# Patient Record
Sex: MALE | Race: WHITE | Employment: OTHER | URBAN - NONMETROPOLITAN AREA
[De-identification: names, ages, dates, MRNs, and addresses within clinical notes are randomized per-mention and may not be internally consistent; named-entity substitution may affect disease eponyms.]

---

## 2017-01-09 ENCOUNTER — OFFICE VISIT (OUTPATIENT)
Dept: FAMILY MEDICINE CLINIC | Age: 57
End: 2017-01-09

## 2017-01-09 VITALS
HEART RATE: 88 BPM | BODY MASS INDEX: 42.06 KG/M2 | WEIGHT: 284 LBS | HEIGHT: 69 IN | SYSTOLIC BLOOD PRESSURE: 130 MMHG | DIASTOLIC BLOOD PRESSURE: 80 MMHG | TEMPERATURE: 98.4 F

## 2017-01-09 DIAGNOSIS — J01.00 SUBACUTE MAXILLARY SINUSITIS: Primary | ICD-10-CM

## 2017-01-09 PROCEDURE — 99212 OFFICE O/P EST SF 10 MIN: CPT | Performed by: FAMILY MEDICINE

## 2017-01-09 RX ORDER — DOXYCYCLINE HYCLATE 100 MG/1
100 CAPSULE ORAL 2 TIMES DAILY
Qty: 20 CAPSULE | Refills: 0 | Status: SHIPPED | OUTPATIENT
Start: 2017-01-09 | End: 2017-01-19

## 2017-02-03 ENCOUNTER — TELEPHONE (OUTPATIENT)
Dept: UROLOGY | Age: 57
End: 2017-02-03

## 2017-02-03 ENCOUNTER — OFFICE VISIT (OUTPATIENT)
Dept: UROLOGY | Age: 57
End: 2017-02-03

## 2017-02-03 VITALS — BODY MASS INDEX: 43.4 KG/M2 | WEIGHT: 293 LBS | HEIGHT: 69 IN

## 2017-02-03 DIAGNOSIS — R97.20 ELEVATED PSA: Primary | ICD-10-CM

## 2017-02-03 PROCEDURE — 99214 OFFICE O/P EST MOD 30 MIN: CPT | Performed by: UROLOGY

## 2017-02-03 PROCEDURE — 81003 URINALYSIS AUTO W/O SCOPE: CPT | Performed by: UROLOGY

## 2017-02-03 RX ORDER — GENTAMICIN SULFATE 40 MG/ML
80 INJECTION, SOLUTION INTRAMUSCULAR; INTRAVENOUS ONCE
Qty: 2 ML | Refills: 0 | Status: SHIPPED | OUTPATIENT
Start: 2017-02-03 | End: 2017-02-03

## 2017-02-03 RX ORDER — TAMSULOSIN HCL 0.4 MG
0.4 CAPSULE ORAL DAILY
Qty: 30 CAPSULE | Refills: 2
Start: 2017-02-03 | End: 2017-04-05 | Stop reason: SDUPTHER

## 2017-02-03 RX ORDER — CIPROFLOXACIN 500 MG/1
500 TABLET, FILM COATED ORAL EVERY 12 HOURS
Qty: 6 TABLET | Refills: 0 | Status: SHIPPED | OUTPATIENT
Start: 2017-02-03 | End: 2017-02-06

## 2017-02-03 ASSESSMENT — ENCOUNTER SYMPTOMS
COLOR CHANGE: 0
DIARRHEA: 0
FACIAL SWELLING: 0
VOMITING: 0
EYE ITCHING: 0
EYE PAIN: 0
BACK PAIN: 1
CHEST TIGHTNESS: 0
SHORTNESS OF BREATH: 0

## 2017-02-06 ENCOUNTER — TELEPHONE (OUTPATIENT)
Dept: UROLOGY | Age: 57
End: 2017-02-06

## 2017-02-09 LAB
BILIRUBIN, POC: NORMAL
BLOOD URINE, POC: NORMAL
CLARITY, POC: NORMAL
COLOR, POC: NORMAL
GLUCOSE URINE, POC: NORMAL
KETONES, POC: NORMAL
LEUKOCYTE EST, POC: NORMAL
NITRITE, POC: NORMAL
PH, POC: NORMAL
PROTEIN, POC: NORMAL
SPECIFIC GRAVITY, POC: NORMAL
UROBILINOGEN, POC: NORMAL

## 2017-02-21 ENCOUNTER — TELEPHONE (OUTPATIENT)
Dept: UROLOGY | Age: 57
End: 2017-02-21

## 2017-02-21 ENCOUNTER — OFFICE VISIT (OUTPATIENT)
Dept: FAMILY MEDICINE CLINIC | Age: 57
End: 2017-02-21

## 2017-02-21 VITALS
HEART RATE: 84 BPM | BODY MASS INDEX: 42.32 KG/M2 | SYSTOLIC BLOOD PRESSURE: 132 MMHG | DIASTOLIC BLOOD PRESSURE: 76 MMHG | TEMPERATURE: 98.6 F | WEIGHT: 286.6 LBS

## 2017-02-21 DIAGNOSIS — J01.00 SUBACUTE MAXILLARY SINUSITIS: Primary | ICD-10-CM

## 2017-02-21 PROCEDURE — 99212 OFFICE O/P EST SF 10 MIN: CPT | Performed by: FAMILY MEDICINE

## 2017-02-21 RX ORDER — ALBUTEROL SULFATE 2.5 MG/3ML
2.5 SOLUTION RESPIRATORY (INHALATION) EVERY 4 HOURS PRN
Qty: 2 PACKAGE | Refills: 1 | Status: SHIPPED | OUTPATIENT
Start: 2017-02-21 | End: 2017-10-03 | Stop reason: SDUPTHER

## 2017-02-21 RX ORDER — PRAVASTATIN SODIUM 20 MG
20 TABLET ORAL DAILY
COMMUNITY
End: 2019-10-08

## 2017-02-21 RX ORDER — DOXYCYCLINE HYCLATE 100 MG/1
100 CAPSULE ORAL 2 TIMES DAILY
Qty: 20 CAPSULE | Refills: 0 | Status: SHIPPED | OUTPATIENT
Start: 2017-02-21 | End: 2017-03-03

## 2017-03-03 ENCOUNTER — TELEPHONE (OUTPATIENT)
Dept: UROLOGY | Age: 57
End: 2017-03-03

## 2017-03-24 RX ORDER — INSULIN GLARGINE 100 [IU]/ML
54 INJECTION, SOLUTION SUBCUTANEOUS 2 TIMES DAILY
Qty: 32.4 ML | Refills: 5 | Status: SHIPPED | OUTPATIENT
Start: 2017-03-24 | End: 2017-05-03

## 2017-04-05 ENCOUNTER — OFFICE VISIT (OUTPATIENT)
Dept: FAMILY MEDICINE CLINIC | Age: 57
End: 2017-04-05

## 2017-04-05 VITALS
HEIGHT: 69 IN | SYSTOLIC BLOOD PRESSURE: 124 MMHG | DIASTOLIC BLOOD PRESSURE: 76 MMHG | BODY MASS INDEX: 42.21 KG/M2 | WEIGHT: 285 LBS | HEART RATE: 100 BPM

## 2017-04-05 DIAGNOSIS — E11.9 WELL CONTROLLED TYPE 2 DIABETES MELLITUS (HCC): ICD-10-CM

## 2017-04-05 DIAGNOSIS — I25.10 CORONARY ARTERY DISEASE INVOLVING NATIVE CORONARY ARTERY OF NATIVE HEART WITHOUT ANGINA PECTORIS: Primary | ICD-10-CM

## 2017-04-05 DIAGNOSIS — E78.00 PURE HYPERCHOLESTEROLEMIA: ICD-10-CM

## 2017-04-05 DIAGNOSIS — J40 BRONCHITIS: ICD-10-CM

## 2017-04-05 PROCEDURE — 99213 OFFICE O/P EST LOW 20 MIN: CPT | Performed by: FAMILY MEDICINE

## 2017-04-05 RX ORDER — FLUTICASONE PROPIONATE 50 MCG
SPRAY, SUSPENSION (ML) NASAL
Qty: 3 BOTTLE | Refills: 1 | Status: SHIPPED | OUTPATIENT
Start: 2017-04-05 | End: 2017-10-03 | Stop reason: SDUPTHER

## 2017-04-05 RX ORDER — PANTOPRAZOLE SODIUM 40 MG/1
40 TABLET, DELAYED RELEASE ORAL DAILY
Qty: 90 TABLET | Refills: 1 | Status: SHIPPED | OUTPATIENT
Start: 2017-04-05 | End: 2017-10-03 | Stop reason: SDUPTHER

## 2017-04-05 RX ORDER — GEMFIBROZIL 600 MG/1
600 TABLET, FILM COATED ORAL 2 TIMES DAILY
Qty: 180 TABLET | Refills: 1 | Status: SHIPPED | OUTPATIENT
Start: 2017-04-05 | End: 2017-10-03 | Stop reason: SDUPTHER

## 2017-04-05 RX ORDER — ALPRAZOLAM 1 MG/1
1 TABLET ORAL 2 TIMES DAILY PRN
Qty: 180 TABLET | Refills: 1 | Status: SHIPPED | OUTPATIENT
Start: 2017-04-05 | End: 2017-10-03 | Stop reason: SDUPTHER

## 2017-04-05 RX ORDER — CLOPIDOGREL BISULFATE 75 MG/1
TABLET ORAL
Qty: 90 TABLET | Refills: 1 | Status: SHIPPED | OUTPATIENT
Start: 2017-04-05 | End: 2019-07-09 | Stop reason: SDUPTHER

## 2017-04-05 RX ORDER — ENALAPRIL MALEATE 10 MG/1
10 TABLET ORAL DAILY
Qty: 90 TABLET | Refills: 1 | Status: SHIPPED | OUTPATIENT
Start: 2017-04-05 | End: 2017-10-03 | Stop reason: SDUPTHER

## 2017-04-05 RX ORDER — TAMSULOSIN HCL 0.4 MG
0.4 CAPSULE ORAL DAILY
Qty: 30 CAPSULE | Refills: 2 | Status: SHIPPED | OUTPATIENT
Start: 2017-04-05 | End: 2017-07-11 | Stop reason: SDUPTHER

## 2017-04-05 RX ORDER — DOXYCYCLINE HYCLATE 100 MG/1
100 CAPSULE ORAL 2 TIMES DAILY
Qty: 20 CAPSULE | Refills: 0 | Status: SHIPPED | OUTPATIENT
Start: 2017-04-05 | End: 2017-04-15

## 2017-04-05 RX ORDER — METOPROLOL TARTRATE 50 MG/1
25 TABLET, FILM COATED ORAL 2 TIMES DAILY
Qty: 90 TABLET | Refills: 1 | Status: SHIPPED | OUTPATIENT
Start: 2017-04-05 | End: 2019-07-09 | Stop reason: SDUPTHER

## 2017-04-17 ENCOUNTER — TELEPHONE (OUTPATIENT)
Dept: FAMILY MEDICINE CLINIC | Age: 57
End: 2017-04-17

## 2017-04-17 RX ORDER — DOXYCYCLINE HYCLATE 100 MG/1
100 CAPSULE ORAL 2 TIMES DAILY
Qty: 20 CAPSULE | Refills: 0 | Status: SHIPPED | OUTPATIENT
Start: 2017-04-17 | End: 2017-04-27

## 2017-06-02 ENCOUNTER — TELEPHONE (OUTPATIENT)
Dept: UROLOGY | Age: 57
End: 2017-06-02

## 2017-06-02 DIAGNOSIS — R97.20 ELEVATED PSA: Primary | ICD-10-CM

## 2017-06-12 ENCOUNTER — TELEPHONE (OUTPATIENT)
Dept: UROLOGY | Age: 57
End: 2017-06-12

## 2017-07-11 ENCOUNTER — TELEPHONE (OUTPATIENT)
Dept: FAMILY MEDICINE CLINIC | Age: 57
End: 2017-07-11

## 2017-07-11 ENCOUNTER — OFFICE VISIT (OUTPATIENT)
Dept: FAMILY MEDICINE CLINIC | Age: 57
End: 2017-07-11

## 2017-07-11 VITALS
DIASTOLIC BLOOD PRESSURE: 74 MMHG | HEART RATE: 70 BPM | HEIGHT: 69 IN | WEIGHT: 285.4 LBS | BODY MASS INDEX: 42.27 KG/M2 | SYSTOLIC BLOOD PRESSURE: 138 MMHG

## 2017-07-11 DIAGNOSIS — M54.2 NECK PAIN: ICD-10-CM

## 2017-07-11 DIAGNOSIS — M25.512 ACUTE PAIN OF LEFT SHOULDER: Primary | ICD-10-CM

## 2017-07-11 DIAGNOSIS — S43.52XA ACROMIOCLAVICULAR SPRAIN, LEFT, INITIAL ENCOUNTER: ICD-10-CM

## 2017-07-11 PROCEDURE — 99213 OFFICE O/P EST LOW 20 MIN: CPT | Performed by: FAMILY MEDICINE

## 2017-07-11 RX ORDER — LEG BRACE
EACH MISCELLANEOUS
Qty: 1 EACH | Refills: 0 | Status: SHIPPED | OUTPATIENT
Start: 2017-07-11 | End: 2017-10-03

## 2017-07-11 ASSESSMENT — ENCOUNTER SYMPTOMS
SHORTNESS OF BREATH: 0
WHEEZING: 0

## 2017-07-12 RX ORDER — TAMSULOSIN HYDROCHLORIDE 0.4 MG/1
CAPSULE ORAL
Qty: 30 CAPSULE | Refills: 2 | Status: SHIPPED | OUTPATIENT
Start: 2017-07-12 | End: 2017-10-03 | Stop reason: SDUPTHER

## 2017-07-20 ENCOUNTER — HOSPITAL ENCOUNTER (OUTPATIENT)
Dept: PHYSICAL THERAPY | Age: 57
Setting detail: THERAPIES SERIES
Discharge: HOME | End: 2017-07-20
Payer: MEDICAID

## 2017-07-20 PROCEDURE — 97035 APP MDLTY 1+ULTRASOUND EA 15: CPT

## 2017-07-20 PROCEDURE — 97110 THERAPEUTIC EXERCISES: CPT

## 2017-07-20 PROCEDURE — 97163 PT EVAL HIGH COMPLEX 45 MIN: CPT

## 2017-07-20 ASSESSMENT — PAIN SCALES - GENERAL: PAINLEVEL_OUTOF10: 7

## 2017-07-20 ASSESSMENT — PAIN DESCRIPTION - ORIENTATION: ORIENTATION: LEFT

## 2017-07-20 ASSESSMENT — PAIN DESCRIPTION - LOCATION: LOCATION: SHOULDER;NECK

## 2017-07-21 ENCOUNTER — HOSPITAL ENCOUNTER (OUTPATIENT)
Dept: PHYSICAL THERAPY | Age: 57
Setting detail: THERAPIES SERIES
Discharge: HOME | End: 2017-07-21
Payer: MEDICAID

## 2017-07-21 PROCEDURE — 97110 THERAPEUTIC EXERCISES: CPT

## 2017-07-21 PROCEDURE — 97035 APP MDLTY 1+ULTRASOUND EA 15: CPT

## 2017-07-21 ASSESSMENT — PAIN SCALES - GENERAL: PAINLEVEL_OUTOF10: 7

## 2017-07-21 ASSESSMENT — PAIN DESCRIPTION - LOCATION: LOCATION: SHOULDER;NECK

## 2017-07-21 ASSESSMENT — PAIN DESCRIPTION - ORIENTATION: ORIENTATION: LEFT

## 2017-07-24 ENCOUNTER — HOSPITAL ENCOUNTER (OUTPATIENT)
Dept: PHYSICAL THERAPY | Age: 57
Setting detail: THERAPIES SERIES
Discharge: HOME | End: 2017-07-24
Payer: MEDICAID

## 2017-07-24 PROCEDURE — 97035 APP MDLTY 1+ULTRASOUND EA 15: CPT

## 2017-07-24 PROCEDURE — 97110 THERAPEUTIC EXERCISES: CPT

## 2017-07-24 ASSESSMENT — PAIN DESCRIPTION - ORIENTATION: ORIENTATION: LEFT

## 2017-07-24 ASSESSMENT — PAIN DESCRIPTION - LOCATION: LOCATION: SHOULDER;NECK

## 2017-07-24 ASSESSMENT — PAIN SCALES - GENERAL: PAINLEVEL_OUTOF10: 6

## 2017-07-25 ENCOUNTER — OFFICE VISIT (OUTPATIENT)
Dept: FAMILY MEDICINE CLINIC | Age: 57
End: 2017-07-25
Payer: MEDICAID

## 2017-07-25 VITALS
DIASTOLIC BLOOD PRESSURE: 64 MMHG | SYSTOLIC BLOOD PRESSURE: 132 MMHG | HEART RATE: 74 BPM | BODY MASS INDEX: 41.86 KG/M2 | HEIGHT: 69 IN | WEIGHT: 282.6 LBS

## 2017-07-25 DIAGNOSIS — S43.52XA ACROMIOCLAVICULAR SPRAIN, LEFT, INITIAL ENCOUNTER: ICD-10-CM

## 2017-07-25 DIAGNOSIS — M25.612 STIFFNESS OF SHOULDER JOINT, LEFT: ICD-10-CM

## 2017-07-25 DIAGNOSIS — M25.512 ACUTE PAIN OF LEFT SHOULDER: Primary | ICD-10-CM

## 2017-07-25 PROCEDURE — 99213 OFFICE O/P EST LOW 20 MIN: CPT | Performed by: FAMILY MEDICINE

## 2017-07-26 ENCOUNTER — HOSPITAL ENCOUNTER (OUTPATIENT)
Dept: PHYSICAL THERAPY | Age: 57
Setting detail: THERAPIES SERIES
Discharge: HOME | End: 2017-07-26
Payer: MEDICAID

## 2017-07-26 PROCEDURE — 97035 APP MDLTY 1+ULTRASOUND EA 15: CPT

## 2017-07-26 PROCEDURE — 97110 THERAPEUTIC EXERCISES: CPT

## 2017-07-26 ASSESSMENT — PAIN DESCRIPTION - LOCATION: LOCATION: NECK;SHOULDER

## 2017-07-26 ASSESSMENT — PAIN SCALES - GENERAL: PAINLEVEL_OUTOF10: 4

## 2017-07-31 RX ORDER — PEN NEEDLE, DIABETIC 31 GX5/16"
NEEDLE, DISPOSABLE MISCELLANEOUS
Qty: 100 EACH | Refills: 1 | Status: SHIPPED | OUTPATIENT
Start: 2017-07-31 | End: 2017-10-03 | Stop reason: SDUPTHER

## 2017-08-01 ENCOUNTER — HOSPITAL ENCOUNTER (OUTPATIENT)
Dept: PHYSICAL THERAPY | Age: 57
Setting detail: THERAPIES SERIES
Discharge: HOME | End: 2017-08-01
Payer: MEDICAID

## 2017-08-01 PROCEDURE — 97110 THERAPEUTIC EXERCISES: CPT

## 2017-08-01 PROCEDURE — 97035 APP MDLTY 1+ULTRASOUND EA 15: CPT

## 2017-08-01 ASSESSMENT — PAIN SCALES - GENERAL: PAINLEVEL_OUTOF10: 2

## 2017-08-01 ASSESSMENT — PAIN DESCRIPTION - LOCATION: LOCATION: NECK;SHOULDER

## 2017-08-01 ASSESSMENT — PAIN DESCRIPTION - ORIENTATION: ORIENTATION: LEFT

## 2017-08-03 ENCOUNTER — HOSPITAL ENCOUNTER (OUTPATIENT)
Dept: PHYSICAL THERAPY | Age: 57
Setting detail: THERAPIES SERIES
Discharge: HOME | End: 2017-08-03
Payer: MEDICAID

## 2017-08-03 PROCEDURE — 97110 THERAPEUTIC EXERCISES: CPT

## 2017-08-03 ASSESSMENT — PAIN SCALES - GENERAL: PAINLEVEL_OUTOF10: 0

## 2017-08-22 ENCOUNTER — OFFICE VISIT (OUTPATIENT)
Dept: FAMILY MEDICINE CLINIC | Age: 57
End: 2017-08-22
Payer: MEDICAID

## 2017-08-22 VITALS
DIASTOLIC BLOOD PRESSURE: 70 MMHG | HEART RATE: 72 BPM | HEIGHT: 69 IN | BODY MASS INDEX: 42.03 KG/M2 | SYSTOLIC BLOOD PRESSURE: 130 MMHG | WEIGHT: 283.8 LBS

## 2017-08-22 DIAGNOSIS — S43.52XA ACROMIOCLAVICULAR SPRAIN, LEFT, INITIAL ENCOUNTER: ICD-10-CM

## 2017-08-22 DIAGNOSIS — M25.512 ACUTE PAIN OF LEFT SHOULDER: Primary | ICD-10-CM

## 2017-08-22 DIAGNOSIS — R20.0 BILATERAL HAND NUMBNESS: ICD-10-CM

## 2017-08-22 PROCEDURE — 99213 OFFICE O/P EST LOW 20 MIN: CPT | Performed by: FAMILY MEDICINE

## 2017-10-02 ENCOUNTER — HOSPITAL ENCOUNTER (OUTPATIENT)
Dept: PHYSICAL THERAPY | Age: 57
Setting detail: THERAPIES SERIES
Discharge: HOME | End: 2017-10-02
Payer: COMMERCIAL

## 2017-10-03 ENCOUNTER — OFFICE VISIT (OUTPATIENT)
Dept: FAMILY MEDICINE CLINIC | Age: 57
End: 2017-10-03
Payer: COMMERCIAL

## 2017-10-03 VITALS
WEIGHT: 276.6 LBS | BODY MASS INDEX: 40.97 KG/M2 | HEIGHT: 69 IN | SYSTOLIC BLOOD PRESSURE: 148 MMHG | DIASTOLIC BLOOD PRESSURE: 90 MMHG | HEART RATE: 72 BPM

## 2017-10-03 DIAGNOSIS — F41.9 ANXIETY: ICD-10-CM

## 2017-10-03 DIAGNOSIS — J40 BRONCHITIS: ICD-10-CM

## 2017-10-03 DIAGNOSIS — R97.20 ELEVATED PSA: ICD-10-CM

## 2017-10-03 DIAGNOSIS — K21.9 GASTROESOPHAGEAL REFLUX DISEASE WITHOUT ESOPHAGITIS: ICD-10-CM

## 2017-10-03 DIAGNOSIS — J30.1 SEASONAL ALLERGIC RHINITIS DUE TO POLLEN: ICD-10-CM

## 2017-10-03 DIAGNOSIS — I10 ESSENTIAL HYPERTENSION: Primary | ICD-10-CM

## 2017-10-03 DIAGNOSIS — Z12.11 COLON CANCER SCREENING: ICD-10-CM

## 2017-10-03 DIAGNOSIS — E11.9 WELL CONTROLLED TYPE 2 DIABETES MELLITUS (HCC): Chronic | ICD-10-CM

## 2017-10-03 DIAGNOSIS — N40.0 BENIGN NON-NODULAR PROSTATIC HYPERPLASIA WITHOUT LOWER URINARY TRACT SYMPTOMS: ICD-10-CM

## 2017-10-03 LAB
CREATININE URINE POCT: 300
MICROALBUMIN/CREAT 24H UR: 80 MG/G{CREAT}
MICROALBUMIN/CREAT UR-RTO: NORMAL

## 2017-10-03 PROCEDURE — 82044 UR ALBUMIN SEMIQUANTITATIVE: CPT | Performed by: FAMILY MEDICINE

## 2017-10-03 PROCEDURE — 99214 OFFICE O/P EST MOD 30 MIN: CPT | Performed by: FAMILY MEDICINE

## 2017-10-03 RX ORDER — FLUTICASONE PROPIONATE 50 MCG
SPRAY, SUSPENSION (ML) NASAL
Qty: 3 BOTTLE | Refills: 1 | Status: SHIPPED | OUTPATIENT
Start: 2017-10-03 | End: 2018-04-11 | Stop reason: SDUPTHER

## 2017-10-03 RX ORDER — ALBUTEROL SULFATE 2.5 MG/3ML
2.5 SOLUTION RESPIRATORY (INHALATION) EVERY 4 HOURS PRN
Qty: 2 PACKAGE | Refills: 1 | Status: SHIPPED | OUTPATIENT
Start: 2017-10-03 | End: 2020-01-07 | Stop reason: SDUPTHER

## 2017-10-03 RX ORDER — PANTOPRAZOLE SODIUM 40 MG/1
40 TABLET, DELAYED RELEASE ORAL DAILY
Qty: 90 TABLET | Refills: 1 | Status: SHIPPED | OUTPATIENT
Start: 2017-10-03 | End: 2018-04-02 | Stop reason: SDUPTHER

## 2017-10-03 RX ORDER — GEMFIBROZIL 600 MG/1
600 TABLET, FILM COATED ORAL 2 TIMES DAILY
Qty: 180 TABLET | Refills: 1 | Status: SHIPPED | OUTPATIENT
Start: 2017-10-03 | End: 2018-04-11 | Stop reason: SDUPTHER

## 2017-10-03 RX ORDER — ENALAPRIL MALEATE 10 MG/1
10 TABLET ORAL DAILY
Qty: 90 TABLET | Refills: 1 | Status: SHIPPED | OUTPATIENT
Start: 2017-10-03 | End: 2018-01-11 | Stop reason: SDUPTHER

## 2017-10-03 RX ORDER — ALBUTEROL SULFATE 90 UG/1
2 AEROSOL, METERED RESPIRATORY (INHALATION) EVERY 4 HOURS PRN
Qty: 1 INHALER | Refills: 0 | Status: SHIPPED | OUTPATIENT
Start: 2017-10-03 | End: 2017-12-26 | Stop reason: SDUPTHER

## 2017-10-03 RX ORDER — ALPRAZOLAM 1 MG/1
1 TABLET ORAL 2 TIMES DAILY PRN
Qty: 180 TABLET | Refills: 0 | Status: SHIPPED | OUTPATIENT
Start: 2017-10-03 | End: 2018-01-01 | Stop reason: SDUPTHER

## 2017-10-03 RX ORDER — TAMSULOSIN HYDROCHLORIDE 0.4 MG/1
0.4 CAPSULE ORAL DAILY
Qty: 90 CAPSULE | Refills: 1 | Status: SHIPPED | OUTPATIENT
Start: 2017-10-03 | End: 2018-03-04 | Stop reason: SDUPTHER

## 2017-10-03 ASSESSMENT — ENCOUNTER SYMPTOMS
COUGH: 1
SHORTNESS OF BREATH: 0
WHEEZING: 0

## 2017-10-03 NOTE — MR AVS SNAPSHOT
After Visit Summary             Liv Jeff   10/3/2017 8:45 AM   Office Visit    Description:  Male : 1960   Provider:  Connor Tolliver MD   Department:  [address] [place] [address] and Future Appointments         Below is a list of your follow-up and future appointments. This may not be a complete list as you may have made appointments directly with providers that we are not aware of or your providers may have made some for you. Please call your providers to confirm appointments. It is important to keep your appointments. Please bring your current insurance card, photo ID, co-pay, and all medication bottles to your appointment. If self-pay, payment is expected at the time of service. Your To-Do List     Future Orders Complete By Expires    POCT Fecal Immunochemical Test (FIT) [[id number] Custom]  2017    Follow-Up    Return in about 3 months (around 1/3/2018) for DM, anxiety. Information from Your Visit        Department     Name Address Phone Fax    [facility] [address] [facility] [phone]      You Were Seen for:         Comments    Essential hypertension   [[id number]]         Vital Signs     Blood Pressure Pulse Height Weight Body Mass Index Smoking Status    148/90 72 5' 9\" (1.753 m) 276 lb 9.6 oz (125.5 kg) 40.85 kg/m2 Former Smoker      Additional Information about your Body Mass Index (BMI)           Your BMI as listed above is considered obese (30 or more). BMI is an estimate of body fat, calculated from your height and weight. The higher your BMI, the greater your risk of heart disease, high blood pressure, type 2 diabetes, stroke, gallstones, arthritis, sleep apnea, and certain cancers. BMI is not perfect. It may overestimate body fat in athletes and people who are more muscular.   Even a small weight loss (between 5 and 10 gemfibrozil (LOPID) 600 MG tablet Take 1 tablet by mouth 2 times daily    metFORMIN (GLUCOPHAGE) 1000 MG tablet Take 1 tablet by mouth 2 times daily (with meals)    Insulin Syringes, Disposable, U-100 1 ML MISC 1 each by Does not apply route 3 times daily 31 gauge x 8 mm  ultrafine 2    ALPRAZolam (XANAX) 1 MG tablet Take 1 tablet by mouth 2 times daily as needed for Sleep or Anxiety    enalapril (VASOTEC) 10 MG tablet Take 1 tablet by mouth daily    pantoprazole (PROTONIX) 40 MG tablet Take 1 tablet by mouth daily    insulin aspart (NOVOLOG) 100 UNIT/ML injection vial Inject 10 Units into the skin 2 times daily as needed for High Blood Sugar    fluticasone (FLONASE) 50 MCG/ACT nasal spray One spray in each nostril q hs    tamsulosin (FLOMAX) 0.4 MG capsule Take 1 capsule by mouth daily    insulin glargine (BASAGLAR KWIKPEN) 100 UNIT/ML injection pen Inject 54 Units into the skin 2 times daily    Insulin Pen Needle (B-D ULTRAFINE III SHORT PEN) 31G X 8 MM MISC 1 each by Other route 2 times daily    clopidogrel (PLAVIX) 75 MG tablet take 1 tablet by mouth once daily    metoprolol tartrate (LOPRESSOR) 50 MG tablet Take 0.5 tablets by mouth 2 times daily    pravastatin (PRAVACHOL) 20 MG tablet Take 20 mg by mouth daily    nitroGLYCERIN (NITROSTAT) 0.4 MG SL tablet Place 0.4 mg under the tongue every 5 minutes as needed for Chest pain    vitamin D (ERGOCALCIFEROL) 400 UNITS CAPS Take 400 Units by mouth daily. aspirin 325 MG tablet Take 325 mg by mouth daily.         Allergies              Darvocet [Propoxyphene N-acetaminophen]     Darvon [Propoxyphene Hcl]     Flexeril [Cyclobenzaprine] Other (See Comments)    Headache    Morphine     Motrin [Ibuprofen Micronized]     Tylenol [Acetaminophen] Nausea Only    Ultram [Tramadol] Itching    Baclofen Other (See Comments)      We Ordered/Performed the following            DIABETES FOOT EXAM     POCT microalbumin          Additional Information Basic Information     Date Of Birth Sex Race Ethnicity Preferred Language Preferred Written Language    1960 Male White Non-/Non  English English      Problem List as of 10/3/2017  Date Reviewed: 10/3/2017                CAD (coronary artery disease)    Chest pain, atypical    Chronic low back pain    Hypertriglyceridemia    Obesity    Hypertension    Hyperlipidemia    Abnormal stress test    Back pain at L4-L5 level    ORBSON (obstructive sleep apnea)    Well controlled type 2 diabetes mellitus (HCC) (Chronic)    Lumbar spinal stenosis (Chronic)    Elevated PSA      Immunizations as of 10/3/2017     Name Date    Influenza, Travis Obregon, 3 Years and older, IM 11/4/2016      Preventive Care        Date Due    Hepatitis C screening is recommended for all adults regardless of risk factors born between [facility] at least once (lifetime) who have never been tested. 1960    HIV screening is recommended for all people regardless of risk factors  aged 15-65 years at least once (lifetime) who have never been HIV tested. 7/10/1976    Urine Check For Kidney Problems 7/10/1979    Tetanus Combination Vaccine (1 - Tdap) 7/10/1980    Pneumococcal Vaccine - Pneumovax for adults aged 19-64 years with: chronic heart disease, chronic lung disease, diabetes mellitus, alcoholism, chronic liver disease, or cigarette smoking. (1 of 1 - PPSV23) 7/10/1980    Colon Cancer Stool Test 7/29/2017    Yearly Flu Vaccine (1) 9/1/2017    Diabetic Foot Exam 10/5/2017    Eye Exam By An Eye Doctor 10/18/2017    Hemoglobin A1C (Test For Long-Term Glucose Control) 6/7/2018    Cholesterol Screening 6/7/2018            MyChart Signup           Our records indicate that you have declined MyChart signup.

## 2017-10-03 NOTE — LETTER
· I will actively participate in any program designed to improve function, including social, physical, psychological and daily or work activities. 2. I will not use illegal or street drugs or another person's prescription. If I have an addiction problem with drugs or alcohol and my provider asks me to enter a program to address this issue, I agree to follow through. Such programs may include:  · 12-Step program and securing a sponsor  · Individual counseling   · Inpatient or outpatient treatment  · Other:_____________________________________________________________________________________________________________________________________________    If in treatment, I will request that a copy of the programs initial evaluation and treatment recommendations be sent to this provider and will not expect refills until that is received. I will also request written monthly updates be sent to this provider to verify my continuing treatment. 3. I will consent to drug screening upon my providers request to assure I am only taking the prescribed drugs, described in this MEDICATION AGREEMENT. I understand that a drug screen is a laboratory test in which a sample of my urine, blood or saliva is checked to see what drugs I have been taking. 4. I agree that I will treat the providers and staff at this office with respect at all times. I will keep all of my scheduled appointments, but if I need to cancel my appointment, I will do so a minimum of 24 hours before it is scheduled. 5. I understand that this provider may stop prescribing the medications listed if:  · I do not show any improvement in pain, or my activity has not improved. · I develop rapid tolerance or loss of improvement, as described in my treatment plan. · I develop significant side effects from the medication.   · My behavior is inconsistent with the responsibilities outlined above, which may also result in my being prevented from receiving further care from this office. · Other:____________________________________________________________________    AGREEMENT:    I have read the above and have had all of my questions answered. For chronic disease management, I know that my symptoms can be managed with many types of treatments. A chronic medication trial may be part of my treatment, but I must be an active participant in my care. Medication therapy is only one part of my symptom management plan. In some cases, there may be limited scientific evidence to support the chronic use of certain medications to improve symptoms and daily function. Furthermore, in certain circumstances, there may be scientific information that suggests that use of chronic controlled substances may actually worsen my symptoms and increase my risk of unintentional death directly related to this medication therapy. I know that if my provider feels my risk from controlled medications is greater than my benefit, I will have my controlled substance medication(s) compassionately lowered or removed altogether. I agree to a controlled substance medication trial.      I further agree to allow this office to contact family or friends if there are concerns about my safety and use of the controlled medications. I have agreed to use the following medications above as instructed by my physician and as stated in this [id number].      Patient Signature:  ______________________  Date:10/3/2017 or _____________    Provider Signature:______________________  Date:10/3/2017 or _____________

## 2017-10-03 NOTE — PROGRESS NOTES
[facility] PROFESSIONAL SERVS  [facility]  [address] [address]  Dept: [phone]  Dept Fax: [phone]  Loc: [phone]  PROGRESS NOTE      Visit Date: 10/3/2017    Julianna Quinteros is a 65 y.o. male who presents today for:  Chief Complaint   Patient presents with    6 Month Follow-Up    Hyperlipidemia    Hypertension    Diabetes       Subjective:  HPI    6 month f/u for    DM: Takes basaglar 54 units twice daily. Takes novolog 10 units twice daily. On metformin twice daily. Glucose 154 this morning. All glucose less than 160. He does arm weight lifting. a1c was 6.8% in June. HTN:  On metoprolol and enalapril. Anxiety/sleep:  Takes xanax 1mg 2x per day which is effective. Elevated PSA. He has not had a biopsy. He is being followed by urology. Has a cough and URI symptoms    Review of Systems   Constitutional: Negative for chills and fever. Respiratory: Positive for cough. Negative for shortness of breath and wheezing. Cardiovascular: Negative for chest pain and leg swelling. Past Medical History:   Diagnosis Date    Abnormal stress test Sept 2012    Lateral Wall Ischemia- done at [facility]    CAD (coronary artery disease)     Chronic low back pain     Diabetes mellitus ([address]     Diabetes mellitus ([address]     Hyperlipidemia     Hypertension     Hypertriglyceridemia     MI (myocardial infarction) ([address] 2004    Obesity       Past Surgical History:   Procedure Laterality Date    BACK SURGERY  November 1997    L4 & L5 fusion   [facility] CARDIAC SURGERY  October 22, 2004    Cardiac cath with stent placement x1    COLONOSCOPY  October 2010   [address]    Mesh repair in abdomen.      Family History   Problem Relation Age of Onset    Diabetes Mother     Heart Disease Mother     Arthritis Father     Diabetes Father     Heart Disease Father     Diabetes Sister     Diabetes Brother     Heart Disease Brother      Social History   Substance Use Topics    Smoking status: Former Smoker     Packs/day: 0.25     Years: 2.00     Types: Cigarettes     Quit date: 1/1/1995    Smokeless tobacco: Never Used    Alcohol use No      Current Outpatient Prescriptions   Medication Sig Dispense Refill    B-D ULTRAFINE III SHORT PEN 31G X 8 MM MISC USE WITH BASAGLAR TWICE DAILY. 100 each 1    BASAGLAR KWIKPEN 100 UNIT/ML injection pen INJECT 54 UNITS SUBCUTANEOUSLY TWICE A DAY.  30 Pen 1    tamsulosin (FLOMAX) 0.4 MG capsule take 1 capsule by mouth once daily 30 capsule 2    clopidogrel (PLAVIX) 75 MG tablet take 1 tablet by mouth once daily 90 tablet 1    fluticasone (FLONASE) 50 MCG/ACT nasal spray One spray in each nostril q hs 3 Bottle 1    insulin aspart (NOVOLOG) 100 UNIT/ML injection vial Inject 10 Units into the skin 2 times daily as needed for High Blood Sugar 3 vial 1    pantoprazole (PROTONIX) 40 MG tablet Take 1 tablet by mouth daily 90 tablet 1    enalapril (VASOTEC) 10 MG tablet Take 1 tablet by mouth daily 90 tablet 1    ALPRAZolam (XANAX) 1 MG tablet Take 1 tablet by mouth 2 times daily as needed for Sleep or Anxiety 180 tablet 1    metFORMIN (GLUCOPHAGE) 1000 MG tablet Take 1 tablet by mouth 2 times daily (with meals) 180 tablet 1    Insulin Syringes, Disposable, U-100 1 ML MISC 1 each by Does not apply route 3 times daily 31 gauge x 8 mm  ultrafine 2 100 each 5    metoprolol tartrate (LOPRESSOR) 50 MG tablet Take 0.5 tablets by mouth 2 times daily 90 tablet 1    gemfibrozil (LOPID) 600 MG tablet Take 1 tablet by mouth 2 times daily 180 tablet 1    pravastatin (PRAVACHOL) 20 MG tablet Take 20 mg by mouth daily      albuterol (PROVENTIL) (2.5 MG/3ML) 0.083% nebulizer solution Take 3 mLs by nebulization every 4 hours as needed for Wheezing 2 Package 1    albuterol sulfate HFA (VENTOLIN HFA) 108 (90 BASE) MCG/ACT inhaler Inhale 2 puffs into the lungs every 4 hours as needed for Wheezing 1 Inhaler 0    nitroGLYCERIN Benign non-nodular prostatic hyperplasia without lower urinary tract symptoms  controlled  -refill tamsulosin (FLOMAX) 0.4 MG capsule; Take 1 capsule by mouth daily  Dispense: 90 capsule; Refill: 1    7. Bronchitis  -intermittent bronchitis symptoms when he gets sick. He has albuterol he keeps for these episodes. - albuterol sulfate HFA (VENTOLIN HFA) 108 (90 Base) MCG/ACT inhaler; Inhale 2 puffs into the lungs every 4 hours as needed for Wheezing  Dispense: 1 Inhaler; Refill: 0  - albuterol (PROVENTIL) (2.5 MG/3ML) 0.083% nebulizer solution; Take 3 mLs by nebulization every 4 hours as needed for Wheezing  Dispense: 2 Package; Refill: 1    8. Gastroesophageal reflux disease without esophagitis  controlled  -refill pantoprazole (PROTONIX) 40 MG tablet; Take 1 tablet by mouth daily  Dispense: 90 tablet; Refill: 1    9. Colon cancer screening  - POCT Fecal Immunochemical Test (FIT); Future    Controlled Substances Monitoring:     Attestation: The Prescription Monitoring Report for this patient was reviewed today. Bernadette Yi MD)  Documentation: Possible medication side effects, risk of tolerance and/or dependence, and alternative treatments discussed., No signs of potential drug abuse or diversion identified. Bernadette Yi MD)  Medication Contracts: Medication contract signed today. Bernadette Yi MD)      They voiced understanding. All questions answered. They agreed with treatment plan. Educational materials given - see patient instructions. Discussed use, benefit, and side effects of prescribed medications. Reviewed health maintenance. Return in about 3 months (around 1/3/2018) for DM, anxiety.        Electronically signed by Bernadette Yi MD on 10/3/2017 at 8:45 AM

## 2017-10-03 NOTE — PATIENT INSTRUCTIONS
soft. Use moisturizing skin cream to keep the skin on your feet soft and prevent calluses and cracks. But do not put the cream between your toes, and stop using any cream that causes a rash. ¨ Clean underneath your toenails carefully. Do not use a sharp object to clean underneath your toenails. Use the blunt end of a nail file or other rounded tool. ¨ Trim and file your toenails straight across to prevent ingrown toenails. Use a nail clipper, not scissors. Use an emery board to smooth the edges. · Change socks daily. Socks without seams are best, because seams often rub the feet. You can find socks for people with diabetes from specialty catalogs. · Look inside your shoes every day for things like gravel or torn linings, which could cause blisters or sores. · Buy shoes that fit well:  ¨ Look for shoes that have plenty of space around the toes. This helps prevent bunions and blisters. ¨ Try on shoes while wearing the kind of socks you will usually wear with the shoes. ¨ Avoid plastic shoes. They may rub your feet and cause blisters. Good shoes should be made of materials that are flexible and breathable, such as leather or cloth. ¨ Break in new shoes slowly by wearing them for no more than an hour a day for several days. Take extra time to check your feet for red areas, blisters, or other problems after you wear new shoes. · Do not go barefoot. Do not wear sandals, and do not wear shoes with very thin soles. Thin soles are easy to puncture. They also do not protect your feet from hot pavement or cold weather. · Have your doctor check your feet during each visit. If you have a foot problem, see your doctor. Do not try to treat an early foot problem at home. Home remedies or treatments that you can buy without a prescription (such as corn removers) can be harmful. · Always get early treatment for foot problems. A minor irritation can lead to a major problem if not properly cared for early.   When should you

## 2017-11-06 ENCOUNTER — TELEPHONE (OUTPATIENT)
Dept: FAMILY MEDICINE CLINIC | Age: 57
End: 2017-11-06

## 2017-11-06 DIAGNOSIS — Z12.11 COLON CANCER SCREENING: ICD-10-CM

## 2017-11-06 LAB
CONTROL: PRESENT
HEMOCCULT STL QL: NEGATIVE

## 2017-11-06 PROCEDURE — 82274 ASSAY TEST FOR BLOOD FECAL: CPT | Performed by: FAMILY MEDICINE

## 2017-12-26 ENCOUNTER — OFFICE VISIT (OUTPATIENT)
Dept: FAMILY MEDICINE CLINIC | Age: 57
End: 2017-12-26
Payer: COMMERCIAL

## 2017-12-26 VITALS
WEIGHT: 292 LBS | OXYGEN SATURATION: 95 % | TEMPERATURE: 98.4 F | DIASTOLIC BLOOD PRESSURE: 90 MMHG | HEART RATE: 88 BPM | BODY MASS INDEX: 43.25 KG/M2 | HEIGHT: 69 IN | SYSTOLIC BLOOD PRESSURE: 166 MMHG

## 2017-12-26 DIAGNOSIS — J20.8 ACUTE BRONCHITIS DUE TO OTHER SPECIFIED ORGANISMS: Primary | ICD-10-CM

## 2017-12-26 DIAGNOSIS — J40 BRONCHITIS: ICD-10-CM

## 2017-12-26 PROCEDURE — G8598 ASA/ANTIPLAT THER USED: HCPCS | Performed by: FAMILY MEDICINE

## 2017-12-26 PROCEDURE — 99213 OFFICE O/P EST LOW 20 MIN: CPT | Performed by: FAMILY MEDICINE

## 2017-12-26 PROCEDURE — 3017F COLORECTAL CA SCREEN DOC REV: CPT | Performed by: FAMILY MEDICINE

## 2017-12-26 PROCEDURE — G8484 FLU IMMUNIZE NO ADMIN: HCPCS | Performed by: FAMILY MEDICINE

## 2017-12-26 PROCEDURE — 1036F TOBACCO NON-USER: CPT | Performed by: FAMILY MEDICINE

## 2017-12-26 PROCEDURE — G8417 CALC BMI ABV UP PARAM F/U: HCPCS | Performed by: FAMILY MEDICINE

## 2017-12-26 PROCEDURE — G8427 DOCREV CUR MEDS BY ELIG CLIN: HCPCS | Performed by: FAMILY MEDICINE

## 2017-12-26 RX ORDER — BENZONATATE 100 MG/1
100 CAPSULE ORAL 3 TIMES DAILY PRN
Qty: 20 CAPSULE | Refills: 0 | Status: SHIPPED | OUTPATIENT
Start: 2017-12-26 | End: 2018-01-11 | Stop reason: CLARIF

## 2017-12-26 RX ORDER — ALBUTEROL SULFATE 90 UG/1
2 AEROSOL, METERED RESPIRATORY (INHALATION) EVERY 4 HOURS PRN
Qty: 1 INHALER | Refills: 0 | Status: SHIPPED | OUTPATIENT
Start: 2017-12-26 | End: 2019-05-22

## 2017-12-26 RX ORDER — DOXYCYCLINE HYCLATE 100 MG
100 TABLET ORAL 2 TIMES DAILY
Qty: 20 TABLET | Refills: 0 | Status: SHIPPED | OUTPATIENT
Start: 2017-12-26 | End: 2018-01-11 | Stop reason: CLARIF

## 2017-12-26 NOTE — PROGRESS NOTES
HFA) 108 (90 Base) MCG/ACT inhaler Inhale 2 puffs into the lungs every 4 hours as needed for Wheezing 1 Inhaler 0    albuterol (PROVENTIL) (2.5 MG/3ML) 0.083% nebulizer solution Take 3 mLs by nebulization every 4 hours as needed for Wheezing 2 Package 1    gemfibrozil (LOPID) 600 MG tablet Take 1 tablet by mouth 2 times daily 180 tablet 1    metFORMIN (GLUCOPHAGE) 1000 MG tablet Take 1 tablet by mouth 2 times daily (with meals) 180 tablet 1    Insulin Syringes, Disposable, U-100 1 ML MISC 1 each by Does not apply route 3 times daily 31 gauge x 8 mm  ultrafine 2 100 each 5    ALPRAZolam (XANAX) 1 MG tablet Take 1 tablet by mouth 2 times daily as needed for Sleep or Anxiety 180 tablet 0    enalapril (VASOTEC) 10 MG tablet Take 1 tablet by mouth daily 90 tablet 1    pantoprazole (PROTONIX) 40 MG tablet Take 1 tablet by mouth daily 90 tablet 1    insulin aspart (NOVOLOG) 100 UNIT/ML injection vial Inject 10 Units into the skin 2 times daily as needed for High Blood Sugar 3 vial 1    fluticasone (FLONASE) 50 MCG/ACT nasal spray One spray in each nostril q hs 3 Bottle 1    tamsulosin (FLOMAX) 0.4 MG capsule Take 1 capsule by mouth daily 90 capsule 1    insulin glargine (BASAGLAR KWIKPEN) 100 UNIT/ML injection pen Inject 54 Units into the skin 2 times daily 33 mL 1    Insulin Pen Needle (B-D ULTRAFINE III SHORT PEN) 31G X 8 MM MISC 1 each by Other route 2 times daily 100 each 3    clopidogrel (PLAVIX) 75 MG tablet take 1 tablet by mouth once daily 90 tablet 1    metoprolol tartrate (LOPRESSOR) 50 MG tablet Take 0.5 tablets by mouth 2 times daily 90 tablet 1    pravastatin (PRAVACHOL) 20 MG tablet Take 20 mg by mouth daily      nitroGLYCERIN (NITROSTAT) 0.4 MG SL tablet Place 0.4 mg under the tongue every 5 minutes as needed for Chest pain      vitamin D (ERGOCALCIFEROL) 400 UNITS CAPS Take 400 Units by mouth daily.  aspirin 325 MG tablet Take 325 mg by mouth daily.          No current facility-administered medications for this visit. Allergies   Allergen Reactions    Darvocet [Propoxyphene N-Acetaminophen]     Darvon [Propoxyphene Hcl]     Flexeril [Cyclobenzaprine] Other (See Comments)     Headache    Morphine     Motrin [Ibuprofen Micronized]     Tylenol [Acetaminophen] Nausea Only    Ultram [Tramadol] Itching    Baclofen Other (See Comments)     Health Maintenance   Topic Date Due    Hepatitis C screen  1960    HIV screen  07/10/1976    DTaP/Tdap/Td vaccine (1 - Tdap) 07/10/1980    Pneumococcal med risk (1 of 1 - PPSV23) 07/10/1980    Flu vaccine (1) 09/01/2017    Diabetic retinal exam  10/18/2017    Diabetic hemoglobin A1C test  06/07/2018    Lipid screen  06/07/2018    Diabetic foot exam  10/03/2018    Diabetic microalbuminuria test  10/03/2018    Colon Cancer Screen FIT/FOBT  11/06/2018       Objective:     BP (!) 162/90 (Site: Left Arm, Position: Sitting, Cuff Size: Large Adult)   Pulse 88   Temp 98.4 °F (36.9 °C)   Ht 5' 9\" (1.753 m)   Wt 292 lb (132.5 kg)   SpO2 95%   BMI 43.12 kg/m²   Physical Exam   Constitutional: He is oriented to person, place, and time. He appears well-developed and well-nourished. No distress. HENT:   Right Ear: External ear normal.   Left Ear: External ear normal.   Nose: Mucosal edema and rhinorrhea present. Right sinus exhibits no maxillary sinus tenderness and no frontal sinus tenderness. Left sinus exhibits no maxillary sinus tenderness and no frontal sinus tenderness. Mouth/Throat: Oropharynx is clear and moist. No oropharyngeal exudate. Cardiovascular: Normal rate and regular rhythm. No murmur heard. Pulmonary/Chest: Effort normal and breath sounds normal. No respiratory distress. He has no wheezes. Neurological: He is alert and oriented to person, place, and time. Vitals reviewed. Harsh cough    Impression/Plan:  1. Acute bronchitis due to other specified organisms  Acute bronchitis.   Viral vs bacterial.

## 2018-01-11 ENCOUNTER — OFFICE VISIT (OUTPATIENT)
Dept: FAMILY MEDICINE CLINIC | Age: 58
End: 2018-01-11
Payer: COMMERCIAL

## 2018-01-11 VITALS
HEART RATE: 78 BPM | SYSTOLIC BLOOD PRESSURE: 154 MMHG | OXYGEN SATURATION: 98 % | HEIGHT: 69 IN | DIASTOLIC BLOOD PRESSURE: 82 MMHG | BODY MASS INDEX: 43.07 KG/M2 | WEIGHT: 290.8 LBS

## 2018-01-11 DIAGNOSIS — F41.9 ANXIETY: ICD-10-CM

## 2018-01-11 DIAGNOSIS — E11.9 WELL CONTROLLED TYPE 2 DIABETES MELLITUS (HCC): Primary | Chronic | ICD-10-CM

## 2018-01-11 DIAGNOSIS — Z23 NEED FOR INFLUENZA VACCINATION: ICD-10-CM

## 2018-01-11 DIAGNOSIS — Z23 NEED FOR PNEUMOCOCCAL VACCINE: ICD-10-CM

## 2018-01-11 DIAGNOSIS — I10 ESSENTIAL HYPERTENSION: ICD-10-CM

## 2018-01-11 LAB
ALBUMIN SERPL-MCNC: 4.3 G/DL (ref 3.5–5.1)
ALP BLD-CCNC: 104 U/L (ref 38–126)
ALT SERPL-CCNC: 16 U/L (ref 11–66)
ANION GAP SERPL CALCULATED.3IONS-SCNC: 15 MEQ/L (ref 8–16)
AST SERPL-CCNC: 16 U/L (ref 5–40)
AVERAGE GLUCOSE: 201 MG/DL (ref 70–126)
BILIRUB SERPL-MCNC: 0.3 MG/DL (ref 0.3–1.2)
BUN BLDV-MCNC: 11 MG/DL (ref 7–22)
CALCIUM SERPL-MCNC: 10.1 MG/DL (ref 8.5–10.5)
CHLORIDE BLD-SCNC: 99 MEQ/L (ref 98–111)
CHOLESTEROL, TOTAL: 166 MG/DL (ref 100–199)
CO2: 26 MEQ/L (ref 23–33)
CREAT SERPL-MCNC: 0.7 MG/DL (ref 0.4–1.2)
GFR SERPL CREATININE-BSD FRML MDRD: > 90 ML/MIN/1.73M2
GLUCOSE BLD-MCNC: 153 MG/DL (ref 70–108)
HBA1C MFR BLD: 8.7 % (ref 4.4–6.4)
HCT VFR BLD CALC: 40.7 % (ref 42–52)
HDLC SERPL-MCNC: 36 MG/DL
HEMOGLOBIN: 13.2 GM/DL (ref 14–18)
LDL CHOLESTEROL CALCULATED: 92 MG/DL
MCH RBC QN AUTO: 25.4 PG (ref 27–31)
MCHC RBC AUTO-ENTMCNC: 32.4 GM/DL (ref 33–37)
MCV RBC AUTO: 78.4 FL (ref 80–94)
PDW BLD-RTO: 15.6 % (ref 11.5–14.5)
PLATELET # BLD: 281 THOU/MM3 (ref 130–400)
PMV BLD AUTO: 8.5 MCM (ref 7.4–10.4)
POTASSIUM SERPL-SCNC: 4.7 MEQ/L (ref 3.5–5.2)
RBC # BLD: 5.19 MILL/MM3 (ref 4.7–6.1)
SODIUM BLD-SCNC: 140 MEQ/L (ref 135–145)
TOTAL PROTEIN: 7.4 G/DL (ref 6.1–8)
TRIGL SERPL-MCNC: 189 MG/DL (ref 0–199)
WBC # BLD: 7.6 THOU/MM3 (ref 4.8–10.8)

## 2018-01-11 PROCEDURE — G8427 DOCREV CUR MEDS BY ELIG CLIN: HCPCS | Performed by: FAMILY MEDICINE

## 2018-01-11 PROCEDURE — 1036F TOBACCO NON-USER: CPT | Performed by: FAMILY MEDICINE

## 2018-01-11 PROCEDURE — 3017F COLORECTAL CA SCREEN DOC REV: CPT | Performed by: FAMILY MEDICINE

## 2018-01-11 PROCEDURE — G8484 FLU IMMUNIZE NO ADMIN: HCPCS | Performed by: FAMILY MEDICINE

## 2018-01-11 PROCEDURE — 90471 IMMUNIZATION ADMIN: CPT | Performed by: FAMILY MEDICINE

## 2018-01-11 PROCEDURE — 90472 IMMUNIZATION ADMIN EACH ADD: CPT | Performed by: FAMILY MEDICINE

## 2018-01-11 PROCEDURE — 99214 OFFICE O/P EST MOD 30 MIN: CPT | Performed by: FAMILY MEDICINE

## 2018-01-11 PROCEDURE — G8598 ASA/ANTIPLAT THER USED: HCPCS | Performed by: FAMILY MEDICINE

## 2018-01-11 PROCEDURE — 3046F HEMOGLOBIN A1C LEVEL >9.0%: CPT | Performed by: FAMILY MEDICINE

## 2018-01-11 PROCEDURE — G8417 CALC BMI ABV UP PARAM F/U: HCPCS | Performed by: FAMILY MEDICINE

## 2018-01-11 PROCEDURE — 90732 PPSV23 VACC 2 YRS+ SUBQ/IM: CPT | Performed by: FAMILY MEDICINE

## 2018-01-11 PROCEDURE — 90688 IIV4 VACCINE SPLT 0.5 ML IM: CPT | Performed by: FAMILY MEDICINE

## 2018-01-11 PROCEDURE — 36415 COLL VENOUS BLD VENIPUNCTURE: CPT | Performed by: FAMILY MEDICINE

## 2018-01-11 RX ORDER — ENALAPRIL MALEATE 20 MG/1
20 TABLET ORAL DAILY
Qty: 90 TABLET | Refills: 1 | Status: SHIPPED | OUTPATIENT
Start: 2018-01-11 | End: 2018-07-11 | Stop reason: SDUPTHER

## 2018-01-11 ASSESSMENT — ENCOUNTER SYMPTOMS
WHEEZING: 0
COUGH: 1
SHORTNESS OF BREATH: 0

## 2018-01-11 ASSESSMENT — PATIENT HEALTH QUESTIONNAIRE - PHQ9
1. LITTLE INTEREST OR PLEASURE IN DOING THINGS: 0
SUM OF ALL RESPONSES TO PHQ QUESTIONS 1-9: 0
SUM OF ALL RESPONSES TO PHQ9 QUESTIONS 1 & 2: 0
2. FEELING DOWN, DEPRESSED OR HOPELESS: 0

## 2018-01-11 NOTE — PATIENT INSTRUCTIONS
you take decongestants or anti-inflammatory medicine, such as ibuprofen. Some of these medicines can raise blood pressure. · Learn how to check your blood pressure at home. Lifestyle changes  · Stay at a healthy weight. This is especially important if you put on weight around the waist. Losing even 10 pounds can help you lower your blood pressure. · If your doctor recommends it, get more exercise. Walking is a good choice. Bit by bit, increase the amount you walk every day. Try for at least 30 minutes on most days of the week. You also may want to swim, bike, or do other activities. · Avoid or limit alcohol. Talk to your doctor about whether you can drink any alcohol. · Try to limit how much sodium you eat to less than 2,300 milligrams (mg) a day. Your doctor may ask you to try to eat less than 1,500 mg a day. · Eat plenty of fruits (such as bananas and oranges), vegetables, legumes, whole grains, and low-fat dairy products. · Lower the amount of saturated fat in your diet. Saturated fat is found in animal products such as milk, cheese, and meat. Limiting these foods may help you lose weight and also lower your risk for heart disease. · Do not smoke. Smoking increases your risk for heart attack and stroke. If you need help quitting, talk to your doctor about stop-smoking programs and medicines. These can increase your chances of quitting for good. When should you call for help? Call 911 anytime you think you may need emergency care. This may mean having symptoms that suggest that your blood pressure is causing a serious heart or blood vessel problem. Your blood pressure may be over 180/110. ? For example, call 911 if:  ? · You have symptoms of a heart attack. These may include:  ¨ Chest pain or pressure, or a strange feeling in the chest.  ¨ Sweating. ¨ Shortness of breath. ¨ Nausea or vomiting.   ¨ Pain, pressure, or a strange feeling in the back, neck, jaw, or upper belly or in one or both shoulders or arms.  ¨ Lightheadedness or sudden weakness. ¨ A fast or irregular heartbeat. ? · You have symptoms of a stroke. These may include:  ¨ Sudden numbness, tingling, weakness, or loss of movement in your face, arm, or leg, especially on only one side of your body. ¨ Sudden vision changes. ¨ Sudden trouble speaking. ¨ Sudden confusion or trouble understanding simple statements. ¨ Sudden problems with walking or balance. ¨ A sudden, severe headache that is different from past headaches. ? · You have severe back or belly pain. ?Do not wait until your blood pressure comes down on its own. Get help right away. ?Call your doctor now or seek immediate care if:  ? · Your blood pressure is much higher than normal (such as 180/110 or higher), but you don't have symptoms. ? · You think high blood pressure is causing symptoms, such as:  ¨ Severe headache. ¨ Blurry vision. ? Watch closely for changes in your health, and be sure to contact your doctor if:  ? · Your blood pressure measures 140/90 or higher at least 2 times. That means the top number is 140 or higher or the bottom number is 90 or higher, or both. ? · You think you may be having side effects from your blood pressure medicine. ? · Your blood pressure is usually normal, but it goes above normal at least 2 times. Where can you learn more? Go to [email or web address]. org and sign in to your [employer] account. Enter N114 in the [place] box to learn more about \"High Blood Pressure: Care Instructions. \"     If you do not have an account, please click on the \"Sign Up Now\" link. Current as of: Tsering 10, 2017  Content Version: 11.5  © 4240-5884 Healthwise, [employer]. Care instructions adapted under license by [facility] ([facility]).  If you have questions about a medical condition or this instruction, always ask your healthcare professional. Nick Chung any warranty or liability for your use of this information.

## 2018-01-11 NOTE — PROGRESS NOTES
Venipuncture obtained from right hand. Patient tolerated the procedure without complications or complaints.

## 2018-01-11 NOTE — PROGRESS NOTES
Current Outpatient Prescriptions   Medication Sig Dispense Refill    ALPRAZolam (XANAX) 1 MG tablet TAKE 1 TABLET BY MOUTH 2 TIMES A DAY AS NEEDED FOR SLEEP OR ANXIETY 60 tablet 2    albuterol sulfate HFA (VENTOLIN HFA) 108 (90 Base) MCG/ACT inhaler Inhale 2 puffs into the lungs every 4 hours as needed for Wheezing 1 Inhaler 0    albuterol (PROVENTIL) (2.5 MG/3ML) 0.083% nebulizer solution Take 3 mLs by nebulization every 4 hours as needed for Wheezing 2 Package 1    gemfibrozil (LOPID) 600 MG tablet Take 1 tablet by mouth 2 times daily 180 tablet 1    metFORMIN (GLUCOPHAGE) 1000 MG tablet Take 1 tablet by mouth 2 times daily (with meals) 180 tablet 1    Insulin Syringes, Disposable, U-100 1 ML MISC 1 each by Does not apply route 3 times daily 31 gauge x 8 mm  ultrafine 2 100 each 5    enalapril (VASOTEC) 10 MG tablet Take 1 tablet by mouth daily 90 tablet 1    pantoprazole (PROTONIX) 40 MG tablet Take 1 tablet by mouth daily 90 tablet 1    insulin aspart (NOVOLOG) 100 UNIT/ML injection vial Inject 10 Units into the skin 2 times daily as needed for High Blood Sugar 3 vial 1    fluticasone (FLONASE) 50 MCG/ACT nasal spray One spray in each nostril q hs 3 Bottle 1    tamsulosin (FLOMAX) 0.4 MG capsule Take 1 capsule by mouth daily 90 capsule 1    Insulin Pen Needle (B-D ULTRAFINE III SHORT PEN) 31G X 8 MM MISC 1 each by Other route 2 times daily 100 each 3    clopidogrel (PLAVIX) 75 MG tablet take 1 tablet by mouth once daily 90 tablet 1    metoprolol tartrate (LOPRESSOR) 50 MG tablet Take 0.5 tablets by mouth 2 times daily 90 tablet 1    pravastatin (PRAVACHOL) 20 MG tablet Take 20 mg by mouth daily      nitroGLYCERIN (NITROSTAT) 0.4 MG SL tablet Place 0.4 mg under the tongue every 5 minutes as needed for Chest pain      vitamin D (ERGOCALCIFEROL) 400 UNITS CAPS Take 400 Units by mouth daily.  aspirin 325 MG tablet Take 325 mg by mouth daily.         insulin glargine [facility]) daily  Dispense: 90 tablet; Refill: 1  - CBC  - Comprehensive Metabolic Panel    3. Anxiety  Controlled  -continue xanax. Had recent rx filled    4. Need for pneumococcal vaccine  - Pneumococcal polysaccharide vaccine 23-valent >= 3yo subcutaneous/IM (PNEUMOVAX 23)    5. Need for influenza vaccination  - INFLUENZA, QUADV, 3 YRS AND OLDER, IM, MDV, 0.5ML (FLUZONE QUADV)      Controlled Substances Monitoring:     Attestation: The Prescription Monitoring Report for this patient was reviewed today. Chucho Alston MD)  Documentation: Possible medication side effects, risk of tolerance and/or dependence, and alternative treatments discussed., No signs of potential drug abuse or diversion identified. Chucho Alston MD)  Medication Contracts: Existing medication contract. Chucho Alston MD)      They voiced understanding. All questions answered. They agreed with treatment plan. Educational materials given - see patient instructions. Discussed use, benefit, and side effects of prescribed medications. Reviewed health maintenance. Return in about 3 months (around 4/11/2018) for HTN, anxiety; nurse visit 2 weeks for BP. Sendy Gandhi received counseling on the following healthy behaviors: nutrition and exercise  Reviewed prior labs and health maintenance  Continue current medications, diet and exercise. Discussed use, benefit, and side effects of prescribed medications. Barriers to medication compliance addressed. Patient given educational materials - see patient instructions  Was a self-tracking handout given in paper form or via USEUMt? No:     Requested Prescriptions     Signed Prescriptions Disp Refills    enalapril (VASOTEC) 20 MG tablet 90 tablet 1     Sig: Take 1 tablet by mouth daily       All patient questions answered. Patient voiced understanding. Quality Measures    Body mass index is 42.94 kg/m². Elevated. Weight control planned discussed Healthy diet and regular exercise.     BP: (!) 154/82

## 2018-01-12 ENCOUNTER — TELEPHONE (OUTPATIENT)
Dept: FAMILY MEDICINE CLINIC | Age: 58
End: 2018-01-12

## 2018-01-25 ENCOUNTER — NURSE ONLY (OUTPATIENT)
Dept: FAMILY MEDICINE CLINIC | Age: 58
End: 2018-01-25

## 2018-01-25 VITALS — SYSTOLIC BLOOD PRESSURE: 134 MMHG | HEART RATE: 80 BPM | DIASTOLIC BLOOD PRESSURE: 72 MMHG

## 2018-01-25 DIAGNOSIS — Z01.30 BLOOD PRESSURE CHECK: Primary | ICD-10-CM

## 2018-02-10 ENCOUNTER — HOSPITAL ENCOUNTER (EMERGENCY)
Age: 58
Discharge: HOME | End: 2018-02-10
Attending: NURSE PRACTITIONER
Payer: COMMERCIAL

## 2018-02-10 VITALS
TEMPERATURE: 97.8 F | BODY MASS INDEX: 42.36 KG/M2 | HEART RATE: 84 BPM | WEIGHT: 286 LBS | RESPIRATION RATE: 22 BRPM | SYSTOLIC BLOOD PRESSURE: 152 MMHG | DIASTOLIC BLOOD PRESSURE: 68 MMHG | HEIGHT: 69 IN | OXYGEN SATURATION: 92 %

## 2018-02-10 DIAGNOSIS — H10.9 CONJUNCTIVITIS OF RIGHT EYE, UNSPECIFIED CONJUNCTIVITIS TYPE: ICD-10-CM

## 2018-02-10 DIAGNOSIS — J01.00 ACUTE MAXILLARY SINUSITIS, RECURRENCE NOT SPECIFIED: Primary | ICD-10-CM

## 2018-02-10 PROCEDURE — 99213 OFFICE O/P EST LOW 20 MIN: CPT | Performed by: NURSE PRACTITIONER

## 2018-02-10 PROCEDURE — 99212 OFFICE O/P EST SF 10 MIN: CPT

## 2018-02-10 RX ORDER — GENTAMICIN SULFATE 3 MG/ML
1 SOLUTION/ DROPS OPHTHALMIC 4 TIMES DAILY
Qty: 1 BOTTLE | Refills: 0 | Status: SHIPPED | OUTPATIENT
Start: 2018-02-10 | End: 2018-02-20

## 2018-02-10 RX ORDER — CEFDINIR 300 MG/1
300 CAPSULE ORAL 2 TIMES DAILY
Qty: 20 CAPSULE | Refills: 0 | Status: SHIPPED | OUTPATIENT
Start: 2018-02-10 | End: 2018-02-20

## 2018-02-10 ASSESSMENT — ENCOUNTER SYMPTOMS
SINUS PRESSURE: 1
EYE REDNESS: 1
EYE DISCHARGE: 1
COUGH: 1
VOMITING: 0
SORE THROAT: 0
SINUS PAIN: 1
DIARRHEA: 0
FACIAL SWELLING: 1
ABDOMINAL PAIN: 0
NAUSEA: 0

## 2018-02-10 ASSESSMENT — PAIN SCALES - GENERAL: PAINLEVEL_OUTOF10: 7

## 2018-02-10 ASSESSMENT — PAIN DESCRIPTION - FREQUENCY: FREQUENCY: CONTINUOUS

## 2018-02-10 ASSESSMENT — PAIN DESCRIPTION - PAIN TYPE: TYPE: ACUTE PAIN

## 2018-02-10 ASSESSMENT — PAIN DESCRIPTION - DESCRIPTORS: DESCRIPTORS: ACHING

## 2018-02-10 ASSESSMENT — PAIN DESCRIPTION - PROGRESSION: CLINICAL_PROGRESSION: NOT CHANGED

## 2018-02-10 ASSESSMENT — PAIN DESCRIPTION - LOCATION: LOCATION: HEAD

## 2018-02-10 NOTE — ED PROVIDER NOTES
mg by mouth daily      nitroGLYCERIN (NITROSTAT) 0.4 MG SL tablet Place 0.4 mg under the tongue every 5 minutes as needed for Chest pain      vitamin D (ERGOCALCIFEROL) 400 UNITS CAPS Take 400 Units by mouth daily. aspirin 325 MG tablet Take 325 mg by mouth daily. ALLERGIES     Patient is is allergic to darvocet [propoxyphene n-acetaminophen]; darvon [propoxyphene hcl]; flexeril [cyclobenzaprine]; morphine; motrin [ibuprofen micronized]; tylenol [acetaminophen]; ultram [tramadol]; and baclofen. FAMILY HISTORY     Patient's family history includes Arthritis in his father; Diabetes in his brother, father, mother, and sister; Heart Disease in his brother, father, and mother. SOCIAL HISTORY     Patient  reports that he quit smoking about 23 years ago. His smoking use included Cigarettes. He has a 0.50 pack-year smoking history. He has never used smokeless tobacco. He reports that he does not drink alcohol or use drugs. PHYSICAL EXAM     ED TRIAGE VITALS  BP: (!) 152/68, Temp: 97.8 °F (36.6 °C), Pulse: 84, Resp: 22, SpO2: 92 %  Physical Exam   Constitutional: He is oriented to person, place, and time. He appears well-developed and well-nourished. No distress. HENT:   Head: Normocephalic and atraumatic. Right Ear: External ear normal. Tympanic membrane is retracted. A middle ear effusion is present. Left Ear: External ear normal. Tympanic membrane is retracted. A middle ear effusion is present. Nose: Mucosal edema present. Right sinus exhibits maxillary sinus tenderness (increased on the right). Left sinus exhibits maxillary sinus tenderness. Mouth/Throat: Uvula is midline and mucous membranes are normal. Posterior oropharyngeal edema and posterior oropharyngeal erythema present. No oropharyngeal exudate. Eyes: EOM are normal. Pupils are equal, round, and reactive to light. Right conjunctiva is injected.    Conjunctiva injected sclera erythematous watery discharge noted   Neck:

## 2018-02-10 NOTE — ED NOTES
Discharge teaching and instructions for diagnosis/procedure of cough completed with patient using teachback method. AVS reviewed. Printed prescriptions given to patient. Patient voiced understanding regarding prescriptions, follow up appointments, and care of self at home.  Discharged ambulatory to  independent living per self       Yi Hutchinson RN  02/10/18 0007

## 2018-02-10 NOTE — ED TRIAGE NOTES
Pt ambulated to room, tolerated well. Pt stated that yesterday he started to have a dry throat, cough, and a swollen right watery eye. Pt sated he has had a runny nose for a while now.

## 2018-02-28 DIAGNOSIS — E11.9 WELL CONTROLLED TYPE 2 DIABETES MELLITUS (HCC): Chronic | ICD-10-CM

## 2018-02-28 RX ORDER — INSULIN GLARGINE 100 [IU]/ML
INJECTION, SOLUTION SUBCUTANEOUS
Qty: 33 ML | Refills: 1 | Status: SHIPPED | OUTPATIENT
Start: 2018-02-28 | End: 2018-04-02 | Stop reason: SDUPTHER

## 2018-03-04 DIAGNOSIS — E11.9 WELL CONTROLLED TYPE 2 DIABETES MELLITUS (HCC): Chronic | ICD-10-CM

## 2018-03-04 DIAGNOSIS — N40.0 BENIGN NON-NODULAR PROSTATIC HYPERPLASIA WITHOUT LOWER URINARY TRACT SYMPTOMS: ICD-10-CM

## 2018-03-05 RX ORDER — TAMSULOSIN HYDROCHLORIDE 0.4 MG/1
CAPSULE ORAL
Qty: 90 CAPSULE | Refills: 1 | Status: SHIPPED | OUTPATIENT
Start: 2018-03-05 | End: 2018-10-09 | Stop reason: SDUPTHER

## 2018-03-21 ENCOUNTER — TELEPHONE (OUTPATIENT)
Dept: FAMILY MEDICINE CLINIC | Age: 58
End: 2018-03-21

## 2018-03-21 ENCOUNTER — OFFICE VISIT (OUTPATIENT)
Dept: FAMILY MEDICINE CLINIC | Age: 58
End: 2018-03-21
Payer: COMMERCIAL

## 2018-03-21 VITALS
WEIGHT: 301.8 LBS | TEMPERATURE: 98.5 F | HEIGHT: 69 IN | BODY MASS INDEX: 44.7 KG/M2 | HEART RATE: 80 BPM | SYSTOLIC BLOOD PRESSURE: 132 MMHG | OXYGEN SATURATION: 96 % | DIASTOLIC BLOOD PRESSURE: 70 MMHG

## 2018-03-21 DIAGNOSIS — B96.89 ACUTE BACTERIAL SINUSITIS: Primary | ICD-10-CM

## 2018-03-21 DIAGNOSIS — E11.9 WELL CONTROLLED TYPE 2 DIABETES MELLITUS (HCC): Chronic | ICD-10-CM

## 2018-03-21 DIAGNOSIS — J01.90 ACUTE BACTERIAL SINUSITIS: Primary | ICD-10-CM

## 2018-03-21 PROCEDURE — 3045F PR MOST RECENT HEMOGLOBIN A1C LEVEL 7.0-9.0%: CPT | Performed by: FAMILY MEDICINE

## 2018-03-21 PROCEDURE — 3017F COLORECTAL CA SCREEN DOC REV: CPT | Performed by: FAMILY MEDICINE

## 2018-03-21 PROCEDURE — G8427 DOCREV CUR MEDS BY ELIG CLIN: HCPCS | Performed by: FAMILY MEDICINE

## 2018-03-21 PROCEDURE — G8482 FLU IMMUNIZE ORDER/ADMIN: HCPCS | Performed by: FAMILY MEDICINE

## 2018-03-21 PROCEDURE — G8598 ASA/ANTIPLAT THER USED: HCPCS | Performed by: FAMILY MEDICINE

## 2018-03-21 PROCEDURE — 99213 OFFICE O/P EST LOW 20 MIN: CPT | Performed by: FAMILY MEDICINE

## 2018-03-21 PROCEDURE — G8417 CALC BMI ABV UP PARAM F/U: HCPCS | Performed by: FAMILY MEDICINE

## 2018-03-21 PROCEDURE — 1036F TOBACCO NON-USER: CPT | Performed by: FAMILY MEDICINE

## 2018-03-21 RX ORDER — AMOXICILLIN AND CLAVULANATE POTASSIUM 875; 125 MG/1; MG/1
1 TABLET, FILM COATED ORAL 2 TIMES DAILY
Qty: 20 TABLET | Refills: 0 | Status: SHIPPED | OUTPATIENT
Start: 2018-03-21 | End: 2018-03-31

## 2018-03-21 ASSESSMENT — ENCOUNTER SYMPTOMS
SHORTNESS OF BREATH: 1
WHEEZING: 0
COUGH: 1
SINUS PRESSURE: 1

## 2018-03-21 NOTE — PROGRESS NOTES
[facility]  [facility]  [address] [id number]  Dept: [phone]  Dept Fax: [phone]  Loc: [phone]  PROGRESS NOTE      Visit Date: 3/21/2018    Daly Nichole is a 65 y.o. male who presents today for:  Chief Complaint   Patient presents with    Sinusitis     with cough and sinus pressure for 4 days    Cough    Fatigue       Subjective:  HPI     Sinus pressure:  Started 4 days ago. He attributes this illness to walking 4 days ago. Having sinus pressure. He had stomach pain which has resolved. He has not tried any otc meds. Glucose:  128 this morninig    Review of Systems   Constitutional: Positive for fever. Negative for chills. HENT: Positive for sinus pressure. Respiratory: Positive for cough and shortness of breath. Negative for wheezing. Past Medical History:   Diagnosis Date    Abnormal stress test Sept 2012    Lateral Wall Ischemia- [facility]    CAD (coronary artery disease)     Chronic low back pain     Diabetes mellitus ([address]     Diabetes mellitus ([address]     Hyperlipidemia     Hypertension     Hypertriglyceridemia     MI (myocardial infarction) 2004    Obesity       Past Surgical History:   Procedure Laterality Date    BACK SURGERY  November 1997    L4 & L5 fusion   [place] CARDIAC SURGERY  October 22, 2004    Cardiac cath with stent placement x1    COLONOSCOPY  October 2010   [place]    Mesh repair in abdomen.      Family History   Problem Relation Age of Onset    Diabetes Mother     Heart Disease Mother     Arthritis Father     Diabetes Father     Heart Disease Father     Diabetes Sister     Diabetes Brother     Heart Disease Brother      Social History   Substance Use Topics    Smoking status: Former Smoker     Packs/day: 0.25     Years: 2.00     Types: Cigarettes     Quit date: 1/1/1995    Smokeless tobacco: Never Used    Alcohol use No      Current Outpatient Prescriptions Wheezing 2 Package 1     No current facility-administered medications for this visit. Allergies   Allergen Reactions    Darvocet [Propoxyphene N-Acetaminophen]     Darvon [Propoxyphene Hcl]     Flexeril [Cyclobenzaprine] Other (See Comments)     Headache    Morphine     Motrin [Ibuprofen Micronized]     Tylenol [Acetaminophen] Nausea Only    Ultram [Tramadol] Itching    Baclofen Other (See Comments)     Health Maintenance   Topic Date Due    Hepatitis C screen  1960    HIV screen  07/10/1976    DTaP/Tdap/Td vaccine (1 - Tdap) 07/10/1980    Shingles Vaccine (1 of 2 - 2 Dose Series) 07/10/2011    Diabetic retinal exam  10/18/2017    Diabetic foot exam  10/03/2018    Diabetic microalbuminuria test  10/03/2018    Colon Cancer Screen FIT/FOBT  11/06/2018    A1C test (Diabetic or Prediabetic)  01/11/2019    Lipid screen  01/11/2019    Potassium monitoring  01/11/2019    Creatinine monitoring  01/11/2019    Flu vaccine  Completed    Pneumococcal med risk  Completed       Objective:  /70 (Site: Right Arm, Position: Sitting, Cuff Size: Large Adult)   Pulse 80   Temp 98.5 °F (36.9 °C) (Oral)   Ht 5' 9\" (1.753 m)   Wt (!) 301 lb 12.8 oz (136.9 kg)   BMI 44.57 kg/m²   Physical Exam   Constitutional: He is oriented to person, place, and time. He appears well-developed and well-nourished. No distress. HENT:   Right Ear: External ear normal.   Left Ear: External ear normal.   Nose: Rhinorrhea present. Right sinus exhibits maxillary sinus tenderness. Right sinus exhibits no frontal sinus tenderness. Left sinus exhibits no maxillary sinus tenderness and no frontal sinus tenderness. Mouth/Throat: Oropharynx is clear and moist. No oropharyngeal exudate. Cardiovascular: Normal rate and regular rhythm. No murmur heard. Pulmonary/Chest: Effort normal and breath sounds normal. No respiratory distress. He has no wheezes.    Neurological: He is alert and oriented to person, place, and

## 2018-03-21 NOTE — PATIENT INSTRUCTIONS

## 2018-04-02 DIAGNOSIS — K21.9 GASTROESOPHAGEAL REFLUX DISEASE WITHOUT ESOPHAGITIS: ICD-10-CM

## 2018-04-02 DIAGNOSIS — E11.9 WELL CONTROLLED TYPE 2 DIABETES MELLITUS (HCC): Chronic | ICD-10-CM

## 2018-04-02 RX ORDER — INSULIN GLARGINE 100 [IU]/ML
INJECTION, SOLUTION SUBCUTANEOUS
Qty: 33 ML | Refills: 1 | Status: SHIPPED | OUTPATIENT
Start: 2018-04-02 | End: 2018-05-30 | Stop reason: SDUPTHER

## 2018-04-02 RX ORDER — PANTOPRAZOLE SODIUM 40 MG/1
TABLET, DELAYED RELEASE ORAL
Qty: 90 TABLET | Refills: 1 | Status: SHIPPED | OUTPATIENT
Start: 2018-04-02 | End: 2018-10-09 | Stop reason: SDUPTHER

## 2018-04-11 ENCOUNTER — OFFICE VISIT (OUTPATIENT)
Dept: FAMILY MEDICINE CLINIC | Age: 58
End: 2018-04-11
Payer: COMMERCIAL

## 2018-04-11 VITALS
SYSTOLIC BLOOD PRESSURE: 150 MMHG | WEIGHT: 303.2 LBS | HEIGHT: 69 IN | DIASTOLIC BLOOD PRESSURE: 70 MMHG | BODY MASS INDEX: 44.91 KG/M2 | HEART RATE: 80 BPM

## 2018-04-11 DIAGNOSIS — J01.90 ACUTE BACTERIAL SINUSITIS: ICD-10-CM

## 2018-04-11 DIAGNOSIS — I10 ESSENTIAL HYPERTENSION: ICD-10-CM

## 2018-04-11 DIAGNOSIS — E78.49 OTHER HYPERLIPIDEMIA: ICD-10-CM

## 2018-04-11 DIAGNOSIS — B96.89 ACUTE BACTERIAL SINUSITIS: ICD-10-CM

## 2018-04-11 DIAGNOSIS — J30.1 CHRONIC SEASONAL ALLERGIC RHINITIS DUE TO POLLEN: ICD-10-CM

## 2018-04-11 LAB — HBA1C MFR BLD: 8.1 %

## 2018-04-11 PROCEDURE — 1036F TOBACCO NON-USER: CPT | Performed by: FAMILY MEDICINE

## 2018-04-11 PROCEDURE — G8427 DOCREV CUR MEDS BY ELIG CLIN: HCPCS | Performed by: FAMILY MEDICINE

## 2018-04-11 PROCEDURE — 3045F PR MOST RECENT HEMOGLOBIN A1C LEVEL 7.0-9.0%: CPT | Performed by: FAMILY MEDICINE

## 2018-04-11 PROCEDURE — G8598 ASA/ANTIPLAT THER USED: HCPCS | Performed by: FAMILY MEDICINE

## 2018-04-11 PROCEDURE — 3017F COLORECTAL CA SCREEN DOC REV: CPT | Performed by: FAMILY MEDICINE

## 2018-04-11 PROCEDURE — 99214 OFFICE O/P EST MOD 30 MIN: CPT | Performed by: FAMILY MEDICINE

## 2018-04-11 PROCEDURE — 2022F DILAT RTA XM EVC RTNOPTHY: CPT | Performed by: FAMILY MEDICINE

## 2018-04-11 PROCEDURE — G8417 CALC BMI ABV UP PARAM F/U: HCPCS | Performed by: FAMILY MEDICINE

## 2018-04-11 PROCEDURE — 83036 HEMOGLOBIN GLYCOSYLATED A1C: CPT | Performed by: FAMILY MEDICINE

## 2018-04-11 RX ORDER — GEMFIBROZIL 600 MG/1
600 TABLET, FILM COATED ORAL 2 TIMES DAILY
Qty: 180 TABLET | Refills: 1 | Status: SHIPPED | OUTPATIENT
Start: 2018-04-11 | End: 2018-10-09 | Stop reason: SDUPTHER

## 2018-04-11 RX ORDER — FLUTICASONE PROPIONATE 50 MCG
SPRAY, SUSPENSION (ML) NASAL
Qty: 3 BOTTLE | Refills: 1 | Status: SHIPPED | OUTPATIENT
Start: 2018-04-11 | End: 2018-09-20 | Stop reason: SDUPTHER

## 2018-04-11 RX ORDER — DOXYCYCLINE HYCLATE 100 MG
100 TABLET ORAL 2 TIMES DAILY
Qty: 20 TABLET | Refills: 0 | Status: SHIPPED | OUTPATIENT
Start: 2018-04-11 | End: 2018-06-18 | Stop reason: ALTCHOICE

## 2018-04-11 ASSESSMENT — ENCOUNTER SYMPTOMS
SINUS PRESSURE: 1
COUGH: 1
WHEEZING: 0

## 2018-04-19 ENCOUNTER — NURSE ONLY (OUTPATIENT)
Dept: FAMILY MEDICINE CLINIC | Age: 58
End: 2018-04-19

## 2018-04-19 VITALS — SYSTOLIC BLOOD PRESSURE: 136 MMHG | DIASTOLIC BLOOD PRESSURE: 78 MMHG | HEART RATE: 88 BPM

## 2018-04-19 DIAGNOSIS — Z01.30 BLOOD PRESSURE CHECK: Primary | ICD-10-CM

## 2018-05-01 ENCOUNTER — TELEPHONE (OUTPATIENT)
Dept: FAMILY MEDICINE CLINIC | Age: 58
End: 2018-05-01

## 2018-05-01 DIAGNOSIS — F41.9 ANXIETY: ICD-10-CM

## 2018-05-01 RX ORDER — ALPRAZOLAM 1 MG/1
1 TABLET ORAL 2 TIMES DAILY
Qty: 60 TABLET | Refills: 1 | Status: SHIPPED | OUTPATIENT
Start: 2018-05-01 | End: 2018-06-29 | Stop reason: SDUPTHER

## 2018-05-19 PROBLEM — Z01.30 BLOOD PRESSURE CHECK: Status: RESOLVED | Noted: 2018-04-19 | Resolved: 2018-05-19

## 2018-05-30 DIAGNOSIS — F41.9 ANXIETY: ICD-10-CM

## 2018-05-30 DIAGNOSIS — E11.9 WELL CONTROLLED TYPE 2 DIABETES MELLITUS (HCC): Chronic | ICD-10-CM

## 2018-05-30 RX ORDER — ALPRAZOLAM 1 MG/1
TABLET ORAL
Qty: 60 TABLET | Refills: 1 | OUTPATIENT
Start: 2018-05-30

## 2018-05-30 RX ORDER — INSULIN GLARGINE 100 [IU]/ML
INJECTION, SOLUTION SUBCUTANEOUS
Qty: 33 ML | Refills: 1 | Status: SHIPPED | OUTPATIENT
Start: 2018-05-30 | End: 2018-07-30 | Stop reason: SDUPTHER

## 2018-06-11 ENCOUNTER — HOSPITAL ENCOUNTER (OUTPATIENT)
Age: 58
Discharge: HOME | End: 2018-06-11
Payer: MEDICAID

## 2018-06-11 LAB
ALBUMIN SERPL-MCNC: 4 G/DL (ref 3.5–5.1)
ALP BLD-CCNC: 88 U/L (ref 38–126)
ALT SERPL-CCNC: 10 U/L (ref 11–66)
ANION GAP SERPL CALCULATED.3IONS-SCNC: 12 MEQ/L (ref 8–16)
AST SERPL-CCNC: 11 U/L (ref 5–40)
BILIRUB SERPL-MCNC: 0.2 MG/DL (ref 0.3–1.2)
BILIRUBIN DIRECT: < 0.2 MG/DL (ref 0–0.3)
BUN BLDV-MCNC: 15 MG/DL (ref 7–22)
CALCIUM SERPL-MCNC: 9.5 MG/DL (ref 8.5–10.5)
CHLORIDE BLD-SCNC: 101 MEQ/L (ref 98–111)
CHOLESTEROL, TOTAL: 147 MG/DL (ref 100–199)
CO2: 27 MEQ/L (ref 23–33)
CREAT SERPL-MCNC: 0.8 MG/DL (ref 0.4–1.2)
GFR SERPL CREATININE-BSD FRML MDRD: > 90 ML/MIN/1.73M2
GLUCOSE BLD-MCNC: 103 MG/DL (ref 70–108)
HCT VFR BLD CALC: 38.3 % (ref 42–52)
HDLC SERPL-MCNC: 29 MG/DL
HEMOGLOBIN: 11.5 GM/DL (ref 14–18)
LDL CHOLESTEROL CALCULATED: 94 MG/DL
MCH RBC QN AUTO: 21.5 PG (ref 27–31)
MCHC RBC AUTO-ENTMCNC: 30 GM/DL (ref 33–37)
MCV RBC AUTO: 71.7 FL (ref 80–94)
PDW BLD-RTO: 17.7 % (ref 11.5–14.5)
PLATELET # BLD: 324 THOU/MM3 (ref 130–400)
PMV BLD AUTO: 8.3 FL (ref 7.4–10.4)
POTASSIUM SERPL-SCNC: 5.1 MEQ/L (ref 3.5–5.2)
RBC # BLD: 5.35 MILL/MM3 (ref 4.7–6.1)
SODIUM BLD-SCNC: 140 MEQ/L (ref 135–145)
TOTAL PROTEIN: 7 G/DL (ref 6.1–8)
TRIGL SERPL-MCNC: 119 MG/DL (ref 0–199)
WBC # BLD: 7.5 THOU/MM3 (ref 4.8–10.8)

## 2018-06-11 PROCEDURE — 80053 COMPREHEN METABOLIC PANEL: CPT

## 2018-06-11 PROCEDURE — 82248 BILIRUBIN DIRECT: CPT

## 2018-06-11 PROCEDURE — 85027 COMPLETE CBC AUTOMATED: CPT

## 2018-06-11 PROCEDURE — 36415 COLL VENOUS BLD VENIPUNCTURE: CPT

## 2018-06-11 PROCEDURE — 80061 LIPID PANEL: CPT

## 2018-06-18 ENCOUNTER — OFFICE VISIT (OUTPATIENT)
Dept: FAMILY MEDICINE CLINIC | Age: 58
End: 2018-06-18
Payer: COMMERCIAL

## 2018-06-18 VITALS
WEIGHT: 290.6 LBS | HEIGHT: 69 IN | DIASTOLIC BLOOD PRESSURE: 82 MMHG | BODY MASS INDEX: 43.04 KG/M2 | HEART RATE: 66 BPM | SYSTOLIC BLOOD PRESSURE: 132 MMHG

## 2018-06-18 DIAGNOSIS — M79.89 LEG SWELLING: Primary | ICD-10-CM

## 2018-06-18 DIAGNOSIS — J30.2 CHRONIC SEASONAL ALLERGIC RHINITIS DUE TO OTHER ALLERGEN: ICD-10-CM

## 2018-06-18 PROCEDURE — G8598 ASA/ANTIPLAT THER USED: HCPCS | Performed by: FAMILY MEDICINE

## 2018-06-18 PROCEDURE — G8427 DOCREV CUR MEDS BY ELIG CLIN: HCPCS | Performed by: FAMILY MEDICINE

## 2018-06-18 PROCEDURE — 99214 OFFICE O/P EST MOD 30 MIN: CPT | Performed by: FAMILY MEDICINE

## 2018-06-18 PROCEDURE — G8417 CALC BMI ABV UP PARAM F/U: HCPCS | Performed by: FAMILY MEDICINE

## 2018-06-18 PROCEDURE — 1036F TOBACCO NON-USER: CPT | Performed by: FAMILY MEDICINE

## 2018-06-18 PROCEDURE — 2022F DILAT RTA XM EVC RTNOPTHY: CPT | Performed by: FAMILY MEDICINE

## 2018-06-18 PROCEDURE — 3045F PR MOST RECENT HEMOGLOBIN A1C LEVEL 7.0-9.0%: CPT | Performed by: FAMILY MEDICINE

## 2018-06-18 PROCEDURE — 3017F COLORECTAL CA SCREEN DOC REV: CPT | Performed by: FAMILY MEDICINE

## 2018-06-18 RX ORDER — CROMOLYN SODIUM 40 MG/ML
1 SOLUTION/ DROPS OPHTHALMIC 4 TIMES DAILY
Qty: 1 BOTTLE | Refills: 0 | Status: SHIPPED | OUTPATIENT
Start: 2018-06-18 | End: 2018-07-11 | Stop reason: SDUPTHER

## 2018-06-18 ASSESSMENT — ENCOUNTER SYMPTOMS
WHEEZING: 0
EYE DISCHARGE: 1
EYE PAIN: 0
SHORTNESS OF BREATH: 1

## 2018-06-29 DIAGNOSIS — F41.9 ANXIETY: ICD-10-CM

## 2018-06-30 RX ORDER — ALPRAZOLAM 1 MG/1
1 TABLET ORAL 2 TIMES DAILY
Qty: 60 TABLET | Refills: 1 | Status: SHIPPED | OUTPATIENT
Start: 2018-06-30 | End: 2018-07-30 | Stop reason: SDUPTHER

## 2018-07-11 ENCOUNTER — OFFICE VISIT (OUTPATIENT)
Dept: FAMILY MEDICINE CLINIC | Age: 58
End: 2018-07-11
Payer: COMMERCIAL

## 2018-07-11 VITALS
DIASTOLIC BLOOD PRESSURE: 74 MMHG | WEIGHT: 289.2 LBS | SYSTOLIC BLOOD PRESSURE: 140 MMHG | HEART RATE: 72 BPM | BODY MASS INDEX: 42.83 KG/M2 | HEIGHT: 69 IN

## 2018-07-11 DIAGNOSIS — I10 ESSENTIAL HYPERTENSION: ICD-10-CM

## 2018-07-11 DIAGNOSIS — E11.9 WELL CONTROLLED TYPE 2 DIABETES MELLITUS (HCC): Primary | Chronic | ICD-10-CM

## 2018-07-11 DIAGNOSIS — F41.9 ANXIETY: ICD-10-CM

## 2018-07-11 DIAGNOSIS — J30.2 CHRONIC SEASONAL ALLERGIC RHINITIS DUE TO OTHER ALLERGEN: ICD-10-CM

## 2018-07-11 LAB — HBA1C MFR BLD: 6.8 %

## 2018-07-11 PROCEDURE — 3017F COLORECTAL CA SCREEN DOC REV: CPT | Performed by: FAMILY MEDICINE

## 2018-07-11 PROCEDURE — 3044F HG A1C LEVEL LT 7.0%: CPT | Performed by: FAMILY MEDICINE

## 2018-07-11 PROCEDURE — G8427 DOCREV CUR MEDS BY ELIG CLIN: HCPCS | Performed by: FAMILY MEDICINE

## 2018-07-11 PROCEDURE — G8417 CALC BMI ABV UP PARAM F/U: HCPCS | Performed by: FAMILY MEDICINE

## 2018-07-11 PROCEDURE — 83036 HEMOGLOBIN GLYCOSYLATED A1C: CPT | Performed by: FAMILY MEDICINE

## 2018-07-11 PROCEDURE — 2022F DILAT RTA XM EVC RTNOPTHY: CPT | Performed by: FAMILY MEDICINE

## 2018-07-11 PROCEDURE — 1036F TOBACCO NON-USER: CPT | Performed by: FAMILY MEDICINE

## 2018-07-11 PROCEDURE — 99214 OFFICE O/P EST MOD 30 MIN: CPT | Performed by: FAMILY MEDICINE

## 2018-07-11 PROCEDURE — G8598 ASA/ANTIPLAT THER USED: HCPCS | Performed by: FAMILY MEDICINE

## 2018-07-11 RX ORDER — CROMOLYN SODIUM 40 MG/ML
1 SOLUTION/ DROPS OPHTHALMIC 4 TIMES DAILY
Qty: 1 BOTTLE | Refills: 0 | Status: SHIPPED | OUTPATIENT
Start: 2018-07-11 | End: 2019-04-09 | Stop reason: SDUPTHER

## 2018-07-11 RX ORDER — ENALAPRIL MALEATE 20 MG/1
20 TABLET ORAL DAILY
Qty: 90 TABLET | Refills: 1 | Status: SHIPPED | OUTPATIENT
Start: 2018-07-11 | End: 2018-10-09 | Stop reason: SDUPTHER

## 2018-07-11 ASSESSMENT — ENCOUNTER SYMPTOMS
WHEEZING: 0
BACK PAIN: 1
SHORTNESS OF BREATH: 0

## 2018-07-30 DIAGNOSIS — E11.9 WELL CONTROLLED TYPE 2 DIABETES MELLITUS (HCC): Chronic | ICD-10-CM

## 2018-07-30 DIAGNOSIS — F41.9 ANXIETY: ICD-10-CM

## 2018-07-30 RX ORDER — ALPRAZOLAM 1 MG/1
1 TABLET ORAL 2 TIMES DAILY
Qty: 60 TABLET | Refills: 1 | Status: SHIPPED | OUTPATIENT
Start: 2018-07-30 | End: 2018-09-24 | Stop reason: SDUPTHER

## 2018-08-27 DIAGNOSIS — E11.9 WELL CONTROLLED TYPE 2 DIABETES MELLITUS (HCC): Chronic | ICD-10-CM

## 2018-08-28 NOTE — TELEPHONE ENCOUNTER
Thurston Lennox called requesting a refill on the following medications:  Requested Prescriptions     Pending Prescriptions Disp Refills    metFORMIN (GLUCOPHAGE) 1000 MG tablet [Pharmacy Med Name: METFORMIN HCL 1,000 MG TABLET] 180 tablet 1     Sig: take 1 tablet by mouth twice a day with meals       Date of last visit: 7/11/2018  Date of next visit (if applicable):Visit date not found  Date of last refill: 03/05/2018  Pharmacy Name: [facility], [address] [place] [address]

## 2018-09-17 DIAGNOSIS — E11.9 WELL CONTROLLED TYPE 2 DIABETES MELLITUS (HCC): Chronic | ICD-10-CM

## 2018-09-20 DIAGNOSIS — J30.1 CHRONIC SEASONAL ALLERGIC RHINITIS DUE TO POLLEN: ICD-10-CM

## 2018-09-20 RX ORDER — FLUTICASONE PROPIONATE 50 MCG
SPRAY, SUSPENSION (ML) NASAL
Qty: 3 BOTTLE | Refills: 1 | Status: SHIPPED | OUTPATIENT
Start: 2018-09-20 | End: 2019-03-21 | Stop reason: SDUPTHER

## 2018-09-20 NOTE — TELEPHONE ENCOUNTER
Received request from [address] [place] [address] for refill for Flonase. Last received 3 with refill on 4/11/18.  Last visit on 7/11/18, next scheduled on 10/09/18

## 2018-09-24 DIAGNOSIS — F41.9 ANXIETY: ICD-10-CM

## 2018-09-24 RX ORDER — ALPRAZOLAM 1 MG/1
1 TABLET ORAL 2 TIMES DAILY
Qty: 60 TABLET | Refills: 1 | Status: SHIPPED | OUTPATIENT
Start: 2018-09-24 | End: 2018-10-09 | Stop reason: SDUPTHER

## 2018-09-28 DIAGNOSIS — E11.9 WELL CONTROLLED TYPE 2 DIABETES MELLITUS (HCC): Chronic | ICD-10-CM

## 2018-09-28 NOTE — TELEPHONE ENCOUNTER
Edwar Jenkins called requesting a refill on the following medications:  Requested Prescriptions     Pending Prescriptions Disp Refills    insulin glargine (BASAGLAR KWIKPEN) 100 UNIT/ML injection pen 33 mL 1     Sig: INJECT 54 UNITS INTO THE SKIN TWICE A DAY.        Date of last visit: 7/11/2018  Date of next visit (if applicable):10/9/2018  Date of last refill: 04/02/2018  Pharmacy Name: [address] [place] [address]      Neptali Sanchez, [address] [place] [address]

## 2018-10-09 ENCOUNTER — OFFICE VISIT (OUTPATIENT)
Dept: FAMILY MEDICINE CLINIC | Age: 58
End: 2018-10-09
Payer: MEDICAID

## 2018-10-09 ENCOUNTER — HOSPITAL ENCOUNTER (OUTPATIENT)
Age: 58
Discharge: HOME | End: 2018-10-09
Payer: MEDICAID

## 2018-10-09 VITALS
HEIGHT: 69 IN | DIASTOLIC BLOOD PRESSURE: 74 MMHG | SYSTOLIC BLOOD PRESSURE: 122 MMHG | HEART RATE: 66 BPM | BODY MASS INDEX: 42.3 KG/M2 | WEIGHT: 285.6 LBS

## 2018-10-09 DIAGNOSIS — E78.49 OTHER HYPERLIPIDEMIA: ICD-10-CM

## 2018-10-09 DIAGNOSIS — E11.9 WELL CONTROLLED TYPE 2 DIABETES MELLITUS (HCC): Chronic | ICD-10-CM

## 2018-10-09 DIAGNOSIS — N40.0 BENIGN NON-NODULAR PROSTATIC HYPERPLASIA WITHOUT LOWER URINARY TRACT SYMPTOMS: ICD-10-CM

## 2018-10-09 DIAGNOSIS — K21.9 GASTROESOPHAGEAL REFLUX DISEASE WITHOUT ESOPHAGITIS: ICD-10-CM

## 2018-10-09 DIAGNOSIS — F41.9 ANXIETY: ICD-10-CM

## 2018-10-09 DIAGNOSIS — F41.9 ANXIETY: Primary | ICD-10-CM

## 2018-10-09 DIAGNOSIS — I10 ESSENTIAL HYPERTENSION: ICD-10-CM

## 2018-10-09 DIAGNOSIS — J30.2 SEASONAL ALLERGIES: ICD-10-CM

## 2018-10-09 LAB
AMPHETAMINE+METHAMPHETAMINE URINE SCREEN: NEGATIVE
BARBITURATE QUANTITATIVE URINE: NEGATIVE
BENZODIAZEPINE QUANTITATIVE URINE: POSITIVE
CANNABINOID QUANTITATIVE URINE: NEGATIVE
COCAINE METABOLITE QUANTITATIVE URINE: NEGATIVE
CREATININE, URINE: 126 MG/DL
MICROALBUMIN UR-MCNC: 7.17 MG/DL
MICROALBUMIN/CREAT UR-RTO: 57 MG/G (ref 0–30)
OPIATES, URINE: NEGATIVE
OXYCODONE: NEGATIVE
PHENCYCLIDINE QUANTITATIVE URINE: NEGATIVE

## 2018-10-09 PROCEDURE — G8427 DOCREV CUR MEDS BY ELIG CLIN: HCPCS | Performed by: FAMILY MEDICINE

## 2018-10-09 PROCEDURE — G8484 FLU IMMUNIZE NO ADMIN: HCPCS | Performed by: FAMILY MEDICINE

## 2018-10-09 PROCEDURE — G8417 CALC BMI ABV UP PARAM F/U: HCPCS | Performed by: FAMILY MEDICINE

## 2018-10-09 PROCEDURE — 82043 UR ALBUMIN QUANTITATIVE: CPT

## 2018-10-09 PROCEDURE — 2022F DILAT RTA XM EVC RTNOPTHY: CPT | Performed by: FAMILY MEDICINE

## 2018-10-09 PROCEDURE — 3017F COLORECTAL CA SCREEN DOC REV: CPT | Performed by: FAMILY MEDICINE

## 2018-10-09 PROCEDURE — 1036F TOBACCO NON-USER: CPT | Performed by: FAMILY MEDICINE

## 2018-10-09 PROCEDURE — 3044F HG A1C LEVEL LT 7.0%: CPT | Performed by: FAMILY MEDICINE

## 2018-10-09 PROCEDURE — 99214 OFFICE O/P EST MOD 30 MIN: CPT | Performed by: FAMILY MEDICINE

## 2018-10-09 PROCEDURE — G8598 ASA/ANTIPLAT THER USED: HCPCS | Performed by: FAMILY MEDICINE

## 2018-10-09 PROCEDURE — 80307 DRUG TEST PRSMV CHEM ANLYZR: CPT

## 2018-10-09 RX ORDER — LORATADINE 10 MG/1
10 TABLET ORAL DAILY
Qty: 90 TABLET | Refills: 1 | Status: SHIPPED | OUTPATIENT
Start: 2018-10-09 | End: 2019-04-09

## 2018-10-09 RX ORDER — ENALAPRIL MALEATE 20 MG/1
20 TABLET ORAL DAILY
Qty: 90 TABLET | Refills: 1 | Status: SHIPPED | OUTPATIENT
Start: 2018-10-09 | End: 2019-04-09 | Stop reason: SDUPTHER

## 2018-10-09 RX ORDER — GEMFIBROZIL 600 MG/1
600 TABLET, FILM COATED ORAL 2 TIMES DAILY
Qty: 180 TABLET | Refills: 1 | Status: SHIPPED | OUTPATIENT
Start: 2018-10-09 | End: 2019-04-09 | Stop reason: SDUPTHER

## 2018-10-09 RX ORDER — ALPRAZOLAM 1 MG/1
1 TABLET ORAL 2 TIMES DAILY
Qty: 60 TABLET | Refills: 1 | Status: SHIPPED | OUTPATIENT
Start: 2018-10-24 | End: 2019-01-22 | Stop reason: SDUPTHER

## 2018-10-09 RX ORDER — TAMSULOSIN HYDROCHLORIDE 0.4 MG/1
CAPSULE ORAL
Qty: 90 CAPSULE | Refills: 1 | Status: SHIPPED | OUTPATIENT
Start: 2018-10-09 | End: 2019-03-21 | Stop reason: SDUPTHER

## 2018-10-09 RX ORDER — PANTOPRAZOLE SODIUM 40 MG/1
TABLET, DELAYED RELEASE ORAL
Qty: 90 TABLET | Refills: 1 | Status: SHIPPED | OUTPATIENT
Start: 2018-10-09 | End: 2019-04-09 | Stop reason: SDUPTHER

## 2018-10-09 ASSESSMENT — ENCOUNTER SYMPTOMS
SHORTNESS OF BREATH: 0
WHEEZING: 0

## 2018-10-09 NOTE — PROGRESS NOTES
(PLAVIX) 75 MG tablet take 1 tablet by mouth once daily 90 tablet 1    metoprolol tartrate (LOPRESSOR) 50 MG tablet Take 0.5 tablets by mouth 2 times daily 90 tablet 1    pravastatin (PRAVACHOL) 20 MG tablet Take 20 mg by mouth daily      nitroGLYCERIN (NITROSTAT) 0.4 MG SL tablet Place 0.4 mg under the tongue every 5 minutes as needed for Chest pain      vitamin D (ERGOCALCIFEROL) 400 UNITS CAPS Take 400 Units by mouth daily.  aspirin 325 MG tablet Take 325 mg by mouth daily. No current facility-administered medications for this visit. Allergies   Allergen Reactions    Darvocet [Propoxyphene N-Acetaminophen]     Darvon [Propoxyphene Hcl]     Flexeril [Cyclobenzaprine] Other (See Comments)     Headache    Morphine     Motrin [Ibuprofen Micronized]     Tylenol [Acetaminophen] Nausea Only    Ultram [Tramadol] Itching    Baclofen Other (See Comments)     Health Maintenance   Topic Date Due    Hepatitis C screen  1960    HIV screen  07/10/1976    DTaP/Tdap/Td vaccine (1 - Tdap) 07/10/1980    Shingles Vaccine (1 of 2 - 2 Dose Series) 07/10/2011    Flu vaccine (1) 09/01/2018    Diabetic foot exam  10/03/2018    Diabetic microalbuminuria test  10/03/2018    Colon Cancer Screen FIT/FOBT  11/06/2018    Lipid screen  06/11/2019    Potassium monitoring  06/11/2019    Creatinine monitoring  06/11/2019    A1C test (Diabetic or Prediabetic)  07/11/2019    Diabetic retinal exam  07/31/2019    Pneumococcal med risk  Completed       Objective:  /74 (Site: Left Upper Arm, Position: Sitting, Cuff Size: Large Adult)   Pulse 66   Ht 5' 9\" (1.753 m)   Wt 285 lb 9.6 oz (129.5 kg)   BMI 42.18 kg/m²   Physical Exam   Constitutional: He is oriented to person, place, and time. He appears well-developed and well-nourished.    HENT:   Right Ear: Tympanic membrane, external ear and ear canal normal.   Left Ear: Tympanic membrane, external ear and ear canal normal.   Nose: Right sinus exhibits no maxillary sinus tenderness and no frontal sinus tenderness. Left sinus exhibits no maxillary sinus tenderness and no frontal sinus tenderness. Mouth/Throat: Oropharynx is clear and moist. No oropharyngeal exudate. Cardiovascular: Normal rate and regular rhythm. No murmur heard. Pulmonary/Chest: Effort normal and breath sounds normal. No respiratory distress. He has no wheezes. Musculoskeletal: He exhibits no edema. Neurological: He is alert and oriented to person, place, and time. Psychiatric: He has a normal mood and affect. His behavior is normal.   Vitals reviewed. Visual inspection:  Deformity/amputation: absent  Skin lesions/pre-ulcerative calluses: absent  Edema: right- negative, left- negative    Sensory exam:  Monofilament sensation: normal  (minimum of 5 random plantar locations tested, avoiding callused areas - > 1 area with absence of sensation is + for neuropathy)    Plus at least one of the following:  Pulses: normal,     Lab Results   Component Value Date    WBC 7.5 06/11/2018    HGB 11.5 (L) 06/11/2018    HCT 38.3 (L) 06/11/2018     06/11/2018    CHOL 147 06/11/2018    TRIG 119 06/11/2018    HDL 29 06/11/2018    ALT 10 (L) 06/11/2018    AST 11 06/11/2018     06/11/2018    K 5.1 06/11/2018     06/11/2018    CREATININE 0.8 06/11/2018    BUN 15 06/11/2018    CO2 27 06/11/2018    PSA 7.61 (H) 06/08/2016    INR 0.93 09/06/2012    LABA1C 6.8 07/11/2018       Impression/Plan:  1. Anxiety  Chronic condition. He has failed multiple other anxiety medications. Controlled. Refill med. We will do random urine drug screen  - ALPRAZolam (XANAX) 1 MG tablet; Take 1 tablet by mouth 2 times daily for 60 days. .  Dispense: 60 tablet; Refill: 1  - Urine Drug Screen; Future    2. Other hyperlipidemia  Chronic condition. Controlled. Refill med  - gemfibrozil (LOPID) 600 MG tablet; Take 1 tablet by mouth 2 times daily  Dispense: 180 tablet; Refill: 1    3.  Seasonal

## 2018-10-10 ENCOUNTER — TELEPHONE (OUTPATIENT)
Dept: FAMILY MEDICINE CLINIC | Age: 58
End: 2018-10-10

## 2018-10-10 PROBLEM — R80.9 MICROALBUMINURIA: Status: ACTIVE | Noted: 2018-10-10

## 2018-11-22 DIAGNOSIS — E11.9 WELL CONTROLLED TYPE 2 DIABETES MELLITUS (HCC): Chronic | ICD-10-CM

## 2018-11-23 RX ORDER — INSULIN GLARGINE 100 [IU]/ML
INJECTION, SOLUTION SUBCUTANEOUS
Qty: 33 ML | Refills: 1 | Status: SHIPPED | OUTPATIENT
Start: 2018-11-23 | End: 2019-01-22 | Stop reason: SDUPTHER

## 2018-11-23 NOTE — TELEPHONE ENCOUNTER
Grabiel Ruiz called requesting a refill on the following medications:  Requested Prescriptions     Pending Prescriptions Disp Refills    BASAGLAR KWIKPEN 100 UNIT/ML injection pen [Pharmacy Med Name: BASAGLAR 100 UNIT/ML KWIKPEN] 33 mL 1     Sig: INJECT 54 UNITS INTO THE SKIN TWICE A DAY.        Date of last visit: 10/9/2018  Date of next visit (if applicable):1/8/2019  Pharmacy Name: [facility], [facility] ([address] [place] [address])

## 2018-12-14 DIAGNOSIS — E11.9 WELL CONTROLLED TYPE 2 DIABETES MELLITUS (HCC): Chronic | ICD-10-CM

## 2019-01-08 ENCOUNTER — OFFICE VISIT (OUTPATIENT)
Dept: FAMILY MEDICINE CLINIC | Age: 59
End: 2019-01-08
Payer: MEDICAID

## 2019-01-08 VITALS
DIASTOLIC BLOOD PRESSURE: 70 MMHG | BODY MASS INDEX: 42.33 KG/M2 | WEIGHT: 285.8 LBS | SYSTOLIC BLOOD PRESSURE: 124 MMHG | HEIGHT: 69 IN | HEART RATE: 66 BPM

## 2019-01-08 DIAGNOSIS — Z12.11 COLON CANCER SCREENING: ICD-10-CM

## 2019-01-08 DIAGNOSIS — F41.9 ANXIETY: Primary | ICD-10-CM

## 2019-01-08 DIAGNOSIS — I10 ESSENTIAL HYPERTENSION: ICD-10-CM

## 2019-01-08 DIAGNOSIS — K21.9 GASTROESOPHAGEAL REFLUX DISEASE WITHOUT ESOPHAGITIS: ICD-10-CM

## 2019-01-08 DIAGNOSIS — E78.49 OTHER HYPERLIPIDEMIA: ICD-10-CM

## 2019-01-08 DIAGNOSIS — G89.29 CHRONIC MIDLINE LOW BACK PAIN WITH SCIATICA, SCIATICA LATERALITY UNSPECIFIED: ICD-10-CM

## 2019-01-08 DIAGNOSIS — E11.9 WELL CONTROLLED TYPE 2 DIABETES MELLITUS (HCC): Chronic | ICD-10-CM

## 2019-01-08 DIAGNOSIS — N40.0 BENIGN NON-NODULAR PROSTATIC HYPERPLASIA WITHOUT LOWER URINARY TRACT SYMPTOMS: ICD-10-CM

## 2019-01-08 DIAGNOSIS — M54.40 CHRONIC MIDLINE LOW BACK PAIN WITH SCIATICA, SCIATICA LATERALITY UNSPECIFIED: ICD-10-CM

## 2019-01-08 LAB
ALBUMIN SERPL-MCNC: 4.4 G/DL (ref 3.5–5.1)
ALP BLD-CCNC: 100 U/L (ref 38–126)
ALT SERPL-CCNC: 11 U/L (ref 11–66)
ANION GAP SERPL CALCULATED.3IONS-SCNC: 14 MEQ/L (ref 8–16)
AST SERPL-CCNC: 10 U/L (ref 5–40)
AVERAGE GLUCOSE: 171 MG/DL (ref 70–126)
BASOPHILS # BLD: 0.8 %
BASOPHILS ABSOLUTE: 0.1 THOU/MM3 (ref 0–0.1)
BILIRUB SERPL-MCNC: 0.3 MG/DL (ref 0.3–1.2)
BUN BLDV-MCNC: 13 MG/DL (ref 7–22)
CALCIUM SERPL-MCNC: 10.1 MG/DL (ref 8.5–10.5)
CHLORIDE BLD-SCNC: 98 MEQ/L (ref 98–111)
CHOLESTEROL, TOTAL: 155 MG/DL (ref 100–199)
CO2: 26 MEQ/L (ref 23–33)
CREAT SERPL-MCNC: 0.8 MG/DL (ref 0.4–1.2)
EOSINOPHIL # BLD: 2.5 %
EOSINOPHILS ABSOLUTE: 0.2 THOU/MM3 (ref 0–0.4)
ERYTHROCYTE [DISTWIDTH] IN BLOOD BY AUTOMATED COUNT: 15.8 % (ref 11.5–14.5)
ERYTHROCYTE [DISTWIDTH] IN BLOOD BY AUTOMATED COUNT: 44.8 FL (ref 35–45)
GFR SERPL CREATININE-BSD FRML MDRD: > 90 ML/MIN/1.73M2
GLUCOSE BLD-MCNC: 155 MG/DL (ref 70–108)
HBA1C MFR BLD: 7.7 % (ref 4.4–6.4)
HCT VFR BLD CALC: 42.2 % (ref 42–52)
HDLC SERPL-MCNC: 33 MG/DL
HEMOGLOBIN: 12.8 GM/DL (ref 14–18)
IMMATURE GRANS (ABS): 0.02 THOU/MM3 (ref 0–0.07)
IMMATURE GRANULOCYTES: 0.2 %
LDL CHOLESTEROL CALCULATED: 97 MG/DL
LYMPHOCYTES # BLD: 20.6 %
LYMPHOCYTES ABSOLUTE: 1.7 THOU/MM3 (ref 1–4.8)
MCH RBC QN AUTO: 24.2 PG (ref 26–33)
MCHC RBC AUTO-ENTMCNC: 30.3 GM/DL (ref 32.2–35.5)
MCV RBC AUTO: 79.8 FL (ref 80–94)
MONOCYTES # BLD: 6.9 %
MONOCYTES ABSOLUTE: 0.6 THOU/MM3 (ref 0.4–1.3)
NUCLEATED RED BLOOD CELLS: 0 /100 WBC
PLATELET # BLD: 287 THOU/MM3 (ref 130–400)
PMV BLD AUTO: 10.1 FL (ref 9.4–12.4)
POTASSIUM SERPL-SCNC: 5 MEQ/L (ref 3.5–5.2)
RBC # BLD: 5.29 MILL/MM3 (ref 4.7–6.1)
SEG NEUTROPHILS: 69 %
SEGMENTED NEUTROPHILS ABSOLUTE COUNT: 5.8 THOU/MM3 (ref 1.8–7.7)
SODIUM BLD-SCNC: 138 MEQ/L (ref 135–145)
TOTAL PROTEIN: 7.6 G/DL (ref 6.1–8)
TRIGL SERPL-MCNC: 127 MG/DL (ref 0–199)
WBC # BLD: 8.4 THOU/MM3 (ref 4.8–10.8)

## 2019-01-08 PROCEDURE — G8417 CALC BMI ABV UP PARAM F/U: HCPCS | Performed by: FAMILY MEDICINE

## 2019-01-08 PROCEDURE — 36415 COLL VENOUS BLD VENIPUNCTURE: CPT | Performed by: FAMILY MEDICINE

## 2019-01-08 PROCEDURE — 3046F HEMOGLOBIN A1C LEVEL >9.0%: CPT | Performed by: FAMILY MEDICINE

## 2019-01-08 PROCEDURE — G8427 DOCREV CUR MEDS BY ELIG CLIN: HCPCS | Performed by: FAMILY MEDICINE

## 2019-01-08 PROCEDURE — 3017F COLORECTAL CA SCREEN DOC REV: CPT | Performed by: FAMILY MEDICINE

## 2019-01-08 PROCEDURE — 99214 OFFICE O/P EST MOD 30 MIN: CPT | Performed by: FAMILY MEDICINE

## 2019-01-08 PROCEDURE — 2022F DILAT RTA XM EVC RTNOPTHY: CPT | Performed by: FAMILY MEDICINE

## 2019-01-08 PROCEDURE — G8598 ASA/ANTIPLAT THER USED: HCPCS | Performed by: FAMILY MEDICINE

## 2019-01-08 PROCEDURE — G8484 FLU IMMUNIZE NO ADMIN: HCPCS | Performed by: FAMILY MEDICINE

## 2019-01-08 PROCEDURE — 82274 ASSAY TEST FOR BLOOD FECAL: CPT | Performed by: FAMILY MEDICINE

## 2019-01-08 PROCEDURE — 1036F TOBACCO NON-USER: CPT | Performed by: FAMILY MEDICINE

## 2019-01-08 RX ORDER — PANTOPRAZOLE SODIUM 40 MG/1
TABLET, DELAYED RELEASE ORAL
Qty: 90 TABLET | Refills: 1 | Status: CANCELLED | OUTPATIENT
Start: 2019-01-08

## 2019-01-08 RX ORDER — GEMFIBROZIL 600 MG/1
600 TABLET, FILM COATED ORAL 2 TIMES DAILY
Qty: 180 TABLET | Refills: 1 | Status: CANCELLED | OUTPATIENT
Start: 2019-01-08

## 2019-01-08 RX ORDER — TAMSULOSIN HYDROCHLORIDE 0.4 MG/1
CAPSULE ORAL
Qty: 90 CAPSULE | Refills: 1 | Status: CANCELLED | OUTPATIENT
Start: 2019-01-08

## 2019-01-08 RX ORDER — ALPRAZOLAM 1 MG/1
1 TABLET ORAL 2 TIMES DAILY
Qty: 60 TABLET | Refills: 1 | Status: CANCELLED | OUTPATIENT
Start: 2019-01-08 | End: 2019-03-09

## 2019-01-08 RX ORDER — ENALAPRIL MALEATE 20 MG/1
20 TABLET ORAL DAILY
Qty: 90 TABLET | Refills: 1 | Status: CANCELLED | OUTPATIENT
Start: 2019-01-08

## 2019-01-08 ASSESSMENT — ENCOUNTER SYMPTOMS
WHEEZING: 0
SHORTNESS OF BREATH: 0

## 2019-01-09 ENCOUNTER — TELEPHONE (OUTPATIENT)
Dept: FAMILY MEDICINE CLINIC | Age: 59
End: 2019-01-09

## 2019-01-18 ENCOUNTER — TELEPHONE (OUTPATIENT)
Dept: FAMILY MEDICINE CLINIC | Age: 59
End: 2019-01-18

## 2019-01-18 DIAGNOSIS — R19.5 POSITIVE FIT (FECAL IMMUNOCHEMICAL TEST): Primary | ICD-10-CM

## 2019-01-18 LAB
CONTROL: PRESENT
HEMOCCULT STL QL: POSITIVE

## 2019-01-22 DIAGNOSIS — F41.9 ANXIETY: ICD-10-CM

## 2019-01-22 DIAGNOSIS — E11.9 WELL CONTROLLED TYPE 2 DIABETES MELLITUS (HCC): Chronic | ICD-10-CM

## 2019-01-22 RX ORDER — ALPRAZOLAM 1 MG/1
1 TABLET ORAL 2 TIMES DAILY
Qty: 60 TABLET | Refills: 1 | Status: SHIPPED | OUTPATIENT
Start: 2019-01-22 | End: 2019-04-17 | Stop reason: SDUPTHER

## 2019-03-21 DIAGNOSIS — J30.1 CHRONIC SEASONAL ALLERGIC RHINITIS DUE TO POLLEN: ICD-10-CM

## 2019-03-21 DIAGNOSIS — N40.0 BENIGN NON-NODULAR PROSTATIC HYPERPLASIA WITHOUT LOWER URINARY TRACT SYMPTOMS: ICD-10-CM

## 2019-03-21 RX ORDER — TAMSULOSIN HYDROCHLORIDE 0.4 MG/1
CAPSULE ORAL
Qty: 90 CAPSULE | Refills: 1 | Status: SHIPPED | OUTPATIENT
Start: 2019-03-21 | End: 2019-08-08 | Stop reason: SDUPTHER

## 2019-03-21 RX ORDER — FLUTICASONE PROPIONATE 50 MCG
SPRAY, SUSPENSION (ML) NASAL
Qty: 3 BOTTLE | Refills: 1 | Status: SHIPPED | OUTPATIENT
Start: 2019-03-21 | End: 2019-08-08 | Stop reason: SDUPTHER

## 2019-04-09 ENCOUNTER — OFFICE VISIT (OUTPATIENT)
Dept: FAMILY MEDICINE CLINIC | Age: 59
End: 2019-04-09
Payer: MEDICAID

## 2019-04-09 VITALS
SYSTOLIC BLOOD PRESSURE: 124 MMHG | HEART RATE: 81 BPM | BODY MASS INDEX: 42.83 KG/M2 | WEIGHT: 289.2 LBS | HEIGHT: 69 IN | DIASTOLIC BLOOD PRESSURE: 72 MMHG

## 2019-04-09 DIAGNOSIS — E78.49 OTHER HYPERLIPIDEMIA: ICD-10-CM

## 2019-04-09 DIAGNOSIS — I10 ESSENTIAL HYPERTENSION: ICD-10-CM

## 2019-04-09 DIAGNOSIS — E11.9 WELL CONTROLLED TYPE 2 DIABETES MELLITUS (HCC): Chronic | ICD-10-CM

## 2019-04-09 DIAGNOSIS — J30.1 SEASONAL ALLERGIC RHINITIS DUE TO POLLEN: ICD-10-CM

## 2019-04-09 DIAGNOSIS — K21.9 GASTROESOPHAGEAL REFLUX DISEASE WITHOUT ESOPHAGITIS: ICD-10-CM

## 2019-04-09 DIAGNOSIS — F41.9 ANXIETY: ICD-10-CM

## 2019-04-09 PROCEDURE — G8427 DOCREV CUR MEDS BY ELIG CLIN: HCPCS | Performed by: FAMILY MEDICINE

## 2019-04-09 PROCEDURE — 99214 OFFICE O/P EST MOD 30 MIN: CPT | Performed by: FAMILY MEDICINE

## 2019-04-09 PROCEDURE — G8598 ASA/ANTIPLAT THER USED: HCPCS | Performed by: FAMILY MEDICINE

## 2019-04-09 PROCEDURE — G8417 CALC BMI ABV UP PARAM F/U: HCPCS | Performed by: FAMILY MEDICINE

## 2019-04-09 PROCEDURE — 3045F PR MOST RECENT HEMOGLOBIN A1C LEVEL 7.0-9.0%: CPT | Performed by: FAMILY MEDICINE

## 2019-04-09 PROCEDURE — 3017F COLORECTAL CA SCREEN DOC REV: CPT | Performed by: FAMILY MEDICINE

## 2019-04-09 PROCEDURE — 1036F TOBACCO NON-USER: CPT | Performed by: FAMILY MEDICINE

## 2019-04-09 PROCEDURE — 2022F DILAT RTA XM EVC RTNOPTHY: CPT | Performed by: FAMILY MEDICINE

## 2019-04-09 RX ORDER — GEMFIBROZIL 600 MG/1
600 TABLET, FILM COATED ORAL 2 TIMES DAILY
Qty: 180 TABLET | Refills: 1 | Status: SHIPPED | OUTPATIENT
Start: 2019-04-09 | End: 2022-10-24 | Stop reason: SDUPTHER

## 2019-04-09 RX ORDER — ALPRAZOLAM 1 MG/1
1 TABLET ORAL 2 TIMES DAILY
Qty: 60 TABLET | Refills: 1 | Status: CANCELLED | OUTPATIENT
Start: 2019-04-09 | End: 2019-06-08

## 2019-04-09 RX ORDER — ENALAPRIL MALEATE 20 MG/1
20 TABLET ORAL DAILY
Qty: 90 TABLET | Refills: 1 | Status: SHIPPED | OUTPATIENT
Start: 2019-04-09 | End: 2019-10-03 | Stop reason: SDUPTHER

## 2019-04-09 RX ORDER — PANTOPRAZOLE SODIUM 40 MG/1
TABLET, DELAYED RELEASE ORAL
Qty: 90 TABLET | Refills: 1 | Status: SHIPPED | OUTPATIENT
Start: 2019-04-09 | End: 2019-10-03 | Stop reason: SDUPTHER

## 2019-04-09 RX ORDER — CROMOLYN SODIUM 40 MG/ML
1 SOLUTION/ DROPS OPHTHALMIC 4 TIMES DAILY
Qty: 1 BOTTLE | Refills: 3 | Status: SHIPPED | OUTPATIENT
Start: 2019-04-09 | End: 2020-01-07 | Stop reason: SDUPTHER

## 2019-04-09 ASSESSMENT — ENCOUNTER SYMPTOMS
WHEEZING: 0
SINUS PRESSURE: 1
SHORTNESS OF BREATH: 0
EYE DISCHARGE: 1

## 2019-04-09 ASSESSMENT — PATIENT HEALTH QUESTIONNAIRE - PHQ9
SUM OF ALL RESPONSES TO PHQ9 QUESTIONS 1 & 2: 0
SUM OF ALL RESPONSES TO PHQ QUESTIONS 1-9: 0
1. LITTLE INTEREST OR PLEASURE IN DOING THINGS: 0
SUM OF ALL RESPONSES TO PHQ QUESTIONS 1-9: 0
2. FEELING DOWN, DEPRESSED OR HOPELESS: 0

## 2019-04-09 NOTE — PROGRESS NOTES
[facility] PROFESSIONAL SERVS  [facility] MEDICINE  [address] [address]  Dept: [phone]  Dept Fax: [phone]  Loc: [phone]  PROGRESS NOTE      Visit Date: 4/9/2019    Amara Garcia is a 63 y.o. male who presents today for:  Chief Complaint   Patient presents with    3 Month Follow-Up    Anxiety       Subjective:  HPI     3 month f/u     Anxiety:  On xanax 1 mg 2x per day. Not on any other anxiety meds. Denies illicit drug use. DM:   on basaglar 54 units twice daily.  On admelopg 10 units twice daily as needed for glucose >155.  He is on metformin as well.  He eats twice daily.  Exercise:  Walking some.  glucose 145 this morning. Glucose usually under 200. On ACEI. GERD:  On protonix. On abd pain. Allergies are worse. Eyes are watering. He stopped the claritin. He was taking opticrom for his eyes but he ran out. Having more nasal congestion and sinus pressure for past few days. He is using flonase daily. Review of Systems   Constitutional: Negative for chills and fever. HENT: Positive for congestion and sinus pressure. Eyes: Positive for discharge. Respiratory: Negative for shortness of breath and wheezing. Cardiovascular: Negative for chest pain and leg swelling. Past Medical History:   Diagnosis Date    Abnormal stress test Sept 2012    Lateral Wall Ischemia- done at [facility]    CAD (coronary artery disease)     Chronic low back pain     Diabetes mellitus ([address]     Diabetes mellitus ([address]     Hyperlipidemia     Hypertension     Hypertriglyceridemia     MI (myocardial infarction) ([address] 2004    Obesity       Past Surgical History:   Procedure Laterality Date    BACK SURGERY  November 1997    L4 & L5 fusion   Frank Notice CARDIAC SURGERY  October 22, 2004    Cardiac cath with stent placement x1    COLONOSCOPY  October 2010   [address]    Mesh repair in abdomen.      Family History   Problem Relation Age of III SHORT PEN) 31G X 8 MM MISC 1 each by Other route 2 times daily 100 each 3    Compression Stockings MISC by Does not apply route Disp:  1 pair of medium strength compression stockings (to the knee)  Dx:  bilateral leg swelling 1 each 0    albuterol sulfate HFA (VENTOLIN HFA) 108 (90 Base) MCG/ACT inhaler Inhale 2 puffs into the lungs every 4 hours as needed for Wheezing 1 Inhaler 0    albuterol (PROVENTIL) (2.5 MG/3ML) 0.083% nebulizer solution Take 3 mLs by nebulization every 4 hours as needed for Wheezing 2 Package 1    clopidogrel (PLAVIX) 75 MG tablet take 1 tablet by mouth once daily 90 tablet 1    metoprolol tartrate (LOPRESSOR) 50 MG tablet Take 0.5 tablets by mouth 2 times daily 90 tablet 1    pravastatin (PRAVACHOL) 20 MG tablet Take 20 mg by mouth daily      nitroGLYCERIN (NITROSTAT) 0.4 MG SL tablet Place 0.4 mg under the tongue every 5 minutes as needed for Chest pain      vitamin D (ERGOCALCIFEROL) 400 UNITS CAPS Take 400 Units by mouth daily.  aspirin 325 MG tablet Take 325 mg by mouth daily.  ALPRAZolam (XANAX) 1 MG tablet Take 1 tablet by mouth 2 times daily for 60 days. . 60 tablet 1     No current facility-administered medications for this visit.       Allergies   Allergen Reactions    Darvocet [Propoxyphene N-Acetaminophen]     Darvon [Propoxyphene Hcl]     Flexeril [Cyclobenzaprine] Other (See Comments)     Headache    Morphine     Motrin [Ibuprofen Micronized]     Tylenol [Acetaminophen] Nausea Only    Ultram [Tramadol] Itching    Baclofen Other (See Comments)     Health Maintenance   Topic Date Due    Hepatitis C screen  1960    HIV screen  07/10/1976    Hepatitis B Vaccine (1 of 3 - Risk 3-dose series) 07/10/1980    DTaP/Tdap/Td vaccine (1 - Tdap) 07/10/1980    Shingles Vaccine (1 of 2) 07/10/2011    Diabetic retinal exam  07/31/2019    Diabetic foot exam  10/09/2019    Diabetic microalbuminuria test  10/09/2019    A1C test (Diabetic or Prediabetic)  01/08/2020    Lipid screen  01/08/2020    Potassium monitoring  01/08/2020    Creatinine monitoring  01/08/2020    Colon cancer screen colonoscopy  02/26/2022    Flu vaccine  Completed    Pneumococcal 0-64 years Vaccine  Completed       Objective:  /72 (Site: Left Upper Arm, Position: Sitting, Cuff Size: Large Adult)   Pulse 81   Ht 5' 9\" (1.753 m)   Wt 289 lb 3.2 oz (131.2 kg)   BMI 42.71 kg/m²   Physical Exam   Constitutional: He is oriented to person, place, and time. He appears well-developed and well-nourished. Eyes:   Watery eyes   Cardiovascular: Normal rate and regular rhythm. No murmur heard. Pulmonary/Chest: Effort normal and breath sounds normal. No respiratory distress. He has no wheezes. Musculoskeletal: He exhibits no edema. Neurological: He is alert and oriented to person, place, and time. Psychiatric: He has a normal mood and affect. His behavior is normal.   Vitals reviewed. Lab Results   Component Value Date    WBC 8.4 01/08/2019    HGB 12.8 (L) 01/08/2019    HCT 42.2 01/08/2019     01/08/2019    CHOL 155 01/08/2019    TRIG 127 01/08/2019    HDL 33 01/08/2019    ALT 11 01/08/2019    AST 10 01/08/2019     01/08/2019    K 5.0 01/08/2019    CL 98 01/08/2019    CREATININE 0.8 01/08/2019    BUN 13 01/08/2019    CO2 26 01/08/2019    PSA 7.61 (H) 06/08/2016    INR 0.93 09/06/2012    LABA1C 7.7 (H) 01/08/2019    LABMICR 7.17 10/09/2018       Impression/Plan:   1. Anxiety  Chronic. Controlled. Continue xanax. He will call when he is ready for a refill of xanax. 2. Other hyperlipidemia  Well-controlled. Chronic condition. Refill medication(s). Continue pravastatin.  - gemfibrozil (LOPID) 600 MG tablet; Take 1 tablet by mouth 2 times daily  Dispense: 180 tablet; Refill: 1    3. Well controlled type 2 diabetes mellitus ([facility]  Chronic. Last A1c was 7.7% January.   Continue insulin regimen and metformin.  -improve diet and exercise  - metFORMIN (GLUCOPHAGE) 1000 MG tablet; take 1 tablet by mouth twice a day with meals  Dispense: 180 tablet; Refill: 1    4. Essential hypertension  Well-controlled. Chronic condition. Refill medication(s). - enalapril (VASOTEC) 20 MG tablet; Take 1 tablet by mouth daily  Dispense: 90 tablet; Refill: 1    5. Gastroesophageal reflux disease without esophagitis  Well-controlled. Chronic condition. Refill medication(s). - pantoprazole (PROTONIX) 40 MG tablet; take 1 tablet by mouth once daily  Dispense: 90 tablet; Refill: 1    6. Seasonal allergic rhinitis due to pollen  Chronic condition. Uncontrolled. Restart medication  - cromolyn (OPTICROM) 4 % ophthalmic solution; Place 1 drop into both eyes 4 times daily  Dispense: 1 Bottle; Refill: 3    likely has allergies that are worse causing nasal and eye symptoms. continue flonase. Controlled Substances Monitoring:     RX Monitoring 4/9/2019   Attestation The Prescription Monitoring Report for this patient was reviewed today. Chronic Pain Routine Monitoring No signs of potential drug abuse or diversion identified: otherwise, see note documentation       They voiced understanding. All questions answered. They agreed with treatment plan. See patient instructions for any educational materials that may have been given. Discussed use, benefit, and side effects of prescribed medications. Reviewed health maintenance. (Please note that portions of this note may have been completed with a voice recognition program.  Efforts were made to edit the dictation but occasionally words are mis-transcribed.)    Return in about 3 months (around 7/9/2019) for HTN, DM, anxiety. Simon Love received counseling on the following healthy behaviors: nutrition and exercise  Reviewed prior labs and health maintenance  Continue current medications, diet and exercise. Discussed use, benefit, and side effects of prescribed medications. Barriers to medication compliance addressed. Patient given educational materials - see patient instructions  Was a self-tracking handout given in paper form or via PEMREDt? No:     Requested Prescriptions     Signed Prescriptions Disp Refills    gemfibrozil (LOPID) 600 MG tablet 180 tablet 1     Sig: Take 1 tablet by mouth 2 times daily    metFORMIN (GLUCOPHAGE) 1000 MG tablet 180 tablet 1     Sig: take 1 tablet by mouth twice a day with meals    enalapril (VASOTEC) 20 MG tablet 90 tablet 1     Sig: Take 1 tablet by mouth daily    pantoprazole (PROTONIX) 40 MG tablet 90 tablet 1     Sig: take 1 tablet by mouth once daily    cromolyn (OPTICROM) 4 % ophthalmic solution 1 Bottle 3     Sig: Place 1 drop into both eyes 4 times daily       All patient questions answered. Patient voiced understanding. Quality Measures    Body mass index is 42.71 kg/m². Elevated. Weight control planned discussed Healthy diet and regular exercise. BP: 124/72 Blood pressure is normal. Treatment plan consists of No treatment change needed.     Lab Results   Component Value Date    LDLCALC 97 01/08/2019    (goal LDL reduction with dx if diabetes is 50% LDL reduction)      PHQ Scores 4/9/2019 1/11/2018 10/5/2016   PHQ2 Score 0 0 0   PHQ9 Score 0 0 0     Interpretation of Total Score Depression Severity: 1-4 = Minimal depression, 5-9 = Mild depression, 10-14 = Moderate depression, 15-19 = Moderately severe depression, 20-27 = Severe depression      Electronically signed by Doris Jasso MD on 4/9/2019 at 10:16 AM

## 2019-04-09 NOTE — PATIENT INSTRUCTIONS
Patient Education        Learning About Anxiety Disorders  What are anxiety disorders? Anxiety disorders are a type of medical problem. They cause severe anxiety. When you feel anxious, you feel that something bad is about to happen. This feeling interferes with your life. These disorders include:  · Generalized anxiety disorder. You feel worried and stressed about many everyday events and activities. This goes on for several months and disrupts your life on most days. · Panic disorder. You have repeated panic attacks. A panic attack is a sudden, intense fear or anxiety. It may make you feel short of breath. Your heart may pound. · Social anxiety disorder. You feel very anxious about what you will say or do in front of people. For example, you may be scared to talk or eat in public. This problem affects your daily life. · Phobias. You are very scared of a specific object, situation, or activity. For example, you may fear spiders, high places, or small spaces. What are the symptoms? Generalized anxiety disorder  Symptoms may include:  · Feeling worried and stressed about many things almost every day. · Feeling tired or irritable. You may have a hard time concentrating. · Having headaches or muscle aches. · Having a hard time getting to sleep or staying asleep. Panic disorder  You may have repeated panic attacks when there is no reason for feeling afraid. You may change your daily activities because you worry that you will have another attack. Symptoms may include:  · Intense fear, terror, or anxiety. · Trouble breathing or very fast breathing. · Chest pain or tightness. · A heartbeat that races or is not regular. Social anxiety disorder  Symptoms may include:  · Fear about a social situation, such as eating in front of others or speaking in public. You may worry a lot. Or you may be afraid that something bad will happen. · Anxiety that can cause you to blush, sweat, and feel shaky.   · A heartbeat that is faster than normal.  · A hard time focusing. Phobias  Symptoms may include:  · More fear than most people of being around an object, being in a situation, or doing an activity. You might also be stressed about the chance of being around the thing you fear. · Worry about losing control, panicking, fainting, or having physical symptoms like a faster heartbeat when you are around the situation or object. How are these disorders treated? Anxiety disorders can be treated with medicines or counseling. A combination of both may be used. Medicines may include:  · Antidepressants. These may help your symptoms by keeping chemicals in your brain in balance. · Benzodiazepines. These may give you short-term relief of your symptoms. Some people use cognitive-behavioral therapy. A therapist helps you learn to change stressful or bad thoughts into helpful thoughts. Lead a healthy lifestyle  A healthy lifestyle may help you feel better. · Get at least 30 minutes of exercise on most days of the week. Walking is a good choice. · Eat a healthy diet. Include fruits, vegetables, lean proteins, and whole grains in your diet each day. · Try to go to bed at the same time every night. Try for 8 hours of sleep a night. · Find ways to manage stress. Try relaxation exercises. · Avoid alcohol and illegal drugs. Follow-up care is a key part of your treatment and safety. Be sure to make and go to all appointments, and call your doctor if you are having problems. It's also a good idea to know your test results and keep a list of the medicines you take. Where can you learn more? Go to https://blanca.YCharts. org and sign in to your [employer] account. Enter P648 in the [facility] box to learn more about \"Learning About Anxiety Disorders. \"     If you do not have an account, please click on the \"Sign Up Now\" link.   Current as of: September 11, 2018  Content Version: 11.9  © 2707-6127 Healthwise, Incorporated. Care instructions adapted under license by [facility] ([facility]). If you have questions about a medical condition or this instruction, always ask your healthcare professional. [address] any warranty or liability for your use of this information.

## 2019-04-17 DIAGNOSIS — F41.9 ANXIETY: ICD-10-CM

## 2019-04-17 DIAGNOSIS — E11.9 WELL CONTROLLED TYPE 2 DIABETES MELLITUS (HCC): Chronic | ICD-10-CM

## 2019-04-17 RX ORDER — ALPRAZOLAM 1 MG/1
1 TABLET ORAL 2 TIMES DAILY
Qty: 60 TABLET | Refills: 1 | Status: SHIPPED | OUTPATIENT
Start: 2019-04-17 | End: 2019-06-18 | Stop reason: SDUPTHER

## 2019-04-17 NOTE — TELEPHONE ENCOUNTER
Received request from [facility] requesting refill for his Xanax (last received 60 with one refill on 1/22/19) Also request for his [employer]. set to send.  Last visit on 4/9/19, next scheduled on 7/09/19

## 2019-05-18 ENCOUNTER — HOSPITAL ENCOUNTER (EMERGENCY)
Age: 59
Discharge: HOME | End: 2019-05-18
Payer: MEDICAID

## 2019-05-18 VITALS
RESPIRATION RATE: 18 BRPM | BODY MASS INDEX: 42.21 KG/M2 | HEART RATE: 75 BPM | WEIGHT: 285 LBS | HEIGHT: 69 IN | OXYGEN SATURATION: 99 % | SYSTOLIC BLOOD PRESSURE: 159 MMHG | DIASTOLIC BLOOD PRESSURE: 67 MMHG | TEMPERATURE: 97.2 F

## 2019-05-18 DIAGNOSIS — L02.01 ABSCESS OF RIGHT EXTERNAL CHEEK: Primary | ICD-10-CM

## 2019-05-18 PROCEDURE — 99212 OFFICE O/P EST SF 10 MIN: CPT

## 2019-05-18 PROCEDURE — 99213 OFFICE O/P EST LOW 20 MIN: CPT | Performed by: NURSE PRACTITIONER

## 2019-05-18 RX ORDER — SULFAMETHOXAZOLE AND TRIMETHOPRIM 800; 160 MG/1; MG/1
1 TABLET ORAL 2 TIMES DAILY
Qty: 20 TABLET | Refills: 0 | Status: SHIPPED | OUTPATIENT
Start: 2019-05-18 | End: 2019-05-28

## 2019-05-18 ASSESSMENT — ENCOUNTER SYMPTOMS
BACK PAIN: 0
FACIAL SWELLING: 1
NAUSEA: 0
SHORTNESS OF BREATH: 0
DIARRHEA: 0
COUGH: 0
SORE THROAT: 0
VOMITING: 0

## 2019-05-18 NOTE — ED PROVIDER NOTES
[facility]) 2004    Obesity        SURGICALHISTORY     Patient  has a past surgical history that includes hernia repair (1991); back surgery (November 1997); Colonoscopy (October 2010); and Cardiac surgery (October 22, 2004). CURRENT MEDICATIONS       Discharge Medication List as of 5/18/2019 10:19 AM      CONTINUE these medications which have NOT CHANGED    Details   ALPRAZolam (XANAX) 1 MG tablet Take 1 tablet by mouth 2 times daily for 60 days. , Disp-60 tablet, R-1Normal      insulin glargine (BASAGLAR KWIKPEN) 100 UNIT/ML injection pen INJECT 54 UNITS INTO THE SKIN TWICE A DAY., Disp-33 mL, R-1Normal      gemfibrozil (LOPID) 600 MG tablet Take 1 tablet by mouth 2 times daily, Disp-180 tablet, R-1Normal      metFORMIN (GLUCOPHAGE) 1000 MG tablet take 1 tablet by mouth twice a day with meals, Disp-180 tablet, R-1Normal      enalapril (VASOTEC) 20 MG tablet Take 1 tablet by mouth daily, Disp-90 tablet, R-1Normal      pantoprazole (PROTONIX) 40 MG tablet take 1 tablet by mouth once daily, Disp-90 tablet, R-1Normal      cromolyn (OPTICROM) 4 % ophthalmic solution Place 1 drop into both eyes 4 times daily, Disp-1 Bottle, R-3Normal      fluticasone (FLONASE) 50 MCG/ACT nasal spray One spray in each nostril q hs, Disp-3 Bottle, R-1Normal      tamsulosin (FLOMAX) 0.4 MG capsule take 1 capsule by mouth once daily, Disp-90 capsule, R-1Normal      insulin lispro (ADMELOG) 100 UNIT/ML injection vial Inject 10 Units into the skin 2 times daily as needed for High Blood Sugar, Disp-3 vial, R-1Normal      albuterol (PROVENTIL) (2.5 MG/3ML) 0.083% nebulizer solution Take 3 mLs by nebulization every 4 hours as needed for Wheezing, Disp-2 Package, R-1Normal      clopidogrel (PLAVIX) 75 MG tablet take 1 tablet by mouth once daily, Disp-90 tablet, R-1Normal      metoprolol tartrate (LOPRESSOR) 50 MG tablet Take 0.5 tablets by mouth 2 times daily, Disp-90 tablet, R-1Normal      pravastatin (PRAVACHOL) 20 MG tablet Take 20 mg by mouth daily      vitamin D (ERGOCALCIFEROL) 400 UNITS CAPS Take 400 Units by mouth daily. aspirin 325 MG tablet Take 325 mg by mouth daily. Handicap Placard MISC Starting Tue 1/8/2019, Disp-1 each, R-0, PrintDuration 5 years; exp 1/8/2024. Dx: Low back pain      !! Insulin Pen Needle (B-D ULTRAFINE III SHORT PEN) 31G X 8 MM MISC DAILY Starting Fri 1/4/2019, Disp-100 each, R-3, NormalMay substitute with any brand      Insulin Syringes, Disposable, U-100 1 ML MISC 3 TIMES DAILY Starting Fri 12/14/2018, Disp-100 each, R-5, [id number] gauge x 8 mm  ultrafine 2      !! Insulin Pen Needle (B-D ULTRAFINE III SHORT PEN) 31G X 8 MM MISC 2 TIMES DAILY Starting Mon 6/18/2018, Disp-100 each, R-3, Normal      Compression Stockings MISC Starting Mon 6/18/2018, Disp-1 each, R-0, PrintDisp:  1 pair of medium strength compression stockings (to the knee) Dx:  bilateral leg swelling      albuterol sulfate HFA (VENTOLIN HFA) 108 (90 Base) MCG/ACT inhaler Inhale 2 puffs into the lungs every 4 hours as needed for Wheezing, Disp-1 Inhaler, R-0Normal      nitroGLYCERIN (NITROSTAT) 0.4 MG SL tablet Place 0.4 mg under the tongue every 5 minutes as needed for Chest pain       !! - Potential duplicate medications found. Please discuss with provider. ALLERGIES     Patient is is allergic to darvocet [propoxyphene n-acetaminophen]; darvon [propoxyphene hcl]; flexeril [cyclobenzaprine]; morphine; motrin [ibuprofen micronized]; tylenol [acetaminophen]; ultram [tramadol]; and baclofen. Patients   Immunization History   Administered Date(s) Administered    Influenza Virus Vaccine 11/06/2018    Influenza, Daphne Rivera, 3 Years and older, IM (Fluzone 3 yrs and older or Afluria 5 yrs and older) 11/04/2016, 01/11/2018    Pneumococcal Polysaccharide ([id number]) 01/11/2018       FAMILY HISTORY     Patient's family history includes Arthritis in his father; Diabetes in his brother, father, mother, and sister;  Heart Disease in his brother, TO:  Reno Knight MD  [facility] [address] [place] [address]  [id number]      DISCHARGE MEDICATIONS:  Discharge Medication List as of 5/18/2019 10:19 AM      START taking these medications    Details   sulfamethoxazole-trimethoprim (BACTRIM DS) 800-160 MG per tablet Take 1 tablet by mouth 2 times daily for 10 days, Disp-20 tablet, R-0Normal             Discharge Medication List as of 5/18/2019 10:19 AM          Discharge Medication List as of 5/18/2019 10:19 AM          PIEDAD Belle CNP    (Please note that portions of this note were completed with a voice recognition program. Efforts were made to edit the dictations but occasionally words are mis-transcribed.)           PIEDAD Belle CNP  05/18/19 [id number]

## 2019-05-18 NOTE — ED NOTES
Patient verbalized understanding of discharge instructions. Denies questions or concerns at this time.      Leroy Mccormick RN  05/18/19 [phone]

## 2019-05-20 ENCOUNTER — OFFICE VISIT (OUTPATIENT)
Dept: FAMILY MEDICINE CLINIC | Age: 59
End: 2019-05-20
Payer: MEDICAID

## 2019-05-20 VITALS
DIASTOLIC BLOOD PRESSURE: 62 MMHG | BODY MASS INDEX: 42.72 KG/M2 | WEIGHT: 288.4 LBS | HEIGHT: 69 IN | TEMPERATURE: 98.4 F | HEART RATE: 68 BPM | SYSTOLIC BLOOD PRESSURE: 122 MMHG

## 2019-05-20 DIAGNOSIS — L02.01 ABSCESS, CHEEK: Primary | ICD-10-CM

## 2019-05-20 PROCEDURE — G8417 CALC BMI ABV UP PARAM F/U: HCPCS | Performed by: FAMILY MEDICINE

## 2019-05-20 PROCEDURE — G8427 DOCREV CUR MEDS BY ELIG CLIN: HCPCS | Performed by: FAMILY MEDICINE

## 2019-05-20 PROCEDURE — 99213 OFFICE O/P EST LOW 20 MIN: CPT | Performed by: FAMILY MEDICINE

## 2019-05-20 PROCEDURE — G8598 ASA/ANTIPLAT THER USED: HCPCS | Performed by: FAMILY MEDICINE

## 2019-05-20 PROCEDURE — 1036F TOBACCO NON-USER: CPT | Performed by: FAMILY MEDICINE

## 2019-05-20 PROCEDURE — 3017F COLORECTAL CA SCREEN DOC REV: CPT | Performed by: FAMILY MEDICINE

## 2019-05-20 RX ORDER — AMOXICILLIN AND CLAVULANATE POTASSIUM 875; 125 MG/1; MG/1
1 TABLET, FILM COATED ORAL 2 TIMES DAILY
Qty: 20 TABLET | Refills: 0 | Status: SHIPPED | OUTPATIENT
Start: 2019-05-20 | End: 2019-05-30

## 2019-05-20 NOTE — PROGRESS NOTES
[facility] PROFESSIONAL SERVS  [facility]  [address] [address]  Dept: [phone]  Dept Fax: [phone]  Loc: [phone]  PROGRESS NOTE      Visit Date: 5/20/2019    Srinivas Arias is a 63 y.o. male who presents today for:  Chief Complaint   Patient presents with    Edema     rt side face working in yard Friday woke up 05/18/2019 to swelling of face seen in  given Bactrim for 10 days        Subjective:  HPI    Right cheek:  Seen in urgent care 2 days ago and placed on Bactrim. He is taking the Bactrim. He is also doing warm compresses. having right cheek swelling. Had a small amount of drainage. No tongue swelling. He is eating and drinking. Glucose levels are 100-250. Review of Systems   Constitutional: Negative for chills and fever. Past Medical History:   Diagnosis Date    Abnormal stress test Sept 2012    Lateral Wall Ischemia- done at [facility]    CAD (coronary artery disease)     Chronic low back pain     [facility]     Diabetes mellitus [facility]     Hyperlipidemia     Hypertension     Hypertriglyceridemia     MI (myocardial infarction) ([facility] 2004    Obesity       Current Outpatient Medications   Medication Sig Dispense Refill    sulfamethoxazole-trimethoprim (BACTRIM DS) 800-160 MG per tablet Take 1 tablet by mouth 2 times daily for 10 days 20 tablet 0    ALPRAZolam (XANAX) 1 MG tablet Take 1 tablet by mouth 2 times daily for 60 days. 60 tablet 1    insulin glargine (BASAGLAR KWIKPEN) 100 UNIT/ML injection pen INJECT 54 UNITS INTO THE SKIN TWICE A DAY.  33 mL 1    gemfibrozil (LOPID) 600 MG tablet Take 1 tablet by mouth 2 times daily 180 tablet 1    metFORMIN (GLUCOPHAGE) 1000 MG tablet take 1 tablet by mouth twice a day with meals 180 tablet 1    enalapril (VASOTEC) 20 MG tablet Take 1 tablet by mouth daily 90 tablet 1    pantoprazole (PROTONIX) 40 MG tablet take 1 tablet by mouth once daily 90 tablet 1  cromolyn (OPTICROM) 4 % ophthalmic solution Place 1 drop into both eyes 4 times daily 1 Bottle 3    fluticasone (FLONASE) 50 MCG/ACT nasal spray One spray in each nostril q hs 3 Bottle 1    tamsulosin (FLOMAX) 0.4 MG capsule take 1 capsule by mouth once daily 90 capsule 1    Handicap Placard MISC by Does not apply route Duration 5 years; exp 1/8/2024. Dx: Low back pain 1 each 0    Insulin Pen Needle (B-D ULTRAFINE III SHORT PEN) 31G X 8 MM MISC Inject 1 each into the skin daily May substitute with any brand 100 each 3    Insulin Syringes, Disposable, U-100 1 ML MISC 1 each by Does not apply route 3 times daily 31 gauge x 8 mm  ultrafine 2 100 each 5    insulin lispro (ADMELOG) 100 UNIT/ML injection vial Inject 10 Units into the skin 2 times daily as needed for High Blood Sugar 3 vial 1    Insulin Pen Needle (B-D ULTRAFINE III SHORT PEN) 31G X 8 MM MISC 1 each by Other route 2 times daily 100 each 3    Compression Stockings MISC by Does not apply route Disp:  1 pair of medium strength compression stockings (to the knee)  Dx:  bilateral leg swelling 1 each 0    albuterol sulfate HFA (VENTOLIN HFA) 108 (90 Base) MCG/ACT inhaler Inhale 2 puffs into the lungs every 4 hours as needed for Wheezing 1 Inhaler 0    albuterol (PROVENTIL) (2.5 MG/3ML) 0.083% nebulizer solution Take 3 mLs by nebulization every 4 hours as needed for Wheezing 2 Package 1    clopidogrel (PLAVIX) 75 MG tablet take 1 tablet by mouth once daily 90 tablet 1    metoprolol tartrate (LOPRESSOR) 50 MG tablet Take 0.5 tablets by mouth 2 times daily 90 tablet 1    pravastatin (PRAVACHOL) 20 MG tablet Take 20 mg by mouth daily      nitroGLYCERIN (NITROSTAT) 0.4 MG SL tablet Place 0.4 mg under the tongue every 5 minutes as needed for Chest pain      vitamin D (ERGOCALCIFEROL) 400 UNITS CAPS Take 400 Units by mouth daily.  aspirin 325 MG tablet Take 325 mg by mouth daily.          No current facility-administered medications for this visit. Allergies   Allergen Reactions    Darvocet [Propoxyphene N-Acetaminophen]     Darvon [Propoxyphene Hcl]     Flexeril [Cyclobenzaprine] Other (See Comments)     Headache    Morphine     Motrin [Ibuprofen Micronized]     Tylenol [Acetaminophen] Nausea Only    Ultram [Tramadol] Itching    Baclofen Other (See Comments)       Objective:     /62 (Site: Left Upper Arm, Position: Sitting, Cuff Size: Large Adult)   Pulse 68   Temp 98.4 °F (36.9 °C) (Oral)   Ht 5' 9\" (1.753 m)   Wt 288 lb 6.4 oz (130.8 kg)   BMI 42.59 kg/m²   Physical Exam   Constitutional: He is oriented to person, place, and time. He appears well-developed and well-nourished. HENT:   Head:       Cardiovascular: Normal rate and regular rhythm. No murmur heard. Pulmonary/Chest: Effort normal and breath sounds normal. No respiratory distress. He has no wheezes. Musculoskeletal: He exhibits no edema. Neurological: He is alert and oriented to person, place, and time. Psychiatric: He has a normal mood and affect. His behavior is normal.   Vitals reviewed. No buccal oral lesions are present. No tongue swelling    Impression/Plan:  1. Abscess, cheek  Abscess of right cheek. There is induration without fluctuance. There is unlikely anything that is able to be drained at this time. Given the close approximation of the oral mucosa, we will add Augmentin to his regimen of Bactrim to help cover anaerobes. - amoxicillin-clavulanate (AUGMENTIN) 875-125 MG per tablet; Take 1 tablet by mouth 2 times daily for 10 days  Dispense: 20 tablet; Refill: 0    continue warm compresses. If he develops fever and chills, he needs to let us know. They voiced understanding. All questions answered. They agreed with treatment plan. See patient instructions for any educational materials that may have been given. Discussed use, benefit, and side effects of prescribed medications.        (Please note that portions of this note may have been completed with a voice recognition program.  Efforts were made to edit the dictation but occasionally words are mis-transcribed.)    Return in about 2 days (around 5/22/2019) for right face abscess. May need to see ENT if not improving.        Electronically signed by Jf Tariq MD on 5/20/2019 at 8:47 AM

## 2019-05-22 ENCOUNTER — OFFICE VISIT (OUTPATIENT)
Dept: FAMILY MEDICINE CLINIC | Age: 59
End: 2019-05-22
Payer: MEDICAID

## 2019-05-22 VITALS
TEMPERATURE: 98.5 F | BODY MASS INDEX: 42.75 KG/M2 | DIASTOLIC BLOOD PRESSURE: 60 MMHG | HEIGHT: 69 IN | HEART RATE: 84 BPM | SYSTOLIC BLOOD PRESSURE: 126 MMHG | WEIGHT: 288.6 LBS

## 2019-05-22 DIAGNOSIS — L02.01 ABSCESS, CHEEK: Primary | ICD-10-CM

## 2019-05-22 PROCEDURE — G8417 CALC BMI ABV UP PARAM F/U: HCPCS | Performed by: FAMILY MEDICINE

## 2019-05-22 PROCEDURE — 3017F COLORECTAL CA SCREEN DOC REV: CPT | Performed by: FAMILY MEDICINE

## 2019-05-22 PROCEDURE — 99212 OFFICE O/P EST SF 10 MIN: CPT | Performed by: FAMILY MEDICINE

## 2019-05-22 PROCEDURE — 1036F TOBACCO NON-USER: CPT | Performed by: FAMILY MEDICINE

## 2019-05-22 PROCEDURE — G8427 DOCREV CUR MEDS BY ELIG CLIN: HCPCS | Performed by: FAMILY MEDICINE

## 2019-05-22 PROCEDURE — G8598 ASA/ANTIPLAT THER USED: HCPCS | Performed by: FAMILY MEDICINE

## 2019-05-24 ENCOUNTER — OFFICE VISIT (OUTPATIENT)
Dept: FAMILY MEDICINE CLINIC | Age: 59
End: 2019-05-24
Payer: MEDICAID

## 2019-05-24 VITALS
DIASTOLIC BLOOD PRESSURE: 74 MMHG | WEIGHT: 286.4 LBS | SYSTOLIC BLOOD PRESSURE: 122 MMHG | HEART RATE: 76 BPM | HEIGHT: 69 IN | BODY MASS INDEX: 42.42 KG/M2

## 2019-05-24 DIAGNOSIS — L02.01 ABSCESS, CHEEK: Primary | ICD-10-CM

## 2019-05-24 PROCEDURE — G8427 DOCREV CUR MEDS BY ELIG CLIN: HCPCS | Performed by: FAMILY MEDICINE

## 2019-05-24 PROCEDURE — 99212 OFFICE O/P EST SF 10 MIN: CPT | Performed by: FAMILY MEDICINE

## 2019-05-24 PROCEDURE — G8598 ASA/ANTIPLAT THER USED: HCPCS | Performed by: FAMILY MEDICINE

## 2019-05-24 PROCEDURE — G8417 CALC BMI ABV UP PARAM F/U: HCPCS | Performed by: FAMILY MEDICINE

## 2019-05-24 PROCEDURE — 1036F TOBACCO NON-USER: CPT | Performed by: FAMILY MEDICINE

## 2019-05-24 PROCEDURE — 3017F COLORECTAL CA SCREEN DOC REV: CPT | Performed by: FAMILY MEDICINE

## 2019-05-24 NOTE — PROGRESS NOTES
[facility]  [address] [address]  Dept: [phone]  Dept Fax: [phone]  Loc: [phone]  PROGRESS NOTE      Visit Date: 5/24/2019    Melchor Babinski is a 63 y.o. male who presents today for:  Chief Complaint   Patient presents with    Other     recheck abscess right side face-improved       Subjective: Other   Pertinent negatives include no chills or fever. 2 day follow-up for abscess of right cheek. He had been taking Bactrim and was placed on Augmentin 4 days ago. He has not had any drainage from it in the past 2 days. He has clearly seen a decrease in the amount of induration. He has been using warm compresses. He denies fever and chills. He is eating and drinking without a problem. Review of Systems   Constitutional: Negative for chills and fever. Past Medical History:   Diagnosis Date    Abnormal stress test Sept 2012    Lateral Wall Ischemia- done at [facility]    CAD (coronary artery disease)     Chronic low back pain     [facility]     Diabetes mellitus ([facility]     Hyperlipidemia     Hypertension     Hypertriglyceridemia     MI (myocardial infarction) ([facility] 2004    Obesity       Current Outpatient Medications   Medication Sig Dispense Refill    amoxicillin-clavulanate (AUGMENTIN) 875-125 MG per tablet Take 1 tablet by mouth 2 times daily for 10 days 20 tablet 0    sulfamethoxazole-trimethoprim (BACTRIM DS) 800-160 MG per tablet Take 1 tablet by mouth 2 times daily for 10 days 20 tablet 0    ALPRAZolam (XANAX) 1 MG tablet Take 1 tablet by mouth 2 times daily for 60 days. 60 tablet 1    insulin glargine (BASAGLAR KWIKPEN) 100 UNIT/ML injection pen INJECT 54 UNITS INTO THE SKIN TWICE A DAY.  33 mL 1    gemfibrozil (LOPID) 600 MG tablet Take 1 tablet by mouth 2 times daily 180 tablet 1    metFORMIN (GLUCOPHAGE) 1000 MG tablet take 1 tablet by mouth twice a day with meals 180 tablet 1    enalapril (VASOTEC) 20 MG tablet Take 1 tablet by mouth daily 90 tablet 1    pantoprazole (PROTONIX) 40 MG tablet take 1 tablet by mouth once daily 90 tablet 1    fluticasone (FLONASE) 50 MCG/ACT nasal spray One spray in each nostril q hs 3 Bottle 1    tamsulosin (FLOMAX) 0.4 MG capsule take 1 capsule by mouth once daily 90 capsule 1    Handicap Placard MISC by Does not apply route Duration 5 years; exp 1/8/2024. Dx: Low back pain 1 each 0    Insulin Pen Needle (B-D ULTRAFINE III SHORT PEN) 31G X 8 MM MISC Inject 1 each into the skin daily May substitute with any brand 100 each 3    Insulin Syringes, Disposable, U-100 1 ML MISC 1 each by Does not apply route 3 times daily 31 gauge x 8 mm  ultrafine 2 100 each 5    insulin lispro (ADMELOG) 100 UNIT/ML injection vial Inject 10 Units into the skin 2 times daily as needed for High Blood Sugar 3 vial 1    Insulin Pen Needle (B-D ULTRAFINE III SHORT PEN) 31G X 8 MM MISC 1 each by Other route 2 times daily 100 each 3    albuterol (PROVENTIL) (2.5 MG/3ML) 0.083% nebulizer solution Take 3 mLs by nebulization every 4 hours as needed for Wheezing 2 Package 1    clopidogrel (PLAVIX) 75 MG tablet take 1 tablet by mouth once daily 90 tablet 1    metoprolol tartrate (LOPRESSOR) 50 MG tablet Take 0.5 tablets by mouth 2 times daily 90 tablet 1    pravastatin (PRAVACHOL) 20 MG tablet Take 20 mg by mouth daily      nitroGLYCERIN (NITROSTAT) 0.4 MG SL tablet Place 0.4 mg under the tongue every 5 minutes as needed for Chest pain      vitamin D (ERGOCALCIFEROL) 400 UNITS CAPS Take 400 Units by mouth daily.  aspirin 325 MG tablet Take 325 mg by mouth daily.  cromolyn (OPTICROM) 4 % ophthalmic solution Place 1 drop into both eyes 4 times daily 1 Bottle 3     No current facility-administered medications for this visit.       Allergies   Allergen Reactions    Darvocet [Propoxyphene N-Acetaminophen]     Darvon [Propoxyphene Hcl]     Flexeril [Cyclobenzaprine] Other (See Comments)     Headache    Morphine     Motrin [Ibuprofen Micronized]     Tylenol [Acetaminophen] Nausea Only    Ultram [Tramadol] Itching    Baclofen Other (See Comments)       Objective:     /74 (Site: Left Upper Arm, Position: Sitting, Cuff Size: Large Adult)   Pulse 76   Ht 5' 9\" (1.753 m)   Wt 286 lb 6.4 oz (129.9 kg)   BMI 42.29 kg/m²   Physical Exam   Constitutional: He is oriented to person, place, and time. He appears well-developed and well-nourished. HENT:   Head:       Neurological: He is alert and oriented to person, place, and time. Psychiatric: He has a normal mood and affect. His behavior is normal.   Vitals reviewed. Impression/Plan:  1. Abscess, cheek  Abscess of right side of cheek. Improving with Augmentin and Bactrim. No fluctuance at this time. Continue warm compresses. Continue and complete antibiotic course. F/u it is worsens. They voiced understanding. All questions answered. They agreed with treatment plan. See patient instructions for any educational materials that may have been given. Discussed use, benefit, and side effects of prescribed medications. (Please note that portions of this note may have been completed with a voice recognition program.  Efforts were made to edit the dictation but occasionally words are mis-transcribed.)    Return if symptoms worsen or fail to improve.        Electronically signed by Jazmyn Terry MD on 5/24/2019 at 9:05 AM

## 2019-06-03 ENCOUNTER — TELEPHONE (OUTPATIENT)
Dept: FAMILY MEDICINE CLINIC | Age: 59
End: 2019-06-03

## 2019-06-18 DIAGNOSIS — F41.9 ANXIETY: ICD-10-CM

## 2019-06-18 RX ORDER — ALPRAZOLAM 1 MG/1
1 TABLET ORAL 2 TIMES DAILY
Qty: 60 TABLET | Refills: 0 | Status: SHIPPED | OUTPATIENT
Start: 2019-06-18 | End: 2019-07-09 | Stop reason: SDUPTHER

## 2019-06-18 NOTE — TELEPHONE ENCOUNTER
Emili Keepers called requesting a refill on the following medications:  Requested Prescriptions     Pending Prescriptions Disp Refills    ALPRAZolam (XANAX) 1 MG tablet 60 tablet 1     Sig: Take 1 tablet by mouth 2 times daily for 60 days.        Date of last visit: 5/24/2019  Date of next visit (if applicable):7/9/2019  Date of last refill: 04/17/2019  [facility] Name: [facility], [address] [place] [address]

## 2019-07-09 ENCOUNTER — OFFICE VISIT (OUTPATIENT)
Dept: FAMILY MEDICINE CLINIC | Age: 59
End: 2019-07-09
Payer: MEDICAID

## 2019-07-09 VITALS
WEIGHT: 287.6 LBS | HEIGHT: 69 IN | SYSTOLIC BLOOD PRESSURE: 126 MMHG | DIASTOLIC BLOOD PRESSURE: 62 MMHG | HEART RATE: 77 BPM | BODY MASS INDEX: 42.6 KG/M2

## 2019-07-09 DIAGNOSIS — E11.9 WELL CONTROLLED TYPE 2 DIABETES MELLITUS (HCC): Primary | ICD-10-CM

## 2019-07-09 DIAGNOSIS — I10 ESSENTIAL HYPERTENSION: ICD-10-CM

## 2019-07-09 DIAGNOSIS — F41.9 ANXIETY: ICD-10-CM

## 2019-07-09 DIAGNOSIS — I25.10 CORONARY ARTERY DISEASE INVOLVING NATIVE CORONARY ARTERY OF NATIVE HEART WITHOUT ANGINA PECTORIS: ICD-10-CM

## 2019-07-09 PROCEDURE — G8427 DOCREV CUR MEDS BY ELIG CLIN: HCPCS | Performed by: FAMILY MEDICINE

## 2019-07-09 PROCEDURE — G8417 CALC BMI ABV UP PARAM F/U: HCPCS | Performed by: FAMILY MEDICINE

## 2019-07-09 PROCEDURE — 1036F TOBACCO NON-USER: CPT | Performed by: FAMILY MEDICINE

## 2019-07-09 PROCEDURE — 3017F COLORECTAL CA SCREEN DOC REV: CPT | Performed by: FAMILY MEDICINE

## 2019-07-09 PROCEDURE — 2022F DILAT RTA XM EVC RTNOPTHY: CPT | Performed by: FAMILY MEDICINE

## 2019-07-09 PROCEDURE — 99214 OFFICE O/P EST MOD 30 MIN: CPT | Performed by: FAMILY MEDICINE

## 2019-07-09 PROCEDURE — 3045F PR MOST RECENT HEMOGLOBIN A1C LEVEL 7.0-9.0%: CPT | Performed by: FAMILY MEDICINE

## 2019-07-09 PROCEDURE — G8598 ASA/ANTIPLAT THER USED: HCPCS | Performed by: FAMILY MEDICINE

## 2019-07-09 RX ORDER — METOPROLOL TARTRATE 50 MG/1
25 TABLET, FILM COATED ORAL 2 TIMES DAILY
Qty: 90 TABLET | Refills: 1 | Status: SHIPPED | OUTPATIENT
Start: 2019-07-09 | End: 2019-12-11 | Stop reason: CLARIF

## 2019-07-09 RX ORDER — CLOPIDOGREL BISULFATE 75 MG/1
TABLET ORAL
Qty: 90 TABLET | Refills: 1 | Status: SHIPPED | OUTPATIENT
Start: 2019-07-09 | End: 2020-01-07 | Stop reason: SDUPTHER

## 2019-07-09 RX ORDER — ALPRAZOLAM 1 MG/1
1 TABLET ORAL 2 TIMES DAILY
Qty: 60 TABLET | Refills: 0 | Status: SHIPPED | OUTPATIENT
Start: 2019-07-15 | End: 2019-08-19 | Stop reason: SDUPTHER

## 2019-07-09 ASSESSMENT — ENCOUNTER SYMPTOMS
WHEEZING: 0
SHORTNESS OF BREATH: 0

## 2019-07-10 ENCOUNTER — TELEPHONE (OUTPATIENT)
Dept: FAMILY MEDICINE CLINIC | Age: 59
End: 2019-07-10

## 2019-07-10 ENCOUNTER — NURSE ONLY (OUTPATIENT)
Dept: FAMILY MEDICINE CLINIC | Age: 59
End: 2019-07-10
Payer: MEDICAID

## 2019-07-10 DIAGNOSIS — I10 ESSENTIAL HYPERTENSION: ICD-10-CM

## 2019-07-10 DIAGNOSIS — E11.9 WELL CONTROLLED TYPE 2 DIABETES MELLITUS (HCC): ICD-10-CM

## 2019-07-10 LAB
ALBUMIN SERPL-MCNC: 4.3 G/DL (ref 3.5–5.1)
ALP BLD-CCNC: 87 U/L (ref 38–126)
ALT SERPL-CCNC: 10 U/L (ref 11–66)
ANION GAP SERPL CALCULATED.3IONS-SCNC: 14 MEQ/L (ref 8–16)
AST SERPL-CCNC: 11 U/L (ref 5–40)
AVERAGE GLUCOSE: 153 MG/DL (ref 70–126)
BILIRUB SERPL-MCNC: 0.3 MG/DL (ref 0.3–1.2)
BUN BLDV-MCNC: 16 MG/DL (ref 7–22)
CALCIUM SERPL-MCNC: 9.9 MG/DL (ref 8.5–10.5)
CHLORIDE BLD-SCNC: 100 MEQ/L (ref 98–111)
CHOLESTEROL, TOTAL: 164 MG/DL (ref 100–199)
CO2: 24 MEQ/L (ref 23–33)
CREAT SERPL-MCNC: 0.9 MG/DL (ref 0.4–1.2)
ERYTHROCYTE [DISTWIDTH] IN BLOOD BY AUTOMATED COUNT: 16.3 % (ref 11.5–14.5)
ERYTHROCYTE [DISTWIDTH] IN BLOOD BY AUTOMATED COUNT: 47.6 FL (ref 35–45)
GFR SERPL CREATININE-BSD FRML MDRD: 87 ML/MIN/1.73M2
GLUCOSE BLD-MCNC: 123 MG/DL (ref 70–108)
HBA1C MFR BLD: 7.1 % (ref 4.4–6.4)
HCT VFR BLD CALC: 42.6 % (ref 42–52)
HDLC SERPL-MCNC: 29 MG/DL
HEMOGLOBIN: 12.9 GM/DL (ref 14–18)
LDL CHOLESTEROL CALCULATED: 106 MG/DL
MCH RBC QN AUTO: 24.6 PG (ref 26–33)
MCHC RBC AUTO-ENTMCNC: 30.3 GM/DL (ref 32.2–35.5)
MCV RBC AUTO: 81.3 FL (ref 80–94)
PLATELET # BLD: 277 THOU/MM3 (ref 130–400)
PMV BLD AUTO: 9.8 FL (ref 9.4–12.4)
POTASSIUM SERPL-SCNC: 4.8 MEQ/L (ref 3.5–5.2)
RBC # BLD: 5.24 MILL/MM3 (ref 4.7–6.1)
SODIUM BLD-SCNC: 138 MEQ/L (ref 135–145)
TOTAL PROTEIN: 7.3 G/DL (ref 6.1–8)
TRIGL SERPL-MCNC: 144 MG/DL (ref 0–199)
WBC # BLD: 8.3 THOU/MM3 (ref 4.8–10.8)

## 2019-07-10 PROCEDURE — 36415 COLL VENOUS BLD VENIPUNCTURE: CPT | Performed by: FAMILY MEDICINE

## 2019-07-10 NOTE — TELEPHONE ENCOUNTER
----- Message from Monica Antonio MD sent at 7/10/2019  4:38 PM EDT -----  Lipids are good. CMP is good. a1c is better at 7.1%. Keep up the good job with the diabetes. CBC shows mild anemia similar to previous. Please advise patient.   Monica Antonio MD

## 2019-07-13 ENCOUNTER — HOSPITAL ENCOUNTER (EMERGENCY)
Age: 59
Discharge: HOME | End: 2019-07-13
Attending: NURSE PRACTITIONER
Payer: MEDICAID

## 2019-07-13 VITALS
RESPIRATION RATE: 16 BRPM | BODY MASS INDEX: 42.51 KG/M2 | SYSTOLIC BLOOD PRESSURE: 156 MMHG | HEART RATE: 91 BPM | TEMPERATURE: 98.3 F | OXYGEN SATURATION: 94 % | DIASTOLIC BLOOD PRESSURE: 72 MMHG | WEIGHT: 287 LBS | HEIGHT: 69 IN

## 2019-07-13 DIAGNOSIS — H72.91 RIGHT OTITIS MEDIA WITH SPONTANEOUS RUPTURE OF EARDRUM: Primary | ICD-10-CM

## 2019-07-13 DIAGNOSIS — H66.91 RIGHT OTITIS MEDIA WITH SPONTANEOUS RUPTURE OF EARDRUM: Primary | ICD-10-CM

## 2019-07-13 DIAGNOSIS — H60.391 OTHER INFECTIVE ACUTE OTITIS EXTERNA OF RIGHT EAR: ICD-10-CM

## 2019-07-13 PROCEDURE — 99213 OFFICE O/P EST LOW 20 MIN: CPT | Performed by: NURSE PRACTITIONER

## 2019-07-13 PROCEDURE — 99212 OFFICE O/P EST SF 10 MIN: CPT

## 2019-07-13 RX ORDER — OFLOXACIN 3 MG/ML
5 SOLUTION AURICULAR (OTIC) 2 TIMES DAILY
Qty: 1 BOTTLE | Refills: 0 | Status: SHIPPED | OUTPATIENT
Start: 2019-07-13 | End: 2019-07-23

## 2019-07-13 RX ORDER — AMOXICILLIN 875 MG/1
875 TABLET, COATED ORAL 2 TIMES DAILY
Qty: 20 TABLET | Refills: 0 | Status: SHIPPED | OUTPATIENT
Start: 2019-07-13 | End: 2019-07-23

## 2019-07-13 ASSESSMENT — PAIN DESCRIPTION - ONSET: ONSET: AWAKENED FROM SLEEP

## 2019-07-13 ASSESSMENT — ENCOUNTER SYMPTOMS
ABDOMINAL PAIN: 0
SINUS PAIN: 0
SINUS PRESSURE: 0
SORE THROAT: 0
SHORTNESS OF BREATH: 0
BACK PAIN: 1
TROUBLE SWALLOWING: 0

## 2019-07-13 ASSESSMENT — PAIN DESCRIPTION - ORIENTATION: ORIENTATION: RIGHT

## 2019-07-13 ASSESSMENT — PAIN SCALES - GENERAL: PAINLEVEL_OUTOF10: 5

## 2019-07-13 ASSESSMENT — PAIN - FUNCTIONAL ASSESSMENT: PAIN_FUNCTIONAL_ASSESSMENT: ACTIVITIES ARE NOT PREVENTED

## 2019-07-13 ASSESSMENT — PAIN DESCRIPTION - PROGRESSION: CLINICAL_PROGRESSION: GRADUALLY WORSENING

## 2019-07-13 ASSESSMENT — PAIN DESCRIPTION - FREQUENCY: FREQUENCY: CONTINUOUS

## 2019-07-13 ASSESSMENT — PAIN DESCRIPTION - PAIN TYPE: TYPE: ACUTE PAIN

## 2019-07-13 ASSESSMENT — PAIN DESCRIPTION - LOCATION: LOCATION: EAR

## 2019-07-13 ASSESSMENT — PAIN DESCRIPTION - DESCRIPTORS: DESCRIPTORS: ACHING

## 2019-07-13 NOTE — ED NOTES
Pt discharge teaching taught via teach back method. Talked with pt about medication and self care. No other concerns voiced. Pt ambulated to leave, rr easy and unlabored.      Bernadine Morin RN  07/13/19 2282

## 2019-07-13 NOTE — ED PROVIDER NOTES
needed for High Blood Sugar, Disp-3 vial, R-1Normal      !! Insulin Pen Needle (B-D ULTRAFINE III SHORT PEN) 31G X 8 MM MISC 2 TIMES DAILY Starting Mon 6/18/2018, Disp-100 each, R-3, Normal      albuterol (PROVENTIL) (2.5 MG/3ML) 0.083% nebulizer solution Take 3 mLs by nebulization every 4 hours as needed for Wheezing, Disp-2 Package, R-1Normal      pravastatin (PRAVACHOL) 20 MG tablet Take 20 mg by mouth daily      nitroGLYCERIN (NITROSTAT) 0.4 MG SL tablet Place 0.4 mg under the tongue every 5 minutes as needed for Chest pain      vitamin D (ERGOCALCIFEROL) 400 UNITS CAPS Take 400 Units by mouth daily. aspirin 325 MG tablet Take 325 mg by mouth daily. !! - Potential duplicate medications found. Please discuss with provider. ALLERGIES     Patient is is allergic to darvocet [propoxyphene n-acetaminophen]; darvon [propoxyphene hcl]; flexeril [cyclobenzaprine]; morphine; motrin [ibuprofen micronized]; tylenol [acetaminophen]; ultram [tramadol]; and baclofen. FAMILY HISTORY     Patient'sfamily history includes Arthritis in his father; Diabetes in his brother, father, mother, and sister; Heart Disease in his brother, father, and mother. SOCIAL HISTORY     Patient  reports that he quit smoking about 24 years ago. His smoking use included cigarettes. He has a 0.50 pack-year smoking history. He has never used smokeless tobacco. He reports that he does not drink alcohol or use drugs. PHYSICAL EXAM     ED TRIAGE VITALS  BP: (!) 156/72, Temp: 98.3 °F (36.8 °C), Pulse: 91, Resp: 16, SpO2: 94 %  Physical Exam   Constitutional: He is oriented to person, place, and time. He appears well-developed and well-nourished. HENT:   Head: Normocephalic and atraumatic. Right Ear: There is drainage (copious yellow/white in canal) and swelling. No tenderness. No mastoid tenderness. Tympanic membrane is perforated. Left Ear: No tenderness. Tympanic membrane is retracted. A middle ear effusion is present. Eyes: Right conjunctiva is not injected. Left conjunctiva is not injected. Pupils are equal.   Cardiovascular: Normal rate, regular rhythm, S1 normal and S2 normal. Exam reveals no gallop. No murmur heard. Pulmonary/Chest: Effort normal and breath sounds normal.   Musculoskeletal: Normal range of motion. Right knee: He exhibits normal range of motion. Left knee: He exhibits normal range of motion. Neurological: He is alert and oriented to person, place, and time. Skin: Skin is warm, dry and intact. Capillary refill takes 2 to 3 seconds. Psychiatric: He has a normal mood and affect. His behavior is normal.   Nursing note and vitals reviewed. DIAGNOSTIC RESULTS   Labs: No results found for this visit on 07/13/19. IMAGING:    URGENT CARE COURSE:     Vitals:    07/13/19 1737   BP: (!) 156/72   Pulse: 91   Resp: 16   Temp: 98.3 °F (36.8 °C)   TempSrc: Tympanic   SpO2: 94%   Weight: 287 lb (130.2 kg)   Height: 5' 9\" (1.753 m)       Medications - No data to display  PROCEDURES:  None  FINAL IMPRESSION      1. Right otitis media with spontaneous rupture of eardrum    2. Other infective acute otitis externa of right ear        DISPOSITION/PLAN   DISPOSITION Decision To Discharge 07/13/2019 06:01:32 PM  Patient presented with acute pain in the right ear. On exam there was a lot of purulent drainage in the ear canal.  The TM was not clearly visualized and appears to be perforated. Plan: Patient will be treated with both eardrops and an oral antibiotic. He was instructed on how to instill the eardrops. Prescription: Floxin otic drops 5 drops twice a day right ear x10 days                        Amoxicillin 875 mg twice a day for 10 days  Follow-up: Make an appointment with your primary care provider if symptoms are not improving or any other concerns  PATIENT REFERRED TO:  Lashanda Koch MD  [address] [place] [address]  [phone]    Schedule an appointment as soon as possible

## 2019-07-18 ASSESSMENT — ENCOUNTER SYMPTOMS
SINUS PRESSURE: 0
ABDOMINAL PAIN: 0
SORE THROAT: 0
SHORTNESS OF BREATH: 0
BACK PAIN: 1
TROUBLE SWALLOWING: 0
SINUS PAIN: 0

## 2019-07-22 ASSESSMENT — ENCOUNTER SYMPTOMS
SORE THROAT: 0
SHORTNESS OF BREATH: 0
BACK PAIN: 1
ABDOMINAL PAIN: 0
SINUS PAIN: 0
SINUS PRESSURE: 0
TROUBLE SWALLOWING: 0

## 2019-08-08 DIAGNOSIS — J30.1 CHRONIC SEASONAL ALLERGIC RHINITIS DUE TO POLLEN: ICD-10-CM

## 2019-08-08 DIAGNOSIS — N40.0 BENIGN NON-NODULAR PROSTATIC HYPERPLASIA WITHOUT LOWER URINARY TRACT SYMPTOMS: ICD-10-CM

## 2019-08-08 RX ORDER — TAMSULOSIN HYDROCHLORIDE 0.4 MG/1
CAPSULE ORAL
Qty: 90 CAPSULE | Refills: 1 | Status: SHIPPED | OUTPATIENT
Start: 2019-08-08 | End: 2020-01-09 | Stop reason: SDUPTHER

## 2019-08-08 RX ORDER — FLUTICASONE PROPIONATE 50 MCG
SPRAY, SUSPENSION (ML) NASAL
Qty: 3 BOTTLE | Refills: 1 | Status: SHIPPED | OUTPATIENT
Start: 2019-08-08 | End: 2020-01-29 | Stop reason: SDUPTHER

## 2019-08-19 DIAGNOSIS — F41.9 ANXIETY: ICD-10-CM

## 2019-08-19 RX ORDER — ALPRAZOLAM 1 MG/1
1 TABLET ORAL 2 TIMES DAILY
Qty: 60 TABLET | Refills: 0 | Status: SHIPPED | OUTPATIENT
Start: 2019-08-19 | End: 2019-09-18 | Stop reason: SDUPTHER

## 2019-09-11 DIAGNOSIS — E11.9 WELL CONTROLLED TYPE 2 DIABETES MELLITUS (HCC): ICD-10-CM

## 2019-09-12 DIAGNOSIS — E11.9 WELL CONTROLLED TYPE 2 DIABETES MELLITUS (HCC): ICD-10-CM

## 2019-09-12 NOTE — TELEPHONE ENCOUNTER
Litzy Sousa called requesting a refill on the following medications:  Requested Prescriptions     Pending Prescriptions Disp Refills    Insulin Pen Needle (B-D ULTRAFINE III SHORT PEN) 31G X 8 MM MISC 200 each 3     Sig: Uses twice daily with the Basaglar insulin pen. Date of last visit: 7/9/2019  Date of next visit (if applicable):10/8/2019  Date of last refill: #100 x 3 on 7/9/2019. Is supposed to be twice daily.   Pharmacy Name: [facility]      Thanks,  Simona Eddy RN

## 2019-09-18 DIAGNOSIS — F41.9 ANXIETY: ICD-10-CM

## 2019-09-18 RX ORDER — ALPRAZOLAM 1 MG/1
TABLET ORAL
Qty: 60 TABLET | Refills: 0 | Status: SHIPPED | OUTPATIENT
Start: 2019-09-18 | End: 2019-10-16 | Stop reason: SDUPTHER

## 2019-10-03 DIAGNOSIS — I10 ESSENTIAL HYPERTENSION: ICD-10-CM

## 2019-10-03 DIAGNOSIS — E11.9 WELL CONTROLLED TYPE 2 DIABETES MELLITUS (HCC): Chronic | ICD-10-CM

## 2019-10-03 DIAGNOSIS — K21.9 GASTROESOPHAGEAL REFLUX DISEASE WITHOUT ESOPHAGITIS: ICD-10-CM

## 2019-10-03 RX ORDER — ENALAPRIL MALEATE 20 MG/1
20 TABLET ORAL DAILY
Qty: 90 TABLET | Refills: 1 | Status: SHIPPED | OUTPATIENT
Start: 2019-10-03 | End: 2020-03-30 | Stop reason: SDUPTHER

## 2019-10-03 RX ORDER — PANTOPRAZOLE SODIUM 40 MG/1
TABLET, DELAYED RELEASE ORAL
Qty: 90 TABLET | Refills: 1 | Status: SHIPPED | OUTPATIENT
Start: 2019-10-03 | End: 2020-03-30 | Stop reason: SDUPTHER

## 2019-10-08 ENCOUNTER — OFFICE VISIT (OUTPATIENT)
Dept: FAMILY MEDICINE CLINIC | Age: 59
End: 2019-10-08
Payer: MEDICAID

## 2019-10-08 VITALS
WEIGHT: 285.6 LBS | DIASTOLIC BLOOD PRESSURE: 62 MMHG | RESPIRATION RATE: 14 BRPM | HEART RATE: 80 BPM | BODY MASS INDEX: 42.3 KG/M2 | HEIGHT: 69 IN | SYSTOLIC BLOOD PRESSURE: 122 MMHG

## 2019-10-08 DIAGNOSIS — Z23 NEED FOR INFLUENZA VACCINATION: ICD-10-CM

## 2019-10-08 DIAGNOSIS — F41.9 ANXIETY: Primary | ICD-10-CM

## 2019-10-08 PROCEDURE — 1036F TOBACCO NON-USER: CPT | Performed by: FAMILY MEDICINE

## 2019-10-08 PROCEDURE — G8598 ASA/ANTIPLAT THER USED: HCPCS | Performed by: FAMILY MEDICINE

## 2019-10-08 PROCEDURE — 90688 IIV4 VACCINE SPLT 0.5 ML IM: CPT | Performed by: FAMILY MEDICINE

## 2019-10-08 PROCEDURE — 90471 IMMUNIZATION ADMIN: CPT | Performed by: FAMILY MEDICINE

## 2019-10-08 PROCEDURE — G8482 FLU IMMUNIZE ORDER/ADMIN: HCPCS | Performed by: FAMILY MEDICINE

## 2019-10-08 PROCEDURE — 3017F COLORECTAL CA SCREEN DOC REV: CPT | Performed by: FAMILY MEDICINE

## 2019-10-08 PROCEDURE — G8427 DOCREV CUR MEDS BY ELIG CLIN: HCPCS | Performed by: FAMILY MEDICINE

## 2019-10-08 PROCEDURE — 99213 OFFICE O/P EST LOW 20 MIN: CPT | Performed by: FAMILY MEDICINE

## 2019-10-08 PROCEDURE — G8417 CALC BMI ABV UP PARAM F/U: HCPCS | Performed by: FAMILY MEDICINE

## 2019-10-08 RX ORDER — PRAVASTATIN SODIUM 40 MG
1 TABLET ORAL NIGHTLY
Refills: 0 | COMMUNITY
Start: 2019-08-01 | End: 2022-10-24 | Stop reason: SDUPTHER

## 2019-10-08 ASSESSMENT — ENCOUNTER SYMPTOMS
SHORTNESS OF BREATH: 0
WHEEZING: 0

## 2019-10-16 DIAGNOSIS — F41.9 ANXIETY: ICD-10-CM

## 2019-10-16 RX ORDER — ALPRAZOLAM 1 MG/1
1 TABLET ORAL 2 TIMES DAILY
Qty: 60 TABLET | Refills: 0 | Status: SHIPPED | OUTPATIENT
Start: 2019-10-16 | End: 2019-11-14 | Stop reason: SDUPTHER

## 2019-11-14 DIAGNOSIS — F41.9 ANXIETY: ICD-10-CM

## 2019-11-14 RX ORDER — ALPRAZOLAM 1 MG/1
1 TABLET ORAL 2 TIMES DAILY
Qty: 60 TABLET | Refills: 0 | Status: SHIPPED | OUTPATIENT
Start: 2019-11-14 | End: 2019-12-16 | Stop reason: SDUPTHER

## 2019-11-18 DIAGNOSIS — E11.9 WELL CONTROLLED TYPE 2 DIABETES MELLITUS (HCC): ICD-10-CM

## 2019-12-11 ENCOUNTER — OFFICE VISIT (OUTPATIENT)
Dept: FAMILY MEDICINE CLINIC | Age: 59
End: 2019-12-11
Payer: MEDICAID

## 2019-12-11 VITALS
SYSTOLIC BLOOD PRESSURE: 128 MMHG | BODY MASS INDEX: 41.53 KG/M2 | HEART RATE: 76 BPM | HEIGHT: 69 IN | DIASTOLIC BLOOD PRESSURE: 64 MMHG | TEMPERATURE: 98.8 F | OXYGEN SATURATION: 97 % | WEIGHT: 280.4 LBS

## 2019-12-11 DIAGNOSIS — H10.31 ACUTE BACTERIAL CONJUNCTIVITIS OF RIGHT EYE: ICD-10-CM

## 2019-12-11 DIAGNOSIS — J06.9 VIRAL URI: Primary | ICD-10-CM

## 2019-12-11 PROCEDURE — 99213 OFFICE O/P EST LOW 20 MIN: CPT | Performed by: FAMILY MEDICINE

## 2019-12-11 PROCEDURE — G8417 CALC BMI ABV UP PARAM F/U: HCPCS | Performed by: FAMILY MEDICINE

## 2019-12-11 PROCEDURE — G8427 DOCREV CUR MEDS BY ELIG CLIN: HCPCS | Performed by: FAMILY MEDICINE

## 2019-12-11 PROCEDURE — 1036F TOBACCO NON-USER: CPT | Performed by: FAMILY MEDICINE

## 2019-12-11 PROCEDURE — G8482 FLU IMMUNIZE ORDER/ADMIN: HCPCS | Performed by: FAMILY MEDICINE

## 2019-12-11 PROCEDURE — 3017F COLORECTAL CA SCREEN DOC REV: CPT | Performed by: FAMILY MEDICINE

## 2019-12-11 PROCEDURE — G8598 ASA/ANTIPLAT THER USED: HCPCS | Performed by: FAMILY MEDICINE

## 2019-12-11 RX ORDER — PEN NEEDLE, DIABETIC 31 GX5/16"
NEEDLE, DISPOSABLE MISCELLANEOUS
Refills: 0 | COMMUNITY
Start: 2019-11-02 | End: 2020-10-23

## 2019-12-11 RX ORDER — POLYMYXIN B SULFATE AND TRIMETHOPRIM 1; 10000 MG/ML; [USP'U]/ML
1 SOLUTION OPHTHALMIC EVERY 4 HOURS
Qty: 1 BOTTLE | Refills: 0 | Status: SHIPPED | OUTPATIENT
Start: 2019-12-11 | End: 2019-12-16

## 2019-12-11 RX ORDER — AMLODIPINE BESYLATE 5 MG/1
5 TABLET ORAL DAILY
Refills: 0 | COMMUNITY
Start: 2019-11-11 | End: 2020-11-30 | Stop reason: DRUGHIGH

## 2019-12-11 RX ORDER — PEN NEEDLE, DIABETIC 29 G X1/2"
NEEDLE, DISPOSABLE MISCELLANEOUS
Refills: 0 | COMMUNITY
Start: 2019-11-04 | End: 2020-02-17 | Stop reason: SDUPTHER

## 2019-12-11 RX ORDER — SPIRONOLACTONE 25 MG/1
1 TABLET ORAL DAILY
Refills: 0 | COMMUNITY
Start: 2019-11-11 | End: 2020-08-28 | Stop reason: ALTCHOICE

## 2019-12-11 ASSESSMENT — ENCOUNTER SYMPTOMS
SINUS PRESSURE: 1
EYE PAIN: 1
EYE REDNESS: 1
SINUS PAIN: 1
EYE DISCHARGE: 1
RHINORRHEA: 1

## 2019-12-16 DIAGNOSIS — F41.9 ANXIETY: ICD-10-CM

## 2019-12-16 RX ORDER — ALPRAZOLAM 1 MG/1
1 TABLET ORAL 2 TIMES DAILY
Qty: 60 TABLET | Refills: 0 | Status: SHIPPED | OUTPATIENT
Start: 2019-12-16 | End: 2020-01-07 | Stop reason: SDUPTHER

## 2020-01-07 ENCOUNTER — OFFICE VISIT (OUTPATIENT)
Dept: FAMILY MEDICINE CLINIC | Age: 60
End: 2020-01-07
Payer: MEDICAID

## 2020-01-07 VITALS
SYSTOLIC BLOOD PRESSURE: 130 MMHG | OXYGEN SATURATION: 95 % | HEART RATE: 66 BPM | DIASTOLIC BLOOD PRESSURE: 76 MMHG | TEMPERATURE: 98.4 F | WEIGHT: 285 LBS | BODY MASS INDEX: 42.21 KG/M2 | HEIGHT: 69 IN

## 2020-01-07 LAB — HBA1C MFR BLD: 7.2 %

## 2020-01-07 PROCEDURE — 2022F DILAT RTA XM EVC RTNOPTHY: CPT | Performed by: FAMILY MEDICINE

## 2020-01-07 PROCEDURE — 3017F COLORECTAL CA SCREEN DOC REV: CPT | Performed by: FAMILY MEDICINE

## 2020-01-07 PROCEDURE — 83036 HEMOGLOBIN GLYCOSYLATED A1C: CPT | Performed by: FAMILY MEDICINE

## 2020-01-07 PROCEDURE — 99214 OFFICE O/P EST MOD 30 MIN: CPT | Performed by: FAMILY MEDICINE

## 2020-01-07 PROCEDURE — 1036F TOBACCO NON-USER: CPT | Performed by: FAMILY MEDICINE

## 2020-01-07 PROCEDURE — G8482 FLU IMMUNIZE ORDER/ADMIN: HCPCS | Performed by: FAMILY MEDICINE

## 2020-01-07 PROCEDURE — G8417 CALC BMI ABV UP PARAM F/U: HCPCS | Performed by: FAMILY MEDICINE

## 2020-01-07 PROCEDURE — G8427 DOCREV CUR MEDS BY ELIG CLIN: HCPCS | Performed by: FAMILY MEDICINE

## 2020-01-07 RX ORDER — CROMOLYN SODIUM 40 MG/ML
1 SOLUTION/ DROPS OPHTHALMIC 4 TIMES DAILY
Qty: 1 BOTTLE | Refills: 3 | Status: SHIPPED | OUTPATIENT
Start: 2020-01-07 | End: 2020-07-21 | Stop reason: SDUPTHER

## 2020-01-07 RX ORDER — ALBUTEROL SULFATE 2.5 MG/3ML
2.5 SOLUTION RESPIRATORY (INHALATION) EVERY 4 HOURS PRN
Qty: 2 PACKAGE | Refills: 1 | Status: SHIPPED | OUTPATIENT
Start: 2020-01-07 | End: 2021-01-11 | Stop reason: SDUPTHER

## 2020-01-07 RX ORDER — ALPRAZOLAM 1 MG/1
1 TABLET ORAL 2 TIMES DAILY
Qty: 60 TABLET | Refills: 0 | Status: SHIPPED | OUTPATIENT
Start: 2020-01-13 | End: 2020-02-17 | Stop reason: SDUPTHER

## 2020-01-07 RX ORDER — BENZONATATE 100 MG/1
100 CAPSULE ORAL 3 TIMES DAILY PRN
Qty: 30 CAPSULE | Refills: 0 | Status: SHIPPED | OUTPATIENT
Start: 2020-01-07 | End: 2020-01-17

## 2020-01-07 RX ORDER — CLOPIDOGREL BISULFATE 75 MG/1
TABLET ORAL
Qty: 90 TABLET | Refills: 1 | Status: SHIPPED | OUTPATIENT
Start: 2020-01-07 | End: 2022-10-24 | Stop reason: SDUPTHER

## 2020-01-07 RX ORDER — AMOXICILLIN AND CLAVULANATE POTASSIUM 875; 125 MG/1; MG/1
1 TABLET, FILM COATED ORAL 2 TIMES DAILY
Qty: 20 TABLET | Refills: 0 | Status: SHIPPED | OUTPATIENT
Start: 2020-01-07 | End: 2020-01-17

## 2020-01-07 ASSESSMENT — ENCOUNTER SYMPTOMS
WHEEZING: 0
SINUS PRESSURE: 1
COUGH: 1
SHORTNESS OF BREATH: 0
SORE THROAT: 0

## 2020-01-07 NOTE — PROGRESS NOTES
mellitus without complication, with long-term current use of insulin ([address]    Anxiety    Microalbuminuria    Positive FIT (fecal immunochemical test)     Past Medical History:   Diagnosis Date    Abnormal stress test 2012    Lateral Wall Ischemia- done at [facility]-    CAD (coronary artery disease)     Chronic low back pain     Diabetes mellitus ([address]     Diabetes mellitus ([address]     Hyperlipidemia     Hypertension     Hypertriglyceridemia     MI (myocardial infarction) ([address] 2004    Obesity       Past Surgical History:   Procedure Laterality Date    BACK SURGERY  1997    L4 & L5 fusion    CARDIAC CATHETERIZATION  10/2019   [place] CARDIAC SURGERY  2004    Cardiac cath with stent placement x1    [address]ONOSCOPY  2010   [address]    Mesh repair in abdomen. Family History   Problem Relation Age of Onset    Diabetes Mother     Heart Disease Mother     Arthritis Father     Diabetes Father     Heart Disease Father     Diabetes Sister     Diabetes Brother     Heart Disease Brother      Social History     Tobacco Use    Smoking status: Former Smoker     Packs/day: 0.25     Years: 2.00     Pack years: 0.50     Types: Cigarettes     Last attempt to quit: 1995     Years since quittin.0    Smokeless tobacco: Never Used   Substance Use Topics    Alcohol use: No      Current Outpatient Medications   Medication Sig Dispense Refill    ALPRAZolam (XANAX) 1 MG tablet Take 1 tablet by mouth 2 times daily for 30 days.  60 tablet 0    amLODIPine (NORVASC) 5 MG tablet Take 1 tablet by mouth daily  0    B-D ULTRAFINE III SHORT PEN 31G X 8 MM MISC   0    BD INSULIN SYRINGE U/F 31G X \" 1 ML MISC   0    spironolactone (ALDACTONE) 25 MG tablet Take 1 tablet by mouth daily  0    metoprolol tartrate (LOPRESSOR) 25 MG tablet Take 1 tablet by mouth 2 times daily  0    insulin glargine (BASAGLAR KWIKPEN) 100 UNIT/ML injection pen inject 53 units subcutaneously twice a day 33 mL 1    Vitamin D, Cholecalciferol, 25 MCG (1000 UT) TABS Take 1,000 Units by mouth daily      pravastatin (PRAVACHOL) 40 MG tablet Take 1 tablet by mouth nightly  0    enalapril (VASOTEC) 20 MG tablet Take 1 tablet by mouth daily 90 tablet 1    pantoprazole (PROTONIX) 40 MG tablet take 1 tablet by mouth once daily 90 tablet 1    metFORMIN (GLUCOPHAGE) 1000 MG tablet take 1 tablet by mouth twice a day with meals 180 tablet 1    fluticasone (FLONASE) 50 MCG/ACT nasal spray One spray in each nostril q hs 3 Bottle 1    tamsulosin (FLOMAX) 0.4 MG capsule take 1 capsule by mouth once daily 90 capsule 1    clopidogrel (PLAVIX) 75 MG tablet take 1 tablet by mouth once daily 90 tablet 1    gemfibrozil (LOPID) 600 MG tablet Take 1 tablet by mouth 2 times daily 180 tablet 1    cromolyn (OPTICROM) 4 % ophthalmic solution Place 1 drop into both eyes 4 times daily 1 Bottle 3    insulin lispro (ADMELOG) 100 UNIT/ML injection vial Inject 10 Units into the skin 2 times daily as needed for High Blood Sugar 3 vial 1    albuterol (PROVENTIL) (2.5 MG/3ML) 0.083% nebulizer solution Take 3 mLs by nebulization every 4 hours as needed for Wheezing 2 Package 1    nitroGLYCERIN (NITROSTAT) 0.4 MG SL tablet Place 0.4 mg under the tongue every 5 minutes as needed for Chest pain      aspirin 325 MG tablet Take 325 mg by mouth daily. No current facility-administered medications for this visit.       Allergies   Allergen Reactions    Ace Inhibitors     Darvocet [Propoxyphene N-Acetaminophen]     Darvon [Propoxyphene Hcl]     Flexeril [Cyclobenzaprine] Other (See Comments)     Headache    Morphine     Motrin [Ibuprofen Micronized]     Tylenol [Acetaminophen] Nausea Only    Ultram [Tramadol] Itching    Baclofen Other (See Comments)     Health Maintenance   Topic Date Due    Hepatitis C screen  1960    DTaP/Tdap/Td vaccine (1 - Tdap) 07/10/1972    HIV screen  07/10/1976    Hepatitis B vaccine (1 of 3 - Risk 3-dose series) 07/10/1980    Shingles Vaccine (1 of 2) 07/10/2011    Diabetic retinal exam  07/31/2019    Diabetic foot exam  10/09/2019    Diabetic microalbuminuria test  10/09/2019    A1C test (Diabetic or Prediabetic)  07/10/2020    Lipid screen  11/01/2020    Potassium monitoring  11/01/2020    Creatinine monitoring  11/01/2020    Colon cancer screen colonoscopy  02/26/2022    Flu vaccine  Completed    Pneumococcal 0-64 years Vaccine  Completed       Objective:  /76 (Site: Left Upper Arm, Position: Sitting, Cuff Size: Large Adult)   Pulse 66   Temp 98.4 °F (36.9 °C) (Oral)   Ht 5' 9\" (1.753 m)   Wt 285 lb (129.3 kg)   SpO2 95%   BMI 42.09 kg/m²   Physical Exam  Vitals signs reviewed. Constitutional:       Appearance: He is well-developed. He is obese. HENT:      Right Ear: Tympanic membrane and ear canal normal.      Left Ear: Tympanic membrane and ear canal normal.      Nose: Mucosal edema present. Right Sinus: No maxillary sinus tenderness or frontal sinus tenderness. Left Sinus: Maxillary sinus tenderness present. No frontal sinus tenderness. Mouth/Throat:      Mouth: Mucous membranes are moist.      Pharynx: Oropharynx is clear. Cardiovascular:      Rate and Rhythm: Normal rate and regular rhythm. Heart sounds: No murmur. Pulmonary:      Effort: Pulmonary effort is normal. No respiratory distress. Breath sounds: Normal breath sounds. No wheezing. Musculoskeletal:      Right lower leg: Edema (trace) present. Left lower leg: Edema (trace) present. Neurological:      Mental Status: He is alert and oriented to person, place, and time.    Psychiatric:         Mood and Affect: Mood normal.         Behavior: Behavior normal.       Visual inspection:  Deformity/amputation: absent  Skin lesions/pre-ulcerative calluses: has dried cracked web spaces with scaling skin  Edema: right- trace, left- trace    Sensory exam:  Monofilament sensation: normal  (minimum of 5 random plantar locations tested, avoiding callused areas - > 1 area with absence of sensation is + for neuropathy)    Plus at least one of the following:  Pulses: normal,         Impression/Plan:  1. Well controlled type 2 diabetes mellitus ([address]  Chronic. Stable with A1c of 7.2%. Continue metformin, Admelog, and Basaglar.  - POCT glycosylated hemoglobin (Hb A1C)  -  DIABETES FOOT EXAM    2. Essential hypertension  Chronic. Well-controlled. Continue enalapril, metoprolol, Aldactone, and Norvasc. 3. Anxiety  Chronic. Controlled. Refill med  - ALPRAZolam (XANAX) 1 MG tablet; Take 1 tablet by mouth 2 times daily for 30 days. Dispense: 60 tablet; Refill: 0    4. Coronary artery disease involving native coronary artery of native heart without angina pectoris  Chronic. Controlled. Continue aspirin, Plavix, and statin. - clopidogrel (PLAVIX) 75 MG tablet; take 1 tablet by mouth once daily  Dispense: 90 tablet; Refill: 1    5. Bronchitis  Chronic. Stable. Continue albuterol as needed. Cough is slightly worse so we will give him some tessalon  - albuterol (PROVENTIL) (2.5 MG/3ML) 0.083% nebulizer solution; Take 3 mLs by nebulization every 4 hours as needed for Wheezing  Dispense: 2 Package; Refill: 1  - benzonatate (TESSALON) 100 MG capsule; Take 1 capsule by mouth 3 times daily as needed for Cough  Dispense: 30 capsule; Refill: 0    6. Seasonal allergic rhinitis due to pollen  Well-controlled. Chronic condition. Refill medication(s). - cromolyn (OPTICROM) 4 % ophthalmic solution; Place 1 drop into both eyes 4 times daily  Dispense: 1 Bottle; Refill: 3    7. Acute bacterial sinusitis  New Problem. Symptoms for over a week and has DM so we will treat with an antibiotic.  - amoxicillin-clavulanate (AUGMENTIN) 875-125 MG per tablet; Take 1 tablet by mouth 2 times daily for 10 days  Dispense: 20 tablet; Refill: 0    They voiced understanding. All questions answered.  They agreed with treatment plan. See patient instructions for any educational materials that may have been given. Discussed use, benefit, and side effects of prescribed medications. Reviewed health maintenance. (Please note that portions of this note may have been completed with a voice recognition program.  Efforts were made to edit the dictation but occasionally words are mis-transcribed.)    Return in about 3 months (around 4/7/2020) for anxiety.       Electronically signed by Dez Zelaya MD on 1/7/2020 at 9:57 AM

## 2020-01-09 RX ORDER — TAMSULOSIN HYDROCHLORIDE 0.4 MG/1
CAPSULE ORAL
Qty: 90 CAPSULE | Refills: 1 | Status: SHIPPED | OUTPATIENT
Start: 2020-01-09 | End: 2020-06-15

## 2020-01-29 RX ORDER — FLUTICASONE PROPIONATE 50 MCG
SPRAY, SUSPENSION (ML) NASAL
Qty: 3 BOTTLE | Refills: 1 | Status: SHIPPED | OUTPATIENT
Start: 2020-01-29 | End: 2020-04-13 | Stop reason: SDUPTHER

## 2020-02-17 ENCOUNTER — TELEPHONE (OUTPATIENT)
Dept: FAMILY MEDICINE CLINIC | Age: 60
End: 2020-02-17

## 2020-02-17 RX ORDER — ALPRAZOLAM 1 MG/1
1 TABLET ORAL 2 TIMES DAILY
Qty: 60 TABLET | Refills: 0 | Status: SHIPPED | OUTPATIENT
Start: 2020-02-17 | End: 2020-03-16

## 2020-02-17 RX ORDER — PEN NEEDLE, DIABETIC 29 G X1/2"
1 NEEDLE, DISPOSABLE MISCELLANEOUS 2 TIMES DAILY
Qty: 100 EACH | Refills: 3 | Status: SHIPPED | OUTPATIENT
Start: 2020-02-17 | End: 2020-11-30

## 2020-02-17 RX ORDER — FLUTICASONE PROPIONATE 50 MCG
SPRAY, SUSPENSION (ML) NASAL
Qty: 3 BOTTLE | Refills: 1 | Status: CANCELLED | OUTPATIENT
Start: 2020-02-17

## 2020-02-17 NOTE — PROGRESS NOTES
Chas Hernandeze called requesting a refill on the following medications:  Requested Prescriptions     Pending Prescriptions Disp Refills    BD INSULIN SYRINGE U/F 31G X 5/16\" 1 ML MISC 100 each 0       Date of last visit: 1/7/2020  Date of next visit (if applicable):04/14/2020  Date of last refill: 11/04/2019  Pharmacy Name: [address] [place] [address]      Danyell Sanchez, [address] [place] [address] [place]

## 2020-03-19 ENCOUNTER — HOSPITAL ENCOUNTER (OUTPATIENT)
Age: 60
Discharge: HOME | End: 2020-03-19
Payer: MEDICAID

## 2020-03-19 LAB
ANION GAP SERPL CALCULATED.3IONS-SCNC: 16 MEQ/L (ref 8–16)
BUN BLDV-MCNC: 23 MG/DL (ref 7–22)
CALCIUM SERPL-MCNC: 9.7 MG/DL (ref 8.5–10.5)
CHLORIDE BLD-SCNC: 101 MEQ/L (ref 98–111)
CO2: 22 MEQ/L (ref 23–33)
CREAT SERPL-MCNC: 0.9 MG/DL (ref 0.4–1.2)
ERYTHROCYTE [DISTWIDTH] IN BLOOD BY AUTOMATED COUNT: 15.4 % (ref 11.5–14.5)
ERYTHROCYTE [DISTWIDTH] IN BLOOD BY AUTOMATED COUNT: 48.5 FL (ref 35–45)
GFR SERPL CREATININE-BSD FRML MDRD: 86 ML/MIN/1.73M2
GLUCOSE BLD-MCNC: 98 MG/DL (ref 70–108)
HCT VFR BLD CALC: 41.9 % (ref 42–52)
HEMOGLOBIN: 12.7 GM/DL (ref 14–18)
MCH RBC QN AUTO: 26.2 PG (ref 26–33)
MCHC RBC AUTO-ENTMCNC: 30.3 GM/DL (ref 32.2–35.5)
MCV RBC AUTO: 86.6 FL (ref 80–94)
PLATELET # BLD: 277 THOU/MM3 (ref 130–400)
PMV BLD AUTO: 10.4 FL (ref 9.4–12.4)
POTASSIUM SERPL-SCNC: 4.7 MEQ/L (ref 3.5–5.2)
RBC # BLD: 4.84 MILL/MM3 (ref 4.7–6.1)
SODIUM BLD-SCNC: 139 MEQ/L (ref 135–145)
WBC # BLD: 9.1 THOU/MM3 (ref 4.8–10.8)

## 2020-03-19 PROCEDURE — 80048 BASIC METABOLIC PNL TOTAL CA: CPT

## 2020-03-19 PROCEDURE — 36415 COLL VENOUS BLD VENIPUNCTURE: CPT

## 2020-03-19 PROCEDURE — 85027 COMPLETE CBC AUTOMATED: CPT

## 2020-03-23 RX ORDER — INSULIN GLARGINE 100 [IU]/ML
INJECTION, SOLUTION SUBCUTANEOUS
Qty: 33 ML | Refills: 1 | Status: SHIPPED | OUTPATIENT
Start: 2020-03-23 | End: 2020-04-13 | Stop reason: SDUPTHER

## 2020-03-30 RX ORDER — PANTOPRAZOLE SODIUM 40 MG/1
TABLET, DELAYED RELEASE ORAL
Qty: 30 TABLET | Refills: 0 | Status: SHIPPED | OUTPATIENT
Start: 2020-03-30 | End: 2020-04-13 | Stop reason: SDUPTHER

## 2020-03-30 RX ORDER — ENALAPRIL MALEATE 20 MG/1
20 TABLET ORAL DAILY
Qty: 30 TABLET | Refills: 0 | Status: SHIPPED | OUTPATIENT
Start: 2020-03-30 | End: 2020-04-13 | Stop reason: SDUPTHER

## 2020-03-30 NOTE — TELEPHONE ENCOUNTER
Fax received from [facility].    Requested Prescriptions     Pending Prescriptions Disp Refills    enalapril (VASOTEC) 20 MG tablet 30 tablet 0     Sig: Take 1 tablet by mouth daily    pantoprazole (PROTONIX) 40 MG tablet 30 tablet 0     Sig: take 1 tablet by mouth once daily    metFORMIN (GLUCOPHAGE) 1000 MG tablet 60 tablet 0     Sig: take 1 tablet by mouth twice a day with meals     *30 day supply given as patient is tentatively scheduled for 04/14/20*    Date of last visit: 1/7/2020  Date of next visit (if applicable):4/14/2020  Pharm[facility] Name:  [facility]    Thanks,  Merly [facility] ([facility])

## 2020-04-02 ENCOUNTER — TELEPHONE (OUTPATIENT)
Dept: FAMILY MEDICINE CLINIC | Age: 60
End: 2020-04-02

## 2020-04-13 RX ORDER — FLUTICASONE PROPIONATE 50 MCG
SPRAY, SUSPENSION (ML) NASAL
Qty: 3 BOTTLE | Refills: 1 | Status: SHIPPED | OUTPATIENT
Start: 2020-04-13 | End: 2020-08-25 | Stop reason: SDUPTHER

## 2020-04-13 RX ORDER — ALPRAZOLAM 1 MG/1
1 TABLET ORAL 2 TIMES DAILY
Qty: 60 TABLET | Refills: 0 | Status: SHIPPED | OUTPATIENT
Start: 2020-04-13 | End: 2020-05-12 | Stop reason: SDUPTHER

## 2020-04-13 RX ORDER — PANTOPRAZOLE SODIUM 40 MG/1
TABLET, DELAYED RELEASE ORAL
Qty: 30 TABLET | Refills: 0 | Status: SHIPPED | OUTPATIENT
Start: 2020-04-13 | End: 2020-06-22 | Stop reason: SDUPTHER

## 2020-04-13 RX ORDER — ENALAPRIL MALEATE 20 MG/1
20 TABLET ORAL DAILY
Qty: 30 TABLET | Refills: 2 | Status: SHIPPED | OUTPATIENT
Start: 2020-04-13 | End: 2020-06-29

## 2020-04-13 RX ORDER — INSULIN GLARGINE 100 [IU]/ML
INJECTION, SOLUTION SUBCUTANEOUS
Qty: 33 ML | Refills: 1 | Status: SHIPPED | OUTPATIENT
Start: 2020-04-13 | End: 2020-07-21 | Stop reason: SDUPTHER

## 2020-05-11 NOTE — TELEPHONE ENCOUNTER
Ross Oneal called requesting a refill on the following medications:  Requested Prescriptions     Pending Prescriptions Disp Refills    ALPRAZolam (XANAX) 1 MG tablet 60 tablet 0     Sig: Take 1 tablet by mouth 2 times daily for 30 days.    will be out before his upcoming appt    Pharmacy verified:  [facility]      Date of last visit: 01-07-20  Date of next visit (if applicable): 9/4/7868

## 2020-05-12 RX ORDER — ALPRAZOLAM 1 MG/1
1 TABLET ORAL 2 TIMES DAILY
Qty: 60 TABLET | Refills: 0 | Status: SHIPPED | OUTPATIENT
Start: 2020-05-12 | End: 2020-06-11 | Stop reason: SDUPTHER

## 2020-05-12 NOTE — TELEPHONE ENCOUNTER
Please schedule VV for today, thurs, or Friday this week for his anxiety instead of waiting until 6/2 appt. Please advise patient.   Armaan Conley MD

## 2020-05-26 ENCOUNTER — OFFICE VISIT (OUTPATIENT)
Dept: FAMILY MEDICINE CLINIC | Age: 60
End: 2020-05-26
Payer: MEDICAID

## 2020-05-26 VITALS
WEIGHT: 281.6 LBS | HEART RATE: 72 BPM | SYSTOLIC BLOOD PRESSURE: 128 MMHG | BODY MASS INDEX: 41.71 KG/M2 | HEIGHT: 69 IN | DIASTOLIC BLOOD PRESSURE: 62 MMHG | TEMPERATURE: 98.3 F

## 2020-05-26 PROCEDURE — 99213 OFFICE O/P EST LOW 20 MIN: CPT | Performed by: FAMILY MEDICINE

## 2020-05-26 PROCEDURE — G8417 CALC BMI ABV UP PARAM F/U: HCPCS | Performed by: FAMILY MEDICINE

## 2020-05-26 PROCEDURE — 1036F TOBACCO NON-USER: CPT | Performed by: FAMILY MEDICINE

## 2020-05-26 PROCEDURE — 3017F COLORECTAL CA SCREEN DOC REV: CPT | Performed by: FAMILY MEDICINE

## 2020-05-26 PROCEDURE — G8427 DOCREV CUR MEDS BY ELIG CLIN: HCPCS | Performed by: FAMILY MEDICINE

## 2020-05-26 ASSESSMENT — ENCOUNTER SYMPTOMS
COUGH: 1
SHORTNESS OF BREATH: 0
WHEEZING: 0

## 2020-05-26 ASSESSMENT — PATIENT HEALTH QUESTIONNAIRE - PHQ9
SUM OF ALL RESPONSES TO PHQ QUESTIONS 1-9: 0
SUM OF ALL RESPONSES TO PHQ9 QUESTIONS 1 & 2: 0
SUM OF ALL RESPONSES TO PHQ QUESTIONS 1-9: 0
1. LITTLE INTEREST OR PLEASURE IN DOING THINGS: 0
2. FEELING DOWN, DEPRESSED OR HOPELESS: 0

## 2020-05-26 NOTE — PROGRESS NOTES
Laterality Date    BACK SURGERY  1997    L4 & L5 fusion    CARDIAC CATHETERIZATION  10/2019   [place] CARDIAC SURGERY  2004    Cardiac cath with stent placement x1    COLONOSCOPY  2010   [address] [place] [address],[address]    Mesh repair in abdomen. Family History   Problem Relation Age of Onset    Diabetes Mother     Heart Disease Mother     Arthritis Father     Diabetes Father     Heart Disease Father     Diabetes Sister     Diabetes Brother     Heart Disease Brother      Social History     Tobacco Use    Smoking status: Former Smoker     Packs/day: 0.25     Years: 2.00     Pack years: 0.50     Types: Cigarettes     Last attempt to quit: 1995     Years since quittin.4    Smokeless tobacco: Never Used   Substance Use Topics    Alcohol use: No      Current Outpatient Medications   Medication Sig Dispense Refill    ALPRAZolam (XANAX) 1 MG tablet Take 1 tablet by mouth 2 times daily for 30 days.  60 tablet 0    enalapril (VASOTEC) 20 MG tablet Take 1 tablet by mouth daily 30 tablet 2    pantoprazole (PROTONIX) 40 MG tablet take 1 tablet by mouth once daily 30 tablet 0    metFORMIN (GLUCOPHAGE) 1000 MG tablet take 1 tablet by mouth twice a day with meals 60 tablet 2    insulin glargine (BASAGLAR KWIKPEN) 100 UNIT/ML injection pen inject 53 units subcutaneously twice a day 33 mL 1    fluticasone (FLONASE) 50 MCG/ACT nasal spray One spray in each nostril q hs 3 Bottle 1    BD INSULIN SYRINGE U/F 31G X \" 1 ML MISC 1 each by Other route 2 times daily 100 each 3    tamsulosin (FLOMAX) 0.4 MG capsule take 1 capsule by mouth once daily 90 capsule 1    clopidogrel (PLAVIX) 75 MG tablet take 1 tablet by mouth once daily 90 tablet 1    insulin lispro (ADMELOG) 100 UNIT/ML injection vial Inject 10 Units into the skin 2 times daily as needed for High Blood Sugar 3 vial 1    albuterol (PROVENTIL) (2.5 MG/3ML) 0.083% nebulizer solution Take 3 mLs by nebulization every 4 hours as Hepatitis A vaccine  Aged Out    Hib vaccine  Aged Out    Meningococcal (ACWY) vaccine  Aged Out       Objective:  /62 (Site: Left Upper Arm, Position: Sitting, Cuff Size: Large Adult)   Pulse 72   Temp 98.3 °F (36.8 °C) (Temporal)   Ht 5' 9\" (1.753 m)   Wt 281 lb 9.6 oz (127.7 kg)   BMI 41.59 kg/m²   Physical Exam  Vitals signs reviewed. Constitutional:       Appearance: He is obese. Cardiovascular:      Rate and Rhythm: Normal rate and regular rhythm. Pulmonary:      Effort: Pulmonary effort is normal. No respiratory distress. Breath sounds: No rhonchi. Neurological:      Mental Status: He is alert. Mental status is at baseline. Psychiatric:         Mood and Affect: Mood is anxious. Speech: Speech normal.         Behavior: Behavior normal.         Impression/Plan:  1. Anxiety  Chronic. Controlled. Has failed multiple other medications. Continue Ativan    2. Cough  New problem. Monitor. 3. Morbidly obese (HCC)  Chronic. Stable. Recommend diet and exercise    PDMP Monitoring:    Last PDMP [place] as Reviewed [facility]):  Review User Review Instant Review Result   DANNI HILLIARD 5/12/2020  8:39 AM Reviewed PDMP [1]     Last Controlled Substance Monitoring Documentation      Office Visit from 1/7/2020 in [facility]   Periodic Controlled Substance Monitoring  Possible medication side effects, risk of tolerance/dependence & alternative treatments discussed., No signs of potential drug abuse or diversion identified.  filed at 01/07/2020 9751        Urine Drug Screenings (1 yr)     Urine Drug Screen  Collected: 10/9/2018  8:55 AM (Final result)    Complete Results              Medication Contract and Consent for Opioid Use Documents Filed     Patient Documents       Type of Document Status Date Received Received By Description     Medication Contract [Status Missing]  Mars Vance DMA Medication Agreement  10/3/17              -A total of at least 15 minutes was spent face-to-face with the patient during this encounter and over 50% of that time was spent on counseling and/or coordination of care. The patient's conditions were thoroughly discussed during the visit today and all questions were answered. They voiced understanding. All questions answered. They agreed with treatment plan. See patient instructions for any educational materials that may have been given. Discussed use, benefit, and side effects of prescribed medications. Reviewed health maintenance. (Please note that portions of this note may have been completed with a voice recognition program.  Efforts were made to edit the dictation but occasionally words are mis-transcribed.)    Return in about 3 months (around 8/26/2020) for DM, HTN, anxiety.       Electronically signed by Ines Atkins MD on 5/26/2020 at 12:10 PM

## 2020-06-11 RX ORDER — ALPRAZOLAM 1 MG/1
1 TABLET ORAL 2 TIMES DAILY
Qty: 60 TABLET | Refills: 0 | Status: SHIPPED | OUTPATIENT
Start: 2020-06-11 | End: 2020-07-09 | Stop reason: SDUPTHER

## 2020-06-11 NOTE — TELEPHONE ENCOUNTER
Rosio Reeder called requesting a refill on the following medications:  Requested Prescriptions     Pending Prescriptions Disp Refills    ALPRAZolam (XANAX) 1 MG tablet 60 tablet 0     Sig: Take 1 tablet by mouth 2 times daily for 30 days. Pharmacy verified: to [address] [place] [address]   . pv      Date of last visit: 5/26/20  Date of next visit (if applicable): Visit date not found

## 2020-06-15 RX ORDER — TAMSULOSIN HYDROCHLORIDE 0.4 MG/1
CAPSULE ORAL
Qty: 90 CAPSULE | Refills: 0 | Status: SHIPPED | OUTPATIENT
Start: 2020-06-15 | End: 2020-09-14

## 2020-06-22 RX ORDER — PANTOPRAZOLE SODIUM 40 MG/1
TABLET, DELAYED RELEASE ORAL
Qty: 90 TABLET | Refills: 0 | Status: SHIPPED | OUTPATIENT
Start: 2020-06-22 | End: 2020-08-19

## 2020-06-22 NOTE — TELEPHONE ENCOUNTER
Glenys Kumar called requesting a refill on the following medications:  Requested Prescriptions     Pending Prescriptions Disp Refills    pantoprazole (PROTONIX) 40 MG tablet 90 tablet 0     Sig: take 1 tablet by mouth once daily       Date of last visit: 5/26/2020  Date of next visit (if applicable):8/25/2020  Pharmacy Name: [facility] ([address] [place] [address])

## 2020-06-29 RX ORDER — ENALAPRIL MALEATE 20 MG/1
TABLET ORAL
Qty: 30 TABLET | Refills: 2 | Status: SHIPPED | OUTPATIENT
Start: 2020-06-29 | End: 2020-09-18

## 2020-07-09 RX ORDER — ALPRAZOLAM 1 MG/1
1 TABLET ORAL 2 TIMES DAILY
Qty: 60 TABLET | Refills: 0 | Status: SHIPPED | OUTPATIENT
Start: 2020-07-09 | End: 2020-08-10 | Stop reason: SDUPTHER

## 2020-07-09 NOTE — TELEPHONE ENCOUNTER
Gege Kay called requesting a refill on the following medications:  Requested Prescriptions     Pending Prescriptions Disp Refills    ALPRAZolam (XANAX) 1 MG tablet 60 tablet 0     Sig: Take 1 tablet by mouth 2 times daily for 30 days. Pharmacy verified: [facility] in [place]  . miguelina      Date of last visit:  5/26/2020  Date of next visit (if applicable): 1/18/4585

## 2020-07-21 RX ORDER — INSULIN GLARGINE 100 [IU]/ML
INJECTION, SOLUTION SUBCUTANEOUS
Qty: 33 ML | Refills: 1 | Status: SHIPPED | OUTPATIENT
Start: 2020-07-21 | End: 2020-08-25 | Stop reason: ALTCHOICE

## 2020-07-21 RX ORDER — CROMOLYN SODIUM 40 MG/ML
1 SOLUTION/ DROPS OPHTHALMIC 4 TIMES DAILY
Qty: 1 BOTTLE | Refills: 3 | Status: SHIPPED | OUTPATIENT
Start: 2020-07-21 | End: 2020-11-25

## 2020-08-10 RX ORDER — ALPRAZOLAM 1 MG/1
1 TABLET ORAL 2 TIMES DAILY
Qty: 60 TABLET | Refills: 0 | Status: SHIPPED | OUTPATIENT
Start: 2020-08-10 | End: 2020-09-09 | Stop reason: SDUPTHER

## 2020-08-10 NOTE — TELEPHONE ENCOUNTER
Mary Wu called requesting a refill on the following medications:  Requested Prescriptions     Pending Prescriptions Disp Refills    ALPRAZolam (XANAX) 1 MG tablet 60 tablet 0     Sig: Take 1 tablet by mouth 2 times daily for 30 days.        Date of last visit: 5/26/2020  Date of next visit (if applicable):8/25/2020  Date of last refill: 07/09/2020  Pharmacy Name: [address] [place] [address]      Leticia Sanchez, [place]

## 2020-08-10 NOTE — TELEPHONE ENCOUNTER
Jesse Fernández called requesting a refill on the following medications:  Requested Prescriptions     Pending Prescriptions Disp Refills    ALPRAZolam (XANAX) 1 MG tablet 60 tablet 0     Sig: Take 1 tablet by mouth 2 times daily for 30 days.      [facility]:  [facility]      Date of last visit: 05-26-20  Date of next visit (if applicable): 2/20/9489

## 2020-08-25 ENCOUNTER — OFFICE VISIT (OUTPATIENT)
Dept: FAMILY MEDICINE CLINIC | Age: 60
End: 2020-08-25
Payer: MEDICAID

## 2020-08-25 ENCOUNTER — HOSPITAL ENCOUNTER (OUTPATIENT)
Age: 60
Discharge: HOME | End: 2020-08-25
Payer: MEDICAID

## 2020-08-25 VITALS
DIASTOLIC BLOOD PRESSURE: 76 MMHG | BODY MASS INDEX: 43.01 KG/M2 | WEIGHT: 290.4 LBS | HEIGHT: 69 IN | SYSTOLIC BLOOD PRESSURE: 128 MMHG | HEART RATE: 70 BPM | TEMPERATURE: 97.3 F | OXYGEN SATURATION: 95 %

## 2020-08-25 DIAGNOSIS — I10 ESSENTIAL HYPERTENSION: ICD-10-CM

## 2020-08-25 DIAGNOSIS — E11.9 WELL CONTROLLED TYPE 2 DIABETES MELLITUS (HCC): Chronic | ICD-10-CM

## 2020-08-25 DIAGNOSIS — Z12.5 PROSTATE CANCER SCREENING: ICD-10-CM

## 2020-08-25 LAB
ALBUMIN SERPL-MCNC: 4.4 G/DL (ref 3.5–5.1)
ALP BLD-CCNC: 85 U/L (ref 38–126)
ALT SERPL-CCNC: 9 U/L (ref 11–66)
ANION GAP SERPL CALCULATED.3IONS-SCNC: 10 MEQ/L (ref 8–16)
AST SERPL-CCNC: 11 U/L (ref 5–40)
BILIRUB SERPL-MCNC: 0.3 MG/DL (ref 0.3–1.2)
BUN BLDV-MCNC: 20 MG/DL (ref 7–22)
CALCIUM SERPL-MCNC: 10 MG/DL (ref 8.5–10.5)
CHLORIDE BLD-SCNC: 104 MEQ/L (ref 98–111)
CHOLESTEROL, TOTAL: 146 MG/DL (ref 100–199)
CO2: 25 MEQ/L (ref 23–33)
CREAT SERPL-MCNC: 1.1 MG/DL (ref 0.4–1.2)
CREATININE URINE POCT: 50
ERYTHROCYTE [DISTWIDTH] IN BLOOD BY AUTOMATED COUNT: 14.6 % (ref 11.5–14.5)
ERYTHROCYTE [DISTWIDTH] IN BLOOD BY AUTOMATED COUNT: 47.2 FL (ref 35–45)
GFR SERPL CREATININE-BSD FRML MDRD: 68 ML/MIN/1.73M2
GLUCOSE BLD-MCNC: 110 MG/DL (ref 70–108)
HCT VFR BLD CALC: 41.7 % (ref 42–52)
HDLC SERPL-MCNC: 30 MG/DL
HEMOGLOBIN: 12.5 GM/DL (ref 14–18)
LDL CHOLESTEROL CALCULATED: 92 MG/DL
MCH RBC QN AUTO: 26.4 PG (ref 26–33)
MCHC RBC AUTO-ENTMCNC: 30 GM/DL (ref 32.2–35.5)
MCV RBC AUTO: 88 FL (ref 80–94)
MICROALBUMIN/CREAT 24H UR: 10 MG/G{CREAT}
MICROALBUMIN/CREAT UR-RTO: <30
PLATELET # BLD: 298 THOU/MM3 (ref 130–400)
PMV BLD AUTO: 10.4 FL (ref 9.4–12.4)
POTASSIUM SERPL-SCNC: 5.8 MEQ/L (ref 3.5–5.2)
RBC # BLD: 4.74 MILL/MM3 (ref 4.7–6.1)
SODIUM BLD-SCNC: 139 MEQ/L (ref 135–145)
TOTAL PROTEIN: 7.1 G/DL (ref 6.1–8)
TRIGL SERPL-MCNC: 118 MG/DL (ref 0–199)
WBC # BLD: 9 THOU/MM3 (ref 4.8–10.8)

## 2020-08-25 PROCEDURE — 3017F COLORECTAL CA SCREEN DOC REV: CPT | Performed by: FAMILY MEDICINE

## 2020-08-25 PROCEDURE — G8427 DOCREV CUR MEDS BY ELIG CLIN: HCPCS | Performed by: FAMILY MEDICINE

## 2020-08-25 PROCEDURE — G0103 PSA SCREENING: HCPCS

## 2020-08-25 PROCEDURE — 2022F DILAT RTA XM EVC RTNOPTHY: CPT | Performed by: FAMILY MEDICINE

## 2020-08-25 PROCEDURE — 83036 HEMOGLOBIN GLYCOSYLATED A1C: CPT

## 2020-08-25 PROCEDURE — 85027 COMPLETE CBC AUTOMATED: CPT

## 2020-08-25 PROCEDURE — 99214 OFFICE O/P EST MOD 30 MIN: CPT | Performed by: FAMILY MEDICINE

## 2020-08-25 PROCEDURE — 82044 UR ALBUMIN SEMIQUANTITATIVE: CPT | Performed by: FAMILY MEDICINE

## 2020-08-25 PROCEDURE — G8417 CALC BMI ABV UP PARAM F/U: HCPCS | Performed by: FAMILY MEDICINE

## 2020-08-25 PROCEDURE — 80053 COMPREHEN METABOLIC PANEL: CPT

## 2020-08-25 PROCEDURE — 1036F TOBACCO NON-USER: CPT | Performed by: FAMILY MEDICINE

## 2020-08-25 PROCEDURE — 80061 LIPID PANEL: CPT

## 2020-08-25 PROCEDURE — 36415 COLL VENOUS BLD VENIPUNCTURE: CPT

## 2020-08-25 PROCEDURE — 3051F HG A1C>EQUAL 7.0%<8.0%: CPT | Performed by: FAMILY MEDICINE

## 2020-08-25 RX ORDER — INSULIN GLARGINE 100 [IU]/ML
INJECTION, SOLUTION SUBCUTANEOUS
Qty: 33 ML | Refills: 1 | Status: CANCELLED | OUTPATIENT
Start: 2020-08-25

## 2020-08-25 RX ORDER — FLUTICASONE PROPIONATE 50 MCG
SPRAY, SUSPENSION (ML) NASAL
Qty: 3 BOTTLE | Refills: 1 | Status: SHIPPED | OUTPATIENT
Start: 2020-08-25 | End: 2020-11-10 | Stop reason: SDUPTHER

## 2020-08-25 RX ORDER — INSULIN GLARGINE 100 [IU]/ML
53 INJECTION, SOLUTION SUBCUTANEOUS 2 TIMES DAILY
Qty: 96 ML | Refills: 0 | Status: ON HOLD | OUTPATIENT
Start: 2020-08-25 | End: 2020-10-19 | Stop reason: SDUPTHER

## 2020-08-25 ASSESSMENT — ENCOUNTER SYMPTOMS
WHEEZING: 0
EYE ITCHING: 1
COUGH: 0
SHORTNESS OF BREATH: 0

## 2020-08-25 NOTE — PROGRESS NOTES
[facility]  [address] [address]  Dept: [phone]  Dept Fax: [phone]  Loc: [phone]  PROGRESS NOTE      Visit Date: 8/25/2020    Joss Rosario is a 62 y.o. male who presents today for:  Chief Complaint   Patient presents with    3 Month Follow-Up    Hypertension    Diabetes       Subjective:  Diabetes   Pertinent negatives for diabetes include no chest pain. 3 month f/u    Anxiety:  On xanax 1 mg 2x per day. Not on any other anxiety meds. Denies illicit drug use. mood is good. Sleeping well.      6 month f/u:    DM:  a1c 7.2% in Jan.  on lantus 53 units twice daily.  On admelog 10 units twice daily as needed for glucose >155.  he has not needed the admelog.  He is on metformin as well.  He eats twice daily.  Exercise:  Walking some.  glucose 143 this morning. No hypoglycemic events. On ACEI.     HTN:  On enalapril, norvasc and metoprolol. Has CAD. On statin. On aspirin and plavix. Follows with cardiology. CAD:  Hx of stent placement. On BB. On pravastatin. Aspirin and plavix. Follows with cardiology. Allergies:  He has been on cromolyn. On flonase. Review of Systems   Constitutional: Negative for chills and fever. Eyes: Positive for itching. Respiratory: Negative for cough, shortness of breath and wheezing. Cardiovascular: Negative for chest pain and leg swelling. Neurological: Negative for syncope and light-headedness.      Patient Active Problem List   Diagnosis    Hypertension    Hyperlipidemia    CAD (coronary artery disease)    Chest pain, atypical    Chronic low back pain    Hypertriglyceridemia    Morbidly obese (HCC)    Abnormal stress test    Sprain and strain of ankle    Os trigonum    Elevated PSA    Lumbar spinal stenosis    Well controlled type 2 diabetes mellitus (HCC)    ROBSON (obstructive sleep apnea)    Back pain at L4-L5 level    Anxiety    Microalbuminuria    Positive FIT (fecal immunochemical test)     Past Medical History:   Diagnosis Date    Abnormal stress test 2012    Lateral Wall Ischemia- [facility]-    CAD (coronary artery disease)     Chronic low back pain     [facility]     Diabetes mellitus [facility]     Hyperlipidemia     Hypertension     Hypertriglyceridemia     MI (myocardial infarction) ([facility] [facility] 2004    Obesity       Past Surgical History:   Procedure Laterality Date    BACK SURGERY  1997    L4 & L5 fusion    CARDIAC CATHETERIZATION  10/2019   [facility] CARDIAC SURGERY  2004    Cardiac cath with stent placement x1    COLONOSCOPY  2010   [address]    Mesh repair in abdomen. Family History   Problem Relation Age of Onset    Diabetes Mother     Heart Disease Mother     Arthritis Father     Diabetes Father     Heart Disease Father     Diabetes Sister     Diabetes Brother     Heart Disease Brother      Social History     Tobacco Use    Smoking status: Former Smoker     Packs/day: 0.25     Years: 2.00     Pack years: 0.50     Types: Cigarettes     Last attempt to quit: 1995     Years since quittin.6    Smokeless tobacco: Never Used   Substance Use Topics    Alcohol use: No      Current Outpatient Medications   Medication Sig Dispense Refill    pantoprazole (PROTONIX) 40 MG tablet take 1 tablet by mouth once daily 90 tablet 1    ALPRAZolam (XANAX) 1 MG tablet Take 1 tablet by mouth 2 times daily for 30 days.  60 tablet 0    insulin glargine (BASAGLAR KWIKPEN) 100 UNIT/ML injection pen inject 53 units subcutaneously twice a day 33 mL 1    cromolyn (OPTICROM) 4 % ophthalmic solution Place 1 drop into both eyes 4 times daily 1 Bottle 3    enalapril (VASOTEC) 20 MG tablet take 1 tablet by mouth once daily 30 tablet 2    metFORMIN (GLUCOPHAGE) 1000 MG tablet take 1 tablet by mouth twice a day with meals 60 tablet 2    tamsulosin (FLOMAX) 0.4 MG capsule take 1 capsule by mouth once daily 90 capsule 0    fluticasone (FLONASE) 50 MCG/ACT nasal spray One spray in each nostril q hs 3 Bottle 1    BD INSULIN SYRINGE U/F 31G X 5/16\" 1 ML MISC 1 each by Other route 2 times daily 100 each 3    clopidogrel (PLAVIX) 75 MG tablet take 1 tablet by mouth once daily 90 tablet 1    insulin lispro (ADMELOG) 100 UNIT/ML injection vial Inject 10 Units into the skin 2 times daily as needed for High Blood Sugar 3 vial 1    albuterol (PROVENTIL) (2.5 MG/3ML) 0.083% nebulizer solution Take 3 mLs by nebulization every 4 hours as needed for Wheezing 2 Package 1    amLODIPine (NORVASC) 5 MG tablet Take 1 tablet by mouth daily  0    B-D ULTRAFINE III SHORT PEN 31G X 8 MM MISC   0    spironolactone (ALDACTONE) 25 MG tablet Take 1 tablet by mouth daily  0    metoprolol tartrate (LOPRESSOR) 25 MG tablet Take 1 tablet by mouth 2 times daily  0    Vitamin D, Cholecalciferol, 25 MCG (1000 UT) TABS Take 1,000 Units by mouth daily      pravastatin (PRAVACHOL) 40 MG tablet Take 1 tablet by mouth nightly  0    gemfibrozil (LOPID) 600 MG tablet Take 1 tablet by mouth 2 times daily 180 tablet 1    nitroGLYCERIN (NITROSTAT) 0.4 MG SL tablet Place 0.4 mg under the tongue every 5 minutes as needed for Chest pain      aspirin 325 MG tablet Take 325 mg by mouth daily. No current facility-administered medications for this visit.       Allergies   Allergen Reactions    Ace Inhibitors     Darvocet [Propoxyphene N-Acetaminophen]     Darvon [Propoxyphene Hcl]     Flexeril [Cyclobenzaprine] Other (See Comments)     Headache    Morphine     Motrin [Ibuprofen Micronized]     Tylenol [Acetaminophen] Nausea Only    Ultram [Tramadol] Itching    Baclofen Other (See Comments)     Health Maintenance   Topic Date Due    Hepatitis C screen  1960    HIV screen  07/10/1976    Hepatitis B vaccine (1 of 3 - Risk 3-dose series) 07/10/1980    DTaP/Tdap/Td vaccine (1 - Tdap) 07/10/1980    Shingles Panel; Future    2. Coronary artery disease involving native coronary artery of native heart without angina pectoris  Chronic. Stable. Continue maximal medical management. Continue statin and DAPT    3. Essential hypertension  Chronic. Controlled. Continue enalapril, aldactone, norvasc, and metoprolol.  - CBC; Future  - Comprehensive Metabolic Panel; Future    4. Gastroesophageal reflux disease without esophagitis  Chronic. Controlled. Continue protonix. 5. Chronic seasonal allergic rhinitis due to pollen  Chronic. stable. Refill med  - fluticasone (FLONASE) 50 MCG/ACT nasal spray; One spray in each nostril q hs  Dispense: 3 Bottle; Refill: 1    6. Anxiety  Chronic. Controlled. Continue xanax    7. Prostate cancer screening  - PSA Screening; Future        They voiced understanding. All questions answered. They agreed with treatment plan. See patient instructions for any educational materials that may have been given. Discussed use, benefit, and side effects of prescribed medications. Reviewed health maintenance. (Please note that portions of this note may have been completed with a voice recognition program.  Efforts were made to edit the dictation but occasionally words are mis-transcribed.)    Return in about 3 months (around 11/25/2020) for anxiety.       Electronically signed by Shani Doll MD on 8/25/2020 at 11:35 AM

## 2020-08-26 ENCOUNTER — HOSPITAL ENCOUNTER (OUTPATIENT)
Age: 60
Discharge: HOME | End: 2020-08-26
Payer: MEDICAID

## 2020-08-26 ENCOUNTER — TELEPHONE (OUTPATIENT)
Dept: FAMILY MEDICINE CLINIC | Age: 60
End: 2020-08-26

## 2020-08-26 DIAGNOSIS — E87.5 HYPERKALEMIA: ICD-10-CM

## 2020-08-26 LAB
AVERAGE GLUCOSE: 159 MG/DL (ref 70–126)
HBA1C MFR BLD: 7.3 % (ref 4.4–6.4)
POTASSIUM SERPL-SCNC: 5.7 MEQ/L (ref 3.5–5.2)
PROSTATE SPECIFIC ANTIGEN: 16.31 NG/ML (ref 0–1)

## 2020-08-26 PROCEDURE — 36415 COLL VENOUS BLD VENIPUNCTURE: CPT

## 2020-08-26 PROCEDURE — 84132 ASSAY OF SERUM POTASSIUM: CPT

## 2020-08-26 NOTE — TELEPHONE ENCOUNTER
Spoke with Leena Farmer- results and directive given. Patient is not on any OTC supplements with potassium. Advised patient to go to [facility] for repeat draw today. Patient stated that he would go today. Spoke with patient about PSA result- agreeable to go to Urology. Requests to go to [id number]. Patient aware that they will contact him to schedule.

## 2020-08-26 NOTE — TELEPHONE ENCOUNTER
-A1c is similar to previous and borderline controlled. No changes to diabetes meds. -PSA is quite elevated. Possible prostate cancer. Recommend referral to urology. Who does he want to see? -CBC shows mild anemia similar to previous. No further work-up  -Lipids are good. -CMP shows elevated potassium. Is he on any otc supplements with potassium in it? Recheck potassium level today. Please advise patient.   Maddie Saldana MD

## 2020-08-27 ENCOUNTER — TELEPHONE (OUTPATIENT)
Dept: FAMILY MEDICINE CLINIC | Age: 60
End: 2020-08-27

## 2020-08-27 NOTE — TELEPHONE ENCOUNTER
----- Message from Maurice Ryan MD sent at 8/26/2020  8:52 PM EDT -----  Hyperkalemia. May be due to aldactone. Recommend he call cardiologist to see if he can stop aldactone and let us know. Please advise patient.   Maurice Ryan MD

## 2020-08-28 ENCOUNTER — TELEPHONE (OUTPATIENT)
Dept: FAMILY MEDICINE CLINIC | Age: 60
End: 2020-08-28

## 2020-08-28 NOTE — TELEPHONE ENCOUNTER
Renard Roque phoned- spoke with Dr. Radha Hatfield office about his hyperkalemia from possible Aldactone use. Dr. Soren Boykin advised Renard Roque to stop the Aldactone,to increase Amlodipine to 10 mg Daily and monitor blood pressure. Med list updated.

## 2020-09-09 RX ORDER — ALPRAZOLAM 1 MG/1
1 TABLET ORAL 2 TIMES DAILY
Qty: 60 TABLET | Refills: 0 | Status: SHIPPED | OUTPATIENT
Start: 2020-09-09 | End: 2020-10-08 | Stop reason: SDUPTHER

## 2020-09-09 NOTE — TELEPHONE ENCOUNTER
Blas Bull called requesting a refill on the following medications:  Requested Prescriptions     Pending Prescriptions Disp Refills    ALPRAZolam (XANAX) 1 MG tablet 60 tablet 0     Sig: Take 1 tablet by mouth 2 times daily for 30 days.      Pharmacy verified:  .pv  [facility]    Date of last visit: 08/25/20  Date of next visit (if applicable): 94/71/0960

## 2020-09-14 RX ORDER — TAMSULOSIN HYDROCHLORIDE 0.4 MG/1
CAPSULE ORAL
Qty: 90 CAPSULE | Refills: 0 | Status: ON HOLD | OUTPATIENT
Start: 2020-09-14 | End: 2020-10-19 | Stop reason: HOSPADM

## 2020-09-14 NOTE — TELEPHONE ENCOUNTER
Kyler La called requesting a refill on the following medications:  Requested Prescriptions     Pending Prescriptions Disp Refills    tamsulosin (FLOMAX) 0.4 MG capsule [Pharmacy Med Name: TAMSULOSIN HCL 0.4 MG CAPSULE] 90 capsule 0     Sig: take 1 capsule by mouth once daily       Date of last visit: 8/25/2020  Date of next visit (if applicable):11/24/2020  Date of last refill: 06/15/2020  Pharmacy Name: [address] [place] [address]      Lacey Sanchez, [address] [place] [address]

## 2020-09-18 ENCOUNTER — OFFICE VISIT (OUTPATIENT)
Dept: UROLOGY | Age: 60
End: 2020-09-18
Payer: MEDICAID

## 2020-09-18 ENCOUNTER — TELEPHONE (OUTPATIENT)
Dept: UROLOGY | Age: 60
End: 2020-09-18

## 2020-09-18 VITALS — TEMPERATURE: 97 F | BODY MASS INDEX: 43.48 KG/M2 | HEIGHT: 69 IN | WEIGHT: 293.6 LBS

## 2020-09-18 LAB
BILIRUBIN URINE: NEGATIVE
BLOOD URINE, POC: NEGATIVE
CHARACTER, URINE: CLEAR
COLOR, URINE: YELLOW
GLUCOSE URINE: NEGATIVE MG/DL
KETONES, URINE: NEGATIVE
LEUKOCYTE CLUMPS, URINE: NEGATIVE
NITRITE, URINE: NEGATIVE
PH, URINE: 5.5 (ref 5–9)
POST VOID RESIDUAL (PVR): 48 ML
PROTEIN, URINE: NEGATIVE MG/DL
SPECIFIC GRAVITY, URINE: 1.01 (ref 1–1.03)
UROBILINOGEN, URINE: 0.2 EU/DL (ref 0–1)

## 2020-09-18 PROCEDURE — 81003 URINALYSIS AUTO W/O SCOPE: CPT | Performed by: UROLOGY

## 2020-09-18 PROCEDURE — G8417 CALC BMI ABV UP PARAM F/U: HCPCS | Performed by: UROLOGY

## 2020-09-18 PROCEDURE — G8427 DOCREV CUR MEDS BY ELIG CLIN: HCPCS | Performed by: UROLOGY

## 2020-09-18 PROCEDURE — 51798 US URINE CAPACITY MEASURE: CPT | Performed by: UROLOGY

## 2020-09-18 PROCEDURE — 99204 OFFICE O/P NEW MOD 45 MIN: CPT | Performed by: UROLOGY

## 2020-09-18 PROCEDURE — 3017F COLORECTAL CA SCREEN DOC REV: CPT | Performed by: UROLOGY

## 2020-09-18 PROCEDURE — 1036F TOBACCO NON-USER: CPT | Performed by: UROLOGY

## 2020-09-18 RX ORDER — ENALAPRIL MALEATE 20 MG/1
TABLET ORAL
Qty: 90 TABLET | Refills: 1 | Status: ON HOLD | OUTPATIENT
Start: 2020-09-18 | End: 2020-10-19 | Stop reason: SDUPTHER

## 2020-09-18 NOTE — TELEPHONE ENCOUNTER
Margie Wiley called requesting a refill on the following medications:  Requested Prescriptions     Pending Prescriptions Disp Refills    enalapril (VASOTEC) 20 MG tablet [Pharmacy Med Name: ENALAPRIL MALEATE 20 MG TAB] 90 tablet 1     Sig: take 1 tablet by mouth once daily    metFORMIN (GLUCOPHAGE) 1000 MG tablet [Pharmacy Med Name: METFORMIN HCL 1,000 MG TABLET] 180 tablet 1     Sig: take 1 tablet by mouth twice a day with meals       Date of last visit: 8/25/2020  Date of next visit (if applicable):11/24/2020  Date of last refill:   Pharmacy Name:       Shalini Sanchez, [address] [place] [address]

## 2020-09-18 NOTE — PROGRESS NOTES
Dr. Inderjit Weir MD  [facility] Consultation / New Patient Visit    Patient:  Topher Campbell  YOB: 1960  Date: 9/18/2020  Consult requested from Migel Lieberman MD     HISTORY OF PRESENT ILLNESS:   The patient is a 62 y.o. male who presents today for follow-up for the following problem(s): elevated PSA, BPH with luts  Overall the problem(s) : are worsening. Associated Symptoms: No dysuria, gross hematuria. Pain Severity:      Today visit:   9/18/20  61 yo male that presents with LUTs and difficulty emptying his bladder. He has incomplete emptying, frequency, and Nocturia, that are worsening over the last couple years. On flomax but he is stating his symptoms are worsening. AUASS: 11/4. Denies hematuria. PVR: 48 ml. Smoking history: quit 20 years ago.  FH: none. ANTONIO: plavix, for CAD s/p stents (10/2019).   Has elevated PSA, 16.        Summary of old records:   (Patient's old records, notes and chart reviewed and summarized above.)    Last several PSA's:  Lab Results   Component Value Date    PSA 16.31 (H) 08/25/2020    PSA 7.61 (H) 06/08/2016       Last total testosterone:  No results found for: TESTOSTERONE    Urinalysis today:  Results for POC orders placed in visit on 09/18/20   POCT Urinalysis No Micro (Auto)   Result Value Ref Range    Glucose, Ur Negative NEGATIVE mg/dl    Bilirubin Urine Negative     Ketones, Urine Negative NEGATIVE    Specific Gravity, Urine 1.015 1.002 - 1.030    Blood, UA POC Negative NEGATIVE    pH, Urine 5.50 5.0 - 9.0    Protein, Urine Negative NEGATIVE mg/dl    Urobilinogen, Urine 0.20 0.0 - 1.0 eu/dl    Nitrite, Urine Negative NEGATIVE    Leukocyte Clumps, Urine Negative NEGATIVE    Color, Urine Yellow [id number]-STRAW    Character, Urine Clear [id number].[id number]   poct post void residual   Result Value Ref Range    post void residual 48 ml         Last BUN and creatinine:  Lab Results   Component Value Date    BUN 20 08/25/2020     Lab drop into both eyes 4 times daily 1 Bottle 3    enalapril (VASOTEC) 20 MG tablet take 1 tablet by mouth once daily 30 tablet 2    metFORMIN (GLUCOPHAGE) 1000 MG tablet take 1 tablet by mouth twice a day with meals 60 tablet 2    BD INSULIN SYRINGE U/F 31G X 5/16\" 1 ML MISC 1 each by Other route 2 times daily 100 each 3    clopidogrel (PLAVIX) 75 MG tablet take 1 tablet by mouth once daily 90 tablet 1    albuterol (PROVENTIL) (2.5 MG/3ML) 0.083% nebulizer solution Take 3 mLs by nebulization every 4 hours as needed for Wheezing 2 Package 1    amLODIPine (NORVASC) 5 MG tablet Take 10 mg by mouth daily   0    B-D ULTRAFINE III SHORT PEN 31G X 8 MM MISC   0    metoprolol tartrate (LOPRESSOR) 25 MG tablet Take 1 tablet by mouth 2 times daily  0    Vitamin D, Cholecalciferol, 25 MCG (1000 UT) TABS Take 1,000 Units by mouth daily      pravastatin (PRAVACHOL) 40 MG tablet Take 1 tablet by mouth nightly  0    gemfibrozil (LOPID) 600 MG tablet Take 1 tablet by mouth 2 times daily 180 tablet 1    nitroGLYCERIN (NITROSTAT) 0.4 MG SL tablet Place 0.4 mg under the tongue every 5 minutes as needed for Chest pain      aspirin 325 MG tablet Take 325 mg by mouth daily. Ace inhibitors; Darvocet [propoxyphene n-acetaminophen]; Darvon [propoxyphene hcl]; Flexeril [cyclobenzaprine]; Morphine; Motrin [ibuprofen micronized]; Tylenol [acetaminophen];  Ultram [tramadol]; and Baclofen  Social History     Tobacco Use   Smoking Status Former Smoker    Packs/day: 0.25    Years: 2.00    Pack years: 0.50    Types: Cigarettes    Last attempt to quit: 1995    Years since quittin.7   Smokeless Tobacco Never Used       Social History     Substance and Sexual Activity   Alcohol Use No       REVIEW OF SYSTEMS:  Constitutional: negative  Eyes: negative  Respiratory: negative  Cardiovascular: negative  Gastrointestinal: negative  Musculoskeletal: negative  Genitourinary: negative  Skin: negative   Neurological: negative  Hematological/Lymphatic: negative  Psychological: negative    Physical Exam:    This a 62 y.o. male   Vitals:    09/18/20 0950   Temp: 97 °F (36.1 °C)     Constitutional: Patient in no acute distress   Neuro: alert and oriented to person place and time. Psych: Mood and affect normal.  Head: atraumatic normocephalic  Eyes: EOMi  HEENT: neck supple, trachea midline  Lungs: Respiratory effort normal  Cardiovascular:  Normal peripheral pulses  Abdomen: Soft, non-tender, non-distended, No CVA  Bladder: non-tender and not distended. FROMx4, no cyanosis clubbing edema  Skin: warm and dry      Assessment and Plan      1. Elevated PSA    2. Incomplete bladder emptying           Plan:      No follow-ups on file.   Repeat PSA  MRI prostate for significantly elevated PSA    Cystoscopy for BPH

## 2020-09-20 LAB
ORGANISM: ABNORMAL
URINE CULTURE, ROUTINE: ABNORMAL

## 2020-10-02 ENCOUNTER — TELEPHONE (OUTPATIENT)
Dept: UROLOGY | Age: 60
End: 2020-10-02

## 2020-10-02 NOTE — TELEPHONE ENCOUNTER
10/2/2020 9:37 AM per Dr. Hina Harmon via perfect serve. We can schedule a biopsy if he is ok with that.  Or he can come talk with me before if he wants

## 2020-10-02 NOTE — TELEPHONE ENCOUNTER
Message sent by perfect serve. Good Morning Dr. Elisha Coleman, this is Sabi Worrell. You saw Terrance Lipoma on 09- for elevated PSA, BPH w/ obstruction and incomplete bladder emptying. You ordered an MRI prostate, which his insurance is denying as there is no tissue biopsy. Do you want the patient scheduled for an office visit to discuss or to just schedule him for a biopsy? Please let me know.  Thank you Read 10/2/2020 9:36 AM

## 2020-10-02 NOTE — TELEPHONE ENCOUNTER
[facility] is denying MRI Pelvis  Peer to peer available  # [phone]  Case# [id number]    Please advise. Reason: Based on [facility] Oncology Imaging Guidelines Section: ONC 19.1 Suspected Prostate Cancer, we cannot approve this request. Your records show that you may have disease in your prostate. This is a gland that surrounds the neck of your bladder. The reason this request cannot be approved is because: Imaging may be supported if results of a tissue sample taken for lab testing (biopsy) that was obtained by using a picture study using sound waves (ultrasoun or US) can not confirm or rule out disease in your prostate. This is a gland that surrounds the male bladder. Your records do not show these results.

## 2020-10-08 RX ORDER — ALPRAZOLAM 1 MG/1
1 TABLET ORAL 2 TIMES DAILY
Qty: 60 TABLET | Refills: 0 | Status: SHIPPED | OUTPATIENT
Start: 2020-10-08 | End: 2020-11-10 | Stop reason: SDUPTHER

## 2020-10-08 NOTE — TELEPHONE ENCOUNTER
Sandee Grant called requesting a refill on the following medications:  Requested Prescriptions     Pending Prescriptions Disp Refills    ALPRAZolam (XANAX) 1 MG tablet 60 tablet 0     Sig: Take 1 tablet by mouth 2 times daily for 30 days.      Pharm[facility] [place]    Date of last visit: 08/25/2020  Date of next visit (if applicable): 84/34/8147

## 2020-10-14 ENCOUNTER — TELEPHONE (OUTPATIENT)
Dept: FAMILY MEDICINE CLINIC | Age: 60
End: 2020-10-14

## 2020-10-14 ENCOUNTER — HOSPITAL ENCOUNTER (INPATIENT)
Age: 60
LOS: 5 days | Discharge: HOME | DRG: 143 | End: 2020-10-20
Attending: EMERGENCY MEDICINE | Admitting: FAMILY MEDICINE
Payer: MEDICAID

## 2020-10-14 ENCOUNTER — APPOINTMENT (OUTPATIENT)
Dept: GENERAL RADIOLOGY | Age: 60
DRG: 143 | End: 2020-10-14
Payer: MEDICAID

## 2020-10-14 ENCOUNTER — NURSE TRIAGE (OUTPATIENT)
Dept: OTHER | Facility: CLINIC | Age: 60
End: 2020-10-14

## 2020-10-14 ENCOUNTER — APPOINTMENT (OUTPATIENT)
Dept: CT IMAGING | Age: 60
DRG: 143 | End: 2020-10-14
Payer: MEDICAID

## 2020-10-14 LAB
ALBUMIN SERPL-MCNC: 4.1 G/DL (ref 3.5–5.1)
ALP BLD-CCNC: 93 U/L (ref 38–126)
ALT SERPL-CCNC: 8 U/L (ref 11–66)
ANION GAP SERPL CALCULATED.3IONS-SCNC: 8 MEQ/L (ref 8–16)
APTT: 30.7 SECONDS (ref 22–38)
AST SERPL-CCNC: 10 U/L (ref 5–40)
BASOPHILS # BLD: 0.8 %
BASOPHILS ABSOLUTE: 0.1 THOU/MM3 (ref 0–0.1)
BILIRUB SERPL-MCNC: 0.3 MG/DL (ref 0.3–1.2)
BUN BLDV-MCNC: 24 MG/DL (ref 7–22)
C-REACTIVE PROTEIN: 1.11 MG/DL (ref 0–1)
CALCIUM SERPL-MCNC: 9.8 MG/DL (ref 8.5–10.5)
CHLORIDE BLD-SCNC: 103 MEQ/L (ref 98–111)
CO2: 28 MEQ/L (ref 23–33)
CREAT SERPL-MCNC: 1.1 MG/DL (ref 0.4–1.2)
EKG ATRIAL RATE: 69 BPM
EKG P AXIS: 35 DEGREES
EKG P-R INTERVAL: 192 MS
EKG Q-T INTERVAL: 406 MS
EKG QRS DURATION: 134 MS
EKG QTC CALCULATION (BAZETT): 435 MS
EKG R AXIS: 79 DEGREES
EKG T AXIS: 37 DEGREES
EKG VENTRICULAR RATE: 69 BPM
EOSINOPHIL # BLD: 2.8 %
EOSINOPHILS ABSOLUTE: 0.3 THOU/MM3 (ref 0–0.4)
ERYTHROCYTE [DISTWIDTH] IN BLOOD BY AUTOMATED COUNT: 15.3 % (ref 11.5–14.5)
ERYTHROCYTE [DISTWIDTH] IN BLOOD BY AUTOMATED COUNT: 46.5 FL (ref 35–45)
FERRITIN: 15 NG/ML (ref 22–322)
FLU A ANTIGEN: NEGATIVE
FLU B ANTIGEN: NEGATIVE
GFR SERPL CREATININE-BSD FRML MDRD: 68 ML/MIN/1.73M2
GLUCOSE BLD-MCNC: 114 MG/DL (ref 70–108)
HCT VFR BLD CALC: 39.8 % (ref 42–52)
HEMOGLOBIN: 11.7 GM/DL (ref 14–18)
IMMATURE GRANS (ABS): 0.02 THOU/MM3 (ref 0–0.07)
IMMATURE GRANULOCYTES: 0.2 %
INR BLD: 1.03 (ref 0.85–1.13)
LACTIC ACID: 0.8 MMOL/L (ref 0.5–2.2)
LD: 131 U/L (ref 100–190)
LYMPHOCYTES # BLD: 18.8 %
LYMPHOCYTES ABSOLUTE: 1.8 THOU/MM3 (ref 1–4.8)
MCH RBC QN AUTO: 24.9 PG (ref 26–33)
MCHC RBC AUTO-ENTMCNC: 29.4 GM/DL (ref 32.2–35.5)
MCV RBC AUTO: 84.7 FL (ref 80–94)
MONOCYTES # BLD: 7.2 %
MONOCYTES ABSOLUTE: 0.7 THOU/MM3 (ref 0.4–1.3)
NUCLEATED RED BLOOD CELLS: 0 /100 WBC
OSMOLALITY CALCULATION: 282.4 MOSMOL/KG (ref 275–300)
PLATELET # BLD: 262 THOU/MM3 (ref 130–400)
PMV BLD AUTO: 9.4 FL (ref 9.4–12.4)
POTASSIUM SERPL-SCNC: 5.2 MEQ/L (ref 3.5–5.2)
PRO-BNP: 124.6 PG/ML (ref 0–900)
PROCALCITONIN: 0.06 NG/ML (ref 0.01–0.09)
RBC # BLD: 4.7 MILL/MM3 (ref 4.7–6.1)
SARS-COV-2, NAAT: NOT DETECTED
SEG NEUTROPHILS: 70.2 %
SEGMENTED NEUTROPHILS ABSOLUTE COUNT: 6.7 THOU/MM3 (ref 1.8–7.7)
SODIUM BLD-SCNC: 139 MEQ/L (ref 135–145)
TOTAL PROTEIN: 7.1 G/DL (ref 6.1–8)
TROPONIN T: < 0.01 NG/ML
WBC # BLD: 9.5 THOU/MM3 (ref 4.8–10.8)

## 2020-10-14 PROCEDURE — 93005 ELECTROCARDIOGRAM TRACING: CPT | Performed by: EMERGENCY MEDICINE

## 2020-10-14 PROCEDURE — 87040 BLOOD CULTURE FOR BACTERIA: CPT

## 2020-10-14 PROCEDURE — 82728 ASSAY OF FERRITIN: CPT

## 2020-10-14 PROCEDURE — 86140 C-REACTIVE PROTEIN: CPT

## 2020-10-14 PROCEDURE — 85025 COMPLETE CBC W/AUTO DIFF WBC: CPT

## 2020-10-14 PROCEDURE — U0002 COVID-19 LAB TEST NON-CDC: HCPCS

## 2020-10-14 PROCEDURE — 84145 PROCALCITONIN (PCT): CPT

## 2020-10-14 PROCEDURE — 84484 ASSAY OF TROPONIN QUANT: CPT

## 2020-10-14 PROCEDURE — 83615 LACTATE (LD) (LDH) ENZYME: CPT

## 2020-10-14 PROCEDURE — 85610 PROTHROMBIN TIME: CPT

## 2020-10-14 PROCEDURE — 83880 ASSAY OF NATRIURETIC PEPTIDE: CPT

## 2020-10-14 PROCEDURE — 80053 COMPREHEN METABOLIC PANEL: CPT

## 2020-10-14 PROCEDURE — 83605 ASSAY OF LACTIC ACID: CPT

## 2020-10-14 PROCEDURE — 6370000000 HC RX 637 (ALT 250 FOR IP): Performed by: EMERGENCY MEDICINE

## 2020-10-14 PROCEDURE — 36415 COLL VENOUS BLD VENIPUNCTURE: CPT

## 2020-10-14 PROCEDURE — 99285 EMERGENCY DEPT VISIT HI MDM: CPT

## 2020-10-14 PROCEDURE — 71275 CT ANGIOGRAPHY CHEST: CPT

## 2020-10-14 PROCEDURE — 87804 INFLUENZA ASSAY W/OPTIC: CPT

## 2020-10-14 PROCEDURE — 99215 OFFICE O/P EST HI 40 MIN: CPT

## 2020-10-14 PROCEDURE — 71046 X-RAY EXAM CHEST 2 VIEWS: CPT

## 2020-10-14 PROCEDURE — 85730 THROMBOPLASTIN TIME PARTIAL: CPT

## 2020-10-14 RX ORDER — IPRATROPIUM BROMIDE AND ALBUTEROL SULFATE 2.5; .5 MG/3ML; MG/3ML
1 SOLUTION RESPIRATORY (INHALATION) ONCE
Status: COMPLETED | OUTPATIENT
Start: 2020-10-14 | End: 2020-10-14

## 2020-10-14 RX ADMIN — IPRATROPIUM BROMIDE AND ALBUTEROL SULFATE 1 AMPULE: .5; 3 SOLUTION RESPIRATORY (INHALATION) at 23:13

## 2020-10-14 ASSESSMENT — ENCOUNTER SYMPTOMS
CHEST TIGHTNESS: 0
COUGH: 1
RHINORRHEA: 0
EYE DISCHARGE: 0
NAUSEA: 0
CONSTIPATION: 0
SORE THROAT: 0
SHORTNESS OF BREATH: 1
PHOTOPHOBIA: 0
WHEEZING: 0
EYE PAIN: 0
BACK PAIN: 0
VOMITING: 0
STRIDOR: 0
SINUS PRESSURE: 1
EYE ITCHING: 0
DIARRHEA: 0
EYE REDNESS: 0
ABDOMINAL DISTENTION: 0
ABDOMINAL PAIN: 0

## 2020-10-14 NOTE — TELEPHONE ENCOUNTER
Spoke with patient- verified that he in fact did not have VV capability. Advised him to go to urgent care for evaluation. He stated that he will go there this afternoon.

## 2020-10-14 NOTE — ED PROVIDER NOTES
[facility] [address] [address] EMERGENCY DEPT  Urgent Care Encounter       CHIEF COMPLAINT       Chief Complaint   Patient presents with    Cough     onset monday     Nasal Congestion       Nurses Notes reviewed and I agree except as noted in the HPI. HISTORY OF PRESENT ILLNESS   Jaylen Anne is a 62 y.o. male who presents with complaints of sinus and chest congestion and PND. Symptoms have been present for the last 2 to 3 days. Patient does report shortness of breath with activity. No fever, chills, body aches, nausea, vomiting or diarrhea. No change in taste or smell. Patient denies any known sick contacts. Patient does not smoke and denies any history of lung disease. Patient does have a history of CAD but no history of CHF. The history is provided by the patient. REVIEW OF SYSTEMS     Review of Systems   Constitutional: Negative for appetite change, chills and fever. HENT: Positive for congestion and sinus pressure. Negative for ear pain and sore throat. Respiratory: Positive for cough and shortness of breath (with activity). Cardiovascular: Negative for chest pain. Gastrointestinal: Negative for diarrhea, nausea and vomiting. Musculoskeletal: Negative for myalgias. Skin: Negative for rash. Neurological: Negative for headaches. PAST MEDICAL HISTORY         Diagnosis Date    Abnormal stress test Sept 2012    Lateral Wall Ischemia- done at [facility]    CAD (coronary artery disease)     Chronic low back pain     Diabetes mellitus ([address]     Diabetes mellitus ([address]     Hyperlipidemia     Hypertension     Hypertriglyceridemia     MI (myocardial infarction) ([address] 2004    Obesity        SURGICALHISTORY     Patient  has a past surgical history that includes hernia repair (1991); back surgery (November 1997); Colonoscopy (October 2010); Cardiac surgery (October 22, 2004); Cardiac catheterization (10/2019); and Coronary angioplasty with stent (10/17/2019).     CURRENT MEDICATIONS       Previous Medications    ALBUTEROL (PROVENTIL) (2.5 MG/3ML) 0.083% NEBULIZER SOLUTION    Take 3 mLs by nebulization every 4 hours as needed for Wheezing    ALPRAZOLAM (XANAX) 1 MG TABLET    Take 1 tablet by mouth 2 times daily for 30 days. AMLODIPINE (NORVASC) 5 MG TABLET    Take 10 mg by mouth daily     ASPIRIN 325 MG TABLET    Take 325 mg by mouth daily.       B-D ULTRAFINE III SHORT PEN 31G X 8 MM MISC        BD INSULIN SYRINGE U/F 31G X 5/16\" 1 ML MISC    1 each by Other route 2 times daily    CLOPIDOGREL (PLAVIX) 75 MG TABLET    take 1 tablet by mouth once daily    CROMOLYN (OPTICROM) 4 % OPHTHALMIC SOLUTION    Place 1 drop into both eyes 4 times daily    ENALAPRIL (VASOTEC) 20 MG TABLET    take 1 tablet by mouth once daily    FLUTICASONE (FLONASE) 50 MCG/ACT NASAL SPRAY    One spray in each nostril q hs    GEMFIBROZIL (LOPID) 600 MG TABLET    Take 1 tablet by mouth 2 times daily    INSULIN GLARGINE (LANTUS SOLOSTAR) 100 UNIT/ML INJECTION PEN    Inject 53 Units into the skin 2 times daily    INSULIN LISPRO (ADMELOG) 100 UNIT/ML INJECTION VIAL    Inject 10 Units into the skin 2 times daily as needed for High Blood Sugar    METFORMIN (GLUCOPHAGE) 1000 MG TABLET    take 1 tablet by mouth twice a day with meals    METOPROLOL TARTRATE (LOPRESSOR) 25 MG TABLET    Take 1 tablet by mouth 2 times daily    NITROGLYCERIN (NITROSTAT) 0.4 MG SL TABLET    Place 0.4 mg under the tongue every 5 minutes as needed for Chest pain    PANTOPRAZOLE (PROTONIX) 40 MG TABLET    take 1 tablet by mouth once daily    PRAVASTATIN (PRAVACHOL) 40 MG TABLET    Take 1 tablet by mouth nightly    TAMSULOSIN (FLOMAX) 0.4 MG CAPSULE    take 1 capsule by mouth once daily    VITAMIN D, CHOLECALCIFEROL, 25 MCG (1000 UT) TABS    Take 1,000 Units by mouth daily       ALLERGIES     Patient is is allergic to ace inhibitors; darvocet [propoxyphene n-acetaminophen]; darvon [propoxyphene hcl]; flexeril [cyclobenzaprine]; morphine; motrin [ibuprofen micronized]; tylenol [acetaminophen]; ultram [tramadol]; and baclofen. Patients   Immunization History   Administered Date(s) Administered    Influenza Virus Vaccine 11/06/2018    Influenza, Kirsty Oldham, IM, (6 mo and older Fluzone, Flulaval, Fluarix and 3 yrs and older Afluria) 11/04/2016, 01/11/2018, 10/08/2019    Pneumococcal Polysaccharide ([id number]) 01/11/2018       FAMILY HISTORY     Patient's family history includes Arthritis in his father; Diabetes in his brother, father, mother, and sister; Heart Disease in his brother, father, and mother. SOCIAL HISTORY     Patient  reports that he quit smoking about 25 years ago. His smoking use included cigarettes. He has a 0.50 pack-year smoking history. He has never used smokeless tobacco. He reports that he does not drink alcohol or use drugs. PHYSICAL EXAM     ED TRIAGE VITALS  BP: 136/64, Temp: 97.5 °F (36.4 °C), Pulse: 76, Resp: 24, SpO2: 95 %(running 89-95%, consistantly is 90-91 with an occasional spike up to 95% briefly),Estimated body mass index is 41.35 kg/m² as calculated from the following:    Height as of this encounter: 5' 9\" (1.753 m). Weight as of this encounter: 280 lb (127 kg). ,No LMP for male patient. Physical Exam  Vitals signs and nursing note reviewed. Constitutional:       General: He is not in acute distress. Appearance: He is well-developed. He is not ill-appearing. Eyes:      General: Lids are normal. No scleral icterus. Conjunctiva/sclera: Conjunctivae normal.      Pupils: Pupils are equal.   Cardiovascular:      Rate and Rhythm: Normal rate and regular rhythm. Heart sounds: Normal heart sounds, S1 normal and S2 normal.   Pulmonary:      Effort: Pulmonary effort is normal. Tachypnea present. No respiratory distress. Breath sounds: Examination of the right-lower field reveals rales. Examination of the left-lower field reveals rales. Rales present.    Musculoskeletal:      Comments: Normal active ROM x 4 extremities  Gait steady   Lymphadenopathy:      Comments: No head or neck adenopathy   Skin:     General: Skin is warm and dry. Findings: No rash (to exposed skin). Neurological:      General: No focal deficit present. Mental Status: He is alert and oriented to person, place, and time. Sensory: No sensory deficit. Comments: Answers questions readily and appropriately   Psychiatric:         Mood and Affect: Mood normal.         Speech: Speech normal.         Behavior: Behavior normal. Behavior is cooperative. DIAGNOSTIC RESULTS     Labs:No results found for this visit on 10/14/20. IMAGING:    XR CHEST (2 VW)   Final Result      Bilateral mixed airspace interstitial opacities concerning for pneumonia. Atypical or viral-type pneumonia should be considered. **This report has been created using voice recognition software. It may contain minor errors which are inherent in voice recognition technology. **      Final report electronically signed by Dr. Raeann Mata on 10/14/2020 7:33 PM            EKG:      URGENT CARE COURSE:     Vitals:    10/14/20 1858   BP: 136/64   Pulse: 76   Resp: 24   Temp: 97.5 °F (36.4 °C)   TempSrc: Temporal   SpO2: 95%   Weight: 280 lb (127 kg)   Height: 5' 9\" (1.753 m)       Medications - No data to display         PROCEDURES:  None    FINAL IMPRESSION      1. Pneumonia due to organism    2. Acute respiratory failure with hypoxia (HCC)    3. Cardiomegaly          DISPOSITION/ PLAN     Patient presents with cough and chest congestion. Patient was noted to be short of breath after ambulating to the room and found with O2 saturation of 89 to 91%. Patient ambulated to radiology for chest x-ray and upon return O2 saturation was 82% on room air. O2 was applied at 2 L per nasal cannula. X-ray shows findings concerning for atypical versus viral pneumonia. Concern for COVID-19 with pneumonia.   Explained to patient that he would need to transfer to the emergency room for further evaluation and probable hospital admission. Patient [facility]. He will travel via EMS with the [facility] and EMS crew. All patient questions answered and he left the urgent care center in good condition in the care of the [facility] EMS. PATIENT REFERRED TO:  Em Boles MD  [address] / [address] [place] [address]  [id number]      DISCHARGE MEDICATIONS:  New Prescriptions    No medications on file       Discontinued Medications    No medications on file       Current Discharge Medication List          Joycelyn Liang, APRN - CNP    (Please note that portions of this note were completed with a voice recognition program. Efforts were made to edit the dictations but occasionally words are mis-transcribed.)         Joycelyn Liang, APRN - CNP  10/14/20 1949

## 2020-10-14 NOTE — ED NOTES
O2 - 2L nc applied. Pox dropped to 82 % with ambulation.        Chinmay Strickland, RN  10/14/20 1415 [facility], RN  10/14/20 1952

## 2020-10-14 NOTE — TELEPHONE ENCOUNTER
NT transferred [facility] back to me, he has symptoms of cough, w lime green secretion, sinus congestion, and SOB, started Monday. He needs a red appt, he doesn't have a device with a camera. Please call him with an appt date and time.

## 2020-10-14 NOTE — TELEPHONE ENCOUNTER
Recommend urgent care since unable to be seen in person in office and he does not have ability to do VV. Please advise patient.   Julianna Enamorado MD

## 2020-10-14 NOTE — TELEPHONE ENCOUNTER
Reason for Disposition   Patient wants to be seen    Answer Assessment - Initial Assessment Questions  1. LOCATION: \"Where does it hurt? \"       His sinuses feel \"clogged up \"    2. ONSET: \"When did the sinus pain start? \"  (e.g., hours, days)       Started this past Monday     3. SEVERITY: \"How bad is the pain? \"   (Scale 1-10; mild, moderate or severe)    - MILD (1-3): doesn't interfere with normal activities     - MODERATE (4-7): interferes with normal activities (e.g., work or school) or awakens from sleep    - SEVERE (8-10): excruciating pain and patient unable to do any normal activities         Rates his congestion about a 5/10     4. RECURRENT SYMPTOM: \"Have you ever had sinus problems before? \" If so, ask: \"When was the last time? \" and \"What happened that time? \"       Yes, he gets this every year at this time. He has had sinus infections in the past     5. NASAL CONGESTION: \"Is the nose blocked? \" If so, ask, \"Can you open it or must you breathe through the mouth? \"      Sinuses feel clogged    6. NASAL DISCHARGE: \"Do you have discharge from your nose? \" If so ask, \"What color? \"      Clear discharge    7. FEVER: \"Do you have a fever? \" If so, ask: \"What is it, how was it measured, and when did it start? \"       No    8. OTHER SYMPTOMS: \"Do you have any other symptoms? \" (e.g., sore throat, cough, earache, difficulty breathing)      Cough with \"lime green\" secretions. Congestion causes sob. 9. PREGNANCY: \"Is there any chance you are pregnant? \" \"When was your last menstrual period? \"      no    Protocols used: SINUS PAIN OR CONGESTION-ADULT-OH    [address] reports symptoms as documented above. Caller informed of disposition. Soft transfer to pre-service center [facility]) to schedule appointment as recommended. Care advice as documented. Attention Provider: Thank you for allowing me to participate in the care of your patient.   The patient was connected to triage in response to information provided to the E[facility]. Please do not respond through this encounter as the response is not directed to a shared pool.

## 2020-10-15 PROBLEM — J12.9 VIRAL PNEUMONIA: Status: ACTIVE | Noted: 2020-10-15

## 2020-10-15 LAB
ALBUMIN SERPL-MCNC: 3.8 G/DL (ref 3.5–5.1)
ALP BLD-CCNC: 77 U/L (ref 38–126)
ALT SERPL-CCNC: 8 U/L (ref 11–66)
ANION GAP SERPL CALCULATED.3IONS-SCNC: 8 MEQ/L (ref 8–16)
AST SERPL-CCNC: 8 U/L (ref 5–40)
BASOPHILS # BLD: 0.8 %
BASOPHILS ABSOLUTE: 0.1 THOU/MM3 (ref 0–0.1)
BILIRUB SERPL-MCNC: 0.4 MG/DL (ref 0.3–1.2)
BORDETELLA PARAPERTUSSIS BY PCR: NOT DETECTED
BORDETELLA PERTUSSIS BY PCR: NOT DETECTED
BUN BLDV-MCNC: 19 MG/DL (ref 7–22)
CALCIUM SERPL-MCNC: 9.4 MG/DL (ref 8.5–10.5)
CHLORIDE BLD-SCNC: 103 MEQ/L (ref 98–111)
CO2: 27 MEQ/L (ref 23–33)
CREAT SERPL-MCNC: 0.9 MG/DL (ref 0.4–1.2)
EOSINOPHIL # BLD: 2.2 %
EOSINOPHILS ABSOLUTE: 0.2 THOU/MM3 (ref 0–0.4)
ERYTHROCYTE [DISTWIDTH] IN BLOOD BY AUTOMATED COUNT: 15.2 % (ref 11.5–14.5)
ERYTHROCYTE [DISTWIDTH] IN BLOOD BY AUTOMATED COUNT: 46.2 FL (ref 35–45)
FERRITIN: 14 NG/ML (ref 22–322)
FILM ARRAY ADENOVIRUS: NOT DETECTED
FILM ARRAY CHLAMYDOPHILIA PNEUMONIAE: NOT DETECTED
FILM ARRAY CORONAVIRUS 229E: NOT DETECTED
FILM ARRAY CORONAVIRUS HKU1: NOT DETECTED
FILM ARRAY CORONAVIRUS NL63: NOT DETECTED
FILM ARRAY CORONAVIRUS OC43: NOT DETECTED
FILM ARRAY INFLUENZA A VIRUS: NOT DETECTED
FILM ARRAY INFLUENZA B: NOT DETECTED
FILM ARRAY METAPNEUMOVIRUS: NOT DETECTED
FILM ARRAY MYCOPLASMA PNEUMONIAE: NOT DETECTED
FILM ARRAY PARAINFLUENZA VIRUS 1: NOT DETECTED
FILM ARRAY PARAINFLUENZA VIRUS 2: NOT DETECTED
FILM ARRAY PARAINFLUENZA VIRUS 3: NOT DETECTED
FILM ARRAY PARAINFLUENZA VIRUS 4: NOT DETECTED
FILM ARRAY RESPIRATORY SYNCITIAL VIRUS: NOT DETECTED
FILM ARRAY RHINOVIRUS/ENTEROVIRUS: NOT DETECTED
FOLATE: 7.9 NG/ML (ref 4.8–24.2)
GFR SERPL CREATININE-BSD FRML MDRD: 86 ML/MIN/1.73M2
GLUCOSE BLD-MCNC: 122 MG/DL (ref 70–108)
GLUCOSE BLD-MCNC: 131 MG/DL (ref 70–108)
GLUCOSE BLD-MCNC: 203 MG/DL (ref 70–108)
GLUCOSE BLD-MCNC: 85 MG/DL (ref 70–108)
GLUCOSE BLD-MCNC: 89 MG/DL (ref 70–108)
HCT VFR BLD CALC: 37.5 % (ref 42–52)
HEMOGLOBIN: 11 GM/DL (ref 14–18)
IMMATURE GRANS (ABS): 0.03 THOU/MM3 (ref 0–0.07)
IMMATURE GRANULOCYTES: 0.4 %
IRON SATURATION: 9 % (ref 20–50)
IRON: 33 UG/DL (ref 65–195)
LYMPHOCYTES # BLD: 19.2 %
LYMPHOCYTES ABSOLUTE: 1.7 THOU/MM3 (ref 1–4.8)
MCH RBC QN AUTO: 24.5 PG (ref 26–33)
MCHC RBC AUTO-ENTMCNC: 29.3 GM/DL (ref 32.2–35.5)
MCV RBC AUTO: 83.5 FL (ref 80–94)
MONOCYTES # BLD: 8.5 %
MONOCYTES ABSOLUTE: 0.7 THOU/MM3 (ref 0.4–1.3)
MRSA SCREEN RT-PCR: NEGATIVE
NUCLEATED RED BLOOD CELLS: 0 /100 WBC
PLATELET # BLD: 251 THOU/MM3 (ref 130–400)
PMV BLD AUTO: 9.6 FL (ref 9.4–12.4)
POTASSIUM REFLEX MAGNESIUM: 4.9 MEQ/L (ref 3.5–5.2)
RBC # BLD: 4.49 MILL/MM3 (ref 4.7–6.1)
SEG NEUTROPHILS: 68.9 %
SEGMENTED NEUTROPHILS ABSOLUTE COUNT: 5.9 THOU/MM3 (ref 1.8–7.7)
SODIUM BLD-SCNC: 138 MEQ/L (ref 135–145)
TOTAL IRON BINDING CAPACITY: 379 UG/DL (ref 171–450)
TOTAL PROTEIN: 6.5 G/DL (ref 6.1–8)
VANCOMYCIN RESISTANT ENTEROCOCCUS: NEGATIVE
VITAMIN B-12: 227 PG/ML (ref 211–911)
WBC # BLD: 8.6 THOU/MM3 (ref 4.8–10.8)

## 2020-10-15 PROCEDURE — 6360000004 HC RX CONTRAST MEDICATION: Performed by: EMERGENCY MEDICINE

## 2020-10-15 PROCEDURE — 87641 MR-STAPH DNA AMP PROBE: CPT

## 2020-10-15 PROCEDURE — 6360000002 HC RX W HCPCS: Performed by: STUDENT IN AN ORGANIZED HEALTH CARE EDUCATION/TRAINING PROGRAM

## 2020-10-15 PROCEDURE — 80053 COMPREHEN METABOLIC PANEL: CPT

## 2020-10-15 PROCEDURE — 82948 REAGENT STRIP/BLOOD GLUCOSE: CPT

## 2020-10-15 PROCEDURE — 85025 COMPLETE CBC W/AUTO DIFF WBC: CPT

## 2020-10-15 PROCEDURE — 83550 IRON BINDING TEST: CPT

## 2020-10-15 PROCEDURE — 6370000000 HC RX 637 (ALT 250 FOR IP): Performed by: STUDENT IN AN ORGANIZED HEALTH CARE EDUCATION/TRAINING PROGRAM

## 2020-10-15 PROCEDURE — 99223 1ST HOSP IP/OBS HIGH 75: CPT | Performed by: FAMILY MEDICINE

## 2020-10-15 PROCEDURE — 94010 BREATHING CAPACITY TEST: CPT

## 2020-10-15 PROCEDURE — 87081 CULTURE SCREEN ONLY: CPT

## 2020-10-15 PROCEDURE — 94669 MECHANICAL CHEST WALL OSCILL: CPT

## 2020-10-15 PROCEDURE — 94640 AIRWAY INHALATION TREATMENT: CPT

## 2020-10-15 PROCEDURE — 82728 ASSAY OF FERRITIN: CPT

## 2020-10-15 PROCEDURE — 36415 COLL VENOUS BLD VENIPUNCTURE: CPT

## 2020-10-15 PROCEDURE — 2700000000 HC OXYGEN THERAPY PER DAY

## 2020-10-15 PROCEDURE — 82607 VITAMIN B-12: CPT

## 2020-10-15 PROCEDURE — 2580000003 HC RX 258: Performed by: STUDENT IN AN ORGANIZED HEALTH CARE EDUCATION/TRAINING PROGRAM

## 2020-10-15 PROCEDURE — 83540 ASSAY OF IRON: CPT

## 2020-10-15 PROCEDURE — 82746 ASSAY OF FOLIC ACID SERUM: CPT

## 2020-10-15 PROCEDURE — 87500 VANOMYCIN DNA AMP PROBE: CPT

## 2020-10-15 PROCEDURE — 2060000000 HC ICU INTERMEDIATE R&B

## 2020-10-15 PROCEDURE — 94761 N-INVAS EAR/PLS OXIMETRY MLT: CPT

## 2020-10-15 PROCEDURE — 0100U HC RESPIRPTHGN MULT REV TRANS & AMP PRB TECH 21 TRGT: CPT

## 2020-10-15 RX ORDER — AZITHROMYCIN 250 MG/1
250 TABLET, FILM COATED ORAL DAILY
Status: COMPLETED | OUTPATIENT
Start: 2020-10-16 | End: 2020-10-19

## 2020-10-15 RX ORDER — GEMFIBROZIL 600 MG/1
600 TABLET, FILM COATED ORAL 2 TIMES DAILY
Status: DISCONTINUED | OUTPATIENT
Start: 2020-10-15 | End: 2020-10-20 | Stop reason: HOSPADM

## 2020-10-15 RX ORDER — PANTOPRAZOLE SODIUM 40 MG/1
40 TABLET, DELAYED RELEASE ORAL DAILY
Status: DISCONTINUED | OUTPATIENT
Start: 2020-10-15 | End: 2020-10-20 | Stop reason: HOSPADM

## 2020-10-15 RX ORDER — CROMOLYN SODIUM 40 MG/ML
1 SOLUTION/ DROPS OPHTHALMIC 4 TIMES DAILY
Status: DISCONTINUED | OUTPATIENT
Start: 2020-10-15 | End: 2020-10-20 | Stop reason: HOSPADM

## 2020-10-15 RX ORDER — CLOPIDOGREL BISULFATE 75 MG/1
75 TABLET ORAL DAILY
Status: DISCONTINUED | OUTPATIENT
Start: 2020-10-15 | End: 2020-10-20 | Stop reason: HOSPADM

## 2020-10-15 RX ORDER — ACETAMINOPHEN 650 MG/1
650 SUPPOSITORY RECTAL EVERY 6 HOURS PRN
Status: DISCONTINUED | OUTPATIENT
Start: 2020-10-15 | End: 2020-10-20 | Stop reason: HOSPADM

## 2020-10-15 RX ORDER — ASPIRIN 325 MG
325 TABLET ORAL DAILY
Status: DISCONTINUED | OUTPATIENT
Start: 2020-10-15 | End: 2020-10-20 | Stop reason: HOSPADM

## 2020-10-15 RX ORDER — PROMETHAZINE HYDROCHLORIDE 25 MG/1
12.5 TABLET ORAL EVERY 6 HOURS PRN
Status: DISCONTINUED | OUTPATIENT
Start: 2020-10-15 | End: 2020-10-20 | Stop reason: HOSPADM

## 2020-10-15 RX ORDER — ALPRAZOLAM 0.5 MG/1
1 TABLET ORAL 2 TIMES DAILY
Status: DISCONTINUED | OUTPATIENT
Start: 2020-10-15 | End: 2020-10-20 | Stop reason: HOSPADM

## 2020-10-15 RX ORDER — NICOTINE POLACRILEX 4 MG
15 LOZENGE BUCCAL PRN
Status: DISCONTINUED | OUTPATIENT
Start: 2020-10-15 | End: 2020-10-20 | Stop reason: HOSPADM

## 2020-10-15 RX ORDER — SODIUM CHLORIDE 0.9 % (FLUSH) 0.9 %
10 SYRINGE (ML) INJECTION PRN
Status: DISCONTINUED | OUTPATIENT
Start: 2020-10-15 | End: 2020-10-20 | Stop reason: HOSPADM

## 2020-10-15 RX ORDER — POLYETHYLENE GLYCOL 3350 17 G/17G
17 POWDER, FOR SOLUTION ORAL DAILY PRN
Status: DISCONTINUED | OUTPATIENT
Start: 2020-10-15 | End: 2020-10-20 | Stop reason: HOSPADM

## 2020-10-15 RX ORDER — ALBUTEROL SULFATE 2.5 MG/3ML
2.5 SOLUTION RESPIRATORY (INHALATION) EVERY 4 HOURS PRN
Status: DISCONTINUED | OUTPATIENT
Start: 2020-10-15 | End: 2020-10-16

## 2020-10-15 RX ORDER — INSULIN GLARGINE 100 [IU]/ML
53 INJECTION, SOLUTION SUBCUTANEOUS 2 TIMES DAILY
Status: DISCONTINUED | OUTPATIENT
Start: 2020-10-15 | End: 2020-10-18

## 2020-10-15 RX ORDER — DEXAMETHASONE 4 MG/1
2 TABLET ORAL DAILY
Status: COMPLETED | OUTPATIENT
Start: 2020-10-15 | End: 2020-10-17

## 2020-10-15 RX ORDER — TAMSULOSIN HYDROCHLORIDE 0.4 MG/1
0.4 CAPSULE ORAL DAILY
Status: DISCONTINUED | OUTPATIENT
Start: 2020-10-15 | End: 2020-10-18

## 2020-10-15 RX ORDER — IPRATROPIUM BROMIDE AND ALBUTEROL SULFATE 2.5; .5 MG/3ML; MG/3ML
1 SOLUTION RESPIRATORY (INHALATION)
Status: DISCONTINUED | OUTPATIENT
Start: 2020-10-15 | End: 2020-10-15

## 2020-10-15 RX ORDER — BENZONATATE 100 MG/1
100 CAPSULE ORAL 3 TIMES DAILY PRN
Status: DISCONTINUED | OUTPATIENT
Start: 2020-10-15 | End: 2020-10-20 | Stop reason: HOSPADM

## 2020-10-15 RX ORDER — IPRATROPIUM BROMIDE AND ALBUTEROL SULFATE 2.5; .5 MG/3ML; MG/3ML
1 SOLUTION RESPIRATORY (INHALATION) EVERY 4 HOURS
Status: DISCONTINUED | OUTPATIENT
Start: 2020-10-15 | End: 2020-10-16

## 2020-10-15 RX ORDER — ACETAMINOPHEN 325 MG/1
650 TABLET ORAL EVERY 6 HOURS PRN
Status: DISCONTINUED | OUTPATIENT
Start: 2020-10-15 | End: 2020-10-20 | Stop reason: HOSPADM

## 2020-10-15 RX ORDER — SODIUM CHLORIDE 0.9 % (FLUSH) 0.9 %
10 SYRINGE (ML) INJECTION EVERY 12 HOURS SCHEDULED
Status: DISCONTINUED | OUTPATIENT
Start: 2020-10-15 | End: 2020-10-20 | Stop reason: HOSPADM

## 2020-10-15 RX ORDER — AMLODIPINE BESYLATE 10 MG/1
10 TABLET ORAL DAILY
Status: DISCONTINUED | OUTPATIENT
Start: 2020-10-15 | End: 2020-10-20 | Stop reason: HOSPADM

## 2020-10-15 RX ORDER — FLUTICASONE PROPIONATE 50 MCG
1 SPRAY, SUSPENSION (ML) NASAL DAILY
Status: DISCONTINUED | OUTPATIENT
Start: 2020-10-15 | End: 2020-10-20 | Stop reason: HOSPADM

## 2020-10-15 RX ORDER — DEXTROSE MONOHYDRATE 25 G/50ML
12.5 INJECTION, SOLUTION INTRAVENOUS PRN
Status: DISCONTINUED | OUTPATIENT
Start: 2020-10-15 | End: 2020-10-20 | Stop reason: HOSPADM

## 2020-10-15 RX ORDER — CLOPIDOGREL BISULFATE 75 MG/1
75 TABLET ORAL ONCE
Status: COMPLETED | OUTPATIENT
Start: 2020-10-15 | End: 2020-10-15

## 2020-10-15 RX ORDER — VITAMIN B COMPLEX
1000 TABLET ORAL DAILY
Status: DISCONTINUED | OUTPATIENT
Start: 2020-10-15 | End: 2020-10-20 | Stop reason: HOSPADM

## 2020-10-15 RX ORDER — ENALAPRIL MALEATE 10 MG/1
20 TABLET ORAL DAILY
Status: DISCONTINUED | OUTPATIENT
Start: 2020-10-15 | End: 2020-10-17

## 2020-10-15 RX ORDER — ONDANSETRON 2 MG/ML
4 INJECTION INTRAMUSCULAR; INTRAVENOUS EVERY 6 HOURS PRN
Status: DISCONTINUED | OUTPATIENT
Start: 2020-10-15 | End: 2020-10-20 | Stop reason: HOSPADM

## 2020-10-15 RX ORDER — AZITHROMYCIN 250 MG/1
500 TABLET, FILM COATED ORAL ONCE
Status: COMPLETED | OUTPATIENT
Start: 2020-10-15 | End: 2020-10-15

## 2020-10-15 RX ORDER — PRAVASTATIN SODIUM 40 MG
40 TABLET ORAL NIGHTLY
Status: DISCONTINUED | OUTPATIENT
Start: 2020-10-15 | End: 2020-10-20 | Stop reason: HOSPADM

## 2020-10-15 RX ORDER — DEXTROSE MONOHYDRATE 50 MG/ML
100 INJECTION, SOLUTION INTRAVENOUS PRN
Status: DISCONTINUED | OUTPATIENT
Start: 2020-10-15 | End: 2020-10-20 | Stop reason: HOSPADM

## 2020-10-15 RX ADMIN — GEMFIBROZIL 600 MG: 600 TABLET ORAL at 07:54

## 2020-10-15 RX ADMIN — AMLODIPINE BESYLATE 10 MG: 10 TABLET ORAL at 07:54

## 2020-10-15 RX ADMIN — IOPAMIDOL 80 ML: 755 INJECTION, SOLUTION INTRAVENOUS at 00:02

## 2020-10-15 RX ADMIN — Medication 10 ML: at 07:53

## 2020-10-15 RX ADMIN — GEMFIBROZIL 600 MG: 600 TABLET ORAL at 20:18

## 2020-10-15 RX ADMIN — FLUTICASONE PROPIONATE 1 SPRAY: 50 SPRAY, METERED NASAL at 08:00

## 2020-10-15 RX ADMIN — PANTOPRAZOLE SODIUM 40 MG: 40 TABLET, DELAYED RELEASE ORAL at 06:07

## 2020-10-15 RX ADMIN — IPRATROPIUM BROMIDE AND ALBUTEROL SULFATE 1 AMPULE: .5; 3 SOLUTION RESPIRATORY (INHALATION) at 17:08

## 2020-10-15 RX ADMIN — ALPRAZOLAM 1 MG: 0.5 TABLET ORAL at 07:54

## 2020-10-15 RX ADMIN — CLOPIDOGREL BISULFATE 75 MG: 75 TABLET ORAL at 03:25

## 2020-10-15 RX ADMIN — TAMSULOSIN HYDROCHLORIDE 0.4 MG: 0.4 CAPSULE ORAL at 07:53

## 2020-10-15 RX ADMIN — IPRATROPIUM BROMIDE AND ALBUTEROL SULFATE 1 AMPULE: .5; 3 SOLUTION RESPIRATORY (INHALATION) at 13:47

## 2020-10-15 RX ADMIN — ENALAPRIL MALEATE 20 MG: 10 TABLET ORAL at 07:54

## 2020-10-15 RX ADMIN — ASPIRIN 325 MG: 325 TABLET, FILM COATED ORAL at 07:54

## 2020-10-15 RX ADMIN — INSULIN GLARGINE 53 UNITS: 100 INJECTION, SOLUTION SUBCUTANEOUS at 13:31

## 2020-10-15 RX ADMIN — Medication 10 ML: at 20:18

## 2020-10-15 RX ADMIN — IPRATROPIUM BROMIDE AND ALBUTEROL SULFATE 1 AMPULE: .5; 3 SOLUTION RESPIRATORY (INHALATION) at 20:42

## 2020-10-15 RX ADMIN — METOPROLOL TARTRATE 25 MG: 25 TABLET, FILM COATED ORAL at 20:18

## 2020-10-15 RX ADMIN — ENOXAPARIN SODIUM 40 MG: 40 INJECTION SUBCUTANEOUS at 07:53

## 2020-10-15 RX ADMIN — METOPROLOL TARTRATE 25 MG: 25 TABLET, FILM COATED ORAL at 07:53

## 2020-10-15 RX ADMIN — METOPROLOL TARTRATE 25 MG: 25 TABLET, FILM COATED ORAL at 03:24

## 2020-10-15 RX ADMIN — PRAVASTATIN SODIUM 40 MG: 40 TABLET ORAL at 20:18

## 2020-10-15 RX ADMIN — AZITHROMYCIN 500 MG: 250 TABLET, FILM COATED ORAL at 06:07

## 2020-10-15 RX ADMIN — DEXAMETHASONE 2 MG: 4 TABLET ORAL at 20:18

## 2020-10-15 RX ADMIN — ALPRAZOLAM 1 MG: 0.5 TABLET ORAL at 20:18

## 2020-10-15 RX ADMIN — Medication 1000 UNITS: at 07:53

## 2020-10-15 RX ADMIN — IPRATROPIUM BROMIDE AND ALBUTEROL SULFATE 1 AMPULE: .5; 3 SOLUTION RESPIRATORY (INHALATION) at 09:47

## 2020-10-15 RX ADMIN — INSULIN GLARGINE 53 UNITS: 100 INJECTION, SOLUTION SUBCUTANEOUS at 20:32

## 2020-10-15 RX ADMIN — CLOPIDOGREL BISULFATE 75 MG: 75 TABLET ORAL at 07:53

## 2020-10-15 ASSESSMENT — PAIN SCALES - GENERAL
PAINLEVEL_OUTOF10: 0
PAINLEVEL_OUTOF10: 8
PAINLEVEL_OUTOF10: 0
PAINLEVEL_OUTOF10: 0

## 2020-10-15 ASSESSMENT — PAIN DESCRIPTION - DESCRIPTORS: DESCRIPTORS: ACHING;SORE

## 2020-10-15 ASSESSMENT — PAIN DESCRIPTION - LOCATION: LOCATION: BACK

## 2020-10-15 ASSESSMENT — PAIN DESCRIPTION - ONSET: ONSET: GRADUAL

## 2020-10-15 ASSESSMENT — PAIN DESCRIPTION - FREQUENCY: FREQUENCY: INTERMITTENT

## 2020-10-15 ASSESSMENT — PAIN DESCRIPTION - PROGRESSION: CLINICAL_PROGRESSION: GRADUALLY WORSENING

## 2020-10-15 ASSESSMENT — PAIN DESCRIPTION - PAIN TYPE: TYPE: ACUTE PAIN

## 2020-10-15 NOTE — PROGRESS NOTES
Pharmacy Medication History Note      List of current medications patient is taking is complete. Source of information: Surescripts, Patient    Changes made to medication list:  Medications removed (include reason, ex. therapy complete or physician discontinued):  · None    Medications added/doses adjusted:  · Adjusted Admelog- pt reports 10 units bid prn when BG >155    Other notes (ex. Recent course of antibiotics, Coumadin dosing):  · Pt was unsure about Cromolyn eye drop frequency but states he follows the bottle instructions  · Pt reported last Nitroglycerin use was 4-5 months ago; had never used it prior. · Denies use of other OTC or herbal medications. Allergies reviewed  · Updated reactions to listed allergies- Darvon, Baclofen, Morphine, Morphine, Motrin  · Pt was unsure of allergy to ACE inhibitors (named common ones). He currently takes enalapril at home and tolerates it.     Electronically signed by Velma Nesbitt on 10/15/2020 at 10:36 AM

## 2020-10-15 NOTE — ED NOTES
VS reassessed. Pt up to change clothes because he spilled his urine on himself. Pt stood up and pt O2 dropped to 82% on room air. Pt back in bed and given diet pop and blanket and pants. Will continue to monitor. Pt RR reg.       Antonio Cesar RN  10/14/20 [id number]

## 2020-10-15 NOTE — PROGRESS NOTES
Pt arrived in 4K 17 from ED and via cart/stretcher. Complaints: Shortness of breath. IV none infusing into Left AC INT'd. Two person skin assessment per D. Dick Post RN and Cristal Goyal RN. The best day to schedule a follow up Dr appointment is:  Monday a.m.       The patient is interested in [facility]. [facility]s meds to beds program?:  Yes

## 2020-10-15 NOTE — ED TRIAGE NOTES
Pt comes to ED form [facility] with c/o cough and nasal congestion. Jack Sanchez RN called and stated pt was 89% on room air after walking when pt first came in. PT got chest x ray done and showed bilateral pneumonia. Jack Sanchez RN stated when pt was done walking back after x ray pt O2 was 82%. Pt denies chest pain. Pt denies SOB. Pt states his congestion started around Sunday or Monday. Pt denies fevers and chills. Pt also denies N/V/D. PT RR are reg and unlabored upon arrival. Pt is on NC as well.

## 2020-10-15 NOTE — ED PROVIDER NOTES
and polyphagia. Genitourinary: Negative for dysuria, flank pain, frequency and hematuria. Musculoskeletal: Negative for arthralgias, back pain, gait problem, myalgias, neck pain and neck stiffness. Skin: Negative for pallor, rash and wound. Allergic/Immunologic: Negative for environmental allergies and food allergies. Neurological: Negative for dizziness, tremors, syncope, weakness and headaches. Psychiatric/Behavioral: Negative for agitation, behavioral problems, confusion, self-injury, sleep disturbance and suicidal ideas. PAST MEDICAL HISTORY     Past Medical History:   Diagnosis Date    Abnormal stress test Sept 2012    L[facility] Wall Ischemia- [facility]-    CAD (coronary artery disease)     Chronic low back pain     [facility] [address] [facility]     Diabetes mellitus ([facility] [address] [facility]     Hyperlipidemia     Hypertension     Hypertriglyceridemia     MI (myocardial infarction) ([facility] [address] 2004    Obesity        SURGICAL HISTORY       Past Surgical History:   Procedure Laterality Date    BACK SURGERY  November 1997    L4 & L5 fusion    CARDIAC CATHETERIZATION  10/2019   Bob Smith CARDIAC SURGERY  October 22, 2004    Cardiac cath with stent placement x1    COLONOSCOPY  October 2010    CORONARY ANGIOPLASTY WITH STENT PLACEMENT  10/17/2019    HERNIA REPAIR  1991    Mesh repair in abdomen. CURRENT MEDICATIONS       Current Discharge Medication List      CONTINUE these medications which have NOT CHANGED    Details   ALPRAZolam (XANAX) 1 MG tablet Take 1 tablet by mouth 2 times daily for 30 days. Qty: 60 tablet, Refills: 0    Associated Diagnoses: Anxiety      enalapril (VASOTEC) 20 MG tablet take 1 tablet by mouth once daily  Qty: 90 tablet, Refills: 1    Associated Diagnoses: Essential hypertension      metFORMIN (GLUCOPHAGE) 1000 MG tablet take 1 tablet by mouth twice a day with meals  Qty: 180 tablet, Refills: 1    Associated Diagnoses:  Well controlled type 2 diabetes mellitus ([facility] [address]      tamsulosin (FLOMAX) 0.4 MG capsule take 1 capsule by mouth once daily  Qty: 90 capsule, Refills: 0    Associated Diagnoses: Benign non-nodular prostatic hyperplasia without lower urinary tract symptoms      insulin lispro (ADMELOG) 100 UNIT/ML injection vial Inject 10 Units into the skin 2 times daily as needed for High Blood Sugar  Qty: 3 vial, Refills: 1    Associated Diagnoses: Well controlled type 2 diabetes mellitus (HCC)      insulin glargine (LANTUS SOLOSTAR) 100 UNIT/ML injection pen Inject 53 Units into the skin 2 times daily  Qty: 96 mL, Refills: 0    Associated Diagnoses: Well controlled type 2 diabetes mellitus ([address]      pantoprazole (PROTONIX) 40 MG tablet take 1 tablet by mouth once daily  Qty: 90 tablet, Refills: 1    Associated Diagnoses: Gastroesophageal reflux disease without esophagitis      cromolyn (OPTICROM) 4 % ophthalmic solution Place 1 drop into both eyes 4 times daily  Qty: 1 Bottle, Refills: 3    Associated Diagnoses: Seasonal allergic rhinitis due to pollen      clopidogrel (PLAVIX) 75 MG tablet take 1 tablet by mouth once daily  Qty: 90 tablet, Refills: 1    Associated Diagnoses: Coronary artery disease involving native coronary artery of native heart without angina pectoris      albuterol (PROVENTIL) (2.5 MG/3ML) 0.083% nebulizer solution Take 3 mLs by nebulization every 4 hours as needed for Wheezing  Qty: 2 Package, Refills: 1    Associated Diagnoses: Bronchitis      metoprolol tartrate (LOPRESSOR) 25 MG tablet Take 1 tablet by mouth 2 times daily  Refills: 0      Vitamin D, Cholecalciferol, 25 MCG (1000 UT) TABS Take 1,000 Units by mouth daily      pravastatin (PRAVACHOL) 40 MG tablet Take 1 tablet by mouth nightly  Refills: 0      gemfibrozil (LOPID) 600 MG tablet Take 1 tablet by mouth 2 times daily  Qty: 180 tablet, Refills: 1    Associated Diagnoses: Other hyperlipidemia      aspirin 325 MG tablet Take 325 mg by mouth daily.         fluticasone (FLONASE) 50 MCG/ACT nasal spray One spray in each nostril q hs  Qty: 3 Bottle, Refills: 1    Associated Diagnoses: Chronic seasonal allergic rhinitis due to pollen      BD INSULIN SYRINGE U/F 31G X 5/16\" 1 ML MISC 1 each by Other route 2 times daily  Qty: 100 each, Refills: 3    Associated Diagnoses: Well controlled type 2 diabetes mellitus ([facility] [address]      amLODIPine (NORVASC) 5 MG tablet Take 10 mg by mouth daily   Refills: 0      B-D ULTRAFINE III SHORT PEN 31G X 8 MM MISC Refills: 0      nitroGLYCERIN (NITROSTAT) 0.4 MG SL tablet Place 0.4 mg under the tongue every 5 minutes as needed for Chest pain             ALLERGIES     Ace inhibitors; Darvocet [propoxyphene n-acetaminophen]; Darvon [propoxyphene hcl]; Flexeril [cyclobenzaprine]; Morphine; Motrin [ibuprofen micronized]; Tylenol [acetaminophen]; Ultram [tramadol]; and Baclofen    FAMILY HISTORY     He indicated that his mother is alive. He indicated that his father is alive. He indicated that his sister is alive. He indicated that both of his brothers are alive. family history includes Arthritis in his father; Diabetes in his brother, father, mother, and sister; Heart Disease in his brother, father, and mother. SOCIAL HISTORY      reports that he quit smoking about 25 years ago. His smoking use included cigarettes. He has a 0.50 pack-year smoking history. He has never used smokeless tobacco. He reports that he does not drink alcohol or use drugs. PHYSICAL EXAM     INITIAL VITALS:  height is 5' 9\" (1.753 m) and weight is 280 lb (127 kg). His oral temperature is 98.3 °F (36.8 °C). His blood pressure is 143/61 (abnormal) and his pulse is 68. His respiration is 12 and oxygen saturation is 95%. Physical Exam  Vitals signs and nursing note reviewed. Constitutional:       Appearance: He is well-developed. He is not diaphoretic. HENT:      Head: Normocephalic and atraumatic. Nose: Nose normal.   Eyes:      General: No scleral icterus. Right eye: No discharge. Left eye: No discharge. Conjunctiva/sclera: Conjunctivae normal.      Pupils: Pupils are equal, round, and reactive to light. Neck:      Musculoskeletal: Normal range of motion and neck supple. Vascular: No JVD. Trachea: No tracheal deviation. Cardiovascular:      Rate and Rhythm: Normal rate and regular rhythm. Heart sounds: Normal heart sounds. No murmur. No friction rub. No gallop. Pulmonary:      Effort: Pulmonary effort is normal. No respiratory distress. Breath sounds: No stridor. Wheezing, rhonchi and rales present. Chest:      Chest wall: No tenderness. Abdominal:      General: Bowel sounds are normal. There is no distension. Palpations: Abdomen is soft. There is no mass. Tenderness: There is no abdominal tenderness. There is no guarding or rebound. Hernia: No hernia is present. Musculoskeletal:         General: No tenderness or deformity. Lymphadenopathy:      Cervical: No cervical adenopathy. Skin:     General: Skin is warm and dry. Capillary Refill: Capillary refill takes less than 2 seconds. Coloration: Skin is not pale. Findings: No erythema or rash. Neurological:      Mental Status: He is alert and oriented to person, place, and time. Cranial Nerves: No cranial nerve deficit. Sensory: No sensory deficit. Motor: No abnormal muscle tone. Coordination: Coordination normal.      Deep Tendon Reflexes: Reflexes normal.   Psychiatric:         Behavior: Behavior normal.         Thought Content:  Thought content normal.         Judgment: Judgment normal.         DIFFERENTIAL DIAGNOSIS:   Includes but not limited to: Pneumonia, bronchitis, CHF, PE, COPD, asthma, pulmonary edema, pleural effusion, AMI, URI, influenza, sinusitis, allergies, anxiety    DIAGNOSTIC RESULTS   EKG: All EKG's are interpreted by the Emergency Department Physician who either signs or Co-signsthis chart in the absence of a cardiologist.  Interpreted by me  Normal sinus rhythm  Ventricular rate 69 bpm  NE interval 192 ms  QRS duration 134 ms   ms  Right bundle branch block  No ST elevation or acute T wave    RADIOLOGY: non-plain film images(s) such as CT, Ultrasound and MRI are read by the radiologist.  XR CHEST (2 VW)   Final Result      Bilateral mixed airspace interstitial opacities concerning for pneumonia. Atypical or viral-type pneumonia should be considered. **This report has been created using voice recognition software. It may contain minor errors which are inherent in voice recognition technology. **      Final report electronically signed by Dr. Bob Julian on 10/14/2020 7:33 PM      CTA CHEST [address]    (Results Pending)       LABS:   Results for orders placed or performed during the hospital encounter of 10/14/20   Rapid influenza A/B antigens    Specimen: Nasopharyngeal   Result Value Ref Range    Flu A Antigen Negative NEGATIVE    Flu B Antigen Negative NEGATIVE   CBC Auto Differential   Result Value Ref Range    WBC 9.5 4.8 - 10.8 thou/mm3    RBC 4.70 4.70 - 6.10 mill/mm3    Hemoglobin 11.7 (L) 14.0 - 18.0 gm/dl    Hematocrit 39.8 (L) 42.0 - 52.0 %    MCV 84.7 80.0 - 94.0 fL    MCH 24.9 (L) 26.0 - 33.0 pg    MCHC 29.4 (L) 32.2 - 35.5 gm/dl    RDW-CV 15.3 (H) 11.5 - 14.5 %    RDW-SD 46.5 (H) 35.0 - 45.0 fL    Platelets 402 576 - 552 thou/mm3    MPV 9.4 9.4 - 12.4 fL    Seg Neutrophils 70.2 %    Lymphocytes 18.8 %    Monocytes 7.2 %    Eosinophils 2.8 %    Basophils 0.8 %    Immature Granulocytes 0.2 %    Segs Absolute 6.7 1.8 - 7.7 thou/mm3    Lymphocytes Absolute 1.8 1.0 - 4.8 thou/mm3    Monocytes Absolute 0.7 0.4 - 1.3 thou/mm3    Eosinophils Absolute 0.3 0.0 - 0.4 thou/mm3    Basophils Absolute 0.1 0.0 - 0.1 thou/mm3    Immature Grans (Abs) 0.02 0.00 - 0.07 thou/mm3    nRBC 0 /100 wbc   Comprehensive Metabolic Panel   Result Value Ref Range    Glucose 114 (H) 70 - 108 mg/dL    CREATININE 1.1 0.4 - 1.2 mg/dL    BUN 24 (H) 7 - 22 mg/dL    Sodium 139 135 - 145 meq/L    Potassium 5.2 3.5 - 5.2 meq/L    Chloride 103 98 - 111 meq/L    CO2 28 23 - 33 meq/L    Calcium 9.8 8.5 - 10.5 mg/dL    AST 10 5 - 40 U/L    Alkaline Phosphatase 93 38 - 126 U/L    Total Protein 7.1 6.1 - 8.0 g/dL    Alb 4.1 3.5 - 5.1 g/dL    Total Bilirubin 0.3 0.3 - 1.2 mg/dL    ALT 8 (L) 11 - 66 U/L   Lactic acid, plasma   Result Value Ref Range    Lactic Acid 0.8 0.5 - 2.2 mmol/L   Ferritin   Result Value Ref Range    Ferritin 15 (L) 22 - 322 ng/mL   C-reactive protein   Result Value Ref Range    CRP 1.11 (H) 0.00 - 1.00 mg/dl   Lactate dehydrogenase   Result Value Ref Range     100 - 190 U/L   Troponin   Result Value Ref Range    Troponin T < 0.010 ng/ml   Brain Natriuretic Peptide   Result Value Ref Range    Pro-.6 0.0 - 900.0 pg/mL   APTT   Result Value Ref Range    aPTT 30.7 22.0 - 38.0 seconds   Protime-INR   Result Value Ref Range    INR 1.03 0.85 - 1.13   Procalcitonin   Result Value Ref Range    Procalcitonin 0.06 0.01 - 0.09 ng/mL   COVID-19   Result Value Ref Range    SARS-CoV-2, NAAT NOT DETECTED NOT DETECTED   Anion Gap   Result Value Ref Range    Anion Gap 8.0 8.0 - 16.0 meq/L   Glomerular Filtration Rate, Estimated   Result Value Ref Range    Est, Glom Filt Rate 68 (A) ml/min/1.73m2   Osmolality   Result Value Ref Range    Osmolality Calc 282.4 275.0 - 300.0 mOsmol/kg   EKG 12 Lead   Result Value Ref Range    Ventricular Rate 69 BPM    Atrial Rate 69 BPM    P-R Interval 192 ms    QRS Duration 134 ms    Q-T Interval 406 ms    QTc Calculation (Bazett) 435 ms    P Axis 35 degrees    R Axis 79 degrees    T Axis 37 degrees       EMERGENCY DEPARTMENT COURSE:   Vitals:    Vitals:    10/14/20 2225 10/14/20 2228 10/14/20 2310 10/15/20 0011   BP:  (!) 140/63 (!) 118/53 (!) 143/61   Pulse:  70 67 68   Resp:  24 17 12   Temp:       TempSrc:       SpO2: (!) 82% 97% 97% 95%   Weight:       Height:         8:40 PM: Patient is seen and evaluated in a timely fashion. ACTIONS:  Large bore IV  Tele monitor  XR CHEST (2 VW)  CTA CHEST W WO CONTRAST  Labs Reviewed   CULTURE, BLOOD 1   CULTURE, BLOOD 2   CBC WITH AUTO DIFFERENTIAL   COMPREHENSIVE METABOLIC PANEL   LACTIC ACID, PLASMA   FERRITIN   C-REACTIVE PROTEIN   LACTATE DEHYDROGENASE   COVID-19   TROPONIN   BRAIN NATRIURETIC PEPTIDE   APTT   PROTIME-INR   PROCALCITONIN     Medications   ipratropium-albuterol (DUONEB) nebulizer solution 1 ampule (has no administration in time range)       MEDICAL DECISION MAKINGS:  ED work-ups revealed patient had hypoxia secondary to viral pneumonia, COVID-19 was negative. Procalcitonin 0.06, troponin less than 0.01. . WBC 9.5. CTA PE study negative for PE, CTA chest shows bilateral groundglass changes consistent with viral illness. Given that procalcitonin has good NPV, I do not feel patient requires antibiotics. Admission is warranted. Case was discussed with Dr. Yoko Ardon and the patient was admitted to hospital service. CRITICAL CARE:   None    CONSULTS:  Dr. Marilou Cuevas:  None    FINAL IMPRESSION      1. Pneumonia due to organism    2. Acute respiratory failure with hypoxia (HCC)    3. Cardiomegaly    4. Viral pneumonia    5. Hypoxia          DISPOSITION/PLAN   Admit    PATIENT REFERRED TO:  No follow-up provider specified.     DISCHARGE MEDICATIONS:  Current Discharge Medication List          (Please note that portions of this note were completed with a voice recognition program.  Efforts were made to edit the dictations but occasionally words aremis-transcribed.)    MD Huong Gomez MD  10/15/20 [id number]

## 2020-10-15 NOTE — PLAN OF CARE
Problem: Impaired respiratory status  Goal: Clear lung sounds  Outcome: Ongoing   Continue aerosols to improve aeration of lungs.

## 2020-10-15 NOTE — ED NOTES
Pt back from CT scan. VS reassessed. RR reg. Pt updated on POC. Pt given another blanket.  Will continue to monitor      Charls Goldberg, RN  10/15/20 9639

## 2020-10-15 NOTE — H&P
Hospitalist - History & Physical      Patient: Kam Cook    Unit/Bed:[facility]  YOB: 1960  MRN: [id number]   Acct: [de-identified]   PCP: Isaac Tyler MD    Date of Service: Pt seen/examined on 10/15/20  and Admitted to Inpatient with expected LOS greater than two midnights due to medical therapy. Chief Complaint:  Cough, nasal congestion     Assessment and Plan:     1. Acute hypoxic respiratory failure 2/2 to Viral vs. Atypical pneumonia - recent onset of non-productive cough, nasal congestion, fatigue. On ambulation patient SpO2 drops to 82-85% despite no shortness of breath. He currently requires 3L of oxygen to maintain SpO2 between 95-98%. He does not require oxygen support at home. CTA chest negative for PE. - wean to keep SpO2 > 93%   - Continue with Duoneb scheduled treatments  - consider home oxygen eval for ongoing hypoxia prior to discharge  - Add Tessalon as a cough suppressant. 2.  Pneumonia - Viral vs. Atypical - non-productive cough, fatigue, no subjective fever or chills. Remains afebrile. Chest xray does reveal bilateral mixed airspace interstitial opacities which supports his clinical presentation of progressive worsening fatigue and non-productive cough. Procalcitonin is negative at 0.06, WBC normal at 9.5. Influenza A/B negative. - Treat empirically with Azithromycin 500 mg for one dose. Continue with Azithromycin 250 mg daily x4 days based on viral panel result. - blood cultures x2 - pending   - respiratory panel - pending   - monitor vital signs and for resolution of symptoms. 3. IDDM Type II - controlled - blood glucose on admission 114. Hold home dose of Metformin. Will resume Lantus 53 units BID and will add low dose ISS along with hypoglycemia protocol.   - monitor blood lucose qAC and qHS   4. CAD s/p stent placement (10/2019) - patient denies chest pain, shortness of breath, compliant with medications.  Troponin level < 0.010, EKG shows normal sinus <0.010, WBC 9.5, hemoglobin 11.7, blood cultures were drawn, procalcitonin 0.06, COVID-19 not detected. Rapid Influenza A/B is negative    Chest Xray reveals bilateral mixed airspace interstitial opacities concerning for Atypical or viral-type pneumonia. CTA chest reveals no pulmonary embolism. Past Medical History:        Diagnosis Date    Abnormal stress test Sept 2012    Lateral Wall Ischemia- [facility]    CAD (coronary artery disease)     Chronic low back pain     [facility] ([facility] [address]     Diabetes mellitus ([facility] [address]     Hyperlipidemia     Hypertension     Hypertriglyceridemia     MI (myocardial infarction) ([facility] [address] 2004    Obesity     Viral pneumonia 10/15/2020       Past Surgical History:        Procedure Laterality Date    BACK SURGERY  November 1997    L4 & L5 fusion    CARDIAC CATHETERIZATION  10/2019   [facility] CARDIAC SURGERY  October 22, 2004    Cardiac cath with stent placement x1    COLONOSCOPY  October 2010    CORONARY ANGIOPLASTY WITH STENT PLACEMENT  10/17/2019    HERNIA REPAIR  1991    Mesh repair in abdomen. Home Medications:   No current facility-administered medications on file prior to encounter. Current Outpatient Medications on File Prior to Encounter   Medication Sig Dispense Refill    ALPRAZolam (XANAX) 1 MG tablet Take 1 tablet by mouth 2 times daily for 30 days.  60 tablet 0    enalapril (VASOTEC) 20 MG tablet take 1 tablet by mouth once daily 90 tablet 1    metFORMIN (GLUCOPHAGE) 1000 MG tablet take 1 tablet by mouth twice a day with meals 180 tablet 1    tamsulosin (FLOMAX) 0.4 MG capsule take 1 capsule by mouth once daily 90 capsule 0    insulin lispro (ADMELOG) 100 UNIT/ML injection vial Inject 10 Units into the skin 2 times daily as needed for High Blood Sugar 3 vial 1    insulin glargine (LANTUS SOLOSTAR) 100 UNIT/ML injection pen Inject 53 Units into the skin 2 times daily 96 mL 0    pantoprazole (PROTONIX) 40 MG tablet take 1 tablet by mouth once daily 90 tablet 1    cromolyn (OPTICROM) 4 % ophthalmic solution Place 1 drop into both eyes 4 times daily 1 Bottle 3    clopidogrel (PLAVIX) 75 MG tablet take 1 tablet by mouth once daily 90 tablet 1    albuterol (PROVENTIL) (2.5 MG/3ML) 0.083% nebulizer solution Take 3 mLs by nebulization every 4 hours as needed for Wheezing 2 Package 1    metoprolol tartrate (LOPRESSOR) 25 MG tablet Take 1 tablet by mouth 2 times daily  0    Vitamin D, Cholecalciferol, 25 MCG (1000 UT) TABS Take 1,000 Units by mouth daily      pravastatin (PRAVACHOL) 40 MG tablet Take 1 tablet by mouth nightly  0    gemfibrozil (LOPID) 600 MG tablet Take 1 tablet by mouth 2 times daily 180 tablet 1    aspirin 325 MG tablet Take 325 mg by mouth daily.  fluticasone (FLONASE) 50 MCG/ACT nasal spray One spray in each nostril q hs 3 Bottle 1    BD INSULIN SYRINGE U/F 31G X 5/16\" 1 ML MISC 1 each by Other route 2 times daily 100 each 3    amLODIPine (NORVASC) 5 MG tablet Take 10 mg by mouth daily   0    B-D ULTRAFINE III SHORT PEN 31G X 8 MM MISC   0    nitroGLYCERIN (NITROSTAT) 0.4 MG SL tablet Place 0.4 mg under the tongue every 5 minutes as needed for Chest pain         Allergies:    Ace inhibitors; Darvocet [propoxyphene n-acetaminophen]; Darvon [propoxyphene hcl]; Flexeril [cyclobenzaprine]; Morphine; Motrin [ibuprofen micronized]; Tylenol [acetaminophen]; Ultram [tramadol]; and Baclofen    Social History:    reports that he quit smoking about 25 years ago. His smoking use included cigarettes. He has a 0.50 pack-year smoking history. He has never used smokeless tobacco. He reports that he does not drink alcohol or use drugs. Family History:       Problem Relation Age of Onset    Diabetes Mother     Heart Disease Mother     Arthritis Father     Diabetes Father     Heart Disease Father     Diabetes Sister     Diabetes Brother     Heart Disease Brother        Diet:  DIET CARB CONTROL;     Review of systems:   Pertinent Atypical or viral-type pneumonia should be considered. **This report has been created using voice recognition software. It may contain minor errors which are inherent in voice recognition technology. **      Final report electronically signed by Dr. Weaver November on 10/14/2020 7:33 PM        Xr Chest (2 Vw)    Result Date: 10/14/2020  PROCEDURE: XR CHEST (2 VW) CLINICAL INFORMATION: SOB; cough. COMPARISON: October 8, 2012 TECHNIQUE: PA and lateral views the chest. FINDINGS: Bilateral mixed airspace interstitial opacities concerning for pneumonia. Atypical or viral-type pneumonia should be considered. Cardiomegaly. Costophrenic recesses are preserved. No acute osseous findings. Bilateral mixed airspace interstitial opacities concerning for pneumonia. Atypical or viral-type pneumonia should be considered. **This report has been created using voice recognition software. It may contain minor errors which are inherent in voice recognition technology. ** Final report electronically signed by Dr. Weaver November on 10/14/2020 7:33 PM    Cta Chest W Wo Contrast    Result Date: 10/14/2020  CT angiography chest with contrast.  3D Postprocessing. Comparison:  CR,SR  - XR CHEST STANDARD (2 VW)  - 10/14/2020 07:21 PM EDT Findings: No pulmonary embolism. No aortic aneurysm or dissection. Heart size normal. Atheromatous aortic and coronary vascular calcifications. Lungs without mass or focal infiltrate. No pneumothorax. Trace effusions. No adenopathy. Soft tissues: Intact. Upper abdomen: Fatty liver. No acute bony pathology. No pulmonary emboli. Trace effusions. Atheromatous aortic and coronary vascular calcifications. This document has been electronically signed by: Leeann Lucio MD on 10/15/2020 12:34 AM All CT scans at this facility use dose modulation, iterative reconstruction, and/or weight-based dosing when appropriate to reduce radiation dose to as low as reasonably achievable. EKG: Normal simus rhythm.

## 2020-10-15 NOTE — ED NOTES
VS reassessed. RR reg. Pt updated on POC. Pt swabbed for flu. Breathing treatment started.  Will continue to monitor      Sridevi Fernandez RN  10/14/20 7116

## 2020-10-15 NOTE — ED NOTES
Second blood culture drawn. VS reassessed. Pt updated on POC. Pt verbalized understanding. Will continue to monitor. Pt stood up to urinate.       Babs Lennox, RN  10/14/20 2100

## 2020-10-15 NOTE — CARE COORDINATION
10/15/20, 11:04 AM EDT  DISCHARGE PLANNING EVALUATION:    [address]       Admitted from: ED 10/14/2020/ [facility] day: 0   Location: [facility]17/017 Reason for admit: Viral pneumonia [J12.9] Status: IP  Admit order signed?: yes  PMH:  has a past medical history of Abnormal stress test, CAD (coronary artery disease), Chronic low back pain, Diabetes mellitus ([facility] Diabetes mellitus ([facility] Hyperlipidemia, Hypertension, Hypertriglyceridemia, MI (myocardial infarction) ([facility] Obesity, and Viral pneumonia. Procedure:   Medications:  Scheduled Meds:   ALPRAZolam  1 mg Oral BID    amLODIPine  10 mg Oral Daily    aspirin  325 mg Oral Daily    cromolyn  1 drop Both Eyes 4x Daily    enalapril  20 mg Oral Daily    fluticasone  1 spray Each Nostril Daily    gemfibrozil  600 mg Oral BID    insulin glargine  53 Units Subcutaneous BID    metoprolol tartrate  25 mg Oral BID    pantoprazole  40 mg Oral Daily    pravastatin  40 mg Oral Nightly    tamsulosin  0.4 mg Oral Daily    Vitamin D  1,000 Units Oral Daily    sodium chloride flush  10 mL Intravenous 2 times per day    enoxaparin  40 mg Subcutaneous Daily    ipratropium-albuterol  1 ampule Inhalation Q4H WA    insulin lispro  0-6 Units Subcutaneous TID WC    insulin lispro  0-3 Units Subcutaneous Nightly    clopidogrel  75 mg Oral Daily     Continuous Infusions:   dextrose        Pertinent Info/Orders/Treatment Plan:  Client admitted with Pneumonia treated with oxygen 2L  Diet: DIET CARB CONTROL;   Smoking status:  reports that he quit smoking about 25 years ago. His smoking use included cigarettes. He has a 0.50 pack-year smoking history.  He has never used smokeless tobacco.   PCP: Nadeen Morales MD  Readmission 30 days or less: no  Readmission Risk Score: 12%    Discharge Planning Evaluation  Current Residence:  Private Residence  Living Arrangements:  Alone   Support Systems:  Family Members  Current Services PTA:     Potential Assistance Needed:

## 2020-10-15 NOTE — ED NOTES
Pt given ice water. VS reassessed. RR reg.  Pt updated on room assignment, will continue to monitor      Sebastian Officer, KESHAWN  10/15/20 3175

## 2020-10-16 LAB
ALLEN TEST: ABNORMAL
ANION GAP SERPL CALCULATED.3IONS-SCNC: 9 MEQ/L (ref 8–16)
BASE EXCESS (CALCULATED): 2.6 MMOL/L (ref -2.5–2.5)
BUN BLDV-MCNC: 19 MG/DL (ref 7–22)
CALCIUM SERPL-MCNC: 9.2 MG/DL (ref 8.5–10.5)
CHLORIDE BLD-SCNC: 100 MEQ/L (ref 98–111)
CO2: 25 MEQ/L (ref 23–33)
COLLECTED BY:: ABNORMAL
CREAT SERPL-MCNC: 1 MG/DL (ref 0.4–1.2)
DEVICE: ABNORMAL
ERYTHROCYTE [DISTWIDTH] IN BLOOD BY AUTOMATED COUNT: 15.2 % (ref 11.5–14.5)
ERYTHROCYTE [DISTWIDTH] IN BLOOD BY AUTOMATED COUNT: 46.1 FL (ref 35–45)
GFR SERPL CREATININE-BSD FRML MDRD: 76 ML/MIN/1.73M2
GLUCOSE BLD-MCNC: 144 MG/DL (ref 70–108)
GLUCOSE BLD-MCNC: 151 MG/DL (ref 70–108)
GLUCOSE BLD-MCNC: 160 MG/DL (ref 70–108)
GLUCOSE BLD-MCNC: 176 MG/DL (ref 70–108)
GLUCOSE BLD-MCNC: 179 MG/DL (ref 70–108)
HCO3: 29 MMOL/L (ref 23–28)
HCT VFR BLD CALC: 38.6 % (ref 42–52)
HEMOGLOBIN: 11.2 GM/DL (ref 14–18)
IFIO2: 1
MCH RBC QN AUTO: 24.5 PG (ref 26–33)
MCHC RBC AUTO-ENTMCNC: 29 GM/DL (ref 32.2–35.5)
MCV RBC AUTO: 84.5 FL (ref 80–94)
O2 SATURATION: 89 %
PCO2: 53 MMHG (ref 35–45)
PH BLOOD GAS: 7.35 (ref 7.35–7.45)
PLATELET # BLD: 267 THOU/MM3 (ref 130–400)
PMV BLD AUTO: 9.6 FL (ref 9.4–12.4)
PO2: 60 MMHG (ref 71–104)
POTASSIUM SERPL-SCNC: 5.9 MEQ/L (ref 3.5–5.2)
RBC # BLD: 4.57 MILL/MM3 (ref 4.7–6.1)
SODIUM BLD-SCNC: 134 MEQ/L (ref 135–145)
SOURCE, BLOOD GAS: ABNORMAL
WBC # BLD: 9 THOU/MM3 (ref 4.8–10.8)

## 2020-10-16 PROCEDURE — 94669 MECHANICAL CHEST WALL OSCILL: CPT

## 2020-10-16 PROCEDURE — 6370000000 HC RX 637 (ALT 250 FOR IP): Performed by: INTERNAL MEDICINE

## 2020-10-16 PROCEDURE — 80048 BASIC METABOLIC PNL TOTAL CA: CPT

## 2020-10-16 PROCEDURE — 36415 COLL VENOUS BLD VENIPUNCTURE: CPT

## 2020-10-16 PROCEDURE — 6370000000 HC RX 637 (ALT 250 FOR IP): Performed by: STUDENT IN AN ORGANIZED HEALTH CARE EDUCATION/TRAINING PROGRAM

## 2020-10-16 PROCEDURE — 99232 SBSQ HOSP IP/OBS MODERATE 35: CPT | Performed by: INTERNAL MEDICINE

## 2020-10-16 PROCEDURE — 82948 REAGENT STRIP/BLOOD GLUCOSE: CPT

## 2020-10-16 PROCEDURE — 36600 WITHDRAWAL OF ARTERIAL BLOOD: CPT

## 2020-10-16 PROCEDURE — 2700000000 HC OXYGEN THERAPY PER DAY

## 2020-10-16 PROCEDURE — 94761 N-INVAS EAR/PLS OXIMETRY MLT: CPT

## 2020-10-16 PROCEDURE — 6360000002 HC RX W HCPCS: Performed by: STUDENT IN AN ORGANIZED HEALTH CARE EDUCATION/TRAINING PROGRAM

## 2020-10-16 PROCEDURE — 99223 1ST HOSP IP/OBS HIGH 75: CPT | Performed by: INTERNAL MEDICINE

## 2020-10-16 PROCEDURE — 85027 COMPLETE CBC AUTOMATED: CPT

## 2020-10-16 PROCEDURE — 82803 BLOOD GASES ANY COMBINATION: CPT

## 2020-10-16 PROCEDURE — 2580000003 HC RX 258: Performed by: STUDENT IN AN ORGANIZED HEALTH CARE EDUCATION/TRAINING PROGRAM

## 2020-10-16 PROCEDURE — 2060000000 HC ICU INTERMEDIATE R&B

## 2020-10-16 PROCEDURE — 94640 AIRWAY INHALATION TREATMENT: CPT

## 2020-10-16 RX ORDER — ASCORBIC ACID 500 MG
500 TABLET ORAL DAILY
Status: DISCONTINUED | OUTPATIENT
Start: 2020-10-16 | End: 2020-10-20 | Stop reason: HOSPADM

## 2020-10-16 RX ORDER — IPRATROPIUM BROMIDE AND ALBUTEROL SULFATE 2.5; .5 MG/3ML; MG/3ML
1 SOLUTION RESPIRATORY (INHALATION) 4 TIMES DAILY
Status: DISCONTINUED | OUTPATIENT
Start: 2020-10-16 | End: 2020-10-18

## 2020-10-16 RX ORDER — LANOLIN ALCOHOL/MO/W.PET/CERES
1000 CREAM (GRAM) TOPICAL DAILY
Status: DISCONTINUED | OUTPATIENT
Start: 2020-10-16 | End: 2020-10-20 | Stop reason: HOSPADM

## 2020-10-16 RX ORDER — FERROUS SULFATE 325(65) MG
325 TABLET ORAL
Status: DISCONTINUED | OUTPATIENT
Start: 2020-10-16 | End: 2020-10-18

## 2020-10-16 RX ADMIN — ENOXAPARIN SODIUM 40 MG: 40 INJECTION SUBCUTANEOUS at 08:33

## 2020-10-16 RX ADMIN — METOPROLOL TARTRATE 25 MG: 25 TABLET, FILM COATED ORAL at 08:29

## 2020-10-16 RX ADMIN — FLUTICASONE PROPIONATE 1 SPRAY: 50 SPRAY, METERED NASAL at 08:32

## 2020-10-16 RX ADMIN — AMLODIPINE BESYLATE 10 MG: 10 TABLET ORAL at 08:30

## 2020-10-16 RX ADMIN — GEMFIBROZIL 600 MG: 600 TABLET ORAL at 20:51

## 2020-10-16 RX ADMIN — OXYCODONE HYDROCHLORIDE AND ACETAMINOPHEN 500 MG: 500 TABLET ORAL at 09:37

## 2020-10-16 RX ADMIN — DEXAMETHASONE 2 MG: 4 TABLET ORAL at 20:51

## 2020-10-16 RX ADMIN — GEMFIBROZIL 600 MG: 600 TABLET ORAL at 08:29

## 2020-10-16 RX ADMIN — IPRATROPIUM BROMIDE AND ALBUTEROL SULFATE 1 AMPULE: .5; 3 SOLUTION RESPIRATORY (INHALATION) at 16:41

## 2020-10-16 RX ADMIN — ASPIRIN 325 MG: 325 TABLET, FILM COATED ORAL at 08:30

## 2020-10-16 RX ADMIN — INSULIN LISPRO 1 UNITS: 100 INJECTION, SOLUTION INTRAVENOUS; SUBCUTANEOUS at 11:50

## 2020-10-16 RX ADMIN — METOPROLOL TARTRATE 25 MG: 25 TABLET, FILM COATED ORAL at 20:51

## 2020-10-16 RX ADMIN — IPRATROPIUM BROMIDE AND ALBUTEROL SULFATE 1 AMPULE: .5; 3 SOLUTION RESPIRATORY (INHALATION) at 12:34

## 2020-10-16 RX ADMIN — TAMSULOSIN HYDROCHLORIDE 0.4 MG: 0.4 CAPSULE ORAL at 08:29

## 2020-10-16 RX ADMIN — IPRATROPIUM BROMIDE AND ALBUTEROL SULFATE 1 AMPULE: .5; 3 SOLUTION RESPIRATORY (INHALATION) at 06:08

## 2020-10-16 RX ADMIN — INSULIN LISPRO 1 UNITS: 100 INJECTION, SOLUTION INTRAVENOUS; SUBCUTANEOUS at 17:39

## 2020-10-16 RX ADMIN — AZITHROMYCIN 250 MG: 250 TABLET, FILM COATED ORAL at 05:19

## 2020-10-16 RX ADMIN — FERROUS SULFATE TAB 325 MG (65 MG ELEMENTAL FE) 325 MG: 325 (65 FE) TAB at 09:37

## 2020-10-16 RX ADMIN — IPRATROPIUM BROMIDE AND ALBUTEROL SULFATE 1 AMPULE: .5; 3 SOLUTION RESPIRATORY (INHALATION) at 09:08

## 2020-10-16 RX ADMIN — ENALAPRIL MALEATE 20 MG: 10 TABLET ORAL at 08:29

## 2020-10-16 RX ADMIN — ALPRAZOLAM 1 MG: 0.5 TABLET ORAL at 08:30

## 2020-10-16 RX ADMIN — PRAVASTATIN SODIUM 40 MG: 40 TABLET ORAL at 20:51

## 2020-10-16 RX ADMIN — INSULIN GLARGINE 53 UNITS: 100 INJECTION, SOLUTION SUBCUTANEOUS at 20:58

## 2020-10-16 RX ADMIN — INSULIN LISPRO 1 UNITS: 100 INJECTION, SOLUTION INTRAVENOUS; SUBCUTANEOUS at 08:28

## 2020-10-16 RX ADMIN — Medication 1000 MCG: at 09:37

## 2020-10-16 RX ADMIN — Medication 1000 UNITS: at 08:29

## 2020-10-16 RX ADMIN — IPRATROPIUM BROMIDE AND ALBUTEROL SULFATE 1 AMPULE: .5; 3 SOLUTION RESPIRATORY (INHALATION) at 01:32

## 2020-10-16 RX ADMIN — PANTOPRAZOLE SODIUM 40 MG: 40 TABLET, DELAYED RELEASE ORAL at 05:20

## 2020-10-16 RX ADMIN — Medication 10 ML: at 20:51

## 2020-10-16 RX ADMIN — IPRATROPIUM BROMIDE AND ALBUTEROL SULFATE 1 AMPULE: .5; 3 SOLUTION RESPIRATORY (INHALATION) at 20:07

## 2020-10-16 RX ADMIN — CLOPIDOGREL BISULFATE 75 MG: 75 TABLET ORAL at 08:34

## 2020-10-16 RX ADMIN — ALPRAZOLAM 1 MG: 0.5 TABLET ORAL at 20:51

## 2020-10-16 RX ADMIN — Medication 10 ML: at 08:32

## 2020-10-16 RX ADMIN — INSULIN GLARGINE 53 UNITS: 100 INJECTION, SOLUTION SUBCUTANEOUS at 08:27

## 2020-10-16 ASSESSMENT — PAIN SCALES - GENERAL
PAINLEVEL_OUTOF10: 0
PAINLEVEL_OUTOF10: 0

## 2020-10-16 NOTE — CARE COORDINATION
Update: home oxygen eval: 1L at rest, 3L with activity, Pulmonary consulted today, monitor for possible new HH, if needed; collaborated with Attending  Electronically signed by Lamin Guerrero RN on 10/16/2020 at 10:51 AM    Update: client prefers new [facility] home oxygen after list of choices offered, [facility] not planned at this time as no diagnosis Pneumonia; updated Annemarie Moiz, [facility]  Electronically signed by Lamin Guerrero RN on 10/16/2020 at 12:48 PM    Update: weaning oxygen today, plans nocturnal oximetry for new home BIPAP testing to see if qualifies, plans home alone when medically cleared; collaborated with Jada Rodrigez, [address] [place] [address] working with David Bonilla, [address] [place] [address]  Electronically signed by Lamin Guerrero RN on 10/16/2020 at 2:17 PM    10/16/20, 2:17 PM EDT    Patient goals/plan/ treatment preferences discussed by  and . Patient goals/plan/ treatment preferences reviewed with patient/ family. Patient/ family verbalize understanding of discharge plan and are in agreement with goal/plan/treatment preferences. Understanding was demonstrated using the teach back method. AVS provided by RN at time of discharge, which includes all necessary medical information pertaining to the patients current course of illness, treatment, post-discharge goals of care, and treatment preferences.

## 2020-10-16 NOTE — PLAN OF CARE
Problem: Cardiovascular  Goal: No DVT, peripheral vascular complications  Outcome: Ongoing  Note: Patient on Lovenox. Problem: Cardiovascular  Goal: Hemodynamic stability  Outcome: Ongoing  Note: VS stable this shift. Problem: Respiratory  Goal: No pulmonary complications  Outcome: Ongoing  Note: Pulmonary consult, Fish to see patient. Problem: Respiratory  Goal: O2 Sat > 90%  Outcome: Ongoing  Note: Home BiPAP eval.      Problem: Skin Integrity/Risk  Goal: No skin breakdown during hospitalization  Outcome: Ongoing  Note: No new skin breakdown this shift, patient turns self in bed. Problem: Discharge Planning:  Goal: Discharged to appropriate level of care  Description: Discharged to appropriate level of care  Outcome: Ongoing  Note: Patient to be discharged home with home health services. Problem: Pain:  Goal: Pain level will decrease  Description: Pain level will decrease  Outcome: Ongoing  Note: Denies any pain. Problem: Pain:  Goal: Control of acute pain  Description: Control of acute pain  Outcome: Ongoing  Note: Denies. Problem: Falls - Risk of:  Goal: Will remain free from falls  Description: Will remain free from falls  Outcome: Ongoing  Note: Patient is free from falls, bed alarm in place. Problem: Falls - Risk of:  Goal: Absence of physical injury  Description: Absence of physical injury  Outcome: Ongoing  Note: No injuries this shift. Problem: Impaired respiratory status  Goal: Clear lung sounds  10/16/2020 1504 by Raul Sanchez RN  Outcome: Ongoing  Note: Patient diminished throughout. 10/16/2020 0910 by Isabella Barboza RCP  Outcome: Ongoing         Care plan reviewed with patient. Patient verbalizes understanding of the plan of care and contribute to goal setting.

## 2020-10-16 NOTE — PROGRESS NOTES
A home oxygen evaluation has been completed. [x]Patient is an inpatient. It is expected that the patient will be discharged within the next 48 hours. Qualified provider to write order for home prescription if patient qualifies. Social service/care managers will arrange for home oxygen. If patient is active, arrange for Home Medical supplier to assess for Oxygen Conserving Device per pulse oximetry. []Patient is an outpatient. Results will be faxed to the ordering provider. Qualified provider to write order for home prescription if patient qualifies and arranges for home oxygen. Patient was placed on room air for 3 minutes. SpO2 was 85 % on room air at rest. Patients SpO2 was below 89% and qualified for home oxygen. Oxygen was applied at 1 lpm via nasal cannula to maintain a SpO2 between 90-92% while at rest. Actual SpO2 was 91 %. Patient can ambulate for exercise flow rate. Patients was ambulated, SpO2 was 90-91% on 3 lpm to maintain SpO2 between 90-92% while exercising.

## 2020-10-16 NOTE — CONSULTS
[facility]    Patient - Dana Padilla   MRN -  [id number]   Acct # - [de-identified]   - 1960      Date of Admission -  10/14/2020  6:52 PM  Date of evaluation -  10/16/2020  Room - [facility] Day - 1  Consulting - Nahomi Nichols MD Primary Care Physician - Black Valencia MD   Chief Complaint   Hypoxia  Active Hospital Problem List      Active Hospital Problems    Diagnosis Date Noted    Pneumonia due to organism [J18.9]     Viral pneumonia [J12.9] 10/15/2020     HPI   Dana Padilla is a 62 y.o. male presented to  c/o chest symptoms for the last few including SOB, congestion, cough, malaise, oxygen saturation was noted to drop down to 82% with ambulation, he was transferred to [facility] for eval.  Never in distress, CTA was unremarkable except for trace effusions. SARS CoV (-), PCT 0.6, proBNP 124  Started on Bipap and the supplemental oxygen. H/O ROBSON diagnosed over 10 years ago in [facility], quit using his PAP after a short time, referred to see me in 2016 by Dr Unruly Hardy for symptoms associated with sleep apnea, was scheduled for sleep study but not completed.   Remote smoker, quit 25 years ago  HTN, HLP, CAD, chronic back pain  Past Medical History         Diagnosis Date    Abnormal stress test 2012    Lateral Wall Ischemia- done at [facility]    CAD (coronary artery disease)     Chronic low back pain     Diabetes mellitus ([address]     Diabetes mellitus ([address]     Hyperlipidemia     Hypertension     Hypertriglyceridemia     MI (myocardial infarction) ([address] 2004    Obesity     Viral pneumonia 10/15/2020      Past Surgical History           Procedure Laterality Date    BACK SURGERY  1997    L4 & L5 fusion    CARDIAC CATHETERIZATION  10/2019   [facility] CARDIAC SURGERY  2004    Cardiac cath with stent placement x1    COLONOSCOPY  2010    CORONARY ANGIOPLASTY WITH STENT PLACEMENT  10/17/2019    HERNIA REPAIR      Mesh repair in abdomen. Diet    DIET CARB CONTROL; Allergies    Ace inhibitors; Baclofen; Darvocet [propoxyphene n-acetaminophen]; Darvon [propoxyphene hcl]; Flexeril [cyclobenzaprine]; Morphine; Motrin [ibuprofen micronized];  Tylenol [acetaminophen]; and Ultram [tramadol]  Social History     Social History     Socioeconomic History    Marital status: Single     Spouse name: Not on file    Number of children: Not on file    Years of education: Not on file    Highest education level: Not on file   Occupational History    Not on file   Social Needs    Financial resource strain: Not on file    Food insecurity     Worry: Not on file     Inability: Not on file    Transportation needs     Medical: Not on file     Non-medical: Not on file   Tobacco Use    Smoking status: Former Smoker     Packs/day: 0.25     Years: 2.00     Pack years: 0.50     Types: Cigarettes     Last attempt to quit: 1995     Years since quittin.8    Smokeless tobacco: Never Used   Substance and Sexual Activity    Alcohol use: No    Drug use: No    Sexual activity: Yes     Partners: Female   Lifestyle    Physical activity     Days per week: Not on file     Minutes per session: Not on file    Stress: Not on file   Relationships    Social connections     Talks on phone: Not on file     Gets together: Not on file     Attends [employer] service: Not on file     Active member of club or organization: Not on file     Attends meetings of clubs or organizations: Not on file     Relationship status: Not on file    Intimate partner violence     Fear of current or ex partner: Not on file     Emotionally abused: Not on file     Physically abused: Not on file     Forced sexual activity: Not on file   Other Topics Concern    Not on file   Social History Narrative    Not on file     Family History          Problem Relation Age of Onset    Diabetes Mother     Heart Disease Mother     Arthritis Father     Diabetes Father     Heart Disease Father     Diabetes Sister     Diabetes Brother     Heart Disease Brother      Sleep History    ROBSON not on treatment  ROS    General/Constitutional: positive malaise but no fever  HENT: Negative. Eyes: Negative. Upper respiratory tract: No nasal stuffiness or post nasal drip. Lower respiratory tract/ lungs: positive cough, SOB, ASTUDILLO  Cardiovascular: No palpitations, chest pain or edema. Gastrointestinal: No nausea or vomiting. Neurological: No focal neurological weakness. Extremities: No tenderness. Musculoskeletal: no complaints  Genitourinary: No complaints. Hematological: Negative. Denies easy buising  Skin: No itching. Meds    Current Medications    vitamin B-12  1,000 mcg Oral Daily    ferrous sulfate  325 mg Oral Daily with breakfast    vitamin C  500 mg Oral Daily    ALPRAZolam  1 mg Oral BID    amLODIPine  10 mg Oral Daily    aspirin  325 mg Oral Daily    cromolyn  1 drop Both Eyes 4x Daily    enalapril  20 mg Oral Daily    fluticasone  1 spray Each Nostril Daily    gemfibrozil  600 mg Oral BID    insulin glargine  53 Units Subcutaneous BID    metoprolol tartrate  25 mg Oral BID    pantoprazole  40 mg Oral Daily    pravastatin  40 mg Oral Nightly    tamsulosin  0.4 mg Oral Daily    Vitamin D  1,000 Units Oral Daily    sodium chloride flush  10 mL Intravenous 2 times per day    enoxaparin  40 mg Subcutaneous Daily    insulin lispro  0-6 Units Subcutaneous TID WC    insulin lispro  0-3 Units Subcutaneous Nightly    clopidogrel  75 mg Oral Daily    azithromycin  250 mg Oral Daily    ipratropium-albuterol  1 ampule Inhalation Q4H    dexamethasone  2 mg Oral Daily     albuterol, sodium chloride flush, acetaminophen **OR** acetaminophen, polyethylene glycol, promethazine **OR** ondansetron, glucose, dextrose, glucagon (rDNA), dextrose, benzonatate  IV Drips/Infusions   dextrose       Vitals    Vitals    height is 5' 9\" (1.753 m) and weight is 290 lb 12.8 oz (131.9 kg). His oral temperature is 98.1 °F (36.7 °C). His blood pressure is 126/68 and his pulse is 70. His respiration is 18 and oxygen saturation is 91%. O2 Flow Rate (L/min): 1 L/min(Titrated down to 1L while awake via respiratory)  I/O    Intake/Output Summary (Last 24 hours) at 10/16/2020 1249  Last data filed at 10/16/2020 0313  Gross per 24 hour   Intake 950 ml   Output 2725 ml   Net -1775 ml     Patient Vitals for the past 96 hrs (Last 3 readings):   Weight   10/16/20 0300 290 lb 12.8 oz (131.9 kg)   10/15/20 0144 290 lb 4.8 oz (131.7 kg)   10/14/20 2024 280 lb (127 kg)     Exam   Constitutional: Patient appears moderately built and moderately nourished. Head: Normocephalic and atraumatic. Mouth/Throat: Oropharynx is clear and moist.  No oral thrush. Eyes: Conjunctivae are normal. Pupils are equal, round, and reactive to light. No scleral icterus. Neck: Neck supple. No JVD or tracheal deviation present. Cardiovascular: Regular rate, regular rhythm, S1 and S2 with no murmur. No peripheral edema  Pulmonary/Chest: Normal effort with clear breath sounds. No stridor. No respiratory distress. Abdominal: Soft. Bowel sounds audible. No distension or tenderness to palp  Musculoskeletal: Moves all extremities  Lymphadenopathy:  No cervical adenopathy. Neurological: Patient is alert and oriented to person, place, and time. Skin: Skin is warm and dry.       Labs   ABG  No results found for: PH, PO2, PCO2, HCO3, O2SAT  No results found for: Consuello Holstein, SETPEEP  CBC  Recent Labs     10/14/20  2044 10/15/20  0553 10/16/20  0923   WBC 9.5 8.6 9.0   RBC 4.70 4.49* 4.57*   HGB 11.7* 11.0* 11.2*   HCT 39.8* 37.5* 38.6*   MCV 84.7 83.5 84.5   MCH 24.9* 24.5* 24.5*   MCHC 29.4* 29.3* 29.0*    251 267   MPV 9.4 9.6 9.6      BMP  Recent Labs     10/14/20  2044 10/15/20  0553 10/16/20  0923    138 134*   K 5.2 4.9 5.9*    103 100   CO2 28 27 25   BUN 24* 19 19   CREATININE 1.1 0.9 1.0 GLUCOSE 114* 89 176*   CALCIUM 9.8 9.4 9.2     LFT  Recent Labs     10/14/20  2044 10/15/20  0553   AST 10 8   ALT 8* 8*   BILITOT 0.3 0.4   ALKPHOS 93 77     TROP  Lab Results   Component Value Date    TROPONINT < 0.010 10/14/2020     BNP  Lab Results   Component Value Date    PROBNP 124.6 10/14/2020     D-Dimer  No results found for: DDIMER  Lactic Acid  Recent Labs     10/14/20  2044   LACTA 0.8     INR  Recent Labs     10/14/20  2044   INR 1.03     PTT  Recent Labs     10/14/20  2044   APTT 30.7     Glucose  Recent Labs     10/15/20  2007 10/16/20  0708 10/16/20  1148   POCGLU 203* 160* 151*     UA No results for input(s): Loletta Virgilio, COLORU, CLARITYU, MUCUS, PROTEINU, BLOODU, RBCUA, WBCUA, BACTERIA, NITRU, GLUCOSEU, BILIRUBINUR, UROBILINOGEN, KETUA, LABCAST, LABCASTTY, AMORPHOS in the last 72 hours. Invalid input(s): CRYSTALS. PFTs   N/A  Echo    N/A  Cultures    Procalcitonin  Lab Results   Component Value Date    PROCAL 0.06 10/14/2020        Radiology    CXR    FINDINGS:    Bilateral mixed airspace interstitial opacities concerning for pneumonia. Atypical or viral-type pneumonia should be considered. Cardiomegaly. Costophrenic recesses are preserved. No acute osseous findings.              Impression         Bilateral mixed airspace interstitial opacities concerning for pneumonia. Atypical or viral-type pneumonia should be considered.              **This report has been created using voice recognition software. It may contain minor errors which are inherent in voice recognition technology. **         Final report electronically signed by Dr. Hanane Calderon on 10/14/2020 7:33 PM          CT Scans    Impression    No pulmonary emboli. Trace effusions. Atheromatous aortic and coronary vascular calcifications.         This document has been electronically signed by:  Miranda Duane, MD on    10/15/2020 12:34 AM         All CT scans at this facility use dose modulation, iterative    reconstruction, and/or weight-based    dosing when appropriate to reduce radiation dose to as low as reasonably    achievable. Assessment   Hypoxemia suspect chronic respiratory failure due to obesity, untreated ROBSON, possibly OHS/COPD  (See actual reports for details). Low PCT, Normal WBC, Afebrile, no infiltrates in CTA--no supportive evidence of CAP  Negative rapid flu/SARS  Obesity BMI 42.9  Recommendations   ABGs to evaluate hypercarbia  Empiric CPAP to sleep, will request a home bipap eval for OHS if pCO2 elevated. Agreeable for PSG/PFTs as op  Wean supplemental oxygen and get a home oxygen eval before DC  Empiric bronchodilators while in house. VTE prophylaxis. Thank you for the consult and allowing us to participate in the care of your patient. Case discussed with nurse and patient/family. Questions and concerns addressed. Meds and Orders reviewed.     Electronically signed by     Andrews Scherer MD on 10/16/2020 at 12:49 PM

## 2020-10-16 NOTE — PROGRESS NOTES
Hospitalist Progress Note    Patient:  Nory La      Unit/Bed:4K-17/017-A    YOB: 1960    MRN: [id number]       Acct: [de-identified]     PCP: Donna Hair MD    Date of Admission: 10/14/2020    Assessment/Plan:     Acute hypoxic respiratory failure: Patient was on 3L NC on admission and does not use oxygen at home. Patient now is on 1L nasal cannula with SpO2>92%. - Wean oxygen off    ? ROBSON: Patient has episodes of desaturation when he is sleeping per nursing staff along with witnessed apneic events. Requiring 1L oxygen in the day and 3L of oxygen at night.  - Consult to inpatient sleep medicine    Bronchitis viral vs atypical: Productive green/yellow sputum x5 days without fever or signs and symptoms that meet minor or major CAP criteria as defined by the 2019 ATS/IDSA guidelines for Community Acquired Pneumonia. Patient had extensive work-up within the emergency department with negative results from Influenza A/B, respiratory panel, blood cultures x2, procalcitonin, and WBC within normal limits. - Azithromycin  250mg for total of 5 days  - Duonebs q4 hours scheduled  - IS/acapaella    IDDM2: Controlled. A1c=7.3 on 8/25/2020. Random ai=999.  - Resume home lantus 53u BID. Medium dose sliding scale insulin  - Hold metformin  - Hypoglycemic protocol, POCT glucose 4x daily AC/HS    Iron deficiency Anemia: Hgb=11.7, MCV=84.7. Ferritin=14, Iron=33, Iron saturation=9, FMEY=240    - Patient started on iron supplementation  - Repeat Iron studies outpatient    Hx of CAD: s/p DELFIN to mid-LAD 11/11/2019 with Dr. Mc Levy. On DAPT without complications. - Resume aspirin and plavix    Hx of HTN: Controlled. Resume amlodipine, enalapril, lopressor    Hx of Hyperlipidemia: Controlled.  Resume gemfebrozil and pravastatin    Hx of BPH: Resume flomax      Expected discharge date:  10/16/2020    Disposition:    [x] Home       [] TCU       [] Rehab       [] Psych       [] SNF       [] [address] [place] [address] 325 mg Oral Daily    cromolyn  1 drop Both Eyes 4x Daily    enalapril  20 mg Oral Daily    fluticasone  1 spray Each Nostril Daily    gemfibrozil  600 mg Oral BID    insulin glargine  53 Units Subcutaneous BID    metoprolol tartrate  25 mg Oral BID    pantoprazole  40 mg Oral Daily    pravastatin  40 mg Oral Nightly    tamsulosin  0.4 mg Oral Daily    Vitamin D  1,000 Units Oral Daily    sodium chloride flush  10 mL Intravenous 2 times per day    enoxaparin  40 mg Subcutaneous Daily    insulin lispro  0-6 Units Subcutaneous TID     insulin lispro  0-3 Units Subcutaneous Nightly    clopidogrel  75 mg Oral Daily    azithromycin  250 mg Oral Daily    ipratropium-albuterol  1 ampule Inhalation Q4H    dexamethasone  2 mg Oral Daily     PRN Meds: albuterol, sodium chloride flush, acetaminophen **OR** acetaminophen, polyethylene glycol, promethazine **OR** ondansetron, glucose, dextrose, glucagon (rDNA), dextrose, benzonatate      Intake/Output Summary (Last 24 hours) at 10/16/2020 0846  Last data filed at 10/16/2020 0313  Gross per 24 hour   Intake 1130 ml   Output 2725 ml   Net -1595 ml       Diet:  DIET CARB CONTROL; Exam:  BP (!) 151/61   Pulse 69   Temp 98.4 °F (36.9 °C) (Oral)   Resp 18   Ht 5' 9\" (1.753 m)   Wt 290 lb 12.8 oz (131.9 kg)   SpO2 93%   BMI 42.94 kg/m²     Physical Exam  Vitals signs and nursing note reviewed. Constitutional:       Appearance: Normal appearance. He is obese. HENT:      Head: Normocephalic and atraumatic. Right Ear: External ear normal.      Left Ear: External ear normal.      Nose: Nose normal.      Mouth/Throat:      Mouth: Mucous membranes are moist.      Pharynx: Oropharynx is clear. Eyes:      Conjunctiva/sclera: Conjunctivae normal.      Pupils: Pupils are equal, round, and reactive to light. Neck:      Musculoskeletal: Normal range of motion and neck supple. Cardiovascular:      Rate and Rhythm: Normal rate and regular rhythm. Pulses: Normal pulses. Heart sounds: Normal heart sounds. Pulmonary:      Effort: Pulmonary effort is normal.   Abdominal:      General: Bowel sounds are normal.      Palpations: Abdomen is soft. Musculoskeletal: Normal range of motion. Skin:     General: Skin is warm and dry. Capillary Refill: Capillary refill takes less than 2 seconds. Neurological:      General: No focal deficit present. Mental Status: He is alert. Mental status is at baseline. He is disoriented. Psychiatric:         Mood and Affect: Mood normal.         Behavior: Behavior normal.         Thought Content: Thought content normal.         Judgment: Judgment normal.             Labs:   Recent Labs     10/14/20  2044 10/15/20  0553   WBC 9.5 8.6   HGB 11.7* 11.0*   HCT 39.8* 37.5*    251     Recent Labs     10/14/20  2044 10/15/20  0553    138   K 5.2 4.9    103   CO2 28 27   BUN 24* 19   CREATININE 1.1 0.9   CALCIUM 9.8 9.4     Recent Labs     10/14/20  2044 10/15/20  0553   AST 10 8   ALT 8* 8*   BILITOT 0.3 0.4   ALKPHOS 93 77     Recent Labs     10/14/20  2044   INR 1.03     No results for input(s): Ingrid Belts in the last 72 hours. Microbiology:      Urinalysis:      Lab Results   Component Value Date    NITRU Negative 09/18/2020    BLOODU Negative 09/18/2020    GLUCOSEU Negative 09/18/2020       Radiology:  CTA CHEST W WO CONTRAST   Final Result      XR CHEST (2 VW)   Final Result      Bilateral mixed airspace interstitial opacities concerning for pneumonia. Atypical or viral-type pneumonia should be considered. **This report has been created using voice recognition software. It may contain minor errors which are inherent in voice recognition technology. **      Final report electronically signed by Dr. Michaele Castleman on 10/14/2020 7:33 PM          DVT prophylaxis: [x] Lovenox                                 [] SCDs                                 [] SQ Heparin [] Encourage ambulation           [] Already on Anticoagulation     Code Status: Full Code    PT/OT Eval Status: None    Tele:   [x] yes             [] no    Electronically signed by Romel Briscoe DO on 10/16/2020 at 8:46 AM

## 2020-10-16 NOTE — PLAN OF CARE
Problem: Impaired respiratory status  Goal: Clear lung sounds  10/16/2020 0910 by Ashley Valdes RCP  Outcome: Ongoing    Improve breath sounds, increase aeration and decrease WOB.

## 2020-10-16 NOTE — PLAN OF CARE
Problem: Impaired respiratory status  Goal: Clear lung sounds  10/16/2020 1648 by Elo Loja GAYATHRI  Outcome: Ongoing    Improve breath sounds, increase aeration and decrease WOB.

## 2020-10-17 PROBLEM — Z95.5 H/O HEART ARTERY STENT: Status: ACTIVE | Noted: 2020-10-17

## 2020-10-17 LAB
ALLEN TEST: POSITIVE
ANION GAP SERPL CALCULATED.3IONS-SCNC: 8 MEQ/L (ref 8–16)
BASE EXCESS (CALCULATED): 3.9 MMOL/L (ref -2.5–2.5)
BUN BLDV-MCNC: 23 MG/DL (ref 7–22)
CALCIUM SERPL-MCNC: 9.2 MG/DL (ref 8.5–10.5)
CHLORIDE BLD-SCNC: 99 MEQ/L (ref 98–111)
CO2: 29 MEQ/L (ref 23–33)
COLLECTED BY:: ABNORMAL
CREAT SERPL-MCNC: 1.1 MG/DL (ref 0.4–1.2)
DEVICE: ABNORMAL
GFR SERPL CREATININE-BSD FRML MDRD: 68 ML/MIN/1.73M2
GLUCOSE BLD-MCNC: 104 MG/DL (ref 70–108)
GLUCOSE BLD-MCNC: 113 MG/DL (ref 70–108)
GLUCOSE BLD-MCNC: 117 MG/DL (ref 70–108)
GLUCOSE BLD-MCNC: 129 MG/DL (ref 70–108)
GLUCOSE BLD-MCNC: 157 MG/DL (ref 70–108)
GLUCOSE BLD-MCNC: 99 MG/DL (ref 70–108)
HCO3: 32 MMOL/L (ref 23–28)
IFIO2: 3
MRSA SCREEN: NORMAL
O2 SATURATION: 94 %
PCO2: 62 MMHG (ref 35–45)
PH BLOOD GAS: 7.32 (ref 7.35–7.45)
PO2: 81 MMHG (ref 71–104)
POTASSIUM SERPL-SCNC: 5.9 MEQ/L (ref 3.5–5.2)
SODIUM BLD-SCNC: 136 MEQ/L (ref 135–145)
SOURCE, BLOOD GAS: ABNORMAL

## 2020-10-17 PROCEDURE — 36415 COLL VENOUS BLD VENIPUNCTURE: CPT

## 2020-10-17 PROCEDURE — 6370000000 HC RX 637 (ALT 250 FOR IP): Performed by: INTERNAL MEDICINE

## 2020-10-17 PROCEDURE — 94760 N-INVAS EAR/PLS OXIMETRY 1: CPT

## 2020-10-17 PROCEDURE — 6360000002 HC RX W HCPCS: Performed by: STUDENT IN AN ORGANIZED HEALTH CARE EDUCATION/TRAINING PROGRAM

## 2020-10-17 PROCEDURE — 6370000000 HC RX 637 (ALT 250 FOR IP): Performed by: STUDENT IN AN ORGANIZED HEALTH CARE EDUCATION/TRAINING PROGRAM

## 2020-10-17 PROCEDURE — 99232 SBSQ HOSP IP/OBS MODERATE 35: CPT | Performed by: INTERNAL MEDICINE

## 2020-10-17 PROCEDURE — 36600 WITHDRAWAL OF ARTERIAL BLOOD: CPT

## 2020-10-17 PROCEDURE — 94761 N-INVAS EAR/PLS OXIMETRY MLT: CPT

## 2020-10-17 PROCEDURE — 82803 BLOOD GASES ANY COMBINATION: CPT

## 2020-10-17 PROCEDURE — 2700000000 HC OXYGEN THERAPY PER DAY

## 2020-10-17 PROCEDURE — 2580000003 HC RX 258: Performed by: STUDENT IN AN ORGANIZED HEALTH CARE EDUCATION/TRAINING PROGRAM

## 2020-10-17 PROCEDURE — 94640 AIRWAY INHALATION TREATMENT: CPT

## 2020-10-17 PROCEDURE — 80048 BASIC METABOLIC PNL TOTAL CA: CPT

## 2020-10-17 PROCEDURE — 2060000000 HC ICU INTERMEDIATE R&B

## 2020-10-17 PROCEDURE — 82948 REAGENT STRIP/BLOOD GLUCOSE: CPT

## 2020-10-17 RX ORDER — ENALAPRIL MALEATE 10 MG/1
10 TABLET ORAL DAILY
Status: DISCONTINUED | OUTPATIENT
Start: 2020-10-18 | End: 2020-10-20 | Stop reason: HOSPADM

## 2020-10-17 RX ORDER — SODIUM POLYSTYRENE SULFONATE 15 G/60ML
15 SUSPENSION ORAL; RECTAL ONCE
Status: COMPLETED | OUTPATIENT
Start: 2020-10-17 | End: 2020-10-17

## 2020-10-17 RX ADMIN — METOPROLOL TARTRATE 25 MG: 25 TABLET, FILM COATED ORAL at 19:50

## 2020-10-17 RX ADMIN — OXYCODONE HYDROCHLORIDE AND ACETAMINOPHEN 500 MG: 500 TABLET ORAL at 09:33

## 2020-10-17 RX ADMIN — PRAVASTATIN SODIUM 40 MG: 40 TABLET ORAL at 19:50

## 2020-10-17 RX ADMIN — IPRATROPIUM BROMIDE AND ALBUTEROL SULFATE 1 AMPULE: .5; 3 SOLUTION RESPIRATORY (INHALATION) at 12:30

## 2020-10-17 RX ADMIN — IPRATROPIUM BROMIDE AND ALBUTEROL SULFATE 1 AMPULE: .5; 3 SOLUTION RESPIRATORY (INHALATION) at 08:21

## 2020-10-17 RX ADMIN — GEMFIBROZIL 600 MG: 600 TABLET ORAL at 19:50

## 2020-10-17 RX ADMIN — ASPIRIN 325 MG: 325 TABLET, FILM COATED ORAL at 09:32

## 2020-10-17 RX ADMIN — ALPRAZOLAM 1 MG: 0.5 TABLET ORAL at 19:54

## 2020-10-17 RX ADMIN — Medication 10 ML: at 19:50

## 2020-10-17 RX ADMIN — FLUTICASONE PROPIONATE 1 SPRAY: 50 SPRAY, METERED NASAL at 09:33

## 2020-10-17 RX ADMIN — Medication 10 ML: at 09:36

## 2020-10-17 RX ADMIN — IPRATROPIUM BROMIDE AND ALBUTEROL SULFATE 1 AMPULE: .5; 3 SOLUTION RESPIRATORY (INHALATION) at 17:09

## 2020-10-17 RX ADMIN — TAMSULOSIN HYDROCHLORIDE 0.4 MG: 0.4 CAPSULE ORAL at 09:33

## 2020-10-17 RX ADMIN — INSULIN GLARGINE 53 UNITS: 100 INJECTION, SOLUTION SUBCUTANEOUS at 09:40

## 2020-10-17 RX ADMIN — GEMFIBROZIL 600 MG: 600 TABLET ORAL at 09:33

## 2020-10-17 RX ADMIN — Medication 1000 MCG: at 09:32

## 2020-10-17 RX ADMIN — PANTOPRAZOLE SODIUM 40 MG: 40 TABLET, DELAYED RELEASE ORAL at 05:50

## 2020-10-17 RX ADMIN — Medication 1000 UNITS: at 09:33

## 2020-10-17 RX ADMIN — AZITHROMYCIN 250 MG: 250 TABLET, FILM COATED ORAL at 05:50

## 2020-10-17 RX ADMIN — FERROUS SULFATE TAB 325 MG (65 MG ELEMENTAL FE) 325 MG: 325 (65 FE) TAB at 09:32

## 2020-10-17 RX ADMIN — METOPROLOL TARTRATE 25 MG: 25 TABLET, FILM COATED ORAL at 09:33

## 2020-10-17 RX ADMIN — IPRATROPIUM BROMIDE AND ALBUTEROL SULFATE 1 AMPULE: .5; 3 SOLUTION RESPIRATORY (INHALATION) at 21:15

## 2020-10-17 RX ADMIN — CLOPIDOGREL BISULFATE 75 MG: 75 TABLET ORAL at 09:32

## 2020-10-17 RX ADMIN — ENALAPRIL MALEATE 20 MG: 10 TABLET ORAL at 09:32

## 2020-10-17 RX ADMIN — SODIUM POLYSTYRENE SULFONATE 15 G: 15 SUSPENSION ORAL; RECTAL at 15:51

## 2020-10-17 RX ADMIN — AMLODIPINE BESYLATE 10 MG: 10 TABLET ORAL at 09:33

## 2020-10-17 RX ADMIN — ALPRAZOLAM 1 MG: 0.5 TABLET ORAL at 09:32

## 2020-10-17 RX ADMIN — ENOXAPARIN SODIUM 40 MG: 40 INJECTION SUBCUTANEOUS at 09:33

## 2020-10-17 RX ADMIN — DEXAMETHASONE 2 MG: 4 TABLET ORAL at 19:50

## 2020-10-17 ASSESSMENT — PAIN SCALES - GENERAL
PAINLEVEL_OUTOF10: 0
PAINLEVEL_OUTOF10: 0
PAINLEVEL_OUTOF10: 2

## 2020-10-17 NOTE — PLAN OF CARE
Problem: Cardiovascular  Goal: No DVT, peripheral vascular complications  Outcome: Ongoing  Note: No s/s of DVT noted. Problem: Respiratory  Goal: No pulmonary complications  Outcome: Ongoing  Note: Unable to wean off of oxygen. Problem: Skin Integrity/Risk  Goal: No skin breakdown during hospitalization  Outcome: Ongoing  Note: No new skin breakdown noted. Patient able to turn and reposition without assistance. Problem: Pain:  Goal: Pain level will decrease  Description: Pain level will decrease  Outcome: Ongoing  Note: Pain Assessment: 0-10  Pain Level: 2   Patient's Stated Pain Goal: 6   Is pain goal met at this time? Yes             Problem: Falls - Risk of:  Goal: Will remain free from falls  Description: Will remain free from falls  Outcome: Ongoing  Note: Fall risk wrist band and fall sign in place. Patient demonstrates proper use at this time. Problem: Skin Integrity:  Goal: Will show no infection signs and symptoms  Description: Will show no infection signs and symptoms  Outcome: Ongoing  Note: Patient is afebrile. No s/s of infection noted. Care plan reviewed with patient. Patient verbalizes understanding of the plan of care and contributes to goal setting.

## 2020-10-17 NOTE — PROGRESS NOTES
[facility]    Patient - Dyan Bear   MRN -  [id number]   [facility]   - 1960      Date of Admission -  10/14/2020  6:52 PM  Date of evaluation -  10/17/2020  Room - [address] [facility] Day - 2  Consulting - Odell Mendez MD Primary Care Physician - Priscila Heredia MD   Chief Complaint   Hypoxia  Active Hospital Problem List      Active Hospital Problems    Diagnosis Date Noted    CAD (coronary artery disease) [I25.10]      Priority: High    Morbidly obese (HCC) [E66.01]      Priority: Medium    Hypertension [I10]      Priority: Medium    H/O heart artery stent [Z95.5] 10/17/2020    Pneumonia due to organism [J18.9]     Acute respiratory failure with hypoxia (HCC) [J96.01]     Viral pneumonia [J12.9] 10/15/2020    ROBSON (obstructive sleep apnea) [G47.33] 2016     HPI   Dyan Bear is a 62 y.o. male presented to [address] [place] [address] c/o chest symptoms for the last few including SOB, congestion, cough, malaise, oxygen saturation was noted to drop down to 82% with ambulation, he was transferred to [facility] for eval.  Never in distress, CTA was unremarkable except for trace effusions. SARS CoV (-), PCT 0.6, proBNP 124  Started on Bipap and the supplemental oxygen. H/O ROBSON diagnosed over 10 years ago in [facility], quit using his PAP after a short time, referred to see me in 2016 by Dr Dc Beverly for symptoms associated with sleep apnea, was scheduled for sleep study but not completed.   Remote smoker, quit 25 years ago  HTN, HLP, CAD, chronic back pain  Events of last 24h     Stable  On NC  Had home Bipap eval overnight and qualifies via obesity hypoventilation syndrome with pCO2 62, FEV1/FVC ratio 78%, BMI 42    Past Surgical History           Procedure Laterality Date    BACK SURGERY  1997    L4 & L5 fusion    CARDIAC CATHETERIZATION  10/2019   Rebekah Castelan CARDIAC SURGERY  2004    Cardiac cath with stent placement x1    COLONOSCOPY  2010  CORONARY ANGIOPLASTY WITH STENT PLACEMENT  10/17/2019    HERNIA REPAIR      Mesh repair in abdomen. Diet    DIET CARB CONTROL; Allergies    Ace inhibitors; Baclofen; Darvocet [propoxyphene n-acetaminophen]; Darvon [propoxyphene hcl]; Flexeril [cyclobenzaprine]; Morphine; Motrin [ibuprofen micronized];  Tylenol [acetaminophen]; and Ultram [tramadol]  Social History     Social History     Socioeconomic History    Marital status: Single     Spouse name: Not on file    Number of children: Not on file    Years of education: Not on file    Highest education level: Not on file   Occupational History    Not on file   Social Needs    Financial resource strain: Not on file    Food insecurity     Worry: Not on file     Inability: Not on file    Transportation needs     Medical: Not on file     Non-medical: Not on file   Tobacco Use    Smoking status: Former Smoker     Packs/day: 0.25     Years: 2.00     Pack years: 0.50     Types: Cigarettes     Last attempt to quit: 1995     Years since quittin.8    Smokeless tobacco: Never Used   Substance and Sexual Activity    Alcohol use: No    Drug use: No    Sexual activity: Yes     Partners: Female   Lifestyle    Physical activity     Days per week: Not on file     Minutes per session: Not on file    Stress: Not on file   Relationships    Social connections     Talks on phone: Not on file     Gets together: Not on file     Attends Christian service: Not on file     Active member of club or organization: Not on file     Attends meetings of clubs or organizations: Not on file     Relationship status: Not on file    Intimate partner violence     Fear of current or ex partner: Not on file     Emotionally abused: Not on file     Physically abused: Not on file     Forced sexual activity: Not on file   Other Topics Concern    Not on file   Social History Narrative    Not on file     Family History          Problem Relation Age of Onset    Diabetes 0310 288 lb 14.4 oz (131 kg)   10/16/20 0300 290 lb 12.8 oz (131.9 kg)   10/15/20 0144 290 lb 4.8 oz (131.7 kg)     Exam   Constitutional: awake, alert, no distress, on NC 2 lpm  Head: Normocephalic and atraumatic. Mouth/Throat: Mallampati class III  Neck: Neck supple. No JVD or tracheal deviation present. Cardiovascular: Regular rate, regular rhythm, S1 and S2 with no murmur. No peripheral edema  Pulmonary/Chest: diminished sounds but clear. Abdominal: Soft. Bowel sounds audible. No distension or tenderness to palp  Lymphadenopathy:  No cervical adenopathy. Neurological: Patient is alert and oriented to person, place, and time. Skin: Skin is warm and dry. Labs   ABG  Lab Results   Component Value Date    PH 7.32 10/17/2020    PO2 81 10/17/2020    PCO2 62 10/17/2020    HCO3 32 10/17/2020    O2SAT 94 10/17/2020     Lab Results   Component Value Date    IFIO2 3 10/17/2020     CBC  Recent Labs     10/14/20  2044 10/15/20  0553 10/16/20  0923   WBC 9.5 8.6 9.0   RBC 4.70 4.49* 4.57*   HGB 11.7* 11.0* 11.2*   HCT 39.8* 37.5* 38.6*   MCV 84.7 83.5 84.5   MCH 24.9* 24.5* 24.5*   MCHC 29.4* 29.3* 29.0*    251 267   MPV 9.4 9.6 9.6      BMP  Recent Labs     10/15/20  0553 10/16/20  0923 10/17/20  0548    134* 136   K 4.9 5.9* 5.9*    100 99   CO2 27 25 29   BUN 19 19 23*   CREATININE 0.9 1.0 1.1   GLUCOSE 89 176* 129*   CALCIUM 9.4 9.2 9.2     LFT  Recent Labs     10/14/20  2044 10/15/20  0553   AST 10 8   ALT 8* 8*   BILITOT 0.3 0.4   ALKPHOS 93 77     TROP  Lab Results   Component Value Date    TROPONINT < 0.010 10/14/2020     BNP  Lab Results   Component Value Date    PROBNP 124.6 10/14/2020     . PFTs     Echo    N/A  Cultures    Procalcitonin  Lab Results   Component Value Date    PROCAL 0.06 10/14/2020        Radiology    CXR    FINDINGS:    Bilateral mixed airspace interstitial opacities concerning for pneumonia. Atypical or viral-type pneumonia should be considered. Cardiomegaly. Costophrenic recesses are preserved. No acute osseous findings.              Impression         Bilateral mixed airspace interstitial opacities concerning for pneumonia. Atypical or viral-type pneumonia should be considered.              **This report has been created using voice recognition software. It may contain minor errors which are inherent in voice recognition technology. **         Final report electronically signed by Dr. Julia Snell on 10/14/2020 7:33 PM          CT Scans    Impression    No pulmonary emboli. Trace effusions. Atheromatous aortic and coronary vascular calcifications.         This document has been electronically signed by: Barbara Reyes MD on    10/15/2020 12:34 AM         All CT scans at this facility use dose modulation, iterative    reconstruction, and/or weight-based    dosing when appropriate to reduce radiation dose to as low as reasonably    achievable. Assessment   Hypoxemia suspect chronic respiratory failure due to obesity, untreated ROBSON, possibly OHS/COPD  (See actual reports for details). Qualifies for Bipap via hypoventilation, will prescribe  Low PCT, Normal WBC, Afebrile, no infiltrates in CTA--no supportive evidence of CAP  Negative rapid flu/SARS  Obesity BMI 42.9  Recommendations   Bipap 12/6  Wean supplemental oxygen and get a home oxygen eval before DC  May DC bronchodilators  Home oxygen eval before DC  VTE prophylaxis. Thank you for the consult and allowing us to participate in the care of your patient. Case discussed with nurse and patient/family. Questions and concerns addressed. Meds and Orders reviewed.     Electronically signed by     Jose Ohara MD on 10/17/2020 at 12:24 PM

## 2020-10-17 NOTE — PROGRESS NOTES
The ordering provider is trying to qualify the patient for a Home Respiratory Assist Device. Patient Diagnosis: Must have documentation in the patients medical record     []  Severe COPD   [] Progressive Neromuscular Disease  [] Severe Thoracic Cage Abnormality  []  Central Sleep Apnea or Complex Sleep Apnea  [x]  Hypoventilation Syndrome    The following test(s) have been completed to justify the need for Home Respiratory Assist Device. Required testing was based on Home Respiratory Assist Device policy and procedure. [x]  ABG while awake, breathing the patientss prescribed FiO2. Lab Results   Component Value Date    PH 7.35 10/16/2020    PO2 60 10/16/2020    PCO2 53 10/16/2020    HCO3 29 10/16/2020    O2SAT 89 10/16/2020     ABG after awakening:  Results for Walt Hendrix (MRN [id number]) as of 10/17/2020 06:00   Ref. Range 10/17/2020 05:55   Source: Unknown R Radial   pH, Blood Gas Latest Ref Range: 7.35 - 7.45  7.32 (L)   PCO2 Latest Ref Range: 35 - 45 mmhg 62 (H)   pO2 Latest Ref Range: 71 - 104 mmhg 81   HCO3 Latest Ref Range: 23 - 28 mmol/l 32 (H)   Base Excess (Calculated) Latest Ref Range: -2.5 - 2.5 mmol/l 3.9 (H)   O2 Sat Latest Units: % 94   Fazal Test Unknown Positive   IFIO2 Unknown 3   DEVICE Unknown Cannula                [] Overnight oximetry while the patient is breathing oxygen at 2 lpm or   the patients prescribed FiO2, whichever is higher  [] Maximal inspiratory pressure   [] Forced Vital capacity  [] Sleep study at a sleep clinic    [x] Spirometry FEV1/FVC    The ordering provider to determine if patient qualifies based on the result of the required testing. The following table describes the necessary criteria for home coverage for Home Respiratory Assist Device. Diagnosis Blood gas  Overnight oximetry  MIP or FVC  Other   COPD PaCO2  > 52 mm Hg W/A & < 88% for at least 5 cumulative minutes.   N/A & ROBSON and treatment with CPAP has been ruled out   Neuromuscular Abnormality PaCO2 > 45 mm hg W/A Or < 88% for at least 5 cumulative minutes. Or MIP < 60 cm H2O or FVC < 50% pred. & COPD does not contribute significantly to patients pulmonary limitation   Severe Thoracic Cage Abnormality PaCO2 > 45 mm hg W/A Or < 88% for at least 5 cumulative minutes.   N/A & COPD does not contribute significantly to patients pulmonary limitation   Central Sleep Apnea N/A  N/A  N/A  Sleep study at a sleep clinic   Hypoventilation Syndrome PaCO2 > 45 mmHg W/A   N/A & FEV1/  FVC > 70% & Sleep study at a sleep clinic  Or  PaCO2 worsening > 7 mmHg when sleeping

## 2020-10-17 NOTE — PROGRESS NOTES
Hospitalist Progress Note    Patient:  Sandee Grant      Unit/Bed:4K-17/017-A    YOB: 1960    MRN: [id number]       Acct: [de-identified]     PCP: Aurea Ricardo MD    Date of Admission: 10/14/2020    Assessment/Plan:    Acute hypoxic/hypercapneic respiratory failure: Likely secondary to OHS/ROBSON in the context of URI. Patient was on 3L NC on admission and does not use oxygen at home. Patient now is on 2.5L nasal cannula with SpO2>92%. Was unable to wean off of oxygen overnight due to hypoxia. Will need to arrange for home o2/medical equipment with social work on discharge. - Wean off oxygen prior to DC  - BiPAP 12/6 at night  - patient qualifies for home respiratory assist device  - will plan to send patient home on bipap per pulmonology recommendations     Respiratory Acidosis: ABG on 10/17/20 shows pH 7.32 pCO2 62 pO2 81 consistent with respiratory acidosis with metabolic compensation. Likely secondary to obesity hyperventilation. - continue Bipap per pulmonology recommendations    ROBSON/OHS: Patient has episodes of desaturation when he is sleeping per nursing staff along with witnessed apneic events. Requiring 1L oxygen in the day and 3L of oxygen at night.  - Consult to inpatient sleep medicine     Bronchitis viral vs atypical: Productive green/yellow sputum x5 days without fever or signs and symptoms that meet minor or major CAP criteria as defined by the 2019 ATS/IDSA guidelines for Community Acquired Pneumonia. Patient had extensive work-up within the emergency department with negative results from Influenza A/B, respiratory panel, blood cultures x2, procalcitonin, and WBC within normal limits. - Azithromycin  250mg for total of 5 days  - Duonebs q4 hours scheduled  - IS/acapaella     IDDM2: Controlled. A1c=7.3 on 8/25/2020. Random hd=523.  - Resume home lantus 53u BID.  Medium dose sliding scale insulin  - Hold metformin  - Hypoglycemic protocol, POCT glucose 4x daily AC/HS     Iron deficiency Anemia: Hgb=11.7, MCV=84.7. Ferritin=14, Iron=33, Iron saturation=9, XGRR=421    - Patient started on iron supplementation  - Repeat Iron studies outpatient     Hx of CAD: s/p DELFIN to mid-LAD 11/11/2019 with Dr. Oscar Sinha. On DAPT without complications. - Resume aspirin and plavix     Hx of HTN: Controlled. Resume amlodipine, enalapril, lopressor     Hx of Hyperlipidemia: Controlled. Resume gemfebrozil and pravastatin     Hx of BPH: Resume flomax          Expected discharge date:  10/1920    Disposition:    [x] Home       [] TCU       [] Rehab       [] Psych       [] SNF       [] [place]       [] Other-    Chief Complaint: Cough, shortness of breath    Hospital Course:    Jolene Hernandez is a 62 y.o. male with PMHx of CAD s/p DELFIN to mid-LAD on 11/11/2019 with Dr. Oscar Sinha, IDDM2, HLD, HTN, chronic low back pain who presented to [facility] emergency department on 10/14/2020 for progressive productive cough with green/yellow sputum that began on 10/10/2020. He presented to the urgent care earlier in the day the urgent care center and he had desaturation of oxygen and sent via EMS to the ED. Patient denied fever, chills, chest pain, chest tightness, nausea, vomiting, and change in bowel/bladder.     Within the ED the patient was afebrile, received 1 Duoneb treatment with improved symptoms and 1 dose of azithromycin. Chest xray revealed mixed airspace interstitial opacities concerning for atypical or viral-type pneumonia. CTA of chest negative for PE.     10/14/2020: Negative respiratory panel, influenza A/B, VRE, COVID, blood cultures, procalcitonin, troponin, MRSA.     10/15/2020: Continued azithromycin and steroids. Patient was started on iron supplementation. 10/17/2020: Attempted to wean off oxygen, however patient hypoxic overnight.  Back to 3L NC    Subjective (past 24 hours):  -Patient was unable to wean off oxygen overnight now back on 2-3 NC  -patient will need medical assist device on discharge (home BiPAP). Will need social work to arrange -expected discharge 10/19/20  -patient continues to have sx of sinusitis       Medications:  Reviewed    Infusion Medications    dextrose       Scheduled Medications    [START ON 10/18/2020] enalapril  10 mg Oral Daily    sodium polystyrene  15 g Oral Once    vitamin B-12  1,000 mcg Oral Daily    ferrous sulfate  325 mg Oral Daily with breakfast    vitamin C  500 mg Oral Daily    ipratropium-albuterol  1 ampule Inhalation 4x daily    ALPRAZolam  1 mg Oral BID    amLODIPine  10 mg Oral Daily    aspirin  325 mg Oral Daily    cromolyn  1 drop Both Eyes 4x Daily    fluticasone  1 spray Each Nostril Daily    gemfibrozil  600 mg Oral BID    insulin glargine  53 Units Subcutaneous BID    metoprolol tartrate  25 mg Oral BID    pantoprazole  40 mg Oral Daily    pravastatin  40 mg Oral Nightly    tamsulosin  0.4 mg Oral Daily    Vitamin D  1,000 Units Oral Daily    sodium chloride flush  10 mL Intravenous 2 times per day    enoxaparin  40 mg Subcutaneous Daily    insulin lispro  0-6 Units Subcutaneous TID WC    insulin lispro  0-3 Units Subcutaneous Nightly    clopidogrel  75 mg Oral Daily    azithromycin  250 mg Oral Daily    dexamethasone  2 mg Oral Daily     PRN Meds: sodium chloride flush, acetaminophen **OR** acetaminophen, polyethylene glycol, promethazine **OR** ondansetron, glucose, dextrose, glucagon (rDNA), dextrose, benzonatate      Intake/Output Summary (Last 24 hours) at 10/17/2020 1544  Last data filed at 10/17/2020 1527  Gross per 24 hour   Intake 1630 ml   Output 3675 ml   Net -2045 ml       Diet:  DIET CARB CONTROL; Exam:  BP (!) 119/58   Pulse 70   Temp 98.7 °F (37.1 °C) (Oral)   Resp 18   Ht 5' 9\" (1.753 m)   Wt 288 lb 14.4 oz (131 kg)   SpO2 93%   BMI 42.66 kg/m²     General appearance: Middle aged obese white male  HEENT: Pupils equal, round, and reactive to light. Conjunctivae/corneas clear.   Neck: Supple, with full range of motion. No jugular venous distention. Trachea midline. Respiratory:  Increased respiratory effort noted. Rhonchi appreciated, otherwise clear to auscultation, bilaterally without Rales/Wheezes  Cardiovascular: Regular rate and rhythm with normal S1/S2 without murmurs, rubs or gallops. Abdomen: Soft, non-tender, non-distended with normal bowel sounds. Musculoskeletal: passive and active ROM x 4 extremities. Skin: Skin color, texture, turgor normal.  No rashes or lesions. Neurologic:  Neurovascularly intact without any focal sensory/motor deficits. Grossly non-focal.  Psychiatric: Alert and oriented, thought content appropriate, normal insight  Capillary Refill: Brisk,< 3 seconds   Peripheral Pulses: +2 palpable, equal bilaterally       Labs:   Recent Labs     10/14/20  2044 10/15/20  0553 10/16/20  0923   WBC 9.5 8.6 9.0   HGB 11.7* 11.0* 11.2*   HCT 39.8* 37.5* 38.6*    251 267     Recent Labs     10/15/20  0553 10/16/20  0923 10/17/20  0548    134* 136   K 4.9 5.9* 5.9*    100 99   CO2 27 25 29   BUN 19 19 23*   CREATININE 0.9 1.0 1.1   CALCIUM 9.4 9.2 9.2     Recent Labs     10/14/20  2044 10/15/20  0553   AST 10 8   ALT 8* 8*   BILITOT 0.3 0.4   ALKPHOS 93 77     Recent Labs     10/14/20  2044   INR 1.03     No results for input(s): Sean Suggs in the last 72 hours. Microbiology:      Urinalysis:      Lab Results   Component Value Date    NITRU Negative 09/18/2020    BLOODU Negative 09/18/2020    GLUCOSEU Negative 09/18/2020       Radiology:  CTA CHEST W WO CONTRAST   Final Result      XR CHEST (2 VW)   Final Result      Bilateral mixed airspace interstitial opacities concerning for pneumonia. Atypical or viral-type pneumonia should be considered. **This report has been created using voice recognition software. It may contain minor errors which are inherent in voice recognition technology. **      Final report electronically signed by Dr. Casi Steinberg on 10/14/2020 7:33 PM          DVT prophylaxis: [x] Lovenox                                 [] SCDs                                 [] SQ Heparin                                 [] Encourage ambulation           [] Already on Anticoagulation     Code Status: Full Code    PT/OT Eval Status: not indicated     Tele:   [x] yes             [] no    Active Hospital Problems    Diagnosis Date Noted    CAD (coronary artery disease) [I25.10]      Priority: High    Morbidly obese ([facility] [address] [E66.01]      Priority: Medium    Hypertension [I10]      Priority: Medium    H/O heart artery stent [Z95.5] 10/17/2020    Pneumonia due to organism [J18.9]     Acute respiratory failure with hypoxia ([facility] [J96.01]     Viral pneumonia [J12.9] 10/15/2020    ROBSON (obstructive sleep apnea) [G47.33] 04/20/2016       Electronically signed by Chica Musa DO on 10/17/2020 at 3:44 PM

## 2020-10-17 NOTE — PLAN OF CARE
Problem: Impaired respiratory status  Goal: Clear lung sounds  10/16/2020 2010 by [employer]  Outcome: Ongoing     Increased patient to 2L nasal cannula due to O2 being 88%. Continued with therapy as ordered to improve lung sounds and aeration.

## 2020-10-18 LAB
AMORPHOUS: ABNORMAL
ANION GAP SERPL CALCULATED.3IONS-SCNC: 9 MEQ/L (ref 8–16)
BACTERIA: ABNORMAL /HPF
BASOPHILS # BLD: 0.7 %
BASOPHILS ABSOLUTE: 0.1 THOU/MM3 (ref 0–0.1)
BILIRUBIN URINE: NEGATIVE
BLOOD, URINE: ABNORMAL
BUN BLDV-MCNC: 22 MG/DL (ref 7–22)
CALCIUM SERPL-MCNC: 10.1 MG/DL (ref 8.5–10.5)
CASTS 2: ABNORMAL /LPF
CASTS UA: ABNORMAL /LPF
CHARACTER, URINE: ABNORMAL
CHLORIDE BLD-SCNC: 98 MEQ/L (ref 98–111)
CO2: 29 MEQ/L (ref 23–33)
COLOR: YELLOW
CREAT SERPL-MCNC: 1.1 MG/DL (ref 0.4–1.2)
CRYSTALS, UA: ABNORMAL
EOSINOPHIL # BLD: 0.5 %
EOSINOPHILS ABSOLUTE: 0.1 THOU/MM3 (ref 0–0.4)
EPITHELIAL CELLS, UA: ABNORMAL /HPF
ERYTHROCYTE [DISTWIDTH] IN BLOOD BY AUTOMATED COUNT: 15.6 % (ref 11.5–14.5)
ERYTHROCYTE [DISTWIDTH] IN BLOOD BY AUTOMATED COUNT: 46.8 FL (ref 35–45)
GFR SERPL CREATININE-BSD FRML MDRD: 68 ML/MIN/1.73M2
GLUCOSE BLD-MCNC: 122 MG/DL (ref 70–108)
GLUCOSE BLD-MCNC: 131 MG/DL (ref 70–108)
GLUCOSE BLD-MCNC: 149 MG/DL (ref 70–108)
GLUCOSE BLD-MCNC: 158 MG/DL (ref 70–108)
GLUCOSE BLD-MCNC: 166 MG/DL (ref 70–108)
GLUCOSE BLD-MCNC: 180 MG/DL (ref 70–108)
GLUCOSE URINE: NEGATIVE MG/DL
HCT VFR BLD CALC: 41.5 % (ref 42–52)
HEMOGLOBIN: 12.2 GM/DL (ref 14–18)
IMMATURE GRANS (ABS): 0.04 THOU/MM3 (ref 0–0.07)
IMMATURE GRANULOCYTES: 0.4 %
KETONES, URINE: NEGATIVE
LEUKOCYTE ESTERASE, URINE: ABNORMAL
LYMPHOCYTES # BLD: 12.2 %
LYMPHOCYTES ABSOLUTE: 1.3 THOU/MM3 (ref 1–4.8)
MCH RBC QN AUTO: 24.4 PG (ref 26–33)
MCHC RBC AUTO-ENTMCNC: 29.4 GM/DL (ref 32.2–35.5)
MCV RBC AUTO: 83.2 FL (ref 80–94)
MISCELLANEOUS 2: ABNORMAL
MONOCYTES # BLD: 6.2 %
MONOCYTES ABSOLUTE: 0.7 THOU/MM3 (ref 0.4–1.3)
NITRITE, URINE: NEGATIVE
NUCLEATED RED BLOOD CELLS: 0 /100 WBC
PH UA: 6 (ref 5–9)
PLATELET # BLD: 307 THOU/MM3 (ref 130–400)
PMV BLD AUTO: 9.4 FL (ref 9.4–12.4)
POTASSIUM SERPL-SCNC: 5.4 MEQ/L (ref 3.5–5.2)
PROTEIN UA: NEGATIVE
RBC # BLD: 4.99 MILL/MM3 (ref 4.7–6.1)
RBC URINE: ABNORMAL /HPF
RENAL EPITHELIAL, UA: ABNORMAL
SEG NEUTROPHILS: 80 %
SEGMENTED NEUTROPHILS ABSOLUTE COUNT: 8.8 THOU/MM3 (ref 1.8–7.7)
SODIUM BLD-SCNC: 136 MEQ/L (ref 135–145)
SPECIFIC GRAVITY, URINE: 1.01 (ref 1–1.03)
UROBILINOGEN, URINE: 0.2 EU/DL (ref 0–1)
WBC # BLD: 11 THOU/MM3 (ref 4.8–10.8)
WBC UA: ABNORMAL /HPF
YEAST: ABNORMAL

## 2020-10-18 PROCEDURE — 94660 CPAP INITIATION&MGMT: CPT

## 2020-10-18 PROCEDURE — 51702 INSERT TEMP BLADDER CATH: CPT

## 2020-10-18 PROCEDURE — 6370000000 HC RX 637 (ALT 250 FOR IP): Performed by: INTERNAL MEDICINE

## 2020-10-18 PROCEDURE — 6370000000 HC RX 637 (ALT 250 FOR IP): Performed by: STUDENT IN AN ORGANIZED HEALTH CARE EDUCATION/TRAINING PROGRAM

## 2020-10-18 PROCEDURE — 36415 COLL VENOUS BLD VENIPUNCTURE: CPT

## 2020-10-18 PROCEDURE — 94761 N-INVAS EAR/PLS OXIMETRY MLT: CPT

## 2020-10-18 PROCEDURE — 99232 SBSQ HOSP IP/OBS MODERATE 35: CPT | Performed by: INTERNAL MEDICINE

## 2020-10-18 PROCEDURE — 51701 INSERT BLADDER CATHETER: CPT

## 2020-10-18 PROCEDURE — 80048 BASIC METABOLIC PNL TOTAL CA: CPT

## 2020-10-18 PROCEDURE — 2060000000 HC ICU INTERMEDIATE R&B

## 2020-10-18 PROCEDURE — 81001 URINALYSIS AUTO W/SCOPE: CPT

## 2020-10-18 PROCEDURE — 2580000003 HC RX 258: Performed by: STUDENT IN AN ORGANIZED HEALTH CARE EDUCATION/TRAINING PROGRAM

## 2020-10-18 PROCEDURE — 82948 REAGENT STRIP/BLOOD GLUCOSE: CPT

## 2020-10-18 PROCEDURE — 87086 URINE CULTURE/COLONY COUNT: CPT

## 2020-10-18 PROCEDURE — 6360000002 HC RX W HCPCS: Performed by: STUDENT IN AN ORGANIZED HEALTH CARE EDUCATION/TRAINING PROGRAM

## 2020-10-18 PROCEDURE — 85025 COMPLETE CBC W/AUTO DIFF WBC: CPT

## 2020-10-18 PROCEDURE — 51798 US URINE CAPACITY MEASURE: CPT

## 2020-10-18 PROCEDURE — 94640 AIRWAY INHALATION TREATMENT: CPT

## 2020-10-18 RX ORDER — FERROUS SULFATE 325(65) MG
325 TABLET ORAL
Status: DISCONTINUED | OUTPATIENT
Start: 2020-10-20 | End: 2020-10-20 | Stop reason: HOSPADM

## 2020-10-18 RX ORDER — TAMSULOSIN HYDROCHLORIDE 0.4 MG/1
0.4 CAPSULE ORAL 2 TIMES DAILY
Status: DISCONTINUED | OUTPATIENT
Start: 2020-10-18 | End: 2020-10-20 | Stop reason: HOSPADM

## 2020-10-18 RX ORDER — INSULIN GLARGINE 100 [IU]/ML
45 INJECTION, SOLUTION SUBCUTANEOUS 2 TIMES DAILY
Status: DISCONTINUED | OUTPATIENT
Start: 2020-10-18 | End: 2020-10-20 | Stop reason: HOSPADM

## 2020-10-18 RX ORDER — INSULIN GLARGINE 100 [IU]/ML
45 INJECTION, SOLUTION SUBCUTANEOUS 2 TIMES DAILY
Status: DISCONTINUED | OUTPATIENT
Start: 2020-10-18 | End: 2020-10-18

## 2020-10-18 RX ADMIN — Medication 10 ML: at 08:26

## 2020-10-18 RX ADMIN — Medication 1000 MCG: at 08:25

## 2020-10-18 RX ADMIN — METOPROLOL TARTRATE 25 MG: 25 TABLET, FILM COATED ORAL at 08:25

## 2020-10-18 RX ADMIN — TAMSULOSIN HYDROCHLORIDE 0.4 MG: 0.4 CAPSULE ORAL at 07:41

## 2020-10-18 RX ADMIN — Medication 1000 UNITS: at 08:25

## 2020-10-18 RX ADMIN — PRAVASTATIN SODIUM 40 MG: 40 TABLET ORAL at 19:50

## 2020-10-18 RX ADMIN — ENALAPRIL MALEATE 10 MG: 10 TABLET ORAL at 08:25

## 2020-10-18 RX ADMIN — INSULIN LISPRO 1 UNITS: 100 INJECTION, SOLUTION INTRAVENOUS; SUBCUTANEOUS at 11:18

## 2020-10-18 RX ADMIN — GEMFIBROZIL 600 MG: 600 TABLET ORAL at 19:50

## 2020-10-18 RX ADMIN — ENOXAPARIN SODIUM 40 MG: 40 INJECTION SUBCUTANEOUS at 08:25

## 2020-10-18 RX ADMIN — METOPROLOL TARTRATE 25 MG: 25 TABLET, FILM COATED ORAL at 19:50

## 2020-10-18 RX ADMIN — INSULIN LISPRO 1 UNITS: 100 INJECTION, SOLUTION INTRAVENOUS; SUBCUTANEOUS at 17:10

## 2020-10-18 RX ADMIN — PANTOPRAZOLE SODIUM 40 MG: 40 TABLET, DELAYED RELEASE ORAL at 06:24

## 2020-10-18 RX ADMIN — CLOPIDOGREL BISULFATE 75 MG: 75 TABLET ORAL at 08:25

## 2020-10-18 RX ADMIN — INSULIN GLARGINE 45 UNITS: 100 INJECTION, SOLUTION SUBCUTANEOUS at 21:50

## 2020-10-18 RX ADMIN — FLUTICASONE PROPIONATE 1 SPRAY: 50 SPRAY, METERED NASAL at 08:26

## 2020-10-18 RX ADMIN — AZITHROMYCIN 250 MG: 250 TABLET, FILM COATED ORAL at 06:24

## 2020-10-18 RX ADMIN — AMLODIPINE BESYLATE 10 MG: 10 TABLET ORAL at 08:25

## 2020-10-18 RX ADMIN — TAMSULOSIN HYDROCHLORIDE 0.4 MG: 0.4 CAPSULE ORAL at 21:49

## 2020-10-18 RX ADMIN — GEMFIBROZIL 600 MG: 600 TABLET ORAL at 08:25

## 2020-10-18 RX ADMIN — OXYCODONE HYDROCHLORIDE AND ACETAMINOPHEN 500 MG: 500 TABLET ORAL at 08:25

## 2020-10-18 RX ADMIN — FERROUS SULFATE TAB 325 MG (65 MG ELEMENTAL FE) 325 MG: 325 (65 FE) TAB at 08:25

## 2020-10-18 RX ADMIN — ASPIRIN 325 MG: 325 TABLET, FILM COATED ORAL at 08:25

## 2020-10-18 RX ADMIN — INSULIN GLARGINE 45 UNITS: 100 INJECTION, SOLUTION SUBCUTANEOUS at 11:19

## 2020-10-18 RX ADMIN — IPRATROPIUM BROMIDE AND ALBUTEROL SULFATE 1 AMPULE: .5; 3 SOLUTION RESPIRATORY (INHALATION) at 08:37

## 2020-10-18 RX ADMIN — ALPRAZOLAM 1 MG: 0.5 TABLET ORAL at 08:25

## 2020-10-18 RX ADMIN — Medication 10 ML: at 19:54

## 2020-10-18 RX ADMIN — ALPRAZOLAM 1 MG: 0.5 TABLET ORAL at 19:50

## 2020-10-18 ASSESSMENT — PAIN SCALES - GENERAL
PAINLEVEL_OUTOF10: 0
PAINLEVEL_OUTOF10: 0

## 2020-10-18 NOTE — FLOWSHEET NOTE
10/18/20 1433   Provider Notification   Reason for Communication Evaluate  (bladder scan)   Provider Name Dr. Milady Lynch   Provider Notification Resident   Method of Communication Secure Message   Response See orders   Notification Time [phone]   Dr. Milady Lynch notified that patient voided about 25ml and could not go more than that.  Bladder scan showed 750ml in his bladder  Insertion of Indwelling urinary catheter ordered

## 2020-10-18 NOTE — FLOWSHEET NOTE
10/18/20 7371   Provider Notification   Reason for Communication Evaluate  (lantus)   Provider Name Dr. Kevin Titus   Provider Notification Resident   Method of Communication Secure Message   Response See orders   Notification Time 8344   Dr. Kevin Titus notified that patient did not receive his 53 units of lantus last night due to BS being 102. This am BS was 122 clarified if he would like lantus given this morning  Dr. Kevin Titus stated to changed lantus to 45 units BID.

## 2020-10-18 NOTE — PROGRESS NOTES
[facility]    Patient - David Franks   MRN -  [id number]   [facility]   - 1960      Date of Admission -  10/14/2020  6:52 PM  Date of evaluation -  10/18/2020  Room - [facility] Day - 3  Consulting - Hanane Umana MD Primary Care Physician - Tisha Botello MD   Chief Complaint   Hypoxia  Active Hospital Problem List      Active Hospital Problems    Diagnosis Date Noted    CAD (coronary artery disease) [I25.10]      Priority: High    Morbidly obese ([address] [E66.01]      Priority: Medium    Hypertension [I10]      Priority: Medium    Urinary retention [R33.9]     H/O heart artery stent [Z95.5] 10/17/2020    Pneumonia due to organism [J18.9]     Acute respiratory failure with hypoxia (HCC) [J96.01]     Viral pneumonia [J12.9] 10/15/2020    ROBSON (obstructive sleep apnea) [G47.33] 2016     HPI   David Franks is a 62 y.o. male presented to [facility] c/o chest symptoms for the last few including SOB, congestion, cough, malaise, oxygen saturation was noted to drop down to 82% with ambulation, he was transferred to [facility] for eval.  Never in distress, CTA was unremarkable except for trace effusions. SARS CoV (-), PCT 0.6, proBNP 124  Started on Bipap and the supplemental oxygen. H/O ROBSON diagnosed over 10 years ago in [facility], quit using his PAP after a short time, referred to see me in  by Dr Huber Monteiro for symptoms associated with sleep apnea, was scheduled for sleep study but not completed.   Remote smoker, quit 25 years ago  HTN, HLP, CAD, chronic back pain  Events of last 24h   Stable  On NC  Mccartney cath inserted  Had home Bipap eval overnight and qualifies via obesity hypoventilation syndrome with pCO2 62, FEV1/FVC ratio 78%, BMI 42    Past Surgical History           Procedure Laterality Date    BACK SURGERY  1997    L4 & L5 fusion    CARDIAC CATHETERIZATION  10/2019   Zannie Cowden CARDIAC SURGERY  2004    Cardiac cath with stent placement x1    COLONOSCOPY  2010    CORONARY ANGIOPLASTY WITH STENT PLACEMENT  10/17/2019    HERNIA REPAIR      Mesh repair in abdomen. Diet    DIET CARB CONTROL; Allergies    Ace inhibitors; Baclofen; Darvocet [propoxyphene n-acetaminophen]; Darvon [propoxyphene hcl]; Flexeril [cyclobenzaprine]; Morphine; Motrin [ibuprofen micronized];  Tylenol [acetaminophen]; and Ultram [tramadol]  Social History     Social History     Socioeconomic History    Marital status: Single     Spouse name: Not on file    Number of children: Not on file    Years of education: Not on file    Highest education level: Not on file   Occupational History    Not on file   Social Needs    Financial resource strain: Not on file    Food insecurity     Worry: Not on file     Inability: Not on file    Transportation needs     Medical: Not on file     Non-medical: Not on file   Tobacco Use    Smoking status: Former Smoker     Packs/day: 0.25     Years: 2.00     Pack years: 0.50     Types: Cigarettes     Last attempt to quit: 1995     Years since quittin.8    Smokeless tobacco: Never Used   Substance and Sexual Activity    Alcohol use: No    Drug use: No    Sexual activity: Yes     Partners: Female   Lifestyle    Physical activity     Days per week: Not on file     Minutes per session: Not on file    Stress: Not on file   Relationships    Social connections     Talks on phone: Not on file     Gets together: Not on file     Attends Cheondo[employer] service: Not on file     Active member of club or organization: Not on file     Attends meetings of clubs or organizations: Not on file     Relationship status: Not on file    Intimate partner violence     Fear of current or ex partner: Not on file     Emotionally abused: Not on file     Physically abused: Not on file     Forced sexual activity: Not on file   Other Topics Concern    Not on file   Social History Narrative    Not on file     Family History Problem Relation Age of Onset    Diabetes Mother     Heart Disease Mother     Arthritis Father     Diabetes Father     Heart Disease Father     Diabetes Sister     Diabetes Brother     Heart Disease Brother      Sleep History    ROBSON not on treatment    Meds    Current Medications    insulin glargine  45 Units Subcutaneous BID    [START ON 10/20/2020] ferrous sulfate  325 mg Oral Q48H    tamsulosin  0.4 mg Oral BID    enalapril  10 mg Oral Daily    vitamin B-12  1,000 mcg Oral Daily    vitamin C  500 mg Oral Daily    ALPRAZolam  1 mg Oral BID    amLODIPine  10 mg Oral Daily    aspirin  325 mg Oral Daily    cromolyn  1 drop Both Eyes 4x Daily    fluticasone  1 spray Each Nostril Daily    gemfibrozil  600 mg Oral BID    metoprolol tartrate  25 mg Oral BID    pantoprazole  40 mg Oral Daily    pravastatin  40 mg Oral Nightly    Vitamin D  1,000 Units Oral Daily    sodium chloride flush  10 mL Intravenous 2 times per day    enoxaparin  40 mg Subcutaneous Daily    insulin lispro  0-6 Units Subcutaneous TID WC    insulin lispro  0-3 Units Subcutaneous Nightly    clopidogrel  75 mg Oral Daily    azithromycin  250 mg Oral Daily     sodium chloride flush, acetaminophen **OR** acetaminophen, polyethylene glycol, promethazine **OR** ondansetron, glucose, dextrose, glucagon (rDNA), dextrose, benzonatate  IV Drips/Infusions   dextrose       Vitals    Vitals    height is 5' 9\" (1.753 m) and weight is 286 lb 1.6 oz (129.8 kg). His oral temperature is 98.6 °F (37 °C). His blood pressure is 123/59 (abnormal) and his pulse is 69. His respiration is 18 and oxygen saturation is 94%.      O2 Flow Rate (L/min): 2 L/min  I/O    Intake/Output Summary (Last 24 hours) at 10/18/2020 1631  Last data filed at 10/18/2020 1455  Gross per 24 hour   Intake 1000 ml   Output 3425 ml   Net -2425 ml     Patient Vitals for the past 96 hrs (Last 3 readings):   Weight   10/18/20 0330 286 lb 1.6 oz (129.8 kg)   10/17/20 0310 288 lb 14.4 oz (131 kg)   10/16/20 0300 290 lb 12.8 oz (131.9 kg)     Exam   Constitutional: awake, alert, no distress, on NC 2 lpm  Head: Normocephalic and atraumatic. Mouth/Throat: Mallampati class III  Neck: Neck supple. No JVD or tracheal deviation present. Cardiovascular: Regular rate, regular rhythm, S1 and S2 with no murmur. No peripheral edema  Pulmonary/Chest: diminished sounds but clear. Abdominal: Soft. Bowel sounds audible. No distension or tenderness to palp  Lymphadenopathy:  No cervical adenopathy. Neurological: Patient is alert and oriented to person, place, and time. Skin: Skin is warm and dry. Labs   ABG  Lab Results   Component Value Date    PH 7.32 10/17/2020    PO2 81 10/17/2020    PCO2 62 10/17/2020    HCO3 32 10/17/2020    O2SAT 94 10/17/2020     Lab Results   Component Value Date    IFIO2 3 10/17/2020     CBC  Recent Labs     10/16/20  0923 10/18/20  0551   WBC 9.0 11.0*   RBC 4.57* 4.99   HGB 11.2* 12.2*   HCT 38.6* 41.5*   MCV 84.5 83.2   MCH 24.5* 24.4*   MCHC 29.0* 29.4*    307   MPV 9.6 9.4      BMP  Recent Labs     10/16/20  0923 10/17/20  0548 10/18/20  0551   * 136 136   K 5.9* 5.9* 5.4*    99 98   CO2 25 29 29   BUN 19 23* 22   CREATININE 1.0 1.1 1.1   GLUCOSE 176* 129* 131*   CALCIUM 9.2 9.2 10.1     LFT  No results for input(s): AST, ALT, ALB, BILITOT, ALKPHOS, LIPASE in the last 72 hours. Invalid input(s): AMYLASE  TROP  Lab Results   Component Value Date    TROPONINT < 0.010 10/14/2020     BNP  Lab Results   Component Value Date    PROBNP 124.6 10/14/2020     . PFTs     Echo    N/A  Cultures    Procalcitonin  Lab Results   Component Value Date    PROCAL 0.06 10/14/2020        Radiology    CXR    FINDINGS:    Bilateral mixed airspace interstitial opacities concerning for pneumonia. Atypical or viral-type pneumonia should be considered. Cardiomegaly. Costophrenic recesses are preserved.  No acute osseous findings.              Impression      Bilateral mixed airspace interstitial opacities concerning for pneumonia. Atypical or viral-type pneumonia should be considered.              **This report has been created using voice recognition software. It may contain minor errors which are inherent in voice recognition technology. **         Final report electronically signed by Dr. Drema Epley on 10/14/2020 7:33 PM          CT Scans    Impression    No pulmonary emboli. Trace effusions. Atheromatous aortic and coronary vascular calcifications.         This document has been electronically signed by: Carla Rodriguez MD on    10/15/2020 12:34 AM         All CT scans at this facility use dose modulation, iterative    reconstruction, and/or weight-based    dosing when appropriate to reduce radiation dose to as low as reasonably    achievable. Assessment   Hypoxemia suspect chronic respiratory failure due to obesity, untreated ROBSON, possibly OHS/COPD  (See actual reports for details). Qualifies for Bipap via hypoventilation, will prescribe  Low PCT, Normal WBC, Afebrile, no infiltrates in CTA--no supportive evidence of CAP  Negative rapid flu/SARS  Obesity BMI 42.9  Recommendations   Bipap 12/6, equipment to be delivered home upon DC  Wean supplemental oxygen and get a home oxygen eval before DC  May DC bronchodilators  Home oxygen eval before DC  VTE prophylaxis. Thank you for the consult and allowing us to participate in the care of your patient. Case discussed with nurse and patient/family. Questions and concerns addressed. Meds and Orders reviewed.     Electronically signed by     Anh Louis MD on 10/18/2020 at 4:31 PM

## 2020-10-18 NOTE — PROGRESS NOTES
Hospitalist Progress Note    Patient:  Shelby Marroquin      Unit/Bed:4K-17/017-A    YOB: 1960    MRN: [id number]       Acct: [de-identified]     PCP: Veronica Flores MD    Date of Admission: 10/14/2020    Assessment/Plan:     Acute hypoxic respiratory failure: Likely secondary to OHS/ROBSON in context of URI. Patient was on 3L NC on admission and does not use oxygen at home. Patient now is on 2L nasal cannula with SpO2>92%. Unable to wean O2.  - BiPAP at sleep and naps with 12/6 settings. - D/C bronchodilators per pulmonology  - Wean oxygen as tolerated  - Will need sleep study at discharge    ? ROBSON? OHS: Patient has episodes of desaturation when he is sleeping per nursing staff along with witnessed apneic events. Home O2 eval results patent will require 1L oxygen in the day and 3L of oxygen at night.  - Consult to inpatient sleep medicine    Bronchitis viral vs atypical: Productive green/yellow sputum x5 days without fever or signs and symptoms that meet minor or major CAP criteria as defined by the 2019 ATS/IDSA guidelines for Community Acquired Pneumonia. Patient had extensive work-up within the emergency department with negative results from Influenza A/B, respiratory panel, blood cultures x2, procalcitonin, and WBC within normal limits. - Azithromycin  250mg for total of 5 days  - IS/acapaella    IDDM2: Controlled. A1c=7.3 on 8/25/2020.  - Lantus 45u BID. Medium dose sliding scale insulin  - Hold metformin  - Hypoglycemic protocol, POCT glucose 4x daily AC/HS    Iron deficiency Anemia: Hgb=12.2, MCV=83.2. Ferritin=14, Iron=33, Iron saturation=9, WESZ=954    - Patient started on iron supplementation  - Repeat Iron studies outpatient    Hx of CAD: s/p DELFIN to mid-LAD 11/11/2019 with Dr. Kemar Tapia. On DAPT without complications. - Resume aspirin and plavix    Hx of HTN: Controlled. Resume amlodipine, enalapril, lopressor    Hx of Hyperlipidemia: Stable at baseline.  Resume gemfebrozil and pravastatin    Hx of BPH: Resume flomax      Expected discharge date:  10/16/2020    Disposition:    [x] Home       [] TCU       [] Rehab       [] Psych       [] SNF       [] [place]       [] Other-    Chief Complaint: Cough, shortness of breath    Hospital Course:  Darryl Gomes is a 62 y.o. male with PMHx of CAD s/p DELFIN to mid-LAD on 11/11/2019 with Dr. Mc Levy, IDDM2, HLD, HTN, chronic low back pain who presented to [facility] emergency department on 10/14/2020 for progressive productive cough with green/yellow sputum that began on 10/10/2020. He presented to the urgent care earlier in the day the urgent care center and he had desaturation of oxygen and sent via EMS to the ED. Patient denied fever, chills, chest pain, chest tightness, nausea, vomiting, and change in bowel/bladder. Within the ED the patient was afebrile, received 1 Duoneb treatment with improved symptoms and 1 dose of azithromycin. Chest xray revealed mixed airspace interstitial opacities concerning for atypical or viral-type pneumonia. CTA of chest negative for PE.    10/14/2020: Negative respiratory panel, influenza A/B, VRE, COVID, blood cultures, procalcitonin, troponin, MRSA. 10/15/2020: Continued azithromycin and steroids. Patient was started on iron supplementation. 10/16/2020: Patient was to start on overnight BiPAP but was not. Home O2 evaluation determined patient will need 1L oxygen at rest and 3L with activity and sleep. 10/17/2020:  Discontinued bronchodilators and started on BiPAP overnight    Subjective (past 24 hours):  - Patient tolerated only 3 hours of BiPAP overnight with initial settings of 12/6 and and increase to 14/6.  - Patient had 1 episode of urinary incontinence and 1 additional episode of retention which required straight catheterization. 1L urine was expelled from the bladder.  - Plan is hopeful discharge on 10/19/2020      ROS (12 point review of systems completed. Pertinent positives noted.  Otherwise ROS is negative). Medications:  Reviewed    Infusion Medications    dextrose       Scheduled Medications    enalapril  10 mg Oral Daily    vitamin B-12  1,000 mcg Oral Daily    ferrous sulfate  325 mg Oral Daily with breakfast    vitamin C  500 mg Oral Daily    ipratropium-albuterol  1 ampule Inhalation 4x daily    ALPRAZolam  1 mg Oral BID    amLODIPine  10 mg Oral Daily    aspirin  325 mg Oral Daily    cromolyn  1 drop Both Eyes 4x Daily    fluticasone  1 spray Each Nostril Daily    gemfibrozil  600 mg Oral BID    insulin glargine  53 Units Subcutaneous BID    metoprolol tartrate  25 mg Oral BID    pantoprazole  40 mg Oral Daily    pravastatin  40 mg Oral Nightly    tamsulosin  0.4 mg Oral Daily    Vitamin D  1,000 Units Oral Daily    sodium chloride flush  10 mL Intravenous 2 times per day    enoxaparin  40 mg Subcutaneous Daily    insulin lispro  0-6 Units Subcutaneous TID WC    insulin lispro  0-3 Units Subcutaneous Nightly    clopidogrel  75 mg Oral Daily    azithromycin  250 mg Oral Daily     PRN Meds: sodium chloride flush, acetaminophen **OR** acetaminophen, polyethylene glycol, promethazine **OR** ondansetron, glucose, dextrose, glucagon (rDNA), dextrose, benzonatate      Intake/Output Summary (Last 24 hours) at 10/18/2020 0800  Last data filed at 10/18/2020 0330  Gross per 24 hour   Intake 1060 ml   Output 3400 ml   Net -2340 ml       Diet:  DIET CARB CONTROL; Exam:  BP (!) 151/70   Pulse 70   Temp 97.6 °F (36.4 °C) (Oral)   Resp 18   Ht 5' 9\" (1.753 m)   Wt 286 lb 1.6 oz (129.8 kg)   SpO2 97%   BMI 42.25 kg/m²     Physical Exam  Vitals signs and nursing note reviewed. Constitutional:       Appearance: Normal appearance. He is obese. HENT:      Head: Normocephalic and atraumatic. Right Ear: External ear normal.      Left Ear: External ear normal.      Nose: Nose normal.      Mouth/Throat:      Mouth: Mucous membranes are moist.      Pharynx: Oropharynx is clear. Eyes:      Conjunctiva/sclera: Conjunctivae normal.      Pupils: Pupils are equal, round, and reactive to light. Neck:      Musculoskeletal: Normal range of motion and neck supple. Cardiovascular:      Rate and Rhythm: Normal rate and regular rhythm. Pulses: Normal pulses. Heart sounds: Normal heart sounds. Pulmonary:      Effort: Pulmonary effort is normal.   Abdominal:      General: Bowel sounds are normal.      Palpations: Abdomen is soft. Musculoskeletal: Normal range of motion. Skin:     General: Skin is warm and dry. Capillary Refill: Capillary refill takes less than 2 seconds. Neurological:      General: No focal deficit present. Mental Status: He is alert. Mental status is at baseline. He is disoriented. Psychiatric:         Mood and Affect: Mood normal.         Behavior: Behavior normal.         Thought Content: Thought content normal.         Judgment: Judgment normal.             Labs:   Recent Labs     10/16/20  0923 10/18/20  0551   WBC 9.0 11.0*   HGB 11.2* 12.2*   HCT 38.6* 41.5*    307     Recent Labs     10/16/20  0923 10/17/20  0548 10/18/20  0551   * 136 136   K 5.9* 5.9* 5.4*    99 98   CO2 25 29 29   BUN 19 23* 22   CREATININE 1.0 1.1 1.1   CALCIUM 9.2 9.2 10.1     No results for input(s): AST, ALT, BILIDIR, BILITOT, ALKPHOS in the last 72 hours. No results for input(s): INR in the last 72 hours. No results for input(s): Zettie Binet in the last 72 hours. Microbiology:      Urinalysis:      Lab Results   Component Value Date    NITRU Negative 09/18/2020    BLOODU Negative 09/18/2020    GLUCOSEU Negative 09/18/2020       Radiology:  CTA CHEST W WO CONTRAST   Final Result      XR CHEST (2 VW)   Final Result      Bilateral mixed airspace interstitial opacities concerning for pneumonia. Atypical or viral-type pneumonia should be considered. **This report has been created using voice recognition software.  It may contain minor errors which are inherent in voice recognition technology. **      Final report electronically signed by Dr. Charels Ruiz on 10/14/2020 7:33 PM          DVT prophylaxis: [x] Lovenox                                 [] SCDs                                 [] SQ Heparin                                 [] Encourage ambulation           [] Already on Anticoagulation     Code Status: Full Code    PT/OT Eval Status: None    Tele:   [x] yes             [] no    Electronically signed by Dylan Mooney DO on 10/18/2020 at 8:00 AM

## 2020-10-19 LAB
ANION GAP SERPL CALCULATED.3IONS-SCNC: 6 MEQ/L (ref 8–16)
BUN BLDV-MCNC: 23 MG/DL (ref 7–22)
CALCIUM SERPL-MCNC: 9.6 MG/DL (ref 8.5–10.5)
CHLORIDE BLD-SCNC: 99 MEQ/L (ref 98–111)
CO2: 33 MEQ/L (ref 23–33)
CREAT SERPL-MCNC: 1 MG/DL (ref 0.4–1.2)
ERYTHROCYTE [DISTWIDTH] IN BLOOD BY AUTOMATED COUNT: 15.6 % (ref 11.5–14.5)
ERYTHROCYTE [DISTWIDTH] IN BLOOD BY AUTOMATED COUNT: 47.4 FL (ref 35–45)
GFR SERPL CREATININE-BSD FRML MDRD: 76 ML/MIN/1.73M2
GLUCOSE BLD-MCNC: 106 MG/DL (ref 70–108)
GLUCOSE BLD-MCNC: 107 MG/DL (ref 70–108)
GLUCOSE BLD-MCNC: 127 MG/DL (ref 70–108)
GLUCOSE BLD-MCNC: 220 MG/DL (ref 70–108)
GLUCOSE BLD-MCNC: 99 MG/DL (ref 70–108)
HCT VFR BLD CALC: 41 % (ref 42–52)
HEMOGLOBIN: 12 GM/DL (ref 14–18)
LV EF: 55 %
LVEF MODALITY: NORMAL
MCH RBC QN AUTO: 24.9 PG (ref 26–33)
MCHC RBC AUTO-ENTMCNC: 29.3 GM/DL (ref 32.2–35.5)
MCV RBC AUTO: 85.1 FL (ref 80–94)
ORGANISM: ABNORMAL
PLATELET # BLD: 262 THOU/MM3 (ref 130–400)
PMV BLD AUTO: 8.9 FL (ref 9.4–12.4)
POTASSIUM SERPL-SCNC: 5.1 MEQ/L (ref 3.5–5.2)
RBC # BLD: 4.82 MILL/MM3 (ref 4.7–6.1)
SODIUM BLD-SCNC: 138 MEQ/L (ref 135–145)
URINE CULTURE REFLEX: ABNORMAL
WBC # BLD: 9.2 THOU/MM3 (ref 4.8–10.8)

## 2020-10-19 PROCEDURE — 6370000000 HC RX 637 (ALT 250 FOR IP): Performed by: INTERNAL MEDICINE

## 2020-10-19 PROCEDURE — 2580000003 HC RX 258: Performed by: STUDENT IN AN ORGANIZED HEALTH CARE EDUCATION/TRAINING PROGRAM

## 2020-10-19 PROCEDURE — 99232 SBSQ HOSP IP/OBS MODERATE 35: CPT | Performed by: INTERNAL MEDICINE

## 2020-10-19 PROCEDURE — 82948 REAGENT STRIP/BLOOD GLUCOSE: CPT

## 2020-10-19 PROCEDURE — 93306 TTE W/DOPPLER COMPLETE: CPT

## 2020-10-19 PROCEDURE — 99221 1ST HOSP IP/OBS SF/LOW 40: CPT | Performed by: PHYSICIAN ASSISTANT

## 2020-10-19 PROCEDURE — 6370000000 HC RX 637 (ALT 250 FOR IP): Performed by: STUDENT IN AN ORGANIZED HEALTH CARE EDUCATION/TRAINING PROGRAM

## 2020-10-19 PROCEDURE — APPSS30 APP SPLIT SHARED TIME 16-30 MINUTES: Performed by: NURSE PRACTITIONER

## 2020-10-19 PROCEDURE — 2060000000 HC ICU INTERMEDIATE R&B

## 2020-10-19 PROCEDURE — 94660 CPAP INITIATION&MGMT: CPT

## 2020-10-19 PROCEDURE — 94761 N-INVAS EAR/PLS OXIMETRY MLT: CPT

## 2020-10-19 PROCEDURE — 6360000002 HC RX W HCPCS: Performed by: STUDENT IN AN ORGANIZED HEALTH CARE EDUCATION/TRAINING PROGRAM

## 2020-10-19 PROCEDURE — 2700000000 HC OXYGEN THERAPY PER DAY

## 2020-10-19 PROCEDURE — 85027 COMPLETE CBC AUTOMATED: CPT

## 2020-10-19 PROCEDURE — 80048 BASIC METABOLIC PNL TOTAL CA: CPT

## 2020-10-19 PROCEDURE — 36415 COLL VENOUS BLD VENIPUNCTURE: CPT

## 2020-10-19 RX ORDER — INSULIN GLARGINE 100 [IU]/ML
40 INJECTION, SOLUTION SUBCUTANEOUS 2 TIMES DAILY
Qty: 96 ML | Refills: 0
Start: 2020-10-19 | End: 2021-01-08 | Stop reason: SDUPTHER

## 2020-10-19 RX ORDER — FERROUS SULFATE 325(65) MG
325 TABLET ORAL
Qty: 30 TABLET | Refills: 3 | Status: SHIPPED | OUTPATIENT
Start: 2020-10-20 | End: 2021-07-07 | Stop reason: SDUPTHER

## 2020-10-19 RX ORDER — ASCORBIC ACID 500 MG
500 TABLET ORAL DAILY
Qty: 30 TABLET | Refills: 3 | Status: SHIPPED | OUTPATIENT
Start: 2020-10-20 | End: 2021-02-17 | Stop reason: SDUPTHER

## 2020-10-19 RX ORDER — ENALAPRIL MALEATE 20 MG/1
10 TABLET ORAL DAILY
Qty: 90 TABLET | Refills: 1
Start: 2020-10-19 | End: 2020-11-24

## 2020-10-19 RX ORDER — TAMSULOSIN HYDROCHLORIDE 0.4 MG/1
0.4 CAPSULE ORAL 2 TIMES DAILY
Qty: 60 CAPSULE | Refills: 5 | Status: SHIPPED | OUTPATIENT
Start: 2020-10-19 | End: 2021-06-07

## 2020-10-19 RX ADMIN — OXYCODONE HYDROCHLORIDE AND ACETAMINOPHEN 500 MG: 500 TABLET ORAL at 08:03

## 2020-10-19 RX ADMIN — AMLODIPINE BESYLATE 10 MG: 10 TABLET ORAL at 08:02

## 2020-10-19 RX ADMIN — METOPROLOL TARTRATE 25 MG: 25 TABLET, FILM COATED ORAL at 21:40

## 2020-10-19 RX ADMIN — CLOPIDOGREL BISULFATE 75 MG: 75 TABLET ORAL at 08:02

## 2020-10-19 RX ADMIN — AZITHROMYCIN 250 MG: 250 TABLET, FILM COATED ORAL at 06:12

## 2020-10-19 RX ADMIN — INSULIN LISPRO 2 UNITS: 100 INJECTION, SOLUTION INTRAVENOUS; SUBCUTANEOUS at 18:18

## 2020-10-19 RX ADMIN — TAMSULOSIN HYDROCHLORIDE 0.4 MG: 0.4 CAPSULE ORAL at 21:41

## 2020-10-19 RX ADMIN — PANTOPRAZOLE SODIUM 40 MG: 40 TABLET, DELAYED RELEASE ORAL at 06:12

## 2020-10-19 RX ADMIN — GEMFIBROZIL 600 MG: 600 TABLET ORAL at 10:09

## 2020-10-19 RX ADMIN — FLUTICASONE PROPIONATE 1 SPRAY: 50 SPRAY, METERED NASAL at 08:03

## 2020-10-19 RX ADMIN — BENZONATATE 100 MG: 100 CAPSULE ORAL at 08:02

## 2020-10-19 RX ADMIN — Medication 10 ML: at 21:44

## 2020-10-19 RX ADMIN — ASPIRIN 325 MG: 325 TABLET, FILM COATED ORAL at 08:03

## 2020-10-19 RX ADMIN — INSULIN GLARGINE 45 UNITS: 100 INJECTION, SOLUTION SUBCUTANEOUS at 08:04

## 2020-10-19 RX ADMIN — PRAVASTATIN SODIUM 40 MG: 40 TABLET ORAL at 21:40

## 2020-10-19 RX ADMIN — ALPRAZOLAM 1 MG: 0.5 TABLET ORAL at 08:02

## 2020-10-19 RX ADMIN — METOPROLOL TARTRATE 25 MG: 25 TABLET, FILM COATED ORAL at 08:03

## 2020-10-19 RX ADMIN — Medication 1000 UNITS: at 08:03

## 2020-10-19 RX ADMIN — ENALAPRIL MALEATE 10 MG: 10 TABLET ORAL at 08:02

## 2020-10-19 RX ADMIN — TAMSULOSIN HYDROCHLORIDE 0.4 MG: 0.4 CAPSULE ORAL at 08:03

## 2020-10-19 RX ADMIN — Medication 10 ML: at 08:04

## 2020-10-19 RX ADMIN — Medication 1000 MCG: at 08:03

## 2020-10-19 RX ADMIN — ALPRAZOLAM 1 MG: 0.5 TABLET ORAL at 21:43

## 2020-10-19 RX ADMIN — ENOXAPARIN SODIUM 40 MG: 40 INJECTION SUBCUTANEOUS at 08:03

## 2020-10-19 ASSESSMENT — PAIN DESCRIPTION - ORIENTATION: ORIENTATION: LOWER

## 2020-10-19 ASSESSMENT — PAIN DESCRIPTION - PAIN TYPE: TYPE: ACUTE PAIN

## 2020-10-19 ASSESSMENT — PAIN DESCRIPTION - LOCATION: LOCATION: BACK

## 2020-10-19 ASSESSMENT — PAIN DESCRIPTION - FREQUENCY: FREQUENCY: INTERMITTENT

## 2020-10-19 ASSESSMENT — PAIN SCALES - GENERAL
PAINLEVEL_OUTOF10: 5
PAINLEVEL_OUTOF10: 0
PAINLEVEL_OUTOF10: 0

## 2020-10-19 ASSESSMENT — PAIN DESCRIPTION - ONSET: ONSET: ON-GOING

## 2020-10-19 ASSESSMENT — PAIN DESCRIPTION - DESCRIPTORS: DESCRIPTORS: ACHING;SORE

## 2020-10-19 NOTE — PLAN OF CARE
Problem: Cardiovascular  Goal: No DVT, peripheral vascular complications  Outcome: Ongoing  Note: No signs/symptoms of DVT. Patient on Lovenox. Problem: Cardiovascular  Goal: Hemodynamic stability  Outcome: Ongoing  Note: VS and labs within normal limits. Problem: Respiratory  Goal: No pulmonary complications  Outcome: Ongoing  Note: Patient on BiPAP while resting, no pulmonary complications this shift. Problem: Respiratory  Goal: O2 Sat > 90%  Outcome: Ongoing  Note: Patient maintaining O2 sat > 90% on 2LNC while awake, and on BiPAP at rest.      Problem: Skin Integrity/Risk  Goal: No skin breakdown during hospitalization  Outcome: Ongoing  Note: No evidence of skin breakdown, patient turns self in bed, educated on importance of pillow support. Problem: Discharge Planning:  Goal: Discharged to appropriate level of care  Description: Discharged to appropriate level of care  Outcome: Ongoing  Note: Patient prepared to be discharged home when appropriate. Problem: Pain:  Goal: Pain level will decrease  Description: Pain level will decrease  Outcome: Ongoing  Note: Denies pain this shift. Problem: Pain:  Goal: Control of acute pain  Description: Control of acute pain  Outcome: Ongoing  Note: Denies pain this shift. Problem: Falls - Risk of:  Goal: Will remain free from falls  Description: Will remain free from falls  Outcome: Ongoing  Note: Patient free from falls this shift, bed alarm in place. Problem: Falls - Risk of:  Goal: Absence of physical injury  Description: Absence of physical injury  Outcome: Ongoing  Note: No injuries this shift. Problem: Impaired respiratory status  Goal: Clear lung sounds  Outcome: Ongoing  Note: Patient lungs are clear and diminished throughout. Problem: Skin Integrity:  Goal: Will show no infection signs and symptoms  Description: Will show no infection signs and symptoms  Outcome: Ongoing  Note: No signs or symptoms of infection. Problem: Skin Integrity:  Goal: Absence of new skin breakdown  Description: Absence of new skin breakdown  Outcome: Ongoing     Problem: Gas Exchange - Impaired:  Goal: Levels of oxygenation will improve  Description: Levels of oxygenation will improve  Outcome: Ongoing        Care plan reviewed with patient. Patient verbalizes understanding of the plan of care and contribute to goal setting.

## 2020-10-19 NOTE — PROGRESS NOTES
Hospitalist Progress Note    Patient:  Jodi Pope      Unit/Bed:4K-17/017-A    YOB: 1960    MRN: [id number]       Acct: [de-identified]     PCP: Delaney Cortez MD    Date of Admission: 10/14/2020    Assessment/Plan:     Acute hypoxic respiratory failure: Likely secondary to OHS/ROBSON in context of URI. Patient was on 3L NC on admission and does not use oxygen at home. Patient now is on 1L nasal cannula with SpO2>92%. Unable to wean O2.  - Up and ambulate with PT/OT  - BiPAP at sleep and naps with 14/6 settings. - D/C bronchodilators per pulmonology  - Wean oxygen as tolerated  - Will need sleep study at discharge    ? ROBSON? OHS: Patient has episodes of desaturation when he is sleeping per nursing staff along with witnessed apneic events. Home O2 eval results patent will require 1L oxygen in the day and 3L of oxygen at night.  - Consult to inpatient sleep medicine    Bronchitis viral vs atypical: Productive green/yellow sputum x5 days without fever or signs and symptoms that meet minor or major CAP criteria as defined by the 2019 ATS/IDSA guidelines for Community Acquired Pneumonia. Patient had extensive work-up within the emergency department with negative results from Influenza A/B, respiratory panel, blood cultures x2, procalcitonin, and WBC within normal limits. - Azithromycin  250mg for total of 5 days (ending on 10/19/2020)  - IS/ac[place]ella    IDDM2: Controlled. A1c=7.3 on 8/25/2020.  - Lantus 45u BID. Medium dose sliding scale insulin  - Hold metformin  - Hypoglycemic protocol, POCT glucose 4x daily AC/HS    Iron deficiency Anemia: Hgb=12.2, MCV=83.2. Ferritin=14, Iron=33, Iron saturation=9, UBUP=557    - Patient started on iron supplementation  - Repeat Iron studies outpatient    Hx of CAD: s/p DELFIN to mid-LAD 11/11/2019 with Dr. Joseline Bentley. On DAPT without complications. - Resume aspirin and plavix    Hx of HTN: Controlled.  Resume amlodipine, enalapril, lopressor    Hx of Hyperlipidemia: Stable at baseline. Resume gemfebrozil and pravastatin    Hx of BPH: Resume flomax      Expected discharge date:  10/16/2020    Disposition:    [x] Home       [] TCU       [] Rehab       [] Psych       [] SNF       [] [place]       [] Other-    Chief Complaint: Cough, shortness of breath    Hospital Course:  Karen Ornelas is a 62 y.o. male with PMHx of CAD s/p DELFIN to mid-LAD on 11/11/2019 with Dr. Thaddeus Pineda, IDDM2, HLD, HTN, chronic low back pain who presented to [facility] emergency department on 10/14/2020 for progressive productive cough with green/yellow sputum that began on 10/10/2020. He presented to the urgent care earlier in the day the urgent care center and he had desaturation of oxygen and sent via EMS to the ED. Patient denied fever, chills, chest pain, chest tightness, nausea, vomiting, and change in bowel/bladder. Within the ED the patient was afebrile, received 1 Duoneb treatment with improved symptoms and 1 dose of azithromycin. Chest xray revealed mixed airspace interstitial opacities concerning for atypical or viral-type pneumonia. CTA of chest negative for PE.    10/14/2020: Negative respiratory panel, influenza A/B, VRE, COVID, blood cultures, procalcitonin, troponin, MRSA. 10/15/2020: Continued azithromycin and steroids. Patient was started on iron supplementation. 10/16/2020: Patient was to start on overnight BiPAP but was not. Home O2 evaluation determined patient will need 1L oxygen at rest and 3L with activity and sleep. 10/17/2020:  Discontinued bronchodilators and started on BiPAP overnight  10/18/2020: Patient ad increased urinary retention and Mccartney was placed. He tolerated BiPAP at 14/6 for 3 hours overnight    Subjective (past 24 hours):  - Per nursing staff patient tolerated BiPAP for 4 hours at 14/6 settings. - Urology was consulted for continued urinary retention  - Patient seen and examined at bedside without complaint. He is still on supplemental oxgen at 1LPM via NC.  He stated that he is eager to go home  - [place] [facility] [place] and BiPAP. Hopeful discharge 10/20/2020    ROS (12 point review of systems completed. Pertinent positives noted. Otherwise ROS is negative). Medications:  Reviewed    Infusion Medications    dextrose       Scheduled Medications    insulin glargine  45 Units Subcutaneous BID    [START ON 10/20/2020] ferrous sulfate  325 mg Oral Q48H    tamsulosin  0.4 mg Oral BID    enalapril  10 mg Oral Daily    vitamin B-12  1,000 mcg Oral Daily    vitamin C  500 mg Oral Daily    ALPRAZolam  1 mg Oral BID    amLODIPine  10 mg Oral Daily    aspirin  325 mg Oral Daily    cromolyn  1 drop Both Eyes 4x Daily    fluticasone  1 spray Each Nostril Daily    gemfibrozil  600 mg Oral BID    metoprolol tartrate  25 mg Oral BID    pantoprazole  40 mg Oral Daily    pravastatin  40 mg Oral Nightly    Vitamin D  1,000 Units Oral Daily    sodium chloride flush  10 mL Intravenous 2 times per day    enoxaparin  40 mg Subcutaneous Daily    insulin lispro  0-6 Units Subcutaneous TID WC    insulin lispro  0-3 Units Subcutaneous Nightly    clopidogrel  75 mg Oral Daily     PRN Meds: sodium chloride flush, acetaminophen **OR** acetaminophen, polyethylene glycol, promethazine **OR** ondansetron, glucose, dextrose, glucagon (rDNA), dextrose, benzonatate      Intake/Output Summary (Last 24 hours) at 10/19/2020 0730  Last data filed at 10/19/2020 0328  Gross per 24 hour   Intake 1375 ml   Output 3450 ml   Net -2075 ml       Diet:  DIET CARB CONTROL; Exam:  BP (!) 128/59   Pulse 70   Temp 97.9 °F (36.6 °C) (Oral)   Resp 23   Ht 5' 9\" (1.753 m)   Wt 285 lb 11.2 oz (129.6 kg)   SpO2 94%   BMI 42.19 kg/m²     Physical Exam  Vitals signs and nursing note reviewed. Constitutional:       Appearance: Normal appearance. He is obese. HENT:      Head: Normocephalic and atraumatic.       Right Ear: External ear normal.      Left Ear: External ear normal.      Nose: Nose normal.      Mouth/Throat:      Mouth: Mucous membranes are moist.      Pharynx: Oropharynx is clear. Eyes:      Conjunctiva/sclera: Conjunctivae normal.      Pupils: Pupils are equal, round, and reactive to light. Neck:      Musculoskeletal: Normal range of motion and neck supple. Cardiovascular:      Rate and Rhythm: Normal rate and regular rhythm. Pulses: Normal pulses. Heart sounds: Normal heart sounds. Pulmonary:      Effort: Pulmonary effort is normal.   Abdominal:      General: Bowel sounds are normal.      Palpations: Abdomen is soft. Musculoskeletal: Normal range of motion. Skin:     General: Skin is warm and dry. Capillary Refill: Capillary refill takes less than 2 seconds. Neurological:      General: No focal deficit present. Mental Status: He is alert. Mental status is at baseline. He is disoriented. Psychiatric:         Mood and Affect: Mood normal.         Behavior: Behavior normal.         Thought Content: Thought content normal.         Judgment: Judgment normal.             Labs:   Recent Labs     10/16/20  0923 10/18/20  0551   WBC 9.0 11.0*   HGB 11.2* 12.2*   HCT 38.6* 41.5*    307     Recent Labs     10/16/20  0923 10/17/20  0548 10/18/20  0551   * 136 136   K 5.9* 5.9* 5.4*    99 98   CO2 25 29 29   BUN 19 23* 22   CREATININE 1.0 1.1 1.1   CALCIUM 9.2 9.2 10.1     No results for input(s): AST, ALT, BILIDIR, BILITOT, ALKPHOS in the last 72 hours. No results for input(s): INR in the last 72 hours. No results for input(s): Makayla Sheryl in the last 72 hours.     Microbiology:      Urinalysis:      Lab Results   Component Value Date    NITRU NEGATIVE 10/18/2020    WBCUA 10-15 10/18/2020    BACTERIA FEW 10/18/2020    RBCUA 10-15 10/18/2020    BLOODU MODERATE 10/18/2020    GLUCOSEU NEGATIVE 10/18/2020       Radiology:  CTA CHEST W WO CONTRAST   Final Result      XR CHEST (2 VW)   Final Result

## 2020-10-19 NOTE — PROGRESS NOTES
Patient was evaluated today for the diagnosis of COPD. I entered a DME order for home oxygen because the diagnosis and testing requires the patient to have supplemental oxygen. Condition will improve or be benefited by oxygen use. The patient is  able to perform good mobility in a home setting and therefore does require the use of a portable oxygen system. The need for this equipment was discussed with the patient and he understands and is in agreement.

## 2020-10-19 NOTE — PROGRESS NOTES
Received message from medical DME that the bipap has been denied. They need an ABG while he is asleep on the bipap? Dr. Deepak Palomino notified, Dr Javier Oacmpo notified.  notified, attempted to call company and got no response. 1800 patient called out and states the equipment company called and states that they are in [facility] and will not be here until after 2100. Patient does not have a ride this late.  Dr. Sean Purdy notified states patient can stay until AM, so social work will assist in process

## 2020-10-19 NOTE — FLOWSHEET NOTE
Alli Lerner is a 62year old male who is in bed on 4k. He is alert and approachable. He likes music and was listening to it on the TV when I entered his room. NO family was present at this time. Alli Lerner welcomed prayer and stated he hopes to be discharged to go home soon. 10/19/20 1410   Encounter Summary   Services provided to: Patient   Referral/Consult From: Rounding   Continue Visiting No  (10/19 hopes to be discharged soon)   Complexity of Encounter Moderate   Length of Encounter 15 minutes   Routine   Type Initial   Spiritual/Samaritan   Type Spiritual support   Care Plan:  Continue spiritual and emotional care for patient and family. Including prayers.

## 2020-10-19 NOTE — PROGRESS NOTES
[facility]    Patient - Prerna Capone   MRN -  [id number]   Acct # - [de-identified]   - 1960      Date of Admission -  10/14/2020  6:52 PM  Date of evaluation -  10/19/2020  Room - 1100 [facility] Day - 4  Consulting - Sabino Gomez MD Primary Care Physician - Jaja Shin MD   Chief Complaint   Hypoxia    Active Hospital Problem List      Active Hospital Problems    Diagnosis Date Noted    CAD (coronary artery disease) [I25.10]      Priority: High    Morbidly obese ([address] [E66.01]      Priority: Medium    Hypertension [I10]      Priority: Medium    Urinary retention [R33.9]     H/O heart artery stent [Z95.5] 10/17/2020    Pneumonia due to organism [J18.9]     Acute respiratory failure with hypoxia (HCC) [J96.01]     Viral pneumonia [J12.9] 10/15/2020    ROBSON (obstructive sleep apnea) [G47.33] 2016     HPI   Prerna Capone is a 62 y.o. male presented to [facility] c/o chest symptoms for the last few including SOB, congestion, cough, malaise, oxygen saturation was noted to drop down to 82% with ambulation, he was transferred to [facility] for eval.  Never in distress, CTA was unremarkable except for trace effusions. SARS CoV (-), PCT 0.6, proBNP 124  Started on Bipap and the supplemental oxygen. H/O ROBSON diagnosed over 10 years ago in [facility], quit using his PAP after a short time, referred to see me in 2016 by Dr Sabrina Matta for symptoms associated with sleep apnea, was scheduled for sleep study but not completed. Remote smoker, quit 25 years ago  HTN, HLP, CAD, chronic back pain  Events of last 24h   Stable on 2LPM via NC- 94%  PAP used overnight  Pt qualifies for home BiPAP  No events overnight   Home today     -All systems reviewed.      Past Surgical History           Procedure Laterality Date    BACK SURGERY  1997    L4 & L5 fusion    CARDIAC CATHETERIZATION  10/2019   [facility] CARDIAC SURGERY  2004    Cardiac cath with stent placement x1    COLONOSCOPY  2010    CORONARY ANGIOPLASTY WITH STENT PLACEMENT  10/17/2019    HERNIA REPAIR      Mesh repair in abdomen. Diet    DIET CARB CONTROL; Allergies    Ace inhibitors; Baclofen; Darvocet [propoxyphene n-acetaminophen]; Darvon [propoxyphene hcl]; Flexeril [cyclobenzaprine]; Morphine; Motrin [ibuprofen micronized];  Tylenol [acetaminophen]; and Ultram [tramadol]  Social History     Social History     Socioeconomic History    Marital status: Single     Spouse name: Not on file    Number of children: Not on file    Years of education: Not on file    Highest education level: Not on file   Occupational History    Not on file   Social Needs    Financial resource strain: Not on file    Food insecurity     Worry: Not on file     Inability: Not on file    Transportation needs     Medical: Not on file     Non-medical: Not on file   Tobacco Use    Smoking status: Former Smoker     Packs/day: 0.25     Years: 2.00     Pack years: 0.50     Types: Cigarettes     Last attempt to quit: 1995     Years since quittin.8    Smokeless tobacco: Never Used   Substance and Sexual Activity    Alcohol use: No    Drug use: No    Sexual activity: Yes     Partners: Female   Lifestyle    Physical activity     Days per week: Not on file     Minutes per session: Not on file    Stress: Not on file   Relationships    Social connections     Talks on phone: Not on file     Gets together: Not on file     Attends Anglican service: Not on file     Active member of club or organization: Not on file     Attends meetings of clubs or organizations: Not on file     Relationship status: Not on file    Intimate partner violence     Fear of current or ex partner: Not on file     Emotionally abused: Not on file     Physically abused: Not on file     Forced sexual activity: Not on file   Other Topics Concern    Not on file   Social History Narrative    Not on file     Family History          Problem Relation Age of Onset    Diabetes Mother     Heart Disease Mother     Arthritis Father     Diabetes Father     Heart Disease Father     Diabetes Sister     Diabetes Brother     Heart Disease Brother      Sleep History    ROBSON not on treatment    Meds    Current Medications    insulin glargine  45 Units Subcutaneous BID    [START ON 10/20/2020] ferrous sulfate  325 mg Oral Q48H    tamsulosin  0.4 mg Oral BID    enalapril  10 mg Oral Daily    vitamin B-12  1,000 mcg Oral Daily    vitamin C  500 mg Oral Daily    ALPRAZolam  1 mg Oral BID    amLODIPine  10 mg Oral Daily    aspirin  325 mg Oral Daily    cromolyn  1 drop Both Eyes 4x Daily    fluticasone  1 spray Each Nostril Daily    gemfibrozil  600 mg Oral BID    metoprolol tartrate  25 mg Oral BID    pantoprazole  40 mg Oral Daily    pravastatin  40 mg Oral Nightly    Vitamin D  1,000 Units Oral Daily    sodium chloride flush  10 mL Intravenous 2 times per day    enoxaparin  40 mg Subcutaneous Daily    insulin lispro  0-6 Units Subcutaneous TID WC    insulin lispro  0-3 Units Subcutaneous Nightly    clopidogrel  75 mg Oral Daily     sodium chloride flush, acetaminophen **OR** acetaminophen, polyethylene glycol, promethazine **OR** ondansetron, glucose, dextrose, glucagon (rDNA), dextrose, benzonatate  IV Drips/Infusions   dextrose       Vitals    Vitals    height is 5' 9\" (1.753 m) and weight is 285 lb 11.2 oz (129.6 kg). His oral temperature is 97.9 °F (36.6 °C). His blood pressure is 132/67 and his pulse is 70. His respiration is 23 and oxygen saturation is 94%.      O2 Flow Rate (L/min): 1 L/min(oxygen removed)  I/O    Intake/Output Summary (Last 24 hours) at 10/19/2020 0918  Last data filed at 10/19/2020 0328  Gross per 24 hour   Intake 1365 ml   Output 2450 ml   Net -1085 ml     Patient Vitals for the past 96 hrs (Last 3 readings):   Weight   10/19/20 0600 285 lb 11.2 oz (129.6 kg)   10/18/20 0330 286 lb 1.6 oz (129.8 kg)   10/17/20 0310 288 lb 14.4 oz (131 kg)     Exam   Physical Exam   Constitutional: No distress on O2 2LPM per NC. Patient appears obese BMI 42  Mouth/Throat: Oropharynx is clear and moist.  No oral thrush. MP III  Eyes: Conjunctivae are normal. Pupils are equal, round. No scleral icterus. Neck: Neck supple. No tracheal deviation present. No JVD  Cardiovascular: S1 and S2 with no murmur. Trace BLE peripheral edema  Pulmonary/Chest: Normal effort with bilateral air entry, clear breath sounds with diminished bases BLL. No stridor. No respiratory distress. Patient exhibits no tenderness. No wheezing/rales  Abdominal: Soft. Bowel sounds audible. No distension or tenderness to palp. Musculoskeletal: Moves all extremities; no clubbing or cyanosis  Neurological: Patient is alert and oriented   Skin: Warm and dry. Labs   ABG  Lab Results   Component Value Date    PH 7.32 10/17/2020    PO2 81 10/17/2020    PCO2 62 10/17/2020    HCO3 32 10/17/2020    O2SAT 94 10/17/2020     Lab Results   Component Value Date    IFIO2 3 10/17/2020     CBC  Recent Labs     10/16/20  0923 10/18/20  0551 10/19/20  0751   WBC 9.0 11.0* 9.2   RBC 4.57* 4.99 4.82   HGB 11.2* 12.2* 12.0*   HCT 38.6* 41.5* 41.0*   MCV 84.5 83.2 85.1   MCH 24.5* 24.4* 24.9*   MCHC 29.0* 29.4* 29.3*    307 262   MPV 9.6 9.4 8.9*      BMP  Recent Labs     10/17/20  0548 10/18/20  0551 10/19/20  0751    136 138   K 5.9* 5.4* 5.1   CL 99 98 99   CO2 29 29 33   BUN 23* 22 23*   CREATININE 1.1 1.1 1.0   GLUCOSE 129* 131* 99   CALCIUM 9.2 10.1 9.6     LFT  No results for input(s): AST, ALT, ALB, BILITOT, ALKPHOS, LIPASE in the last 72 hours. Invalid input(s): AMYLASE  TROP  Lab Results   Component Value Date    TROPONINT < 0.010 10/14/2020     BNP  Lab Results   Component Value Date    PROBNP 124.6 10/14/2020     .   PFTs     Echo    10/14/20- Pending read    Cultures    Procalcitonin  Lab Results   Component Value Date    PROCAL 0.06 10/14/2020   Rapid Influenza A/B (-)  MRSA (-)  VRE (-)  Blood Cultures x 2: no growth; prelim    Respiratory Panel, Molecular [[id number]]   Collected: 10/15/20 0225    Order Status: Completed  Updated: 10/15/20 0602     Film Array Adenovirus  Not Detected     Film Array Coronavirus 229E  Not Detected     Film Array Coronavirus HKU1  Not Detected     Film Array Conoravirus NL63  Not Detected     Film Array Coronavirus OC43  Not Detected     Film Array Metapneumovirus  Not Detected     Film Array Rhinovirus/Enterovirus  Not Detected     Film Array Influenza A Virus  Not Detected     Film Array Influenza B  Not Detected     Film Array Parainfluenza Virus 1  Not Detected     Film Array Parainfluenza Virus 2  Not Detected     Film Array Parainfluenza Virus 3  Not Detected     Film Array Parainfluenza Virus 4  Not Detected     Film Array Respiratory Syncitial Virus  Not Detected     Bordetella parapertussis by PCR  Not Detected     Bordetella pertussis by PCR  Not Detected     Film Array Chlamydophilia Pneumoniae  Not Detected     Film Array Mycoplasma Pneumoniae  Not Detected           Radiology    CXR    FINDINGS:    Bilateral mixed airspace interstitial opacities concerning for pneumonia. Atypical or viral-type pneumonia should be considered. Cardiomegaly. Costophrenic recesses are preserved. No acute osseous findings.              Impression         Bilateral mixed airspace interstitial opacities concerning for pneumonia. Atypical or viral-type pneumonia should be considered.              **This report has been created using voice recognition software. It may contain minor errors which are inherent in voice recognition technology. **         Final report electronically signed by Dr. Terence Moreira on 10/14/2020 7:33 PM          CT Scans    Impression    No pulmonary emboli. Trace effusions. Atheromatous aortic and coronary vascular calcifications.         This document has been electronically signed by:  Ethel Coley MD on    10/15/2020 12:34 AM         All CT scans at this facility use dose modulation, iterative    reconstruction, and/or weight-based    dosing when appropriate to reduce radiation dose to as low as reasonably    achievable. Assessment   Hypoxemia suspect chronic respiratory failure due to obesity, untreated ROBSON, possibly OHS/COPD- improving   (See actual reports for details). Qualifies for Bipap via hypoventilation, will prescribe  Low PCT, Normal WBC, Afebrile, no infiltrates in CTA--no supportive evidence of CAP  Negative rapid flu/SARS  Obesity BMI 42  Full Code   Recommendations   Bipap 14/6, equipment to be delivered home upon DC- DME order in [facility]   Patient qualified for home BiPAP using Obesity Hypoventilation pathway  Patient to use PAP therapy with daytime napping and continuous at bedtime  Weight loss encouraged  Wean supplemental oxygen and get a home oxygen eval before DC  Use sedative medications with caution   VTE prophylaxis: Lovenox  GI prophylaxis: Protonix  OK for DC when other services are ready   ECHO pending read    Case discussed with nurse and patient/family. Questions and concerns addressed. Meds and Orders reviewed. Follow up at [facility] in the [facility] Clinic with Monae Kern CNP in 6-8 weeks with PAP download    Electronically signed by     PIEDAD Freire CNP on 10/19/2020 at 9:18 AM      No changes  ECHO in progress  Used Bipap to sleep  Still on supplemental oxygen  Bipap to go home, 6 MWT before DC  F/U pulm clinic      Patient seen and examined independently by me. Above discussed and I agree with  CNP note Also see my additional comments. Labs, cultures, and radiographs when available were reviewed. Changes were made in the orders as necessary. I discussed patient concerns with Pool'prasanth [email or web address] and instructions were given. Respiratory care issues addressed. Please see our orders for the updated patient care plan.     Electronically signed by     Harris Becerril Kathryn Pope MD on 10/19/2020 at 10:54 AM

## 2020-10-19 NOTE — PROGRESS NOTES
A home oxygen evaluation has been completed. [x]Patient is an inpatient. It is expected that the patient will be discharged within the next 48 hours. Qualified provider to write order for home prescription if patient qualifies. Social service/care managers will arrange for home oxygen. If patient is active, arrange for Home Medical supplier to assess for Oxygen Conserving Device per pulse oximetry. []Patient is an outpatient. Results will be faxed to the ordering provider. Qualified provider to write order for home prescription if patient qualifies and arranges for home oxygen. Patient was on room air. SpO2 was 86% on room air at rest. Patients SpO2 was below 89% and qualified for home oxygen. Oxygen was applied at 2 lpm via nasal cannula to maintain a SpO2 between 90-92% while at rest. Actual SpO2 was 91-92 %. Patient able to ambulate for exercise flow rate. Patients was ambulated, SpO2 was 91-92% on 2 lpm to maintain SpO2 between 90-92% while exercising. KESHAWN HERRMANN given results of evaluation.

## 2020-10-20 ENCOUNTER — TELEPHONE (OUTPATIENT)
Dept: UROLOGY | Age: 60
End: 2020-10-20

## 2020-10-20 LAB
BLOOD CULTURE, ROUTINE: NORMAL
BLOOD CULTURE, ROUTINE: NORMAL
GLUCOSE BLD-MCNC: 120 MG/DL (ref 70–108)
GLUCOSE BLD-MCNC: 136 MG/DL (ref 70–108)

## 2020-10-20 PROCEDURE — 6370000000 HC RX 637 (ALT 250 FOR IP): Performed by: INTERNAL MEDICINE

## 2020-10-20 PROCEDURE — 99239 HOSP IP/OBS DSCHRG MGMT >30: CPT | Performed by: INTERNAL MEDICINE

## 2020-10-20 PROCEDURE — 94761 N-INVAS EAR/PLS OXIMETRY MLT: CPT

## 2020-10-20 PROCEDURE — 94660 CPAP INITIATION&MGMT: CPT

## 2020-10-20 PROCEDURE — 82948 REAGENT STRIP/BLOOD GLUCOSE: CPT

## 2020-10-20 PROCEDURE — 2700000000 HC OXYGEN THERAPY PER DAY

## 2020-10-20 PROCEDURE — 2580000003 HC RX 258: Performed by: STUDENT IN AN ORGANIZED HEALTH CARE EDUCATION/TRAINING PROGRAM

## 2020-10-20 PROCEDURE — 6370000000 HC RX 637 (ALT 250 FOR IP): Performed by: STUDENT IN AN ORGANIZED HEALTH CARE EDUCATION/TRAINING PROGRAM

## 2020-10-20 PROCEDURE — 6360000002 HC RX W HCPCS: Performed by: STUDENT IN AN ORGANIZED HEALTH CARE EDUCATION/TRAINING PROGRAM

## 2020-10-20 RX ADMIN — ENOXAPARIN SODIUM 40 MG: 40 INJECTION SUBCUTANEOUS at 08:14

## 2020-10-20 RX ADMIN — GEMFIBROZIL 600 MG: 600 TABLET ORAL at 08:14

## 2020-10-20 RX ADMIN — AMLODIPINE BESYLATE 10 MG: 10 TABLET ORAL at 08:14

## 2020-10-20 RX ADMIN — Medication 10 ML: at 08:14

## 2020-10-20 RX ADMIN — ENALAPRIL MALEATE 10 MG: 10 TABLET ORAL at 08:14

## 2020-10-20 RX ADMIN — CLOPIDOGREL BISULFATE 75 MG: 75 TABLET ORAL at 08:14

## 2020-10-20 RX ADMIN — FERROUS SULFATE TAB 325 MG (65 MG ELEMENTAL FE) 325 MG: 325 (65 FE) TAB at 08:14

## 2020-10-20 RX ADMIN — Medication 1000 MCG: at 08:14

## 2020-10-20 RX ADMIN — PANTOPRAZOLE SODIUM 40 MG: 40 TABLET, DELAYED RELEASE ORAL at 05:41

## 2020-10-20 RX ADMIN — OXYCODONE HYDROCHLORIDE AND ACETAMINOPHEN 500 MG: 500 TABLET ORAL at 08:14

## 2020-10-20 RX ADMIN — Medication 1000 UNITS: at 08:14

## 2020-10-20 RX ADMIN — ASPIRIN 325 MG: 325 TABLET, FILM COATED ORAL at 08:14

## 2020-10-20 RX ADMIN — FLUTICASONE PROPIONATE 1 SPRAY: 50 SPRAY, METERED NASAL at 08:15

## 2020-10-20 RX ADMIN — TAMSULOSIN HYDROCHLORIDE 0.4 MG: 0.4 CAPSULE ORAL at 08:14

## 2020-10-20 RX ADMIN — ALPRAZOLAM 1 MG: 0.5 TABLET ORAL at 08:15

## 2020-10-20 RX ADMIN — METOPROLOL TARTRATE 25 MG: 25 TABLET, FILM COATED ORAL at 08:14

## 2020-10-20 ASSESSMENT — PAIN SCALES - GENERAL
PAINLEVEL_OUTOF10: 0

## 2020-10-20 NOTE — PROGRESS NOTES
Discharge teaching and instructions for diagnosis/procedure of respiratory failure completed with patient using teachback method. AVS reviewed. Printed prescriptions given to patient. Patient voiced understanding regarding prescriptions, follow up appointments, and care of self at home.  Discharged in a wheelchair to  home with support per family

## 2020-10-20 NOTE — TELEPHONE ENCOUNTER
Discharge from [facility] on 10/20/20. Urinary retention- Patient has a history of BPH with LUTS. Historically, he has taken Flomax once daily. Increase Flomax 0.4 mg to twice daily. Leave corea catheter in at discharge. Leg bag teaching. Is scheduled to follow-up at [address] [place] [address] Urology on 10/26/20 at 8 am with Dr. Giang. Will perform office cystoscopy with void trial.     Elevated PSA- Patient has an elevated PSA of 16.31. Insurance denies MRI of the prostate. Briefly discussed the importance of obtaining a TRUS prostate biopsy for further delineation. Discussed technical aspects of the procedure. Will discuss more at length at his next appointment and schedule in the future. Patient takes Plavix which will need to be held prior to his potential procedure.

## 2020-10-20 NOTE — DISCHARGE SUMMARY
Hospital Medicine Discharge Summary      Patient Identification:   Name: Dana Padilla   : 1960  MRN: [id number]   Account: [de-identified]      Patient's PCP: Black Valencia MD    Admit Date: 10/14/2020     Discharge Date:   10/20/2020    Admitting Physician: Vitaliy Aldrich MD     Discharge Physician: Sha Oseguera DO         HPI:  Dana Padilla is a 62 y.o. male who presented to [address] [place] [address] for productive cough and shortness of breath for approximately 4 days which required supplemental oxygen upon admission. Please see chart for more details regarding HPI. Hospital Course:   Freddy Mena is a 62 y.o. male with PMHx of CAD s/p DELFIN to mid-LAD on 2019 with Dr. Unruly Hardy, IDDM2, HLD, HTN, chronic low back pain who presented to [facility] emergency department on 10/14/2020 for progressive productive cough with green/yellow sputum that began on 10/10/2020. He presented to the urgent care earlier in the day the urgent care center and he had desaturation of oxygen and sent via EMS to the ED. Patient denied fever, chills, chest pain, chest tightness, nausea, vomiting, and change in bowel/bladder.     Within the ED the patient was afebrile, received 1 Duoneb treatment with improved symptoms and 1 dose of azithromycin. Chest xray revealed mixed airspace interstitial opacities concerning for atypical or viral-type pneumonia. CTA of chest negative for PE.     Acute hypoxic respiratory syndrome: Patient required 3L NC upon admission with weaning down to 1L via NC upon discharge. Home O2 evaluation revealed patient to need 1L at rest and 3L with activity and sleep. Patient was sent home with incentive spirometry and Acapella for continued use. Acute onset urinary retention: Patient experienced urinary retention which required a Mccartney to be placed. Urology was consulted with recommendation to leave the Mccartney catheter in place discharge and follow-up outpatient.     Obesity hypoventilation syndrome: Patient was observed to have hypoxic events while sleeping along with apnea and snoring. The patient had a trial on BiPAP with settings of 14/6 with improvement during his hiospital course. The patient was consulted by pulmonology with recommendation for home BiPAP and follow-up outpatient for possible sleep study. Iron deficiency Anemia: Laboratory studies revealed low iron, low iron saturation, normal b12, and normal folate. Patient was started on iron supplementation and will follow-up with his PCP for continued care. The patient was stable for discharge - all consultants were contacted and in agreement with plan for discharge. Appropriate follow up appointment was arranged prior to discharge. Please see below or view chart for more details from hospital course. Discharge Diagnoses:    · Acute hypoxic respiratory failure  · Urinary retention  · Obesity hypoventilation syndrome  · Iron deficiency anemia      The patient was seen and examined on day of discharge and this discharge summary is in conjunction with any daily progress note from day of discharge. Exam:     Vitals:   Vitals:    10/20/20 0305 10/20/20 0545 10/20/20 0800 10/20/20 1115   BP: (!) 111/54  130/61 (!) 122/57   Pulse: 71  77 70   Resp: 20  18 16   Temp: 97.9 °F (36.6 °C)  98.5 °F (36.9 °C) 98.6 °F (37 °C)   TempSrc: Oral  Oral Oral   SpO2: 94%  94% 94%   Weight:  284 lb 9.6 oz (129.1 kg)     Height:  5' 9\" (1.753 m)       Weight: Weight: 284 lb 9.6 oz (129.1 kg)     24 hour intake/output:    Intake/Output Summary (Last 24 hours) at 10/20/2020 1352  Last data filed at 10/20/2020 1336  Gross per 24 hour   Intake 2469.08 ml   Output 1900 ml   Net 569.08 ml         Physical Exam  Vitals signs and nursing note reviewed. Constitutional:       Appearance: Normal appearance. He is obese. HENT:      Head: Normocephalic and atraumatic.       Right Ear: External ear normal.      Left Ear: External ear normal.      Nose: Nose normal.      Mouth/Throat:      Mouth: Mucous membranes are moist.      Pharynx: Oropharynx is clear. Eyes:      Conjunctiva/sclera: Conjunctivae normal.      Pupils: Pupils are equal, round, and reactive to light. Neck:      Musculoskeletal: Normal range of motion and neck supple. Cardiovascular:      Rate and Rhythm: Normal rate and regular rhythm. Pulses: Normal pulses. Heart sounds: Normal heart sounds. Pulmonary:      Effort: Pulmonary effort is normal.      Breath sounds: Normal breath sounds. Abdominal:      General: Bowel sounds are normal.      Palpations: Abdomen is soft. Genitourinary:     Comments: Mccartney catheter  Musculoskeletal: Normal range of motion. Skin:     General: Skin is warm and dry. Capillary Refill: Capillary refill takes less than 2 seconds. Neurological:      General: No focal deficit present. Mental Status: He is alert and oriented to person, place, and time. Mental status is at baseline. Psychiatric:         Mood and Affect: Mood normal.         Behavior: Behavior normal.         Thought Content: Thought content normal.         Judgment: Judgment normal.             Labs: For convenience and continuity at follow-up the following most recent labs are provided:      CBC:    Lab Results   Component Value Date    WBC 9.2 10/19/2020    HGB 12.0 10/19/2020    HCT 41.0 10/19/2020     10/19/2020       Renal:    Lab Results   Component Value Date     10/19/2020    K 5.1 10/19/2020    K 4.9 10/15/2020    CL 99 10/19/2020    CO2 33 10/19/2020    BUN 23 10/19/2020    CREATININE 1.0 10/19/2020    CALCIUM 9.6 10/19/2020         Significant Diagnostic Studies    Radiology:   CTA CHEST W WO CONTRAST   Final Result      XR CHEST (2 VW)   Final Result      Bilateral mixed airspace interstitial opacities concerning for pneumonia. Atypical or viral-type pneumonia should be considered.           **This report has been created using voice recognition software. It may contain minor errors which are inherent in voice recognition technology. **      Final report electronically signed by Dr. Roanne Romberg on 10/14/2020 7:33 PM             Consults:     IP CONSULT TO PULMONOLOGY  IP CONSULT TO UROLOGY  IP CONSULT TO HOME CARE NEEDS    Disposition:    [x] Home       [] TCU       [] Rehab       [] Psych       [] SNF       [] [place]       [] Other-    Condition at Discharge: good    Code Status:  Full Code   Tobacco: Former smoker  DVT Prophylaxis: Lovenox  Vaccinations:    Patient Instructions:    Discharge lab work: None  Activity: activity as tolerated  Diet: DIET CARB CONTROL; Follow-up visits:   Priscila Heredia MD  [facility]  [phone]    On 10/27/2020  Hospital follow up appointment TIME: 8:45 am    [facility] [address]  [address] [id number] [phone]      As needed for nursing, [facility]. [facility]  [facility] [address]  [address] [facility],[address]  [facility]  Schedule an appointment as soon as possible for a visit  Sleep study & PFTs    Sera Calderon, APRN - CNP  [address] [facility]  [phone]    On 11/30/2020  6-8 weeks follow up with download, Hospital follow up appointment TIME: 11:30 am    Medical Services  [phone]    As needed for home BIPAP 14/6 with 2L bleed-in, home oxygen 2L          Discharge Medications:        Medication List      START taking these medications    ascorbic acid 500 MG tablet  Commonly known as:  VITAMIN C  Take 1 tablet by mouth daily     cyanocobalamin 1000 MCG tablet  Take 1 tablet by mouth daily     ferrous sulfate 325 (65 Fe) MG tablet  Commonly known as:  IRON 325  Take 1 tablet by mouth every 48 hours        CHANGE how you take these medications    enalapril 20 MG tablet  Commonly known as:  VASOTEC  Take 0.5 tablets by mouth daily  What changed:  See the new instructions.      Lantus SoloStar 100 UNIT/ML injection pen  Generic drug:  insulin glargine  Inject 40 Units into the skin 2 times daily  What changed:  how much to take     tamsulosin 0.4 MG capsule  Commonly known as:  Flomax  Take 1 capsule by mouth 2 times daily  What changed:  See the new instructions. CONTINUE taking these medications    albuterol (2.5 MG/3ML) 0.083% nebulizer solution  Commonly known as:  PROVENTIL  Take 3 mLs by nebulization every 4 hours as needed for Wheezing     ALPRAZolam 1 MG tablet  Commonly known as:  XANAX  Take 1 tablet by mouth 2 times daily for 30 days.      amLODIPine 5 MG tablet  Commonly known as:  NORVASC     aspirin 325 MG tablet     B-D ULTRAFINE III SHORT PEN 31G X 8 MM Misc  Generic drug:  Insulin Pen Needle     BD Insulin Syringe U/F 31G X 5/16\" 1 ML Misc  Generic drug:  Insulin Syringe-Needle U-100  1 each by Other route 2 times daily     clopidogrel 75 MG tablet  Commonly known as:  PLAVIX  take 1 tablet by mouth once daily     cromolyn 4 % ophthalmic solution  Commonly known as:  OPTICROM  Place 1 drop into both eyes 4 times daily     fluticasone 50 MCG/ACT nasal spray  Commonly known as:  Flonase  One spray in each nostril q hs     gemfibrozil 600 MG tablet  Commonly known as:  LOPID  Take 1 tablet by mouth 2 times daily     insulin lispro 100 UNIT/ML injection vial  Commonly known as:  HUMALOG     metFORMIN 1000 MG tablet  Commonly known as:  GLUCOPHAGE  take 1 tablet by mouth twice a day with meals     metoprolol tartrate 25 MG tablet  Commonly known as:  LOPRESSOR     nitroGLYCERIN 0.4 MG SL tablet  Commonly known as:  NITROSTAT     pantoprazole 40 MG tablet  Commonly known as:  PROTONIX  take 1 tablet by mouth once daily     pravastatin 40 MG tablet  Commonly known as:  PRAVACHOL     Vitamin D (Cholecalciferol) 25 MCG (1000 UT) Tabs           Where to Get Your Medications      These medications were sent to [facility] [address], [address], [facility] [place] [address]    Phone:  [phone]   · ascorbic acid 500 MG tablet  · cyanocobalamin 1000 MCG tablet  · ferrous sulfate 325 (65 Fe) MG tablet  · tamsulosin 0.4 MG capsule     Information about where to get these medications is not yet available    Ask your nurse or doctor about these medications  · enalapril 20 MG tablet  · Lantus SoloStar 100 UNIT/ML injection pen           Discharge Time:  Time spent on discharge is >35 minutes in the examination, evaluation, counseling, and review of medications and discharge plan. The hospital course was discussed with the patient and all questions and concerns were addressed at that time. The patient was in agreement with and verbalized understanding of the plan of care and had no additional questions or complaints. The patient was instructed to follow-up with any scheduled outpatient appointments or to report to the nearest Emergency Department if new or worsening symptoms should arise. Thank you Mango Faye MD for the opportunity to be involved in this patient's care.     Signed:    Electronically signed by Carissa Daniels DO on 10/20/2020 at 1:52 PM

## 2020-10-20 NOTE — PROGRESS NOTES
ABG drawn on 10/16/2020 at 1303 was drawn while pt wide awake. ABG drawn on 10/17/2020 at 0555 was drawn upon pt awakening.

## 2020-10-20 NOTE — DISCHARGE INSTR - DIET
 Good nutrition is important when healing from an illness, injury, or surgery. Follow any nutrition recommendations given to you during your hospital stay.  If you were given an oral nutrition supplement while in the hospital, continue to take this supplement at home. You can take it with meals, in-between meals, and/or before bedtime. These supplements can be purchased at most local grocery stores, pharmacies, and chain super-stores.  If you have any questions about your diet or nutrition, call the hospital and ask for the dietitian. You are being placed on a diabetic carb counting diet. Eating healthy is the first step in controlling diabetes    Here's how to get started. ... Eat 3 meals a day. Eat your meals at the same time each day and do not skip meals. Eat about the same amount of food each day. Limit sugar and sweets. Eat less candy, desserts, pastries and jelly. Limit intake of regular pop, sugary beverages and fruit juice. Drink sugar free beverages such as diet pop, water, Crystal Light, and unsweetened tea instead. Use Equal or Sweet-n-Low in place of sugar. Lose weight if you are overweight. Even a small amount of weight loss may help improve your blood sugar control. To help lose weight, reduce your portion sizes. Control your intake of carbohydrates. Carbohydrate is the main  nutrient that affects blood sugar levels. All the carbohydrate you eat is turned  into sugar by your body. Therefore, it is important to control  the amount  of carbohydrate that you eat a day. You should eat about 60-75  grams of  carbohydrate at each meal.      Common sources of carbohydrates:                       Eat more fiber. Fiber can help slow down the rise in blood sugar following a meal.  To get more fiber in your diet, eat at least 5 servings of fruits and vegetables a day, choose whole grain bread/cereal and eat more beans or legumes.      Reduce your intake of high fat foods. Cutting back on your intake of high fat food can help reduce body weight and cholesterol levels. Reduce intake of fried food, anders, sausage, luncheon meat, gravy, sour cream, cheese, egg yolks and margarine/butter. Limit your intake of alcohol. Drink alcohol only with permission of your doctor. Never drink alcohol on an empty stomach. Be more active. Regular exercise is an important part of your diabetes care as exercise can help lower your blood sugar levels. The type and amount of exercise that is right for you should be discussed with your doctor.

## 2020-10-20 NOTE — CONSULTS
REPAIR  1990    Mesh repair in abdomen. No current facility-administered medications on file prior to encounter. Current Outpatient Medications on File Prior to Encounter   Medication Sig Dispense Refill    insulin lispro (HUMALOG) 100 UNIT/ML injection vial Inject 10 Units into the skin 2 times daily as needed for High Blood Sugar when blood sugar > 155      ALPRAZolam (XANAX) 1 MG tablet Take 1 tablet by mouth 2 times daily for 30 days. 60 tablet 0    metFORMIN (GLUCOPHAGE) 1000 MG tablet take 1 tablet by mouth twice a day with meals 180 tablet 1    cromolyn (OPTICROM) 4 % ophthalmic solution Place 1 drop into both eyes 4 times daily 1 Bottle 3    clopidogrel (PLAVIX) 75 MG tablet take 1 tablet by mouth once daily 90 tablet 1    albuterol (PROVENTIL) (2.5 MG/3ML) 0.083% nebulizer solution Take 3 mLs by nebulization every 4 hours as needed for Wheezing 2 Package 1    metoprolol tartrate (LOPRESSOR) 25 MG tablet Take 1 tablet by mouth 2 times daily  0    Vitamin D, Cholecalciferol, 25 MCG (1000 UT) TABS Take 1,000 Units by mouth daily      gemfibrozil (LOPID) 600 MG tablet Take 1 tablet by mouth 2 times daily 180 tablet 1    aspirin 325 MG tablet Take 325 mg by mouth daily.  fluticasone (FLONASE) 50 MCG/ACT nasal spray One spray in each nostril q hs 3 Bottle 1    pantoprazole (PROTONIX) 40 MG tablet take 1 tablet by mouth once daily 90 tablet 1    BD INSULIN SYRINGE U/F 31G X 5/16\" 1 ML MISC 1 each by Other route 2 times daily 100 each 3    amLODIPine (NORVASC) 5 MG tablet Take 10 mg by mouth daily   0    B-D ULTRAFINE III SHORT PEN 31G X 8 MM MISC   0    pravastatin (PRAVACHOL) 40 MG tablet Take 1 tablet by mouth nightly  0    nitroGLYCERIN (NITROSTAT) 0.4 MG SL tablet Place 0.4 mg under the tongue every 5 minutes as needed for Chest pain         Allergies   Allergen Reactions    Ace Inhibitors      When asked Oct 2020, patient was unsure of allergy/reaction.  Patient takes/tolerates enalapril.     Baclofen Other (See Comments)     Lightheadedness, dizziness    Darvocet [Propoxyphene N-Acetaminophen] Nausea Only     Felt sickly    Darvon [Propoxyphene Hcl]      Felt sickly    Flexeril [Cyclobenzaprine] Other (See Comments)     Headache    Morphine Nausea Only     Pt reported feeling sickly    Motrin [Ibuprofen Micronized] Nausea Only     Pt reported feeling very sick    Tylenol [Acetaminophen] Nausea Only    Ultram [Tramadol] Itching       Family History   Problem Relation Age of Onset    Diabetes Mother     Heart Disease Mother     Arthritis Father     Diabetes Father     Heart Disease Father     Diabetes Sister     Diabetes Brother     Heart Disease Brother        Social History     Socioeconomic History    Marital status: Single     Spouse name: Not on file    Number of children: Not on file    Years of education: Not on file    Highest education level: Not on file   Occupational History    Not on file   Social Needs    Financial resource strain: Not on file    Food insecurity     Worry: Not on file     Inability: Not on file    Transportation needs     Medical: Not on file     Non-medical: Not on file   Tobacco Use    Smoking status: Former Smoker     Packs/day: 0.25     Years: 2.00     Pack years: 0.50     Types: Cigarettes     Last attempt to quit: 1995     Years since quittin.8    Smokeless tobacco: Never Used   Substance and Sexual Activity    Alcohol use: No    Drug use: No    Sexual activity: Yes     Partners: Female   Lifestyle    Physical activity     Days per week: Not on file     Minutes per session: Not on file    Stress: Not on file   Relationships    Social connections     Talks on phone: Not on file     Gets together: Not on file     Attends Jew service: Not on file     Active member of club or organization: Not on file     Attends meetings of clubs or organizations: Not on file     Relationship status: Not on file CREATININE 1.0 10/19/2020    BUN 23 (H) 10/19/2020     10/19/2020    K 5.1 10/19/2020    CL 99 10/19/2020    CO2 33 10/19/2020       Lab Results   Component Value Date    PSA 16.31 (H) 08/25/2020    PSA 7.61 (H) 06/08/2016         Plan:  1. Urinary retention- Patient has a history of BPH with LUTS. Historically, he has taken Flomax once daily. Increase Flomax 0.4 mg to twice daily. Leave corea catheter in at discharge. Leg bag teaching. Is scheduled to follow-up at [facility] on 10/26/20 at 8 am with Dr. Srinivasa Gonzalez. Will perform office cystoscopy with void trial.     2. Elevated PSA- Patient has an elevated PSA of 16.31. Insurance denies MRI of the prostate. Briefly discussed the importance of obtaining a TRUS prostate biopsy for further delineation. Discussed technical aspects of the procedure. Will discuss more at length at his next appointment and schedule in the future. Patient takes Plavix which will need to be held prior to his potential procedure.      Plan discussed with and agreed upon by Dr. Travon Greene PA-C  10/19/20

## 2020-10-20 NOTE — PLAN OF CARE
Problem: Cardiovascular  Goal: No DVT, peripheral vascular complications  Outcome: Ongoing  Note: No signs or symptoms of DVTs. Patient on daily lovenox. Telemetry applied - normal sinus rhythm. Problem: Respiratory  Goal: No pulmonary complications  Outcome: Ongoing  Note: Admitted with acute respiratory failure. C/o congestion, non-productive cough, fatigue, and O2 saturations in the 80's. Lung sounds are clear and diminished. O2 saturation is 93% on 2L. Patient wears continuous BIPAP while sleeping. Home O2 evaluation completed. Patient will go home with oxygen and BIPAP. Will continue to monitor. Problem: Skin Integrity/Risk  Goal: No skin breakdown during hospitalization  Outcome: Ongoing  Note: No new skin lesions noted this shift. Patient encouraged to reposition every two hours. Skin assessments completed and ongoing. Nursing staff assist with patient repositioning. Problem: Discharge Planning:  Goal: Discharged to appropriate level of care  Description: Discharged to appropriate level of care  Outcome: Ongoing  Note: Patient from home alone. Patient plans to return home alone at discharge. Pt could have been discharged on 10/19, however, there was no oxygen or BIPAP available for patient at the time. Possible discharge on 10/20. Problem: Pain:  Goal: Pain level will decrease  Description: Pain level will decrease  Outcome: Ongoing  Note: Pain Assessment: 0-10  Pain Level: 0   Patient's Stated Pain Goal: 6   Is pain goal met at this time? Yes     Non-Pharmaceutical Pain Intervention(s): Rest, Repositioned    Problem: Falls - Risk of:  Goal: Will remain free from falls  Description: Will remain free from falls  Outcome: Ongoing  Note: No falls noted this shift. Fall risk assessment completed. Hourly rounding performed. Bed locked in lowest position, bed alarm on, call light and personal items within reach, and fall sign posted.  Patient has indwelling urinary corea catheter placed to urinary retention. Patient will be discharged with corea catheter. Problem:   Goal: Adequate urinary output  Outcome: Ongoing  Note: Issues with urinary retention noted on 10/19. Bladder scan showed 750ml retained. Straight cathed patient and removed 1000ml. Corea catheter placed. Patient will follow up with urology outpatient for cystoscopy on 10-26 at 0800 with Dr. Diane Alegre. Care plan reviewed with patient. Patient verbalize understanding of the plan of care and contribute to goal setting.     Electronically signed by Dori Padron RN on 10/20/2020 at 2:19 AM

## 2020-10-21 NOTE — PROGRESS NOTES
CLINICAL PHARMACY NOTE: MEDS TO [address] Select Patient?: Yes  Total # of Prescriptions Filled: 4   The following medications were delivered to the patient:  Vitamin B-12 1000 mcg  Vitamin C 1000 mg  Ferrous sulfate 325  Tamsulosin 0.4 mg  Total # of Interventions Completed: 2  Time Spent (min): 30    Additional Documentation:

## 2020-10-22 ENCOUNTER — TELEPHONE (OUTPATIENT)
Dept: FAMILY MEDICINE CLINIC | Age: 60
End: 2020-10-22

## 2020-10-22 NOTE — TELEPHONE ENCOUNTER
[facility] Trans[facility] Initial Follow Up Call    Outreach made within 2 business days of discharge: Yes    Patient: Burleigh Sacks Patient : 1960   MRN: [id number]  Reason for Admission: Pneumonia    Discharge Date: 10/20/20       Spoke with: Esteban Horvath    Discharge department/facility: [address] [place] [address] Interactive Patient Contact:  Was patient able to fill all prescriptions: Yes  Was patient instructed to bring all medications to the follow-up visit: Yes  Is patient taking all medications as directed in the discharge summary?  Yes  Does patient understand their discharge instructions: Yes  Does patient have questions or concerns that need addressed prior to 7-14 day follow up office visit: no    Scheduled appointment with PCP within 7-14 days    Follow Up  Future Appointments   Date Time Provider Gricelda Dillard   10/26/2020  8:00 AM Parag Salgado MD [facility]rology [facility]   10/27/2020  8[facility]   2020  9:15 AM MD PAPA NielsenPHOS [facility]   2020 11:30 AM PIEDAD Roy - DOUG Bernard, CMA ([address] [place] [address])

## 2020-10-23 ENCOUNTER — HOSPITAL ENCOUNTER (OUTPATIENT)
Age: 60
Discharge: HOME | End: 2020-10-23
Payer: MEDICAID

## 2020-10-23 DIAGNOSIS — N40.1 BENIGN PROSTATIC HYPERPLASIA WITH URINARY OBSTRUCTION: ICD-10-CM

## 2020-10-23 DIAGNOSIS — R33.9 INCOMPLETE BLADDER EMPTYING: ICD-10-CM

## 2020-10-23 DIAGNOSIS — R97.20 ELEVATED PSA: ICD-10-CM

## 2020-10-23 DIAGNOSIS — N13.8 BENIGN PROSTATIC HYPERPLASIA WITH URINARY OBSTRUCTION: ICD-10-CM

## 2020-10-23 LAB — PROSTATE SPECIFIC ANTIGEN: 16.28 NG/ML (ref 0–1)

## 2020-10-23 PROCEDURE — 36415 COLL VENOUS BLD VENIPUNCTURE: CPT

## 2020-10-23 PROCEDURE — 84153 ASSAY OF PSA TOTAL: CPT

## 2020-10-23 RX ORDER — PEN NEEDLE, DIABETIC 31 GX5/16"
NEEDLE, DISPOSABLE MISCELLANEOUS
Qty: 200 EACH | Refills: 2 | Status: SHIPPED | OUTPATIENT
Start: 2020-10-23 | End: 2020-11-30

## 2020-10-23 NOTE — TELEPHONE ENCOUNTER
Marlene Kauffman called requesting a refill on the following medications:  Requested Prescriptions     Pending Prescriptions Disp Refills    B-D ULTRAFINE III SHORT PEN 31G X 8 MM MISC [Pharmacy Med Name: BD U/F SHORT PEN [address] each      Sig: USE TWICE DAILY WITH THE [address] PEN.        Date of last visit: 8/25/2020  Date of next visit (if applicable):10/27/2020  Date of last refill:   Pharmacy Name:       [facility] Sanchez, [address] [place] [address]

## 2020-10-24 VITALS
HEIGHT: 69 IN | HEART RATE: 70 BPM | DIASTOLIC BLOOD PRESSURE: 57 MMHG | TEMPERATURE: 98.6 F | BODY MASS INDEX: 42.15 KG/M2 | WEIGHT: 284.6 LBS | RESPIRATION RATE: 16 BRPM | SYSTOLIC BLOOD PRESSURE: 122 MMHG | OXYGEN SATURATION: 94 %

## 2020-10-26 ENCOUNTER — PROCEDURE VISIT (OUTPATIENT)
Dept: UROLOGY | Age: 60
End: 2020-10-26
Payer: MEDICAID

## 2020-10-26 ENCOUNTER — TELEPHONE (OUTPATIENT)
Dept: UROLOGY | Age: 60
End: 2020-10-26

## 2020-10-26 VITALS — HEIGHT: 69 IN | BODY MASS INDEX: 41.62 KG/M2 | WEIGHT: 281 LBS

## 2020-10-26 PROCEDURE — 1111F DSCHRG MED/CURRENT MED MERGE: CPT | Performed by: UROLOGY

## 2020-10-26 PROCEDURE — G8417 CALC BMI ABV UP PARAM F/U: HCPCS | Performed by: UROLOGY

## 2020-10-26 PROCEDURE — 3017F COLORECTAL CA SCREEN DOC REV: CPT | Performed by: UROLOGY

## 2020-10-26 PROCEDURE — 52000 CYSTOURETHROSCOPY: CPT | Performed by: UROLOGY

## 2020-10-26 PROCEDURE — G8427 DOCREV CUR MEDS BY ELIG CLIN: HCPCS | Performed by: UROLOGY

## 2020-10-26 PROCEDURE — G8484 FLU IMMUNIZE NO ADMIN: HCPCS | Performed by: UROLOGY

## 2020-10-26 PROCEDURE — 99214 OFFICE O/P EST MOD 30 MIN: CPT | Performed by: UROLOGY

## 2020-10-26 PROCEDURE — 1036F TOBACCO NON-USER: CPT | Performed by: UROLOGY

## 2020-10-26 NOTE — TELEPHONE ENCOUNTER
Patient was recently seen in the hospital by pulmonary and has a 6 wk hospital follow up with Lou Das on 11/30.

## 2020-10-26 NOTE — PROGRESS NOTES
Dr. Larry Collazo MD  [facility]rology [facility] Consultation / New Patient Visit    Patient:  Nyasia Abrams  YOB: 1960  Date: 10/26/2020  Consult requested from Tuan Lyon MD     HISTORY OF PRESENT ILLNESS:   The patient is a 62 y.o. male who presents today for follow-up for the following problem(s): elevated PSA, BPH with luts  Overall the problem(s) : are worsening. Associated Symptoms: No dysuria, gross hematuria. Pain Severity:      Today visit:   10/26/20  Sandee Orellana presents with history of BPH with LUTs, and elevated PSA. An MRI was denies by his insurance, so we review options of elevated PSA. He also has worsening LUTs, with retention and hematuria. He is on Home O2 for recent bronchitis, which is improving. He has history of CAD on ASA and Plavix. Cystoscopy Operative Note  Surgeon: Larry Collazo   Anesthesia: Urethral 2%  Indications: BPH with LUTs  Position: supine  Findings:   The patient was prepped and draped in the usual sterile fashion. The flexible cystoscope was advanced through the urethra and into the bladder. The bladder was thoroughly inspected and the following was noted:  Residual Urine: Moderate  Urethra: No abnormalities of the urethra are noted. Prostate: Large gland Complete obstruction by latera lobe of prostate. Bladder: No tumors or CIS noted. No bladder diverticulum. Large bladder stone. Ureters: Clear efflux from both ureters. Orifices with normal configuration and location. The cystoscope was removed. The patient tolerated the procedure well. Plan  Standard TURP  Cystolithalopaxy    9/18/20  63 yo male that presents with LUTs and difficulty emptying his bladder. He has incomplete emptying, frequency, and Nocturia, that are worsening over the last couple years. On flomax but he is stating his symptoms are worsening. AUASS: 11/4. Denies hematuria. PVR: 48 ml. Smoking history: quit 20 years ago.  FH: none.    ANTONIO: plavix, for CAD s/p stents (10/2019). Has elevated PSA, 16.        Summary of old records:   (Patient's old records, notes and chart reviewed and summarized above.)    Last several PSA's:  Lab Results   Component Value Date    PSA 16.28 (H) 10/23/2020    PSA 16.31 (H) 08/25/2020    PSA 7.61 (H) 06/08/2016       Last total testosterone:  No results found for: TESTOSTERONE    Urinalysis today:  No results found for this visit on 10/26/20. Last BUN and creatinine:  Lab Results   Component Value Date    BUN 23 (H) 10/19/2020     Lab Results   Component Value Date    CREATININE 1.0 10/19/2020       Imaging Reviewed during this Office Visit:   (results were independently reviewed by physician and radiology report verified)    PAST MEDICAL, FAMILY AND SOCIAL HISTORY:  Past Medical History:   Diagnosis Date    Abnormal stress test Sept 2012    Lateral Wall Ischemia- [facility]-    CAD (coronary artery disease)     Chronic low back pain     Diabetes mellitus ([address]     Diabetes mellitus ([address]     Hyperlipidemia     Hypertension     Hypertriglyceridemia     MI (myocardial infarction) ([address] 2004    Obesity     Viral pneumonia 10/15/2020     Past Surgical History:   Procedure Laterality Date    BACK SURGERY  November 1997    L4 & L5 fusion    CARDIAC CATHETERIZATION  10/2019   [facility]ARDIAC SURGERY  October 22, 2004    Cardiac cath with stent placement x1    COLONOSCOPY  October 2010    CORONARY ANGIOPLASTY WITH STENT PLACEMENT  10/17/2019    HERNIA REPAIR  1991    Mesh repair in abdomen. Family History   Problem Relation Age of Onset    Diabetes Mother     Heart Disease Mother     Arthritis Father     Diabetes Father     Heart Disease Father     Diabetes Sister     Diabetes Brother     Heart Disease Brother      No outpatient medications have been marked as taking for the 10/26/20 encounter (Procedure visit) with Josh Moreno MD.       Ace inhibitors;  Baclofen; Darvocet [propoxyphene n-acetaminophen]; Darvon [propoxyphene hcl]; Flexeril [cyclobenzaprine]; Morphine; Motrin [ibuprofen micronized]; Tylenol [acetaminophen]; and Ultram [tramadol]  Social History     Tobacco Use   Smoking Status Former Smoker    Packs/day: 0.25    Years: 2.00    Pack years: 0.50    Types: Cigarettes    Last attempt to quit: 1995    Years since quittin.8   Smokeless Tobacco Never Used       Social History     Substance and Sexual Activity   Alcohol Use No       REVIEW OF SYSTEMS:  Constitutional: negative  Eyes: negative  Respiratory: negative  Cardiovascular: negative  Gastrointestinal: negative  Musculoskeletal: negative  Genitourinary: negative  Skin: negative   Neurological: negative  Hematological/Lymphatic: negative  Psychological: negative    Physical Exam:    This a 62 y.o. male   There were no vitals filed for this visit. Constitutional: Patient in no acute distress   Neuro: alert and oriented to person place and time. Psych: Mood and affect normal.  Head: atraumatic normocephalic  Eyes: EOMi  HEENT: neck supple, trachea midline  Lungs: Respiratory effort normal  Cardiovascular:  Normal peripheral pulses  Abdomen: Soft, non-tender, non-distended, No CVA  Bladder: non-tender and not distended. FROMx4, no cyanosis clubbing edema  Skin: warm and dry      Assessment and Plan      1. Urinary retention    2. Benign prostatic hyperplasia with urinary retention    3. Elevated PSA           Plan:      No follow-ups on file. Will need prostate biopsy for elevated PSA as MRI was denied by insurance. BPH and bladder stone with history of retention.     Standard TURP  Cystolithalopaxy

## 2020-10-26 NOTE — TELEPHONE ENCOUNTER
CARDIAC  CLEARANCE FORM    Clearance From  Dr Erica Turcios   Appointment Date   Time       [facility] [place]  1960  Surgeon:  Dr Juan Pablo Diaz    Procedure:  Transrectal ultrasound with prostate biopsy,Transurethral resection of the prostate and a Cystolitholapaxy  Date:  Pending Clearance  Facility: [facility]    I.  MEDICAL HISTORY  DM CAD PVD CVA DVT/PE MI CHFMalignant Hyperthemia HTN Tobacco/ETOH Sleep Apnea GERD Hyperlipidemia Renal Insufficiency COPD/Asthma Bleeding Disorder Pacemaker/AICD  II. CURRENT MEDICATIONS: Attach list or complete     Pt is on following meds that need special instructions for surgery:  Anticoagulants Heart Meds ASA Insulin Oral anti-diabetics NSAIDS Diuretics     K replacements  III. ALLERGIES:   IV.  FUNCTIONAL CAPACITY  >4 METS (CAN VACUUM/HOUSEWORK,CLIMB FLIGHT STAIRS WITHOUT DYSPNEA)  <4METS (FLIGHT OF STEPS CAUSES DYSPNEA/CARDIAC SYMPTOMS)   Stress Test Recommended:    Stress tests or Cardiac Cath in last 5 years:  Yes (attach report)  No   Results: WNL   ABN  Any Change in Cardiac symptoms: Yes  NO  Comments:    Revascularization in last 5 years: Yes  NO  CABG: Yes  No   Comments:     Stents:  Date: ________  Any change in cardiac symptoms  Yes  NO  Comments:   V.  REVIEW OF SYSTEMS:  (Pertinent positive or negative)    VI. PHYSICIAL EXAM  HEENT:1.  Dentations   Good Poor          HT:_______ WT:______      2. Neck Pathology: Rheumatoid DDD C-spine  BP:______ P:________  PULMONARY:  CARDIAC  ABDOMEN  EXTREMITIES  OTHER  VII.   Testing Ordered by Surgeon  Reviewed by Clearance Physician  Test      Result  Plan,if Abnormal  ___CBS     WNL  ABN____________________  ___BMP/BUN/CR    WNL  ABN____________________  ___K+      WNL  ABN____________________  ___UA      WNL  ABN____________________  ___CXR     WNL  ABN____________________  ___EKG     WNL  ABN____________________  ___MRSA     WNL  ABN____________________  VIII.  ___Acceptable risk for surgery ___Risk Unacceptable-Communication to Follow  Comments:_____________________________________________________  ________________________________________________________________________  Physician ___________________________  Date:_______________  Physician Printed Name:  _________________________    Fax  [phone]

## 2020-10-26 NOTE — TELEPHONE ENCOUNTER
Patient to be  scheduled for surgery with Dr Pastor Padilla. Surgery consent signed. Patient will need pre op urinalysis and Covid 19 done prior. Surgery instructions to be mailed to the patient. Patient will need clearance from Dr Fausto Mcdonnell and from Pulmonary. Surgery date on hold until clearance. Patient scheduled for surgery on 12/7/20. Tali salazar ultrasound notified.

## 2020-10-26 NOTE — TELEPHONE ENCOUNTER
Patient to be scheduled for a Transrectal ultrasound with prostate biopsy ,Transuretheral resection of the prostate and a Cystolitholapaxy with Dr Julius Neal. We are asking for medical clearance.

## 2020-10-27 ENCOUNTER — OFFICE VISIT (OUTPATIENT)
Dept: FAMILY MEDICINE CLINIC | Age: 60
End: 2020-10-27
Payer: MEDICAID

## 2020-10-27 VITALS
DIASTOLIC BLOOD PRESSURE: 62 MMHG | OXYGEN SATURATION: 95 % | HEIGHT: 69 IN | TEMPERATURE: 96.4 F | WEIGHT: 288.6 LBS | SYSTOLIC BLOOD PRESSURE: 132 MMHG | BODY MASS INDEX: 42.75 KG/M2 | HEART RATE: 77 BPM

## 2020-10-27 PROCEDURE — 1111F DSCHRG MED/CURRENT MED MERGE: CPT | Performed by: FAMILY MEDICINE

## 2020-10-27 PROCEDURE — 99214 OFFICE O/P EST MOD 30 MIN: CPT | Performed by: FAMILY MEDICINE

## 2020-10-27 ASSESSMENT — ENCOUNTER SYMPTOMS
COUGH: 1
SHORTNESS OF BREATH: 1
WHEEZING: 0

## 2020-10-27 NOTE — TELEPHONE ENCOUNTER
Accidentally closed the encounter.  Please let me know when the patient is scheduled for the clearance appointment

## 2020-10-27 NOTE — PROGRESS NOTES
Post-Discharge Transitional Care Management Services or Hospital Follow Up      Prerna Capone   YOB: 1960    Date of Office Visit:  10/27/2020  Date of Hospital Admission: 10/14/20  Date of Hospital Discharge: 10/20/20  Readmission Risk Score(high >=14%. Medium >=10%):Readmission Risk Score: 15      Care management risk score Rising risk (score 2-5) and Complex Care (Scores >=6): 5     Non face to face  following discharge, date last encounter closed (first attempt may have been earlier): 10/22/2020 10:33 AM 10/22/2020 10:33 AM    Call initiated 2 business days of discharge: Yes     Patient Active Problem List   Diagnosis    Hypertension    Hyperlipidemia    CAD (coronary artery disease)    Chest pain, atypical    Chronic low back pain    Hypertriglyceridemia    Morbidly obese (HCC)    Abnormal stress test    Sprain and strain of ankle    Os trigonum    Elevated PSA    Lumbar spinal stenosis    Well controlled type 2 diabetes mellitus ([address]    ROBSON (obstructive sleep apnea)    Back pain at L4-L5 level    Anxiety    Microalbuminuria    Positive FIT (fecal immunochemical test)    Viral pneumonia    Pneumonia due to organism    Acute respiratory failure with hypoxia ([address]    H/O heart artery stent    Urinary retention       Allergies   Allergen Reactions    Ace Inhibitors      When asked Oct 2020, patient was unsure of allergy/reaction. Patient takes/tolerates enalapril.     Baclofen Other (See Comments)     Lightheadedness, dizziness    Darvocet [Propoxyphene N-Acetaminophen] Nausea Only     Felt sickly    Darvon [Propoxyphene Hcl]      Felt sickly    Flexeril [Cyclobenzaprine] Other (See Comments)     Headache    Morphine Nausea Only     Pt reported feeling sickly    Motrin [Ibuprofen Micronized] Nausea Only     Pt reported feeling very sick    Tylenol [Acetaminophen] Nausea Only    Ultram [Tramadol] Itching       Medications listed as ordered at the time of discharge from [facility]   Home Medication Instructions TOI:    Printed on:10/27/20 0900   Medication Information                      albuterol (PROVENTIL) (2.5 MG/3ML) 0.083% nebulizer solution  Take 3 mLs by nebulization every 4 hours as needed for Wheezing             ALPRAZolam (XANAX) 1 MG tablet  Take 1 tablet by mouth 2 times daily for 30 days. amLODIPine (NORVASC) 5 MG tablet  Take 10 mg by mouth daily              aspirin 325 MG tablet  Take 325 mg by mouth daily. B-D ULTRAFINE III SHORT PEN 31G X 8 MM MISC  USE TWICE DAILY WITH THE BASAGLAR PEN.              BD INSULIN SYRINGE U/F 31G X 5/16\" 1 ML MISC  1 each by Other route 2 times daily             clopidogrel (PLAVIX) 75 MG tablet  take 1 tablet by mouth once daily             cromolyn (OPTICROM) 4 % ophthalmic solution  Place 1 drop into both eyes 4 times daily             enalapril (VASOTEC) 20 MG tablet  Take 0.5 tablets by mouth daily             ferrous sulfate (IRON 325) 325 (65 Fe) MG tablet  Take 1 tablet by mouth every 48 hours             fluticasone (FLONASE) 50 MCG/ACT nasal spray  One spray in each nostril q hs             gemfibrozil (LOPID) 600 MG tablet  Take 1 tablet by mouth 2 times daily             insulin glargine (LANTUS SOLOSTAR) 100 UNIT/ML injection pen  Inject 40 Units into the skin 2 times daily             insulin lispro (HUMALOG) 100 UNIT/ML injection vial  Inject 10 Units into the skin 2 times daily as needed for High Blood Sugar when blood sugar > 155             metFORMIN (GLUCOPHAGE) 1000 MG tablet  take 1 tablet by mouth twice a day with meals             metoprolol tartrate (LOPRESSOR) 25 MG tablet  Take 1 tablet by mouth 2 times daily             nitroGLYCERIN (NITROSTAT) 0.4 MG SL tablet  Place 0.4 mg under the tongue every 5 minutes as needed for Chest pain             pantoprazole (PROTONIX) 40 MG tablet  take 1 tablet by mouth once daily             pravastatin (PRAVACHOL) 40 MG tablet  Take 1 tablet by mouth nightly             tamsulosin (FLOMAX) 0.4 MG capsule  Take 1 capsule by mouth 2 times daily             vitamin B-12 1000 MCG tablet  Take 1 tablet by mouth daily             vitamin C (VITAMIN C) 500 MG tablet  Take 1 tablet by mouth daily             Vitamin D, Cholecalciferol, 25 MCG (1000 UT) TABS  Take 1,000 Units by mouth daily                   Medications marked \"taking\" at this time  Outpatient Medications Marked as Taking for the 10/27/20 encounter (Office Visit) with Shira Patel MD   Medication Sig Dispense Refill    B-D ULTRAFINE III SHORT PEN 31G X 8 MM MISC USE TWICE DAILY WITH THE [employer] PEN. 200 each 2    vitamin C (VITAMIN C) 500 MG tablet Take 1 tablet by mouth daily 30 tablet 3    ferrous sulfate (IRON 325) 325 (65 Fe) MG tablet Take 1 tablet by mouth every 48 hours 30 tablet 3    vitamin B-12 1000 MCG tablet Take 1 tablet by mouth daily 30 tablet 3    tamsulosin (FLOMAX) 0.4 MG capsule Take 1 capsule by mouth 2 times daily 60 capsule 5    insulin glargine (LANTUS SOLOSTAR) 100 UNIT/ML injection pen Inject 40 Units into the skin 2 times daily 96 mL 0    enalapril (VASOTEC) 20 MG tablet Take 0.5 tablets by mouth daily 90 tablet 1    insulin lispro (HUMALOG) 100 UNIT/ML injection vial Inject 10 Units into the skin 2 times daily as needed for High Blood Sugar when blood sugar > 155      ALPRAZolam (XANAX) 1 MG tablet Take 1 tablet by mouth 2 times daily for 30 days.  60 tablet 0    metFORMIN (GLUCOPHAGE) 1000 MG tablet take 1 tablet by mouth twice a day with meals 180 tablet 1    fluticasone (FLONASE) 50 MCG/ACT nasal spray One spray in each nostril q hs 3 Bottle 1    pantoprazole (PROTONIX) 40 MG tablet take 1 tablet by mouth once daily 90 tablet 1    cromolyn (OPTICROM) 4 % ophthalmic solution Place 1 drop into both eyes 4 times daily 1 Bottle 3    BD INSULIN SYRINGE U/F 31G X 5/16\" 1 ML MISC 1 each by Other route 2 times daily 100 each 3    MD

## 2020-10-28 ENCOUNTER — TELEPHONE (OUTPATIENT)
Dept: UROLOGY | Age: 60
End: 2020-10-28

## 2020-10-28 NOTE — TELEPHONE ENCOUNTER
To my knowledge, Pt doesn't have kidney stone   he has a bladder stone, so im not sure that his back pain is from kidneys  Can use heating pad as needed.

## 2020-10-28 NOTE — TELEPHONE ENCOUNTER
I called and spoke with the patient and notified him that he has a bladder stone and not a kidney stone. His back pain is probably not coming from his kidneys. Advised to use a heating pad and take tylenol or ibuprofen as needed for pain. Patient voiced understanding.

## 2020-10-28 NOTE — TELEPHONE ENCOUNTER
Evan Daley from [facility] called the office and left message that she saw patient today and he is having a lot of back pain from his kidney stone. He wondered if there was anything he could take. She said to note that patient has a lot of allergies.   Please advise

## 2020-10-29 ENCOUNTER — OFFICE VISIT (OUTPATIENT)
Dept: PULMONOLOGY | Age: 60
End: 2020-10-29
Payer: MEDICAID

## 2020-10-29 VITALS
OXYGEN SATURATION: 96 % | BODY MASS INDEX: 42.95 KG/M2 | DIASTOLIC BLOOD PRESSURE: 64 MMHG | HEART RATE: 81 BPM | TEMPERATURE: 96.1 F | WEIGHT: 290 LBS | SYSTOLIC BLOOD PRESSURE: 116 MMHG | HEIGHT: 69 IN

## 2020-10-29 PROCEDURE — 1036F TOBACCO NON-USER: CPT | Performed by: NURSE PRACTITIONER

## 2020-10-29 PROCEDURE — 99214 OFFICE O/P EST MOD 30 MIN: CPT | Performed by: NURSE PRACTITIONER

## 2020-10-29 PROCEDURE — 3023F SPIROM DOC REV: CPT | Performed by: NURSE PRACTITIONER

## 2020-10-29 PROCEDURE — 1111F DSCHRG MED/CURRENT MED MERGE: CPT | Performed by: NURSE PRACTITIONER

## 2020-10-29 PROCEDURE — G8926 SPIRO NO PERF OR DOC: HCPCS | Performed by: NURSE PRACTITIONER

## 2020-10-29 PROCEDURE — G8427 DOCREV CUR MEDS BY ELIG CLIN: HCPCS | Performed by: NURSE PRACTITIONER

## 2020-10-29 PROCEDURE — G8484 FLU IMMUNIZE NO ADMIN: HCPCS | Performed by: NURSE PRACTITIONER

## 2020-10-29 PROCEDURE — G8417 CALC BMI ABV UP PARAM F/U: HCPCS | Performed by: NURSE PRACTITIONER

## 2020-10-29 PROCEDURE — 3017F COLORECTAL CA SCREEN DOC REV: CPT | Performed by: NURSE PRACTITIONER

## 2020-10-29 ASSESSMENT — ENCOUNTER SYMPTOMS
VOMITING: 0
DIARRHEA: 0
STRIDOR: 0
ALLERGIC/IMMUNOLOGIC NEGATIVE: 1
CHEST TIGHTNESS: 0
NAUSEA: 0
WHEEZING: 0
RESPIRATORY NEGATIVE: 1
GASTROINTESTINAL NEGATIVE: 1
EYES NEGATIVE: 1

## 2020-10-29 NOTE — PROGRESS NOTES
[facility]    Patient: Leah Fong, 62 y.o.   : 1960  10/29/2020    Pt of Dr. Taty Hsu   Patient presents with    Pre-op Exam     Last seen in house Dr Sarai Meehan 10/19/20        HPI  Esteban Horvath is here for pre-operative clearance for urology for prostate biopsy to rule out malignancy. Patient has been seen in our office prior by Dr. Sarai Meehan MD in . Our services saw the patient in the beginning of October for hypoxia r/t pneumonia. Very remote smoking history quit around 30+ years ago. Refused any PAP therapy   Present today on O2 supplementation at 2LPM continuous  SOB an issue with exertion only  No fever/chills  No known Covid exposures. Follows with PCP for COPD management currently only on Albuterol PRN for SOB/wheezing - uses roughly 2-3 times a day    Progress History:   Since last visit any new medical issues? No  New ER or hospital visits? No  Any new or changes in medicines? No  Using inhalers? Yes albuterol PRN  Are they helpful? Yes   Flu vaccine - done   Pneumonia vaccine UTD  Past Medical hx   PMH:  Past Medical History:   Diagnosis Date    Abnormal stress test 2012    Lateral Wall I[facility]ia[facility]    CAD (coronary artery disease)     Chronic low back pain     [facility]     Diabetes mellitus ([facility]     Hyperlipidemia     Hypertension     Hypertriglyceridemia     MI (myocardial infarction) ([facility]     Obesity     Viral pneumonia 10/15/2020     SURGICAL HISTORY:  Past Surgical History:   Procedure Laterality Date    BACK SURGERY  1997    L4 & L5 fusion    CARDIAC CATHETERIZATION  10/2019   [facility] CARDIAC SURGERY  2004    Cardiac cath with stent placement x1    COLONOSCOPY  2010    CORONARY ANGIOPLASTY WITH STENT PLACEMENT  10/17/2019    HERNIA REPAIR      Mesh repair in abdomen.      SOCIAL HISTORY:  Social History     Tobacco Use    Smoking status: Former times daily as needed for High Blood Sugar when blood sugar > 155      ALPRAZolam (XANAX) 1 MG tablet Take 1 tablet by mouth 2 times daily for 30 days. 60 tablet 0    metFORMIN (GLUCOPHAGE) 1000 MG tablet take 1 tablet by mouth twice a day with meals 180 tablet 1    fluticasone (FLONASE) 50 MCG/ACT nasal spray One spray in each nostril q hs 3 Bottle 1    pantoprazole (PROTONIX) 40 MG tablet take 1 tablet by mouth once daily 90 tablet 1    cromolyn (OPTICROM) 4 % ophthalmic solution Place 1 drop into both eyes 4 times daily 1 Bottle 3    BD INSULIN SYRINGE U/F 31G X 5/16\" 1 ML MISC 1 each by Other route 2 times daily 100 each 3    clopidogrel (PLAVIX) 75 MG tablet take 1 tablet by mouth once daily 90 tablet 1    albuterol (PROVENTIL) (2.5 MG/3ML) 0.083% nebulizer solution Take 3 mLs by nebulization every 4 hours as needed for Wheezing 2 Package 1    amLODIPine (NORVASC) 5 MG tablet Take 10 mg by mouth daily   0    metoprolol tartrate (LOPRESSOR) 25 MG tablet Take 1 tablet by mouth 2 times daily  0    Vitamin D, Cholecalciferol, 25 MCG (1000 UT) TABS Take 1,000 Units by mouth daily      pravastatin (PRAVACHOL) 40 MG tablet Take 1 tablet by mouth nightly  0    gemfibrozil (LOPID) 600 MG tablet Take 1 tablet by mouth 2 times daily 180 tablet 1    nitroGLYCERIN (NITROSTAT) 0.4 MG SL tablet Place 0.4 mg under the tongue every 5 minutes as needed for Chest pain      aspirin 325 MG tablet Take 325 mg by mouth daily. No current facility-administered medications for this visit. ROS   Review of Systems   Constitutional: Negative. Negative for chills, fever and unexpected weight change. HENT: Negative. Eyes: Negative. Respiratory: Negative. Negative for chest tightness, wheezing and stridor. Cardiovascular: Negative for chest pain and leg swelling. Gastrointestinal: Negative. Negative for diarrhea, nausea and vomiting. Endocrine: Negative. Genitourinary: Negative.   Negative for dysuria. Musculoskeletal: Negative. Skin: Negative. Allergic/Immunologic: Negative. Neurological: Negative. Hematological: Negative. Psychiatric/Behavioral: Negative. Physical exam   /64 (Site: Left Upper Arm, Position: Sitting, Cuff Size: Large Adult)   Pulse 81   Temp 96.1 °F (35.6 °C)   Ht 5' 9\" (1.753 m)   Wt 290 lb (131.5 kg)   SpO2 96% Comment: 2 liter 02  BMI 42.83 kg/m²    Wt Readings from Last 3 Encounters:   10/29/20 290 lb (131.5 kg)   10/27/20 288 lb 9.6 oz (130.9 kg)   10/26/20 281 lb (127.5 kg)       Physical Exam  Vitals signs and nursing note reviewed. Constitutional:       General: He is not in acute distress. Appearance: He is well-developed. He is obese. HENT:      Head: Normocephalic and atraumatic. Neck:      Trachea: No tracheal deviation. Cardiovascular:      Rate and Rhythm: Normal rate and regular rhythm. Heart sounds: Normal heart sounds. No murmur. Pulmonary:      Effort: Pulmonary effort is normal. No respiratory distress. Breath sounds: No stridor. Examination of the right-lower field reveals rales. Rales present. No wheezing. Chest:      Chest wall: No tenderness. Abdominal:      General: Bowel sounds are normal. There is no distension. Palpations: Abdomen is soft. Skin:     General: Skin is warm and dry. Capillary Refill: Capillary refill takes less than 2 seconds. Neurological:      Mental Status: He is alert and oriented to person, place, and time. Psychiatric:         Behavior: Behavior normal.         Thought Content:  Thought content normal.         Judgment: Judgment normal.          results   Lung Nodule Screening     [] Qualifies    [x] Does not qualify   [] Declined    [] Completed  -quit more than 15 years ago   The USPSTF recommends annual screening for lung cancer with low-dose computed tomography (LDCT) in adults aged 54 to [de-identified] years who have a 30 pack-year smoking history and currently smoke or have quit within the past 15 years. Screening should be discontinued once a person has not smoked for 15 years or develops a health problem that substantially limits life expectancy or the ability or willingness to have curative lung surgery. 10/14/2020   CT angiography chest with contrast.   Findings: No pulmonary embolism. No aortic aneurysm or dissection. Heart size normal. Atheromatous aortic and coronary vascular calcifications. Lungs without mass or focal infiltrate. No pneumothorax. Trace effusions. No adenopathy. Soft tissues: Intact. Upper abdomen: Fatty liver. No acute bony pathology. No pulmonary emboli. Trace effusions. Atheromatous aortic and coronary vascular calcifications. PFTs             Assessment      Diagnosis Orders   1. Encounter for pre-operative respiratory clearance     2. COPD, severe ([address]     3. ROBSON (obstructive sleep apnea)     4. Urinary retention           Plan   -From a Pulmonary standpoint patient would be at moderate risk going for procedure involving general anesthesia and mechanical ventilation,due to history of Severe COPD, risks include but are not limited to prolonged mechanical ventilation, inability to wean from mechanical ventilation, postoperative pneumonia, and reintubation. To minimize complications recommend breathing treatments pre and post operatively, monitoring SpO2 and aggressive pulmonary hygiene after procedure.   -Patient needs vigorous bronchodilator therapy with albuterol 2.5mg via nebulizer Q6h and Q2h prn before, during and after procedure. Patient need to continue rest of His/Her inhalers as prescribed.  -Patient was informed about increased risk, and has agreed to take the risk.   -Patient need to be educated about incentive spirometry before surgery to facilitate it's use in the post operative period with minimum of Q4h as tolerated.   -Instructed to bring home BiPAP to hospital day of surgery  -Advised to maintain pneumonia vaccine with PCP and to take flu vaccine this coming season.  -Advised patient to call office with any changes, questions, or concerns regarding respiratory status    Will see [id number] back in: 1 month appt already scheduled for sleep clinic    Sharifa Zuñiga CNP  10/29/2020

## 2020-11-03 NOTE — TELEPHONE ENCOUNTER
SURGERY [address] [place] [address] [address] [facility], [address]      Phone *[phone] *[phone]   Surgical Scheduling Direct Line Phone *[phone] Fax *[phone]      Charles Theodore 1960 male    [address] [facility]   Marital Status: Single         Home Phone: [phone]      Cell Phone:    Telephone Information:   Mobile [phone]          Surgeon: Dr. Giang  Surgery Date: 12/7/20   Time: 8:45 am    Procedure: Transrectal ultrasound with prostate biopsy,transurethral resection of the prostate, cystolitholapaxy    Diagnosis: Elevated PSA,Bladder Stone,BPH with Obstruction    Important Medical History: In Epic    Special Inst/Equip: BRAYAN Cesar in Ultrasound Notified    CPT Codes:    84856,35544,56745  Latex Allergy:  No     Cardiac Device:  No     Anesthesia:  Anesthesiologist (General/Spinal)          Admission Type:  Same Day                             Admit Prior to Day of Surgery: No    Case Location:  Main OR           Preadmission Testing:Phone Call              PAT Date and Time:______________________________________________________    PAT Confirmation #: ______________________________________________________    Post Op Visit: ___________________________________________________________    Need Preop Cardiac Clearance: Yes    Does Patient have Cardiologist/physician?      Dr Lucila Copeland cleared, Pulmonary cleared also    Surgery Confirmation #: __________________________________________________    Brando Sidell: ________________________   Date: __________________________     [employer] Name: [address] [facility]

## 2020-11-09 ENCOUNTER — TELEPHONE (OUTPATIENT)
Dept: UROLOGY | Age: 60
End: 2020-11-09

## 2020-11-09 ENCOUNTER — PREP FOR PROCEDURE (OUTPATIENT)
Dept: UROLOGY | Age: 60
End: 2020-11-09

## 2020-11-09 RX ORDER — SODIUM CHLORIDE 9 MG/ML
INJECTION, SOLUTION INTRAVENOUS CONTINUOUS
Status: CANCELLED | OUTPATIENT
Start: 2020-12-07

## 2020-11-10 RX ORDER — ALPRAZOLAM 1 MG/1
1 TABLET ORAL 2 TIMES DAILY
Qty: 60 TABLET | Refills: 0 | Status: SHIPPED | OUTPATIENT
Start: 2020-11-10 | End: 2020-12-10 | Stop reason: SDUPTHER

## 2020-11-10 RX ORDER — FLUTICASONE PROPIONATE 50 MCG
SPRAY, SUSPENSION (ML) NASAL
Qty: 3 BOTTLE | Refills: 1 | Status: SHIPPED | OUTPATIENT
Start: 2020-11-10 | End: 2021-05-06 | Stop reason: SDUPTHER

## 2020-11-10 NOTE — TELEPHONE ENCOUNTER
Marlene Kauffman called requesting a refill on the following medications:  Requested Prescriptions     Pending Prescriptions Disp Refills    ALPRAZolam (XANAX) 1 MG tablet 60 tablet 0     Sig: Take 1 tablet by mouth 2 times daily for 30 days.  fluticasone (FLONASE) 50 MCG/ACT nasal spray 3 Bottle 1     Sig: One spray in each nostril q hs     [facility]   . pv      Date of last visit: 10/27/20  Date of next visit (if applicable): 80/43/9704

## 2020-11-19 ENCOUNTER — TELEPHONE (OUTPATIENT)
Dept: UROLOGY | Age: 60
End: 2020-11-19

## 2020-11-19 NOTE — TELEPHONE ENCOUNTER
Caitlyn Goodman at [facility] advised to exchange the catheter and collect a urine for UA reflex to culture. She will send a nurse today. Patient called and aware.

## 2020-11-19 NOTE — TELEPHONE ENCOUNTER
Patient states the corea catheter is draining ok. He has a lot of pain and burning when the urine drains via catheter. Pain is located in his left side andominal and middle flank area of his back. He denies fever or chills. The urine is clear yellow. He denies a foul odor. He has Pitney Jasen. The catheter was exchanged 10/26/2020. He is scheduled for Transrectal ultrasound with prostate biopsy,transurethral resection of the prostate,cytolithoapaxy on 12/07/2020 with Dr Jacob Lyles. Do you want the catheter exchanged and an urine collected or what do you recommend? Please advise. Thank you.

## 2020-11-22 ENCOUNTER — TELEPHONE (OUTPATIENT)
Dept: UROLOGY | Age: 60
End: 2020-11-22

## 2020-11-22 RX ORDER — DOXYCYCLINE HYCLATE 100 MG/1
100 CAPSULE ORAL 2 TIMES DAILY
Qty: 20 CAPSULE | Refills: 0 | Status: SHIPPED | OUTPATIENT
Start: 2020-11-22 | End: 2020-12-02

## 2020-11-23 NOTE — TELEPHONE ENCOUNTER
Patient with Staph epidermidis UTI. Will start Doxycycline 100 mg po twice daily x 10 days, #20, 0 refills.

## 2020-11-23 NOTE — TELEPHONE ENCOUNTER
Patient advised doxycycline was sent to the pharmacy to treat the urine infection. He voiced understanding and will pick it up.

## 2020-11-24 ENCOUNTER — OFFICE VISIT (OUTPATIENT)
Dept: FAMILY MEDICINE CLINIC | Age: 60
End: 2020-11-24
Payer: MEDICAID

## 2020-11-24 VITALS
TEMPERATURE: 96.4 F | WEIGHT: 290.4 LBS | DIASTOLIC BLOOD PRESSURE: 68 MMHG | SYSTOLIC BLOOD PRESSURE: 136 MMHG | HEART RATE: 90 BPM | OXYGEN SATURATION: 97 % | HEIGHT: 69 IN | BODY MASS INDEX: 43.01 KG/M2

## 2020-11-24 PROCEDURE — G8482 FLU IMMUNIZE ORDER/ADMIN: HCPCS | Performed by: FAMILY MEDICINE

## 2020-11-24 PROCEDURE — 1036F TOBACCO NON-USER: CPT | Performed by: FAMILY MEDICINE

## 2020-11-24 PROCEDURE — G8417 CALC BMI ABV UP PARAM F/U: HCPCS | Performed by: FAMILY MEDICINE

## 2020-11-24 PROCEDURE — 3017F COLORECTAL CA SCREEN DOC REV: CPT | Performed by: FAMILY MEDICINE

## 2020-11-24 PROCEDURE — 99213 OFFICE O/P EST LOW 20 MIN: CPT | Performed by: FAMILY MEDICINE

## 2020-11-24 PROCEDURE — G8427 DOCREV CUR MEDS BY ELIG CLIN: HCPCS | Performed by: FAMILY MEDICINE

## 2020-11-24 RX ORDER — PANTOPRAZOLE SODIUM 40 MG/1
TABLET, DELAYED RELEASE ORAL
Qty: 90 TABLET | Refills: 1 | Status: SHIPPED | OUTPATIENT
Start: 2020-11-24 | End: 2021-06-07 | Stop reason: SDUPTHER

## 2020-11-24 RX ORDER — ENALAPRIL MALEATE 20 MG/1
TABLET ORAL
Qty: 90 TABLET | Refills: 1 | Status: SHIPPED | OUTPATIENT
Start: 2020-11-24 | End: 2021-06-07 | Stop reason: SDUPTHER

## 2020-11-24 ASSESSMENT — ENCOUNTER SYMPTOMS
SHORTNESS OF BREATH: 0
COUGH: 1

## 2020-11-24 NOTE — TELEPHONE ENCOUNTER
Jaye Iverson called requesting a refill on the following medications:  Requested Prescriptions     Pending Prescriptions Disp Refills    enalapril (VASOTEC) 20 MG tablet [Pharmacy Med Name: ENALAPRIL MALEATE 20 MG TAB] 90 tablet 1     Sig: take 1 tablet by mouth once daily    pantoprazole (PROTONIX) 40 MG tablet [Pharmacy Med Name: PANTOPRAZOLE SOD DR 40 MG TAB] 90 tablet 1     Sig: take 1 tablet by mouth once daily    metFORMIN (GLUCOPHAGE) 1000 MG tablet [Pharmacy Med Name: METFORMIN HCL 1,000 MG TABLET] 180 tablet 1     Sig: take 1 tablet by mouth twice a day with meals       Date of last visit: 11/24/2020  Date of next visit (if applicable):2/24/2021  Date of last refill:   Pharmacy Name:       [facility], [address] [place] [address]

## 2020-11-24 NOTE — PROGRESS NOTES
Inject 40 Units into the skin 2 times daily 96 mL 0    enalapril (VASOTEC) 20 MG tablet Take 0.5 tablets by mouth daily 90 tablet 1    insulin lispro (HUMALOG) 100 UNIT/ML injection vial Inject 10 Units into the skin 2 times daily as needed for High Blood Sugar when blood sugar > 155      metFORMIN (GLUCOPHAGE) 1000 MG tablet take 1 tablet by mouth twice a day with meals 180 tablet 1    pantoprazole (PROTONIX) 40 MG tablet take 1 tablet by mouth once daily 90 tablet 1    cromolyn (OPTICROM) 4 % ophthalmic solution Place 1 drop into both eyes 4 times daily 1 Bottle 3    BD INSULIN SYRINGE U/F 31G X 5/16\" 1 ML MISC 1 each by Other route 2 times daily 100 each 3    clopidogrel (PLAVIX) 75 MG tablet take 1 tablet by mouth once daily 90 tablet 1    albuterol (PROVENTIL) (2.5 MG/3ML) 0.083% nebulizer solution Take 3 mLs by nebulization every 4 hours as needed for Wheezing 2 Package 1    amLODIPine (NORVASC) 5 MG tablet Take 10 mg by mouth daily   0    metoprolol tartrate (LOPRESSOR) 25 MG tablet Take 1 tablet by mouth 2 times daily  0    Vitamin D, Cholecalciferol, 25 MCG (1000 UT) TABS Take 1,000 Units by mouth daily      pravastatin (PRAVACHOL) 40 MG tablet Take 1 tablet by mouth nightly  0    gemfibrozil (LOPID) 600 MG tablet Take 1 tablet by mouth 2 times daily 180 tablet 1    nitroGLYCERIN (NITROSTAT) 0.4 MG SL tablet Place 0.4 mg under the tongue every 5 minutes as needed for Chest pain      aspirin 325 MG tablet Take 325 mg by mouth daily. No current facility-administered medications for this visit. Allergies   Allergen Reactions    Ace Inhibitors      When asked Oct 2020, patient was unsure of allergy/reaction. Patient takes/tolerates enalapril.     Baclofen Other (See Comments)     Lightheadedness, dizziness    Darvocet [Propoxyphene N-Acetaminophen] Nausea Only     Felt sickly    Darvon [Propoxyphene Hcl]      Felt sickly    Flexeril [Cyclobenzaprine] Other (See Comments) Headache    Morphine Nausea Only     Pt reported feeling sickly    Motrin [Ibuprofen Micronized] Nausea Only     Pt reported feeling very sick    Tylenol [Acetaminophen] Nausea Only    Ultram [Tramadol] Itching     Health Maintenance   Topic Date Due    Hepatitis C screen  1960    HIV screen  07/10/1976    Hepatitis B vaccine (1 of 3 - Risk 3-dose series) 07/10/1980    DTaP/Tdap/Td vaccine (1 - Tdap) 07/10/1980    Shingles Vaccine (1 of 2) 07/10/2011    Diabetic retinal exam  07/31/2019    Diabetic foot exam  01/07/2021    A1C test (Diabetic or Prediabetic)  08/25/2021    Diabetic microalbuminuria test  08/25/2021    Lipid screen  08/25/2021    Potassium monitoring  10/19/2021    Creatinine monitoring  10/19/2021    PSA counseling  10/23/2021    Colon cancer screen colonoscopy  02/26/2022    Flu vaccine  Completed    Hepatitis A vaccine  Aged Out    Hib vaccine  Aged Out    Meningococcal (ACWY) vaccine  Aged Out    Pneumococcal 0-64 years Vaccine  Aged Out       Objective:  /68 (Site: Left Upper Arm, Position: Sitting)   Pulse 90   Temp 96.4 °F (35.8 °C)   Ht 5' 9\" (1.753 m)   Wt 290 lb 6.4 oz (131.7 kg)   SpO2 97%   BMI 42.88 kg/m²   Physical Exam  Vitals signs reviewed. Constitutional:       General: He is not in acute distress. Appearance: He is obese. He is not ill-appearing. Interventions: Nasal cannula in place. Cardiovascular:      Rate and Rhythm: Normal rate and regular rhythm. Pulmonary:      Effort: Pulmonary effort is normal. No respiratory distress. Breath sounds: No wheezing or rhonchi. Neurological:      Mental Status: He is alert. Mental status is at baseline. Psychiatric:         Mood and Affect: Mood is anxious. Behavior: Behavior normal.         Impression/Plan:  1. Anxiety  Chronic. Mostly controlled. Continue Xanax 1 mg twice daily    2. Urinary retention  Acute problem. Following with urology.   Planning for procedure in dec    -A total of at least 15 minutes was spent face-to-face with the patient during this encounter and over 50% of that time was spent on counseling and/or coordination of care. The patient's conditions were thoroughly discussed during the visit today and all questions were answered. Discussed medications in detail. Discussed his symptoms and how he is feeling. They voiced understanding. All questions answered. They agreed with treatment plan. See patient instructions for any educational materials that may have been given. Discussed use, benefit, and side effects of prescribed medications. Reviewed health maintenance. (Please note that portions of this note may have been completed with a voice recognition program.  Efforts were made to edit the dictation but occasionally words are mis-transcribed.)    Return in about 3 months (around 2/24/2021) for HTN, DM.       Electronically signed by Julianna Enamorado MD on 11/24/2020 at 9:20 AM

## 2020-11-25 RX ORDER — CROMOLYN SODIUM 40 MG/ML
SOLUTION/ DROPS OPHTHALMIC
Qty: 10 ML | Refills: 0 | Status: SHIPPED | OUTPATIENT
Start: 2020-11-25 | End: 2021-02-02 | Stop reason: SDUPTHER

## 2020-11-25 NOTE — TELEPHONE ENCOUNTER
Lotus Merino pscrxrequest    Refill: Cromolyn 4% Solution 1 drop both eyes 4 times daily    Pharmacy:  [facility]    Last Ov: 11/24/20

## 2020-11-27 ENCOUNTER — HOSPITAL ENCOUNTER (OUTPATIENT)
Age: 60
Discharge: HOME | End: 2020-11-27
Payer: MEDICAID

## 2020-11-27 DIAGNOSIS — R30.0 DYSURIA: ICD-10-CM

## 2020-11-27 LAB
BACTERIA: ABNORMAL /HPF
BILIRUBIN URINE: NEGATIVE
BLOOD, URINE: ABNORMAL
CASTS 2: ABNORMAL /LPF
CASTS UA: ABNORMAL /LPF
CHARACTER, URINE: CLEAR
COLOR: YELLOW
CRYSTALS, UA: ABNORMAL
EPITHELIAL CELLS, UA: ABNORMAL /HPF
GLUCOSE URINE: NEGATIVE MG/DL
KETONES, URINE: NEGATIVE
LEUKOCYTE ESTERASE, URINE: ABNORMAL
MISCELLANEOUS 2: ABNORMAL
NITRITE, URINE: NEGATIVE
PH UA: 6 (ref 5–9)
PROTEIN UA: ABNORMAL
RBC URINE: ABNORMAL /HPF
RENAL EPITHELIAL, UA: ABNORMAL
SPECIFIC GRAVITY, URINE: 1.01 (ref 1–1.03)
UROBILINOGEN, URINE: 0.2 EU/DL (ref 0–1)
WBC UA: ABNORMAL /HPF
YEAST: ABNORMAL

## 2020-11-27 PROCEDURE — 81001 URINALYSIS AUTO W/SCOPE: CPT

## 2020-11-27 PROCEDURE — 99211 OFF/OP EST MAY X REQ PHY/QHP: CPT

## 2020-11-27 NOTE — PROGRESS NOTES
Sherri Osler with urinary catheter attached to leg bag. Urine sample obtained via sterile technique with catheter syringe.

## 2020-11-30 ENCOUNTER — TELEPHONE (OUTPATIENT)
Dept: UROLOGY | Age: 60
End: 2020-11-30

## 2020-11-30 ENCOUNTER — HOSPITAL ENCOUNTER (OUTPATIENT)
Age: 60
Discharge: HOME | End: 2020-11-30
Payer: MEDICAID

## 2020-11-30 ENCOUNTER — OFFICE VISIT (OUTPATIENT)
Dept: PULMONOLOGY | Age: 60
End: 2020-11-30
Payer: MEDICAID

## 2020-11-30 ENCOUNTER — HOSPITAL ENCOUNTER (OUTPATIENT)
Dept: GENERAL RADIOLOGY | Age: 60
Discharge: HOME | End: 2020-11-30
Payer: MEDICAID

## 2020-11-30 VITALS
HEART RATE: 82 BPM | SYSTOLIC BLOOD PRESSURE: 134 MMHG | BODY MASS INDEX: 44.43 KG/M2 | HEIGHT: 69 IN | TEMPERATURE: 98.6 F | OXYGEN SATURATION: 98 % | DIASTOLIC BLOOD PRESSURE: 68 MMHG | WEIGHT: 300 LBS

## 2020-11-30 DIAGNOSIS — J44.9 COPD, SEVERE (HCC): ICD-10-CM

## 2020-11-30 PROCEDURE — G8417 CALC BMI ABV UP PARAM F/U: HCPCS | Performed by: NURSE PRACTITIONER

## 2020-11-30 PROCEDURE — 3023F SPIROM DOC REV: CPT | Performed by: NURSE PRACTITIONER

## 2020-11-30 PROCEDURE — G8427 DOCREV CUR MEDS BY ELIG CLIN: HCPCS | Performed by: NURSE PRACTITIONER

## 2020-11-30 PROCEDURE — 71046 X-RAY EXAM CHEST 2 VIEWS: CPT

## 2020-11-30 PROCEDURE — 3017F COLORECTAL CA SCREEN DOC REV: CPT | Performed by: NURSE PRACTITIONER

## 2020-11-30 PROCEDURE — G8926 SPIRO NO PERF OR DOC: HCPCS | Performed by: NURSE PRACTITIONER

## 2020-11-30 PROCEDURE — G8482 FLU IMMUNIZE ORDER/ADMIN: HCPCS | Performed by: NURSE PRACTITIONER

## 2020-11-30 PROCEDURE — 1036F TOBACCO NON-USER: CPT | Performed by: NURSE PRACTITIONER

## 2020-11-30 PROCEDURE — 99211 OFF/OP EST MAY X REQ PHY/QHP: CPT

## 2020-11-30 PROCEDURE — U0003 INFECTIOUS AGENT DETECTION BY NUCLEIC ACID (DNA OR RNA); SEVERE ACUTE RESPIRATORY SYNDROME CORONAVIRUS 2 (SARS-COV-2) (CORONAVIRUS DISEASE [COVID-19]), AMPLIFIED PROBE TECHNIQUE, MAKING USE OF HIGH THROUGHPUT TECHNOLOGIES AS DESCRIBED BY CMS-2020-01-R: HCPCS

## 2020-11-30 PROCEDURE — 99214 OFFICE O/P EST MOD 30 MIN: CPT | Performed by: NURSE PRACTITIONER

## 2020-11-30 RX ORDER — AMLODIPINE BESYLATE 10 MG/1
10 TABLET ORAL DAILY
COMMUNITY
End: 2022-10-24 | Stop reason: SDUPTHER

## 2020-11-30 ASSESSMENT — ENCOUNTER SYMPTOMS
CHEST TIGHTNESS: 0
RESPIRATORY NEGATIVE: 1
EYES NEGATIVE: 1
VOMITING: 0
NAUSEA: 0
WHEEZING: 0
DIARRHEA: 0
GASTROINTESTINAL NEGATIVE: 1
ALLERGIC/IMMUNOLOGIC NEGATIVE: 1
STRIDOR: 0

## 2020-11-30 NOTE — TELEPHONE ENCOUNTER
[facility] Urology Surgery Preop Check Off    Preop testing- done 2 weeks prior and covid testing 1 week prior to surgery date. Continue to give arrival times and inform patients time is subject to change that day as it gets closer to the day of surgery. PREOP check list      Surgeon Dr. Josy Strong       Patient Name  Shellie Noe     1960   MRN   [id number]     DOS   20  Diagnosis/Indication for Surgery  Elevated Psa,Bladder Whitlash Dread, BPH with obstruction  Procedure Transrectal ultrasound with prostate biopsy,transurethral resection of the prostate, cystolitholapaxy    Allergies     Allergies   Allergen Reactions    Ace Inhibitors      When asked Oct 2020, patient was unsure of allergy/reaction. Patient takes/tolerates enalapril.  Baclofen Other (See Comments)     Lightheadedness, dizziness    Darvocet [Propoxyphene N-Acetaminophen] Nausea Only     Felt sickly    Darvon [Propoxyphene Hcl]      Felt sickly    Flexeril [Cyclobenzaprine] Other (See Comments)     Headache    Morphine Nausea Only     Pt reported feeling sickly    Motrin [Ibuprofen Micronized] Nausea Only     Pt reported feeling very sick    Tylenol [Acetaminophen] Nausea Only    Ultram [Tramadol] Itching      UA  20  Leukocyte trace  Urine Culture  None  Blood thinners  Yes     EKG.  20  CXR-  20 Abnormal    COVID 20 Not Detected    Clearance:  Cardiac- Dr Zackery aRmirez to surgery- No

## 2020-11-30 NOTE — PROGRESS NOTES
[facility]      Jaylen Anne         [id number]  11/30/2020   Chief Complaint   Patient presents with    Follow-up     6-8 week follow up [address] D/C to home 10/19/2020 respiratory failure with hypoxia        Pt of Dr. Balaji Quiñonez    PAP Download:   Fuad Claros or initial AHI: N/A     Date of initial study: N/A      Compliant  100%     Noncompliant 0 %     PAP Type VAuto Level  14/6   Avg Hrs/Day 9 hours 16 minutes  AHI: 5.4   Recorded compliance dates , 10/31/2020  to 11/29/2020   Machine/Mfg:   [x] ResMed    [] Respironics/Dreamstation   Interface:   [] Nasal    [] Nasal pillows   [x] FFM      Provider:      [x] [facility]     [][employer]     [] [employer]    [] Reather Meth    [] [facility]               [] [facility]      [] [employer]    [] [address]      [x] Other [facility]     Neck Size: 22.5  Mallampati Mallampati 4      Here is a scan of the most recent download:            Presentation:   Rafaela Marlow presents for sleep medicine follow up for obstructive sleep apnea  Since the last visit, Rafaela Marlow has been compliant with BiPAP usage and does feel the benefit from PAP therapy. Equipment issues: The pressure is not  acceptable, the mask is acceptable     Sleep issues:  Do you feel better? Yes  More rested? Yes   Better concentration? yes    Progress History:   Since last visit any new medical issues? No  New ER or hospitlal visits? No  Any new or changes in medicines? No  Any new sleep medicines?  No        Past Medical History:   Diagnosis Date    Abnormal stress test Sept 2012    Lateral Wall Ischemia- done at [facility]-    CAD (coronary artery disease)     Chronic low back pain     Diabetes mellitus [facility]     Diabetes mellitus ([facility]     Hyperlipidemia     Hypertension     Hypertriglyceridemia     MI (myocardial infarction) ([facility] 2004    Obesity     Viral pneumonia 10/15/2020       Past Surgical History:   Procedure Laterality Date    BACK SURGERY  November 1997    L4 & L5 fusion    CARDIAC CATHETERIZATION  10/2019   Mayra Angela CARDIAC SURGERY  2004    Cardiac cath with stent placement x1    COLONOSCOPY  2010    CORONARY ANGIOPLASTY WITH STENT PLACEMENT  10/17/2019    HERNIA REPAIR      Mesh repair in abdomen. Social History     Tobacco Use    Smoking status: Former Smoker     Packs/day: 0.25     Years: 2.00     Pack years: 0.50     Types: Cigarettes     Last attempt to quit: 1995     Years since quittin.9    Smokeless tobacco: Never Used   Substance Use Topics    Alcohol use: No    Drug use: No       Allergies   Allergen Reactions    Ace Inhibitors      When asked Oct 2020, patient was unsure of allergy/reaction. Patient takes/tolerates enalapril.  Baclofen Other (See Comments)     Lightheadedness, dizziness    Darvocet [Propoxyphene N-Acetaminophen] Nausea Only     Felt sickly    Darvon [Propoxyphene Hcl]      Felt sickly    Flexeril [Cyclobenzaprine] Other (See Comments)     Headache    Morphine Nausea Only     Pt reported feeling sickly    Motrin [Ibuprofen Micronized] Nausea Only     Pt reported feeling very sick    Tylenol [Acetaminophen] Nausea Only    Ultram [Tramadol] Itching       Current Outpatient Medications   Medication Sig Dispense Refill    CPAP Machine MISC by Does not apply route Please change EPAP pressure to 9 cm H20.  Should be 14/9  Please send download in 2 weeks to office on new settings 1 each 0    cromolyn (OPTICROM) 4 % ophthalmic solution instill 1 drop into both eyes four times a day 10 mL 0    enalapril (VASOTEC) 20 MG tablet take 1 tablet by mouth once daily 90 tablet 1    pantoprazole (PROTONIX) 40 MG tablet take 1 tablet by mouth once daily 90 tablet 1    metFORMIN (GLUCOPHAGE) 1000 MG tablet take 1 tablet by mouth twice a day with meals 180 tablet 1    doxycycline hyclate (VIBRAMYCIN) 100 MG capsule Take 1 capsule by mouth 2 times daily for 10 days 20 capsule 0    ALPRAZolam (XANAX) 1 MG tablet Take 1 tablet by mouth 2 times daily for 30 days. 60 tablet 0    fluticasone (FLONASE) 50 MCG/ACT nasal spray One spray in each nostril q hs 3 Bottle 1    cyanocobalamin 1000 MCG tablet Take 1 tablet by mouth daily 30 tablet 3    B-D ULTRAFINE III SHORT PEN 31G X 8 MM MISC USE TWICE DAILY WITH THE BASAGLAR PEN. 200 each 2    vitamin C (VITAMIN C) 500 MG tablet Take 1 tablet by mouth daily 30 tablet 3    ferrous sulfate (IRON 325) 325 (65 Fe) MG tablet Take 1 tablet by mouth every 48 hours 30 tablet 3    tamsulosin (FLOMAX) 0.4 MG capsule Take 1 capsule by mouth 2 times daily 60 capsule 5    insulin glargine (LANTUS SOLOSTAR) 100 UNIT/ML injection pen Inject 40 Units into the skin 2 times daily 96 mL 0    insulin lispro (HUMALOG) 100 UNIT/ML injection vial Inject 10 Units into the skin 2 times daily as needed for High Blood Sugar when blood sugar > 155      BD INSULIN SYRINGE U/F 31G X 5/16\" 1 ML MISC 1 each by Other route 2 times daily 100 each 3    clopidogrel (PLAVIX) 75 MG tablet take 1 tablet by mouth once daily 90 tablet 1    albuterol (PROVENTIL) (2.5 MG/3ML) 0.083% nebulizer solution Take 3 mLs by nebulization every 4 hours as needed for Wheezing 2 Package 1    amLODIPine (NORVASC) 5 MG tablet Take 10 mg by mouth daily   0    metoprolol tartrate (LOPRESSOR) 25 MG tablet Take 1 tablet by mouth 2 times daily  0    Vitamin D, Cholecalciferol, 25 MCG (1000 UT) TABS Take 1,000 Units by mouth daily      pravastatin (PRAVACHOL) 40 MG tablet Take 1 tablet by mouth nightly  0    gemfibrozil (LOPID) 600 MG tablet Take 1 tablet by mouth 2 times daily 180 tablet 1    nitroGLYCERIN (NITROSTAT) 0.4 MG SL tablet Place 0.4 mg under the tongue every 5 minutes as needed for Chest pain      aspirin 325 MG tablet Take 325 mg by mouth daily. No current facility-administered medications for this visit.         Family History   Problem Relation Age of Onset    Diabetes Mother     Heart Disease Mother    [facility] Arthritis Father     Diabetes Father     Heart Disease Father     Diabetes Sister     Diabetes Brother     Heart Disease Brother         Review of Systems -   Review of Systems   Constitutional: Negative. Negative for chills, fever and unexpected weight change. HENT: Negative. Eyes: Negative. Respiratory: Negative. Negative for chest tightness, wheezing and stridor. Cardiovascular: Negative for chest pain and leg swelling. Gastrointestinal: Negative. Negative for diarrhea, nausea and vomiting. Endocrine: Negative. Genitourinary: Negative. Negative for dysuria. Musculoskeletal: Negative. Skin: Negative. Allergic/Immunologic: Negative. Neurological: Negative. Hematological: Negative. Psychiatric/Behavioral: Negative. Physical Exam:    BMI:  Body mass index is 44.3 kg/m². Wt Readings from Last 3 Encounters:   11/30/20 300 lb (136.1 kg)   11/24/20 290 lb 6.4 oz (131.7 kg)   10/29/20 290 lb (131.5 kg)     Vitals: /68 (Site: Left Upper Arm, Position: Sitting, Cuff Size: Large Adult)   Pulse 82   Temp 98.6 °F (37 °C)   Ht 5' 9\" (1.753 m)   Wt 300 lb (136.1 kg)   SpO2 98% Comment: on 2L O2 POC  BMI 44.30 kg/m²       Physical Exam  Vitals signs and nursing note reviewed. Constitutional:       General: He is not in acute distress. Appearance: He is well-developed. He is obese. HENT:      Head: Normocephalic and atraumatic. Neck:      Trachea: No tracheal deviation. Cardiovascular:      Rate and Rhythm: Normal rate and regular rhythm. Heart sounds: Normal heart sounds. No murmur. Pulmonary:      Effort: Pulmonary effort is normal. No respiratory distress. Breath sounds: Normal breath sounds. No stridor. No wheezing or rales. Chest:      Chest wall: No tenderness. Abdominal:      General: Bowel sounds are normal. There is no distension. Palpations: Abdomen is soft.    Genitourinary:     Comments: (+) corea with leg bag   Skin: General: Skin is warm and dry. Capillary Refill: Capillary refill takes less than 2 seconds. Neurological:      Mental Status: He is alert and oriented to person, place, and time. Psychiatric:         Behavior: Behavior normal.         Thought Content: Thought content normal.         Judgment: Judgment normal.       ASSESSMENT/DIAGNOSIS     Diagnosis Orders   1. Obesity hypoventilation syndrome (HCC)  CPAP Machine MISC   2. COPD, severe ([address]  Full PFT Study With Bronchodilator    COVID-19 Ambulatory          Plan   Do you need any equipment today?  No  -Will increase EPAP pressure to 9 making settings 14/9 with download in 2 weeks.   -Sleep hygiene discussed  -Patient to have urology surgery on 12/7/20 discussed bringing BiPAP machine to hospital with him  -Weight loss encouraged.   -Patient needs pulmonary appointment with PFT prior - Severe COPD but on no inhalers but Albuterol  -6MWT at next visit     Electronically signed by PIEDAD Long - CNP on 11/30/2020 at 12:02 PM

## 2020-12-01 ENCOUNTER — TELEPHONE (OUTPATIENT)
Dept: UROLOGY | Age: 60
End: 2020-12-01

## 2020-12-01 NOTE — TELEPHONE ENCOUNTER
I would say yes he is ok for surgery would highly recommend aggressive bronchodilation before and after surgery. Patient instructed to bring PAP machine to hospital with him and incentive spirometry encouraged after OR.

## 2020-12-01 NOTE — TELEPHONE ENCOUNTER
Pulmonologist is in [address] [place] [address] Rd. Sent telephone encounter for review of the chest xray.

## 2020-12-01 NOTE — TELEPHONE ENCOUNTER
Patient is scheduled for surgery with Dr Hina Harmon on Monday. Yaz Amaral PA-C wanted to make sure you had reviewed his chest xray from 11/30/20 and he is ok to proceed with the planned procedure.

## 2020-12-01 NOTE — TELEPHONE ENCOUNTER
Patient notified of new surgery arrival time. Patient is to be at [address] [place] [address] Same day surgery by  7:30 AM   on  12/7/20    Patient reminded to have nothing to eat or drink after midnight. Patient voiced understanding.

## 2020-12-03 ENCOUNTER — TELEPHONE (OUTPATIENT)
Dept: UROLOGY | Age: 60
End: 2020-12-03

## 2020-12-03 LAB — SARS-COV-2: NOT DETECTED

## 2020-12-03 RX ORDER — OXYBUTYNIN CHLORIDE 10 MG/1
10 TABLET, EXTENDED RELEASE ORAL DAILY
Qty: 30 TABLET | Refills: 3 | Status: SHIPPED | OUTPATIENT
Start: 2020-12-03 | End: 2021-04-02 | Stop reason: SDUPTHER

## 2020-12-03 NOTE — TELEPHONE ENCOUNTER
Patient states he has leaking at the insertion site of the catheter. The corea is patent with good output. It feels like a spasm and has pain when it occurs. He does not take anything for spasms but takes flomax. The corea was exchanged by home health a couple of weeks ago. He denies fever or chills. Please advise. Thank you.

## 2020-12-04 ENCOUNTER — ANESTHESIA EVENT (OUTPATIENT)
Dept: OPERATING ROOM | Age: 60
End: 2020-12-04
Payer: MEDICAID

## 2020-12-04 NOTE — PROGRESS NOTES
Patient contacted regarding COVID-19 screen. Following questions asked: In the last month, have you been in contact with someone who was confirmed or suspected to have Coronavirus/COVID-19:  Patient stated NO    Do you or family members have any of the following symptoms:  Cough-no   Muscle pain-no   Shortness of breath-no   Fever-no   Weakness-no  Severe headache-no   Sore throat-no   Respiratory symptoms-no    Have you traveled internationally in the last month? No     Have you been to the emergency room recently-no     The patient will bring one visitor and please wear a mask .

## 2020-12-07 ENCOUNTER — ANESTHESIA (OUTPATIENT)
Dept: OPERATING ROOM | Age: 60
End: 2020-12-07
Payer: MEDICAID

## 2020-12-07 ENCOUNTER — HOSPITAL ENCOUNTER (OUTPATIENT)
Age: 60
Setting detail: OUTPATIENT SURGERY
Discharge: HOME | End: 2020-12-07
Attending: UROLOGY | Admitting: UROLOGY
Payer: MEDICAID

## 2020-12-07 ENCOUNTER — HOSPITAL ENCOUNTER (OUTPATIENT)
Dept: ULTRASOUND IMAGING | Age: 60
Discharge: HOME | End: 2020-12-07
Payer: MEDICAID

## 2020-12-07 ENCOUNTER — TELEPHONE (OUTPATIENT)
Dept: UROLOGY | Age: 60
End: 2020-12-07

## 2020-12-07 VITALS — DIASTOLIC BLOOD PRESSURE: 63 MMHG | TEMPERATURE: 96.8 F | SYSTOLIC BLOOD PRESSURE: 117 MMHG | OXYGEN SATURATION: 95 %

## 2020-12-07 VITALS
BODY MASS INDEX: 43.28 KG/M2 | HEIGHT: 69 IN | WEIGHT: 292.2 LBS | HEART RATE: 75 BPM | OXYGEN SATURATION: 92 % | TEMPERATURE: 96.8 F | RESPIRATION RATE: 18 BRPM | DIASTOLIC BLOOD PRESSURE: 70 MMHG | SYSTOLIC BLOOD PRESSURE: 125 MMHG

## 2020-12-07 LAB
GLUCOSE BLD-MCNC: 152 MG/DL (ref 70–108)
INR BLD: 1.05 (ref 0.85–1.13)

## 2020-12-07 PROCEDURE — 3700000000 HC ANESTHESIA ATTENDED CARE: Performed by: UROLOGY

## 2020-12-07 PROCEDURE — 2709999900 HC NON-CHARGEABLE SUPPLY: Performed by: UROLOGY

## 2020-12-07 PROCEDURE — 76998 US GUIDE INTRAOP: CPT

## 2020-12-07 PROCEDURE — 2580000003 HC RX 258: Performed by: UROLOGY

## 2020-12-07 PROCEDURE — 6370000000 HC RX 637 (ALT 250 FOR IP): Performed by: UROLOGY

## 2020-12-07 PROCEDURE — 6360000002 HC RX W HCPCS: Performed by: NURSE ANESTHETIST, CERTIFIED REGISTERED

## 2020-12-07 PROCEDURE — 7100000000 HC PACU RECOVERY - FIRST 15 MIN: Performed by: UROLOGY

## 2020-12-07 PROCEDURE — 2700000000 HC OXYGEN THERAPY PER DAY

## 2020-12-07 PROCEDURE — 3700000001 HC ADD 15 MINUTES (ANESTHESIA): Performed by: UROLOGY

## 2020-12-07 PROCEDURE — 3600000004 HC SURGERY LEVEL 4 BASE: Performed by: UROLOGY

## 2020-12-07 PROCEDURE — 3600000014 HC SURGERY LEVEL 4 ADDTL 15MIN: Performed by: UROLOGY

## 2020-12-07 PROCEDURE — 88305 TISSUE EXAM BY PATHOLOGIST: CPT

## 2020-12-07 PROCEDURE — 7100000010 HC PHASE II RECOVERY - FIRST 15 MIN: Performed by: UROLOGY

## 2020-12-07 PROCEDURE — 6360000002 HC RX W HCPCS: Performed by: UROLOGY

## 2020-12-07 PROCEDURE — 36415 COLL VENOUS BLD VENIPUNCTURE: CPT

## 2020-12-07 PROCEDURE — 85610 PROTHROMBIN TIME: CPT

## 2020-12-07 PROCEDURE — 82948 REAGENT STRIP/BLOOD GLUCOSE: CPT

## 2020-12-07 PROCEDURE — 94640 AIRWAY INHALATION TREATMENT: CPT

## 2020-12-07 PROCEDURE — 2500000003 HC RX 250 WO HCPCS: Performed by: NURSE ANESTHETIST, CERTIFIED REGISTERED

## 2020-12-07 PROCEDURE — 7100000001 HC PACU RECOVERY - ADDTL 15 MIN: Performed by: UROLOGY

## 2020-12-07 PROCEDURE — 94760 N-INVAS EAR/PLS OXIMETRY 1: CPT

## 2020-12-07 PROCEDURE — 2720000010 HC SURG SUPPLY STERILE: Performed by: UROLOGY

## 2020-12-07 PROCEDURE — 7100000011 HC PHASE II RECOVERY - ADDTL 15 MIN: Performed by: UROLOGY

## 2020-12-07 RX ORDER — SODIUM CHLORIDE 9 MG/ML
INJECTION, SOLUTION INTRAVENOUS CONTINUOUS
Status: DISCONTINUED | OUTPATIENT
Start: 2020-12-07 | End: 2020-12-07 | Stop reason: HOSPADM

## 2020-12-07 RX ORDER — LABETALOL 20 MG/4 ML (5 MG/ML) INTRAVENOUS SYRINGE
10 EVERY 10 MIN PRN
Status: DISCONTINUED | OUTPATIENT
Start: 2020-12-07 | End: 2020-12-07 | Stop reason: HOSPADM

## 2020-12-07 RX ORDER — FENTANYL CITRATE 50 UG/ML
INJECTION, SOLUTION INTRAMUSCULAR; INTRAVENOUS PRN
Status: DISCONTINUED | OUTPATIENT
Start: 2020-12-07 | End: 2020-12-07 | Stop reason: SDUPTHER

## 2020-12-07 RX ORDER — IPRATROPIUM BROMIDE AND ALBUTEROL SULFATE 2.5; .5 MG/3ML; MG/3ML
1 SOLUTION RESPIRATORY (INHALATION)
Status: DISCONTINUED | OUTPATIENT
Start: 2020-12-07 | End: 2020-12-07

## 2020-12-07 RX ORDER — CIPROFLOXACIN 500 MG/1
500 TABLET, FILM COATED ORAL 2 TIMES DAILY
Qty: 10 TABLET | Refills: 0 | Status: SHIPPED | OUTPATIENT
Start: 2020-12-07 | End: 2020-12-12

## 2020-12-07 RX ORDER — FENTANYL CITRATE 50 UG/ML
50 INJECTION, SOLUTION INTRAMUSCULAR; INTRAVENOUS EVERY 5 MIN PRN
Status: DISCONTINUED | OUTPATIENT
Start: 2020-12-07 | End: 2020-12-07 | Stop reason: HOSPADM

## 2020-12-07 RX ORDER — ONDANSETRON 2 MG/ML
INJECTION INTRAMUSCULAR; INTRAVENOUS PRN
Status: DISCONTINUED | OUTPATIENT
Start: 2020-12-07 | End: 2020-12-07 | Stop reason: SDUPTHER

## 2020-12-07 RX ORDER — PROPOFOL 10 MG/ML
INJECTION, EMULSION INTRAVENOUS PRN
Status: DISCONTINUED | OUTPATIENT
Start: 2020-12-07 | End: 2020-12-07 | Stop reason: SDUPTHER

## 2020-12-07 RX ORDER — OXYCODONE HYDROCHLORIDE AND ACETAMINOPHEN 5; 325 MG/1; MG/1
1 TABLET ORAL
Status: COMPLETED | OUTPATIENT
Start: 2020-12-07 | End: 2020-12-07

## 2020-12-07 RX ORDER — MIDAZOLAM HYDROCHLORIDE 1 MG/ML
INJECTION INTRAMUSCULAR; INTRAVENOUS PRN
Status: DISCONTINUED | OUTPATIENT
Start: 2020-12-07 | End: 2020-12-07 | Stop reason: SDUPTHER

## 2020-12-07 RX ORDER — GLYCOPYRROLATE 1 MG/5 ML
SYRINGE (ML) INTRAVENOUS PRN
Status: DISCONTINUED | OUTPATIENT
Start: 2020-12-07 | End: 2020-12-07 | Stop reason: SDUPTHER

## 2020-12-07 RX ORDER — FENTANYL CITRATE 50 UG/ML
25 INJECTION, SOLUTION INTRAMUSCULAR; INTRAVENOUS EVERY 5 MIN PRN
Status: DISCONTINUED | OUTPATIENT
Start: 2020-12-07 | End: 2020-12-07 | Stop reason: HOSPADM

## 2020-12-07 RX ORDER — DEXAMETHASONE SODIUM PHOSPHATE 10 MG/ML
INJECTION, EMULSION INTRAMUSCULAR; INTRAVENOUS PRN
Status: DISCONTINUED | OUTPATIENT
Start: 2020-12-07 | End: 2020-12-07 | Stop reason: SDUPTHER

## 2020-12-07 RX ORDER — LIDOCAINE HCL/PF 100 MG/5ML
SYRINGE (ML) INJECTION PRN
Status: DISCONTINUED | OUTPATIENT
Start: 2020-12-07 | End: 2020-12-07 | Stop reason: SDUPTHER

## 2020-12-07 RX ORDER — KETAMINE HCL IN NACL, ISO-OSM 100MG/10ML
SYRINGE (ML) INJECTION PRN
Status: DISCONTINUED | OUTPATIENT
Start: 2020-12-07 | End: 2020-12-07 | Stop reason: SDUPTHER

## 2020-12-07 RX ADMIN — Medication 0.2 MG: at 10:19

## 2020-12-07 RX ADMIN — MIDAZOLAM HYDROCHLORIDE 1 MG: 1 INJECTION, SOLUTION INTRAMUSCULAR; INTRAVENOUS at 09:21

## 2020-12-07 RX ADMIN — Medication 25 MG: at 09:53

## 2020-12-07 RX ADMIN — Medication 3 G: at 09:29

## 2020-12-07 RX ADMIN — DEXAMETHASONE SODIUM PHOSPHATE 10 MG: 10 INJECTION, EMULSION INTRAMUSCULAR; INTRAVENOUS at 09:31

## 2020-12-07 RX ADMIN — IPRATROPIUM BROMIDE AND ALBUTEROL SULFATE 1 AMPULE: .5; 3 SOLUTION RESPIRATORY (INHALATION) at 08:54

## 2020-12-07 RX ADMIN — ONDANSETRON HYDROCHLORIDE 4 MG: 4 INJECTION, SOLUTION INTRAMUSCULAR; INTRAVENOUS at 10:09

## 2020-12-07 RX ADMIN — OXYCODONE AND ACETAMINOPHEN 1 TABLET: 5; 325 TABLET ORAL at 11:59

## 2020-12-07 RX ADMIN — FENTANYL CITRATE 50 MCG: 50 INJECTION, SOLUTION INTRAMUSCULAR; INTRAVENOUS at 09:21

## 2020-12-07 RX ADMIN — PROPOFOL 160 MG: 10 INJECTION, EMULSION INTRAVENOUS at 09:21

## 2020-12-07 RX ADMIN — FENTANYL CITRATE 50 MCG: 50 INJECTION, SOLUTION INTRAMUSCULAR; INTRAVENOUS at 09:31

## 2020-12-07 RX ADMIN — MIDAZOLAM HYDROCHLORIDE 1 MG: 1 INJECTION, SOLUTION INTRAMUSCULAR; INTRAVENOUS at 09:24

## 2020-12-07 RX ADMIN — SODIUM CHLORIDE: 9 INJECTION, SOLUTION INTRAVENOUS at 08:03

## 2020-12-07 RX ADMIN — Medication 25 MG: at 09:38

## 2020-12-07 RX ADMIN — Medication 60 MG: at 09:21

## 2020-12-07 ASSESSMENT — PULMONARY FUNCTION TESTS
PIF_VALUE: 3
PIF_VALUE: 1
PIF_VALUE: 20
PIF_VALUE: 22
PIF_VALUE: 24
PIF_VALUE: 24
PIF_VALUE: 4
PIF_VALUE: 21
PIF_VALUE: 20
PIF_VALUE: 27
PIF_VALUE: 18
PIF_VALUE: 4
PIF_VALUE: 18
PIF_VALUE: 3
PIF_VALUE: 31
PIF_VALUE: 3
PIF_VALUE: 2
PIF_VALUE: 25
PIF_VALUE: 1
PIF_VALUE: 25
PIF_VALUE: 27
PIF_VALUE: 10
PIF_VALUE: 26
PIF_VALUE: 16
PIF_VALUE: 23
PIF_VALUE: 24
PIF_VALUE: 16
PIF_VALUE: 6
PIF_VALUE: 4
PIF_VALUE: 19
PIF_VALUE: 1
PIF_VALUE: 2
PIF_VALUE: 0
PIF_VALUE: 1
PIF_VALUE: 0
PIF_VALUE: 0
PIF_VALUE: 23
PIF_VALUE: 15
PIF_VALUE: 20
PIF_VALUE: 23
PIF_VALUE: 17
PIF_VALUE: 1
PIF_VALUE: 0
PIF_VALUE: 24
PIF_VALUE: 0
PIF_VALUE: 1
PIF_VALUE: 1
PIF_VALUE: 0
PIF_VALUE: 2
PIF_VALUE: 4
PIF_VALUE: 15
PIF_VALUE: 24
PIF_VALUE: 18
PIF_VALUE: 20
PIF_VALUE: 19
PIF_VALUE: 19
PIF_VALUE: 16
PIF_VALUE: 23
PIF_VALUE: 0
PIF_VALUE: 20
PIF_VALUE: 25
PIF_VALUE: 24
PIF_VALUE: 26
PIF_VALUE: 25
PIF_VALUE: 24
PIF_VALUE: 24
PIF_VALUE: 5
PIF_VALUE: 17

## 2020-12-07 ASSESSMENT — PAIN DESCRIPTION - DESCRIPTORS: DESCRIPTORS: CONSTANT;DISCOMFORT

## 2020-12-07 ASSESSMENT — PAIN SCALES - GENERAL
PAINLEVEL_OUTOF10: 3
PAINLEVEL_OUTOF10: 0
PAINLEVEL_OUTOF10: 0
PAINLEVEL_OUTOF10: 8
PAINLEVEL_OUTOF10: 8

## 2020-12-07 ASSESSMENT — PAIN DESCRIPTION - LOCATION: LOCATION: SCROTUM

## 2020-12-07 ASSESSMENT — PAIN DESCRIPTION - FREQUENCY: FREQUENCY: CONTINUOUS

## 2020-12-07 NOTE — TELEPHONE ENCOUNTER
Follow up 1-2 weeks for pathology results  Rx to pharm Sitting up in bed, requesting when she can have more iv pain med. 0166: requesting nausea/vomiting medication, medicated    0914: kalyan issa at bedside for epidural    2095: epidural in, tilted to left, low fowlers, fhr signal loss during epidural placement    0944: f/c placed    0946: fhr decel, O2, iv bolus,  Letha care rendered, bed bad changed, total 105cc radha blood    1023: call from dr Yuridia Barkley, phone report on above, dr Yuridia Barkley at bedside, strip reviewed, and ampicillin started as ordered    1140:  sve by dr Yuridia Barkley, bedside [address] [facility], breech    [phone]:  kalyan olivia for epidural dose    1159:   To OR via bed with cvbest,faye and kalyan olivia    5971: fhr hand held external 157    1219: baby    3713: to 209 via gurney    1315: abdominal binder applied    1525: faye valerio

## 2020-12-07 NOTE — OP NOTE
FACILITY:  [facility], [address] [place] [address]       DATE: 12/7/20     SURGEON:  Patsy Torres   Assistant: none  Preoperative diagnosis: Elevated PSA, Bladder stone, BPH  Postoperative diagnosis: Elevated PSA, Bladder stone, BPH  Procedure:  Cystolithalopaxy  Stone extraction  Transrectal ultrasound-guided saturation biopsy of the prostate  Anesthesia: Gen LMA  Antibiotics: Ancef 2 gm  DVT prophylaxis: EPC cuffs Functioning Prior to Induction of Anesthesia   Volume: 88 cc  Follow-up: Patient will follow up in 1-2 weeks for review of  pathology results    Indication: This is a 72-year-old gentleman with PSA of 16. He is here today for biopsy of the prostate. He also has BPH with a bladder stone. Insurance denied a prostate MRI, so we will need to r/o prostate cancer before treating outlet obstruction. He is having significant LUTs and hematuria so we elect to treat the bladder stone today. He has an indwelling corea catheter. Risks benefits and alternatives goals and possible complications of the procedure were explained to the patient for consent was obtained. He elected to proceed. Details: Patient was brought back to the operating room table. He was laid in the lithotomy position. EPC cuffs were placed on and functioning prior to induction of anesthesia. Anesthesia was inducted. A timeout performed. We started with a cystoscope and entered the bladder. The patient had a large obstructing prostate gland. We identified the large bladder stone. We changed to a continuous flow sheath and started the cystolithalopaxy using a 1000um holmium laser fiber. We fragmented the stone and then extracted through the scope. All stone fragments were removed. The ultrasound probe was placed per rectum. The prostate was brought into view. As previously noted his prostates proximally 88 g. Seminal vesicles appeared normal.  We used 1 percent lidocaine to inject around the neurovascular bundle bilaterally.   We then proceeded with a saturation biopsy due to the size of the prostate gland. Starting on the right side in the left side for a total of 24 specimens. There was a hypoechoic nodule and some calcified areas that were sampled. These core specimens were sent off for permanent pathology. After completion of the biopsy we then removed the ultrasound probe. There is no evidence of brisk bleeding per the  rectum. The patient tolerated the procedure well. His anesthesia was reversed. He was then taken to recovery in stable condition. He was discharged home in stable condition and instructed to follow-up for review of his pathology results. He is instructed to call if he has any fevers shaking chills. The attending was present for the entire case.

## 2020-12-07 NOTE — ADDENDUM NOTE
Addendum  created 12/07/20 1307 by PIEDAD Seay - CRNA    Flowsheet accepted, Intraprocedure Flowsheets edited

## 2020-12-07 NOTE — PROGRESS NOTES
Patient admitted to [facility] room 1. Bed in low position side rails up call light in reach. Patient denies questions at this time.

## 2020-12-07 NOTE — H&P
eyes four times a day 11/25/20  Yes Dave Thorpe MD   enalapril (VASOTEC) 20 MG tablet take 1 tablet by mouth once daily 11/24/20  Yes Dave Thorpe MD   pantoprazole (PROTONIX) 40 MG tablet take 1 tablet by mouth once daily 11/24/20  Yes Dave Thorpe MD   metFORMIN (GLUCOPHAGE) 1000 MG tablet take 1 tablet by mouth twice a day with meals 11/24/20  Yes Dave Thorpe MD   ALPRAZolam Joanna Gutiérreze) 1 MG tablet Take 1 tablet by mouth 2 times daily for 30 days.  11/10/20 12/10/20 Yes Dave Thorpe MD   fluticasone [facility]) 50 MCG/ACT nasal spray One spray in each nostril q hs 11/10/20  Yes Dave Thorpe MD   cyanocobalamin 1000 MCG tablet Take 1 tablet by mouth daily 10/27/20  Yes Dave Thorpe MD   vitamin C (VITAMIN C) 500 MG tablet Take 1 tablet by mouth daily 10/20/20  Yes Philip Manzano DO   ferrous sulfate (IRON 325) 325 (65 Fe) MG tablet Take 1 tablet by mouth every 48 hours 10/20/20  Yes Sandrine Manzano DO   tamsulosin (FLOMAX) 0.4 MG capsule Take 1 capsule by mouth 2 times daily 10/19/20 10/19/21 Yes Roderick Nunes PA-C   insulin glargine (LANTUS SOLOSTAR) 100 UNIT/ML injection pen Inject 40 Units into the skin 2 times daily 10/19/20  Yes Indira Sidhu MD   insulin lispro (HUMALOG) 100 UNIT/ML injection vial Inject 10 Units into the skin 2 times daily as needed for High Blood Sugar when blood sugar > 155   Yes Historical Provider, MD   clopidogrel (PLAVIX) 75 MG tablet take 1 tablet by mouth once daily 1/7/20  Yes Dave Thorpe MD   albuterol (PROVENTIL) (2.5 MG/3ML) 0.083% nebulizer solution Take 3 mLs by nebulization every 4 hours as needed for Wheezing 1/7/20  Yes Dave Thorpe MD   metoprolol tartrate (LOPRESSOR) 25 MG tablet Take 1 tablet by mouth 2 times daily 12/7/19  Yes Historical Provider, MD   Vitamin D, Cholecalciferol, 25 MCG (1000 UT) TABS Take 1,000 Units by mouth daily   Yes Historical Provider, MD   pravastatin (PRAVACHOL) 40 MG tablet Take 1 tablet by mouth nightly 19  Yes Historical Provider, MD   gemfibrozil (LOPID) 600 MG tablet Take 1 tablet by mouth 2 times daily 19  Yes Donna Hair MD   aspirin 325 MG tablet Take 325 mg by mouth daily. Yes Historical Provider, MD   nitroGLYCERIN (NITROSTAT) 0.4 MG SL tablet Place 0.4 mg under the tongue every 5 minutes as needed for Chest pain    Historical Provider, MD       Allergies:  Ace inhibitors; Baclofen; Darvocet [propoxyphene n-acetaminophen]; Darvon [propoxyphene hcl]; Flexeril [cyclobenzaprine]; Morphine; Motrin [ibuprofen micronized];  Tylenol [acetaminophen]; and Ultram [tramadol]    Social History:    Social History     Socioeconomic History    Marital status: Single     Spouse name: Not on file    Number of children: Not on file    Years of education: Not on file    Highest education level: Not on file   Occupational History    Not on file   Social Needs    Financial resource strain: Not on file    Food insecurity     Worry: Not on file     Inability: Not on file    Transportation needs     Medical: Not on file     Non-medical: Not on file   Tobacco Use    Smoking status: Former Smoker     Packs/day: 0.25     Years: 2.00     Pack years: 0.50     Types: Cigarettes     Last attempt to quit: 1995     Years since quittin.9    Smokeless tobacco: Never Used   Substance and Sexual Activity    Alcohol use: No    Drug use: No    Sexual activity: Yes     Partners: Female   Lifestyle    Physical activity     Days per week: Not on file     Minutes per session: Not on file    Stress: Not on file   Relationships    Social connections     Talks on phone: Not on file     Gets together: Not on file     Attends Buddhist service: Not on file     Active member of club or organization: Not on file     Attends meetings of clubs or organizations: Not on file     Relationship status: Not on file    Intimate partner violence     Fear of current or ex partner: Not on file     Emotionally abused: Not on file     Physically abused: Not on file     Forced sexual activity: Not on file   Other Topics Concern    Not on file   Social History Narrative    Not on file       Family History:    Family History   Problem Relation Age of Onset    Diabetes Mother     Heart Disease Mother     High Blood Pressure Mother     Arthritis Father     Diabetes Father     Heart Disease Father     High Blood Pressure Father     Diabetes Sister     Diabetes Brother     Heart Disease Brother     Cancer Neg Hx     Stroke Neg Hx      Previous Urologic Family history: none  REVIEW OF SYSTEMS:  All systems reviewed and negative except for that already noted in the HPI. Physical Exam:      Patient Vitals for the past 24 hrs:   BP Temp Temp src Pulse Resp SpO2 Height Weight   12/07/20 0723 (!) 140/65 98.3 °F (36.8 °C) Temporal 71 16 96 % 5' 9\" (1.753 m) 292 lb 3.2 oz (132.5 kg)     Constitutional: Patient in no acute distress; Neuro: alert and oriented to person place and time. Psych: Mood and affect normal.  Skin: Normal  Lungs: Respiratory effort normal  Cardiovascular:  Normal peripheral pulses  Abdomen: Soft, non-tender, non-distended with no CVA, flank pain, hepatosplenomegaly or hernia. Kidneys normal.  Bladder non-tender and not distended. Lymphatics: no palpable lymphadenopathy      LABS:   No results for input(s): WBC, HGB, HCT, MCV, PLT in the last 72 hours. No results for input(s): NA, K, CL, CO2, PHOS, BUN, CREATININE in the last 72 hours. Invalid input(s): CA  Lab Results   Component Value Date    PSA 16.28 (H) 10/23/2020    PSA 16.31 (H) 08/25/2020    PSA 7.61 (H) 06/08/2016       Additional Lab/culture results:    Urinalysis: No results for input(s): COLORU, PHUR, LABCAST, WBCUA, RBCUA, MUCUS, TRICHOMONAS, YEAST, BACTERIA, CLARITYU, SPECGRAV, LEUKOCYTESUR, UROBILINOGEN, BILIRUBINUR, BLOODU in the last 72 hours.     Invalid input(s): Jaylan Cuenca -----------------------------------------------------------------  Imaging Results:    Assessment and Plan   Impression:    Patient Active Problem List   Diagnosis    Hypertension    Hyperlipidemia    CAD (coronary artery disease)    Chest pain, atypical    Chronic low back pain    Hypertriglyceridemia    Morbidly obese (HCC)    Abnormal stress test    Sprain and strain of ankle    Os trigonum    Elevated PSA    Lumbar spinal stenosis    Well controlled type 2 diabetes mellitus ([facility])    ROBSON (obstructive sleep apnea)    Back pain at L4-L5 level    Anxiety    Microalbuminuria    Positive FIT (fecal immunochemical test)    Viral pneumonia    Acute respiratory failure with hypoxia ([facility])    H/O heart artery stent    Urinary retention       Plan  To OR for TRUS prostate biopsy and cystolithalopaxy

## 2020-12-07 NOTE — ANESTHESIA PRE PROCEDURE
Department of Anesthesiology  Preprocedure Note       Name:  Jaylen Anne   Age:  62 y.o.  :  1960                                          MRN:  [id number]         Date:  2020      Surgeon: Radha Saldana):  Marcella Valentin MD    Procedure: Procedure(s):  TRANSRECTAL ULTRASOUND WITH PROSTATE BIOPSY, CYSTOLITHOLAPAXY  CYSTOSCOPY LITHOLAPAXY    Medications prior to admission:   Prior to Admission medications    Medication Sig Start Date End Date Taking? Authorizing Provider   oxybutynin (DITROPAN XL) 10 MG extended release tablet Take 1 tablet by mouth daily 12/3/20  Yes [address] PIEDAD Ring - CNP   amLODIPine (NORVASC) 10 MG tablet Take 10 mg by mouth daily   Yes Historical Provider, MD   cromolyn (OPTICROM) 4 % ophthalmic solution instill 1 drop into both eyes four times a day 20  Yes Lisa Gaming MD   enalapril (VASOTEC) 20 MG tablet take 1 tablet by mouth once daily 20  Yes Lisa Gaming MD   pantoprazole (PROTONIX) 40 MG tablet take 1 tablet by mouth once daily 20  Yes Lisa Gaming MD   metFORMIN (GLUCOPHAGE) 1000 MG tablet take 1 tablet by mouth twice a day with meals 20  Yes Lisa Gaming MD   ALPRAZolam Jonny Leyland) 1 MG tablet Take 1 tablet by mouth 2 times daily for 30 days.  11/10/20 12/10/20 Yes Lisa Gaming MD   fluticasone Derian Robin) 50 MCG/ACT nasal spray One spray in each nostril q hs 11/10/20  Yes Lisa Gaming MD   cyanocobalamin 1000 MCG tablet Take 1 tablet by mouth daily 10/27/20  Yes Lisa Gaming MD   vitamin C (VITAMIN C) 500 MG tablet Take 1 tablet by mouth daily 10/20/20  Yes Stephanie Manzano,    ferrous sulfate (IRON 325) 325 (65 Fe) MG tablet Take 1 tablet by mouth every 48 hours 10/20/20  Yes Dorys Juarez, DO   tamsulosin (FLOMAX) 0.4 MG capsule Take 1 capsule by mouth 2 times daily 10/19/20 10/19/21 Yes Marianna Rosenberg PA-C   insulin glargine (LANTUS SOLOSTAR) 100 UNIT/ML injection pen Inject 40 Units into the skin 2 times daily 10/19/20  Yes Deann Good MD   insulin lispro (HUMALOG) 100 UNIT/ML injection vial Inject 10 Units into the skin 2 times daily as needed for High Blood Sugar when blood sugar > 155   Yes Historical Provider, MD   clopidogrel (PLAVIX) 75 MG tablet take 1 tablet by mouth once daily 1/7/20  Yes Aurea Ricardo MD   albuterol (PROVENTIL) (2.5 MG/3ML) 0.083% nebulizer solution Take 3 mLs by nebulization every 4 hours as needed for Wheezing 1/7/20  Yes Aurea Ricardo MD   metoprolol tartrate (LOPRESSOR) 25 MG tablet Take 1 tablet by mouth 2 times daily 12/7/19  Yes Historical Provider, MD   Vitamin D, Cholecalciferol, 25 MCG (1000 UT) TABS Take 1,000 Units by mouth daily   Yes Historical Provider, MD   pravastatin (PRAVACHOL) 40 MG tablet Take 1 tablet by mouth nightly 8/1/19  Yes Historical Provider, MD   gemfibrozil (LOPID) 600 MG tablet Take 1 tablet by mouth 2 times daily 4/9/19  Yes Aurea Ricardo MD   aspirin 325 MG tablet Take 325 mg by mouth daily. Yes Historical Provider, MD   nitroGLYCERIN (NITROSTAT) 0.4 MG SL tablet Place 0.4 mg under the tongue every 5 minutes as needed for Chest pain    Historical Provider, MD       Current medications:    Current Facility-Administered Medications   Medication Dose Route Frequency Provider Last Rate Last Dose    ipratropium-albuterol (DUONEB) nebulizer solution 1 ampule  1 ampule Inhalation 30 Min Pre-Op Shay Jesus MD        ceFAZolin (ANCEF) 3 g in dextrose 5 % 100 mL IVPB  3 g Intravenous 30 Min Pre-Op Shay Jesus MD        0.9 % sodium chloride infusion   Intravenous Continuous Shay Jesus  mL/hr at 12/07/20 0803         Allergies: Allergies   Allergen Reactions    Ace Inhibitors      When asked Oct 2020, patient was unsure of allergy/reaction. Patient takes/tolerates enalapril.     Baclofen Other (See Comments)     Lightheadedness, dizziness    Darvocet [Propoxyphene N-Acetaminophen] Nausea Only     Felt sickly    HCG (If Applicable): No results found for: PREGTESTUR, PREGSERUM, HCG, HCGQUANT     ABGs: No results found for: PHART, PO2ART, MNN7GVJ, UCD1IZW, BEART, N7WTTCFD     Type & Screen (If Applicable):  Lab Results   Component Value Date    LABRH NEG 11/01/2019       Drug/Infectious Status (If Applicable):  No results found for: HIV, HEPCAB    COVID-19 Screening (If Applicable):   Lab Results   Component Value Date    COVID19 Not Detected 11/30/2020         Anesthesia Evaluation    Airway: Mallampati: III  TM distance: >3 FB   Neck ROM: full  Mouth opening: > = 3 FB Dental:          Pulmonary:   (+) sleep apnea:  decreased breath sounds,                             Cardiovascular:    (+) hypertension:, CAD:,         Rhythm: regular                      Neuro/Psych:               GI/Hepatic/Renal:   (+) morbid obesity          Endo/Other:    (+) Diabetes, . Abdominal:   (+) obese,         Vascular:                                        Anesthesia Plan      general     ASA 4     (On home o2 2l/min)  Induction: intravenous. MIPS: Postoperative opioids intended and Prophylactic antiemetics administered. Anesthetic plan and risks discussed with patient. Plan discussed with CRNA.                   Prince Griffin MD   12/7/2020

## 2020-12-07 NOTE — PROGRESS NOTES
Pt states his rectal/scrotal area hurts. No drainage noted rectally. Mccartney draining light pink/red urine.

## 2020-12-07 NOTE — PROGRESS NOTES
1019 patient arrived to PACU, responds to voice. Denies pain.  VSS, corea in place  1034 patient awake, resp easy  1049 pt resting off and on, meets criteria for discharge from PACU, transported to [facility]

## 2020-12-10 RX ORDER — ALPRAZOLAM 1 MG/1
1 TABLET ORAL 2 TIMES DAILY
Qty: 60 TABLET | Refills: 0 | Status: SHIPPED | OUTPATIENT
Start: 2020-12-10 | End: 2021-01-08 | Stop reason: SDUPTHER

## 2020-12-10 NOTE — TELEPHONE ENCOUNTER
Mayco Nice called requesting a refill on the following medications:  Requested Prescriptions     Pending Prescriptions Disp Refills    ALPRAZolam (XANAX) 1 MG tablet 60 tablet 0     Sig: Take 1 tablet by mouth 2 times daily for 30 days. [facility] verified:[facility]  . pv      Date of last visit: 11/24/20  Date of next visit (if applicable): 0/97/6600

## 2020-12-18 ENCOUNTER — OFFICE VISIT (OUTPATIENT)
Dept: UROLOGY | Age: 60
End: 2020-12-18
Payer: MEDICAID

## 2020-12-18 VITALS — TEMPERATURE: 98.7 F | WEIGHT: 285 LBS | BODY MASS INDEX: 42.21 KG/M2 | HEIGHT: 69 IN

## 2020-12-18 PROCEDURE — 3017F COLORECTAL CA SCREEN DOC REV: CPT | Performed by: UROLOGY

## 2020-12-18 PROCEDURE — G8417 CALC BMI ABV UP PARAM F/U: HCPCS | Performed by: UROLOGY

## 2020-12-18 PROCEDURE — 99214 OFFICE O/P EST MOD 30 MIN: CPT | Performed by: UROLOGY

## 2020-12-18 PROCEDURE — 51700 IRRIGATION OF BLADDER: CPT | Performed by: UROLOGY

## 2020-12-18 PROCEDURE — G8482 FLU IMMUNIZE ORDER/ADMIN: HCPCS | Performed by: UROLOGY

## 2020-12-18 PROCEDURE — 1036F TOBACCO NON-USER: CPT | Performed by: UROLOGY

## 2020-12-18 PROCEDURE — G8427 DOCREV CUR MEDS BY ELIG CLIN: HCPCS | Performed by: UROLOGY

## 2020-12-18 NOTE — PROGRESS NOTES
63 yo male that presents with LUTs and difficulty emptying his bladder. He has incomplete emptying, frequency, and Nocturia, that are worsening over the last couple years. On flomax but he is stating his symptoms are worsening. AUASS: 11/4. Denies hematuria. PVR: 48 ml. Smoking history: quit 20 years ago.  FH: none. ANTONIO: plavix, for CAD s/p stents (10/2019). Has elevated PSA, 16.          Summary of old records:   (Patient's old records, notes and chart reviewed and summarized above.)     Last several PSA's:  Lab Results   Component Value Date    PSA 16.28 (H) 10/23/2020    PSA 16.31 (H) 08/25/2020    PSA 7.61 (H) 06/08/2016       Last total testosterone:  No results found for: TESTOSTERONE    Urinalysis today:  No results found for this visit on 12/18/20. Last BUN and creatinine:  Lab Results   Component Value Date    BUN 23 (H) 10/19/2020     Lab Results   Component Value Date    CREATININE 1.0 10/19/2020       Imaging Reviewed during this Office Visit:   (results were independently reviewed by physician and radiology report verified)    PAST MEDICAL, FAMILY AND SOCIAL HISTORY UPDATE:  Past Medical History:   Diagnosis Date    Abnormal stress test Sept 2012    Lateral Wall Ischemia- done at [facility]-    CAD (coronary artery disease)     Chronic low back pain     Diabetes mellitus ([address]     Diabetes mellitus ([address]     Hyperlipidemia     Hypertension     Hypertriglyceridemia     MI (myocardial infarction) ([address] 2004    Obesity     Sleep apnea     has CPAP    Viral pneumonia 10/15/2020     Past Surgical History:   Procedure Laterality Date    BACK SURGERY  November 1997    L4 & L5 fusion    [facility]  October 22, 2004    Cardiac cath with stent placement x1    COLONOSCOPY  October 2010    CORONARY ANGIOPLASTY WITH STENT PLACEMENT  10/17/2019    HERNIA REPAIR  1991    Mesh repair in abdomen.     ULTRASOUND PROSTATE/TRANSRECTAL N/A 12/7/2020 TRANSRECTAL ULTRASOUND WITH PROSTATE BIOPSY performed by Sara Chan MD at [facility] History   Problem Relation Age of Onset    Diabetes Mother     Heart Disease Mother     High Blood Pressure Mother     Arthritis Father     Diabetes Father     Heart Disease Father     High Blood Pressure Father     Diabetes Sister     Diabetes Brother     Heart Disease Brother     Cancer Neg Hx     Stroke Neg Hx      Outpatient Medications Marked as Taking for the 12/18/20 encounter (Office Visit) with Sara Chan MD   Medication Sig Dispense Refill    ALPRAZolam Lamount Salon) 1 MG tablet Take 1 tablet by mouth 2 times daily for 30 days.  60 tablet 0    oxybutynin (DITROPAN XL) 10 MG extended release tablet Take 1 tablet by mouth daily 30 tablet 3    amLODIPine (NORVASC) 10 MG tablet Take 10 mg by mouth daily      cromolyn (OPTICROM) 4 % ophthalmic solution instill 1 drop into both eyes four times a day 10 mL 0    enalapril (VASOTEC) 20 MG tablet take 1 tablet by mouth once daily 90 tablet 1    pantoprazole (PROTONIX) 40 MG tablet take 1 tablet by mouth once daily 90 tablet 1    metFORMIN (GLUCOPHAGE) 1000 MG tablet take 1 tablet by mouth twice a day with meals 180 tablet 1    fluticasone (FLONASE) 50 MCG/ACT nasal spray One spray in each nostril q hs 3 Bottle 1    cyanocobalamin 1000 MCG tablet Take 1 tablet by mouth daily 30 tablet 3    vitamin C (VITAMIN C) 500 MG tablet Take 1 tablet by mouth daily 30 tablet 3    ferrous sulfate (IRON 325) 325 (65 Fe) MG tablet Take 1 tablet by mouth every 48 hours 30 tablet 3    tamsulosin (FLOMAX) 0.4 MG capsule Take 1 capsule by mouth 2 times daily 60 capsule 5    insulin glargine (LANTUS SOLOSTAR) 100 UNIT/ML injection pen Inject 40 Units into the skin 2 times daily 96 mL 0    insulin lispro (HUMALOG) 100 UNIT/ML injection vial Inject 10 Units into the skin 2 times daily as needed for High Blood Sugar when blood sugar > 155 Abdomen: Soft, non-tender, non-distended, No CVA  Bladder: non-tender and not distended. FROMx4, no cyanosis clubbing edema  Skin: warm and dry        Assessment and Plan      1. Prostate cancer ([address]    2. Elevated PSA    3. Benign prostatic hyperplasia with urinary retention    4. Urinary retention    5. Bladder stone           Plan:      No follow-ups on file. Path: HR prostate cancer, high volume G9 (4+5)  Needs metastatic work up - bone scan, CT scan   - Labs  Void trial today - passed  S/p Cystolithalopaxy - irritative symptoms improved    We discussed treatment options. He may not be the best surgical candidate secondary to O2 dependent COPD, morbid obesity, and history of CAD MI and stent placement. We discussed all options including Active surveillance, Surgery, Radiation and ADT for his significant comorbidities. Will continue discussion after work up.

## 2020-12-18 NOTE — PROGRESS NOTES
Patient has given me verbal consent to perform fill and spill procedure yes    With catheter in place 220 cc water instilled into bladder. Balloon deflated, catheter removed without difficulty. Pt then voided 160 cc.

## 2020-12-23 ENCOUNTER — TELEPHONE (OUTPATIENT)
Dept: UROLOGY | Age: 60
End: 2020-12-23

## 2020-12-23 NOTE — TELEPHONE ENCOUNTER
[facility]. They are planning on discharging today and will need set up with the office for catheter exchanges. Please give catheter orders. Thank you.

## 2020-12-23 NOTE — TELEPHONE ENCOUNTER
Kelsie Marlow at [facility] voiced understanding to reinsert corea catheter. Patient advised and voiced understanding to keep catheter inserted until follow up appointment.

## 2020-12-23 NOTE — TELEPHONE ENCOUNTER
Insert corea catheter, keep in corea catheter until follow up with Dr Liliya Edwards on 1/15    Routine catheter care  Exchange catheter every 4-6 weeks  Irrigate as needed with sterile solution    Anything else we can address at his follow up

## 2020-12-23 NOTE — TELEPHONE ENCOUNTER
Patient c/o having trouble urinating. He had the corea removed on 12/18/2020. He underwent  TRANSRECTAL ULTRASOUND WITH PROSTATE BIOPSY, CYSTOLITHOLAPAXY  CYSTOSCOPY LITHOLAPAXY on 12/07/2020 with Dr Tha Poon. He has home health. Should they reinsert the catheter? He feels like he has retention and very uncomfortable. Please advise. Thank you.

## 2020-12-24 ENCOUNTER — TELEPHONE (OUTPATIENT)
Dept: UROLOGY | Age: 60
End: 2020-12-24

## 2020-12-24 NOTE — TELEPHONE ENCOUNTER
[facility] is requesting a peer to peer  CT Abdomen/Pelvis  # [phone]  Case# [id number]    Please advise.

## 2020-12-28 ENCOUNTER — VIRTUAL VISIT (OUTPATIENT)
Dept: FAMILY MEDICINE CLINIC | Age: 60
End: 2020-12-28
Payer: MEDICAID

## 2020-12-28 ENCOUNTER — HOSPITAL ENCOUNTER (OUTPATIENT)
Age: 60
Setting detail: SPECIMEN
Discharge: HOME | End: 2020-12-28
Payer: MEDICAID

## 2020-12-28 DIAGNOSIS — J01.90 ACUTE BACTERIAL SINUSITIS: ICD-10-CM

## 2020-12-28 DIAGNOSIS — B96.89 ACUTE BACTERIAL SINUSITIS: ICD-10-CM

## 2020-12-28 PROCEDURE — 99213 OFFICE O/P EST LOW 20 MIN: CPT | Performed by: FAMILY MEDICINE

## 2020-12-28 PROCEDURE — U0003 INFECTIOUS AGENT DETECTION BY NUCLEIC ACID (DNA OR RNA); SEVERE ACUTE RESPIRATORY SYNDROME CORONAVIRUS 2 (SARS-COV-2) (CORONAVIRUS DISEASE [COVID-19]), AMPLIFIED PROBE TECHNIQUE, MAKING USE OF HIGH THROUGHPUT TECHNOLOGIES AS DESCRIBED BY CMS-2020-01-R: HCPCS

## 2020-12-28 RX ORDER — AMOXICILLIN AND CLAVULANATE POTASSIUM 875; 125 MG/1; MG/1
1 TABLET, FILM COATED ORAL 2 TIMES DAILY
Qty: 20 TABLET | Refills: 0 | Status: SHIPPED | OUTPATIENT
Start: 2020-12-28 | End: 2021-01-07

## 2020-12-28 NOTE — TELEPHONE ENCOUNTER
Per [employer] it is still pending review from medical director. Peer to peer scheduled for tomorrow.  At 1130 am

## 2020-12-28 NOTE — PROGRESS NOTES
Sandee Grant is a 62 y.o. male evaluated via telephone on 12/28/2020. Consent:  He and/or health care decision maker is aware that that he may receive a bill for this telephone service, depending on his insurance coverage, and has provided verbal consent to proceed: Yes      Documentation:  I communicated with the patient and/or health care decision maker about sinus headache. Not coughing anything up with albuterol treatments. No SOB or wheezing. He feels fine. Started 3 days ago with sinus symptoms. Has nasal congestion. No fever or chills. No loss of taste or smell. No body aches. His cpap machine is broke and someone is coming to fix it. Has prostate cancer. Planning on upcoming surgery. He     Details of this discussion including any medical advice provided: Sinusitis symptoms ongoing for 3 days. Given his comorbidities and upcoming testing for his prostate cancer, we will treat him with Augmentin. We will also test him for Covid. He needs to isolate at home until Covid test is resulted. Diagnosis Orders   1. Acute bacterial sinusitis  amoxicillin-clavulanate (AUGMENTIN) 875-125 MG per tablet    COVID-19 Ambulatory   2. Prostate cancer [facility])         I affirm this is a Patient Initiated Episode with a Patient who has not had a related appointment within my department in the past 7 days or scheduled within the next 24 hours. Patient identification was verified at the start of the visit: Yes    Total Time:   14 minutes.     Note: not billable if this call serves to triage the patient into an appointment for the relevant concern      Aurea Ricardo

## 2020-12-29 NOTE — TELEPHONE ENCOUNTER
Patient scheduled [place] CT [place] [facility] [place] with an arrival of 10:30 am for a 11:00 am scan time. NPO 4 hours prior. Whole Body Bone Scan scheduled at [facility] on 1/11/21 arrival of 8:45 am for a 8:30 am injection time and scan at 12:00 noon. Patient informed and voiced understanding.

## 2020-12-30 LAB — SARS-COV-2: NOT DETECTED

## 2020-12-31 ENCOUNTER — TELEPHONE (OUTPATIENT)
Dept: FAMILY MEDICINE CLINIC | Age: 60
End: 2020-12-31

## 2020-12-31 NOTE — TELEPHONE ENCOUNTER
----- Message from Shira Patel MD sent at 12/30/2020  8:28 PM EST -----  Negative. Please advise patient.   Shira Patel MD

## 2021-01-07 ENCOUNTER — HOSPITAL ENCOUNTER (OUTPATIENT)
Age: 61
Discharge: HOME | End: 2021-01-07
Payer: MEDICAID

## 2021-01-07 ENCOUNTER — HOSPITAL ENCOUNTER (OUTPATIENT)
Dept: CT IMAGING | Age: 61
Discharge: HOME | End: 2021-01-07
Payer: MEDICAID

## 2021-01-07 DIAGNOSIS — C61 PROSTATE CANCER (HCC): ICD-10-CM

## 2021-01-07 LAB
ALP BLD-CCNC: 88 U/L (ref 38–126)
ANION GAP SERPL CALCULATED.3IONS-SCNC: 12 MEQ/L (ref 8–16)
BUN BLDV-MCNC: 15 MG/DL (ref 7–22)
CALCIUM SERPL-MCNC: 10.5 MG/DL (ref 8.5–10.5)
CHLORIDE BLD-SCNC: 102 MEQ/L (ref 98–111)
CO2: 25 MEQ/L (ref 23–33)
CREAT SERPL-MCNC: 1 MG/DL (ref 0.5–1.2)
ERYTHROCYTE [DISTWIDTH] IN BLOOD BY AUTOMATED COUNT: 17.6 % (ref 11.5–14.5)
ERYTHROCYTE [DISTWIDTH] IN BLOOD BY AUTOMATED COUNT: 51.1 FL (ref 35–45)
GFR, ESTIMATED: 81 ML/MIN/1.73M2
GLUCOSE BLD-MCNC: 112 MG/DL (ref 70–108)
HCT VFR BLD CALC: 38 % (ref 42–52)
HEMOGLOBIN: 11.3 GM/DL (ref 14–18)
MCH RBC QN AUTO: 23.9 PG (ref 26–33)
MCHC RBC AUTO-ENTMCNC: 29.7 GM/DL (ref 32.2–35.5)
MCV RBC AUTO: 80.3 FL (ref 80–94)
PLATELET # BLD: 393 THOU/MM3 (ref 130–400)
PMV BLD AUTO: 9.7 FL (ref 9.4–12.4)
POTASSIUM SERPL-SCNC: 5.9 MEQ/L (ref 3.5–5.2)
RBC # BLD: 4.73 MILL/MM3 (ref 4.7–6.1)
SODIUM BLD-SCNC: 139 MEQ/L (ref 135–145)
WBC # BLD: 9 THOU/MM3 (ref 4.8–10.8)

## 2021-01-07 PROCEDURE — 6360000004 HC RX CONTRAST MEDICATION: Performed by: UROLOGY

## 2021-01-07 PROCEDURE — 74177 CT ABD & PELVIS W/CONTRAST: CPT

## 2021-01-07 PROCEDURE — 80051 ELECTROLYTE PANEL: CPT

## 2021-01-07 PROCEDURE — 85027 COMPLETE CBC AUTOMATED: CPT

## 2021-01-07 PROCEDURE — 82947 ASSAY GLUCOSE BLOOD QUANT: CPT

## 2021-01-07 PROCEDURE — 84520 ASSAY OF UREA NITROGEN: CPT

## 2021-01-07 PROCEDURE — 36415 COLL VENOUS BLD VENIPUNCTURE: CPT

## 2021-01-07 PROCEDURE — 82565 ASSAY OF CREATININE: CPT

## 2021-01-07 PROCEDURE — 82310 ASSAY OF CALCIUM: CPT

## 2021-01-07 PROCEDURE — 84075 ASSAY ALKALINE PHOSPHATASE: CPT

## 2021-01-07 RX ADMIN — IOPAMIDOL 85 ML: 755 INJECTION, SOLUTION INTRAVENOUS at 10:43

## 2021-01-08 DIAGNOSIS — J40 BRONCHITIS: ICD-10-CM

## 2021-01-08 DIAGNOSIS — F41.9 ANXIETY: ICD-10-CM

## 2021-01-08 DIAGNOSIS — E11.9 WELL CONTROLLED TYPE 2 DIABETES MELLITUS (HCC): Chronic | ICD-10-CM

## 2021-01-08 RX ORDER — ALBUTEROL SULFATE 2.5 MG/3ML
2.5 SOLUTION RESPIRATORY (INHALATION) EVERY 4 HOURS PRN
Qty: 2 PACKAGE | Refills: 1 | Status: CANCELLED | OUTPATIENT
Start: 2021-01-08

## 2021-01-08 RX ORDER — ALPRAZOLAM 1 MG/1
1 TABLET ORAL 2 TIMES DAILY
Qty: 60 TABLET | Refills: 0 | Status: SHIPPED | OUTPATIENT
Start: 2021-01-08 | End: 2021-02-08 | Stop reason: SDUPTHER

## 2021-01-08 RX ORDER — INSULIN GLARGINE 100 [IU]/ML
40 INJECTION, SOLUTION SUBCUTANEOUS 2 TIMES DAILY
Qty: 96 ML | Refills: 0 | Status: SHIPPED | OUTPATIENT
Start: 2021-01-08 | End: 2021-05-06 | Stop reason: SDUPTHER

## 2021-01-08 NOTE — TELEPHONE ENCOUNTER
Reshma Oakley called requesting a refill on the following medications:  Requested Prescriptions     Pending Prescriptions Disp Refills    albuterol (PROVENTIL) (2.5 MG/3ML) 0.083% nebulizer solution 2 Package 1     Sig: Take 3 mLs by nebulization every 4 hours as needed for Wheezing    ALPRAZolam (XANAX) 1 MG tablet 60 tablet 0     Sig: Take 1 tablet by mouth 2 times daily for 30 days.  insulin glargine (LANTUS SOLOSTAR) 100 UNIT/ML injection pen 96 mL 0     Sig: Inject 40 Units into the skin 2 times daily     Pharmacy verified: [facility]   . pv      Date of last visit: 12/20/2020  Date of next visit (if applicable): 7/51/7315

## 2021-01-08 NOTE — TELEPHONE ENCOUNTER
Rojelio Valdez called requesting a refill on the following medications:  Requested Prescriptions     Pending Prescriptions Disp Refills    ALPRAZolam (XANAX) 1 MG tablet 60 tablet 0     Sig: Take 1 tablet by mouth 2 times daily for 30 days.     insulin glargine (LANTUS SOLOSTAR) 100 UNIT/ML injection pen 96 mL 0     Sig: Inject 40 Units into the skin 2 times daily       Date of last visit: 12/28/2020  Date of next visit (if applicable):2/24/2021  Date of last refill: 12/10/20 and 10/19/20  Pharmacy Name: [facility]      Gordy Sanchez LPN

## 2021-01-11 ENCOUNTER — HOSPITAL ENCOUNTER (OUTPATIENT)
Age: 61
Discharge: HOME | End: 2021-01-11
Payer: MEDICAID

## 2021-01-11 ENCOUNTER — HOSPITAL ENCOUNTER (OUTPATIENT)
Dept: NUCLEAR MEDICINE | Age: 61
Discharge: HOME | End: 2021-01-11
Payer: MEDICAID

## 2021-01-11 ENCOUNTER — TELEPHONE (OUTPATIENT)
Dept: UROLOGY | Age: 61
End: 2021-01-11

## 2021-01-11 ENCOUNTER — TELEPHONE (OUTPATIENT)
Dept: FAMILY MEDICINE CLINIC | Age: 61
End: 2021-01-11

## 2021-01-11 DIAGNOSIS — E87.5 HYPERKALEMIA: Primary | ICD-10-CM

## 2021-01-11 DIAGNOSIS — J40 BRONCHITIS: ICD-10-CM

## 2021-01-11 DIAGNOSIS — C61 PROSTATE CANCER (HCC): ICD-10-CM

## 2021-01-11 LAB — POTASSIUM SERPL-SCNC: 5.5 MEQ/L (ref 3.5–5.2)

## 2021-01-11 PROCEDURE — 3430000000 HC RX DIAGNOSTIC RADIOPHARMACEUTICAL: Performed by: UROLOGY

## 2021-01-11 PROCEDURE — 78306 BONE IMAGING WHOLE BODY: CPT

## 2021-01-11 PROCEDURE — A9503 TC99M MEDRONATE: HCPCS | Performed by: UROLOGY

## 2021-01-11 PROCEDURE — 84132 ASSAY OF SERUM POTASSIUM: CPT

## 2021-01-11 PROCEDURE — 36415 COLL VENOUS BLD VENIPUNCTURE: CPT

## 2021-01-11 RX ORDER — TC 99M MEDRONATE 20 MG/10ML
26.2 INJECTION, POWDER, LYOPHILIZED, FOR SOLUTION INTRAVENOUS
Status: COMPLETED | OUTPATIENT
Start: 2021-01-11 | End: 2021-01-11

## 2021-01-11 RX ORDER — ALBUTEROL SULFATE 2.5 MG/3ML
2.5 SOLUTION RESPIRATORY (INHALATION) EVERY 4 HOURS PRN
Qty: 2 PACKAGE | Refills: 1 | Status: SHIPPED | OUTPATIENT
Start: 2021-01-11 | End: 2021-04-08 | Stop reason: SDUPTHER

## 2021-01-11 RX ADMIN — TC 99M MEDRONATE 26.2 MILLICURIE: 20 INJECTION, POWDER, LYOPHILIZED, FOR SOLUTION INTRAVENOUS at 09:10

## 2021-01-11 NOTE — TELEPHONE ENCOUNTER
Patient advised of the lab results. He has not started any new medications except he just finished augmentin. He has been drinking 3-4 bottles of water a day. He voiced understanding to repeat the potassium level today. Thank you.

## 2021-01-11 NOTE — TELEPHONE ENCOUNTER
Mr. Bee Alcaraz has an elevated potassium level on his last BMP (1/7/21) of 5.9. His kidney function is stable. We need to repeat a potassium level on him today. Has he been drinking adequate fluids? Did he start any new medications?

## 2021-01-11 NOTE — TELEPHONE ENCOUNTER
Potassium level 5.5 today. He did have a CT abdomen pelvis with contrast on 1/7. Reviewed meds. Recheck BMP tomorrow. Please advise patient.   Ashutosh Kendall MD

## 2021-01-11 NOTE — TELEPHONE ENCOUNTER
Please call Dr. Kaylen Obrien office to let them know Mr. Brenda Michelle has an elevated K level. Please fax recent BMP and repeat potassium levels to Dr. Kaylen Obrien office.  It appears he has had hyperkalemia in the past.

## 2021-01-13 ENCOUNTER — HOSPITAL ENCOUNTER (OUTPATIENT)
Age: 61
Discharge: HOME | End: 2021-01-13
Payer: MEDICAID

## 2021-01-13 DIAGNOSIS — E87.5 HYPERKALEMIA: ICD-10-CM

## 2021-01-13 PROCEDURE — 36415 COLL VENOUS BLD VENIPUNCTURE: CPT

## 2021-01-13 PROCEDURE — 80048 BASIC METABOLIC PNL TOTAL CA: CPT

## 2021-01-14 ENCOUNTER — TELEPHONE (OUTPATIENT)
Dept: FAMILY MEDICINE CLINIC | Age: 61
End: 2021-01-14

## 2021-01-14 LAB
ANION GAP SERPL CALCULATED.3IONS-SCNC: 10 MEQ/L (ref 8–16)
BUN BLDV-MCNC: 16 MG/DL (ref 7–22)
CALCIUM SERPL-MCNC: 10.2 MG/DL (ref 8.5–10.5)
CHLORIDE BLD-SCNC: 104 MEQ/L (ref 98–111)
CO2: 24 MEQ/L (ref 23–33)
CREAT SERPL-MCNC: 1.1 MG/DL (ref 0.4–1.2)
GFR SERPL CREATININE-BSD FRML MDRD: 68 ML/MIN/1.73M2
GLUCOSE BLD-MCNC: 102 MG/DL (ref 70–108)
POTASSIUM SERPL-SCNC: 5.2 MEQ/L (ref 3.5–5.2)
SODIUM BLD-SCNC: 138 MEQ/L (ref 135–145)

## 2021-01-14 NOTE — TELEPHONE ENCOUNTER
----- Message from Everett Vela MD sent at 1/14/2021  5:44 AM EST -----  BMP is good. Potassium is at upper range of normal. Please advise patient.   Everett Vela MD

## 2021-01-14 NOTE — TELEPHONE ENCOUNTER
Notified patient he understands and he plans to back off foods high in potassium he stated he does eat a banana everyday.  I suggested he just eat half a banana

## 2021-01-15 ENCOUNTER — OFFICE VISIT (OUTPATIENT)
Dept: UROLOGY | Age: 61
End: 2021-01-15
Payer: MEDICAID

## 2021-01-15 VITALS — WEIGHT: 284 LBS | HEIGHT: 69 IN | TEMPERATURE: 96.8 F | BODY MASS INDEX: 42.06 KG/M2

## 2021-01-15 DIAGNOSIS — C61 PROSTATE CANCER (HCC): Primary | ICD-10-CM

## 2021-01-15 PROCEDURE — G8482 FLU IMMUNIZE ORDER/ADMIN: HCPCS | Performed by: UROLOGY

## 2021-01-15 PROCEDURE — G8427 DOCREV CUR MEDS BY ELIG CLIN: HCPCS | Performed by: UROLOGY

## 2021-01-15 PROCEDURE — G8417 CALC BMI ABV UP PARAM F/U: HCPCS | Performed by: UROLOGY

## 2021-01-15 PROCEDURE — 3017F COLORECTAL CA SCREEN DOC REV: CPT | Performed by: UROLOGY

## 2021-01-15 PROCEDURE — 99214 OFFICE O/P EST MOD 30 MIN: CPT | Performed by: UROLOGY

## 2021-01-15 PROCEDURE — 1036F TOBACCO NON-USER: CPT | Performed by: UROLOGY

## 2021-01-15 RX ORDER — PEN NEEDLE, DIABETIC 31 GX5/16"
NEEDLE, DISPOSABLE MISCELLANEOUS
COMMUNITY
Start: 2020-12-10 | End: 2021-08-20

## 2021-01-15 RX ORDER — BLACK COHOSH ROOT 200 MG
CAPSULE ORAL
COMMUNITY
Start: 2020-12-10 | End: 2021-03-01 | Stop reason: SDUPTHER

## 2021-01-15 RX ORDER — PSYLLIUM HUSK 3.4 G/7G
POWDER ORAL
COMMUNITY
Start: 2020-12-10 | End: 2021-03-01 | Stop reason: SDUPTHER

## 2021-01-15 NOTE — PROGRESS NOTES
Dr. Samantha Chavez MD  [facility] Consultation / Follow up Visit    Patient:  Brynn Antoine  YOB: 1960  Date: 1/15/2021    HISTORY OF PRESENT ILLNESS:   The patient is a 62 y.o. male who presents today for follow-up for the following problem(s): elevated PSA, BPH with luts,  Urinary retention, Bladder stone  Overall the problem(s) : are worsening. Associated Symptoms: No dysuria, gross hematuria. Pain Severity:       Today visit:   1/15/21  Bone scan  1. Abnormal radiotracer accumulation in the left hemipelvis and right femur, possibly secondary to a patulous disease. Osseous metastatic disease cannot be excluded. 2. Probable degenerative arthropathic changes as detailed above. CT  1. 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 2. The previously a few low-density foci in the right kidney, likely cysts. Confirmation with ultrasound recommended. 3. Findings of constipation. No evidence to suggest metastatic disease         12/18/20  Minal Webber follow up after prostate biopsy and cystolithalopaxy for large bladder stone. Doing well, no fevers, hematuria resolved. Pathology reviewed: High volume [place] 4+5 = 9, with PNI, (Grade Group 5). PSA: 16.28    10/26/20  Minal Webber presents with history of BPH with LUTs, and elevated PSA. An MRI was denies by his insurance, so we review options of elevated PSA. He also has worsening LUTs, with retention and hematuria. He is on Home O2 for recent bronchitis, which is improving. He has history of CAD on ASA and Plavix.     Cystoscopy Operative Note  Surgeon: Samantha Chavez   Anesthesia: Urethral 2%  Indications: BPH with LUTs  Position: supine  Findings: The patient was prepped and draped in the usual sterile fashion. The flexible cystoscope was advanced through the urethra and into the bladder. The bladder was thoroughly inspected and the following was noted:  Residual Urine: Moderate  Urethra: No abnormalities of the urethra are noted. Prostate: Large gland Complete obstruction by latera lobe of prostate. Bladder: No tumors or CIS noted. No bladder diverticulum. Large bladder stone. Ureters: Clear efflux from both ureters. Orifices with normal configuration and location. The cystoscope was removed. The patient tolerated the procedure well. Plan  Standard TURP  Cystolithalopaxy     9/18/20  63 yo male that presents with LUTs and difficulty emptying his bladder. He has incomplete emptying, frequency, and Nocturia, that are worsening over the last couple years. On flomax but he is stating his symptoms are worsening. AUASS: 11/4. Denies hematuria. PVR: 48 ml. Smoking history: quit 20 years ago.  FH: none. ANTONIO: plavix, for CAD s/p stents (10/2019). Has elevated PSA, 16.          Summary of old records:   (Patient's old records, notes and chart reviewed and summarized above.)     Last several PSA's:  Lab Results   Component Value Date    PSA 16.28 (H) 10/23/2020    PSA 16.31 (H) 08/25/2020    PSA 7.61 (H) 06/08/2016       Last total testosterone:  No results found for: TESTOSTERONE    Urinalysis today:  No results found for this visit on 01/15/21.     Last BUN and creatinine:  Lab Results   Component Value Date    BUN 16 01/13/2021     Lab Results   Component Value Date    CREATININE 1.1 01/13/2021       Imaging Reviewed during this Office Visit:   (results were independently reviewed by physician and radiology report verified)    PAST MEDICAL, FAMILY AND SOCIAL HISTORY UPDATE:  Past Medical History:   Diagnosis Date    Abnormal stress test Sept 2012    Lateral Wall Ischemia- [facility]    CAD (coronary artery disease)  Chronic low back pain     [facility]     Diabetes mellitus [facility]     Hyperlipidemia     Hypertension     Hypertriglyceridemia     MI (myocardial infarction) ([facility] 2004    Obesity     Sleep apnea     has CPAP    Viral pneumonia 10/15/2020     Past Surgical History:   Procedure Laterality Date    BACK SURGERY  November 1997    L4 & L5 fusion    CARDIAC CATHETERIZATION  10/2019   [facility] CARDIAC SURGERY  October 22, 2004    Cardiac cath with stent placement x1    COLONOSCOPY  October 2010    CORONARY ANGIOPLASTY WITH STENT PLACEMENT  10/17/2019    HERNIA REPAIR  1991    Mesh repair in abdomen.  ULTRASOUND PROSTATE/TRANSRECTAL N/A 12/7/2020    TRANSRECTAL ULTRASOUND WITH PROSTATE BIOPSY performed by Fela Parnell MD at [address] [place] [address] History   Problem Relation Age of Onset    Diabetes Mother     Heart Disease Mother     High Blood Pressure Mother     Arthritis Father     Diabetes Father     Heart Disease Father     High Blood Pressure Father     Diabetes Sister     Diabetes Brother     Heart Disease Brother     Cancer Neg Hx     Stroke Neg Hx      Outpatient Medications Marked as Taking for the 1/15/21 encounter (Office Visit) with Fela Parnell MD   Medication Sig Dispense Refill    RA VITAMIN C/ERNA HIPS 1000 MG tablet TAKE 1/2 TABLET BY MOUTH DAILY      RA VITAMIN B-12 TR 1000 MCG TBCR TAKE 1 TABLET BY MOUTH DAILY      B-D ULTRAFINE III SHORT PEN 31G X 8 MM MISC USE TWICE DAILY WITH THE [place] PEN.  albuterol (PROVENTIL) (2.5 MG/3ML) 0.083% nebulizer solution Take 3 mLs by nebulization every 4 hours as needed for Wheezing 2 Package 1    ALPRAZolam (XANAX) 1 MG tablet Take 1 tablet by mouth 2 times daily for 30 days.  60 tablet 0    insulin glargine (LANTUS SOLOSTAR) 100 UNIT/ML injection pen Inject 40 Units into the skin 2 times daily 96 mL 0    oxybutynin (DITROPAN XL) 10 MG extended release tablet Take 1 tablet by mouth daily 30 tablet 3  amLODIPine (NORVASC) 10 MG tablet Take 10 mg by mouth daily      cromolyn (OPTICROM) 4 % ophthalmic solution instill 1 drop into both eyes four times a day 10 mL 0    enalapril (VASOTEC) 20 MG tablet take 1 tablet by mouth once daily 90 tablet 1    pantoprazole (PROTONIX) 40 MG tablet take 1 tablet by mouth once daily 90 tablet 1    metFORMIN (GLUCOPHAGE) 1000 MG tablet take 1 tablet by mouth twice a day with meals 180 tablet 1    fluticasone (FLONASE) 50 MCG/ACT nasal spray One spray in each nostril q hs 3 Bottle 1    cyanocobalamin 1000 MCG tablet Take 1 tablet by mouth daily 30 tablet 3    vitamin C (VITAMIN C) 500 MG tablet Take 1 tablet by mouth daily 30 tablet 3    ferrous sulfate (IRON 325) 325 (65 Fe) MG tablet Take 1 tablet by mouth every 48 hours 30 tablet 3    tamsulosin (FLOMAX) 0.4 MG capsule Take 1 capsule by mouth 2 times daily 60 capsule 5    insulin lispro (HUMALOG) 100 UNIT/ML injection vial Inject 10 Units into the skin 2 times daily as needed for High Blood Sugar when blood sugar > 155      clopidogrel (PLAVIX) 75 MG tablet take 1 tablet by mouth once daily 90 tablet 1    metoprolol tartrate (LOPRESSOR) 25 MG tablet Take 1 tablet by mouth 2 times daily  0    Vitamin D, Cholecalciferol, 25 MCG (1000 UT) TABS Take 1,000 Units by mouth daily      pravastatin (PRAVACHOL) 40 MG tablet Take 1 tablet by mouth nightly  0    gemfibrozil (LOPID) 600 MG tablet Take 1 tablet by mouth 2 times daily 180 tablet 1    nitroGLYCERIN (NITROSTAT) 0.4 MG SL tablet Place 0.4 mg under the tongue every 5 minutes as needed for Chest pain      aspirin 325 MG tablet Take 325 mg by mouth daily.            Ace inhibitors, Baclofen, Darvocet [propoxyphene n-acetaminophen], Darvon [propoxyphene hcl], Flexeril [cyclobenzaprine], Morphine, Motrin [ibuprofen micronized], Tylenol [acetaminophen], and Ultram [tramadol]  Social History     Tobacco Use   Smoking Status Former Smoker    Packs/day: 0.25  Years: 2.00    Pack years: 0.50    Types: Cigarettes    Quit date: 1995    Years since quittin.0   Smokeless Tobacco Never Used       Social History     Substance and Sexual Activity   Alcohol Use No       REVIEW OF SYSTEMS:  Constitutional: negative  Eyes: negative  Respiratory: negative  Cardiovascular: negative  Gastrointestinal: negative  Musculoskeletal: negative  Genitourinary: negative  Skin: negative   Neurological: negative  Hematological/Lymphatic: negative  Psychological: negative    Physical Exam:      Vitals:    01/15/21 0900   Temp: 96.8 °F (36 °C)     Constitutional: Patient in no acute distress   Neuro: alert and oriented to person place and time. Psych: Mood and affect normal.  Head: atraumatic normocephalic  Eyes: EOMi  HEENT: neck supple, trachea midline  Lungs: Respiratory effort normal  Cardiovascular:  Normal peripheral pulses  Abdomen: Soft, non-tender, non-distended, No CVA  Bladder: non-tender and not distended. FROMx4, no cyanosis clubbing edema  Skin: warm and dry        Assessment and Plan      No diagnosis found. Plan:      No follow-ups on file. Path: HR prostate cancer, high volume G9 (4+5)  Needs metastatic work up - bone scan, CT scan   - Labs  S/p Cystolithalopaxy - irritative symptoms improved  Catheter replaced due to retention    We discussed treatment options. He may not be the best surgical candidate secondary to O2 dependent COPD, morbid obesity, and history of CAD MI and stent placement. We discussed all options including Active surveillance, Surgery, Radiation and ADT for his significant comorbidities. Will continue discussion after work up.     Bone scan - possible lesions on pelvis and right femur  - MRI scan to r/o metastatic disease    Refer to Rad oncology for HR prostate cancer

## 2021-01-22 ENCOUNTER — HOSPITAL ENCOUNTER (OUTPATIENT)
Dept: RADIATION ONCOLOGY | Age: 61
Discharge: HOME | End: 2021-01-22
Payer: MEDICAID

## 2021-01-22 ENCOUNTER — CLINICAL DOCUMENTATION (OUTPATIENT)
Dept: CASE MANAGEMENT | Age: 61
End: 2021-01-22

## 2021-01-22 VITALS
HEIGHT: 69 IN | TEMPERATURE: 98.7 F | RESPIRATION RATE: 20 BRPM | DIASTOLIC BLOOD PRESSURE: 57 MMHG | SYSTOLIC BLOOD PRESSURE: 122 MMHG | OXYGEN SATURATION: 97 % | HEART RATE: 77 BPM | WEIGHT: 283.8 LBS | BODY MASS INDEX: 42.03 KG/M2

## 2021-01-22 DIAGNOSIS — C61 MALIGNANT NEOPLASM OF PROSTATE (HCC): ICD-10-CM

## 2021-01-22 PROCEDURE — 99202 OFFICE O/P NEW SF 15 MIN: CPT | Performed by: RADIOLOGY

## 2021-01-22 PROCEDURE — 99205 OFFICE O/P NEW HI 60 MIN: CPT | Performed by: RADIOLOGY

## 2021-01-22 ASSESSMENT — PAIN SCALES - GENERAL: PAINLEVEL_OUTOF10: 7

## 2021-01-22 ASSESSMENT — PAIN DESCRIPTION - LOCATION: LOCATION: BACK

## 2021-01-22 NOTE — PROGRESS NOTES
Lightheadedness, dizziness    Darvocet [Propoxyphene N-Acetaminophen] Nausea Only     Felt sickly    Darvon [Propoxyphene Hcl]      Felt sickly    Flexeril [Cyclobenzaprine] Other (See Comments)     Headache    Morphine Nausea Only     Pt reported feeling sickly    Motrin [Ibuprofen Micronized] Nausea Only     Pt reported feeling very sick    Tylenol [Acetaminophen] Nausea Only    Ultram [Tramadol] Itching          Current Outpatient Medications:     RA VITAMIN C/RENA HIPS 1000 MG tablet, TAKE 1/2 TABLET BY MOUTH DAILY, Disp: , Rfl:     RA VITAMIN B-12 TR 1000 MCG TBCR, TAKE 1 TABLET BY MOUTH DAILY, Disp: , Rfl:     B-D ULTRAFINE III SHORT PEN 31G X 8 MM MISC, USE TWICE DAILY WITH THE [place] PEN., Disp: , Rfl:     albuterol (PROVENTIL) (2.5 MG/3ML) 0.083% nebulizer solution, Take 3 mLs by nebulization every 4 hours as needed for Wheezing, Disp: 2 Package, Rfl: 1    ALPRAZolam (XANAX) 1 MG tablet, Take 1 tablet by mouth 2 times daily for 30 days. , Disp: 60 tablet, Rfl: 0    insulin glargine (LANTUS SOLOSTAR) 100 UNIT/ML injection pen, Inject 40 Units into the skin 2 times daily, Disp: 96 mL, Rfl: 0    oxybutynin (DITROPAN XL) 10 MG extended release tablet, Take 1 tablet by mouth daily, Disp: 30 tablet, Rfl: 3    amLODIPine (NORVASC) 10 MG tablet, Take 10 mg by mouth daily, Disp: , Rfl:     cromolyn (OPTICROM) 4 % ophthalmic solution, instill 1 drop into both eyes four times a day, Disp: 10 mL, Rfl: 0    enalapril (VASOTEC) 20 MG tablet, take 1 tablet by mouth once daily, Disp: 90 tablet, Rfl: 1    pantoprazole (PROTONIX) 40 MG tablet, take 1 tablet by mouth once daily, Disp: 90 tablet, Rfl: 1    metFORMIN (GLUCOPHAGE) 1000 MG tablet, take 1 tablet by mouth twice a day with meals, Disp: 180 tablet, Rfl: 1    fluticasone (FLONASE) 50 MCG/ACT nasal spray, One spray in each nostril q hs, Disp: 3 Bottle, Rfl: 1   cyanocobalamin 1000 MCG tablet, Take 1 tablet by mouth daily, Disp: 30 tablet, Rfl: 3    vitamin C (VITAMIN C) 500 MG tablet, Take 1 tablet by mouth daily, Disp: 30 tablet, Rfl: 3    ferrous sulfate (IRON 325) 325 (65 Fe) MG tablet, Take 1 tablet by mouth every 48 hours, Disp: 30 tablet, Rfl: 3    tamsulosin (FLOMAX) 0.4 MG capsule, Take 1 capsule by mouth 2 times daily, Disp: 60 capsule, Rfl: 5    insulin lispro (HUMALOG) 100 UNIT/ML injection vial, Inject 10 Units into the skin 2 times daily as needed for High Blood Sugar when blood sugar > 155, Disp: , Rfl:     clopidogrel (PLAVIX) 75 MG tablet, take 1 tablet by mouth once daily, Disp: 90 tablet, Rfl: 1    metoprolol tartrate (LOPRESSOR) 25 MG tablet, Take 1 tablet by mouth 2 times daily, Disp: , Rfl: 0    Vitamin D, Cholecalciferol, 25 MCG (1000 UT) TABS, Take 1,000 Units by mouth daily, Disp: , Rfl:     pravastatin (PRAVACHOL) 40 MG tablet, Take 1 tablet by mouth nightly, Disp: , Rfl: 0    gemfibrozil (LOPID) 600 MG tablet, Take 1 tablet by mouth 2 times daily, Disp: 180 tablet, Rfl: 1    nitroGLYCERIN (NITROSTAT) 0.4 MG SL tablet, Place 0.4 mg under the tongue every 5 minutes as needed for Chest pain, Disp: , Rfl:     aspirin 325 MG tablet, Take 325 mg by mouth daily. , Disp: , Rfl:       ADDITIONAL COMMENTS: Seen today in consult with Dr. Chandu Suarez. MRI Pelvis needs completed before a decision on treatment is made. Follow up with Dr. Chandu Suarez after imaging results are back.       Sandra Pair RT(R)(T)

## 2021-01-22 NOTE — PROGRESS NOTES
Name: Yovani Murphy  : 1960  MRN: [id number]    Oncology Navigation- Initial Note:    Intake-  Contact Type: Radiation Oncology    Diagnosis: Prostate  A.  Prostate, right lateral base, needle core biopsy:       Adenocarcinoma of the prostate, involving 2 of 2 cores, [place]       score 4+5 = 9 (grade group 5), measuring 17 mm/17 mm and 16 mm/16       mm with perineural invasion. B.  Prostate, right base, needle core biopsy:       Adenocarcinoma of the prostate, involving 2 of 2 cores, [place]       score 4+5 = 9 (grade group 5), measuring 16 mm/16 mm and 15 mm/16       mm. C.  Prostate, right mid, needle core biopsy:       Adenocarcinoma of the prostate, involving 2 of 2 cores, [place]       score 4+5 = 9 (grade group 5), measuring 18 mm/18 mm and 15 mm/18       mm. D.  Prostate, right apex, needle core biopsy:       Adenocarcinoma of the prostate, involving 2 of 2 cores, [place]       score 4+5 = 9 (grade group 5), measuring 8 mm/10 mm and 4 mm/7 mm.   E.  Prostate, right lateral mid, needle core biopsy:       Adenocarcinoma of the prostate, involving 2 of 2 cores, [place]       score 4+5 = 9 (grade group 5), measuring 14 mm/14 mm and 12 mm/13       mm with perineural invasion. F.  Prostate, right lateral apex, needle core biopsy:       Adenocarcinoma of the prostate, involving 2 of 2 cores, [place]       score 4+5 = 9 (grade group 5), measuring 12 mm/13 mm and 11 mm/12       mm with perineural invasion. G.  Prostate, left lateral base, needle core biopsy:       Adenocarcinoma of the prostate, involving 2 of 2 cores, [place]       score 4+5 = 9 (grade group 5), measuring 6 mm/17 mm and 2 mm/10 mm. H.  Prostate, left base, needle core biopsy:       Adenocarcinoma of the prostate, involving 2 of 2 cores, [place]       score 4+5 = 9 (grade group 5), measuring 7 mm/15 mm and 2 mm/14 mm       with perineural invasion.    I.  Prostate, left lateral mid, needle core biopsy:     Adenocarcinoma of the prostate, involving 1 of 2 cores, [place]       score 3+4 = 7 (grade group 2), measuring 4 mm/15 mm. J. Prostate, left mid, needle core biopsy:       Adenocarcinoma of the prostate, involving 2 of 2 cores, [place]       score 4+5 = 9 (grade group 5), measuring 7/14 mm and 4/13 mm with       perineural invasion. K. Prostate, left lateral apex, needle core biopsy:    Benign prostatic tissue.   Vitaly Briceño for malignancy. L. Prostate, left apex, needle core biopsy:    Atypical small acinar proliferation. BONE Scan 1/11/2021:  1. Abnormal radiotracer accumulation in the left hemipelvis and right femur, possibly secondary to a patulous disease. Osseous metastatic disease cannot be excluded. 2. Probable degenerative arthropathic changes as detailed above. CT ABD 1/7/2021:  1. 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 2. The previously a few low-density foci in the right kidney, likely cysts. Confirmation with ultrasound recommended. 3. Findings of constipation. No evidence to suggest metastatic disease       Home Disposition: Lives alone    Patient needs and barriers to care: Coordination of Care, Knowledge deficit and Symptom Management     Referral Source: Outpatient    Receptive to Advanced Care Planning/ Palliative Care:  deferred    Interventions-   General Interventions: Andriy program discussed; welcome folder reviewed including contact information. Education/Screenings:  yes -     Rad Onc reviewed Pathology in detail along with NCCn guidelines for treatment & management. Currently on ADT: not at this time, but likely INDICATED; anticipate start     Referrals: Supportive Therapies  reviewed in detail. Questions asked, & were answered     Continuum of Care: Diagnosis/Active Treatment    Notes:  Andriy available to assist & support as needed. MRI is pending & needs scheduled. Andriy phoned Urology office to query MRI date->office closed. Andriy will FU with Urology on Monday 1/25.       Electronically signed by Bj Pelayo RN on 1/22/2021 at 3:20 PM

## 2021-01-22 NOTE — PROGRESS NOTES
Radiation Oncology Consultation  Encounter Date: 2021     Mr. Johnny Capone is a 62 y.o. male  : 1960  MRN: [id number]  [id number]  Requesting Provider: Dr. Melly Novak    Reason for request: Prostate cancer      CONSULTANT: Miriam Yarbrough Sq / DIAGNOSIS: C61 -- Malignant neoplasm of the prostate; Adenocarcinoma, Merline's 4+5=9, Grade Group 5; PSA 16.28; cT1c N0 M0, Stage IIIC (cannot rule out spread to bones or lymph nodes - awaiting MRI)        ASSESSMENT:  1. Adenocarcinoma of the prostate, at least very high risk disease. 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 2. The diagnosis, so far as it is currently understood was reviewed. Denise Sanchez received a copy of his AJCC staging information. We reviewed the [facility] nomogram information specific to Denise Sanchez. His probability of organ confined disease is only 3%, with 96% probability of extracapsular extension, 58% probability of lymph node involvement and 64% probability of seminal vesicle invasion. 3. Treatment options and recommendations, including current clinical practice guidelines (NCCN) were reviewed in detail. As noted, Cheryl Denton is not felt to be a good surgical candidate because of his heart disease and requirement for Plavix. He is not a suitable candidate for interstitial brachytherapy of the prostate, nor for prostate SBRT due to his risk status. The most appropriate option in this case is definitive radiation therapy with androgen deprivation therapy. Based on current recommendations for moderate hypofractionation, the preferred treatment regimen would be 28 fractions given 5 days weekly for 5-1/2 weeks with \"long course\" androgen deprivation. Treatment would give definitive dosing to the prostate, with simultaneous integrated boost for prophylactic treatment to pelvic lymph nodes. The CT scan is concerning with respect to pelvic lymph nodes, and Cheryl Denton has a very high risk of seminal vesicle invasion and extraprostatic extension. MRI scanning will be essential for further evaluation of the bone findings, lymph nodes and for evidence of local extension outside the prostate. Cheryl Denton received a copy of the clinical practice guideline pertaining to his situation. 4. Details of moderately hypofractionated radiation therapy were reviewed. We discussed the nature/mode of action of external beam radiation therapy, multiple steps involved in simulation/set up, planning, initiation and performance of the treatment. We reviewed logistics of treatment including expected number of daily treatments and we reviewed expected and potential short and long-term side effects and risks of treatment. We also reviewed the advisability of placing absorbable hydrogel in the prerectal space for rectal sparing and placement of fiducial markers in the prostate for treatment guidance. The need for Plavix may preclude use of these measures for improved treatment guidance and rectal sparing. [facility] had the opportunity to ask questions, and indicated that his questions were satisfactorily addressed. He also indicated that he is agreeable to proceeding with the MRI scan, but will require an open MRI due to his claustrophobia.        PLAN:  1. Arrange to have MRI done at [address] [facility], [address] with open MRI. 2. Follow-up after MRI scan to review results and finalize treatment recommendations. 3. Proceed based on patient wishes. 4. Continue care in urology, and with other physicians/providers. 5. Continue surveillance and basic/preventive/supportive health care in accordance with clinical practice guidelines.        HISTORY OF PRESENT ILLNESS:   Jazmyn Stern is a 52-year-old gentleman who was referred for urology evaluation when he was found to have a significantly elevated PSA at 16.28. He has known prostatic hypertrophy with lower urinary tract symptoms (for which he has been initiated on tamsulosin) and history of urinary retention. Kendy Lozoya received a recommendation for prostate biopsy, which was performed in December 2020. The biopsy returned showing adenocarcinoma Woodbury's 4+5 = 9 grade group 5 with cancer and multiple cores from both sides of the prostate. Perineural invasion was identified, but there was no definite evidence of extraprostatic extension seen on the core specimens. Kendy Lozoya reviewed his biopsy results during his urology follow-up. He has significant coronary artery disease with coronary artery stent placement in October 2019 and requires Plavix. Because of these issues, Kendy Lozoya is not considered a good candidate for prostatectomy. He is being seen today 1/22/2021 for evaluation and discussion regarding the potential role of radiation therapy in the management of his very high risk prostate cancer.         Past Medical History:   Diagnosis Date    Abnormal stress test Sept 2012    Lateral Wall Ischemia- [facility]    CAD (coronary artery disease)     Cancer [facility])     Prostate  Chronic low back pain     [facility]     Diabetes mellitus ([facility]     Hyperlipidemia     Hypertension     Hypertriglyceridemia     MI [facility]ocardial infarction) [facility]     Obesity     Sleep apnea     has CPAP    Viral pneumonia 10/15/2020        Past Surgical History:   Procedure Laterality Date    BACK SURGERY  1997    L4 & L5 fusion    CARDIAC CATHETERIZATION  10/2019   Pascual Barbie CARDIAC SURGERY  2004    Cardiac cath with stent placement x1    COLONOSCOPY  2010    CORONARY ANGIOPLASTY WITH STENT PLACEMENT  10/17/2019    HERNIA REPAIR      Mesh repair in abdomen.     ULTRASOUND PROSTATE/TRANSRECTAL N/A 2020    TRANSRECTAL ULTRASOUND WITH PROSTATE BIOPSY performed by Jewell Delgado MD at [place] [address] History   Problem Relation Age of Onset    Diabetes Mother     Heart Disease Mother     High Blood Pressure Mother     Arthritis Father     Diabetes Father     Heart Disease Father     High Blood Pressure Father     Diabetes Sister     Diabetes Brother     Heart Disease Brother     Cancer Neg Hx     Stroke Neg Hx        Social History     Socioeconomic History    Marital status: Single     Spouse name: Not on file    Number of children: Not on file    Years of education: Not on file    Highest education level: Not on file   Occupational History    Not on file   Social Needs    Financial resource strain: Not on file    Food insecurity     Worry: Not on file     Inability: Not on file    Transportation needs     Medical: Not on file     Non-medical: Not on file   Tobacco Use    Smoking status: Former Smoker     Packs/day: 0.25     Years: 2.00     Pack years: 0.50     Types: Cigarettes     Quit date: 1995     Years since quittin.0    Smokeless tobacco: Never Used   Substance and Sexual Activity    Alcohol use: No    Drug use: No    Sexual activity: Yes     Partners: Female   Lifestyle    Physical activity Days per week: Not on file     Minutes per session: Not on file    Stress: Not on file   Relationships    Social connections     Talks on phone: Not on file     Gets together: Not on file     Attends Orthodox service: Not on file     Active member of club or organization: Not on file     Attends meetings of clubs or organizations: Not on file     Relationship status: Not on file    Intimate partner violence     Fear of current or ex partner: Not on file     Emotionally abused: Not on file     Physically abused: Not on file     Forced sexual activity: Not on file   Other Topics Concern    Not on file   Social History Narrative    Not on file     Exposure to Industrial/ environmental Carcinogens: no      ALLERGIES:   Allergies   Allergen Reactions    Ace Inhibitors      When asked Oct 2020, patient was unsure of allergy/reaction. Patient takes/tolerates enalapril.  Baclofen Other (See Comments)     Lightheadedness, dizziness    Darvocet [Propoxyphene N-Acetaminophen] Nausea Only     Felt sickly    Darvon [Propoxyphene Hcl]      Felt sickly    Flexeril [Cyclobenzaprine] Other (See Comments)     Headache    Morphine Nausea Only     Pt reported feeling sickly    Motrin [Ibuprofen Micronized] Nausea Only     Pt reported feeling very sick    Tylenol [Acetaminophen] Nausea Only    Ultram [Tramadol] Itching          Current Outpatient Medications   Medication Sig Dispense Refill    RA VITAMIN C/ERNA HIPS 1000 MG tablet TAKE 1/2 TABLET BY MOUTH DAILY      RA VITAMIN B-12 TR 1000 MCG TBCR TAKE 1 TABLET BY MOUTH DAILY      B-D ULTRAFINE III SHORT PEN 31G X 8 MM MISC USE TWICE DAILY WITH THE BASAGLAR PEN.  albuterol (PROVENTIL) (2.5 MG/3ML) 0.083% nebulizer solution Take 3 mLs by nebulization every 4 hours as needed for Wheezing 2 Package 1    ALPRAZolam (XANAX) 1 MG tablet Take 1 tablet by mouth 2 times daily for 30 days.  60 tablet 0  insulin glargine (LANTUS SOLOSTAR) 100 UNIT/ML injection pen Inject 40 Units into the skin 2 times daily 96 mL 0    oxybutynin (DITROPAN XL) 10 MG extended release tablet Take 1 tablet by mouth daily 30 tablet 3    amLODIPine (NORVASC) 10 MG tablet Take 10 mg by mouth daily      cromolyn (OPTICROM) 4 % ophthalmic solution instill 1 drop into both eyes four times a day 10 mL 0    enalapril (VASOTEC) 20 MG tablet take 1 tablet by mouth once daily 90 tablet 1    pantoprazole (PROTONIX) 40 MG tablet take 1 tablet by mouth once daily 90 tablet 1    metFORMIN (GLUCOPHAGE) 1000 MG tablet take 1 tablet by mouth twice a day with meals 180 tablet 1    fluticasone (FLONASE) 50 MCG/ACT nasal spray One spray in each nostril q hs 3 Bottle 1    cyanocobalamin 1000 MCG tablet Take 1 tablet by mouth daily 30 tablet 3    vitamin C (VITAMIN C) 500 MG tablet Take 1 tablet by mouth daily 30 tablet 3    ferrous sulfate (IRON 325) 325 (65 Fe) MG tablet Take 1 tablet by mouth every 48 hours 30 tablet 3    tamsulosin (FLOMAX) 0.4 MG capsule Take 1 capsule by mouth 2 times daily 60 capsule 5    insulin lispro (HUMALOG) 100 UNIT/ML injection vial Inject 10 Units into the skin 2 times daily as needed for High Blood Sugar when blood sugar > 155      clopidogrel (PLAVIX) 75 MG tablet take 1 tablet by mouth once daily 90 tablet 1    metoprolol tartrate (LOPRESSOR) 25 MG tablet Take 1 tablet by mouth 2 times daily  0    Vitamin D, Cholecalciferol, 25 MCG (1000 UT) TABS Take 1,000 Units by mouth daily      pravastatin (PRAVACHOL) 40 MG tablet Take 1 tablet by mouth nightly  0    gemfibrozil (LOPID) 600 MG tablet Take 1 tablet by mouth 2 times daily 180 tablet 1    nitroGLYCERIN (NITROSTAT) 0.4 MG SL tablet Place 0.4 mg under the tongue every 5 minutes as needed for Chest pain      aspirin 325 MG tablet Take 325 mg by mouth daily. No current facility-administered medications for this encounter. No outpatient medications have been marked as taking for the 1/22/21 encounter [facility] Encounter) with Zach Martel MD.          LABORATORY STUDIES:    CBC:   Lab Results   Component Value Date    WBC 9.0 01/07/2021    RBC 4.73 01/07/2021    HGB 11.3 01/07/2021    HCT 38.0 01/07/2021    MCV 80.3 01/07/2021    MCH 23.9 01/07/2021    MCHC 29.7 01/07/2021    RDW 17.7 06/11/2018     01/07/2021    MPV 9.7 76/89/3516     MetabolicPanel:  Lab Results   Component Value Date     01/13/2021    K 5.2 01/13/2021    K 4.9 10/15/2020     01/13/2021    CO2 24 01/13/2021    BUN 16 01/13/2021    CREATININE 1.1 01/13/2021    LABGLOM 68 01/13/2021    GLUCOSE 102 01/13/2021    PROT 6.5 10/15/2020    LABALBU 3.8 10/15/2020    CALCIUM 10.2 01/13/2021    BILITOT 0.4 10/15/2020    ALKPHOS 88 01/07/2021    AST 8 10/15/2020    ALT 8 10/15/2020     Onc labs:   Lab Results   Component Value Date    PSA 16.28 10/23/2020     10/14/2020         PATHOLOGY:   12/07/2020: Surgical Pathology:  FINAL DIAGNOSIS:   A.  Prostate, right lateral base, needle core biopsy:       Adenocarcinoma of the prostate, involving 2 of 2 cores, [place]       score 4+5 = 9 (grade group 5), measuring 17 mm/17 mm and 16 mm/16       mm with perineural invasion. B.  Prostate, right base, needle core biopsy:       Adenocarcinoma of the prostate, involving 2 of 2 cores, [place]       score 4+5 = 9 (grade group 5), measuring 16 mm/16 mm and 15 mm/16       mm. C.  Prostate, right mid, needle core biopsy:       Adenocarcinoma of the prostate, involving 2 of 2 cores, [place]       score 4+5 = 9 (grade group 5), measuring 18 mm/18 mm and 15 mm/18       mm.    D.  Prostate, right apex, needle core biopsy:       Adenocarcinoma of the prostate, involving 2 of 2 cores, [place]       score 4+5 = 9 (grade group 5), measuring 8 mm/10 mm and 4 mm/7 mm.   E.  Prostate, right lateral mid, needle core biopsy: F - RLA, two fragments, 1.4 and 1.5 cm. G - LLB, 2 fragments, 1 and 2 cm. H - LB, 2 fragments, 1.2 and 1.5 cm. I - LLM, two fragments, 1 and 1.6 cm. J - LM, 2 fragments, 1.6 cm each. K - LLA, three fragments, 0.2, 0.4, and 0.5 cm. L - LA, two fragments, 1 and 2 cm.  SIO/DKR:[id number]     Microscopic Examination:   A-J.  The needle core biopsies demonstrate poorly differentiated   prostatic adenocarcinoma. Elaine Angel is cribriform [place] pattern 4. Several cores demonstrate perineural invasion.  Lymphovascular invasion   and extra prostatic extension are not identified. K.  The specimen consists of multiple needle core biopsies of prostatic   tissue.  Malignancy is not identified.  Multiple deeper sections are   examined. L. The specimen consists of multiple needle core biopsies of prostatic   tissue. Elaine Angel is focal chronic inflammation.  A few glands appear   atypical, however there is insufficient quantity for definitive   diagnosis.  Multiple deeper sections are examined. RADIOLOGIC STUDIES:   1/07/2021: CT Abdomen/Pelvis with contrast:  Impression   1. 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 2. The previously a few low-density foci in the right kidney, likely cysts. Confirmation with ultrasound recommended. 3. Findings of constipation. No evidence to suggest metastatic disease         1/11/2021: NM Bone Scan Whole Body:  Impression   1. Abnormal radiotracer accumulation in the left hemipelvis and right femur, possibly secondary to a patulous disease. Osseous metastatic disease cannot be excluded. 2. Probable degenerative arthropathic changes as detailed above. REVIEW OF SYSTEMS:  ***  Constitutional: Denies fever, chills, unintentional weight change. HEENT: Denies hearing or vision change. Denies dysphagia or odynophagia. Respiratory: Denies worsening dyspnea. Denies hemoptysis, coughing, wheezing or sputum production. Cardiovascular: Chest pain, palpitations, syncope. GI: Denies nausea or vomiting, hematemesis or rectal bleeding. Denies change in bowel habits. : Denies hematuria or dysuria. Musculoskeletal:   Extremities: Maintains extremity ROM  Metabolic/endocrine: ***  Neurological: Denies seizures, fainting or tremors. Integument: Denies rashes or ulcerations. PHYSICAL EXAMINATION:   VITAL SIGNS: BP (!) 122/57   Pulse 77   Temp 98.7 °F (37.1 °C) (Temporal)   Resp 20   Ht 5' 9\" (1.753 m)   Wt 283 lb 12.8 oz (128.7 kg)   SpO2 97%   BMI 41.91 kg/m²   ECOG PERFORMANCE STATUS: ***  PAIN RATING:  ***  GENERAL: Pleasant well-developed adult ***. In no acute distress. Alert and oriented ×3; clear mentation with appropriate affect  SKIN: Warm and dry, without jaundice, ecchymoses, or petechiae. HEENT: Normocephalic, atraumatic. PERRL/EOMI; ears, nose and lips unremarkable on external examination; oral cavity/oropharyngeal mucosa moist without lesion or exudate  NECK:  No JVD; no palpable cervical adenopathy. THORAX: No palpable supraclavicular or axillary adenopathy;  LUNGS: clear bilaterally. Good inspiratory effort, no accessory muscle use. CARDIAC: Regular rate and rhythm, without murmur  BREASTS: ***  ABDOMEN: Soft, nontender, bowel sounds present  PELVIS/RECTAL: No significant external hemorrhoidal tissue. Sphincter tone normal. Prostate within limits of this exam, no intrinsic rectal mass is appreciated. EXTREMITIES: ***  NEUROLOGIC: No grossly apparent focal deficits. Cranial nerves grossly intact      IMPRESSION:  1. ***      PLAN:  1. Await MRI  2. Follow up after MRI to review results, finalize staging and treatment recommendations. Thank you for allowing my assistance in the care of your patient.       Rory Garzon MD   Radiation Oncology

## 2021-01-25 ENCOUNTER — TELEPHONE (OUTPATIENT)
Dept: CASE MANAGEMENT | Age: 61
End: 2021-01-25

## 2021-01-25 NOTE — TELEPHONE ENCOUNTER
Andriy outreach to [facility] for follow up as requested by Dr. Nicol Jang. Spoke with Sharifa Lutz. MRI requires prior authorization. She informs order was placed on 1/15. Schedule date is currently pending    Andriy updated that pt shared [facility] has discharged pt from services, & cath has not been changed out since services have been stopped on 12/23/2020. Last Cath exchange was on 12/23/2020, according to Regina Kocher at [facility]. She will follow up re: Open MRI & schedule pt for corea cath exchange. Andriy requested return phone call with date for MRI so can be chare with Dr. Nicol Jang.

## 2021-01-26 ENCOUNTER — TELEPHONE (OUTPATIENT)
Dept: UROLOGY | Age: 61
End: 2021-01-26

## 2021-01-26 NOTE — TELEPHONE ENCOUNTER
Ouida Goldberg from radiation center called in regards to patient open MRI that needs scheduled at [facility]. Gave to Shavon Meraz to check on it. He also was getting cath change by home health. They are no longer doing. He will need a visit for lab/ma visit for cath change asap.

## 2021-01-27 ENCOUNTER — NURSE ONLY (OUTPATIENT)
Dept: UROLOGY | Age: 61
End: 2021-01-27
Payer: MEDICAID

## 2021-01-27 ENCOUNTER — TELEPHONE (OUTPATIENT)
Dept: UROLOGY | Age: 61
End: 2021-01-27

## 2021-01-27 VITALS — TEMPERATURE: 97.3 F

## 2021-01-27 DIAGNOSIS — R33.9 URINARY RETENTION: ICD-10-CM

## 2021-01-27 PROCEDURE — 51702 INSERT TEMP BLADDER CATH: CPT | Performed by: NURSE PRACTITIONER

## 2021-01-27 NOTE — TELEPHONE ENCOUNTER
Patient came in for a cath change 1-27. We are still waiting on ins to authorize his mri. Delbert has checked on this daily.

## 2021-01-27 NOTE — PROGRESS NOTES
Patient has given me verbal consent to perform catheter change Yes    Does patient have latex allergy? No  Does patient have shellfish or betadine allergy? No      Following Dr. Skylar HERBERT-CNP plan of care. 16 Fr Catheter changed without difficulty. Once balloon was deflated, removed catheter without difficulty. Cleansed head of penis with betadine swab. 16 Fr regular corea was inserted without difficulty. Catheter was flushed with 70cc water insuring return and inflated balloon with 10 ml of water. Corea Catheter was hooked up to leg bag with straps. Patient instructed on catheter care including draining catheter bag and keeping catheter bag above the knee to prevent pulling on catheter causing blood. Patient was given an extra leg bag and an overnight bag. Patient states he is not sure how long patient will need catheter. He should know more once he has his MRI. I told him I will ask at his next catheter change if he knows how long he will have a catheter so we can send a BARD order.

## 2021-01-27 NOTE — TELEPHONE ENCOUNTER
[facility] is denying MRI Pelvis  Peer to peer available  #   Case# [id number]    Please advise    MRI Femur was approved    Reason: Your records show that the same test or one like it is on file for you, and it is still in effect. (MRI Femur) They do not show the results of this study, or if there are plans for the study. The approved study may show your doctor what they needed to see in order to treat your problem (condition). Further imaging cannot be approved without knowing the outcome of the prior approved study.

## 2021-01-28 ENCOUNTER — TELEPHONE (OUTPATIENT)
Dept: UROLOGY | Age: 61
End: 2021-01-28

## 2021-01-28 NOTE — TELEPHONE ENCOUNTER
Regarding patient Celestino Rivera(1960) prostate ca. You saw on 1/15/21 and ordered a MRI Pelvis and MRI Right Femur. We normally schedule this at [facility]. Read 1/28/2021 12:52 PM  1/28/2021 12:02 PM  The patient wants a open MRI which is only done at [address] [place] [address]. He is claustrophobic and cannot go in feet first either. He really does not want to do at [address] [place] [address]. Please advise. These were ordered as staging scans and he has been referred to Zain Trent 1/28/2021 12:52 PM  1/28/2021 12:53 PM  Ok      Patient scheduled for MRI at [facility] on 2/2/21 with an arrival of 8:45 am. No prep. Patient informed and voiced understanding. Patient will get the scans on a disc. Iesha at [employer] informed.

## 2021-01-29 DIAGNOSIS — J30.1 SEASONAL ALLERGIC RHINITIS DUE TO POLLEN: ICD-10-CM

## 2021-01-29 NOTE — TELEPHONE ENCOUNTER
Patient requesting a medication refill.   Medication: Cromolyn  Pharmacy: [id number] office visit: 12/28/20  Next office visit: 2/24/2021

## 2021-02-02 ENCOUNTER — TELEPHONE (OUTPATIENT)
Dept: CASE MANAGEMENT | Age: 61
End: 2021-02-02

## 2021-02-02 DIAGNOSIS — E11.9 WELL CONTROLLED TYPE 2 DIABETES MELLITUS (HCC): Chronic | ICD-10-CM

## 2021-02-02 DIAGNOSIS — J30.1 SEASONAL ALLERGIC RHINITIS DUE TO POLLEN: ICD-10-CM

## 2021-02-02 LAB
CREAT SERPL-MCNC: 0.9 MG/DL (ref 0.6–1.3)
CREATININE CLEARANCE: 60

## 2021-02-02 RX ORDER — CROMOLYN SODIUM 40 MG/ML
SOLUTION/ DROPS OPHTHALMIC
Qty: 10 ML | Refills: 0 | Status: SHIPPED | OUTPATIENT
Start: 2021-02-02 | End: 2021-04-08 | Stop reason: SDUPTHER

## 2021-02-02 NOTE — TELEPHONE ENCOUNTER
Andriy outreach to pt to check how he did with the open MRI today at [facility]. Pt reports he \"did pretty good\", denies having any difficulty with the imaging study. Pt scheduled with MADELYN DYKES II.[facility] 2-8. Pt shares he will have a disc to bring to his appts for physician review. Pt understands Andriy is available to assist as needed.

## 2021-02-08 ENCOUNTER — OFFICE VISIT (OUTPATIENT)
Dept: UROLOGY | Age: 61
End: 2021-02-08
Payer: MEDICAID

## 2021-02-08 VITALS — HEIGHT: 69 IN | WEIGHT: 282 LBS | BODY MASS INDEX: 41.77 KG/M2 | TEMPERATURE: 97.9 F

## 2021-02-08 DIAGNOSIS — F41.9 ANXIETY: ICD-10-CM

## 2021-02-08 DIAGNOSIS — C61 PROSTATE CANCER (HCC): Primary | ICD-10-CM

## 2021-02-08 PROCEDURE — 1036F TOBACCO NON-USER: CPT | Performed by: UROLOGY

## 2021-02-08 PROCEDURE — G8427 DOCREV CUR MEDS BY ELIG CLIN: HCPCS | Performed by: UROLOGY

## 2021-02-08 PROCEDURE — 99214 OFFICE O/P EST MOD 30 MIN: CPT | Performed by: UROLOGY

## 2021-02-08 PROCEDURE — G8482 FLU IMMUNIZE ORDER/ADMIN: HCPCS | Performed by: UROLOGY

## 2021-02-08 PROCEDURE — 3017F COLORECTAL CA SCREEN DOC REV: CPT | Performed by: UROLOGY

## 2021-02-08 PROCEDURE — G8417 CALC BMI ABV UP PARAM F/U: HCPCS | Performed by: UROLOGY

## 2021-02-08 RX ORDER — ALPRAZOLAM 1 MG/1
1 TABLET ORAL 2 TIMES DAILY
Qty: 60 TABLET | Refills: 0 | Status: SHIPPED | OUTPATIENT
Start: 2021-02-08 | End: 2021-03-08 | Stop reason: SDUPTHER

## 2021-02-08 NOTE — TELEPHONE ENCOUNTER
[employer] [employer] called requesting a refill on the following medications:  Requested Prescriptions     Pending Prescriptions Disp Refills    ALPRAZolam (XANAX) 1 MG tablet 60 tablet 0     Sig: Take 1 tablet by mouth 2 times daily for 30 days.        Date of last visit: 12/28/2020  Date of next visit (if applicable):3/1/2021  Date of last refill: 01/08/21 # 60 x NR  Pharmacy Name: Sandy Bravo, [facility] ([address] [place] [address])

## 2021-02-08 NOTE — PROGRESS NOTES
 HIV screen  07/10/1976    Hepatitis B Vaccine (1 of 3 - Risk 3-dose series) 07/10/1980    DTaP/Tdap/Td vaccine (1 - Tdap) 07/10/1980    Shingles Vaccine (1 of 2) 07/10/2011    Diabetic retinal exam  07/31/2019    Flu vaccine (1) 09/01/2019    Diabetic foot exam  10/09/2019    Diabetic microalbuminuria test  10/09/2019    A1C test (Diabetic or Prediabetic)  01/08/2020    Lipid screen  01/08/2020    Potassium monitoring  01/08/2020    Creatinine monitoring  01/08/2020    Colon cancer screen colonoscopy  02/26/2022    Pneumococcal 0-64 years Vaccine  Completed       Objective:  /62 (Site: Left Upper Arm, Position: Sitting, Cuff Size: Large Adult)   Pulse 77   Ht 5' 9\" (1.753 m)   Wt 287 lb 9.6 oz (130.5 kg)   BMI 42.47 kg/m²   Physical Exam   Constitutional: He is oriented to person, place, and time. He appears well-developed and well-nourished. Cardiovascular: Normal rate and regular rhythm. No murmur heard. Pulmonary/Chest: Effort normal and breath sounds normal. No respiratory distress. He has no wheezes. Musculoskeletal: He exhibits no edema. Neurological: He is alert and oriented to person, place, and time. Psychiatric: He has a normal mood and affect. His behavior is normal.   Vitals reviewed. Impression/Plan:  1. Well controlled type 2 diabetes mellitus ([facility] [address]  Chronic. Check status of control with labs. Refill medication. No medication adjustments today  - Insulin Pen Needle (B-D ULTRAFINE III SHORT PEN) 31G X 8 MM MISC; Inject 1 each into the skin daily May substitute with any brand  Dispense: 100 each; Refill: 3  - Hemoglobin A1C; Future  - Comprehensive Metabolic Panel; Future  - CBC; Future  - Lipid Panel; Future  - insulin glargine (BASAGLAR KWIKPEN) 100 UNIT/ML injection pen; INJECT 54 UNITS INTO THE SKIN TWICE A DAY. Dispense: 33 mL; Refill: 1    2. Anxiety  Well-controlled. Chronic condition. Refill medication(s). - ALPRAZolam (XANAX) 1 MG tablet;  Take 1 tablet by mouth 2 times daily for 30 days. Dispense: 60 tablet; Refill: 0    3. Essential hypertension  chronic. Well-controlled. Refill metoprolol. Continue enalapril. Check labs. - metoprolol tartrate (LOPRESSOR) 50 MG tablet; Take 0.5 tablets by mouth 2 times daily  Dispense: 90 tablet; Refill: 1  - Comprehensive Metabolic Panel; Future  - CBC; Future    4. Coronary artery disease involving native coronary artery of native heart without angina pectoris  Chronic. Controlled. Continue Plavix, aspirin, statin, and beta-blocker. Keep Cardiology appointment  - clopidogrel (PLAVIX) 75 MG tablet; take 1 tablet by mouth once daily  Dispense: 90 tablet; Refill: 1    See optometrist for eyes watering    PDMP Monitoring:    Last PDMP [place] as Reviewed [facility]):  Review User Review Instant Review Result   Yulissa HILLIARD 7/9/2019 10:24 AM Reviewed PDMP [1]     Last Controlled Substance Monitoring Documentation      Office Visit from 7/9/2019 in [facility]   Periodic Controlled Substance Monitoring  Possible medication side effects, risk of tolerance/dependence & alternative treatments discussed., No signs of potential drug abuse or diversion identified. filed at 07/09/2019 1024        Urine Drug Screenings (1 yr)     Urine Drug Screen  Collected: 10/9/2018  8:55 AM (Final result)    Complete Results              Medication Contract and Consent for Opioid Use Documents Filed     Patient Documents       Type of Document Status Date Received Received By Description     Medication Contract [Status Missing]  Jennifer Rincon [facility] Medication Agreement  10/3/17                They voiced understanding. All questions answered. They agreed with treatment plan. See patient instructions for any educational materials that may have been given. Discussed use, benefit, and side effects of prescribed medications. Reviewed health maintenance.     (Please note that portions of this note may have been Ilumya Counseling: I discussed with the patient the risks of tildrakizumab including but not limited to immunosuppression, malignancy, posterior leukoencephalopathy syndrome, and serious infections.  The patient understands that monitoring is required including a PPD at baseline and must alert us or the primary physician if symptoms of infection or other concerning signs are noted.

## 2021-02-08 NOTE — PROGRESS NOTES
PSA: 16.28    10/26/20  [place] Boys presents with history of BPH with LUTs, and elevated PSA. An MRI was denies by his insurance, so we review options of elevated PSA. He also has worsening LUTs, with retention and hematuria. He is on Home O2 for recent bronchitis, which is improving. He has history of CAD on ASA and Plavix. Cystoscopy Operative Note  Surgeon: Elfrleonela Aschoff   Anesthesia: Urethral 2%  Indications: BPH with LUTs  Position: supine  Findings:   The patient was prepped and draped in the usual sterile fashion. The flexible cystoscope was advanced through the urethra and into the bladder. The bladder was thoroughly inspected and the following was noted:  Residual Urine: Moderate  Urethra: No abnormalities of the urethra are noted. Prostate: Large gland Complete obstruction by latera lobe of prostate. Bladder: No tumors or CIS noted. No bladder diverticulum. Large bladder stone. Ureters: Clear efflux from both ureters. Orifices with normal configuration and location. The cystoscope was removed. The patient tolerated the procedure well. Plan  Standard TURP  Cystolithalopaxy     9/18/20  63 yo male that presents with LUTs and difficulty emptying his bladder. He has incomplete emptying, frequency, and Nocturia, that are worsening over the last couple years. On flomax but he is stating his symptoms are worsening. AUASS: 11/4. Denies hematuria. PVR: 48 ml. Smoking history: quit 20 years ago.  FH: none. ANTONIO: plavix, for CAD s/p stents (10/2019). Has elevated PSA, 16.          Summary of old records:   (Patient's old records, notes and chart reviewed and summarized above.)     Last several PSA's:  Lab Results   Component Value Date    PSA 16.28 (H) 10/23/2020    PSA 16.31 (H) 08/25/2020    PSA 7.61 (H) 06/08/2016       Last total testosterone:  No results found for: TESTOSTERONE    Urinalysis today:  No results found for this visit on 02/08/21.     Last BUN and creatinine:  Lab Results   Component Value Date    BUN 16 01/13/2021     Lab Results   Component Value Date    CREATININE 0.9 02/02/2021       Imaging Reviewed during this Office Visit:   (results were independently reviewed by physician and radiology report verified)    PAST MEDICAL, FAMILY AND SOCIAL HISTORY UPDATE:  Past Medical History:   Diagnosis Date    Abnormal stress test Sept 2012    Lateral Wall Ischemia- [facility] at [facility]    CAD (coronary artery disease)     Cancer ([address]     Prostate    Chronic low back pain     Diabetes mellitus ([address]     Diabetes mellitus ([address]     Hyperlipidemia     Hypertension     Hypertriglyceridemia     MI (myocardial infarction) ([address] 2004    Obesity     Sleep apnea     has CPAP    Viral pneumonia 10/15/2020     Past Surgical History:   Procedure Laterality Date    BACK SURGERY  November 1997    L4 & L5 fusion    CARDIAC CATHETERIZATION  10/2019   Alejandrina Manual CARDIAC SURGERY  October 22, 2004    Cardiac cath with stent placement x1    COLONOSCOPY  October 2010    CORONARY ANGIOPLASTY WITH STENT PLACEMENT  10/17/2019    HERNIA REPAIR  1991    Mesh repair in abdomen.  ULTRASOUND PROSTATE/TRANSRECTAL N/A 12/7/2020    TRANSRECTAL ULTRASOUND WITH PROSTATE BIOPSY performed by Rambo Bertrand MD at [address] [place] [address] History   Problem Relation Age of Onset    Diabetes Mother     Heart Disease Mother     High Blood Pressure Mother     Arthritis Father     Diabetes Father     Heart Disease Father     High Blood Pressure Father     Diabetes Sister     Diabetes Brother     Heart Disease Brother     Cancer Neg Hx     Stroke Neg Hx      Outpatient Medications Marked as Taking for the 2/8/21 encounter (Office Visit) with Rambo Bertrand MD   Medication Sig Dispense Refill    ALPRAZolam Connie Nolan) 1 MG tablet Take 1 tablet by mouth 2 times daily for 30 days.  60 tablet 0    cromolyn (OPTICROM) 4 % ophthalmic solution instill 1 drop into both eyes four times a day 10 mL 0    Insulin Syringes, Disposable, U-100 1 ML MISC 1 each by Does not apply route 3 times daily 31 gauge x 8 mm  ultrafine 2 100 each 5    RA VITAMIN C/ERNA HIPS 1000 MG tablet TAKE 1/2 TABLET BY MOUTH DAILY      RA VITAMIN B-12 TR 1000 MCG TBCR TAKE 1 TABLET BY MOUTH DAILY      B-D ULTRAFINE III SHORT PEN 31G X 8 MM MISC USE TWICE DAILY WITH THE BASAGLAR PEN.       albuterol (PROVENTIL) (2.5 MG/3ML) 0.083% nebulizer solution Take 3 mLs by nebulization every 4 hours as needed for Wheezing 2 Package 1    insulin glargine (LANTUS SOLOSTAR) 100 UNIT/ML injection pen Inject 40 Units into the skin 2 times daily 96 mL 0    oxybutynin (DITROPAN XL) 10 MG extended release tablet Take 1 tablet by mouth daily 30 tablet 3    amLODIPine (NORVASC) 10 MG tablet Take 10 mg by mouth daily      enalapril (VASOTEC) 20 MG tablet take 1 tablet by mouth once daily 90 tablet 1    pantoprazole (PROTONIX) 40 MG tablet take 1 tablet by mouth once daily 90 tablet 1    metFORMIN (GLUCOPHAGE) 1000 MG tablet take 1 tablet by mouth twice a day with meals 180 tablet 1    fluticasone (FLONASE) 50 MCG/ACT nasal spray One spray in each nostril q hs 3 Bottle 1    cyanocobalamin 1000 MCG tablet Take 1 tablet by mouth daily 30 tablet 3    vitamin C (VITAMIN C) 500 MG tablet Take 1 tablet by mouth daily 30 tablet 3    ferrous sulfate (IRON 325) 325 (65 Fe) MG tablet Take 1 tablet by mouth every 48 hours 30 tablet 3    tamsulosin (FLOMAX) 0.4 MG capsule Take 1 capsule by mouth 2 times daily 60 capsule 5    insulin lispro (HUMALOG) 100 UNIT/ML injection vial Inject 10 Units into the skin 2 times daily as needed for High Blood Sugar when blood sugar > 155      clopidogrel (PLAVIX) 75 MG tablet take 1 tablet by mouth once daily 90 tablet 1    metoprolol tartrate (LOPRESSOR) 25 MG tablet Take 1 tablet by mouth 2 times daily  0    Vitamin D, Cholecalciferol, 25 MCG (1000 UT) TABS Take 1,000 Units by mouth daily      pravastatin (PRAVACHOL) 40 MG tablet Take 1 tablet by mouth nightly  0    gemfibrozil (LOPID) 600 MG tablet Take 1 tablet by mouth 2 times daily 180 tablet 1    nitroGLYCERIN (NITROSTAT) 0.4 MG SL tablet Place 0.4 mg under the tongue every 5 minutes as needed for Chest pain      aspirin 325 MG tablet Take 325 mg by mouth daily. Ace inhibitors, Baclofen, Darvocet [propoxyphene n-acetaminophen], Darvon [propoxyphene hcl], Flexeril [cyclobenzaprine], Morphine, Motrin [ibuprofen micronized], Tylenol [acetaminophen], and Ultram [tramadol]  Social History     Tobacco Use   Smoking Status Former Smoker    Packs/day: 0.25    Years: 2.00    Pack years: 0.50    Types: Cigarettes    Quit date: 1995    Years since quittin.1   Smokeless Tobacco Never Used       Social History     Substance and Sexual Activity   Alcohol Use No       REVIEW OF SYSTEMS:  Constitutional: negative  Eyes: negative  Respiratory: negative  Cardiovascular: negative  Gastrointestinal: negative  Musculoskeletal: negative  Genitourinary: negative  Skin: negative   Neurological: negative  Hematological/Lymphatic: negative  Psychological: negative    Physical Exam:      Vitals:    21 1452   Temp: 97.9 °F (36.6 °C)     Constitutional: Patient in no acute distress   Neuro: alert and oriented to person place and time. Psych: Mood and affect normal.  Head: atraumatic normocephalic  Eyes: EOMi  HEENT: neck supple, trachea midline  Lungs: Respiratory effort normal  Cardiovascular:  Normal peripheral pulses  Abdomen: Soft, non-tender, non-distended, No CVA  Bladder: non-tender and not distended. FROMx4, no cyanosis clubbing edema  Skin: warm and dry        Assessment and Plan      No diagnosis found. Plan:      No follow-ups on file. Path: HR prostate cancer, high volume G9 (4+5)  Needs metastatic work up - bone scan, CT scan   - Labs  S/p Cystolithalopaxy - irritative symptoms improved  Catheter replaced due to retention    We discussed treatment options. He may not be the best surgical candidate secondary to O2 dependent COPD, morbid obesity, and history of CAD MI and stent placement. We discussed all options including Active surveillance, Surgery, Radiation and ADT for his significant comorbidities.     Bone scan - possible lesions on pelvis and right femur    Refer to Rad oncology for HR prostate cancer

## 2021-02-08 NOTE — TELEPHONE ENCOUNTER
See [facility] called requesting a refill on the following medications:  Requested Prescriptions     Pending Prescriptions Disp Refills    ALPRAZolam (XANAX) 1 MG tablet 60 tablet 0     Sig: Take 1 tablet by mouth 2 times daily for 30 days. Pharmacy verified: [address] [place] [address]  .       Date of last visit: 12/28/2020  Date of next visit (if applicable): 2/2/8285

## 2021-02-10 PROBLEM — C61 MALIGNANT NEOPLASM OF PROSTATE (HCC): Status: ACTIVE | Noted: 2021-02-10

## 2021-02-11 ENCOUNTER — TELEPHONE (OUTPATIENT)
Dept: FAMILY MEDICINE CLINIC | Age: 61
End: 2021-02-11

## 2021-02-11 NOTE — TELEPHONE ENCOUNTER
He has had dual antiplatelet therapy for 1 year from nov 2019 stent placement. Likely ok to stop plavix fort 7 day and use aspirin 81 mg during that time. Recommend they check with Dr. José Ellsworth as well. Please advise oncology office.  Jemma Dahl MD

## 2021-02-11 NOTE — TELEPHONE ENCOUNTER
Call from the [address] [place] [address] stating the patient is possibly having a procedure for placing markers in the prostate and placing Hydro gel in between the prostate and the rectum for possible chemo protection  They are asking to be able to stop the Plavix for 7 days and maybe place him on ASA 81 mg  Dr Joni Farris is doing the procedure if everything is OK to proceed  They gave me Dr Joni Farris cell number if you would like to speak to the physician ( I have the number here at the office)

## 2021-02-15 ENCOUNTER — HOSPITAL ENCOUNTER (OUTPATIENT)
Dept: MRI IMAGING | Age: 61
Discharge: HOME | End: 2021-02-15

## 2021-02-15 DIAGNOSIS — Z00.6 ENCOUNTER FOR EXAMINATION FOR NORMAL COMPARISON AND CONTROL IN CLINICAL RESEARCH PROGRAM: ICD-10-CM

## 2021-02-15 DIAGNOSIS — C80.1 CANCER (HCC): ICD-10-CM

## 2021-02-15 NOTE — TELEPHONE ENCOUNTER
Spoke with Eddie Duffy from Oncology and patient was OK by Dr Lindsey Ortiz and started the ASA on this past Saturday and having the procedure on this Thursday with Dr Chandu Suarez

## 2021-02-16 NOTE — TELEPHONE ENCOUNTER
[facility] called requesting a refill on the following medications:  Requested Prescriptions     Pending Prescriptions Disp Refills    ascorbic acid (VITAMIN C) 500 MG tablet 30 tablet 3     Sig: Take 1 tablet by mouth daily     [facility]   . pv      Date of last visit: 12/8/20  Date of next visit (if applicable): 8/1/8648

## 2021-02-17 RX ORDER — ASCORBIC ACID 500 MG
500 TABLET ORAL DAILY
Qty: 30 TABLET | Refills: 3 | Status: SHIPPED | OUTPATIENT
Start: 2021-02-17 | End: 2021-06-21

## 2021-02-18 ENCOUNTER — HOSPITAL ENCOUNTER (OUTPATIENT)
Dept: RADIATION ONCOLOGY | Age: 61
Discharge: HOME | End: 2021-02-18
Attending: RADIOLOGY
Payer: MEDICAID

## 2021-02-18 ENCOUNTER — TELEPHONE (OUTPATIENT)
Dept: CASE MANAGEMENT | Age: 61
End: 2021-02-18

## 2021-02-18 VITALS
RESPIRATION RATE: 20 BRPM | OXYGEN SATURATION: 97 % | DIASTOLIC BLOOD PRESSURE: 55 MMHG | TEMPERATURE: 98.4 F | SYSTOLIC BLOOD PRESSURE: 116 MMHG | HEART RATE: 71 BPM

## 2021-02-18 VITALS
DIASTOLIC BLOOD PRESSURE: 57 MMHG | OXYGEN SATURATION: 97 % | RESPIRATION RATE: 20 BRPM | SYSTOLIC BLOOD PRESSURE: 124 MMHG | HEART RATE: 71 BPM | TEMPERATURE: 97.9 F

## 2021-02-18 PROCEDURE — 55874 TPRNL PLMT BIODEGRDABL MATRL: CPT | Performed by: RADIOLOGY

## 2021-02-18 PROCEDURE — 76942 ECHO GUIDE FOR BIOPSY: CPT | Performed by: RADIOLOGY

## 2021-02-18 PROCEDURE — 55876 PLACE RT DEVICE/MARKER PROS: CPT | Performed by: RADIOLOGY

## 2021-02-18 NOTE — TELEPHONE ENCOUNTER
Andriy call to pt s/p space oar placement today. Pt reports he is doing ok; no problems to report at this time. Andriy reiterated availability for any questions or concerns. Pt scheduled for prostate XRT teaching Friday 2/26/21 at 0930. Pt aware of appt.

## 2021-02-19 ENCOUNTER — TELEPHONE (OUTPATIENT)
Dept: UROLOGY | Age: 61
End: 2021-02-19

## 2021-02-19 RX ORDER — BICALUTAMIDE 50 MG/1
50 TABLET, FILM COATED ORAL DAILY
Qty: 90 TABLET | Refills: 3 | Status: SHIPPED | OUTPATIENT
Start: 2021-02-19 | End: 2022-02-11 | Stop reason: SDUPTHER

## 2021-02-19 NOTE — TELEPHONE ENCOUNTER
Can you call in prescription for patient: see Dr Adelaide Blanco message I copied and pasted to patient chart.     Casodex 50 mg PO  Daily

## 2021-02-19 NOTE — TELEPHONE ENCOUNTER
Dasia-just checking you don't want to initiate ADT. just doing casodex      Dr Zay Bauer stands for Androgen Deprivation therapy     2/19/2021 8:52 AM Casodex is a distal receptor blocker of testosterone (which is the androgen that were blocking in this case). You need to do that before something like Lupron or Eligard which is more centrally Acting and will result in an initial spike in testosterone before depleting   T.     2/19/2021 8:53 AM Is there something else the radiologist wanted to use?

## 2021-02-19 NOTE — TELEPHONE ENCOUNTER
this is Trisha Pendleton from [facility]. calling in regards to patient Zannie Skiff [birth year]. Matthew from radiation called here in regards to patient. they want him to start ADT asap. Here is your plan from 2-8-21. he also had 2 MRI from [facility] on 2-2-21 that was not scanned in chart. they are in there now. what is your ADT plan on patient. we will have to do a prior authorization on this too. . I also told them you would not be back til 3-8-21 they seemed to want this started before then. Please advise-asap. No diagnosis found. Plan:   No follow-ups on file. Path: HR prostate cancer, high volume G9 (4+5)  Needs metastatic work up - bone scan, CT scan  - Labs  S/p Cystolithalopaxy  - irritative symptoms improved  Catheter replaced due to retention    We discussed treatment options. He may not be the best surgical candidate secondary to O2 dependent COPD, morbid obesity, and history of CAD MI and stent placement. We discussed all options including Active surveillance, Surgery, Radiation and ADT for his significant comorbidities. Will continue discussion after work up.     Bone scan - possible lesions on pelvis and right femur  - MRI scan to r/o metastatic disease    Refer to Rad oncology for HR prostate cancer Read 2/18/2021 4:17 PM 2/18/2021     4:18 PM Casodex 50 mg PO Daily

## 2021-02-25 ENCOUNTER — NURSE ONLY (OUTPATIENT)
Dept: UROLOGY | Age: 61
End: 2021-02-25
Payer: MEDICAID

## 2021-02-25 VITALS — TEMPERATURE: 97.9 F

## 2021-02-25 DIAGNOSIS — R33.9 URINARY RETENTION: ICD-10-CM

## 2021-02-25 PROCEDURE — 51702 INSERT TEMP BLADDER CATH: CPT | Performed by: NURSE PRACTITIONER

## 2021-02-25 NOTE — PROGRESS NOTES
Patient has given me verbal consent to perform catheter change Yes    Does patient have latex allergy? NO  Does patient have shellfish or betadine allergy? NO      Following [facility] APRN-CNP plan of care. 16 Fr Catheter changed without difficulty. Once balloon was deflated, removed catheter without difficulty. Cleansed head of penis with betadine swab. 16 Fr regular corea was inserted without difficulty. Catheter was flushed with 70cc water insuring return and inflated balloon with 10 ml of water. Corea Catheter was hooked up to leg bag with straps. Patient instructed on catheter care including draining catheter bag and keeping catheter bag above the knee to prevent pulling on catheter causing blood. Patient was given an extra leg bag and overnight. Bard order sent.

## 2021-02-26 ENCOUNTER — HOSPITAL ENCOUNTER (OUTPATIENT)
Dept: CT IMAGING | Age: 61
Discharge: HOME | End: 2021-02-26
Payer: MEDICAID

## 2021-02-26 ENCOUNTER — TELEPHONE (OUTPATIENT)
Dept: CASE MANAGEMENT | Age: 61
End: 2021-02-26

## 2021-02-26 ENCOUNTER — HOSPITAL ENCOUNTER (OUTPATIENT)
Dept: RADIATION ONCOLOGY | Age: 61
Discharge: HOME | End: 2021-02-26
Attending: RADIOLOGY
Payer: MEDICAID

## 2021-02-26 DIAGNOSIS — C61 MALIGNANT NEOPLASM OF PROSTATE (HCC): ICD-10-CM

## 2021-02-26 PROCEDURE — 77290 THER RAD SIMULAJ FIELD CPLX: CPT | Performed by: RADIOLOGY

## 2021-02-26 PROCEDURE — 77334 RADIATION TREATMENT AID(S): CPT | Performed by: RADIOLOGY

## 2021-02-26 PROCEDURE — 77263 THER RADIOLOGY TX PLNG CPLX: CPT | Performed by: RADIOLOGY

## 2021-02-26 PROCEDURE — 3209999900 CT GUIDE RADIATION THERAPY NO CHARGE

## 2021-02-26 PROCEDURE — 99214 OFFICE O/P EST MOD 30 MIN: CPT | Performed by: NURSE PRACTITIONER

## 2021-02-26 NOTE — PROGRESS NOTES
[address] [facility]  [facility], [address]  Phone: [phone]   Toll Free: [phone]   Fax: [phone]    RADIATION ONCOLOGY EDUCATION    CHIEF COMPLAINT: Jv Mcclellan presents to radiation oncology today for education prior to starting radiation treatment to prostate, sv and pelvic nodes. HISTORY OF PRESENT ILLNESS: Jv Mcclellan was diagnosed with prostate cancer in December 2020. Jv Mcclellan received recommendation for radiation therapy and ADT for which he is agreeable. EXAMINATION:   CONSTITUTIONAL: Jv Mcclellan is a pleasant well developed adult male. NEUROLOGICAL: He is alert and oriented x3 in no acute distress. Clear mentation. MOOD/AFFECT: Appropriate for place and situation. PLAN:   1. Discussed with patient the steps involved with set up and initiation of radiation therapy (including treatment simulation and verification). Also reviewed the logistics of treatment (day, time and number of treatments). Expected and potential side effects (both short and long-term) were discussed in detail. 2. Skin care and moisturization instructions were discussed in detail. 3. Patient was agreeable to PT, massage and dietician referrals which will be placed once treatment is initiated. Segundo5 Alice Weiss had the opportunity to ask questions, and indicated that his questions were satisfactorily addressed. ATTESTATION:  I have spent 45 minutes reviewing previous notes, test results and face to face with the patient discussing the diagnosis and importance of compliance with the treatment plan as well as documenting on the day of the visit.

## 2021-02-26 NOTE — TELEPHONE ENCOUNTER
Phone call to pt to follow up from XRT teaching today; No answer. Andriy will follow up at a later time.

## 2021-03-01 ENCOUNTER — TELEPHONE (OUTPATIENT)
Dept: CASE MANAGEMENT | Age: 61
End: 2021-03-01

## 2021-03-01 ENCOUNTER — OFFICE VISIT (OUTPATIENT)
Dept: FAMILY MEDICINE CLINIC | Age: 61
End: 2021-03-01
Payer: MEDICAID

## 2021-03-01 VITALS
RESPIRATION RATE: 14 BRPM | HEIGHT: 69 IN | TEMPERATURE: 97.2 F | DIASTOLIC BLOOD PRESSURE: 62 MMHG | SYSTOLIC BLOOD PRESSURE: 110 MMHG | OXYGEN SATURATION: 99 % | WEIGHT: 283 LBS | HEART RATE: 80 BPM | BODY MASS INDEX: 41.92 KG/M2

## 2021-03-01 DIAGNOSIS — I25.10 CORONARY ARTERY DISEASE INVOLVING NATIVE CORONARY ARTERY OF NATIVE HEART WITHOUT ANGINA PECTORIS: ICD-10-CM

## 2021-03-01 DIAGNOSIS — E11.9 WELL CONTROLLED TYPE 2 DIABETES MELLITUS (HCC): Primary | ICD-10-CM

## 2021-03-01 DIAGNOSIS — I10 ESSENTIAL HYPERTENSION: ICD-10-CM

## 2021-03-01 DIAGNOSIS — C61 PROSTATE CANCER (HCC): ICD-10-CM

## 2021-03-01 DIAGNOSIS — F41.9 ANXIETY: ICD-10-CM

## 2021-03-01 PROBLEM — J44.9 COPD, SEVERE (HCC): Status: ACTIVE | Noted: 2021-03-01

## 2021-03-01 LAB — HBA1C MFR BLD: 6.3 %

## 2021-03-01 PROCEDURE — 2022F DILAT RTA XM EVC RTNOPTHY: CPT | Performed by: FAMILY MEDICINE

## 2021-03-01 PROCEDURE — 99214 OFFICE O/P EST MOD 30 MIN: CPT | Performed by: FAMILY MEDICINE

## 2021-03-01 PROCEDURE — G8482 FLU IMMUNIZE ORDER/ADMIN: HCPCS | Performed by: FAMILY MEDICINE

## 2021-03-01 PROCEDURE — G8417 CALC BMI ABV UP PARAM F/U: HCPCS | Performed by: FAMILY MEDICINE

## 2021-03-01 PROCEDURE — G8427 DOCREV CUR MEDS BY ELIG CLIN: HCPCS | Performed by: FAMILY MEDICINE

## 2021-03-01 PROCEDURE — 3044F HG A1C LEVEL LT 7.0%: CPT | Performed by: FAMILY MEDICINE

## 2021-03-01 PROCEDURE — 83036 HEMOGLOBIN GLYCOSYLATED A1C: CPT | Performed by: FAMILY MEDICINE

## 2021-03-01 PROCEDURE — 1036F TOBACCO NON-USER: CPT | Performed by: FAMILY MEDICINE

## 2021-03-01 PROCEDURE — 3017F COLORECTAL CA SCREEN DOC REV: CPT | Performed by: FAMILY MEDICINE

## 2021-03-01 ASSESSMENT — PATIENT HEALTH QUESTIONNAIRE - PHQ9
1. LITTLE INTEREST OR PLEASURE IN DOING THINGS: 0
2. FEELING DOWN, DEPRESSED OR HOPELESS: 0
SUM OF ALL RESPONSES TO PHQ QUESTIONS 1-9: 0
SUM OF ALL RESPONSES TO PHQ QUESTIONS 1-9: 0

## 2021-03-01 ASSESSMENT — ENCOUNTER SYMPTOMS
SHORTNESS OF BREATH: 0
COUGH: 0
WHEEZING: 0
EYE ITCHING: 0

## 2021-03-01 NOTE — PROGRESS NOTES
[facility] PROFESSIONAL [facility] MEDICINE  [address] [address]  Dept: [phone]  Dept Fax: [phone]  Loc: [phone]  PROGRESS NOTE      Visit Date: 3/1/2021    Nallely Barbosa is a 62 y.o. male who presents today for:  Chief Complaint   Patient presents with    3 Month Follow-Up    Diabetes Mellitus       Subjective:  Diabetes  Pertinent negatives for diabetes include no chest pain. 3 month f/u    Anxiety:  On xanax 1 mg 2x per day. Not on any other anxiety meds. Denies illicit drug use. mood is good. Sleeping well.      6 month f/u:    DM:  a1c 7.3% in Aug.  on lantus 40 units twice daily and he was on lantus 53 units twice daily.  On humalog 10 units twice daily as needed for glucose >155.  he has not needed the Parviz Haralson is on metformin as well.  He eats twice daily.  Exercise: Ana Goncalves. No hypoglycemic events. On ACEI.     HTN:  On enalapril, norvasc and metoprolol. Has CAD. On statin. On aspirin and plavix. Follows with cardiology. CAD:  Hx of stent placement. On BB. On pravastatin and lopid. Aspirin and plavix. Follows with cardiology. Allergies:  He has been on cromolyn. On flonase. Currently being treated for prostate cancer. Has urinary catheter in place. COPD:  On supplemental oxygen. Uses albuterol. Has cpap for his ROBSON>    Needs dentures. Review of Systems   Constitutional: Negative for chills and fever. Eyes: Negative for itching. Respiratory: Negative for cough, shortness of breath and wheezing. Cardiovascular: Negative for chest pain and leg swelling. Neurological: Negative for syncope and light-headedness.      Patient Active Problem List   Diagnosis    Hypertension    Hyperlipidemia    CAD (coronary artery disease)    Chest pain, atypical    Chronic low back pain    Hypertriglyceridemia    Morbidly obese (HCC)    Abnormal stress test    Sprain and strain of ankle    Os trigonum    Elevated PSA    Lumbar spinal stenosis    Well controlled type 2 diabetes mellitus ([address]    ROBSON (obstructive sleep apnea)    Back pain at L4-L5 level    Anxiety    Microalbuminuria    Positive FIT (fecal immunochemical test)    Viral pneumonia    Acute respiratory failure with hypoxia (HCC)    H/O heart artery stent    Urinary retention    Malignant neoplasm of prostate [facility])     Past Medical History:   Diagnosis Date    Abnormal stress test 2012    Lateral Wall Ischemia- [facility]-    CAD (coronary artery disease)     Cancer (HCC)     Prostate    Chronic low back pain     Diabetes mellitus [facility]     Diabetes mellitus ([address]     Hyperlipidemia     Hypertension     Hypertriglyceridemia     MI (myocardial infarction) ([address]     Obesity     Sleep apnea     has CPAP    Viral pneumonia 10/15/2020      Past Surgical History:   Procedure Laterality Date    BACK SURGERY  1997    L4 & L5 fusion    CARDIAC CATHETERIZATION  10/2019   [facility] CARDIAC SURGERY  2004    Cardiac cath with stent placement x1    COLONOSCOPY  2010    CORONARY ANGIOPLASTY WITH STENT PLACEMENT  10/17/2019    HERNIA REPAIR      Mesh repair in abdomen.     ULTRASOUND PROSTATE/TRANSRECTAL N/A 2020    TRANSRECTAL ULTRASOUND WITH PROSTATE BIOPSY performed by Alessandra Fernandez MD at [address] [place] [address] History   Problem Relation Age of Onset    Diabetes Mother     Heart Disease Mother     High Blood Pressure Mother     Arthritis Father     Diabetes Father     Heart Disease Father     High Blood Pressure Father     Diabetes Sister     Diabetes Brother     Heart Disease Brother     Cancer Neg Hx     Stroke Neg Hx      Social History     Tobacco Use    Smoking status: Former Smoker     Packs/day: 0.25     Years: 2.00     Pack years: 0.50     Types: Cigarettes     Quit date: 1995     Years since quittin.1    Smokeless tobacco: Never Used   Substance Use Topics    Alcohol use: No      Current Outpatient Medications   Medication Sig Dispense Refill    bicalutamide (CASODEX) 50 MG chemo tablet Take 1 tablet by mouth daily 90 tablet 3    ascorbic acid (VITAMIN C) 500 MG tablet Take 1 tablet by mouth daily 30 tablet 3    ALPRAZolam (XANAX) 1 MG tablet Take 1 tablet by mouth 2 times daily for 30 days. 60 tablet 0    cromolyn (OPTICROM) 4 % ophthalmic solution instill 1 drop into both eyes four times a day 10 mL 0    Insulin Syringes, Disposable, U-100 1 ML MISC 1 each by Does not apply route 3 times daily 31 gauge x 8 mm  ultrafine 2 100 each 5    B-D ULTRAFINE III SHORT PEN 31G X 8 MM MISC USE TWICE DAILY WITH THE BASAGLAR PEN.       albuterol (PROVENTIL) (2.5 MG/3ML) 0.083% nebulizer solution Take 3 mLs by nebulization every 4 hours as needed for Wheezing 2 Package 1    insulin glargine (LANTUS SOLOSTAR) 100 UNIT/ML injection pen Inject 40 Units into the skin 2 times daily 96 mL 0    oxybutynin (DITROPAN XL) 10 MG extended release tablet Take 1 tablet by mouth daily 30 tablet 3    amLODIPine (NORVASC) 10 MG tablet Take 10 mg by mouth daily      enalapril (VASOTEC) 20 MG tablet take 1 tablet by mouth once daily 90 tablet 1    pantoprazole (PROTONIX) 40 MG tablet take 1 tablet by mouth once daily 90 tablet 1    metFORMIN (GLUCOPHAGE) 1000 MG tablet take 1 tablet by mouth twice a day with meals 180 tablet 1    fluticasone (FLONASE) 50 MCG/ACT nasal spray One spray in each nostril q hs 3 Bottle 1    cyanocobalamin 1000 MCG tablet Take 1 tablet by mouth daily 30 tablet 3    ferrous sulfate (IRON 325) 325 (65 Fe) MG tablet Take 1 tablet by mouth every 48 hours 30 tablet 3    tamsulosin (FLOMAX) 0.4 MG capsule Take 1 capsule by mouth 2 times daily 60 capsule 5    insulin lispro (HUMALOG) 100 UNIT/ML injection vial Inject 10 Units into the skin 2 times daily as needed for High Blood Sugar when blood sugar > 155      clopidogrel (PLAVIX) 75 MG tablet take 1 tablet by mouth once daily 90 tablet 1    metoprolol tartrate (LOPRESSOR) 25 MG tablet Take 1 tablet by mouth 2 times daily  0    Vitamin D, Cholecalciferol, 25 MCG (1000 UT) TABS Take 1,000 Units by mouth daily      pravastatin (PRAVACHOL) 40 MG tablet Take 1 tablet by mouth nightly  0    gemfibrozil (LOPID) 600 MG tablet Take 1 tablet by mouth 2 times daily 180 tablet 1    nitroGLYCERIN (NITROSTAT) 0.4 MG SL tablet Place 0.4 mg under the tongue every 5 minutes as needed for Chest pain      aspirin 325 MG tablet Take 325 mg by mouth daily. No current facility-administered medications for this visit. Allergies   Allergen Reactions    Ace Inhibitors      When asked Oct 2020, patient was unsure of allergy/reaction. Patient takes/tolerates enalapril.     Baclofen Other (See Comments)     Lightheadedness, dizziness    Darvocet [Propoxyphene N-Acetaminophen] Nausea Only     Felt sickly    Darvon [Propoxyphene Hcl]      Felt sickly    Flexeril [Cyclobenzaprine] Other (See Comments)     Headache    Morphine Nausea Only     Pt reported feeling sickly    Motrin [Ibuprofen Micronized] Nausea Only     Pt reported feeling very sick    Tylenol [Acetaminophen] Nausea Only    Ultram [Tramadol] Itching     Health Maintenance   Topic Date Due    Hepatitis C screen  1960    HIV screen  07/10/1976    Hepatitis B vaccine (1 of 3 - Risk 3-dose series) 07/10/1980    DTaP/Tdap/Td vaccine (1 - Tdap) 07/10/1980    Shingles Vaccine (1 of 2) 07/10/2011    Diabetic retinal exam  07/31/2019    Diabetic foot exam  01/07/2021    A1C test (Diabetic or Prediabetic)  08/25/2021    Diabetic microalbuminuria test  08/25/2021    Lipid screen  08/25/2021    PSA counseling  10/23/2021    Potassium monitoring  01/13/2022    Creatinine monitoring  02/02/2022    Colon cancer screen colonoscopy  02/26/2022    Flu vaccine  Completed    Pneumococcal 0-64 years Vaccine  Completed    Hepatitis A vaccine  Aged Out  Hib vaccine  Aged Out    Meningococcal (ACWY) vaccine  Aged Out       Objective:  /62 (Site: Left Upper Arm, Position: Sitting, Cuff Size: Large Adult)   Pulse 80   Temp 97.2 °F (36.2 °C) (Temporal)   Resp 14   Ht 5' 9\" (1.753 m)   Wt 283 lb (128.4 kg)   SpO2 99%   BMI 41.79 kg/m²   Physical Exam  Vitals signs reviewed. Constitutional:       Appearance: He is well-developed. He is obese. He is not ill-appearing. Interventions: Nasal cannula in place. Cardiovascular:      Rate and Rhythm: Normal rate and regular rhythm. Heart sounds: No murmur. Pulmonary:      Effort: Pulmonary effort is normal. No respiratory distress. Breath sounds: Normal breath sounds. No wheezing or rhonchi. Musculoskeletal:      Right lower leg: Edema (trace) present. Left lower leg: Edema (trace) present. Neurological:      Mental Status: He is alert. Mental status is at baseline. Psychiatric:         Mood and Affect: Mood normal.         Behavior: Behavior normal.       Visual inspection:  Deformity/amputation: absent  Skin lesions/pre-ulcerative calluses: absent  Edema: right- trace, left- trace    Sensory exam:  Monofilament sensation: normal  (minimum of 5 random plantar locations tested, avoiding callused areas - > 1 area with absence of sensation is + for neuropathy)    Plus at least one of the following:  Pulses: normal,     Lab Results   Component Value Date    WBC 9.0 01/07/2021    HGB 11.3 (L) 01/07/2021    HCT 38.0 (L) 01/07/2021     01/07/2021    CHOL 146 08/25/2020    TRIG 118 08/25/2020    HDL 30 08/25/2020    ALT 8 (L) 10/15/2020    AST 8 10/15/2020     01/13/2021    K 5.2 01/13/2021     01/13/2021    CREATININE 0.9 02/02/2021    BUN 16 01/13/2021    CO2 24 01/13/2021    PSA 16.28 (H) 10/23/2020    INR 1.05 12/07/2020    LABA1C 6.3 03/01/2021    LABMICR 7.17 10/09/2018       Impression/Plan:  1. Well controlled type 2 diabetes mellitus ([facility]  Chronic. Well-controlled with A1c of 6.3% today. Continue Lantus, Metformin, and humalog as needed  -  DIABETES FOOT EXAM  - POCT glycosylated hemoglobin (Hb A1C)    2. Anxiety  Chronic. Controlled. Continue Xanax    3. Essential hypertension  Chronic. Controlled. Continue enalapril, Norvasc, and metoprolol    4. Coronary artery disease involving native coronary artery of native heart without angina pectoris  Chronic. Stable. Continue beta-blocker, statin, and Plavix plus aspirin    5. Prostate cancer ([facility]  Chronic. About to start radiation. Continue to follow with rad Onc and urology        They voiced understanding. All questions answered. They agreed with treatment plan. See patient instructions for any educational materials that may have been given. Discussed use, benefit, and side effects of prescribed medications. Reviewed health maintenance. (Please note that portions of this note may have been completed with a voice recognition program.  Efforts were made to edit the dictation but occasionally words are mis-transcribed.)    Return in about 3 months (around 6/1/2021) for anxiety.       Electronically signed by Consuelo Corbin MD on 3/1/2021 at 1:23 PM

## 2021-03-01 NOTE — TELEPHONE ENCOUNTER
Pt returned call to Marnie, & had shared he did well with the sim & currently has no questions re: his upcoming treatment. Pt aware of ability to call marnie for any questions or concerns.

## 2021-03-02 ENCOUNTER — HOSPITAL ENCOUNTER (OUTPATIENT)
Age: 61
Discharge: HOME | End: 2021-03-02
Payer: MEDICAID

## 2021-03-02 LAB
ALBUMIN SERPL-MCNC: 4.4 G/DL (ref 3.5–5.1)
ALP BLD-CCNC: 81 U/L (ref 38–126)
ALT SERPL-CCNC: 6 U/L (ref 11–66)
ANION GAP SERPL CALCULATED.3IONS-SCNC: 12 MEQ/L (ref 8–16)
AST SERPL-CCNC: 7 U/L (ref 5–40)
BILIRUB SERPL-MCNC: 0.3 MG/DL (ref 0.3–1.2)
BILIRUBIN DIRECT: < 0.2 MG/DL (ref 0–0.3)
BUN BLDV-MCNC: 15 MG/DL (ref 7–22)
CALCIUM SERPL-MCNC: 9.9 MG/DL (ref 8.5–10.5)
CHLORIDE BLD-SCNC: 104 MEQ/L (ref 98–111)
CHOLESTEROL, FASTING: 139 MG/DL (ref 100–199)
CO2: 25 MEQ/L (ref 23–33)
CREAT SERPL-MCNC: 0.9 MG/DL (ref 0.4–1.2)
ERYTHROCYTE [DISTWIDTH] IN BLOOD BY AUTOMATED COUNT: 16.6 % (ref 11.5–14.5)
ERYTHROCYTE [DISTWIDTH] IN BLOOD BY AUTOMATED COUNT: 50.4 FL (ref 35–45)
GFR SERPL CREATININE-BSD FRML MDRD: 86 ML/MIN/1.73M2
GLUCOSE FASTING: 115 MG/DL (ref 70–108)
HCT VFR BLD CALC: 36.1 % (ref 42–52)
HDLC SERPL-MCNC: 29 MG/DL
HEMOGLOBIN: 11 GM/DL (ref 14–18)
LDL CHOLESTEROL CALCULATED: 91 MG/DL
MCH RBC QN AUTO: 25.3 PG (ref 26–33)
MCHC RBC AUTO-ENTMCNC: 30.5 GM/DL (ref 32.2–35.5)
MCV RBC AUTO: 83 FL (ref 80–94)
PLATELET # BLD: 266 THOU/MM3 (ref 130–400)
PMV BLD AUTO: 10.1 FL (ref 9.4–12.4)
POTASSIUM SERPL-SCNC: 4.6 MEQ/L (ref 3.5–5.2)
RBC # BLD: 4.35 MILL/MM3 (ref 4.7–6.1)
SODIUM BLD-SCNC: 141 MEQ/L (ref 135–145)
TOTAL PROTEIN: 7.4 G/DL (ref 6.1–8)
TRIGLYCERIDE, FASTING: 93 MG/DL (ref 0–199)
WBC # BLD: 8.5 THOU/MM3 (ref 4.8–10.8)

## 2021-03-02 PROCEDURE — 80048 BASIC METABOLIC PNL TOTAL CA: CPT

## 2021-03-02 PROCEDURE — 80076 HEPATIC FUNCTION PANEL: CPT

## 2021-03-02 PROCEDURE — U0003 INFECTIOUS AGENT DETECTION BY NUCLEIC ACID (DNA OR RNA); SEVERE ACUTE RESPIRATORY SYNDROME CORONAVIRUS 2 (SARS-COV-2) (CORONAVIRUS DISEASE [COVID-19]), AMPLIFIED PROBE TECHNIQUE, MAKING USE OF HIGH THROUGHPUT TECHNOLOGIES AS DESCRIBED BY CMS-2020-01-R: HCPCS

## 2021-03-02 PROCEDURE — 36415 COLL VENOUS BLD VENIPUNCTURE: CPT

## 2021-03-02 PROCEDURE — 80061 LIPID PANEL: CPT

## 2021-03-02 PROCEDURE — 85027 COMPLETE CBC AUTOMATED: CPT

## 2021-03-04 ENCOUNTER — HOSPITAL ENCOUNTER (OUTPATIENT)
Dept: RADIATION ONCOLOGY | Age: 61
End: 2021-03-04
Attending: RADIOLOGY
Payer: MEDICAID

## 2021-03-04 LAB — SARS-COV-2: NOT DETECTED

## 2021-03-04 PROCEDURE — 77301 RADIOTHERAPY DOSE PLAN IMRT: CPT | Performed by: RADIOLOGY

## 2021-03-04 PROCEDURE — 77300 RADIATION THERAPY DOSE PLAN: CPT | Performed by: RADIOLOGY

## 2021-03-04 PROCEDURE — 77338 DESIGN MLC DEVICE FOR IMRT: CPT | Performed by: RADIOLOGY

## 2021-03-08 ENCOUNTER — HOSPITAL ENCOUNTER (OUTPATIENT)
Dept: RADIATION ONCOLOGY | Age: 61
End: 2021-03-08
Attending: RADIOLOGY
Payer: MEDICAID

## 2021-03-08 DIAGNOSIS — F41.9 ANXIETY: ICD-10-CM

## 2021-03-08 PROCEDURE — 77338 DESIGN MLC DEVICE FOR IMRT: CPT | Performed by: RADIOLOGY

## 2021-03-08 RX ORDER — ALPRAZOLAM 1 MG/1
1 TABLET ORAL 2 TIMES DAILY
Qty: 60 TABLET | Refills: 0 | Status: SHIPPED | OUTPATIENT
Start: 2021-03-08 | End: 2021-04-08 | Stop reason: SDUPTHER

## 2021-03-08 NOTE — TELEPHONE ENCOUNTER
Patient requesting a medication refill.   Medication: ALPRAZolam Gil Woodall   Pharmacy: [address] [facility]   Last office visit: 3/1/21  Next office visit: 6/7/2021

## 2021-03-08 NOTE — TELEPHONE ENCOUNTER
Rodney Stahl called requesting a refill on the following medications:  Requested Prescriptions     Pending Prescriptions Disp Refills    ALPRAZolam (XANAX) 1 MG tablet 60 tablet 0     Sig: Take 1 tablet by mouth 2 times daily for 30 days.        Date of last visit: 3/1/2021  Date of next visit (if applicable):6/7/2021  Date of last refill: 02/08/21  Pharmacy Name: [facility]      Thanks,  William Silva LPN

## 2021-03-09 ENCOUNTER — HOSPITAL ENCOUNTER (OUTPATIENT)
Dept: RADIATION ONCOLOGY | Age: 61
Discharge: HOME | End: 2021-03-09
Attending: RADIOLOGY
Payer: MEDICAID

## 2021-03-09 ENCOUNTER — HOSPITAL ENCOUNTER (OUTPATIENT)
Dept: PULMONOLOGY | Age: 61
Discharge: HOME | End: 2021-03-09
Payer: MEDICAID

## 2021-03-09 DIAGNOSIS — J44.9 COPD, SEVERE (HCC): ICD-10-CM

## 2021-03-09 PROCEDURE — 94060 EVALUATION OF WHEEZING: CPT

## 2021-03-09 PROCEDURE — 77385 HC NTSTY MODUL RAD TX DLVR SMPL: CPT | Performed by: RADIOLOGY

## 2021-03-09 PROCEDURE — 94726 PLETHYSMOGRAPHY LUNG VOLUMES: CPT

## 2021-03-09 PROCEDURE — 77014 PR CT GUIDANCE PLACEMENT RAD THERAPY FIELDS: CPT | Performed by: RADIOLOGY

## 2021-03-09 PROCEDURE — 94729 DIFFUSING CAPACITY: CPT

## 2021-03-09 NOTE — PROGRESS NOTES
Prescreening performed prior to testing. The following symptoms may indicate COVID-19 infection:        One of the following criteria:   Temperature taken, patient temperature was 97.6 F. Fever greater 100.0 F -no  Cough -  no  New onset shortness of breath -no  New onset difficulty breathing -no        And/or   Two or more of the following criteria:  Muscle aches -no  Headache -no  Sore throat -no  New onset loss of smell/taste -no  New onset diarrhea -no    Patient's screening was negative. PFT will be performed.

## 2021-03-10 ENCOUNTER — HOSPITAL ENCOUNTER (OUTPATIENT)
Dept: RADIATION ONCOLOGY | Age: 61
Discharge: HOME | End: 2021-03-10
Attending: RADIOLOGY
Payer: MEDICAID

## 2021-03-10 ENCOUNTER — CLINICAL DOCUMENTATION (OUTPATIENT)
Dept: NUTRITION | Age: 61
End: 2021-03-10

## 2021-03-10 PROCEDURE — 77385 HC NTSTY MODUL RAD TX DLVR SMPL: CPT | Performed by: RADIOLOGY

## 2021-03-10 PROCEDURE — 77014 PR CT GUIDANCE PLACEMENT RAD THERAPY FIELDS: CPT | Performed by: RADIOLOGY

## 2021-03-11 ENCOUNTER — HOSPITAL ENCOUNTER (OUTPATIENT)
Dept: RADIATION ONCOLOGY | Age: 61
Discharge: HOME | End: 2021-03-11
Attending: RADIOLOGY
Payer: MEDICAID

## 2021-03-11 PROCEDURE — 77014 PR CT GUIDANCE PLACEMENT RAD THERAPY FIELDS: CPT | Performed by: RADIOLOGY

## 2021-03-11 PROCEDURE — 77385 HC NTSTY MODUL RAD TX DLVR SMPL: CPT | Performed by: RADIOLOGY

## 2021-03-12 ENCOUNTER — HOSPITAL ENCOUNTER (OUTPATIENT)
Dept: RADIATION ONCOLOGY | Age: 61
Discharge: HOME | End: 2021-03-12
Attending: RADIOLOGY
Payer: MEDICAID

## 2021-03-12 DIAGNOSIS — C61 MALIGNANT NEOPLASM OF PROSTATE (HCC): Primary | ICD-10-CM

## 2021-03-12 PROCEDURE — 77385 HC NTSTY MODUL RAD TX DLVR SMPL: CPT | Performed by: RADIOLOGY

## 2021-03-12 PROCEDURE — 77014 PR CT GUIDANCE PLACEMENT RAD THERAPY FIELDS: CPT | Performed by: RADIOLOGY

## 2021-03-15 ENCOUNTER — HOSPITAL ENCOUNTER (OUTPATIENT)
Dept: RADIATION ONCOLOGY | Age: 61
Discharge: HOME | End: 2021-03-15
Attending: RADIOLOGY
Payer: MEDICAID

## 2021-03-15 PROCEDURE — 77336 RADIATION PHYSICS CONSULT: CPT | Performed by: RADIOLOGY

## 2021-03-15 PROCEDURE — 77014 PR CT GUIDANCE PLACEMENT RAD THERAPY FIELDS: CPT | Performed by: RADIOLOGY

## 2021-03-15 PROCEDURE — 77385 HC NTSTY MODUL RAD TX DLVR SMPL: CPT | Performed by: RADIOLOGY

## 2021-03-15 PROCEDURE — 77427 RADIATION TX MANAGEMENT X5: CPT | Performed by: RADIOLOGY

## 2021-03-16 ENCOUNTER — HOSPITAL ENCOUNTER (OUTPATIENT)
Dept: RADIATION ONCOLOGY | Age: 61
Discharge: HOME | End: 2021-03-16
Attending: RADIOLOGY
Payer: MEDICAID

## 2021-03-16 ENCOUNTER — OFFICE VISIT (OUTPATIENT)
Dept: PULMONOLOGY | Age: 61
End: 2021-03-16
Payer: MEDICAID

## 2021-03-16 VITALS
TEMPERATURE: 97.9 F | WEIGHT: 279.8 LBS | OXYGEN SATURATION: 99 % | SYSTOLIC BLOOD PRESSURE: 122 MMHG | DIASTOLIC BLOOD PRESSURE: 60 MMHG | HEIGHT: 69 IN | BODY MASS INDEX: 41.44 KG/M2 | HEART RATE: 70 BPM

## 2021-03-16 DIAGNOSIS — E66.2 OBESITY HYPOVENTILATION SYNDROME (HCC): ICD-10-CM

## 2021-03-16 DIAGNOSIS — J96.11 CHRONIC RESPIRATORY FAILURE WITH HYPOXIA (HCC): ICD-10-CM

## 2021-03-16 DIAGNOSIS — J43.9 MIXED RESTRICTIVE AND OBSTRUCTIVE LUNG DISEASE (HCC): Primary | ICD-10-CM

## 2021-03-16 DIAGNOSIS — J98.4 MIXED RESTRICTIVE AND OBSTRUCTIVE LUNG DISEASE (HCC): Primary | ICD-10-CM

## 2021-03-16 PROCEDURE — 1036F TOBACCO NON-USER: CPT | Performed by: NURSE PRACTITIONER

## 2021-03-16 PROCEDURE — G8482 FLU IMMUNIZE ORDER/ADMIN: HCPCS | Performed by: NURSE PRACTITIONER

## 2021-03-16 PROCEDURE — 77385 HC NTSTY MODUL RAD TX DLVR SMPL: CPT | Performed by: RADIOLOGY

## 2021-03-16 PROCEDURE — 99214 OFFICE O/P EST MOD 30 MIN: CPT | Performed by: NURSE PRACTITIONER

## 2021-03-16 PROCEDURE — 77014 PR CT GUIDANCE PLACEMENT RAD THERAPY FIELDS: CPT | Performed by: RADIOLOGY

## 2021-03-16 PROCEDURE — G8417 CALC BMI ABV UP PARAM F/U: HCPCS | Performed by: NURSE PRACTITIONER

## 2021-03-16 PROCEDURE — G8926 SPIRO NO PERF OR DOC: HCPCS | Performed by: NURSE PRACTITIONER

## 2021-03-16 PROCEDURE — 94618 PULMONARY STRESS TESTING: CPT | Performed by: NURSE PRACTITIONER

## 2021-03-16 PROCEDURE — G8427 DOCREV CUR MEDS BY ELIG CLIN: HCPCS | Performed by: NURSE PRACTITIONER

## 2021-03-16 PROCEDURE — 3023F SPIROM DOC REV: CPT | Performed by: NURSE PRACTITIONER

## 2021-03-16 PROCEDURE — 3017F COLORECTAL CA SCREEN DOC REV: CPT | Performed by: NURSE PRACTITIONER

## 2021-03-16 ASSESSMENT — ENCOUNTER SYMPTOMS
DIARRHEA: 0
STRIDOR: 0
RESPIRATORY NEGATIVE: 1
CHEST TIGHTNESS: 0
WHEEZING: 0
VOMITING: 0
ALLERGIC/IMMUNOLOGIC NEGATIVE: 1
GASTROINTESTINAL NEGATIVE: 1
NAUSEA: 0
EYES NEGATIVE: 1

## 2021-03-16 NOTE — PROGRESS NOTES
[facility]    Patient: Ngoc Fried, 62 y.o.   : 1960  3/16/2021      Subjective     Chief Complaint   Patient presents with    Follow-up     3 month pulm follow up with PFT on 3/9/2021        HPI  Madelaine Buckner is here for follow up for COPD and ROBSON treated with BiPAP. PFT done 3/9/2021- mixed obstruction and restriction noted. Using home oxygen 2LPM continuous right now - also O2 bleed in to BiPAP machine at 2LPM  Requesting 6MWT today   Getting radiation for Prostate CA 5/days a week - and on Casodex oral chemo  Using BIPAP for OHS with good compliance AHI today 3.4- still feeling the benefit from BiPAP therapy. No issues with pulmonary or sleep today. Progress History:   Since last visit any new medical issues? No  New ER or hospital visits? No  Any new or changes in medicines? No  Using inhalers? Yes   Are they helpful? Yes   Past Medical hx   PMH:  Past Medical History:   Diagnosis Date    Abnormal stress test 2012    Lateral Wall Ischemia[facility]    CAD (coronary artery disease)     Cancer ([address]     Prostate    Chronic low back pain     Diabetes mellitus ([address]     Diabetes mellitus ([address]     Hyperlipidemia     Hypertension     Hypertriglyceridemia     MI (myocardial infarction) ([address]     Obesity     Sleep apnea     has CPAP    Viral pneumonia 10/15/2020     SURGICAL HISTORY:  Past Surgical History:   Procedure Laterality Date    BACK SURGERY  1997    L4 & L5 fusion    CARDIAC CATHETERIZATION  10/2019   [facility] CARDIAC SURGERY  2004    Cardiac cath with stent placement x1    COLONOSCOPY  2010    CORONARY ANGIOPLASTY WITH STENT PLACEMENT  10/17/2019    HERNIA REPAIR      Mesh repair in abdomen.     ULTRASOUND PROSTATE/TRANSRECTAL N/A 2020    TRANSRECTAL ULTRASOUND WITH PROSTATE BIOPSY performed by Keven Moraes MD at [address] [place] [address] Road:  Social History     Tobacco Use    Smoking MG/3ML) 0.083% nebulizer solution Take 3 mLs by nebulization every 4 hours as needed for Wheezing 2 Package 1    insulin glargine (LANTUS SOLOSTAR) 100 UNIT/ML injection pen Inject 40 Units into the skin 2 times daily 96 mL 0    oxybutynin (DITROPAN XL) 10 MG extended release tablet Take 1 tablet by mouth daily 30 tablet 3    amLODIPine (NORVASC) 10 MG tablet Take 10 mg by mouth daily      enalapril (VASOTEC) 20 MG tablet take 1 tablet by mouth once daily 90 tablet 1    pantoprazole (PROTONIX) 40 MG tablet take 1 tablet by mouth once daily 90 tablet 1    metFORMIN (GLUCOPHAGE) 1000 MG tablet take 1 tablet by mouth twice a day with meals 180 tablet 1    fluticasone (FLONASE) 50 MCG/ACT nasal spray One spray in each nostril q hs 3 Bottle 1    cyanocobalamin 1000 MCG tablet Take 1 tablet by mouth daily 30 tablet 3    ferrous sulfate (IRON 325) 325 (65 Fe) MG tablet Take 1 tablet by mouth every 48 hours 30 tablet 3    tamsulosin (FLOMAX) 0.4 MG capsule Take 1 capsule by mouth 2 times daily 60 capsule 5    insulin lispro (HUMALOG) 100 UNIT/ML injection vial Inject 10 Units into the skin 2 times daily as needed for High Blood Sugar when blood sugar > 155      clopidogrel (PLAVIX) 75 MG tablet take 1 tablet by mouth once daily 90 tablet 1    metoprolol tartrate (LOPRESSOR) 25 MG tablet Take 1 tablet by mouth 2 times daily  0    Vitamin D, Cholecalciferol, 25 MCG (1000 UT) TABS Take 1,000 Units by mouth daily      pravastatin (PRAVACHOL) 40 MG tablet Take 1 tablet by mouth nightly  0    gemfibrozil (LOPID) 600 MG tablet Take 1 tablet by mouth 2 times daily 180 tablet 1    nitroGLYCERIN (NITROSTAT) 0.4 MG SL tablet Place 0.4 mg under the tongue every 5 minutes as needed for Chest pain      aspirin 325 MG tablet Take 325 mg by mouth daily. No current facility-administered medications for this visit. ROS   Review of Systems   Constitutional: Negative.   Negative for chills, fever and unexpected weight change. HENT: Negative. Eyes: Negative. Respiratory: Negative. Negative for chest tightness, wheezing and stridor. Cardiovascular: Negative for chest pain and leg swelling. Gastrointestinal: Negative. Negative for diarrhea, nausea and vomiting. Endocrine: Negative. Genitourinary: Negative. Negative for dysuria. Musculoskeletal: Negative. Skin: Negative. Allergic/Immunologic: Negative. Neurological: Negative. Hematological: Negative. Psychiatric/Behavioral: Negative. Physical exam   /60 (Site: Left Upper Arm, Position: Sitting, Cuff Size: Large Adult)   Pulse 70   Temp 97.9 °F (36.6 °C) (Temporal)   Ht 5' 9\" (1.753 m)   Wt 279 lb 12.8 oz (126.9 kg)   SpO2 99% Comment: On 2 liters  BMI 41.32 kg/m²    Wt Readings from Last 3 Encounters:   03/16/21 279 lb 12.8 oz (126.9 kg)   03/11/21 278 lb (126.1 kg)   03/01/21 283 lb (128.4 kg)       Physical Exam  Vitals signs and nursing note reviewed. Constitutional:       General: He is not in acute distress. Appearance: He is well-developed. He is obese. HENT:      Head: Normocephalic and atraumatic. Neck:      Trachea: No tracheal deviation. Cardiovascular:      Rate and Rhythm: Normal rate and regular rhythm. Heart sounds: Normal heart sounds. No murmur. Pulmonary:      Effort: Pulmonary effort is normal. No respiratory distress. Breath sounds: Normal breath sounds. No stridor. No wheezing or rales. Chest:      Chest wall: No tenderness. Abdominal:      General: Bowel sounds are normal. There is no distension. Palpations: Abdomen is soft. Skin:     General: Skin is warm and dry. Capillary Refill: Capillary refill takes less than 2 seconds. Neurological:      Mental Status: He is alert and oriented to person, place, and time. Psychiatric:         Behavior: Behavior normal.         Thought Content:  Thought content normal.         Judgment: Judgment normal. results   Lung Nodule Screening     [] Qualifies    [x] Does not qualify   [] Declined    [] Completed  The USPSTF recommends annual screening for lung cancer with low-dose computed tomography (LDCT) in adults aged 54 to 80 years who have a 30 pack-year smoking history and currently smoke or have quit within the past 15 years. Screening should be discontinued once a person has not smoked for 15 years or develops a health problem that substantially limits life expectancy or the ability or willingness to have curative lung surgery. Six Minute Walk Test  Chiquita Comment 1960    Six minute walk test done in my office today by my medical assistant. Pool's oxygen saturation at rest on room air was 98%. At the end of the test Pool Rivera's oxygen saturation remained at 94% on room air with exertion. . Patient ambulated a total of 648 feet with oxygen. Resting Dyspnea/Aydin score was 2 / 2  and 3 / 3  upon completion of the walk. Resting heart rate was  63 bpm and 98 bpm upon completing the walk. Nasal Oxygen order:  2 lpm to be used with:  Rest: No.  Walking: No.   Sleep: Yes. POC flow: No.  Continuous flow: No.    DME Medical Necessity Documentation    Radha Carrera was seen in the office on 3/16/2021 for the diagnosis chronic hypoxic respiratory failre/OHS. I am prescribing oxygen because the diagnosis and testing requires the patient to have oxygen in the home. his condition will improve or be benefited by oxygen use. Assessment      Diagnosis Orders   1. Mixed restrictive and obstructive lung disease ([facility]  DME Order for Home Oxygen as OP   2. Obesity hypoventilation syndrome ([facility]  DME Order for CPAP as OP    DME Order for Home Oxygen as OP   3. Chronic respiratory failure with hypoxia (HCC)  DME Order for Home Oxygen as OP         Plan   -Continue Albuterol neb as needed for SOB/wheezing  -No O2 needed with activity- 2LPM O2 bleed in with BiPAP still; DME note done and faxed. -Continue use of BiPAP as prescribed.    -PAP supplies RX done   -Advised to maintain pneumonia vaccine with PCP and to take flu vaccine this coming season.  -Advised patient to call office with any changes, questions, or concerns regarding respiratory status    Will see [address] back in: 6 months    Apple Keenan, [facility]  3/16/2021

## 2021-03-17 ENCOUNTER — HOSPITAL ENCOUNTER (OUTPATIENT)
Dept: RADIATION ONCOLOGY | Age: 61
Discharge: HOME | End: 2021-03-17
Attending: RADIOLOGY
Payer: MEDICAID

## 2021-03-17 PROCEDURE — 77385 HC NTSTY MODUL RAD TX DLVR SMPL: CPT | Performed by: RADIOLOGY

## 2021-03-17 PROCEDURE — 77014 PR CT GUIDANCE PLACEMENT RAD THERAPY FIELDS: CPT | Performed by: RADIOLOGY

## 2021-03-18 ENCOUNTER — HOSPITAL ENCOUNTER (OUTPATIENT)
Dept: RADIATION ONCOLOGY | Age: 61
Discharge: HOME | End: 2021-03-18
Attending: RADIOLOGY
Payer: MEDICAID

## 2021-03-18 PROCEDURE — 77385 HC NTSTY MODUL RAD TX DLVR SMPL: CPT | Performed by: RADIOLOGY

## 2021-03-18 PROCEDURE — 77014 PR CT GUIDANCE PLACEMENT RAD THERAPY FIELDS: CPT | Performed by: RADIOLOGY

## 2021-03-19 ENCOUNTER — HOSPITAL ENCOUNTER (OUTPATIENT)
Dept: RADIATION ONCOLOGY | Age: 61
Discharge: HOME | End: 2021-03-19
Attending: RADIOLOGY
Payer: MEDICAID

## 2021-03-19 PROCEDURE — 77385 HC NTSTY MODUL RAD TX DLVR SMPL: CPT | Performed by: RADIOLOGY

## 2021-03-19 PROCEDURE — 77014 PR CT GUIDANCE PLACEMENT RAD THERAPY FIELDS: CPT | Performed by: RADIOLOGY

## 2021-03-22 ENCOUNTER — HOSPITAL ENCOUNTER (OUTPATIENT)
Dept: RADIATION ONCOLOGY | Age: 61
Discharge: HOME | End: 2021-03-22
Attending: RADIOLOGY
Payer: MEDICAID

## 2021-03-22 PROCEDURE — 77336 RADIATION PHYSICS CONSULT: CPT | Performed by: RADIOLOGY

## 2021-03-22 PROCEDURE — 77385 HC NTSTY MODUL RAD TX DLVR SMPL: CPT | Performed by: RADIOLOGY

## 2021-03-22 PROCEDURE — 77014 PR CT GUIDANCE PLACEMENT RAD THERAPY FIELDS: CPT | Performed by: RADIOLOGY

## 2021-03-22 PROCEDURE — 77427 RADIATION TX MANAGEMENT X5: CPT | Performed by: RADIOLOGY

## 2021-03-23 ENCOUNTER — NURSE ONLY (OUTPATIENT)
Dept: UROLOGY | Age: 61
End: 2021-03-23
Payer: MEDICAID

## 2021-03-23 ENCOUNTER — HOSPITAL ENCOUNTER (OUTPATIENT)
Dept: RADIATION ONCOLOGY | Age: 61
Discharge: HOME | End: 2021-03-23
Attending: RADIOLOGY
Payer: MEDICAID

## 2021-03-23 DIAGNOSIS — R33.9 URINARY RETENTION: ICD-10-CM

## 2021-03-23 PROCEDURE — 77385 HC NTSTY MODUL RAD TX DLVR SMPL: CPT | Performed by: RADIOLOGY

## 2021-03-23 PROCEDURE — 77014 PR CT GUIDANCE PLACEMENT RAD THERAPY FIELDS: CPT | Performed by: RADIOLOGY

## 2021-03-23 PROCEDURE — 51702 INSERT TEMP BLADDER CATH: CPT | Performed by: NURSE PRACTITIONER

## 2021-03-23 NOTE — PROGRESS NOTES
Patient has given me verbal consent to perform catheter change YES    Does patient have latex allergy? NO  Does patient have shellfish or betadine allergy? NO      Following [facility] plan of care. 16 Fr Catheter changed without difficulty. Once balloon was deflated, removed catheter without difficulty. Cleansed head of penis with betadine swab. 16 Fr regular corea was inserted without difficulty. Catheter was flushed with 35cc water ensuring return and inflated balloon with 10 ml of water. Corea Catheter was hooked up to leg bag with straps. Patient instructed on catheter care including draining catheter bag and keeping catheter bag above the knee to prevent pulling on catheter causing blood. Patient brought own supplies for change.     Needs follow up with  for OV

## 2021-03-24 ENCOUNTER — HOSPITAL ENCOUNTER (OUTPATIENT)
Dept: RADIATION ONCOLOGY | Age: 61
Discharge: HOME | End: 2021-03-24
Attending: RADIOLOGY
Payer: MEDICAID

## 2021-03-24 PROCEDURE — 77014 PR CT GUIDANCE PLACEMENT RAD THERAPY FIELDS: CPT | Performed by: RADIOLOGY

## 2021-03-24 PROCEDURE — 77385 HC NTSTY MODUL RAD TX DLVR SMPL: CPT | Performed by: RADIOLOGY

## 2021-03-25 ENCOUNTER — HOSPITAL ENCOUNTER (OUTPATIENT)
Dept: RADIATION ONCOLOGY | Age: 61
Discharge: HOME | End: 2021-03-25
Attending: RADIOLOGY
Payer: MEDICAID

## 2021-03-25 PROCEDURE — 77014 PR CT GUIDANCE PLACEMENT RAD THERAPY FIELDS: CPT | Performed by: RADIOLOGY

## 2021-03-25 PROCEDURE — 77385 HC NTSTY MODUL RAD TX DLVR SMPL: CPT | Performed by: RADIOLOGY

## 2021-03-26 ENCOUNTER — HOSPITAL ENCOUNTER (OUTPATIENT)
Dept: RADIATION ONCOLOGY | Age: 61
Discharge: HOME | End: 2021-03-26
Attending: RADIOLOGY
Payer: MEDICAID

## 2021-03-26 PROCEDURE — 77385 HC NTSTY MODUL RAD TX DLVR SMPL: CPT | Performed by: RADIOLOGY

## 2021-03-26 PROCEDURE — 77014 PR CT GUIDANCE PLACEMENT RAD THERAPY FIELDS: CPT | Performed by: RADIOLOGY

## 2021-03-29 ENCOUNTER — TELEPHONE (OUTPATIENT)
Dept: UROLOGY | Age: 61
End: 2021-03-29

## 2021-03-29 ENCOUNTER — HOSPITAL ENCOUNTER (OUTPATIENT)
Dept: RADIATION ONCOLOGY | Age: 61
Discharge: HOME | End: 2021-03-29
Attending: RADIOLOGY
Payer: MEDICAID

## 2021-03-29 PROCEDURE — 77427 RADIATION TX MANAGEMENT X5: CPT | Performed by: RADIOLOGY

## 2021-03-29 PROCEDURE — 77336 RADIATION PHYSICS CONSULT: CPT | Performed by: RADIOLOGY

## 2021-03-29 PROCEDURE — 77385 HC NTSTY MODUL RAD TX DLVR SMPL: CPT | Performed by: RADIOLOGY

## 2021-03-29 PROCEDURE — 77014 PR CT GUIDANCE PLACEMENT RAD THERAPY FIELDS: CPT | Performed by: RADIOLOGY

## 2021-03-29 NOTE — TELEPHONE ENCOUNTER
Yes can come to office for urine collection if hed like. Can take azo for burning, and use antibiotic ointment for around catheter site.

## 2021-03-29 NOTE — TELEPHONE ENCOUNTER
Patient had the catheter exchange a week ago. He c/o burning and leaking around the insertion site that started a couple of days ago. The catheter is patent. He denies fever or chills. He does not know how to collect an urine. Do you want a sample collected at the office? He is taking taking the oxybutynin. Please advise. Thank you.

## 2021-03-30 ENCOUNTER — HOSPITAL ENCOUNTER (OUTPATIENT)
Dept: RADIATION ONCOLOGY | Age: 61
Discharge: HOME | End: 2021-03-30
Attending: RADIOLOGY
Payer: MEDICAID

## 2021-03-30 PROCEDURE — 77014 PR CT GUIDANCE PLACEMENT RAD THERAPY FIELDS: CPT | Performed by: RADIOLOGY

## 2021-03-30 PROCEDURE — 77385 HC NTSTY MODUL RAD TX DLVR SMPL: CPT | Performed by: RADIOLOGY

## 2021-03-30 NOTE — TELEPHONE ENCOUNTER
Patient advised may use azo and apply antibiotic ointment around catheter site. Lab visit scheduled for urine collection.

## 2021-03-31 ENCOUNTER — HOSPITAL ENCOUNTER (OUTPATIENT)
Dept: RADIATION ONCOLOGY | Age: 61
Discharge: HOME | End: 2021-03-31
Attending: RADIOLOGY
Payer: MEDICAID

## 2021-03-31 ENCOUNTER — NURSE ONLY (OUTPATIENT)
Dept: UROLOGY | Age: 61
End: 2021-03-31

## 2021-03-31 VITALS — TEMPERATURE: 97.9 F

## 2021-03-31 DIAGNOSIS — C61 MALIGNANT NEOPLASM OF PROSTATE (HCC): Primary | ICD-10-CM

## 2021-03-31 DIAGNOSIS — R30.0 DYSURIA: Primary | ICD-10-CM

## 2021-03-31 PROCEDURE — 99999 PR OFFICE/OUTPT VISIT,PROCEDURE ONLY: CPT | Performed by: NURSE PRACTITIONER

## 2021-03-31 PROCEDURE — 77014 PR CT GUIDANCE PLACEMENT RAD THERAPY FIELDS: CPT | Performed by: RADIOLOGY

## 2021-03-31 PROCEDURE — 77385 HC NTSTY MODUL RAD TX DLVR SMPL: CPT | Performed by: RADIOLOGY

## 2021-03-31 NOTE — PROGRESS NOTES
Patient is in for a urine collection from his catheter. Patient's catheter unhooked from catheter bag, plugged and urine collected. Urine sent for culture. Will call patient with results. Patient to call the office if needed before his follow up on 4/26/21 with Dr. Ashlee Yeh. Patient asked if he still has to take 2 flomax since he has a catheter. Per Jason Curran APRN-CNP patient can take one now with the catheter. I asked patient is he has the antibiotic ointment and AZO, patient does and he is using it.

## 2021-04-01 ENCOUNTER — HOSPITAL ENCOUNTER (OUTPATIENT)
Age: 61
Discharge: HOME | End: 2021-04-01
Payer: MEDICAID

## 2021-04-01 ENCOUNTER — HOSPITAL ENCOUNTER (OUTPATIENT)
Dept: RADIATION ONCOLOGY | Age: 61
Discharge: HOME | End: 2021-04-01
Attending: RADIOLOGY
Payer: MEDICAID

## 2021-04-01 DIAGNOSIS — C61 MALIGNANT NEOPLASM OF PROSTATE (HCC): ICD-10-CM

## 2021-04-01 LAB
BASOPHILS # BLD: 0.7 %
BASOPHILS ABSOLUTE: 0.1 THOU/MM3 (ref 0–0.1)
EOSINOPHIL # BLD: 8.6 %
EOSINOPHILS ABSOLUTE: 0.6 THOU/MM3 (ref 0–0.4)
ERYTHROCYTE [DISTWIDTH] IN BLOOD BY AUTOMATED COUNT: 16.1 % (ref 11.5–14.5)
ERYTHROCYTE [DISTWIDTH] IN BLOOD BY AUTOMATED COUNT: 48.3 FL (ref 35–45)
HCT VFR BLD CALC: 35.8 % (ref 42–52)
HEMOGLOBIN: 11 GM/DL (ref 14–18)
IMMATURE GRANS (ABS): 0.03 THOU/MM3 (ref 0–0.07)
IMMATURE GRANULOCYTES: 0.4 %
LYMPHOCYTES # BLD: 7.6 %
LYMPHOCYTES ABSOLUTE: 0.5 THOU/MM3 (ref 1–4.8)
MCH RBC QN AUTO: 25.6 PG (ref 26–33)
MCHC RBC AUTO-ENTMCNC: 30.7 GM/DL (ref 32.2–35.5)
MCV RBC AUTO: 83.4 FL (ref 80–94)
MONOCYTES # BLD: 7.2 %
MONOCYTES ABSOLUTE: 0.5 THOU/MM3 (ref 0.4–1.3)
NUCLEATED RED BLOOD CELLS: 0 /100 WBC
PLATELET # BLD: 204 THOU/MM3 (ref 130–400)
PMV BLD AUTO: 9.6 FL (ref 9.4–12.4)
RBC # BLD: 4.29 MILL/MM3 (ref 4.7–6.1)
SEG NEUTROPHILS: 75.5 %
SEGMENTED NEUTROPHILS ABSOLUTE COUNT: 5.4 THOU/MM3 (ref 1.8–7.7)
WBC # BLD: 7.2 THOU/MM3 (ref 4.8–10.8)

## 2021-04-01 PROCEDURE — 77014 PR CT GUIDANCE PLACEMENT RAD THERAPY FIELDS: CPT | Performed by: RADIOLOGY

## 2021-04-01 PROCEDURE — 36415 COLL VENOUS BLD VENIPUNCTURE: CPT

## 2021-04-01 PROCEDURE — 85025 COMPLETE CBC W/AUTO DIFF WBC: CPT

## 2021-04-01 PROCEDURE — 77385 HC NTSTY MODUL RAD TX DLVR SMPL: CPT | Performed by: RADIOLOGY

## 2021-04-01 RX ORDER — SULFAMETHOXAZOLE AND TRIMETHOPRIM 800; 160 MG/1; MG/1
1 TABLET ORAL 2 TIMES DAILY
Qty: 10 TABLET | Refills: 0 | Status: SHIPPED | OUTPATIENT
Start: 2021-04-01 | End: 2021-04-05 | Stop reason: ALTCHOICE

## 2021-04-01 NOTE — TELEPHONE ENCOUNTER
Matthew from [facility] [place] stated the patient voiced c/o being upset regarding the catheter leaking. The urine was sent yesterday to the lab. I advised her the results are not back yet. Please advise.  Thank you

## 2021-04-01 NOTE — TELEPHONE ENCOUNTER
Patient denies constipation. He is taking the oxybutynin. He voiced understanding Bactrim was sent to the pharmacy for treatment and lab visit scheduled tomorrow to exchange the catheter.

## 2021-04-02 ENCOUNTER — HOSPITAL ENCOUNTER (OUTPATIENT)
Dept: RADIATION ONCOLOGY | Age: 61
Discharge: HOME | End: 2021-04-02
Attending: RADIOLOGY
Payer: MEDICAID

## 2021-04-02 ENCOUNTER — NURSE ONLY (OUTPATIENT)
Dept: UROLOGY | Age: 61
End: 2021-04-02
Payer: MEDICAID

## 2021-04-02 VITALS — TEMPERATURE: 97.1 F

## 2021-04-02 DIAGNOSIS — R33.9 URINARY RETENTION: ICD-10-CM

## 2021-04-02 PROCEDURE — 77014 PR CT GUIDANCE PLACEMENT RAD THERAPY FIELDS: CPT | Performed by: RADIOLOGY

## 2021-04-02 PROCEDURE — 51702 INSERT TEMP BLADDER CATH: CPT | Performed by: UROLOGY

## 2021-04-02 PROCEDURE — 77385 HC NTSTY MODUL RAD TX DLVR SMPL: CPT | Performed by: RADIOLOGY

## 2021-04-02 RX ORDER — OXYBUTYNIN CHLORIDE 10 MG/1
10 TABLET, EXTENDED RELEASE ORAL DAILY
Qty: 30 TABLET | Refills: 3 | Status: SHIPPED | OUTPATIENT
Start: 2021-04-02 | End: 2021-04-19 | Stop reason: SDUPTHER

## 2021-04-02 NOTE — PROGRESS NOTES
Patient in to get a bigger corea placed because of leaking at the penis. Per Adriel Tafoya APRN-CNP patient to have a bigger on placed. Patient is taking oxybutynin and denies constipation. Patient needs a refill on oxybutynin sent to [facility] in [place]. Patient has given me verbal consent to perform catheter change Yes    Does patient have latex allergy? No  Does patient have shellfish or betadine allergy? No      Following Dr. Roger Penny of [facility] [place]. 18 Fr Catheter changed without difficulty. Once balloon was deflated, removed catheter without difficulty. Cleansed head of penis with betadine swab. 18 Fr regular corea was inserted without difficulty. Catheter was flushed with 70cc water ensuring return and inflated balloon with 10 ml of water. Corea Catheter was hooked up to leg bag with straps. Patient instructed on catheter care including draining catheter bag and keeping catheter bag above the knee to prevent pulling on catheter causing blood.

## 2021-04-03 LAB
ORGANISM: ABNORMAL
URINE CULTURE, ROUTINE: ABNORMAL

## 2021-04-05 ENCOUNTER — TELEPHONE (OUTPATIENT)
Dept: UROLOGY | Age: 61
End: 2021-04-05

## 2021-04-05 ENCOUNTER — HOSPITAL ENCOUNTER (OUTPATIENT)
Dept: RADIATION ONCOLOGY | Age: 61
Discharge: HOME | End: 2021-04-05
Attending: RADIOLOGY
Payer: MEDICAID

## 2021-04-05 PROCEDURE — 77427 RADIATION TX MANAGEMENT X5: CPT | Performed by: RADIOLOGY

## 2021-04-05 PROCEDURE — 77385 HC NTSTY MODUL RAD TX DLVR SMPL: CPT | Performed by: RADIOLOGY

## 2021-04-05 PROCEDURE — 77336 RADIATION PHYSICS CONSULT: CPT | Performed by: RADIOLOGY

## 2021-04-05 PROCEDURE — 77014 PR CT GUIDANCE PLACEMENT RAD THERAPY FIELDS: CPT | Performed by: RADIOLOGY

## 2021-04-05 RX ORDER — CIPROFLOXACIN 500 MG/1
500 TABLET, FILM COATED ORAL 2 TIMES DAILY
Qty: 14 TABLET | Refills: 0 | Status: SHIPPED | OUTPATIENT
Start: 2021-04-05 | End: 2021-04-12

## 2021-04-05 NOTE — TELEPHONE ENCOUNTER
----- Message from PIEDAD Holt CNP sent at 4/5/2021 11:36 AM EDT -----  Stop bactrim.  Start cipro  Urine showed pseudomonas

## 2021-04-06 ENCOUNTER — HOSPITAL ENCOUNTER (OUTPATIENT)
Dept: RADIATION ONCOLOGY | Age: 61
Discharge: HOME | End: 2021-04-06
Attending: RADIOLOGY
Payer: MEDICAID

## 2021-04-06 PROCEDURE — 77014 PR CT GUIDANCE PLACEMENT RAD THERAPY FIELDS: CPT | Performed by: RADIOLOGY

## 2021-04-06 PROCEDURE — 77385 HC NTSTY MODUL RAD TX DLVR SMPL: CPT | Performed by: RADIOLOGY

## 2021-04-07 ENCOUNTER — HOSPITAL ENCOUNTER (OUTPATIENT)
Dept: RADIATION ONCOLOGY | Age: 61
Discharge: HOME | End: 2021-04-07
Attending: RADIOLOGY
Payer: MEDICAID

## 2021-04-07 DIAGNOSIS — J40 BRONCHITIS: ICD-10-CM

## 2021-04-07 DIAGNOSIS — F41.9 ANXIETY: ICD-10-CM

## 2021-04-07 DIAGNOSIS — J30.1 SEASONAL ALLERGIC RHINITIS DUE TO POLLEN: ICD-10-CM

## 2021-04-07 PROCEDURE — 77014 PR CT GUIDANCE PLACEMENT RAD THERAPY FIELDS: CPT | Performed by: RADIOLOGY

## 2021-04-07 PROCEDURE — 77385 HC NTSTY MODUL RAD TX DLVR SMPL: CPT | Performed by: RADIOLOGY

## 2021-04-07 NOTE — TELEPHONE ENCOUNTER
[facility] on the following medications:  Requested Prescriptions     Pending Prescriptions Disp Refills    cromolyn (OPTICROM) 4 % ophthalmic solution 10 mL 0     Sig: instill 1 drop into both eyes four times a day    ALPRAZolam (XANAX) 1 MG tablet 60 tablet 0     Sig: Take 1 tablet by mouth 2 times daily for 30 days.  albuterol (PROVENTIL) (2.5 MG/3ML) 0.083% nebulizer solution 2 Package 1     Sig: Take 3 mLs by nebulization every 4 hours as needed for Wheezing     Pharmacy verified: [address] [place] [address]   .       Date of last visit:   Date of next visit (if applicable): 3/7/0515

## 2021-04-08 ENCOUNTER — TELEPHONE (OUTPATIENT)
Dept: UROLOGY | Age: 61
End: 2021-04-08

## 2021-04-08 ENCOUNTER — HOSPITAL ENCOUNTER (OUTPATIENT)
Dept: RADIATION ONCOLOGY | Age: 61
Discharge: HOME | End: 2021-04-08
Attending: RADIOLOGY
Payer: MEDICAID

## 2021-04-08 PROCEDURE — 77014 PR CT GUIDANCE PLACEMENT RAD THERAPY FIELDS: CPT | Performed by: RADIOLOGY

## 2021-04-08 PROCEDURE — 77385 HC NTSTY MODUL RAD TX DLVR SMPL: CPT | Performed by: RADIOLOGY

## 2021-04-08 RX ORDER — CROMOLYN SODIUM 40 MG/ML
SOLUTION/ DROPS OPHTHALMIC
Qty: 10 ML | Refills: 0 | Status: SHIPPED | OUTPATIENT
Start: 2021-04-08 | End: 2021-06-14

## 2021-04-08 RX ORDER — ALBUTEROL SULFATE 2.5 MG/3ML
2.5 SOLUTION RESPIRATORY (INHALATION) EVERY 4 HOURS PRN
Qty: 2 PACKAGE | Refills: 1 | Status: SHIPPED | OUTPATIENT
Start: 2021-04-08 | End: 2021-08-06 | Stop reason: SDUPTHER

## 2021-04-08 RX ORDER — ALPRAZOLAM 1 MG/1
1 TABLET ORAL 2 TIMES DAILY
Qty: 60 TABLET | Refills: 0 | Status: SHIPPED | OUTPATIENT
Start: 2021-04-08 | End: 2021-05-06 | Stop reason: SDUPTHER

## 2021-04-08 NOTE — TELEPHONE ENCOUNTER
Patient still has problems with leaking around the catheter when he clamps the catheter for radiation. He keeps it clamped approximately 50 minutes. He is done with radiation next Thursday. It occasionally leaks when the catheter is not clamped. He is taking the oxybutynin xl 10 mg daily. He finishes the cipro Saturday. He continues to have burning after he has the corea clamped. He denies fever or chills. Please advise. Thank you.

## 2021-04-09 ENCOUNTER — HOSPITAL ENCOUNTER (OUTPATIENT)
Dept: RADIATION ONCOLOGY | Age: 61
Discharge: HOME | End: 2021-04-09
Attending: RADIOLOGY
Payer: MEDICAID

## 2021-04-09 PROCEDURE — 77385 HC NTSTY MODUL RAD TX DLVR SMPL: CPT | Performed by: RADIOLOGY

## 2021-04-09 PROCEDURE — 77014 PR CT GUIDANCE PLACEMENT RAD THERAPY FIELDS: CPT | Performed by: RADIOLOGY

## 2021-04-12 ENCOUNTER — HOSPITAL ENCOUNTER (OUTPATIENT)
Dept: RADIATION ONCOLOGY | Age: 61
Discharge: HOME | End: 2021-04-12
Attending: RADIOLOGY
Payer: MEDICAID

## 2021-04-12 PROCEDURE — 77014 PR CT GUIDANCE PLACEMENT RAD THERAPY FIELDS: CPT | Performed by: RADIOLOGY

## 2021-04-12 PROCEDURE — 77385 HC NTSTY MODUL RAD TX DLVR SMPL: CPT | Performed by: RADIOLOGY

## 2021-04-12 PROCEDURE — 77427 RADIATION TX MANAGEMENT X5: CPT | Performed by: RADIOLOGY

## 2021-04-12 PROCEDURE — 77336 RADIATION PHYSICS CONSULT: CPT | Performed by: RADIOLOGY

## 2021-04-13 ENCOUNTER — HOSPITAL ENCOUNTER (OUTPATIENT)
Dept: RADIATION ONCOLOGY | Age: 61
Discharge: HOME | End: 2021-04-13
Attending: RADIOLOGY
Payer: MEDICAID

## 2021-04-13 PROCEDURE — 77385 HC NTSTY MODUL RAD TX DLVR SMPL: CPT | Performed by: RADIOLOGY

## 2021-04-13 PROCEDURE — 77014 PR CT GUIDANCE PLACEMENT RAD THERAPY FIELDS: CPT | Performed by: RADIOLOGY

## 2021-04-14 ENCOUNTER — HOSPITAL ENCOUNTER (OUTPATIENT)
Dept: RADIATION ONCOLOGY | Age: 61
Discharge: HOME | End: 2021-04-14
Attending: RADIOLOGY
Payer: MEDICAID

## 2021-04-14 PROCEDURE — 77385 HC NTSTY MODUL RAD TX DLVR SMPL: CPT | Performed by: RADIOLOGY

## 2021-04-14 PROCEDURE — 77014 PR CT GUIDANCE PLACEMENT RAD THERAPY FIELDS: CPT | Performed by: RADIOLOGY

## 2021-04-15 ENCOUNTER — HOSPITAL ENCOUNTER (OUTPATIENT)
Dept: RADIATION ONCOLOGY | Age: 61
Discharge: HOME | End: 2021-04-15
Attending: RADIOLOGY
Payer: MEDICAID

## 2021-04-15 PROCEDURE — 77336 RADIATION PHYSICS CONSULT: CPT | Performed by: RADIOLOGY

## 2021-04-15 PROCEDURE — 77014 PR CT GUIDANCE PLACEMENT RAD THERAPY FIELDS: CPT | Performed by: RADIOLOGY

## 2021-04-15 PROCEDURE — 77427 RADIATION TX MANAGEMENT X5: CPT | Performed by: RADIOLOGY

## 2021-04-15 PROCEDURE — 77385 HC NTSTY MODUL RAD TX DLVR SMPL: CPT | Performed by: RADIOLOGY

## 2021-04-19 ENCOUNTER — HOSPITAL ENCOUNTER (OUTPATIENT)
Dept: PHYSICAL THERAPY | Age: 61
Setting detail: THERAPIES SERIES
Discharge: HOME | End: 2021-04-19
Payer: MEDICAID

## 2021-04-19 ENCOUNTER — TELEPHONE (OUTPATIENT)
Dept: UROLOGY | Age: 61
End: 2021-04-19

## 2021-04-19 PROCEDURE — 97162 PT EVAL MOD COMPLEX 30 MIN: CPT

## 2021-04-19 RX ORDER — OXYBUTYNIN CHLORIDE 10 MG/1
10 TABLET, EXTENDED RELEASE ORAL DAILY
Qty: 30 TABLET | Refills: 3 | Status: SHIPPED | OUTPATIENT
Start: 2021-04-19 | End: 2021-04-21 | Stop reason: SDUPTHER

## 2021-04-19 NOTE — PROGRESS NOTES
** PLEASE SIGN, DATE AND TIME CERTIFICATION BELOW AND RETURN TO [facility] OUTPATIENT REHABILITATION (FAX #: [phone]). ATTEST/CO-SIGN IF ACCESSING VIA [facility]. THANK YOU.**    I certify that I have examined the patient below and determined that Physical Medicine and Rehabilitation service is necessary and that I approve the established plan of care for up to 90 days or as specifically noted. Attestation, signature or co-signature of physician indicates approval of certification requirements.    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, history of urinary frequency, nocturia and difficulty with complete emptying of bladder, CAD, chronic LBP, DM, HTN, MI, obesity, sleep apnea, O2 dependent with COPD, corea cath      Functional Outcome Measure Used O: BFI   Functional Outcome Score 1.4 (21)       Insurance: Primary: Payor: Ivan 58 /  /  / ,   Secondary:    Authorization Information: EVAL only   Visit # 1, 1/10 for progress note   Visits Allowed: 1 thru    Recertification Date:    Physician Follow-Up: 21 Urology, 21 [facility]   Physician Orders: Fatigue, lymph ed   History of Present Illness: 2020 Prostate CA with radiation     SUBJECTIVE: Pt reports has had chronic LBP with L LE radicular symptoms which frequent episodes of physical therapy without significant benefit.  Notes swelling in bilateral lower legs and has followed with PCP with compression socks but admits does not always wear and continues with edema. Social/Functional History and Current Status:  Medications and Allergies have been reviewed and are listed on Medical History Questionnaire. Tiny Monk lives alone in a single story home with stairs and a handrail to enter.     Task Previous Current   ADLs  Independent Independent   IADL's Modified Independent Modified Independent - not responsible for yard work   Ambulation Modified Independent Modified Independent   Transfers [address] [facility] Spending time with family   Community Integration Modified Independent Modified Independent   Driving Active  Active    Work On Disability  On Disability due to back and heart     OBJECTIVE:   Posture: Fair  Palpation: Pitting edema at B lower legs, skin mildly dry but intact  Observation: Morbid obesity    Range of Motion/Strength (Range of Motion in degrees)    Right Left Comments   Shoulder Flexion PROM [place]    Shoulder ABDuction PROM [facility]    Shoulder Strength 4/5 4/5    Elbow Strength 4+/5 4+/5    Hip Flexion AROM [facility]    Hip Strength 4/5 4/5    Knee Extension/Flexion AROM [facility]    Knee Strength 4+/5 4+/5    Ankle AROM [facility]    Ankle Strength 4+/5 4+/5      Circumferential Measurements:   Lower Extremity Circumferential Measurements    Right (cm) Left (cm) Comments   Met Heads 27 27    Malleoli 24.1 24.7    Widest part of calf 41 40    Infrapatellar 38.5 39    Suprapatellar 44.5 44.5    Total 175.1 175.2 Trunk at iliac crest: NT     Brief Fatigue Inventory: 1.4 (0: none, 1-3: mild, 4-6: moderate, 7-10: severe)    Treatment Initiated: Not permitted per insurance      TREATMENT   Precautions: No lifting >5# due to back issue   Pain: 7/10 low back and L LE    X in shaded column indicates activity completed today   Modalities Parameters/  Location  Notes         Manual Therapy Time/Technique  Notes         Exercise/Intervention   Notes            Specific Interventions Next Treatment: PORi protocol for gentle manual lymphatic drainage and myofascial release, gentle stretches, strengthening, conditioning, lymphedema education    Activity Tolerance: Patient tolerated evaluation well. ASSESSMENT:  Assessment: Pt presents with history of chronic low back pain with L LE radicular symptoms and B LE swelling with worsening symptoms since finishing radiation therapy. Radiation has also increased his risk for lymphedema and negatively contributed to his fatigue. He would benefit from short course physical therapy for education in self management and HEP with progression as appropriate for improved symptoms and control of risk factors. Body Structures/Functions/Activity Limitations:Decreased core stabilization, Decreased overall strength, Decreased tolerance of activities, Pain and Lymphedema  Prognosis: fair    GOALS:  Patient Goal: Avoid worsening lymphedema and improve stamina    Short Term Goals to be met in 12 weeks:  1. See LTGs    Long Term Goals to be met in 12 weeks:   1. Pt to demo BFI score improved from 1.4 to <1 for improved participation in community activities. 2. Pt to demo independence with lymphedema risk reduction strategies for safety with chronic progressive disease after discharge. 3. Pt to be independent with home exercise program to improve stamina with participation in household activities. Patient Education: Plan of care, goals. See \"Treatment Initiated\" for further details. Education Outcome: Verbalized understanding  Education Barriers: None    PLAN:  Treatment Recommendations: Strengthening, Range of Motion, Conditioning, Lymphedema Management, Manual Techniques, Home Exercise Prescription and Safety Education    Plan of care initiated.   Plan to see patient 1 time per week, every 3 weeks for 12 weeks to address the treatment planned outlined above; requesting 4 more sessions.     Time In 0945   Time Out 1030   Timed Code Minutes: 0 min   Total Treatment Time: 45 min       Electronically Signed by: Inez Johnson PT, DPT, [facility] [id number] 4/19/2021

## 2021-04-19 NOTE — TELEPHONE ENCOUNTER
Hailey Garica called requesting a refill on the following medications:  Requested Prescriptions     Pending Prescriptions Disp Refills    oxybutynin (DITROPAN XL) 10 MG extended release tablet 30 tablet 3     Sig: Take 1 tablet by mouth daily     Pharmacy verified: [facility] in [place]  . [email or web address]      Date of last visit: 1/15/2021  Date of next visit (if applicable): 9/37/6197

## 2021-04-21 RX ORDER — OXYBUTYNIN CHLORIDE 10 MG/1
10 TABLET, EXTENDED RELEASE ORAL 2 TIMES DAILY
Qty: 60 TABLET | Refills: 5 | Status: SHIPPED | OUTPATIENT
Start: 2021-04-21 | End: 2021-05-25 | Stop reason: SINTOL

## 2021-04-21 NOTE — TELEPHONE ENCOUNTER
Patient is calling because the prescription was sent to the pharmacy written to take once daily. Per phone encounter on 04/08/2021, patient was instructed to take BID. Patient is needing an updated script sent to the pharmacy for this.   [facility] in [place]

## 2021-04-22 ENCOUNTER — TELEPHONE (OUTPATIENT)
Dept: UROLOGY | Age: 61
End: 2021-04-22

## 2021-04-26 ENCOUNTER — TELEPHONE (OUTPATIENT)
Dept: UROLOGY | Age: 61
End: 2021-04-26

## 2021-04-26 ENCOUNTER — OFFICE VISIT (OUTPATIENT)
Dept: UROLOGY | Age: 61
End: 2021-04-26
Payer: MEDICAID

## 2021-04-26 VITALS
SYSTOLIC BLOOD PRESSURE: 122 MMHG | DIASTOLIC BLOOD PRESSURE: 60 MMHG | HEIGHT: 69 IN | BODY MASS INDEX: 40.61 KG/M2 | WEIGHT: 274.2 LBS

## 2021-04-26 DIAGNOSIS — N40.1 BENIGN PROSTATIC HYPERPLASIA WITH URINARY RETENTION: ICD-10-CM

## 2021-04-26 DIAGNOSIS — R30.0 DYSURIA: ICD-10-CM

## 2021-04-26 DIAGNOSIS — R33.8 BENIGN PROSTATIC HYPERPLASIA WITH URINARY RETENTION: ICD-10-CM

## 2021-04-26 DIAGNOSIS — C61 PROSTATE CANCER (HCC): Primary | ICD-10-CM

## 2021-04-26 PROCEDURE — 1036F TOBACCO NON-USER: CPT | Performed by: UROLOGY

## 2021-04-26 PROCEDURE — 99214 OFFICE O/P EST MOD 30 MIN: CPT | Performed by: UROLOGY

## 2021-04-26 PROCEDURE — 3017F COLORECTAL CA SCREEN DOC REV: CPT | Performed by: UROLOGY

## 2021-04-26 PROCEDURE — G8427 DOCREV CUR MEDS BY ELIG CLIN: HCPCS | Performed by: UROLOGY

## 2021-04-26 PROCEDURE — G8417 CALC BMI ABV UP PARAM F/U: HCPCS | Performed by: UROLOGY

## 2021-04-26 RX ORDER — TAMSULOSIN HYDROCHLORIDE 0.4 MG/1
0.8 CAPSULE ORAL DAILY
Qty: 60 CAPSULE | Refills: 3 | Status: SHIPPED | OUTPATIENT
Start: 2021-04-26 | End: 2021-11-29 | Stop reason: SDUPTHER

## 2021-04-26 NOTE — TELEPHONE ENCOUNTER
Turner Stauffer from [facility] is approved from 4/26/21 till 4/26/22  Approval # [id number]

## 2021-04-26 NOTE — PROGRESS NOTES
Dr. Lorraine Hargrove MD  [facility] / Follow up Visit    Patient:  Geno Hawkins  YOB: 1960  Date: 4/26/2021    HISTORY OF PRESENT ILLNESS:   The patient is a 62 y.o. male who presents today for follow-up for the following problem(s): elevated PSA, BPH with luts,  Urinary retention, Bladder stone  Overall the problem(s) : are worsening. Associated Symptoms: No dysuria, gross hematuria. Pain Severity:       Today visit:   4/26/21   Joan Hernandez follows with us for HR PCa. We reviewed old records. Pathology reviewed: High volume [place] 4+5 = 9, with PNI, (Grade Group 5). Diagnotic PSA: 16.28   He has finished radiation thearpy. He is still on Bicaluatmide, discussed lupron injection. 1/15/21  Bone scan  1. Abnormal radiotracer accumulation in the left hemipelvis and right femur, possibly secondary to a patulous disease. Osseous metastatic disease cannot be excluded. 2. Probable degenerative arthropathic changes as detailed above. CT  1. 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 2. The previously a few low-density foci in the right kidney, likely cysts. Confirmation with ultrasound recommended. 3. Findings of constipation. No evidence to suggest metastatic disease         12/18/20  Joan Hernandez follow up after prostate biopsy and cystolithalopaxy for large bladder stone. Doing well, no fevers, hematuria resolved. Pathology reviewed: High volume [place] 4+5 = 9, with PNI, (Grade Group 5). PSA: 16.28    10/26/20  Joan Hernandez presents with history of BPH with LUTs, and elevated PSA. An MRI was denies by his insurance, so we review options of elevated PSA. He also has worsening LUTs, with retention and hematuria. He is on Home O2 for recent bronchitis, which is improving. He has history of CAD on ASA and Plavix.     Cystoscopy Operative Note  Surgeon: Lorraine Hargrove   Anesthesia: Urethral 2% ultrafine 2 100 each 5    B-D ULTRAFINE III SHORT PEN 31G X 8 MM MISC USE TWICE DAILY WITH THE BASAGLAR PEN.  insulin glargine (LANTUS SOLOSTAR) 100 UNIT/ML injection pen Inject 40 Units into the skin 2 times daily 96 mL 0    amLODIPine (NORVASC) 10 MG tablet Take 10 mg by mouth daily      enalapril (VASOTEC) 20 MG tablet take 1 tablet by mouth once daily 90 tablet 1    pantoprazole (PROTONIX) 40 MG tablet take 1 tablet by mouth once daily 90 tablet 1    metFORMIN (GLUCOPHAGE) 1000 MG tablet take 1 tablet by mouth twice a day with meals 180 tablet 1    fluticasone (FLONASE) 50 MCG/ACT nasal spray One spray in each nostril q hs 3 Bottle 1    cyanocobalamin 1000 MCG tablet Take 1 tablet by mouth daily 30 tablet 3    ferrous sulfate (IRON 325) 325 (65 Fe) MG tablet Take 1 tablet by mouth every 48 hours 30 tablet 3    tamsulosin (FLOMAX) 0.4 MG capsule Take 1 capsule by mouth 2 times daily 60 capsule 5    insulin lispro (HUMALOG) 100 UNIT/ML injection vial Inject 10 Units into the skin 2 times daily as needed for High Blood Sugar when blood sugar > 155      clopidogrel (PLAVIX) 75 MG tablet take 1 tablet by mouth once daily 90 tablet 1    metoprolol tartrate (LOPRESSOR) 25 MG tablet Take 1 tablet by mouth 2 times daily  0    Vitamin D, Cholecalciferol, 25 MCG (1000 UT) TABS Take 1,000 Units by mouth daily      pravastatin (PRAVACHOL) 40 MG tablet Take 1 tablet by mouth nightly  0    gemfibrozil (LOPID) 600 MG tablet Take 1 tablet by mouth 2 times daily 180 tablet 1    nitroGLYCERIN (NITROSTAT) 0.4 MG SL tablet Place 0.4 mg under the tongue every 5 minutes as needed for Chest pain      aspirin 325 MG tablet Take 325 mg by mouth daily.            Ace inhibitors, Baclofen, Darvocet [propoxyphene n-acetaminophen], Darvon [propoxyphene hcl], Flexeril [cyclobenzaprine], Morphine, Motrin [ibuprofen micronized], Tylenol [acetaminophen], and Ultram [tramadol]  Social History     Tobacco Use   Smoking Status Former Smoker    Packs/day: 0.25    Years: 2.00    Pack years: 0.50    Types: Cigarettes    Quit date: 1995    Years since quittin.3   Smokeless Tobacco Never Used       Social History     Substance and Sexual Activity   Alcohol Use No       REVIEW OF SYSTEMS:  Constitutional: negative  Eyes: negative  Respiratory: negative  Cardiovascular: negative  Gastrointestinal: negative  Musculoskeletal: negative  Genitourinary: negative  Skin: negative   Neurological: negative  Hematological/Lymphatic: negative  Psychological: negative    Physical Exam:      Vitals:    21 0934   BP: 122/60     Constitutional: Patient in no acute distress   Neuro: alert and oriented to person place and time. Psych: Mood and affect normal.  Head: atraumatic normocephalic  Eyes: EOMi  HEENT: neck supple, trachea midline  Lungs: Respiratory effort normal  Cardiovascular:  Normal peripheral pulses  Abdomen: Soft, non-tender, non-distended, No CVA  Bladder: non-tender and not distended. FROMx4, no cyanosis clubbing edema  Skin: warm and dry        Assessment and Plan      1. Prostate cancer ([address]    2. Benign prostatic hyperplasia with urinary retention    3. Dysuria           Plan:      No follow-ups on file.   Path: HR prostate cancer, high volume G9 (4+5)  - SP EBRT on ADT (casodex)    S/p Cystolithalopaxy - irritative symptoms improved  Catheter replaced due to retention    Cysto and void trial for BPH with retention  Flomax 0.8 mg  Lupron injection for ADT therapy

## 2021-04-26 NOTE — TELEPHONE ENCOUNTER
Can one of you girls upfront schedule Lupron please. I believe from note Dr Leonel Hodge wanted some other things done too.   Thanks

## 2021-04-26 NOTE — TELEPHONE ENCOUNTER
[facility] called for clarification on the flomax. Spoke to Dr Gallo Julian, the dose was increased to 0.8 mg daily. [facility] voiced understanding.

## 2021-04-26 NOTE — TELEPHONE ENCOUNTER
Lupron injection for ADT therapy-send to [facility] for PA per Dr Deepti Lenz.   Please schedule appointment when PA complete

## 2021-04-29 ENCOUNTER — HOSPITAL ENCOUNTER (OUTPATIENT)
Dept: PHYSICAL THERAPY | Age: 61
Setting detail: THERAPIES SERIES
Discharge: HOME | End: 2021-04-29
Payer: MEDICAID

## 2021-04-29 PROCEDURE — 97535 SELF CARE MNGMENT TRAINING: CPT

## 2021-04-29 PROCEDURE — 97110 THERAPEUTIC EXERCISES: CPT

## 2021-04-29 NOTE — PROGRESS NOTES
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, history of urinary frequency, nocturia and difficulty with complete emptying of bladder, CAD, chronic LBP, DM, HTN, MI, obesity, sleep apnea, O2 dependent with COPD, corea cath       Functional Outcome Measure Used O: BFI   Functional Outcome Score 1.4 (21)        Insurance: Primary: Payor: Ivan 58 /  /  / ,   Secondary:    Authorization Information: 18 visits thru 21 for there ex, ADL, and manual therapy     Visit # 2, 2/10 for progress note   Visits Allowed: 18 thr 11   Recertification Date: 36   Physician Follow-Up: 21 Urology, 21 [facility] [place][note 2]   Physician Orders: Fatigue, lymph ed   History of Present Illness: 2020 Prostate CA with radiation        SUBJECTIVE: States unable to get the catheter out as waiting for a shot to be approved by insurance.       TREATMENT   Precautions: No lifting >5# due to back issue, urinary catheter   Pain: 6/10 neck, LB, and L knee    X in shaded column indicates activity completed today   Modalities Parameters/  Location  Notes         Manual Therapy Time/Technique  Notes         Exercise/Intervention   Notes   Hooklying 2 minutes  x Increased LE and back pain   Hooklying with L LE extended 1 minute  x Increased knee pain   R sidelying 1 minute  x Increased back pain   Marches x5  x    Minisquats x5  x    Heel/toe raises x5  x    3-way hip x5  x    Long arc quads x10  x Bilaterally with cues for posture                            Manual Treatments/Provider Interaction: Lymphatic system, lymphedema risk factors and risk reduction strategies reviewed with National Lymphedema Network handout provided. Discussed importance of good skin hygiene, preventing infections in leg as well as to utilize muscle-pump action of LE to stimulate lymphatic drainage during times of both inactivity and activity. Encouraged pt to avoid using saunas/hot tubs longer than 15 minutes and monitor fit of shoes and socks. Educated to importance of strengthening leg to further improve the efficiency of the muscle pump action of the LE. Discussed benefits of compression socks/garments and that purpose is to also stimulate lymphatic drainage along with maintaining size of leg. Educated that if the patient wishes to obtain a garment, the patient would need to go through a physician to get a prescription for insurance to cover garment or the patient can purchase outright. Provided with proper compression class and how to size appropriately. Also provided with handout on lymphedema compression garments and educated on when to wear garment, when to remove, general care of garment and how and where to purchase garment. Specific Interventions for Next Treatment:  PORi protocol for gentle manual lymphatic drainage and myofascial release, gentle stretches, strengthening, conditioning, lymphedema education    Activity Tolerance: Patient limited by pain    ASSESSMENT:  Assessment: Pt limited by pain with activities with pain at knee, low back and radicular symptoms. Cues for posture. GOALS:  Patient Goal: Avoid worsening lymphedema and improve stamina     Short Term Goals to be met in 12 weeks:  1. See LTGs     Long Term Goals to be met in 12 weeks:   1. Pt to demo BFI score improved from 1.4 to <1 for improved participation in community activities.   2. Pt to demo independence with lymphedema risk reduction strategies for safety with chronic progressive disease after discharge. 3. Pt to be independent with home exercise program to improve stamina with participation in household activities.       Patient Education: Access Code: [email or web address]  URL: [email or web address]  Date: 04/29/2021  Prepared by: Jeana Saleh    Exercises  Standing March with Counter Support - 2 x daily - 7 x weekly - 1 sets - 10 reps  Mini Squat with Counter Support - 2 x daily - 7 x weekly - 1 sets - 10 reps  Heel Toe Raises with Counter Support - 2 x daily - 7 x weekly - 1 sets - 10 reps  Standing Hip Flexion with Counter Support - 2 x daily - 7 x weekly - 1 sets - 10 reps  Standing Hip Abduction with Counter Support - 2 x daily - 7 x weekly - 1 sets - 10 reps  Standing Hip Extension with Counter Support - 2 x daily - 7 x weekly - 1 sets - 10 reps  Seated Long Arc Quad - 2 x daily - 7 x weekly - 1 sets - 10 reps    Education Outcome: Verbalized understanding  Education Barriers: None    PLAN: Continue established plan of care      Time In 0805   Time Out 0850   Timed Code Minutes: 45 min   Total Treatment Time: 45 min       Electronically Signed by: Renate Zavala PT, DPT, [facility] [id number] 4/29/2021

## 2021-05-04 ENCOUNTER — APPOINTMENT (OUTPATIENT)
Dept: PHYSICAL THERAPY | Age: 61
End: 2021-05-04
Payer: MEDICAID

## 2021-05-06 DIAGNOSIS — E11.9 WELL CONTROLLED TYPE 2 DIABETES MELLITUS (HCC): Chronic | ICD-10-CM

## 2021-05-06 DIAGNOSIS — J30.1 CHRONIC SEASONAL ALLERGIC RHINITIS DUE TO POLLEN: ICD-10-CM

## 2021-05-06 DIAGNOSIS — F41.9 ANXIETY: ICD-10-CM

## 2021-05-06 RX ORDER — INSULIN GLARGINE 100 [IU]/ML
40 INJECTION, SOLUTION SUBCUTANEOUS 2 TIMES DAILY
Qty: 96 ML | Refills: 0 | Status: SHIPPED | OUTPATIENT
Start: 2021-05-06 | End: 2021-08-31

## 2021-05-06 RX ORDER — FLUTICASONE PROPIONATE 50 MCG
SPRAY, SUSPENSION (ML) NASAL
Qty: 3 BOTTLE | Refills: 1 | Status: SHIPPED | OUTPATIENT
Start: 2021-05-06 | End: 2021-10-29 | Stop reason: SDUPTHER

## 2021-05-06 RX ORDER — ALPRAZOLAM 1 MG/1
1 TABLET ORAL 2 TIMES DAILY
Qty: 60 TABLET | Refills: 0 | Status: SHIPPED | OUTPATIENT
Start: 2021-05-06 | End: 2021-06-07 | Stop reason: SDUPTHER

## 2021-05-07 ENCOUNTER — HOSPITAL ENCOUNTER (OUTPATIENT)
Dept: PHYSICAL THERAPY | Age: 61
Setting detail: THERAPIES SERIES
Discharge: HOME | End: 2021-05-07
Payer: MEDICAID

## 2021-05-07 PROCEDURE — 97110 THERAPEUTIC EXERCISES: CPT

## 2021-05-11 ENCOUNTER — HOSPITAL ENCOUNTER (OUTPATIENT)
Dept: PHYSICAL THERAPY | Age: 61
Setting detail: THERAPIES SERIES
Discharge: HOME | End: 2021-05-11
Payer: MEDICAID

## 2021-05-11 PROCEDURE — 97535 SELF CARE MNGMENT TRAINING: CPT

## 2021-05-11 PROCEDURE — 97110 THERAPEUTIC EXERCISES: CPT

## 2021-05-11 NOTE — DISCHARGE SUMMARY
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, history of urinary frequency, nocturia and difficulty with complete emptying of bladder, CAD, chronic LBP, DM, HTN, MI, obesity, sleep apnea, O2 dependent with COPD, corea cath       Functional Outcome Measure Used O: BFI   Functional Outcome Score 1.4 (21)   0 (21)       Insurance: Primary: Payor: Ivan 58 /  /  / ,   Secondary:    Authorization Information: 18 visits thru 21 for there ex, ADL, and manual therapy     Visit # 4, 10 for progress note   Visits Allowed: 18 thr    Recertification Date:    Physician Follow-Up: 21 Urology, 21 [facility]   Physician Orders: Fatigue, lymph ed   History of Present Illness: 2020 Prostate CA with radiation        SUBJECTIVE: Reports no issues after last session and has less pain today. Complying with HEP daily without issue. Has been wearing compression every other day. Notes LBP has been present since  and 5 years with L knee pain - has been through PT previously and reports he know what he should be doing.       TREATMENT   Precautions: No lifting >5# due to back issue, urinary catheter   Pain: 5/10  LB, and L knee    X in shaded column indicates activity completed today   Modalities Parameters/  Location  Notes         Manual Therapy Time/Technique  Notes         Exercise/Intervention   Notes   Hooklying 2 minutes   Increased LE and back pain   Hooklying with L LE Fatigue Inventory 1997*     Lower Extremity Circumferential Measurements     Right (cm) Left (cm) Comments   Met Heads 26.4 26.9     Malleoli 23.4 24.2     Widest part of calf 40.1 40.3     Infrapatellar 39 39.2     Suprapatellar 45.2 44.4     Total 174.1 175 Trunk at iliac crest: NT      4/19/21:     175.1  175.2      Specific Interventions for Next Treatment: N/A  Activity Tolerance: Patient limited by pain    ASSESSMENT:  Assessment: Pt now has independent HEP that will facilitate improved muscle tone and lymphatic drainage in B LE due to his lymphedema risk following his radiation therapy. His fatigue has greatly improved and note decrease in girth in B LE. He understands his lymphedema risk and is safe for discharge at this time. GOALS:  Patient Goal: Avoid worsening lymphedema and improve stamina - goal met.     Short Term Goals to be met in 12 weeks:  1. See LTGs     Long Term Goals to be met in 12 weeks:   1. Pt to demo BFI score improved from 1.4 to <1 for improved participation in community activities. GOAL MET:  0.  Discontinue Goal    2. Pt to demo independence with lymphedema risk reduction strategies for safety with chronic progressive disease after discharge. GOAL MET:  Pt independent. Discontinue Goal    3. Pt to be independent with home exercise program to improve stamina with participation in household activities. GOAL MET:  Pt independent with HEP. .  Discontinue Goal         Patient Education: Progress towards goals, importance of HEP and updates in stretches as stated below, discharge at this time. Pt in agreement. Access Code: [email or web address]  URL: [email or web address]  Date: 05/11/2021  Prepared by: Eliel Anne    Exercises  Seated Hamstring Stretch - 1 x daily - 7 x weekly - 1 sets - 3 reps - 15 hold  Standing Gastroc Stretch - 1 x daily - 7 x weekly - 1 sets - 3 reps - 15 hold    Education Outcome: Verbalized understanding  Education Barriers: None    PLAN: Discharge      Time In 0945 Time Out 1030   Timed Code Minutes: 45 min   Total Treatment Time: 45 min       Electronically Signed by: Geetha Dow

## 2021-05-24 ENCOUNTER — PROCEDURE VISIT (OUTPATIENT)
Dept: UROLOGY | Age: 61
End: 2021-05-24
Payer: MEDICAID

## 2021-05-24 VITALS
BODY MASS INDEX: 40.35 KG/M2 | SYSTOLIC BLOOD PRESSURE: 124 MMHG | HEIGHT: 69 IN | WEIGHT: 272.4 LBS | DIASTOLIC BLOOD PRESSURE: 60 MMHG

## 2021-05-24 DIAGNOSIS — R33.8 BENIGN PROSTATIC HYPERPLASIA WITH URINARY RETENTION: ICD-10-CM

## 2021-05-24 DIAGNOSIS — N40.1 BENIGN PROSTATIC HYPERPLASIA WITH URINARY RETENTION: ICD-10-CM

## 2021-05-24 DIAGNOSIS — C61 PROSTATE CANCER (HCC): Primary | ICD-10-CM

## 2021-05-24 LAB — POST VOID RESIDUAL (PVR): 237 ML

## 2021-05-24 PROCEDURE — 51798 US URINE CAPACITY MEASURE: CPT | Performed by: UROLOGY

## 2021-05-24 PROCEDURE — 96402 CHEMO HORMON ANTINEOPL SQ/IM: CPT | Performed by: UROLOGY

## 2021-05-24 PROCEDURE — 52000 CYSTOURETHROSCOPY: CPT | Performed by: UROLOGY

## 2021-05-24 NOTE — PROGRESS NOTES
Patient has given me verbal consent to perform corea removal yes    10 cc of water deflated from corea balloon. 18 Fr corea removed without difficulty.

## 2021-05-24 NOTE — PROGRESS NOTES
Patient has given me verbal consent to perform Lupron Injection *yes    Following Dr. Nathaniel Sandhu of [facility]. LUPRON 45 MG GIVEN I.M left UOQ HIP  Lot Number: [id number]  Expiration Date: 10*  [facility] #: [id number]    After Injection was given there were no reactions at injection site and patient was feeling well. Patient was notified that possible side effects from injections include: Redness, swelling and itching at the injection site. Possible side effects of androgen deprivation therapy, including hot flashes, flushing of the skin, increased weight, decreased sex drive, and difficulties with ED. Patient was instructed to call the office with any further questions or concerns. Date of last Calcium/Vit D level: 3/2/21 of 9.9  Is patient on Calcium/Vit D replacement? yes  Date of last Bone Scan: 1/1/21  Testosterone Level none  Psa level of 16.28 done on 10/23/21    Patient supplied their own medications no      Pt [address] therapy first initiated on 5/24/21.

## 2021-05-25 ENCOUNTER — TELEPHONE (OUTPATIENT)
Dept: UROLOGY | Age: 61
End: 2021-05-25

## 2021-05-25 ENCOUNTER — NURSE ONLY (OUTPATIENT)
Dept: UROLOGY | Age: 61
End: 2021-05-25
Payer: MEDICAID

## 2021-05-25 DIAGNOSIS — R39.198 SLOW URINARY STREAM: Primary | ICD-10-CM

## 2021-05-25 DIAGNOSIS — R30.0 DYSURIA: ICD-10-CM

## 2021-05-25 LAB
BILIRUBIN URINE: NEGATIVE
BLOOD URINE, POC: ABNORMAL
CHARACTER, URINE: CLEAR
COLOR, URINE: YELLOW
GLUCOSE URINE: NEGATIVE MG/DL
KETONES, URINE: NEGATIVE
LEUKOCYTE CLUMPS, URINE: ABNORMAL
NITRITE, URINE: NEGATIVE
PH, URINE: 6 (ref 5–9)
POST VOID RESIDUAL (PVR): 545 ML
PROTEIN, URINE: 100 MG/DL
SPECIFIC GRAVITY, URINE: 1.02 (ref 1–1.03)
UROBILINOGEN, URINE: 0.2 EU/DL (ref 0–1)

## 2021-05-25 PROCEDURE — 81003 URINALYSIS AUTO W/O SCOPE: CPT | Performed by: NURSE PRACTITIONER

## 2021-05-25 PROCEDURE — 51798 US URINE CAPACITY MEASURE: CPT | Performed by: NURSE PRACTITIONER

## 2021-05-25 PROCEDURE — 51701 INSERT BLADDER CATHETER: CPT | Performed by: NURSE PRACTITIONER

## 2021-05-25 RX ORDER — CIPROFLOXACIN 500 MG/1
500 TABLET, FILM COATED ORAL 2 TIMES DAILY
Qty: 14 TABLET | Refills: 0 | Status: SHIPPED | OUTPATIENT
Start: 2021-05-25 | End: 2021-06-01

## 2021-05-25 NOTE — PROGRESS NOTES
Patient having trouble starting urinary stream. Patient UA shows Blood large and Leukocytes moderate and  mL. Per Collette HERBERT-CNP see if patient will straight catheterize 4 times daily. Patient was agreeable to learn to self catheterize. Patient was given a urine hat to measure his urine output and was instructed that he can stop doing this once he has 3-4 ounces of urine every time he catheterizes. Patient was told he can stop the Oxybutynin and start taking Cipro that was sent to his pharmacy, follow up as scheduled. The office will call with the urine culture results. Patient verbalized and understood. Following Collette HERBERT-CNP plan of care. Patient instructed on self cath with 16 Fr straight catheter with a residual of 400    Patient was supplied with a 16 Fr straight catheter containing a water soluble solution, and a urinary hat for voiding log. Patient was instructed to wash his hands and clean the head of the penis. While patient is sitting on the edge of the chair I explained to the patient I will place the catheter first so he knows what to expect. Once I had shown the patient I gave the catheter to him and ensured he was comfortable with placing the catheter. Patient was advised that he needs to void on his own prior to using the catheter, and then to keep track of urine amount from the catheter.

## 2021-05-25 NOTE — PROGRESS NOTES
Pt had pseudomonas in the past, will send for culture, and start cipro empirically  cic 4 times a day  I have personally verified, reviewed, and approved these actions.

## 2021-05-28 LAB
ORGANISM: ABNORMAL
ORGANISM: ABNORMAL
URINE CULTURE, ROUTINE: ABNORMAL
URINE CULTURE, ROUTINE: ABNORMAL

## 2021-06-01 ENCOUNTER — TELEPHONE (OUTPATIENT)
Dept: UROLOGY | Age: 61
End: 2021-06-01

## 2021-06-01 NOTE — TELEPHONE ENCOUNTER
----- Message from Levon Holter, APRN - CNP sent at 5/28/2021  2:01 PM EDT -----  Regarding: FW:   Continue cipro  ----- Message -----  From: Carmita Kruse Incoming Lab Results From Soft  Sent: 5/26/2021  10:29 AM EDT  To: Levon Holter, APRN - CNP

## 2021-06-04 ENCOUNTER — HOSPITAL ENCOUNTER (OUTPATIENT)
Dept: RADIATION ONCOLOGY | Age: 61
Discharge: HOME | End: 2021-06-04
Attending: RADIOLOGY
Payer: MEDICAID

## 2021-06-04 VITALS
WEIGHT: 267.4 LBS | BODY MASS INDEX: 39.49 KG/M2 | SYSTOLIC BLOOD PRESSURE: 115 MMHG | RESPIRATION RATE: 20 BRPM | TEMPERATURE: 97.6 F | DIASTOLIC BLOOD PRESSURE: 58 MMHG | OXYGEN SATURATION: 98 % | HEART RATE: 67 BPM

## 2021-06-04 PROCEDURE — 99215 OFFICE O/P EST HI 40 MIN: CPT | Performed by: NURSE PRACTITIONER

## 2021-06-04 PROCEDURE — 99212 OFFICE O/P EST SF 10 MIN: CPT | Performed by: NURSE PRACTITIONER

## 2021-06-04 ASSESSMENT — PAIN DESCRIPTION - LOCATION: LOCATION: BACK

## 2021-06-04 ASSESSMENT — PAIN DESCRIPTION - PAIN TYPE: TYPE: CHRONIC PAIN

## 2021-06-04 ASSESSMENT — PAIN DESCRIPTION - DESCRIPTORS: DESCRIPTORS: ACHING

## 2021-06-04 ASSESSMENT — PAIN SCALES - GENERAL: PAINLEVEL_OUTOF10: 6

## 2021-06-04 NOTE — PROGRESS NOTES
into the skin 2 times daily 96 mL 0    fluticasone (FLONASE) 50 MCG/ACT nasal spray One spray in each nostril q hs 3 Bottle 1    ALPRAZolam (XANAX) 1 MG tablet Take 1 tablet by mouth 2 times daily for 30 days. 60 tablet 0    leuprolide (LUPRON) 45 MG injection Inject 45 mg into the muscle once for 1 dose 45 mg 1    tamsulosin (FLOMAX) 0.4 MG capsule Take 2 capsules by mouth daily Take one capsule daily to facilitate passage of ureteral stone 60 capsule 3    cromolyn (OPTICROM) 4 % ophthalmic solution instill 1 drop into both eyes four times a day 10 mL 0    albuterol (PROVENTIL) (2.5 MG/3ML) 0.083% nebulizer solution Take 3 mLs by nebulization every 4 hours as needed for Wheezing 2 Package 1    bicalutamide (CASODEX) 50 MG chemo tablet Take 1 tablet by mouth daily 90 tablet 3    ascorbic acid (VITAMIN C) 500 MG tablet Take 1 tablet by mouth daily 30 tablet 3    Insulin Syringes, Disposable, U-100 1 ML MISC 1 each by Does not apply route 3 times daily 31 gauge x 8 mm  ultrafine 2 100 each 5    B-D ULTRAFINE III SHORT PEN 31G X 8 MM MISC USE TWICE DAILY WITH THE [place] PEN.       amLODIPine (NORVASC) 10 MG tablet Take 10 mg by mouth daily      enalapril (VASOTEC) 20 MG tablet take 1 tablet by mouth once daily 90 tablet 1    pantoprazole (PROTONIX) 40 MG tablet take 1 tablet by mouth once daily 90 tablet 1    metFORMIN (GLUCOPHAGE) 1000 MG tablet take 1 tablet by mouth twice a day with meals 180 tablet 1    cyanocobalamin 1000 MCG tablet Take 1 tablet by mouth daily 30 tablet 3    ferrous sulfate (IRON 325) 325 (65 Fe) MG tablet Take 1 tablet by mouth every 48 hours 30 tablet 3    tamsulosin (FLOMAX) 0.4 MG capsule Take 1 capsule by mouth 2 times daily 60 capsule 5    insulin lispro (HUMALOG) 100 UNIT/ML injection vial Inject 10 Units into the skin 2 times daily as needed for High Blood Sugar when blood sugar > 155      clopidogrel (PLAVIX) 75 MG tablet take 1 tablet by mouth once daily 90 tablet 1  metoprolol tartrate (LOPRESSOR) 25 MG tablet Take 1 tablet by mouth 2 times daily  0    Vitamin D, Cholecalciferol, 25 MCG (1000 UT) TABS Take 1,000 Units by mouth daily      pravastatin (PRAVACHOL) 40 MG tablet Take 1 tablet by mouth nightly  0    gemfibrozil (LOPID) 600 MG tablet Take 1 tablet by mouth 2 times daily 180 tablet 1    nitroGLYCERIN (NITROSTAT) 0.4 MG SL tablet Place 0.4 mg under the tongue every 5 minutes as needed for Chest pain      aspirin 325 MG tablet Take 325 mg by mouth daily. No current facility-administered medications for this encounter. ROS: As noted in the HPI and Interval history, otherwise negative. EXAMINATION:   GENERAL: Fernando kauffman is a pleasant well-developed adult male. He is alert and oriented x3 in no acute distress. VITAL SIGNS: Weight 121.3 kg, temperature 36.4 °C, pulse 67, blood pressure 115/58, respirations 20, pulse ox 98% on room air. HEENT: Normocephalic, atraumatic. PERRL. EOMI. Ears, nose and lips are within normal limits on external examination. Oropharynx unremarkable. NECK: Supple without palpable cervical adenopathy. LYMPH: Without palpable supraclavicular axillary adenopathy. LUNGS: Clear without adventitious sounds. Respirations easy and unlabored. HEART: Regular rate and rhythm. Without murmur. ABDOMEN: Soft, round, nontender. Active bowel sounds x4. EXTREMITIES: Without clubbing or cyanosis. NEUROLOGIC EXAMINATION: Cranial nerves grossly intact. MUSCULOSKELETAL: Without bony point tenderness. ASSESSMENT: Fernando kauffman was diagnosed with prostate cancer in December 2020. He received recommendation for ADT and radiation therapy for which he was agreeable. Fernando kauffman is currently on Lupron and Casodex. Since completing radiation treatment Fernando kauffman had his corea catheter removed. He did have issues with retention again and had to straight cath for relief (patient reports only needed to do this twice).  He was again complaining of urgency and dysuria and was found to have a UTI. He was started on antibiotics and since then has been able to urinate without use of straight cath. He was offered AZO for the dysuria but declined at this time. Will increase water intake. Kota Hopkins has not yet had a PSA since completing treatment. Will obtain one and call him with results. He does not have concerning findings on today's physical exam.     PLAN:  1. Check PSA  2. Urinary retention: improved, catheter removed. Voiding on his own now. 3. UTI: being treated with antibiotics currently  4. Dysuria: r/t UTI. Offered AZO patient declined. He will increase water intake  5. Continues on Casodex and Lupron  6. Continue follow up with other providers as scheduled. 7. Follow up here in September 8. Kota Hopkins is aware he can call for questions, concerns or changes in condition. Electronically signed by Sherren Brink, APRN - CNP on 6/4/21 at 9:50 AM EDT    ATTESTATION:  I have spent 45 minutes reviewing previous notes, test results and face to face with the patient discussing the diagnosis and importance of compliance with the treatment plan as well as documenting on the day of the visit.     CC: Yohan Robles; Sang Lee

## 2021-06-07 ENCOUNTER — OFFICE VISIT (OUTPATIENT)
Dept: FAMILY MEDICINE CLINIC | Age: 61
End: 2021-06-07
Payer: MEDICAID

## 2021-06-07 VITALS
DIASTOLIC BLOOD PRESSURE: 78 MMHG | RESPIRATION RATE: 18 BRPM | HEIGHT: 68 IN | OXYGEN SATURATION: 97 % | SYSTOLIC BLOOD PRESSURE: 132 MMHG | WEIGHT: 268.2 LBS | HEART RATE: 68 BPM | BODY MASS INDEX: 40.65 KG/M2 | TEMPERATURE: 97.1 F

## 2021-06-07 DIAGNOSIS — K21.9 GASTROESOPHAGEAL REFLUX DISEASE WITHOUT ESOPHAGITIS: ICD-10-CM

## 2021-06-07 DIAGNOSIS — F41.9 ANXIETY: ICD-10-CM

## 2021-06-07 DIAGNOSIS — E11.9 WELL CONTROLLED TYPE 2 DIABETES MELLITUS (HCC): Chronic | ICD-10-CM

## 2021-06-07 DIAGNOSIS — I10 ESSENTIAL HYPERTENSION: ICD-10-CM

## 2021-06-07 PROCEDURE — G8417 CALC BMI ABV UP PARAM F/U: HCPCS | Performed by: FAMILY MEDICINE

## 2021-06-07 PROCEDURE — 3044F HG A1C LEVEL LT 7.0%: CPT | Performed by: FAMILY MEDICINE

## 2021-06-07 PROCEDURE — 99213 OFFICE O/P EST LOW 20 MIN: CPT | Performed by: FAMILY MEDICINE

## 2021-06-07 PROCEDURE — 1036F TOBACCO NON-USER: CPT | Performed by: FAMILY MEDICINE

## 2021-06-07 PROCEDURE — G8427 DOCREV CUR MEDS BY ELIG CLIN: HCPCS | Performed by: FAMILY MEDICINE

## 2021-06-07 PROCEDURE — 3017F COLORECTAL CA SCREEN DOC REV: CPT | Performed by: FAMILY MEDICINE

## 2021-06-07 PROCEDURE — 2022F DILAT RTA XM EVC RTNOPTHY: CPT | Performed by: FAMILY MEDICINE

## 2021-06-07 RX ORDER — ALPRAZOLAM 1 MG/1
1 TABLET ORAL 2 TIMES DAILY
Qty: 60 TABLET | Refills: 0 | Status: SHIPPED | OUTPATIENT
Start: 2021-06-07 | End: 2021-07-07 | Stop reason: SDUPTHER

## 2021-06-07 RX ORDER — ENALAPRIL MALEATE 10 MG/1
10 TABLET ORAL DAILY
Qty: 90 TABLET | Refills: 1 | Status: SHIPPED | OUTPATIENT
Start: 2021-06-07 | End: 2021-12-14

## 2021-06-07 RX ORDER — PANTOPRAZOLE SODIUM 40 MG/1
40 TABLET, DELAYED RELEASE ORAL DAILY
Qty: 90 TABLET | Refills: 1 | Status: SHIPPED | OUTPATIENT
Start: 2021-06-07 | End: 2021-12-17

## 2021-06-07 ASSESSMENT — PATIENT HEALTH QUESTIONNAIRE - PHQ9
2. FEELING DOWN, DEPRESSED OR HOPELESS: 0
SUM OF ALL RESPONSES TO PHQ9 QUESTIONS 1 & 2: 0
1. LITTLE INTEREST OR PLEASURE IN DOING THINGS: 0
SUM OF ALL RESPONSES TO PHQ QUESTIONS 1-9: 0

## 2021-06-07 ASSESSMENT — ENCOUNTER SYMPTOMS
SHORTNESS OF BREATH: 0
WHEEZING: 0

## 2021-06-07 NOTE — PROGRESS NOTES
[facility]  [address]  Dept: [phone]  Dept Fax: [phone]  Loc: [phone]  PROGRESS NOTE      Visit Date: 6/7/2021    Saintclair Archer is a 62 y.o. male who presents today for:  Chief Complaint   Patient presents with    3 Month Follow-Up    Anxiety       Subjective:  HPI    3 month f/u    Anxiety:  On xanax 1 mg 2x per day. Not on any other anxiety meds. Denies illicit drug use. mood is good. Some anxiety with prostate cancer. Sleeping well.        Has had radiation for prostate cancer. Review of Systems   Respiratory: Negative for shortness of breath and wheezing. Psychiatric/Behavioral: Negative for behavioral problems and sleep disturbance. The patient is nervous/anxious. The patient is not hyperactive.       Patient Active Problem List   Diagnosis    Hypertension    Hyperlipidemia    CAD (coronary artery disease)    Chest pain, atypical    Chronic low back pain    Hypertriglyceridemia    Morbidly obese (HCC)    Abnormal stress test    Sprain and strain of ankle    Os trigonum    Elevated PSA    Lumbar spinal stenosis    Well controlled type 2 diabetes mellitus (HCC)    ROBSON (obstructive sleep apnea)    Back pain at L4-L5 level    Anxiety    Microalbuminuria    Positive FIT (fecal immunochemical test)    Viral pneumonia    Acute respiratory failure with hypoxia (HCC)    H/O heart artery stent    Urinary retention    Malignant neoplasm of prostate ([address]    COPD, severe ([address]     Past Medical History:   Diagnosis Date    Abnormal stress test Sept 2012    Lateral Wall Ischemia- done at [facility]    CAD (coronary artery disease)     Cancer ([address]     Prostate    Chronic low back pain     Diabetes mellitus ([address]     Diabetes mellitus ([address]     Hyperlipidemia     Hypertension     Hypertriglyceridemia     MI (myocardial infarction) ([address] 2004    Obesity     Sleep apnea has CPAP    Viral pneumonia 10/15/2020      Past Surgical History:   Procedure Laterality Date    BACK SURGERY  1997    L4 & L5 fusion    CARDIAC CATHETERIZATION  10/2019   [facility] CARDIAC SURGERY  2004    Cardiac cath with stent placement x1    COLONOSCOPY  2010    CORONARY ANGIOPLASTY WITH STENT PLACEMENT  10/17/2019    HERNIA REPAIR  1991    Mesh repair in abdomen.  ULTRASOUND PROSTATE/TRANSRECTAL N/A 2020    TRANSRECTAL ULTRASOUND WITH PROSTATE BIOPSY performed by Pao Solomon MD at [address] [place] [address] History   Problem Relation Age of Onset    Diabetes Mother     Heart Disease Mother     High Blood Pressure Mother     Arthritis Father     Diabetes Father     Heart Disease Father     High Blood Pressure Father     Diabetes Sister     Diabetes Brother     Heart Disease Brother     Cancer Neg Hx     Stroke Neg Hx      Social History     Tobacco Use    Smoking status: Former Smoker     Packs/day: 0.25     Years: 2.00     Pack years: 0.50     Types: Cigarettes     Quit date: 1995     Years since quittin.4    Smokeless tobacco: Never Used   Substance Use Topics    Alcohol use: No      Current Outpatient Medications   Medication Sig Dispense Refill    insulin glargine (LANTUS SOLOSTAR) 100 UNIT/ML injection pen Inject 40 Units into the skin 2 times daily 96 mL 0    fluticasone (FLONASE) 50 MCG/ACT nasal spray One spray in each nostril q hs 3 Bottle 1    ALPRAZolam (XANAX) 1 MG tablet Take 1 tablet by mouth 2 times daily for 30 days.  60 tablet 0    leuprolide (LUPRON) 45 MG injection Inject 45 mg into the muscle once for 1 dose 45 mg 1    tamsulosin (FLOMAX) 0.4 MG capsule Take 2 capsules by mouth daily Take one capsule daily to facilitate passage of ureteral stone 60 capsule 3    cromolyn (OPTICROM) 4 % ophthalmic solution instill 1 drop into both eyes four times a day 10 mL 0    albuterol (PROVENTIL) (2.5 MG/3ML) 0.083% nebulizer solution takes/tolerates enalapril.  Baclofen Other (See Comments)     Lightheadedness, dizziness    Darvocet [Propoxyphene N-Acetaminophen] Nausea Only     Felt sickly    Darvon [Propoxyphene Hcl]      Felt sickly    Flexeril [Cyclobenzaprine] Other (See Comments)     Headache    Morphine Nausea Only     Pt reported feeling sickly    Motrin [Ibuprofen Micronized] Nausea Only     Pt reported feeling very sick    Tylenol [Acetaminophen] Nausea Only    Ultram [Tramadol] Itching     Health Maintenance   Topic Date Due    Hepatitis C screen  Never done    HIV screen  Never done    Hepatitis B vaccine (1 of 3 - Risk 3-dose series) Never done    DTaP/Tdap/Td vaccine (1 - Tdap) Never done    Shingles Vaccine (1 of 2) Never done    Diabetic retinal exam  07/31/2019    Diabetic microalbuminuria test  08/25/2021    PSA counseling  10/23/2021    Colon cancer screen colonoscopy  02/26/2022    Diabetic foot exam  03/01/2022    A1C test (Diabetic or Prediabetic)  03/01/2022    Lipid screen  03/02/2022    Potassium monitoring  03/02/2022    Creatinine monitoring  03/02/2022    Pneumococcal 0-64 years Vaccine (2 of 2) 07/10/2026    Flu vaccine  Completed    COVID-19 Vaccine  Completed    Hepatitis A vaccine  Aged Out    Hib vaccine  Aged Out    Meningococcal (ACWY) vaccine  Aged Out       Objective:  /78   Pulse 68   Temp 97.1 °F (36.2 °C)   Resp 18   Ht 5' 8\" (1.727 m)   Wt 268 lb 3.2 oz (121.7 kg)   SpO2 97%   BMI 40.78 kg/m²   Physical Exam  Vitals reviewed. Constitutional:       Appearance: He is well-developed. He is obese. He is not ill-appearing. Interventions: Nasal cannula in place. Cardiovascular:      Rate and Rhythm: Normal rate and regular rhythm. Heart sounds: No murmur heard. Pulmonary:      Effort: Pulmonary effort is normal. No respiratory distress. Breath sounds: Normal breath sounds. No wheezing or rhonchi. Neurological:      Mental Status: He is alert. Mental status is at baseline. Psychiatric:         Mood and Affect: Mood normal.         Behavior: Behavior normal.         Impression/Plan:  1. Anxiety  Chronic. Partially controlled. Refill med  - ALPRAZolam (XANAX) 1 MG tablet; Take 1 tablet by mouth 2 times daily for 30 days. Dispense: 60 tablet; Refill: 0      They voiced understanding. All questions answered. They agreed with treatment plan. See patient instructions for any educational materials that may have been given. Discussed use, benefit, and side effects of prescribed medications. Reviewed health maintenance. (Please note that portions of this note may have been completed with a voice recognition program.  Efforts were made to edit the dictation but occasionally words are mis-transcribed.)    Return in about 3 months (around 9/7/2021) for DM,  HTN.       Electronically signed by Francisco Matta MD on 6/7/2021 at 1:17 PM

## 2021-06-08 ENCOUNTER — HOSPITAL ENCOUNTER (OUTPATIENT)
Age: 61
Discharge: HOME | End: 2021-06-08
Payer: MEDICAID

## 2021-06-08 LAB — PROSTATE SPECIFIC ANTIGEN: 0.81 NG/ML (ref 0–1)

## 2021-06-08 PROCEDURE — 36415 COLL VENOUS BLD VENIPUNCTURE: CPT

## 2021-06-08 PROCEDURE — 84153 ASSAY OF PSA TOTAL: CPT

## 2021-06-08 NOTE — PROGRESS NOTES
MD MD [facility] Consultation / Follow up Visit    Patient:  Gracia Antoine  YOB: 1960  Date: 6/8/2021    HISTORY OF PRESENT ILLNESS:   The patient is a 62 y.o. male who presents today for follow-up for the following problem(s): elevated PSA, BPH with luts,  Urinary retention, Bladder stone  Overall the problem(s) : are worsening. Associated Symptoms: No dysuria, gross hematuria. Pain Severity:       Today visit:   5/24/21  Cystoscopy Operative Note  Surgeon: Ariel Perez MD   Anesthesia: Urethral 2%  Indications: BPH with retention  Position: supine  Findings:   The patient was prepped and draped in the usual sterile fashion. The flexible cystoscope was advanced through the urethra and into the bladder. The bladder was thoroughly inspected and the following was noted:    Residual Urine: Moderate  Urethra: No abnormalities of the urethra are noted. Prostate: Complete obstruction by lateral lobe of prostate. Bladder: No tumors or CIS noted. No bladder diverticulum. Moderate trabeculation noted. Ureters: Clear efflux from both ureters. Orifices with normal configuration and location. The cystoscope was removed. The patient tolerated the procedure well. Void trial      4/26/21   Pool follows with us for HR PCa. We reviewed old records. Pathology reviewed: High volume Merline 4+5 = 9, with PNI, (Grade Group 5). Diagnotic PSA: 16.28   He has finished radiation thearpy. He is still on Bicaluatmide, discussed lupron injection. 1/15/21  Bone scan  1. Abnormal radiotracer accumulation in the left hemipelvis and right femur, possibly secondary to a patulous disease. Osseous metastatic disease cannot be excluded. 2. Probable degenerative arthropathic changes as detailed above. CT  1. 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    2. The previously a few low-density foci in the right kidney, likely cysts. Confirmation with ultrasound recommended. 3. Findings of constipation. No evidence to suggest metastatic disease         12/18/20  Shellie Smith follow up after prostate biopsy and cystolithalopaxy for large bladder stone. Doing well, no fevers, hematuria resolved. Pathology reviewed: High volume Merline 4+5 = 9, with PNI, (Grade Group 5). PSA: 16.28    10/26/20  Shellie Smith presents with history of BPH with LUTs, and elevated PSA. An MRI was denies by his insurance, so we review options of elevated PSA. He also has worsening LUTs, with retention and hematuria. He is on Home O2 for recent bronchitis, which is improving. He has history of CAD on ASA and Plavix. Cystoscopy Operative Note  Surgeon: Kathryn Bauer   Anesthesia: Urethral 2%  Indications: BPH with LUTs  Position: supine  Findings:   The patient was prepped and draped in the usual sterile fashion. The flexible cystoscope was advanced through the urethra and into the bladder. The bladder was thoroughly inspected and the following was noted:  Residual Urine: Moderate  Urethra: No abnormalities of the urethra are noted. Prostate: Large gland Complete obstruction by latera lobe of prostate. Bladder: No tumors or CIS noted. No bladder diverticulum. Large bladder stone. Ureters: Clear efflux from both ureters. Orifices with normal configuration and location. The cystoscope was removed. The patient tolerated the procedure well. Plan  Standard TURP  Cystolithalopaxy     9/18/20  61 yo male that presents with LUTs and difficulty emptying his bladder. He has incomplete emptying, frequency, and Nocturia, that are worsening over the last couple years. On flomax but he is stating his symptoms are worsening. AUASS: 11/4. Denies hematuria. PVR: 48 ml. Smoking history: quit 20 years ago.  FH: none. ANTONIO: plavix, for CAD s/p stents (10/2019).   Has elevated PSA, 16.          Summary of old records: (Patient's old records, notes and chart reviewed and summarized above.)     Last several PSA's:  Lab Results   Component Value Date    PSA 16.28 (H) 10/23/2020    PSA 16.31 (H) 08/25/2020    PSA 7.61 (H) 06/08/2016       Last total testosterone:  No results found for: TESTOSTERONE    Urinalysis today:  Results for POC orders placed in visit on 05/24/21   poct post void residual   Result Value Ref Range    post void residual 237 ml       Last BUN and creatinine:  Lab Results   Component Value Date    BUN 15 03/02/2021     Lab Results   Component Value Date    CREATININE 0.9 03/02/2021       Imaging Reviewed during this Office Visit:   (results were independently reviewed by physician and radiology report verified)    PAST MEDICAL, FAMILY AND SOCIAL HISTORY UPDATE:  Past Medical History:   Diagnosis Date    Abnormal stress test Sept 2012    Lateral Wall Ischemia- [facility] at [facility]    CAD (coronary artery disease)     Cancer ([address]     Prostate    Chronic low back pain     Diabetes mellitus ([address]     Diabetes mellitus ([address]     Hyperlipidemia     Hypertension     Hypertriglyceridemia     MI (myocardial infarction) ([address] 2004    Obesity     Sleep apnea     has CPAP    Viral pneumonia 10/15/2020     Past Surgical History:   Procedure Laterality Date    BACK SURGERY  November 1997    L4 & L5 fusion    CARDIAC CATHETERIZATION  10/2019   Jackie Gasca CARDIAC SURGERY  October 22, 2004    Cardiac cath with stent placement x1    COLONOSCOPY  October 2010    CORONARY ANGIOPLASTY WITH STENT PLACEMENT  10/17/2019    HERNIA REPAIR  1991    Mesh repair in abdomen.     ULTRASOUND PROSTATE/TRANSRECTAL N/A 12/7/2020    TRANSRECTAL ULTRASOUND WITH PROSTATE BIOPSY performed by Keiry Dominguez MD [facility] History   Problem Relation Age of Onset    Diabetes Mother     Heart Disease Mother     High Blood Pressure Mother     Arthritis Father     Diabetes Father     Heart Disease Father     High Blood Pressure Father     Diabetes Sister     Diabetes Brother     Heart Disease Brother     Cancer Neg Hx     Stroke Neg Hx      Outpatient Medications Marked as Taking for the 5/24/21 encounter (Procedure visit) with Keiry Dominguez MD   Medication Sig Dispense Refill    insulin glargine (LANTUS SOLOSTAR) 100 UNIT/ML injection pen Inject 40 Units into the skin 2 times daily 96 mL 0    fluticasone (FLONASE) 50 MCG/ACT nasal spray One spray in each nostril q hs 3 Bottle 1    [DISCONTINUED] ALPRAZolam (XANAX) 1 MG tablet Take 1 tablet by mouth 2 times daily for 30 days. 60 tablet 0    tamsulosin (FLOMAX) 0.4 MG capsule Take 2 capsules by mouth daily Take one capsule daily to facilitate passage of ureteral stone 60 capsule 3    [DISCONTINUED] oxybutynin (DITROPAN XL) 10 MG extended release tablet Take 1 tablet by mouth 2 times daily 60 tablet 5    cromolyn (OPTICROM) 4 % ophthalmic solution instill 1 drop into both eyes four times a day 10 mL 0    albuterol (PROVENTIL) (2.5 MG/3ML) 0.083% nebulizer solution Take 3 mLs by nebulization every 4 hours as needed for Wheezing 2 Package 1    bicalutamide (CASODEX) 50 MG chemo tablet Take 1 tablet by mouth daily 90 tablet 3    ascorbic acid (VITAMIN C) 500 MG tablet Take 1 tablet by mouth daily 30 tablet 3    Insulin Syringes, Disposable, U-100 1 ML MISC 1 each by Does not apply route 3 times daily 31 gauge x 8 mm  ultrafine 2 100 each 5    B-D ULTRAFINE III SHORT PEN 31G X 8 MM MISC USE TWICE DAILY WITH THE [employer] PEN.       amLODIPine (NORVASC) 10 MG tablet Take 10 mg by mouth daily      [DISCONTINUED] enalapril (VASOTEC) 20 MG tablet take 1 tablet by mouth once daily 90 tablet 1    [DISCONTINUED] pantoprazole (PROTONIX) 40 MG tablet take 1 tablet by mouth once daily 90 tablet 1    [DISCONTINUED] metFORMIN (GLUCOPHAGE) 1000 MG tablet take 1 tablet by mouth twice a day with meals 180 tablet 1    cyanocobalamin 1000 MCG tablet Take 1 tablet by mouth daily 30 time.    Psych: Mood and affect normal.  Head: atraumatic normocephalic  Eyes: EOMi  HEENT: neck supple, trachea midline  Lungs: Respiratory effort normal  Cardiovascular:  Normal peripheral pulses  Abdomen: Soft, non-tender, non-distended, No CVA  Bladder: non-tender and not distended. FROMx4, no cyanosis clubbing edema  Skin: warm and dry        Assessment and Plan      1. Prostate cancer ([facility]    2. Benign prostatic hyperplasia with urinary retention           Plan:      No follow-ups on file.   Path: HR prostate cancer, high volume G9 (4+5)  - SP EBRT on ADT (casodex)    S/p Cystolithalopaxy - irritative symptoms improved  Catheter replaced due to retention    Cysto and void trial for BPH with retention - catheter removed  Flomax 0.8 mg  Lupron injection for ADT therapy

## 2021-06-09 DIAGNOSIS — C61 MALIGNANT NEOPLASM OF PROSTATE (HCC): Primary | ICD-10-CM

## 2021-06-13 DIAGNOSIS — J30.1 SEASONAL ALLERGIC RHINITIS DUE TO POLLEN: ICD-10-CM

## 2021-06-14 RX ORDER — CROMOLYN SODIUM 40 MG/ML
SOLUTION/ DROPS OPHTHALMIC
Qty: 10 ML | Refills: 0 | Status: SHIPPED | OUTPATIENT
Start: 2021-06-14 | End: 2022-01-04 | Stop reason: SDUPTHER

## 2021-06-14 NOTE — TELEPHONE ENCOUNTER
Funmi Tse called requesting a refill on the following medications:  Requested Prescriptions     Pending Prescriptions Disp Refills    cromolyn (OPTICROM) 4 % ophthalmic solution [Pharmacy Med Name: CROMOLYN 4% EYE DROPS] 10 mL 0     Sig: instill 1 drop into both eyes four times a day       Date of last visit: 6/7/2021  Date of next visit (if applicable):9/8/2021  Date of last refill: 04/08/21  Pharmacy Name: [facility]      Thanks,  Braydon See LPN

## 2021-06-19 DIAGNOSIS — E53.8 LOW SERUM VITAMIN B12: ICD-10-CM

## 2021-06-21 RX ORDER — ASCORBIC ACID 500 MG
TABLET ORAL
Qty: 30 TABLET | Refills: 3 | Status: SHIPPED | OUTPATIENT
Start: 2021-06-21 | End: 2021-10-18 | Stop reason: SDUPTHER

## 2021-06-21 RX ORDER — PSYLLIUM HUSK 3.4 G/7G
POWDER ORAL
Qty: 30 TABLET | Refills: 2 | Status: SHIPPED | OUTPATIENT
Start: 2021-06-21 | End: 2021-09-14 | Stop reason: SDUPTHER

## 2021-06-28 ENCOUNTER — OFFICE VISIT (OUTPATIENT)
Dept: UROLOGY | Age: 61
End: 2021-06-28
Payer: MEDICAID

## 2021-06-28 VITALS
SYSTOLIC BLOOD PRESSURE: 122 MMHG | HEIGHT: 68 IN | DIASTOLIC BLOOD PRESSURE: 64 MMHG | BODY MASS INDEX: 41.1 KG/M2 | WEIGHT: 271.2 LBS

## 2021-06-28 DIAGNOSIS — N40.1 BENIGN PROSTATIC HYPERPLASIA WITH URINARY RETENTION: ICD-10-CM

## 2021-06-28 DIAGNOSIS — C61 PROSTATE CANCER (HCC): Primary | ICD-10-CM

## 2021-06-28 DIAGNOSIS — R33.8 BENIGN PROSTATIC HYPERPLASIA WITH URINARY RETENTION: ICD-10-CM

## 2021-06-28 LAB
BILIRUBIN URINE: NEGATIVE
BLOOD URINE, POC: NEGATIVE
CHARACTER, URINE: CLEAR
COLOR, URINE: YELLOW
GLUCOSE URINE: NEGATIVE MG/DL
KETONES, URINE: NEGATIVE
LEUKOCYTE CLUMPS, URINE: NEGATIVE
NITRITE, URINE: NEGATIVE
PH, URINE: 6 (ref 5–9)
POST VOID RESIDUAL (PVR): 0 ML
PROTEIN, URINE: NEGATIVE MG/DL
SPECIFIC GRAVITY, URINE: 1.02 (ref 1–1.03)
UROBILINOGEN, URINE: 0.2 EU/DL (ref 0–1)

## 2021-06-28 PROCEDURE — 1036F TOBACCO NON-USER: CPT | Performed by: UROLOGY

## 2021-06-28 PROCEDURE — 99214 OFFICE O/P EST MOD 30 MIN: CPT | Performed by: UROLOGY

## 2021-06-28 PROCEDURE — 81003 URINALYSIS AUTO W/O SCOPE: CPT | Performed by: UROLOGY

## 2021-06-28 PROCEDURE — G8417 CALC BMI ABV UP PARAM F/U: HCPCS | Performed by: UROLOGY

## 2021-06-28 PROCEDURE — G8427 DOCREV CUR MEDS BY ELIG CLIN: HCPCS | Performed by: UROLOGY

## 2021-06-28 PROCEDURE — 51798 US URINE CAPACITY MEASURE: CPT | Performed by: UROLOGY

## 2021-06-28 PROCEDURE — 3017F COLORECTAL CA SCREEN DOC REV: CPT | Performed by: UROLOGY

## 2021-06-28 NOTE — PROGRESS NOTES
Dr. Latisha Skinner MD MD  [facility] Consultation / Follow up Visit    Patient:  Saintclair Archer  YOB: 1960  Date: 6/28/2021    HISTORY OF PRESENT ILLNESS:   The patient is a 62 y.o. male who presents today for follow-up for the following problem(s): elevated PSA, BPH with luts,  Urinary retention, Bladder stone  Overall the problem(s) : are worsening. Associated Symptoms: No dysuria, gross hematuria. Pain Severity:       Today visit:   6/28/21  Zofia Donnelly follows with us for HR prostate cancer s/p Radiation and ADT. His PSA has responded well. PSA:  0.81. He also had BPH with LUTs and urinary retention, voiding well now after start of therapy and states that he is having urinary frequency. PVR: 0 ml. He is havingn significant urgency after radiation, and is bothered by these symptoms. Wishes for improvement. 5/24/21  Cystoscopy Operative Note  Surgeon: Latisha Skinner MD   Anesthesia: Urethral 2%  Indications: BPH with retention  Position: supine  Findings:   The patient was prepped and draped in the usual sterile fashion. The flexible cystoscope was advanced through the urethra and into the bladder. The bladder was thoroughly inspected and the following was noted:    Residual Urine: Moderate  Urethra: No abnormalities of the urethra are noted. Prostate: Complete obstruction by lateral lobe of prostate. Bladder: No tumors or CIS noted. No bladder diverticulum. Moderate trabeculation noted. Ureters: Clear efflux from both ureters. Orifices with normal configuration and location. The cystoscope was removed. The patient tolerated the procedure well. Void trial      4/26/21   [facility] follows with us for HR PCa. We reviewed old records. Pathology reviewed: High volume Merline 4+5 = 9, with PNI, (Grade Group 5). Diagnotic PSA: 16.28   He has finished radiation thearpy. He is still on Bicaluatmide, discussed lupron injection. 1/15/21  Bone scan  1. difficulty emptying his bladder. He has incomplete emptying, frequency, and Nocturia, that are worsening over the last couple years. On flomax but he is stating his symptoms are worsening. AUASS: 11/4. Denies hematuria. PVR: 48 ml. Smoking history: quit 20 years ago.  FH: none. ANTONIO: plavix, for CAD s/p stents (10/2019).   Has elevated PSA, 16.          Summary of old records:   (Patient's old records, notes and chart reviewed and summarized above.)     Last several PSA's:  Lab Results   Component Value Date    PSA 0.81 06/08/2021    PSA 16.28 (H) 10/23/2020    PSA 16.31 (H) 08/25/2020       Last total testosterone:  No results found for: TESTOSTERONE    Urinalysis today:  Results for POC orders placed in visit on 06/28/21   POCT Urinalysis No Micro (Auto)   Result Value Ref Range    Glucose, Ur Negative NEGATIVE mg/dl    Bilirubin Urine Negative     Ketones, Urine Negative NEGATIVE    Specific Gravity, Urine 1.020 1.002 - 1.030    Blood, UA POC Negative NEGATIVE    pH, Urine 6.00 5.0 - 9.0    Protein, Urine Negative NEGATIVE mg/dl    Urobilinogen, Urine 0.20 0.0 - 1.0 eu/dl    Nitrite, Urine Negative NEGATIVE    Leukocyte Clumps, Urine Negative NEGATIVE    Color, Urine Yellow [id number]    Character, Urine Clear [id number]   poct post void residual   Result Value Ref Range    post void residual 0 ml       Last BUN and creatinine:  Lab Results   Component Value Date    BUN 15 03/02/2021     Lab Results   Component Value Date    CREATININE 0.9 03/02/2021       Imaging Reviewed during this Office Visit:   (results were independently reviewed by physician and radiology report verified)    PAST MEDICAL, FAMILY AND SOCIAL HISTORY UPDATE:  Past Medical History:   Diagnosis Date    Abnormal stress test Sept 2012    [facility]ia[facility]    CAD (coronary artery disease)     [facility]     Prostate    Chronic low back pain     Diabetes mellitus [facility]     Diabetes mellitus ([address]     Hyperlipidemia     Hypertension     Hypertriglyceridemia     [facility]) [facility] 2004    Obesity     Sleep apnea     has CPAP    Viral pneumonia 10/15/2020     Past Surgical History:   Procedure Laterality Date    BACK SURGERY  November 1997    L4 & L5 fusion    CARDIAC CATHETERIZATION  10/2019   Karleneroman Lyonsner CARDIAC SURGERY  October 22, 2004    Cardiac cath with stent placement x1    COLONOSCOPY  October 2010    CORONARY ANGIOPLASTY WITH STENT PLACEMENT  10/17/2019    HERNIA REPAIR  1991    Mesh repair in abdomen.  ULTRASOUND PROSTATE/TRANSRECTAL N/A 12/7/2020    TRANSRECTAL ULTRASOUND WITH PROSTATE BIOPSY performed by Jesus Gibson MD at [address] [place] [address] History   Problem Relation Age of Onset    Diabetes Mother     Heart Disease Mother     High Blood Pressure Mother     Arthritis Father     Diabetes Father     Heart Disease Father     High Blood Pressure Father     Diabetes Sister     Diabetes Brother     Heart Disease Brother     Cancer Neg Hx     Stroke Neg Hx      Outpatient Medications Marked as Taking for the 6/28/21 encounter (Office Visit) with Jesus Gibson MD   Medication Sig Dispense Refill    RA VITAMIN C/ERNA HIPS 500 MG tablet TAKE 1 TABLET BY MOUTH DAILY 30 tablet 3    RA VITAMIN B-12 TR 1000 MCG TBCR TAKE 1 TABLET BY MOUTH DAILY 30 tablet 2    cromolyn (OPTICROM) 4 % ophthalmic solution instill 1 drop into both eyes four times a day 10 mL 0    enalapril (VASOTEC) 10 MG tablet Take 1 tablet by mouth daily 90 tablet 1    pantoprazole (PROTONIX) 40 MG tablet Take 1 tablet by mouth daily 90 tablet 1    metFORMIN (GLUCOPHAGE) 1000 MG tablet Take 1 tablet by mouth daily (with breakfast) 180 tablet 1    ALPRAZolam (XANAX) 1 MG tablet Take 1 tablet by mouth 2 times daily for 30 days.  60 tablet 0    insulin glargine (LANTUS SOLOSTAR) 100 UNIT/ML injection pen Inject 40 Units into the skin 2 times daily 96 mL 0    fluticasone (FLONASE) 50 MCG/ACT nasal spray One spray in each nostril q hs 3 Bottle 1    tamsulosin (FLOMAX) 0.4 MG capsule Take 2 capsules by mouth daily Take one capsule daily to facilitate passage of ureteral stone 60 capsule 3    albuterol (PROVENTIL) (2.5 MG/3ML) 0.083% nebulizer solution Take 3 mLs by nebulization every 4 hours as needed for Wheezing 2 Package 1    bicalutamide (CASODEX) 50 MG chemo tablet Take 1 tablet by mouth daily 90 tablet 3    Insulin Syringes, Disposable, U-100 1 ML MISC 1 each by Does not apply route 3 times daily 31 gauge x 8 mm  ultrafine 2 100 each 5    B-D ULTRAFINE III SHORT PEN 31G X 8 MM MISC USE TWICE DAILY WITH THE [place] PEN.  amLODIPine (NORVASC) 10 MG tablet Take 10 mg by mouth daily      ferrous sulfate (IRON 325) 325 (65 Fe) MG tablet Take 1 tablet by mouth every 48 hours 30 tablet 3    insulin lispro (HUMALOG) 100 UNIT/ML injection vial Inject 10 Units into the skin 2 times daily as needed for High Blood Sugar when blood sugar > 155      clopidogrel (PLAVIX) 75 MG tablet take 1 tablet by mouth once daily 90 tablet 1    metoprolol tartrate (LOPRESSOR) 25 MG tablet Take 1 tablet by mouth 2 times daily  0    Vitamin D, Cholecalciferol, 25 MCG (1000 UT) TABS Take 1,000 Units by mouth daily      pravastatin (PRAVACHOL) 40 MG tablet Take 1 tablet by mouth nightly  0    gemfibrozil (LOPID) 600 MG tablet Take 1 tablet by mouth 2 times daily 180 tablet 1    nitroGLYCERIN (NITROSTAT) 0.4 MG SL tablet Place 0.4 mg under the tongue every 5 minutes as needed for Chest pain      aspirin 325 MG tablet Take 325 mg by mouth daily.            Ace inhibitors, Baclofen, Darvocet [propoxyphene n-acetaminophen], Darvon [propoxyphene hcl], Flexeril [cyclobenzaprine], Morphine, Motrin [ibuprofen micronized], Tylenol [acetaminophen], and Ultram [tramadol]  Social History     Tobacco Use   Smoking Status Former Smoker    Packs/day: 0.25    Years: 2.00    Pack years: 0.50    Types: Cigarettes    Quit date:

## 2021-07-01 NOTE — PROGRESS NOTES
Margareth Cameron is not considered a good candidate for prostatectomy. He was seen 2021 for evaluation and discussion regarding the potential role of radiation therapy in the management of his very high risk prostate cancer. MRI scan did not show any obvious seminal vesicle invasion, extraprostatic extension or lymph node involvement. After reviewing his options, Margareth Cameron elected to undergo definitive radiation therapy with androgen deprivation. Pain Ratin-2/10 (chronic back and knee pain)  ECOG Performance Status: 1      Treatment Tolerance/Response:   Kayla Campbell tolerated his radiation therapy as expected. He had an indwelling urinary catheter throat treatment due to his urinary retention, and occasionally had difficulty with the catheter clogging resulting in overflow incontinence. He was doing better in this regard towards the latter part of his treatment course. Margareth Cameron did not have any unexpected side effects from his radiation therapy. Systemic Therapy: Androgen deprivation therapy      Follow Up Plan:   Margareth Cameron received posttreatment instructions regarding skin care, recommended activity level and fluid/nutritional needs. He is scheduled to return for a checkup in a month, but was instructed to call and arrange for an earlier appointment if needed for any problems, questions or concerns. He will continue surveillance with PSA monitoring, and will continue care in urology, and with his other physicians/providers. Magaly Gaytan MD   Radiation Oncology    CC: Yohan Moon  ACC: Tumor Registry: [facility]      This document was created using a voice-recognition program.  Computer generated transcription errors may be present.

## 2021-07-02 ENCOUNTER — TELEPHONE (OUTPATIENT)
Dept: UROLOGY | Age: 61
End: 2021-07-02

## 2021-07-02 RX ORDER — CIPROFLOXACIN 500 MG/1
500 TABLET, FILM COATED ORAL 2 TIMES DAILY
Qty: 20 TABLET | Refills: 0 | Status: SHIPPED | OUTPATIENT
Start: 2021-07-02 | End: 2021-07-12

## 2021-07-02 NOTE — TELEPHONE ENCOUNTER
Patient advised of the urine results. He voiced understanding and will take the cipro until completed.

## 2021-07-07 DIAGNOSIS — F41.9 ANXIETY: ICD-10-CM

## 2021-07-07 RX ORDER — FERROUS SULFATE 325(65) MG
325 TABLET ORAL
Qty: 30 TABLET | Refills: 3 | Status: SHIPPED | OUTPATIENT
Start: 2021-07-07 | End: 2022-04-05 | Stop reason: SDUPTHER

## 2021-07-07 RX ORDER — ALPRAZOLAM 1 MG/1
1 TABLET ORAL 2 TIMES DAILY
Qty: 60 TABLET | Refills: 0 | Status: SHIPPED | OUTPATIENT
Start: 2021-07-07 | End: 2021-08-06 | Stop reason: SDUPTHER

## 2021-07-07 NOTE — TELEPHONE ENCOUNTER
Charles Jaimes called requesting a refill on the following medications:  Requested Prescriptions     Pending Prescriptions Disp Refills    ALPRAZolam (XANAX) 1 MG tablet 60 tablet 0     Sig: Take 1 tablet by mouth 2 times daily for 30 days.     ferrous sulfate (IRON 325) 325 (65 Fe) MG tablet 30 tablet 3     Sig: Take 1 tablet by mouth every 48 hours       Date of last visit: 6/7/2021  Date of next visit (if applicable):Visit date not found  Date of last refill: 06/07/21  Pharmacy Name: [facility]      Pa Sanchez LPN

## 2021-08-06 DIAGNOSIS — J40 BRONCHITIS: ICD-10-CM

## 2021-08-06 DIAGNOSIS — F41.9 ANXIETY: ICD-10-CM

## 2021-08-06 RX ORDER — ALBUTEROL SULFATE 2.5 MG/3ML
2.5 SOLUTION RESPIRATORY (INHALATION) EVERY 4 HOURS PRN
Qty: 2 PACKAGE | Refills: 1 | Status: SHIPPED | OUTPATIENT
Start: 2021-08-06 | End: 2022-01-04 | Stop reason: SDUPTHER

## 2021-08-06 RX ORDER — ALPRAZOLAM 1 MG/1
1 TABLET ORAL 2 TIMES DAILY
Qty: 60 TABLET | Refills: 0 | Status: SHIPPED | OUTPATIENT
Start: 2021-08-06 | End: 2021-09-07 | Stop reason: SDUPTHER

## 2021-08-12 ENCOUNTER — TELEPHONE (OUTPATIENT)
Dept: UROLOGY | Age: 61
End: 2021-08-12

## 2021-08-12 NOTE — TELEPHONE ENCOUNTER
Prior Auth initiated for [employer] . PA sent to [facility]. Should receive response in 3-5 days.

## 2021-08-19 DIAGNOSIS — E11.9 WELL CONTROLLED TYPE 2 DIABETES MELLITUS (HCC): Primary | ICD-10-CM

## 2021-08-20 RX ORDER — PEN NEEDLE, DIABETIC 31 GX5/16"
NEEDLE, DISPOSABLE MISCELLANEOUS
Qty: 200 EACH | Refills: 3 | Status: SHIPPED | OUTPATIENT
Start: 2021-08-20 | End: 2022-09-27

## 2021-08-31 DIAGNOSIS — E11.9 WELL CONTROLLED TYPE 2 DIABETES MELLITUS (HCC): Chronic | ICD-10-CM

## 2021-08-31 RX ORDER — INSULIN GLARGINE 100 [IU]/ML
INJECTION, SOLUTION SUBCUTANEOUS
Qty: 96 ML | Refills: 0 | Status: SHIPPED | OUTPATIENT
Start: 2021-08-31 | End: 2021-12-29 | Stop reason: SDUPTHER

## 2021-08-31 NOTE — TELEPHONE ENCOUNTER
Pool Rivera needs refill of   Requested Prescriptions     Pending Prescriptions Disp Refills    LANTUS SOLOSTAR 100 UNIT/ML injection pen [Pharmacy Med Name: LANTUS SOLOSTAR 100 UNIT/ML] 96 mL 0     Sig: INJECT 40 UNITS INTO THE SKIN 2 TIMES A DAY       Last Filled on:  05- #96 mL R-0 and sent to [address] [place] [address] in [place], [place] by Dr. Tommie Moses.       Last Visit Date:  06-    Next Visit Date:  09/08/2021

## 2021-09-07 DIAGNOSIS — F41.9 ANXIETY: ICD-10-CM

## 2021-09-07 RX ORDER — ALPRAZOLAM 1 MG/1
1 TABLET ORAL 2 TIMES DAILY
Qty: 60 TABLET | Refills: 0 | Status: SHIPPED | OUTPATIENT
Start: 2021-09-07 | End: 2021-10-07 | Stop reason: SDUPTHER

## 2021-09-07 NOTE — TELEPHONE ENCOUNTER
Dana Padilla called requesting a refill on the following medications:  Requested Prescriptions     Pending Prescriptions Disp Refills    ALPRAZolam (XANAX) 1 MG tablet 60 tablet 0     Sig: Take 1 tablet by mouth 2 times daily for 30 days.        Date of last visit: 6/7/2021  Date of next visit (if applicable):9/8/2021  Date of last refill: 08/06/21 #60 x NR  Pharmacy Name: [facility] ([address] [place] [address])

## 2021-09-08 ENCOUNTER — HOSPITAL ENCOUNTER (OUTPATIENT)
Age: 61
Discharge: HOME | End: 2021-09-08
Payer: MEDICAID

## 2021-09-08 ENCOUNTER — OFFICE VISIT (OUTPATIENT)
Dept: FAMILY MEDICINE CLINIC | Age: 61
End: 2021-09-08
Payer: MEDICAID

## 2021-09-08 VITALS
RESPIRATION RATE: 18 BRPM | OXYGEN SATURATION: 95 % | DIASTOLIC BLOOD PRESSURE: 62 MMHG | SYSTOLIC BLOOD PRESSURE: 126 MMHG | HEART RATE: 76 BPM | BODY MASS INDEX: 42.5 KG/M2 | TEMPERATURE: 97.5 F | HEIGHT: 68 IN | WEIGHT: 280.4 LBS

## 2021-09-08 DIAGNOSIS — C61 MALIGNANT NEOPLASM OF PROSTATE (HCC): ICD-10-CM

## 2021-09-08 DIAGNOSIS — F41.9 ANXIETY: ICD-10-CM

## 2021-09-08 DIAGNOSIS — E11.9 WELL CONTROLLED TYPE 2 DIABETES MELLITUS (HCC): ICD-10-CM

## 2021-09-08 DIAGNOSIS — E11.9 WELL CONTROLLED TYPE 2 DIABETES MELLITUS (HCC): Primary | ICD-10-CM

## 2021-09-08 DIAGNOSIS — I25.10 CORONARY ARTERY DISEASE INVOLVING NATIVE CORONARY ARTERY OF NATIVE HEART WITHOUT ANGINA PECTORIS: ICD-10-CM

## 2021-09-08 DIAGNOSIS — I10 ESSENTIAL HYPERTENSION: ICD-10-CM

## 2021-09-08 PROBLEM — J12.9 VIRAL PNEUMONIA: Status: RESOLVED | Noted: 2020-10-15 | Resolved: 2021-09-08

## 2021-09-08 LAB
ERYTHROCYTE [DISTWIDTH] IN BLOOD BY AUTOMATED COUNT: 14.3 % (ref 11.5–14.5)
ERYTHROCYTE [DISTWIDTH] IN BLOOD BY AUTOMATED COUNT: 44.6 FL (ref 35–45)
HCT VFR BLD CALC: 36.7 % (ref 42–52)
HEMOGLOBIN: 11.8 GM/DL (ref 14–18)
MCH RBC QN AUTO: 27.4 PG (ref 26–33)
MCHC RBC AUTO-ENTMCNC: 32.2 GM/DL (ref 32.2–35.5)
MCV RBC AUTO: 85.3 FL (ref 80–94)
PLATELET # BLD: 252 THOU/MM3 (ref 130–400)
PMV BLD AUTO: 9.9 FL (ref 9.4–12.4)
RBC # BLD: 4.3 MILL/MM3 (ref 4.7–6.1)
WBC # BLD: 8.5 THOU/MM3 (ref 4.8–10.8)

## 2021-09-08 PROCEDURE — 80061 LIPID PANEL: CPT

## 2021-09-08 PROCEDURE — 85027 COMPLETE CBC AUTOMATED: CPT

## 2021-09-08 PROCEDURE — 80053 COMPREHEN METABOLIC PANEL: CPT

## 2021-09-08 PROCEDURE — 2022F DILAT RTA XM EVC RTNOPTHY: CPT | Performed by: FAMILY MEDICINE

## 2021-09-08 PROCEDURE — 3051F HG A1C>EQUAL 7.0%<8.0%: CPT | Performed by: FAMILY MEDICINE

## 2021-09-08 PROCEDURE — 83036 HEMOGLOBIN GLYCOSYLATED A1C: CPT

## 2021-09-08 PROCEDURE — 3017F COLORECTAL CA SCREEN DOC REV: CPT | Performed by: FAMILY MEDICINE

## 2021-09-08 PROCEDURE — 99214 OFFICE O/P EST MOD 30 MIN: CPT | Performed by: FAMILY MEDICINE

## 2021-09-08 PROCEDURE — 84153 ASSAY OF PSA TOTAL: CPT

## 2021-09-08 PROCEDURE — 36415 COLL VENOUS BLD VENIPUNCTURE: CPT

## 2021-09-08 PROCEDURE — 1036F TOBACCO NON-USER: CPT | Performed by: FAMILY MEDICINE

## 2021-09-08 PROCEDURE — G8427 DOCREV CUR MEDS BY ELIG CLIN: HCPCS | Performed by: FAMILY MEDICINE

## 2021-09-08 PROCEDURE — G8417 CALC BMI ABV UP PARAM F/U: HCPCS | Performed by: FAMILY MEDICINE

## 2021-09-08 RX ORDER — PEN NEEDLE, DIABETIC 29 G X1/2"
NEEDLE, DISPOSABLE MISCELLANEOUS
COMMUNITY
Start: 2021-08-20 | End: 2021-12-03 | Stop reason: SDUPTHER

## 2021-09-08 SDOH — ECONOMIC STABILITY: FOOD INSECURITY: WITHIN THE PAST 12 MONTHS, YOU WORRIED THAT YOUR FOOD WOULD RUN OUT BEFORE YOU GOT MONEY TO BUY MORE.: NEVER TRUE

## 2021-09-08 SDOH — ECONOMIC STABILITY: FOOD INSECURITY: WITHIN THE PAST 12 MONTHS, THE FOOD YOU BOUGHT JUST DIDN'T LAST AND YOU DIDN'T HAVE MONEY TO GET MORE.: NEVER TRUE

## 2021-09-08 ASSESSMENT — SOCIAL DETERMINANTS OF HEALTH (SDOH): HOW HARD IS IT FOR YOU TO PAY FOR THE VERY BASICS LIKE FOOD, HOUSING, MEDICAL CARE, AND HEATING?: NOT HARD AT ALL

## 2021-09-08 ASSESSMENT — PATIENT HEALTH QUESTIONNAIRE - PHQ9
SUM OF ALL RESPONSES TO PHQ9 QUESTIONS 1 & 2: 0
2. FEELING DOWN, DEPRESSED OR HOPELESS: 0
1. LITTLE INTEREST OR PLEASURE IN DOING THINGS: 0
SUM OF ALL RESPONSES TO PHQ QUESTIONS 1-9: 0

## 2021-09-08 ASSESSMENT — ENCOUNTER SYMPTOMS
EYE ITCHING: 0
WHEEZING: 0
SHORTNESS OF BREATH: 0
COUGH: 0

## 2021-09-08 NOTE — PROGRESS NOTES
[facility] PROFESSIONAL [facility] MEDICINE  [address] [address]  Dept: [phone]  Dept Fax: [phone]  Loc: [phone]  PROGRESS NOTE      Visit Date: 9/8/2021    Sandee Grant is a 62 y.o. male who presents today for:  Chief Complaint   Patient presents with    3 Month Follow-Up    Hypertension    Diabetes       Subjective:  Diabetes  Pertinent negatives for diabetes include no chest pain. Hypertension  Pertinent negatives include no chest pain or shortness of breath. 3 month f/u    Anxiety:  On xanax 1 mg 2x per day. Not on any other anxiety meds. Denies illicit drug use. mood is good. Sleeping well.      6 month f/u:    DM:  a1c 6.3% in march.  on lantus 40 units twice daily.  On humalog 10 units twice daily as needed for glucose >155.  he has not needed the humalog very much.  He is on metformin as well.  He eats twice daily.  Exercise: [place]. No hypoglycemic events. On ACEI. 7 lb wt gain in 2 months.      HTN:  On enalapril, norvasc and metoprolol. Has CAD. On statin. On aspirin and plavix. Follows with cardiology. CAD:  Hx of stent placement. On BB. On pravastatin and lopid. Aspirin and plavix. Follows with cardiology. Allergies:  He has been on cromolyn. On flonase. being treated for prostate cancer. On flomax and myrbetriq. COPD:  On supplemental oxygen at night. Uses albuterol. Has cpap for his ROBSON    Review of Systems   Constitutional: Negative for chills and fever. Eyes: Negative for itching. Respiratory: Negative for cough, shortness of breath and wheezing. Cardiovascular: Negative for chest pain and leg swelling. Skin: Negative for rash and wound. Neurological: Negative for syncope and light-headedness.      Patient Active Problem List   Diagnosis    Hypertension    Hyperlipidemia    CAD (coronary artery disease)    Chest pain, atypical    Chronic low back pain    Hypertriglyceridemia    Morbidly obese (HCC)    Abnormal stress test    Sprain and strain of ankle    Os trigonum    Elevated PSA    Lumbar spinal stenosis    Well controlled type 2 diabetes mellitus ([address]    ROBSON (obstructive sleep apnea)    Back pain at L4-L5 level    Anxiety    Microalbuminuria    Positive FIT (fecal immunochemical test)    Viral pneumonia    Acute respiratory failure with hypoxia ([facility])    H/O heart artery stent    Urinary retention    Malignant neoplasm of prostate ([address]    COPD, severe ([address]     Past Medical History:   Diagnosis Date    Abnormal stress test Sept 2012    Lateral Wall Ischemia- [facility] at [facility]-    CAD (coronary artery disease)     Cancer ([address]     Prostate    Chronic low back pain     Diabetes mellitus ([address]     Diabetes mellitus ([address]     Hyperlipidemia     Hypertension     Hypertriglyceridemia     MI (myocardial infarction) ([address] 2004    Obesity     Sleep apnea     has CPAP    Viral pneumonia 10/15/2020      Past Surgical History:   Procedure Laterality Date    BACK SURGERY  November 1997    L4 & L5 fusion    CARDIAC CATHETERIZATION  10/2019   Danita See CARDIAC SURGERY  October 22, 2004    Cardiac cath with stent placement x1    COLONOSCOPY  October 2010    CORONARY ANGIOPLASTY WITH STENT PLACEMENT  10/17/2019    HERNIA REPAIR  1991    Mesh repair in abdomen.     ULTRASOUND PROSTATE/TRANSRECTAL N/A 12/7/2020    TRANSRECTAL ULTRASOUND WITH PROSTATE BIOPSY performed by Nathan Hawkins MD at [address] [place] [address] History   Problem Relation Age of Onset    Diabetes Mother     Heart Disease Mother     High Blood Pressure Mother     Arthritis Father     Diabetes Father     Heart Disease Father     High Blood Pressure Father     Diabetes Sister     Diabetes Brother     Heart Disease Brother     Cancer Neg Hx     Stroke Neg Hx      Social History     Tobacco Use    Smoking status: Former Smoker     Packs/day: 0.25     Years: 2.00     Pack years: 0.50     Types: Cigarettes     Quit date: 1995     Years since quittin.7    Smokeless tobacco: Never Used   Substance Use Topics    Alcohol use: No      Current Outpatient Medications   Medication Sig Dispense Refill    ALPRAZolam (XANAX) 1 MG tablet Take 1 tablet by mouth 2 times daily for 30 days. 60 tablet 0    LANTUS SOLOSTAR 100 UNIT/ML injection pen INJECT 40 UNITS INTO THE SKIN 2 TIMES A DAY 96 mL 0    B-D ULTRAFINE III SHORT PEN 31G X 8 MM MISC USE TWICE DAILY WITH THE BASAGLAR PEN.  200 each 3    mirabegron (MYRBETRIQ) 25 MG TB24 Take 1 tablet by mouth daily 30 tablet 11    albuterol (PROVENTIL) (2.5 MG/3ML) 0.083% nebulizer solution Take 3 mLs by nebulization every 4 hours as needed for Wheezing 2 Package 1    ferrous sulfate (IRON 325) 325 (65 Fe) MG tablet Take 1 tablet by mouth every 48 hours 30 tablet 3    RA VITAMIN C/ERNA HIPS 500 MG tablet TAKE 1 TABLET BY MOUTH DAILY 30 tablet 3    RA VITAMIN B-12 TR 1000 MCG TBCR TAKE 1 TABLET BY MOUTH DAILY 30 tablet 2    cromolyn (OPTICROM) 4 % ophthalmic solution instill 1 drop into both eyes four times a day 10 mL 0    enalapril (VASOTEC) 10 MG tablet Take 1 tablet by mouth daily 90 tablet 1    pantoprazole (PROTONIX) 40 MG tablet Take 1 tablet by mouth daily 90 tablet 1    metFORMIN (GLUCOPHAGE) 1000 MG tablet Take 1 tablet by mouth daily (with breakfast) 180 tablet 1    fluticasone (FLONASE) 50 MCG/ACT nasal spray One spray in each nostril q hs 3 Bottle 1    tamsulosin (FLOMAX) 0.4 MG capsule Take 2 capsules by mouth daily Take one capsule daily to facilitate passage of ureteral stone 60 capsule 3    bicalutamide (CASODEX) 50 MG chemo tablet Take 1 tablet by mouth daily 90 tablet 3    Insulin Syringes, Disposable, U-100 1 ML MISC 1 each by Does not apply route 3 times daily 31 gauge x 8 mm  ultrafine 2 100 each 5    amLODIPine (NORVASC) 10 MG tablet Take 10 mg by mouth daily      insulin lispro (HUMALOG) 100 UNIT/ML injection vial Inject 10 Units into the skin 2 times daily as needed for High Blood Sugar when blood sugar > 155      clopidogrel (PLAVIX) 75 MG tablet take 1 tablet by mouth once daily 90 tablet 1    metoprolol tartrate (LOPRESSOR) 25 MG tablet Take 1 tablet by mouth 2 times daily  0    Vitamin D, Cholecalciferol, 25 MCG (1000 UT) TABS Take 1,000 Units by mouth daily      pravastatin (PRAVACHOL) 40 MG tablet Take 1 tablet by mouth nightly  0    gemfibrozil (LOPID) 600 MG tablet Take 1 tablet by mouth 2 times daily 180 tablet 1    aspirin 325 MG tablet Take 325 mg by mouth daily.  BD INSULIN SYRINGE U/F 31G X 5/16\" 1 ML MISC use as directed three times a day      leuprolide (LUPRON) 45 MG injection Inject 45 mg into the muscle once for 1 dose (Patient not taking: Reported on 9/8/2021) 45 mg 1    nitroGLYCERIN (NITROSTAT) 0.4 MG SL tablet Place 0.4 mg under the tongue every 5 minutes as needed for Chest pain (Patient not taking: Reported on 9/8/2021)       No current facility-administered medications for this visit. Allergies   Allergen Reactions    Ace Inhibitors      When asked Oct 2020, patient was unsure of allergy/reaction. Patient takes/tolerates enalapril.     Baclofen Other (See Comments)     Lightheadedness, dizziness    Darvocet [Propoxyphene N-Acetaminophen] Nausea Only     Felt sickly    Darvon [Propoxyphene Hcl]      Felt sickly    Flexeril [Cyclobenzaprine] Other (See Comments)     Headache    Morphine Nausea Only     Pt reported feeling sickly    Motrin [Ibuprofen Micronized] Nausea Only     Pt reported feeling very sick    Tylenol [Acetaminophen] Nausea Only    Ultram [Tramadol] Itching     Health Maintenance   Topic Date Due    Hepatitis C screen  Never done    HIV screen  Never done    DTaP/Tdap/Td vaccine (1 - Tdap) Never done    Shingles Vaccine (1 of 2) Never done    Diabetic retinal exam  07/31/2019    COVID-19 Vaccine (3 - Moderna risk 3-dose series) 05/13/2021    Diabetic microalbuminuria test  08/25/2021    Flu vaccine (1) 09/01/2021    Colon cancer screen colonoscopy  02/26/2022    Diabetic foot exam  03/01/2022    A1C test (Diabetic or Prediabetic)  03/01/2022    Lipid screen  03/02/2022    Potassium monitoring  03/02/2022    Creatinine monitoring  03/02/2022    PSA counseling  06/08/2022    Pneumococcal 0-64 years Vaccine (2 of 4 - PPSV23) 01/11/2023    Hepatitis A vaccine  Aged Out    Hib vaccine  Aged Out    Meningococcal (ACWY) vaccine  Aged Out       Objective:  /62 (Site: Right Upper Arm, Position: Sitting)   Pulse 76   Temp 97.5 °F (36.4 °C)   Resp 18   Ht 5' 8\" (1.727 m)   Wt 280 lb 6.4 oz (127.2 kg)   SpO2 95%   BMI 42.63 kg/m²   Physical Exam  Vitals reviewed. Constitutional:       Appearance: He is well-developed. He is obese. He is not ill-appearing. Interventions: Nasal cannula in place. Cardiovascular:      Rate and Rhythm: Normal rate and regular rhythm. Heart sounds: No murmur heard. Pulmonary:      Effort: Pulmonary effort is normal. No respiratory distress. Breath sounds: Normal breath sounds. No wheezing or rhonchi. Musculoskeletal:      Right lower leg: Edema (trace) present. Left lower leg: Edema (trace) present. Neurological:      Mental Status: He is alert. Mental status is at baseline. Psychiatric:         Mood and Affect: Mood normal.         Behavior: Behavior normal.         Lab Results   Component Value Date    WBC 7.2 04/01/2021    HGB 11.0 (L) 04/01/2021    HCT 35.8 (L) 04/01/2021     04/01/2021    CHOL 146 08/25/2020    TRIG 118 08/25/2020    HDL 29 03/02/2021    ALT 6 (L) 03/02/2021    AST 7 03/02/2021     03/02/2021    K 4.6 03/02/2021     03/02/2021    CREATININE 0.9 03/02/2021    BUN 15 03/02/2021    CO2 25 03/02/2021    PSA 0.81 06/08/2021    INR 1.05 12/07/2020    GLUF 115 (H) 03/02/2021    LABA1C 6.3 03/01/2021    LABMICR 7.17 10/09/2018       Impression/Plan:  1. Well controlled type 2 diabetes mellitus ([facility]  Chronic. Check status of control with labs. Continue metformin, lantus, humalog. On ACEI and statin. Diet and exercise  - CBC; Future  - Comprehensive Metabolic Panel; Future  - Lipid Panel; Future  - Hemoglobin A1C; Future    2. Essential hypertension  Chronic. Controlled. Check labs. Continue enalapril, norvasc and metoprolol.  - CBC; Future  - Comprehensive Metabolic Panel; Future  - Lipid Panel; Future    3. Coronary artery disease involving native coronary artery of native heart without angina pectoris  Chronic. Stable. continue DAPT and statin. Continue to follow with cardiology    4. Anxiety  Chronic. Controlled. continue xanax. They voiced understanding. All questions answered. They agreed with treatment plan. See patient instructions for any educational materials that may have been given. Discussed use, benefit, and side effects of prescribed medications. Reviewed health maintenance. (Please note that portions of this note may have been completed with a voice recognition program.  Efforts were made to edit the dictation but occasionally words are mis-transcribed.)    Return in about 3 months (around 12/8/2021) for anxiety.       Electronically signed by Susanne York MD on 9/8/2021 at 1:47 PM

## 2021-09-09 ENCOUNTER — TELEPHONE (OUTPATIENT)
Dept: FAMILY MEDICINE CLINIC | Age: 61
End: 2021-09-09

## 2021-09-09 LAB
ALBUMIN SERPL-MCNC: 4.4 G/DL (ref 3.5–5.1)
ALP BLD-CCNC: 88 U/L (ref 38–126)
ALT SERPL-CCNC: 13 U/L (ref 11–66)
ANION GAP SERPL CALCULATED.3IONS-SCNC: 12 MEQ/L (ref 8–16)
AST SERPL-CCNC: 13 U/L (ref 5–40)
AVERAGE GLUCOSE: 159 MG/DL (ref 70–126)
BILIRUB SERPL-MCNC: 0.3 MG/DL (ref 0.3–1.2)
BUN BLDV-MCNC: 27 MG/DL (ref 7–22)
CALCIUM SERPL-MCNC: 10.4 MG/DL (ref 8.5–10.5)
CHLORIDE BLD-SCNC: 103 MEQ/L (ref 98–111)
CHOLESTEROL, TOTAL: 182 MG/DL (ref 100–199)
CO2: 22 MEQ/L (ref 23–33)
CREAT SERPL-MCNC: 1 MG/DL (ref 0.4–1.2)
GFR SERPL CREATININE-BSD FRML MDRD: 76 ML/MIN/1.73M2
GLUCOSE BLD-MCNC: 223 MG/DL (ref 70–108)
HBA1C MFR BLD: 7.3 % (ref 4.4–6.4)
HDLC SERPL-MCNC: 32 MG/DL
LDL CHOLESTEROL CALCULATED: 107 MG/DL
POTASSIUM SERPL-SCNC: 5.2 MEQ/L (ref 3.5–5.2)
PROSTATE SPECIFIC ANTIGEN: 0.02 NG/ML (ref 0–1)
SODIUM BLD-SCNC: 137 MEQ/L (ref 135–145)
TOTAL PROTEIN: 7.3 G/DL (ref 6.1–8)
TRIGL SERPL-MCNC: 217 MG/DL (ref 0–199)

## 2021-09-09 NOTE — TELEPHONE ENCOUNTER
----- Message from Salome Arnett MD sent at 9/9/2021  5:21 AM EDT -----  Psa is good. Cbc shows anemia slightly better than previous. A1c is higher at 7.3% meaning diabetes is slightly uncontrolled. Lipids are good except triglycerides and LDL are higher than previous. CMP is good except elevated glucose. Continue current meds without changes. Will do POC a1c at appt in dec as scheduled. Please advise patient.   Salome Arnett MD

## 2021-09-10 ENCOUNTER — HOSPITAL ENCOUNTER (OUTPATIENT)
Dept: RADIATION ONCOLOGY | Age: 61
Discharge: HOME | End: 2021-09-10
Attending: RADIOLOGY
Payer: MEDICAID

## 2021-09-10 VITALS
DIASTOLIC BLOOD PRESSURE: 56 MMHG | TEMPERATURE: 97.9 F | BODY MASS INDEX: 42.63 KG/M2 | WEIGHT: 280.4 LBS | OXYGEN SATURATION: 93 % | HEART RATE: 77 BPM | RESPIRATION RATE: 20 BRPM | SYSTOLIC BLOOD PRESSURE: 119 MMHG

## 2021-09-10 DIAGNOSIS — R33.9 URINARY RETENTION: Primary | ICD-10-CM

## 2021-09-10 DIAGNOSIS — C61 MALIGNANT NEOPLASM OF PROSTATE (HCC): ICD-10-CM

## 2021-09-10 PROCEDURE — 99215 OFFICE O/P EST HI 40 MIN: CPT | Performed by: NURSE PRACTITIONER

## 2021-09-10 PROCEDURE — 99212 OFFICE O/P EST SF 10 MIN: CPT | Performed by: NURSE PRACTITIONER

## 2021-09-10 NOTE — PROGRESS NOTES
[facility], [address]  Phone: [phone]   Toll Free: [phone]   Fax: [phone]    RADIATION ONCOLOGY FOLLOW UP REPORT    PATIENT NAME:  Federica Devlin     : 1960  MEDICAL RECORD NO: [id number]    LOCATION: [facility] NO: [id number]      PROVIDER: PIEDAD Holder CNP        DATE OF SERVICE: 9/10/2021    FOLLOW UP PHYSICIANS: Yohan Fitzpatrick; Rosio Jo    DIAGNOSIS:  C61 -- Malignant neoplasm of the prostate; Adenocarcinoma, Merline's 4+5=9, Grade Group 5; PSA 16.28; cT1c N0 M0, Stage IIIC    DATE OF DIAGNOSIS: 2020    END OF TREATMENT DATE: 4/15/2021    ECOG PERFORMANCE STATUS: 0    PAIN: Denies    CHAPERONE: Declined      HPI: David Mcclellan has known prostatic hypertrophy with lower urinary tract symptoms (for which he has been initiated on tamsulosin) and history of urinary retention. David Mcclellan received a recommendation for prostate biopsy, which was performed in 2020. The biopsy returned showing adenocarcinoma Killeen's 4+5 = 9 grade group 5 with cancer and multiple cores from both sides of the prostate. Perineural invasion was identified, but there was no definite evidence of extraprostatic extension seen on the core specimens. David Mcclellan reviewed his biopsy results during his urology follow-up. He has significant coronary artery disease with coronary artery stent placement in 2019 and requires Plavix. Because of these issues, David Mcclellan is not considered a good candidate for prostatectomy. He was seen 2021 for evaluation and discussion regarding the potential role of radiation therapy in the management of his very high risk prostate cancer. Pool tolerated radiation treatment fairly well. He had an indwelling catheter throughout the course of treatment. This was clamped to allow for bladder filling prior to treatment. He did have issues with urinary leaking around the catheter. Adjusted to timing of clamping it and that did improve the leaking. He was also treated for a UTI during his course of radiation treatment. INTERVAL HISTORY: Jagdish Pierce presents to [address] [place] [address] for a post treatment follow up after completing radiation treatment. He continues on Lupron and Casodex. Jagdish Pierce states he was started on Myrbetric for worsening urinary symptoms. He reports it did not help much, and his insurance would not pay for it. He also decreased Flomax to once per day from BID. Since doing that he has noticed worsening urinary symptoms. He reports being up 4 times per night to urinate. He denies leaking, or weak stream. He denies chest pain, sob, fever, chills, abdominal pain, arthralgias, changes in bowel habits, unintentional weight loss or loss of appetite. AUA Symptom Score: 14 (Moderate)  Quality of Life Score: 4 (Mostly dissatisfied)  LILI Inventory: 1 (Severe ED)    LAB RESULTS:   PSAs:  9/8/21: 0.02  6/8/21: 0.81  10/23/2020: 16.28  8/25/2020: 16.31  6/8/16: 7.61    CBC:   Lab Results   Component Value Date    WBC 7.2 04/01/2021    RBC 4.29 04/01/2021    HGB 11.0 04/01/2021    HCT 35.8 04/01/2021    MCV 83.4 04/01/2021    MCH 25.6 04/01/2021    MCHC 30.7 04/01/2021     04/01/2021    MPV 9.6 04/01/2021     CMP:  Lab Results   Component Value Date     03/02/2021    K 4.6 03/02/2021     03/02/2021    CO2 25 03/02/2021    BUN 15 03/02/2021    CREATININE 0.9 03/02/2021    LABGLOM 86 03/02/2021    PROT 7.4 03/02/2021    LABALBU 4.4 03/02/2021    CALCIUM 9.9 03/02/2021    BILITOT 0.3 03/02/2021    ALKPHOS 81 03/02/2021    AST 7 03/02/2021    ALT 6 03/02/2021       RADIOLOGY RESULTS:   No recent imaging results    MEDICATIONS:   Current Outpatient Medications   Medication Sig Dispense Refill    BD INSULIN SYRINGE U/F 31G X 5/16\" 1 ML MISC use as directed three times a day      ALPRAZolam (XANAX) 1 MG tablet Take 1 tablet by mouth 2 times daily for 30 days.  60 tablet 0    metoprolol tartrate (LOPRESSOR) 25 MG tablet Take 1 tablet by mouth 2 times daily  0    Vitamin D, Cholecalciferol, 25 MCG (1000 UT) TABS Take 1,000 Units by mouth daily      pravastatin (PRAVACHOL) 40 MG tablet Take 1 tablet by mouth nightly  0    gemfibrozil (LOPID) 600 MG tablet Take 1 tablet by mouth 2 times daily 180 tablet 1    nitroGLYCERIN (NITROSTAT) 0.4 MG SL tablet Place 0.4 mg under the tongue every 5 minutes as needed for Chest pain (Patient not taking: Reported on 9/8/2021)      aspirin 325 MG tablet Take 325 mg by mouth daily. No current facility-administered medications for this encounter. ROS: As noted in the HPI and Interval history, otherwise negative. EXAMINATION:   GENERAL: Sandee Orellana is a pleasant well-developed adult male. He is alert and oriented x3 in no acute distress. VITAL SIGNS: Weight 127.2 kg, temperature 36.6 °C, pulse 77, blood pressure 119/58, respirations 20, pulse ox 93% on room air. HEENT: Normocephalic, atraumatic. PERRL. EOMI. Ears, nose and lips are within normal limits on external examination. Oropharynx unremarkable. NECK: Supple without palpable cervical adenopathy. LYMPH: Without palpable supraclavicular axillary adenopathy. LUNGS: Clear without adventitious sounds. Respirations easy and unlabored. HEART: Regular rate and rhythm. Without murmur. ABDOMEN: Soft, round, nontender. Active bowel sounds x4. EXTREMITIES: Without clubbing or cyanosis. NEUROLOGIC EXAMINATION: Cranial nerves grossly intact. MUSCULOSKELETAL: Without bony point tenderness. ASSESSMENT: Sandee Orellana was diagnosed with prostate cancer in December 2020. He received recommendation for ADT and radiation therapy for which he was agreeable. Sandee Orellana started Casodex in 2/2021 and Initial dose Lupron was on 5/24/21. Randys PSA has shown a nice decline since starting on ADT and completing radiation treatment. Most recent PSA is 0.02 on 9/8/21.  This shows great control of

## 2021-09-14 DIAGNOSIS — E53.8 LOW SERUM VITAMIN B12: ICD-10-CM

## 2021-09-14 RX ORDER — PSYLLIUM HUSK 3.4 G/7G
POWDER ORAL
Qty: 30 TABLET | Refills: 2 | Status: SHIPPED | OUTPATIENT
Start: 2021-09-14 | End: 2021-12-14

## 2021-09-27 ENCOUNTER — OFFICE VISIT (OUTPATIENT)
Dept: UROLOGY | Age: 61
End: 2021-09-27
Payer: MEDICAID

## 2021-09-27 VITALS
BODY MASS INDEX: 42.06 KG/M2 | DIASTOLIC BLOOD PRESSURE: 60 MMHG | WEIGHT: 284 LBS | SYSTOLIC BLOOD PRESSURE: 112 MMHG | HEIGHT: 69 IN

## 2021-09-27 DIAGNOSIS — C61 PROSTATE CANCER (HCC): Primary | ICD-10-CM

## 2021-09-27 DIAGNOSIS — R33.8 BENIGN PROSTATIC HYPERPLASIA WITH URINARY RETENTION: ICD-10-CM

## 2021-09-27 DIAGNOSIS — N40.1 BENIGN PROSTATIC HYPERPLASIA WITH URINARY RETENTION: ICD-10-CM

## 2021-09-27 LAB
BILIRUBIN URINE: NEGATIVE
BLOOD URINE, POC: NEGATIVE
CHARACTER, URINE: CLEAR
COLOR, URINE: YELLOW
GLUCOSE URINE: NEGATIVE MG/DL
KETONES, URINE: NEGATIVE
LEUKOCYTE CLUMPS, URINE: NEGATIVE
NITRITE, URINE: NEGATIVE
PH, URINE: 5.5 (ref 5–9)
PROTEIN, URINE: NEGATIVE MG/DL
SPECIFIC GRAVITY, URINE: 1.01 (ref 1–1.03)
UROBILINOGEN, URINE: 0.2 EU/DL (ref 0–1)

## 2021-09-27 PROCEDURE — 99214 OFFICE O/P EST MOD 30 MIN: CPT | Performed by: UROLOGY

## 2021-09-27 PROCEDURE — 3017F COLORECTAL CA SCREEN DOC REV: CPT | Performed by: UROLOGY

## 2021-09-27 PROCEDURE — 81003 URINALYSIS AUTO W/O SCOPE: CPT | Performed by: UROLOGY

## 2021-09-27 PROCEDURE — G8417 CALC BMI ABV UP PARAM F/U: HCPCS | Performed by: UROLOGY

## 2021-09-27 PROCEDURE — 1036F TOBACCO NON-USER: CPT | Performed by: UROLOGY

## 2021-09-27 PROCEDURE — G8427 DOCREV CUR MEDS BY ELIG CLIN: HCPCS | Performed by: UROLOGY

## 2021-09-27 RX ORDER — OXYBUTYNIN CHLORIDE 5 MG/1
5 TABLET, EXTENDED RELEASE ORAL DAILY
Qty: 30 TABLET | Refills: 6 | Status: SHIPPED | OUTPATIENT
Start: 2021-09-27 | End: 2021-10-22

## 2021-09-27 NOTE — PROGRESS NOTES
MD MD [facility] [address] Consultation / Follow up Visit    Patient:  Gema Streeter  YOB: 1960  Date: 9/27/2021    HISTORY OF PRESENT ILLNESS:   The patient is a 62 y.o. male who presents today for follow-up for the following problem(s): elevated PSA, BPH with luts,  Urinary retention, Bladder stone  Overall the problem(s) : are worsening. Associated Symptoms: No dysuria, gross hematuria. Pain Severity:       Today visit:   9/27/21   Melida Barnard follows with us for HR prostate cancer s/p Radiation (3/2021) and ADT. His PSA has responded well. PSA:  0.02. He also had BPH with LUTs and urinary retention - improved on ADT. He is also on Flomax 0.8 mg    He is havingn significant urgency after radiation, and is bothered by these symptoms. Wishes for improvement. 5/24/21  Cystoscopy Operative Note  Surgeon: Kory Roth MD   Anesthesia: Urethral 2%  Indications: BPH with retention  Position: supine  Findings:   The patient was prepped and draped in the usual sterile fashion. The flexible cystoscope was advanced through the urethra and into the bladder. The bladder was thoroughly inspected and the following was noted:    Residual Urine: Moderate  Urethra: No abnormalities of the urethra are noted. Prostate: Complete obstruction by lateral lobe of prostate. Bladder: No tumors or CIS noted. No bladder diverticulum. Moderate trabeculation noted. Ureters: Clear efflux from both ureters. Orifices with normal configuration and location. The cystoscope was removed. The patient tolerated the procedure well. Void trial      4/26/21   [facility] follows with us for HR PCa. We reviewed old records. Pathology reviewed: High volume [place] 4+5 = 9, with PNI, (Grade Group 5). Diagnotic PSA: 16.28   He has finished radiation thearpy. He is still on Bicaluatmide, discussed lupron injection. 1/15/21  Bone scan  1.  Abnormal radiotracer accumulation in the left hemipelvis and right femur, possibly secondary to a patulous disease. Osseous metastatic disease cannot be excluded. 2. Probable degenerative arthropathic changes as detailed above. CT  1. 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 2. The previously a few low-density foci in the right kidney, likely cysts. Confirmation with ultrasound recommended. 3. Findings of constipation. No evidence to suggest metastatic disease         12/18/20  Jeanne Puga follow up after prostate biopsy and cystolithalopaxy for large bladder stone. Doing well, no fevers, hematuria resolved. Pathology reviewed: High volume [place] 4+5 = 9, with PNI, (Grade Group 5). PSA: 16.28    10/26/20  Jeanne Puga presents with history of BPH with LUTs, and elevated PSA. An MRI was denies by his insurance, so we review options of elevated PSA. He also has worsening LUTs, with retention and hematuria. He is on Home O2 for recent bronchitis, which is improving. He has history of CAD on ASA and Plavix. Cystoscopy Operative Note  Surgeon: Tommie Noel   Anesthesia: Urethral 2%  Indications: BPH with LUTs  Position: supine  Findings:   The patient was prepped and draped in the usual sterile fashion. The flexible cystoscope was advanced through the urethra and into the bladder. The bladder was thoroughly inspected and the following was noted:  Residual Urine: Moderate  Urethra: No abnormalities of the urethra are noted. Prostate: Large gland Complete obstruction by latera lobe of prostate. Bladder: No tumors or CIS noted. No bladder diverticulum. Large bladder stone. Ureters: Clear efflux from both ureters. Orifices with normal configuration and location. The cystoscope was removed. The patient tolerated the procedure well. Plan  Standard TURP  Cystolithalopaxy     9/18/20  63 yo male that presents with LUTs and difficulty emptying his bladder.   He has incomplete emptying, frequency, and Nocturia, that are worsening over the last couple years. On flomax but he is stating his symptoms are worsening. AUASS: 11/4. Denies hematuria. PVR: 48 ml. Smoking history: quit 20 years ago.  FH: none. ANTONIO: plavix, for CAD s/p stents (10/2019).   Has elevated PSA, 16.          Summary of old records:   (Patient's old records, notes and chart reviewed and summarized above.)     Last several PSA's:  Lab Results   Component Value Date    PSA 0.02 09/08/2021    PSA 0.81 06/08/2021    PSA 16.28 (H) 10/23/2020       Last total testosterone:  No results found for: TESTOSTERONE    Urinalysis today:  Results for POC orders placed in visit on 09/27/21   POCT Urinalysis No Micro (Auto)   Result Value Ref Range    Glucose, Ur Negative NEGATIVE mg/dl    Bilirubin Urine Negative     Ketones, Urine Negative NEGATIVE    Specific Gravity, Urine 1.015 1.002 - 1.030    Blood, UA POC Negative NEGATIVE    pH, Urine 5.50 5.0 - 9.0    Protein, Urine Negative NEGATIVE mg/dl    Urobilinogen, Urine 0.20 0.0 - 1.0 eu/dl    Nitrite, Urine Negative NEGATIVE    Leukocyte Clumps, Urine Negative NEGATIVE    Color, Urine Yellow YELLOW-STRAW    Character, Urine Clear [id number].[id number]       Last BUN and creatinine:  Lab Results   Component Value Date    BUN 27 (H) 09/08/2021     Lab Results   Component Value Date    CREATININE 1.0 09/08/2021       Imaging Reviewed during this Office Visit:   (results were independently reviewed by physician and radiology report verified)    PAST MEDICAL, FAMILY AND SOCIAL HISTORY UPDATE:  Past Medical History:   Diagnosis Date    Abnormal stress test Sept 2012    Lateral Wall Ischemia- [facility]-    CAD (coronary artery disease)     Cancer ([address]     Prostate    Chronic low back pain     Diabetes mellitus ([address]     Diabetes mellitus ([address]     Hyperlipidemia     Hypertension     Hypertriglyceridemia     MI (myocardial infarction) ([address] 2004    Obesity     Sleep apnea has CPAP    Viral pneumonia 10/15/2020     Past Surgical History:   Procedure Laterality Date    BACK SURGERY  November 1997    L4 & L5 fusion    CARDIAC CATHETERIZATION  10/2019   Chantell Mathewgomery CARDIAC SURGERY  October 22, 2004    Cardiac cath with stent placement x1    COLONOSCOPY  October 2010    CORONARY ANGIOPLASTY WITH STENT PLACEMENT  10/17/2019    HERNIA REPAIR  1991    Mesh repair in abdomen.  ULTRASOUND PROSTATE/TRANSRECTAL N/A 12/7/2020    TRANSRECTAL ULTRASOUND WITH PROSTATE BIOPSY performed by Jai Harding MD at [facility] History   Problem Relation Age of Onset    Diabetes Mother     Heart Disease Mother     High Blood Pressure Mother     Arthritis Father     Diabetes Father     Heart Disease Father     High Blood Pressure Father     Diabetes Sister     Diabetes Brother     Heart Disease Brother     Cancer Neg Hx     Stroke Neg Hx      Outpatient Medications Marked as Taking for the 9/27/21 encounter (Office Visit) with Jai Harding MD   Medication Sig Dispense Refill    Cyanocobalamin ER (RA VITAMIN B-12 TR) 1000 MCG TBCR TAKE 1 TABLET BY MOUTH DAILY 30 tablet 2    BD INSULIN SYRINGE U/F 31G X 5/16\" 1 ML MISC use as directed three times a day      ALPRAZolam (XANAX) 1 MG tablet Take 1 tablet by mouth 2 times daily for 30 days. 60 tablet 0    LANTUS SOLOSTAR 100 UNIT/ML injection pen INJECT 40 UNITS INTO THE SKIN 2 TIMES A DAY 96 mL 0    B-D ULTRAFINE III SHORT PEN 31G X 8 MM MISC USE TWICE DAILY WITH THE TempolibAGLAR PEN.  200 each 3    albuterol (PROVENTIL) (2.5 MG/3ML) 0.083% nebulizer solution Take 3 mLs by nebulization every 4 hours as needed for Wheezing 2 Package 1    ferrous sulfate (IRON 325) 325 (65 Fe) MG tablet Take 1 tablet by mouth every 48 hours 30 tablet 3    RA VITAMIN C/ERNA HIPS 500 MG tablet TAKE 1 TABLET BY MOUTH DAILY 30 tablet 3    cromolyn (OPTICROM) 4 % ophthalmic solution instill 1 drop into both eyes four times a day 10 mL 0    enalapril (VASOTEC) 10 MG tablet Take 1 tablet by mouth daily 90 tablet 1    pantoprazole (PROTONIX) 40 MG tablet Take 1 tablet by mouth daily 90 tablet 1    metFORMIN (GLUCOPHAGE) 1000 MG tablet Take 1 tablet by mouth daily (with breakfast) 180 tablet 1    fluticasone (FLONASE) 50 MCG/ACT nasal spray One spray in each nostril q hs 3 Bottle 1    bicalutamide (CASODEX) 50 MG chemo tablet Take 1 tablet by mouth daily 90 tablet 3    Insulin Syringes, Disposable, U-100 1 ML MISC 1 each by Does not apply route 3 times daily 31 gauge x 8 mm  ultrafine 2 100 each 5    amLODIPine (NORVASC) 10 MG tablet Take 10 mg by mouth daily      insulin lispro (HUMALOG) 100 UNIT/ML injection vial Inject 10 Units into the skin 2 times daily as needed for High Blood Sugar when blood sugar > 155      clopidogrel (PLAVIX) 75 MG tablet take 1 tablet by mouth once daily 90 tablet 1    metoprolol tartrate (LOPRESSOR) 25 MG tablet Take 1 tablet by mouth 2 times daily  0    Vitamin D, Cholecalciferol, 25 MCG (1000 UT) TABS Take 1,000 Units by mouth daily      pravastatin (PRAVACHOL) 40 MG tablet Take 1 tablet by mouth nightly  0    gemfibrozil (LOPID) 600 MG tablet Take 1 tablet by mouth 2 times daily 180 tablet 1    nitroGLYCERIN (NITROSTAT) 0.4 MG SL tablet Place 0.4 mg under the tongue every 5 minutes as needed for Chest pain       aspirin 325 MG tablet Take 325 mg by mouth daily.            Ace inhibitors, Baclofen, Darvocet [propoxyphene n-acetaminophen], Darvon [propoxyphene hcl], Flexeril [cyclobenzaprine], Morphine, Motrin [ibuprofen micronized], Tylenol [acetaminophen], and Ultram [tramadol]  Social History     Tobacco Use   Smoking Status Former Smoker    Packs/day: 0.25    Years: 2.00    Pack years: 0.50    Types: Cigarettes    Quit date: 1995    Years since quittin.7   Smokeless Tobacco Never Used       Social History     Substance and Sexual Activity   Alcohol Use No       REVIEW OF SYSTEMS:  Constitutional: negative  Eyes: negative  Respiratory: negative  Cardiovascular: negative  Gastrointestinal: negative  Musculoskeletal: negative  Genitourinary: negative  Skin: negative   Neurological: negative  Hematological/Lymphatic: negative  Psychological: negative    Physical Exam:      Vitals:    09/27/21 1144   BP: 112/60     Constitutional: Patient in no acute distress   Neuro: alert and oriented to person place and time. Psych: Mood and affect normal.  Head: atraumatic normocephalic  Eyes: EOMi  HEENT: neck supple, trachea midline  Lungs: Respiratory effort normal  Cardiovascular:  Normal peripheral pulses  Abdomen: Soft, non-tender, non-distended, No CVA  Bladder: non-tender and not distended. FROMx4, no cyanosis clubbing edema  Skin: warm and dry        Assessment and Plan      1. Prostate cancer [facility])           Plan:      No follow-ups on file. Path: HR prostate cancer, high volume G9 (4+5)  - SP EBRT on ADT (casodex)  - Good PSA response  - has urinary urgency - will order oxybutynin 5 mg ER    S/p Cystolithalopaxy     Cysto and void trial for BPH with retention - catheter removed  Flomax 0.8 mg  Lupron injection for ADT therapy  Voiding well, has some irritative symptoms after radiation.

## 2021-10-07 DIAGNOSIS — F41.9 ANXIETY: ICD-10-CM

## 2021-10-07 RX ORDER — ALPRAZOLAM 1 MG/1
1 TABLET ORAL 2 TIMES DAILY
Qty: 60 TABLET | Refills: 0 | Status: SHIPPED | OUTPATIENT
Start: 2021-10-07 | End: 2021-11-05 | Stop reason: SDUPTHER

## 2021-10-07 NOTE — TELEPHONE ENCOUNTER
Damián Caba called requesting a refill on the following medications:  Requested Prescriptions     Pending Prescriptions Disp Refills    insulin lispro (HUMALOG) 100 UNIT/ML injection vial [Pharmacy Med Name: INSULIN LISPRO 100 UNIT/ML ] 30 mL 1     Sig: inject 10 units subcutaneously twice a day if needed for HIGH BLOOD SUGAR.  ALPRAZolam (XANAX) 1 MG tablet 60 tablet 0     Sig: Take 1 tablet by mouth 2 times daily for 30 days.        Date of last visit: 9/8/2021  Date of next visit (if applicable):12/17/2021  Date of last refill: 10/15/20  Pharmacy Name: Octavia Shultz LPN

## 2021-10-08 ENCOUNTER — TELEPHONE (OUTPATIENT)
Dept: UROLOGY | Age: 61
End: 2021-10-08

## 2021-10-08 NOTE — TELEPHONE ENCOUNTER
Patient advised may trial the 10 mg xl and to keep his scheduled appointment. He will call the office when he needs a new prescription.

## 2021-10-08 NOTE — TELEPHONE ENCOUNTER
Patient c/o the oxybutynin xl 5 mg is not working any longer. He is getting up a lot at night at least 4 times. The frequency is not as bad during the day. C/o some burning which is not new. He had burning since radiation. He also has a hard time controlling the urine. He was on the 10 mg XL BID in the past and feels like it helped more. Can he take the Ditropan 10 mg xl BID? Please advise.  Thank you

## 2021-10-08 NOTE — TELEPHONE ENCOUNTER
He can trial 10mgXL, watch closely for retention  Has f/u in December  Does he need more medication sent

## 2021-10-18 RX ORDER — ASCORBIC ACID 500 MG
500 TABLET ORAL DAILY
Qty: 30 TABLET | Refills: 3 | Status: SHIPPED | OUTPATIENT
Start: 2021-10-18 | End: 2022-02-14

## 2021-10-18 NOTE — TELEPHONE ENCOUNTER
Requested Prescriptions     Pending Prescriptions Disp Refills    ascorbic acid (RA VITAMIN C/ERNA HIPS) 500 MG tablet 30 tablet 3   called in and requested for a refill  Next appointment 11/02/2021.

## 2021-10-22 ENCOUNTER — TELEPHONE (OUTPATIENT)
Dept: UROLOGY | Age: 61
End: 2021-10-22

## 2021-10-22 RX ORDER — OXYBUTYNIN CHLORIDE 10 MG/1
10 TABLET, EXTENDED RELEASE ORAL DAILY
Qty: 30 TABLET | Refills: 1 | Status: SHIPPED | OUTPATIENT
Start: 2021-10-22 | End: 2021-12-27

## 2021-10-29 DIAGNOSIS — J30.1 CHRONIC SEASONAL ALLERGIC RHINITIS DUE TO POLLEN: ICD-10-CM

## 2021-10-29 NOTE — TELEPHONE ENCOUNTER
Erik Jeronimo is requesting a refill on the following medications:  Requested Prescriptions     Pending Prescriptions Disp Refills    fluticasone (FLONASE) 50 MCG/ACT nasal spray 3 each 1     Sig: One spray in each nostril q hs       Date of last visit: 9/8/2021  Date of next visit (if applicable):Visit date not found  Date of last refill: 5/6/21  Pharmacy Name: Jefferson Garza LPN

## 2021-10-30 RX ORDER — FLUTICASONE PROPIONATE 50 MCG
SPRAY, SUSPENSION (ML) NASAL
Qty: 3 EACH | Refills: 1 | Status: SHIPPED | OUTPATIENT
Start: 2021-10-30 | End: 2022-04-05 | Stop reason: SDUPTHER

## 2021-11-02 ENCOUNTER — OFFICE VISIT (OUTPATIENT)
Dept: PULMONOLOGY | Age: 61
End: 2021-11-02
Payer: MEDICAID

## 2021-11-02 VITALS
DIASTOLIC BLOOD PRESSURE: 60 MMHG | TEMPERATURE: 97.8 F | HEART RATE: 75 BPM | BODY MASS INDEX: 42.98 KG/M2 | OXYGEN SATURATION: 96 % | HEIGHT: 69 IN | SYSTOLIC BLOOD PRESSURE: 122 MMHG | WEIGHT: 290.2 LBS

## 2021-11-02 DIAGNOSIS — G47.33 OSA TREATED WITH BIPAP: ICD-10-CM

## 2021-11-02 DIAGNOSIS — J98.4 MIXED RESTRICTIVE AND OBSTRUCTIVE LUNG DISEASE (HCC): Primary | ICD-10-CM

## 2021-11-02 DIAGNOSIS — J43.9 MIXED RESTRICTIVE AND OBSTRUCTIVE LUNG DISEASE (HCC): Primary | ICD-10-CM

## 2021-11-02 DIAGNOSIS — E66.2 OBESITY HYPOVENTILATION SYNDROME (HCC): ICD-10-CM

## 2021-11-02 PROCEDURE — 3023F SPIROM DOC REV: CPT | Performed by: NURSE PRACTITIONER

## 2021-11-02 PROCEDURE — G8427 DOCREV CUR MEDS BY ELIG CLIN: HCPCS | Performed by: NURSE PRACTITIONER

## 2021-11-02 PROCEDURE — G8484 FLU IMMUNIZE NO ADMIN: HCPCS | Performed by: NURSE PRACTITIONER

## 2021-11-02 PROCEDURE — 99215 OFFICE O/P EST HI 40 MIN: CPT | Performed by: NURSE PRACTITIONER

## 2021-11-02 PROCEDURE — 3017F COLORECTAL CA SCREEN DOC REV: CPT | Performed by: NURSE PRACTITIONER

## 2021-11-02 PROCEDURE — G8417 CALC BMI ABV UP PARAM F/U: HCPCS | Performed by: NURSE PRACTITIONER

## 2021-11-02 PROCEDURE — 1036F TOBACCO NON-USER: CPT | Performed by: NURSE PRACTITIONER

## 2021-11-02 PROCEDURE — G8926 SPIRO NO PERF OR DOC: HCPCS | Performed by: NURSE PRACTITIONER

## 2021-11-02 RX ORDER — UMECLIDINIUM BROMIDE AND VILANTEROL TRIFENATATE 62.5; 25 UG/1; UG/1
1 POWDER RESPIRATORY (INHALATION) DAILY
Qty: 3 EACH | Refills: 3 | Status: SHIPPED | OUTPATIENT
Start: 2021-11-02 | End: 2022-04-12

## 2021-11-02 RX ORDER — ALBUTEROL SULFATE 90 UG/1
2 AEROSOL, METERED RESPIRATORY (INHALATION) EVERY 6 HOURS PRN
Qty: 1 EACH | Refills: 3 | Status: SHIPPED | OUTPATIENT
Start: 2021-11-02 | End: 2022-06-03 | Stop reason: SDUPTHER

## 2021-11-02 ASSESSMENT — ENCOUNTER SYMPTOMS
BACK PAIN: 1
WHEEZING: 0
ALLERGIC/IMMUNOLOGIC NEGATIVE: 1
VOMITING: 0
DIARRHEA: 0
CHEST TIGHTNESS: 0
NAUSEA: 0
SHORTNESS OF BREATH: 1
GASTROINTESTINAL NEGATIVE: 1
EYES NEGATIVE: 1
STRIDOR: 0

## 2021-11-03 ENCOUNTER — TELEPHONE (OUTPATIENT)
Dept: PULMONOLOGY | Age: 61
End: 2021-11-03

## 2021-11-05 DIAGNOSIS — F41.9 ANXIETY: ICD-10-CM

## 2021-11-05 RX ORDER — ALPRAZOLAM 1 MG/1
1 TABLET ORAL 2 TIMES DAILY
Qty: 60 TABLET | Refills: 0 | Status: SHIPPED | OUTPATIENT
Start: 2021-11-05 | End: 2021-12-03 | Stop reason: SDUPTHER

## 2021-11-05 NOTE — TELEPHONE ENCOUNTER
Fide Salvage called requesting a refill on the following medications:  Requested Prescriptions     Pending Prescriptions Disp Refills    ALPRAZolam (XANAX) 1 MG tablet 60 tablet 0     Sig: Take 1 tablet by mouth 2 times daily for 30 days.        Date of last visit: 9/8/2021  Date of next visit (if applicable):Visit date not found  Date of last refill: 10/07/21  Pharmacy Name: Leno Shultz LPN

## 2021-11-29 RX ORDER — TAMSULOSIN HYDROCHLORIDE 0.4 MG/1
0.8 CAPSULE ORAL DAILY
Qty: 60 CAPSULE | Refills: 3 | Status: SHIPPED | OUTPATIENT
Start: 2021-11-29 | End: 2021-12-06 | Stop reason: SDUPTHER

## 2021-11-29 NOTE — TELEPHONE ENCOUNTER
Ned Kawasaki called requesting a refill on the following medications:  Requested Prescriptions     Pending Prescriptions Disp Refills    tamsulosin (FLOMAX) 0.4 MG capsule 60 capsule 3     Sig: Take 2 capsules by mouth daily Take one capsule daily to facilitate passage of ureteral stone     Pharmacy verified:  .pv  [facility] in [place]    Date of last visit: 09/27/2021  Date of next visit (if applicable): 10/38/8738

## 2021-11-30 ENCOUNTER — HOSPITAL ENCOUNTER (OUTPATIENT)
Age: 61
Discharge: HOME | End: 2021-11-30
Payer: MEDICAID

## 2021-11-30 PROCEDURE — 84153 ASSAY OF PSA TOTAL: CPT

## 2021-11-30 PROCEDURE — 36415 COLL VENOUS BLD VENIPUNCTURE: CPT

## 2021-12-01 LAB — PROSTATE SPECIFIC ANTIGEN: < 0.02 NG/ML (ref 0–1)

## 2021-12-03 ENCOUNTER — TELEPHONE (OUTPATIENT)
Dept: UROLOGY | Age: 61
End: 2021-12-03

## 2021-12-03 DIAGNOSIS — F41.9 ANXIETY: ICD-10-CM

## 2021-12-03 RX ORDER — PEN NEEDLE, DIABETIC 29 G X1/2"
NEEDLE, DISPOSABLE MISCELLANEOUS
Qty: 100 EACH | Refills: 3 | Status: SHIPPED | OUTPATIENT
Start: 2021-12-03 | End: 2022-04-22

## 2021-12-03 RX ORDER — ALPRAZOLAM 1 MG/1
1 TABLET ORAL 2 TIMES DAILY
Qty: 60 TABLET | Refills: 0 | Status: SHIPPED | OUTPATIENT
Start: 2021-12-03 | End: 2022-01-04 | Stop reason: SDUPTHER

## 2021-12-03 NOTE — TELEPHONE ENCOUNTER
Patient left a message stating the pharmacy did not receive the refills for flomax on 11/29/2021.     Please resend Thank you

## 2021-12-03 NOTE — TELEPHONE ENCOUNTER
Keyur Hargrove called requesting a refill on the following medications:  Requested Prescriptions     Pending Prescriptions Disp Refills    BD INSULIN SYRINGE U/F 31G X 5/16\" 1 ML MISC 100 each 3     Sig: use as directed three times a day    ALPRAZolam (XANAX) 1 MG tablet 60 tablet 0     Sig: Take 1 tablet by mouth 2 times daily for 30 days.        Date of last visit: 9/8/2021  Date of next visit (if applicable):Visit date not found  Date of last refill: 12/05/21  Pharmacy Name: [facility]      Thanks,  Joseline Cano LPN

## 2021-12-03 NOTE — TELEPHONE ENCOUNTER
Outpatient Medication Detail     Disp Refills Start End    tamsulosin (FLOMAX) 0.4 MG capsule 60 capsule 3 11/29/2021 12/29/2021    Sig - Route:  Take 2 capsules by mouth daily Take one capsule daily to facilitate passage of ureteral stone - Oral    Sent to pharmacy as: Tamsulosin HCl 0.4 MG Oral Capsule [facility])    E-Prescribing Status: Receipt confirmed by pharmacy (11/29/2021  5:03 PM EST)      Pharmacy confirmed receipt

## 2021-12-06 RX ORDER — TAMSULOSIN HYDROCHLORIDE 0.4 MG/1
0.8 CAPSULE ORAL DAILY
Qty: 60 CAPSULE | Refills: 3 | Status: SHIPPED | OUTPATIENT
Start: 2021-12-06 | End: 2021-12-06

## 2021-12-06 RX ORDER — TAMSULOSIN HYDROCHLORIDE 0.4 MG/1
0.8 CAPSULE ORAL DAILY
Qty: 60 CAPSULE | Refills: 3 | Status: SHIPPED | OUTPATIENT
Start: 2021-12-06 | End: 2022-04-05 | Stop reason: SDUPTHER

## 2021-12-06 NOTE — TELEPHONE ENCOUNTER
Pharmacy called to clarify the dose of flomax. The prescription had 2 sets of directions. Advised per Dr Steph Henry note on 09/27/2021patient takes 0.8 mg daily.

## 2021-12-06 NOTE — TELEPHONE ENCOUNTER
Spoke to [facility]. They do not have the prescription. Can this be sent to the pharmacy? Thank you.

## 2021-12-12 DIAGNOSIS — I10 ESSENTIAL HYPERTENSION: ICD-10-CM

## 2021-12-13 NOTE — TELEPHONE ENCOUNTER
Patient's last appointment was : 9/8/2021  Patient's next appointment is : 1/4/2022  Last refilled:06/7/21

## 2021-12-14 DIAGNOSIS — E53.8 LOW SERUM VITAMIN B12: ICD-10-CM

## 2021-12-14 RX ORDER — ENALAPRIL MALEATE 20 MG/1
TABLET ORAL
Qty: 90 TABLET | Refills: 0 | Status: SHIPPED | OUTPATIENT
Start: 2021-12-14 | End: 2021-12-17 | Stop reason: SDUPTHER

## 2021-12-14 RX ORDER — PSYLLIUM HUSK 3.4 G/7G
POWDER ORAL
Qty: 30 TABLET | Refills: 2 | Status: SHIPPED | OUTPATIENT
Start: 2021-12-14

## 2021-12-17 ENCOUNTER — TELEPHONE (OUTPATIENT)
Dept: FAMILY MEDICINE CLINIC | Age: 61
End: 2021-12-17

## 2021-12-17 DIAGNOSIS — I10 ESSENTIAL HYPERTENSION: ICD-10-CM

## 2021-12-17 DIAGNOSIS — K21.9 GASTROESOPHAGEAL REFLUX DISEASE WITHOUT ESOPHAGITIS: ICD-10-CM

## 2021-12-17 RX ORDER — ENALAPRIL MALEATE 10 MG/1
10 TABLET ORAL DAILY
Qty: 90 TABLET | Refills: 0 | Status: SHIPPED | OUTPATIENT
Start: 2021-12-17 | End: 2022-01-04 | Stop reason: SDUPTHER

## 2021-12-17 RX ORDER — ENALAPRIL MALEATE 20 MG/1
10 TABLET ORAL DAILY
Qty: 90 TABLET | Refills: 0 | Status: SHIPPED | OUTPATIENT
Start: 2021-12-17 | End: 2021-12-17

## 2021-12-17 RX ORDER — PANTOPRAZOLE SODIUM 40 MG/1
TABLET, DELAYED RELEASE ORAL
Qty: 90 TABLET | Refills: 1 | Status: SHIPPED | OUTPATIENT
Start: 2021-12-17 | End: 2022-01-04 | Stop reason: SDUPTHER

## 2021-12-17 NOTE — TELEPHONE ENCOUNTER
Lidia Galvin called in concerning his Vasotec Rx. He states at last OV, Dr. Pauline Wing informed he was to take 10 mg daily, but his Rx is for 20 mg. Is he supposed to cut his tablets in half, or is he mistaken and should be taking 20 mg daily?

## 2021-12-17 NOTE — TELEPHONE ENCOUNTER
Reviewed chart. The request that was sent to me was for the 20 mg tablets and so that was approved. He is supposed to be on the 10 mg as of September visit with Dr. Gerardo Kessler. Prescription was corrected.

## 2021-12-17 NOTE — TELEPHONE ENCOUNTER
Tavo Deluca is requesting a refill on the following medications:  Requested Prescriptions     Pending Prescriptions Disp Refills    pantoprazole (PROTONIX) 40 MG tablet [Pharmacy Med Name: PANTOPRAZOLE SOD DR 40 MG TAB] 90 tablet 1     Sig: take 1 tablet by mouth once daily       Date of last visit: 9/8/2021  Date of next visit (if applicable):1/4/2022  Date of last refill: 6/7/2021  Pharmacy Name: Yoselin Perez LPN

## 2021-12-20 NOTE — TELEPHONE ENCOUNTER
Called and notified patient to cut 20mg tablets in half. Explained to him that he should only be taking 10mg. He has verbalized an understanding.

## 2021-12-27 ENCOUNTER — OFFICE VISIT (OUTPATIENT)
Dept: UROLOGY | Age: 61
End: 2021-12-27
Payer: MEDICAID

## 2021-12-27 VITALS
HEART RATE: 76 BPM | BODY MASS INDEX: 43.1 KG/M2 | DIASTOLIC BLOOD PRESSURE: 78 MMHG | SYSTOLIC BLOOD PRESSURE: 133 MMHG | HEIGHT: 69 IN | WEIGHT: 291 LBS

## 2021-12-27 DIAGNOSIS — R33.8 BENIGN PROSTATIC HYPERPLASIA WITH URINARY RETENTION: ICD-10-CM

## 2021-12-27 DIAGNOSIS — N40.1 BENIGN PROSTATIC HYPERPLASIA WITH URINARY RETENTION: ICD-10-CM

## 2021-12-27 DIAGNOSIS — E11.9 WELL CONTROLLED TYPE 2 DIABETES MELLITUS (HCC): Chronic | ICD-10-CM

## 2021-12-27 DIAGNOSIS — R35.1 NOCTURIA: ICD-10-CM

## 2021-12-27 DIAGNOSIS — C61 MALIGNANT NEOPLASM OF PROSTATE (HCC): Primary | ICD-10-CM

## 2021-12-27 DIAGNOSIS — R33.9 URINARY RETENTION: ICD-10-CM

## 2021-12-27 DIAGNOSIS — C61 PROSTATE CANCER (HCC): ICD-10-CM

## 2021-12-27 DIAGNOSIS — N21.0 BLADDER STONE: ICD-10-CM

## 2021-12-27 LAB
BILIRUBIN URINE: NEGATIVE
BLOOD URINE, POC: NORMAL
CHARACTER, URINE: CLEAR
COLOR, URINE: YELLOW
GLUCOSE URINE: NEGATIVE MG/DL
KETONES, URINE: NEGATIVE
LEUKOCYTE CLUMPS, URINE: NEGATIVE
NITRITE, URINE: NEGATIVE
PH, URINE: 6 (ref 5–9)
POST VOID RESIDUAL (PVR): 400 ML
PROTEIN, URINE: NEGATIVE MG/DL
SPECIFIC GRAVITY, URINE: 1.01 (ref 1–1.03)
UROBILINOGEN, URINE: 0.2 EU/DL (ref 0–1)

## 2021-12-27 PROCEDURE — 51798 US URINE CAPACITY MEASURE: CPT | Performed by: UROLOGY

## 2021-12-27 PROCEDURE — 3017F COLORECTAL CA SCREEN DOC REV: CPT | Performed by: UROLOGY

## 2021-12-27 PROCEDURE — 1036F TOBACCO NON-USER: CPT | Performed by: UROLOGY

## 2021-12-27 PROCEDURE — 81003 URINALYSIS AUTO W/O SCOPE: CPT | Performed by: UROLOGY

## 2021-12-27 PROCEDURE — 96402 CHEMO HORMON ANTINEOPL SQ/IM: CPT | Performed by: UROLOGY

## 2021-12-27 PROCEDURE — G8417 CALC BMI ABV UP PARAM F/U: HCPCS | Performed by: UROLOGY

## 2021-12-27 PROCEDURE — 99214 OFFICE O/P EST MOD 30 MIN: CPT | Performed by: UROLOGY

## 2021-12-27 PROCEDURE — G8428 CUR MEDS NOT DOCUMENT: HCPCS | Performed by: UROLOGY

## 2021-12-27 PROCEDURE — G8484 FLU IMMUNIZE NO ADMIN: HCPCS | Performed by: UROLOGY

## 2021-12-27 RX ORDER — OXYBUTYNIN CHLORIDE 5 MG/1
5 TABLET, EXTENDED RELEASE ORAL 2 TIMES DAILY
Qty: 60 TABLET | Refills: 6 | Status: SHIPPED | OUTPATIENT
Start: 2021-12-27 | End: 2022-08-02 | Stop reason: SDUPTHER

## 2021-12-27 RX ORDER — INSULIN GLARGINE 100 [IU]/ML
INJECTION, SOLUTION SUBCUTANEOUS
Qty: 96 ML | Refills: 0 | OUTPATIENT
Start: 2021-12-27

## 2021-12-27 NOTE — PROGRESS NOTES
Patient has given me verbal consent to perform Lupron Injection  Yes      Following Dr. Cl Jasmine of [facility]. LUPRON 45 MG GIVEN I.M RIGHT UOQ HIP  Lot Number: [id number]  Expiration Date: 4-  Arianna Castellano #: [id number]    After Injection was given there were no reactions at injection site and patient was feeling well. Patient was notified that possible side effects from injections include: Redness, swelling and itching at the injection site. Possible side effects of androgen deprivation therapy, including hot flashes, flushing of the skin, increased weight, decreased sex drive, and difficulties with ED. Patient was instructed to call the office with any further questions or concerns. Patient supplied their own medications No      Pt [id number] therapy first initiated on 5-.

## 2021-12-27 NOTE — PROGRESS NOTES
MD MD [facility] / Follow up [facility]    Patient:  Holly Rangel  YOB: 1960  Date: 12/27/2021    HISTORY OF PRESENT ILLNESS:   The patient is a 62 y.o. male who presents today for follow-up for the following problem(s): elevated PSA, BPH with luts,  Urinary retention, Bladder stone  Overall the problem(s) : are worsening. Associated Symptoms: No dysuria, gross hematuria. Pain Severity:       Today visit:   12/27/21   Karo Corona follows with us for HR prostate cancer s/p EBRT Radiation (3/2021) and on ADT (lupron). His PSA has responded well. PSA:  < 0.02. He also had BPH with LUTs and urinary retention - improved on ADT. He is also on Flomax 0.8 mg    He is havingn significant urgency after radiation, and is bothered by these symptoms. Wishes for improvement. 5/24/21  Cystoscopy Operative Note  Surgeon: Sania Lara MD   Anesthesia: Urethral 2%  Indications: BPH with retention  Position: supine  Findings:   The patient was prepped and draped in the usual sterile fashion. The flexible cystoscope was advanced through the urethra and into the bladder. The bladder was thoroughly inspected and the following was noted:    Residual Urine: Moderate  Urethra: No abnormalities of the urethra are noted. Prostate: Complete obstruction by lateral lobe of prostate. Bladder: No tumors or CIS noted. No bladder diverticulum. Moderate trabeculation noted. Ureters: Clear efflux from both ureters. Orifices with normal configuration and location. The cystoscope was removed. The patient tolerated the procedure well. Void trial      4/26/21   [facility] follows with us for HR PCa. We reviewed old records. Pathology reviewed: High volume [place] 4+5 = 9, with PNI, (Grade Group 5). Diagnotic PSA: 16.28   He has finished radiation thearpy. He is still on Bicaluatmide, discussed lupron injection. 1/15/21  Bone scan  1.  Abnormal radiotracer accumulation in the left hemipelvis and right femur, possibly secondary to a patulous disease. Osseous metastatic disease cannot be excluded. 2. Probable degenerative arthropathic changes as detailed above. CT  1. 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 2. The previously a few low-density foci in the right kidney, likely cysts. Confirmation with ultrasound recommended. 3. Findings of constipation. No evidence to suggest metastatic disease         12/18/20  Karo Corona follow up after prostate biopsy and cystolithalopaxy for large bladder stone. Doing well, no fevers, hematuria resolved. Pathology reviewed: High volume [place] 4+5 = 9, with PNI, (Grade Group 5). PSA: 16.28    10/26/20  Karo Corona presents with history of BPH with LUTs, and elevated PSA. An MRI was denies by his insurance, so we review options of elevated PSA. He also has worsening LUTs, with retention and hematuria. He is on Home O2 for recent bronchitis, which is improving. He has history of CAD on ASA and Plavix. Cystoscopy Operative Note  Surgeon: Sania Lara   Anesthesia: Urethral 2%  Indications: BPH with LUTs  Position: supine  Findings:   The patient was prepped and draped in the usual sterile fashion. The flexible cystoscope was advanced through the urethra and into the bladder. The bladder was thoroughly inspected and the following was noted:  Residual Urine: Moderate  Urethra: No abnormalities of the urethra are noted. Prostate: Large gland Complete obstruction by latera lobe of prostate. Bladder: No tumors or CIS noted. No bladder diverticulum. Large bladder stone. Ureters: Clear efflux from both ureters. Orifices with normal configuration and location. The cystoscope was removed. The patient tolerated the procedure well.   Plan  Standard TURP  Cystolithalopaxy     9/18/20  63 yo male that presents with LUTs and difficulty emptying his bladder. He has incomplete emptying, frequency, and Nocturia, that are worsening over the last couple years. On flomax but he is stating his symptoms are worsening. AUASS: 11/4. Denies hematuria. PVR: 48 ml. Smoking history: quit 20 years ago.  FH: none. ANTONIO: plavix, for CAD s/p stents (10/2019).   Has elevated PSA, 16.          Summary of old records:   (Patient's old records, notes and chart reviewed and summarized above.)     Last several PSA's:  Lab Results   Component Value Date    PSA <0.02 11/30/2021    PSA 0.02 09/08/2021    PSA 0.81 06/08/2021       Last total testosterone:  No results found for: TESTOSTERONE    Urinalysis today:  Results for POC orders placed in visit on 12/27/21   POCT Urinalysis No Micro (Auto)   Result Value Ref Range    Glucose, Ur Negative NEGATIVE mg/dl    Bilirubin Urine Negative     Ketones, Urine Negative NEGATIVE    Specific Gravity, Urine 1.015 1.002 - 1.030    Blood, UA POC Trace-intact NEGATIVE    pH, Urine 6.00 5.0 - 9.0    Protein, Urine Negative NEGATIVE mg/dl    Urobilinogen, Urine 0.20 0.0 - 1.0 eu/dl    Nitrite, Urine Negative NEGATIVE    Leukocyte Clumps, Urine Negative NEGATIVE    Color, Urine Yellow [id number]    Character, Urine Clear [id number]   poct post void residual   Result Value Ref Range    post void residual 400 ml       Last BUN and creatinine:  Lab Results   Component Value Date    BUN 27 (H) 09/08/2021     Lab Results   Component Value Date    CREATININE 1.0 09/08/2021       Imaging Reviewed during this Office Visit:   (results were independently reviewed by physician and radiology report verified)    PAST MEDICAL, FAMILY AND SOCIAL HISTORY UPDATE:  Past Medical History:   Diagnosis Date    Abnormal stress test Sept 2012    L[facility]-    CAD (coronary artery disease)     Cancer ([facility] [address]     Prostate    Chronic low back pain     Diabetes mellitus ([address]     Diabetes mellitus ([address]     Hyperlipidemia     Hypertension     Hypertriglyceridemia     MI (myocardial infarction) ([facility] [address] 2004    Obesity     Sleep apnea     has CPAP    Viral pneumonia 10/15/2020     Past Surgical History:   Procedure Laterality Date    BACK SURGERY  November 1997    L4 & L5 fusion    CARDIAC CATHETERIZATION  10/2019   [facility] CARDIAC SURGERY  October 22, 2004    Cardiac cath with stent placement x1    COLONOSCOPY  October 2010    CORONARY ANGIOPLASTY WITH STENT PLACEMENT  10/17/2019    HERNIA REPAIR  1991    Mesh repair in abdomen.  ULTRASOUND PROSTATE/TRANSRECTAL N/A 12/7/2020    TRANSRECTAL ULTRASOUND WITH PROSTATE BIOPSY performed by Oliver Markham MD at [address] [place] [address] History   Problem Relation Age of Onset    Diabetes Mother     Heart Disease Mother     High Blood Pressure Mother     Arthritis Father     Diabetes Father     Heart Disease Father     High Blood Pressure Father     Diabetes Sister     Diabetes Brother     Heart Disease Brother     Cancer Neg Hx     Stroke Neg Hx      Outpatient Medications Marked as Taking for the 12/27/21 encounter (Office Visit) with Oliver Markham MD   Medication Sig Dispense Refill    pantoprazole (PROTONIX) 40 MG tablet take 1 tablet by mouth once daily 90 tablet 1    enalapril (VASOTEC) 10 MG tablet Take 1 tablet by mouth daily 90 tablet 0    Cyanocobalamin ER (RA VITAMIN B-12 TR) 1000 MCG TBCR take 1 tablet by mouth once daily 30 tablet 2    tamsulosin (FLOMAX) 0.4 MG capsule Take 2 capsules by mouth daily 60 capsule 3    BD INSULIN SYRINGE U/F 31G X 5/16\" 1 ML MISC use as directed three times a day 100 each 3    ALPRAZolam (XANAX) 1 MG tablet Take 1 tablet by mouth 2 times daily for 30 days.  60 tablet 0    albuterol sulfate HFA (PROAIR HFA) 108 (90 Base) MCG/ACT inhaler Inhale 2 puffs into the lungs every 6 hours as needed for Wheezing or Shortness of Breath 1 each 3    umeclidinium-vilanterol (ANORO ELLIPTA) 62.5-25 MCG/INH AEPB inhaler Inhale 1 puff into the lungs daily 3 each 3    fluticasone (FLONASE) 50 MCG/ACT nasal spray One spray in each nostril q hs 3 each 1    oxybutynin (DITROPAN XL) 10 MG extended release tablet Take 1 tablet by mouth daily 30 tablet 1    ascorbic acid (RA VITAMIN C/ERNA HIPS) 500 MG tablet Take 1 tablet by mouth daily 30 tablet 3    insulin lispro (HUMALOG) 100 UNIT/ML injection vial inject 10 units subcutaneously twice a day if needed for HIGH BLOOD SUGAR. 30 mL 3    LANTUS SOLOSTAR 100 UNIT/ML injection pen INJECT 40 UNITS INTO THE SKIN 2 TIMES A DAY 96 mL 0    B-D ULTRAFINE III SHORT PEN 31G X 8 MM MISC USE TWICE DAILY WITH THE [employer] PEN.  200 each 3    albuterol (PROVENTIL) (2.5 MG/3ML) 0.083% nebulizer solution Take 3 mLs by nebulization every 4 hours as needed for Wheezing 2 Package 1    ferrous sulfate (IRON 325) 325 (65 Fe) MG tablet Take 1 tablet by mouth every 48 hours 30 tablet 3    cromolyn (OPTICROM) 4 % ophthalmic solution instill 1 drop into both eyes four times a day 10 mL 0    metFORMIN (GLUCOPHAGE) 1000 MG tablet Take 1 tablet by mouth daily (with breakfast) 180 tablet 1    bicalutamide (CASODEX) 50 MG chemo tablet Take 1 tablet by mouth daily 90 tablet 3    Insulin Syringes, Disposable, U-100 1 ML MISC 1 each by Does not apply route 3 times daily 31 gauge x 8 mm  ultrafine 2 100 each 5    amLODIPine (NORVASC) 10 MG tablet Take 10 mg by mouth daily      clopidogrel (PLAVIX) 75 MG tablet take 1 tablet by mouth once daily 90 tablet 1    metoprolol tartrate (LOPRESSOR) 25 MG tablet Take 1 tablet by mouth 2 times daily  0    Vitamin D, Cholecalciferol, 25 MCG (1000 UT) TABS Take 1,000 Units by mouth daily      pravastatin (PRAVACHOL) 40 MG tablet Take 1 tablet by mouth nightly  0    gemfibrozil (LOPID) 600 MG tablet Take 1 tablet by mouth 2 times daily 180 tablet 1    nitroGLYCERIN (NITROSTAT) 0.4 MG SL tablet Place 0.4 mg under the tongue every 5 minutes as needed for Chest pain       aspirin 325 MG tablet Take 325 mg by mouth daily. Ace inhibitors, Baclofen, Darvocet [propoxyphene n-acetaminophen], Darvon [propoxyphene hcl], Flexeril [cyclobenzaprine], Morphine, Motrin [ibuprofen micronized], Tylenol [acetaminophen], and Ultram [tramadol]  Social History     Tobacco Use   Smoking Status Former Smoker    Packs/day: 0.25    Years: 2.00    Pack years: 0.50    Types: Cigarettes    Quit date: 1995    Years since quittin.0   Smokeless Tobacco Never Used       Social History     Substance and Sexual Activity   Alcohol Use No       REVIEW OF SYSTEMS:  Constitutional: negative  Eyes: negative  Respiratory: negative  Cardiovascular: negative  Gastrointestinal: negative  Musculoskeletal: negative  Genitourinary: negative  Skin: negative   Neurological: negative  Hematological/Lymphatic: negative  Psychological: negative    Physical Exam:      Vitals:    21 0853   BP: 133/78   Pulse: 76     Constitutional: Patient in no acute distress   Neuro: alert and oriented to person place and time. Psych: Mood and affect normal.  Head: atraumatic normocephalic  Eyes: EOMi  HEENT: neck supple, trachea midline  Lungs: Respiratory effort normal  Cardiovascular:  Normal peripheral pulses  Abdomen: Soft, non-tender, non-distended, No CVA  Bladder: non-tender and not distended. FROMx4, no cyanosis clubbing edema  Skin: warm and dry        Assessment and Plan      1. Urinary retention    2. Prostate cancer ([address]    3. Benign prostatic hyperplasia with urinary retention    4. Bladder stone    5. Nocturia           Plan:      No follow-ups on file.   Path: HR prostate cancer, high volume G9 (4+5)  - SP EBRT on ADT (casodex)   - Good PSA response - PSA is undetectable  - has urinary urgency - will order oxybutynin 5 mg ER BID x     S/p Cystolithalopaxy - bladder stone resolved    Cysto and void trial for BPH with retention - catheter removed  Flomax 0.8 mg  Lupron injection for ADT therapy - will continue 6 month injections for 2-3 years.

## 2021-12-28 ENCOUNTER — TELEPHONE (OUTPATIENT)
Dept: UROLOGY | Age: 61
End: 2021-12-28

## 2021-12-28 NOTE — TELEPHONE ENCOUNTER
Request Reference Number: [id number]. LUPRON DEPOT INJ 45MG is approved through 12/28/2022. For further questions, call [employer] at [phone].

## 2021-12-29 RX ORDER — INSULIN GLARGINE 100 [IU]/ML
INJECTION, SOLUTION SUBCUTANEOUS
Qty: 96 ML | Refills: 0 | Status: SHIPPED | OUTPATIENT
Start: 2021-12-29 | End: 2022-04-05 | Stop reason: SDUPTHER

## 2022-01-04 ENCOUNTER — TELEPHONE (OUTPATIENT)
Dept: UROLOGY | Age: 62
End: 2022-01-04

## 2022-01-04 ENCOUNTER — OFFICE VISIT (OUTPATIENT)
Dept: FAMILY MEDICINE CLINIC | Age: 62
End: 2022-01-04
Payer: MEDICAID

## 2022-01-04 VITALS
WEIGHT: 289.8 LBS | BODY MASS INDEX: 42.92 KG/M2 | TEMPERATURE: 97.2 F | DIASTOLIC BLOOD PRESSURE: 68 MMHG | HEART RATE: 77 BPM | HEIGHT: 69 IN | SYSTOLIC BLOOD PRESSURE: 156 MMHG | RESPIRATION RATE: 20 BRPM | OXYGEN SATURATION: 94 %

## 2022-01-04 DIAGNOSIS — J30.1 SEASONAL ALLERGIC RHINITIS DUE TO POLLEN: ICD-10-CM

## 2022-01-04 DIAGNOSIS — I10 ESSENTIAL HYPERTENSION: ICD-10-CM

## 2022-01-04 DIAGNOSIS — K21.9 GASTROESOPHAGEAL REFLUX DISEASE WITHOUT ESOPHAGITIS: ICD-10-CM

## 2022-01-04 DIAGNOSIS — J43.9 MIXED RESTRICTIVE AND OBSTRUCTIVE LUNG DISEASE (HCC): ICD-10-CM

## 2022-01-04 DIAGNOSIS — J98.4 MIXED RESTRICTIVE AND OBSTRUCTIVE LUNG DISEASE (HCC): ICD-10-CM

## 2022-01-04 DIAGNOSIS — Z79.4 TYPE 2 DIABETES MELLITUS WITH HYPERGLYCEMIA, WITH LONG-TERM CURRENT USE OF INSULIN (HCC): ICD-10-CM

## 2022-01-04 DIAGNOSIS — E11.65 TYPE 2 DIABETES MELLITUS WITH HYPERGLYCEMIA, WITH LONG-TERM CURRENT USE OF INSULIN (HCC): ICD-10-CM

## 2022-01-04 DIAGNOSIS — F41.9 ANXIETY: ICD-10-CM

## 2022-01-04 LAB — HBA1C MFR BLD: 7.9 %

## 2022-01-04 PROCEDURE — 1036F TOBACCO NON-USER: CPT | Performed by: FAMILY MEDICINE

## 2022-01-04 PROCEDURE — G8417 CALC BMI ABV UP PARAM F/U: HCPCS | Performed by: FAMILY MEDICINE

## 2022-01-04 PROCEDURE — 2022F DILAT RTA XM EVC RTNOPTHY: CPT | Performed by: FAMILY MEDICINE

## 2022-01-04 PROCEDURE — 83036 HEMOGLOBIN GLYCOSYLATED A1C: CPT | Performed by: FAMILY MEDICINE

## 2022-01-04 PROCEDURE — 3051F HG A1C>EQUAL 7.0%<8.0%: CPT | Performed by: FAMILY MEDICINE

## 2022-01-04 PROCEDURE — G8482 FLU IMMUNIZE ORDER/ADMIN: HCPCS | Performed by: FAMILY MEDICINE

## 2022-01-04 PROCEDURE — 99214 OFFICE O/P EST MOD 30 MIN: CPT | Performed by: FAMILY MEDICINE

## 2022-01-04 PROCEDURE — G8427 DOCREV CUR MEDS BY ELIG CLIN: HCPCS | Performed by: FAMILY MEDICINE

## 2022-01-04 PROCEDURE — 3017F COLORECTAL CA SCREEN DOC REV: CPT | Performed by: FAMILY MEDICINE

## 2022-01-04 PROCEDURE — 3023F SPIROM DOC REV: CPT | Performed by: FAMILY MEDICINE

## 2022-01-04 RX ORDER — BICALUTAMIDE 50 MG/1
50 TABLET, FILM COATED ORAL DAILY
Qty: 90 TABLET | Refills: 3 | Status: CANCELLED | OUTPATIENT
Start: 2022-01-04

## 2022-01-04 RX ORDER — CROMOLYN SODIUM 40 MG/ML
SOLUTION/ DROPS OPHTHALMIC
Qty: 10 ML | Refills: 0 | Status: SHIPPED | OUTPATIENT
Start: 2022-01-04 | End: 2022-03-11 | Stop reason: SDUPTHER

## 2022-01-04 RX ORDER — ALPRAZOLAM 1 MG/1
1 TABLET ORAL 2 TIMES DAILY
Qty: 60 TABLET | Refills: 0 | Status: SHIPPED | OUTPATIENT
Start: 2022-01-04 | End: 2022-02-07 | Stop reason: SDUPTHER

## 2022-01-04 RX ORDER — ALBUTEROL SULFATE 2.5 MG/3ML
2.5 SOLUTION RESPIRATORY (INHALATION) 3 TIMES DAILY
Qty: 90 EACH | Refills: 2 | Status: SHIPPED | OUTPATIENT
Start: 2022-01-04 | End: 2022-06-06

## 2022-01-04 RX ORDER — PANTOPRAZOLE SODIUM 40 MG/1
TABLET, DELAYED RELEASE ORAL
Qty: 90 TABLET | Refills: 1 | Status: SHIPPED | OUTPATIENT
Start: 2022-01-04 | End: 2022-10-10 | Stop reason: SDUPTHER

## 2022-01-04 RX ORDER — GLYCOPYRROLATE AND FORMOTEROL FUMARATE 9; 4.8 UG/1; UG/1
AEROSOL, METERED RESPIRATORY (INHALATION)
COMMUNITY
Start: 2021-11-05 | End: 2022-04-12

## 2022-01-04 RX ORDER — ENALAPRIL MALEATE 10 MG/1
10 TABLET ORAL DAILY
Qty: 90 TABLET | Refills: 1 | Status: SHIPPED | OUTPATIENT
Start: 2022-01-04 | End: 2022-01-27 | Stop reason: SDUPTHER

## 2022-01-04 ASSESSMENT — ENCOUNTER SYMPTOMS
SHORTNESS OF BREATH: 0
WHEEZING: 0

## 2022-01-04 NOTE — PROGRESS NOTES
[facility]  [address] [facility]  Dept: [phone]  Dept Fax: [phone]  Loc: [phone]  PROGRESS NOTE      Visit Date: 1/4/2022    Marina Blank is a 62 y.o. male who presents today for:  Chief Complaint   Patient presents with    Diabetes     3 month follow up with A1C    Anxiety     3 month follow up       Subjective:  HPI     3 month f/u    Anxiety:  On xanax 1 mg 2x per day.  Not on any other anxiety meds.  Denies illicit drug use.  mood is good. Sleeping well. DM:  a1c 7.3% in sept. Glucose 140-200. On lantus 40 units twice daily, lispro 10 units 3x per day, and metformin      Review of Systems   Constitutional: Negative for chills and fever. Respiratory: Negative for shortness of breath and wheezing. Psychiatric/Behavioral: Negative for sleep disturbance. The patient is nervous/anxious.       Patient Active Problem List   Diagnosis    Hypertension    Hyperlipidemia    CAD (coronary artery disease)    Chest pain, atypical    Chronic low back pain    Hypertriglyceridemia    Morbidly obese (HCC)    Abnormal stress test    Os trigonum    Elevated PSA    Lumbar spinal stenosis    Well controlled type 2 diabetes mellitus (HCC)    ROBSON (obstructive sleep apnea)    Back pain at L4-L5 level    Anxiety    Microalbuminuria    Positive FIT (fecal immunochemical test)    H/O heart artery stent    Urinary retention    Malignant neoplasm of prostate ([address]    COPD, severe ([address]     Past Medical History:   Diagnosis Date    Abnormal stress test Sept 2012    [facility]    CAD (coronary artery disease)     Cancer ([address]     Prostate    Chronic low back pain     Diabetes mellitus ([address]     Diabetes mellitus ([address]     Hyperlipidemia     Hypertension     Hypertriglyceridemia     MI (myocardial infarction) ([address] 2004    Obesity     Sleep apnea     has CPAP    Viral pneumonia 10/15/2020      Past Surgical History:   Procedure Laterality Date    BACK SURGERY  1997    L4 & L5 fusion    CARDIAC CATHETERIZATION  10/2019   [facility] CARDIAC SURGERY  2004    Cardiac cath with stent placement x1    COLONOSCOPY  2010    CORONARY ANGIOPLASTY WITH STENT PLACEMENT  10/17/2019    HERNIA REPAIR      Mesh repair in abdomen.     ULTRASOUND PROSTATE/TRANSRECTAL N/A 2020    TRANSRECTAL ULTRASOUND WITH PROSTATE BIOPSY performed [facility] Adolfo Dunn MD [facility] History   Problem Relation Age of Onset    Diabetes Mother     Heart Disease Mother     High Blood Pressure Mother     Arthritis Father     Diabetes Father     Heart Disease Father     High Blood Pressure Father     Diabetes Sister     Diabetes Brother     Heart Disease Brother     Cancer Neg Hx     Stroke Neg Hx      Social History     Tobacco Use    Smoking status: Former Smoker     Packs/day: 0.25     Years: 2.00     Pack years: 0.50     Types: Cigarettes     Quit date: 1995     Years since quittin.0    Smokeless tobacco: Never Used   Substance Use Topics    Alcohol use: No      Current Outpatient Medications   Medication Sig Dispense Refill    insulin glargine (LANTUS SOLOSTAR) 100 UNIT/ML injection pen INJECT 40 UNITS INTO THE SKIN 2 TIMES A DAY 96 mL 0    leuprolide (LUPRON) 45 MG injection Inject 45 mg into the muscle once for 1 dose 1 each 1    oxybutynin (DITROPAN XL) 5 MG extended release tablet Take 1 tablet by mouth 2 times daily 60 tablet 6    pantoprazole (PROTONIX) 40 MG tablet take 1 tablet by mouth once daily 90 tablet 1    enalapril (VASOTEC) 10 MG tablet Take 1 tablet by mouth daily 90 tablet 0    Cyanocobalamin ER (RA VITAMIN B-12 TR) 1000 MCG TBCR take 1 tablet by mouth once daily 30 tablet 2    tamsulosin (FLOMAX) 0.4 MG capsule Take 2 capsules by mouth daily 60 capsule 3    BD INSULIN SYRINGE U/F 31G X \" 1 ML MISC use as directed three times a day 100 each 3    ALPRAZolam (XANAX) 1 MG tablet Take 1 tablet by mouth 2 times daily for 30 days. 60 tablet 0    albuterol sulfate HFA (PROAIR HFA) 108 (90 Base) MCG/ACT inhaler Inhale 2 puffs into the lungs every 6 hours as needed for Wheezing or Shortness of Breath 1 each 3    fluticasone (FLONASE) 50 MCG/ACT nasal spray One spray in each nostril q hs 3 each 1    ascorbic acid (RA VITAMIN C/ERNA HIPS) 500 MG tablet Take 1 tablet by mouth daily 30 tablet 3    insulin lispro (HUMALOG) 100 UNIT/ML injection vial inject 10 units subcutaneously twice a day if needed for HIGH BLOOD SUGAR. 30 mL 3    B-D ULTRAFINE III SHORT PEN 31G X 8 MM MISC USE TWICE DAILY WITH THE [employer] PEN. 200 each 3    ferrous sulfate (IRON 325) 325 (65 Fe) MG tablet Take 1 tablet by mouth every 48 hours 30 tablet 3    cromolyn (OPTICROM) 4 % ophthalmic solution instill 1 drop into both eyes four times a day 10 mL 0    metFORMIN (GLUCOPHAGE) 1000 MG tablet Take 1 tablet by mouth daily (with breakfast) 180 tablet 1    bicalutamide (CASODEX) 50 MG chemo tablet Take 1 tablet by mouth daily 90 tablet 3    Insulin Syringes, Disposable, U-100 1 ML MISC 1 each by Does not apply route 3 times daily 31 gauge x 8 mm  ultrafine 2 100 each 5    amLODIPine (NORVASC) 10 MG tablet Take 10 mg by mouth daily      clopidogrel (PLAVIX) 75 MG tablet take 1 tablet by mouth once daily 90 tablet 1    metoprolol tartrate (LOPRESSOR) 25 MG tablet Take 1 tablet by mouth 2 times daily  0    Vitamin D, Cholecalciferol, 25 MCG (1000 UT) TABS Take 1,000 Units by mouth daily      pravastatin (PRAVACHOL) 40 MG tablet Take 1 tablet by mouth nightly  0    gemfibrozil (LOPID) 600 MG tablet Take 1 tablet by mouth 2 times daily 180 tablet 1    aspirin 325 MG tablet Take 325 mg by mouth daily.         BEVESPI AEROSPHERE 9-4.8 MCG/ACT AERO inhale 2 puffs by mouth and INTO THE LUNGS twice a day (Patient not taking: Reported on 1/4/2022)      umeclidinium-vilanterol (ANORO ELLIPTA) 62.5-25 MCG/INH AEPB inhaler Inhale 1 puff into the lungs daily (Patient not taking: Reported on 1/4/2022) 3 each 3    albuterol (PROVENTIL) (2.5 MG/3ML) 0.083% nebulizer solution Take 3 mLs by nebulization every 4 hours as needed for Wheezing (Patient not taking: Reported on 1/4/2022) 2 Package 1    nitroGLYCERIN (NITROSTAT) 0.4 MG SL tablet Place 0.4 mg under the tongue every 5 minutes as needed for Chest pain        No current facility-administered medications for this visit. Allergies   Allergen Reactions    Ace Inhibitors      When asked Oct 2020, patient was unsure of allergy/reaction. Patient takes/tolerates enalapril.     Baclofen Other (See Comments)     Lightheadedness, dizziness    Darvocet [Propoxyphene N-Acetaminophen] Nausea Only     Felt sickly    Darvon [Propoxyphene Hcl]      Felt sickly    Flexeril [Cyclobenzaprine] Other (See Comments)     Headache    Morphine Nausea Only     Pt reported feeling sickly    Motrin [Ibuprofen Micronized] Nausea Only     Pt reported feeling very sick    Tylenol [Acetaminophen] Nausea Only    Ultram [Tramadol] Itching     Health Maintenance   Topic Date Due    Hepatitis C screen  Never done    HIV screen  Never done    DTaP/Tdap/Td vaccine (1 - Tdap) Never done    Shingles Vaccine (1 of 2) Never done    Diabetic retinal exam  07/31/2019    Diabetic microalbuminuria test  08/25/2021    COVID-19 Vaccine (3 - Moderna risk 4-dose series) 01/17/2022    Colon cancer screen colonoscopy  02/26/2022    Diabetic foot exam  03/01/2022    A1C test (Diabetic or Prediabetic)  09/08/2022    Lipid screen  09/08/2022    Depression Screen  09/08/2022    Potassium monitoring  09/08/2022    Creatinine monitoring  09/08/2022    PSA counseling  11/30/2022    Pneumococcal 0-64 years Vaccine (2 of 4 - PPSV23) 01/11/2023    Flu vaccine  Completed    Hepatitis A vaccine  Aged Out    Hib vaccine  Aged Out    Meningococcal (ACWY) vaccine  Aged Out       Objective:  BP (!) 156/68 (Site: Left Upper Arm, Position: Sitting, Cuff Size: Medium Adult)   Pulse 77   Temp 97.2 °F (36.2 °C) (Temporal)   Resp 20   Ht 5' 9\" (1.753 m)   Wt 289 lb 12.8 oz (131.5 kg)   SpO2 94%   BMI 42.80 kg/m²   Physical Exam  Vitals reviewed. Constitutional:       Appearance: He is well-developed. He is obese. He is not ill-appearing. Interventions: Nasal cannula in place. Cardiovascular:      Rate and Rhythm: Normal rate and regular rhythm. Heart sounds: No murmur heard. Pulmonary:      Effort: Pulmonary effort is normal. No respiratory distress. Breath sounds: Normal breath sounds. No wheezing or rhonchi. Musculoskeletal:      Right lower leg: Edema (trace) present. Left lower leg: Edema (trace) present. Neurological:      Mental Status: He is alert. Mental status is at baseline. Psychiatric:         Mood and Affect: Mood normal.         Behavior: Behavior normal.         Impression/Plan:  1. Type 2 diabetes mellitus with hyperglycemia, with long-term current use of insulin (HCC)  Chronic. Uncontrolled with A1c of 7.9%. Increase Lantus to 42 units twice daily. Continue Metformin and Humalog  - metFORMIN (GLUCOPHAGE) 1000 MG tablet; Take 1 tablet by mouth daily (with breakfast)  Dispense: 180 tablet; Refill: 1  - POCT glycosylated hemoglobin (Hb A1C)    2. Anxiety  Chronic. Controlled. Refill meds  - ALPRAZolam (XANAX) 1 MG tablet; Take 1 tablet by mouth 2 times daily for 30 days. Dispense: 60 tablet; Refill: 0    3. Essential hypertension  Chronic. Uncontrolled. Hold on med change. Refill enalapril  - enalapril (VASOTEC) 10 MG tablet; Take 1 tablet by mouth daily  Dispense: 90 tablet; Refill: 1    4. Bronchitis  Chronic. Stable. Refill med. - albuterol (PROVENTIL) (2.5 MG/3ML) 0.083% nebulizer solution; Take 3 mLs by nebulization 3 times daily  Dispense: 90 each; Refill: 2    5.  Mixed restrictive and obstructive lung disease ([facility]  As above    6. Seasonal allergic rhinitis due to pollen  Stable. Med refill  - cromolyn (OPTICROM) 4 % ophthalmic solution; Instill 1 drop into each eye four times a day  Dispense: 10 mL; Refill: 0    7. Gastroesophageal reflux disease without esophagitis  Stable. Med refill  - pantoprazole (PROTONIX) 40 MG tablet; take 1 tablet by mouth once daily  Dispense: 90 tablet; Refill: 1      They voiced understanding. All questions answered. They agreed with treatment plan. See patient instructions for any educational materials that may have been given. Discussed use, benefit, and side effects of prescribed medications. Reviewed health maintenance. (Please note that portions of this note may have been completed with a voice recognition program.  Efforts were made to edit the dictation but occasionally words are mis-transcribed.)    Return in about 3 months (around 4/4/2022) for anxiety, HTN, DM  (2-4 weeks for neck and shoulder pain).       Electronically signed by Bc Bey MD on 1/4/2022 at 1:49 PM

## 2022-01-04 NOTE — TELEPHONE ENCOUNTER
Prior Auth initiated for oxybutynin . PA sent to [employer] [place] [employer]. Should receive response in 3-5 days. Request Reference Number: [id number]. OXYBUTYNIN TAB 5MG ER is approved through 01/04/2023. For further questions, call [employer] at [phone].

## 2022-01-27 ENCOUNTER — TELEPHONE (OUTPATIENT)
Dept: FAMILY MEDICINE CLINIC | Age: 62
End: 2022-01-27

## 2022-01-27 ENCOUNTER — HOSPITAL ENCOUNTER (OUTPATIENT)
Dept: GENERAL RADIOLOGY | Age: 62
Discharge: HOME | End: 2022-01-27
Payer: MEDICAID

## 2022-01-27 ENCOUNTER — OFFICE VISIT (OUTPATIENT)
Dept: FAMILY MEDICINE CLINIC | Age: 62
End: 2022-01-27
Payer: MEDICAID

## 2022-01-27 ENCOUNTER — HOSPITAL ENCOUNTER (OUTPATIENT)
Age: 62
Discharge: HOME | End: 2022-01-27
Payer: MEDICAID

## 2022-01-27 VITALS
TEMPERATURE: 97.2 F | WEIGHT: 288.8 LBS | HEART RATE: 79 BPM | SYSTOLIC BLOOD PRESSURE: 150 MMHG | BODY MASS INDEX: 42.78 KG/M2 | OXYGEN SATURATION: 97 % | DIASTOLIC BLOOD PRESSURE: 78 MMHG | HEIGHT: 69 IN | RESPIRATION RATE: 18 BRPM

## 2022-01-27 DIAGNOSIS — C61 MALIGNANT NEOPLASM OF PROSTATE (HCC): ICD-10-CM

## 2022-01-27 DIAGNOSIS — M54.2 NECK PAIN: ICD-10-CM

## 2022-01-27 DIAGNOSIS — M54.2 NECK PAIN: Primary | ICD-10-CM

## 2022-01-27 DIAGNOSIS — I10 ESSENTIAL HYPERTENSION: ICD-10-CM

## 2022-01-27 PROCEDURE — G8482 FLU IMMUNIZE ORDER/ADMIN: HCPCS | Performed by: FAMILY MEDICINE

## 2022-01-27 PROCEDURE — G8427 DOCREV CUR MEDS BY ELIG CLIN: HCPCS | Performed by: FAMILY MEDICINE

## 2022-01-27 PROCEDURE — 3017F COLORECTAL CA SCREEN DOC REV: CPT | Performed by: FAMILY MEDICINE

## 2022-01-27 PROCEDURE — 1036F TOBACCO NON-USER: CPT | Performed by: FAMILY MEDICINE

## 2022-01-27 PROCEDURE — G8417 CALC BMI ABV UP PARAM F/U: HCPCS | Performed by: FAMILY MEDICINE

## 2022-01-27 PROCEDURE — 99214 OFFICE O/P EST MOD 30 MIN: CPT | Performed by: FAMILY MEDICINE

## 2022-01-27 PROCEDURE — 72040 X-RAY EXAM NECK SPINE 2-3 VW: CPT

## 2022-01-27 RX ORDER — ENALAPRIL MALEATE 20 MG/1
20 TABLET ORAL DAILY
Qty: 90 TABLET | Refills: 1
Start: 2022-01-27 | End: 2022-09-09

## 2022-01-27 ASSESSMENT — ENCOUNTER SYMPTOMS: BACK PAIN: 1

## 2022-01-27 NOTE — PROGRESS NOTES
[facility]  [address] [facility]  Dept: [phone]  Dept Fax: [phone]  Loc: [phone]  PROGRESS NOTE      Visit Date: 1/27/2022    Latonia Ross is a 62 y.o. male who presents today for:  Chief Complaint   Patient presents with    Neck Pain     Follow up on neck and shoulder pain       Subjective:  HPI     Neck to bilateral shoulder Pain:  Started about 1 month ago. No injury. No numbness or weakness in arms. Duration:  1 month   Quality:   achy  Severity (0-10):   7/10  Timing:  constant  Exacerbating factors:  Rotating head, sitting  Relieving factors:   aspercream  Treatments tried:  aspercream ointment, ice, heating pack      Hypertension: On enalapril, Norvasc, and metoprolol. Review of Systems   Constitutional: Negative for chills and fever. Musculoskeletal: Positive for arthralgias, back pain and neck pain. Neurological: Negative for weakness and numbness.      Patient Active Problem List   Diagnosis    Hypertension    Hyperlipidemia    CAD (coronary artery disease)    Chest pain, atypical    Chronic low back pain    Hypertriglyceridemia    Morbidly obese (HCC)    Abnormal stress test    Os trigonum    Elevated PSA    Lumbar spinal stenosis    Well controlled type 2 diabetes mellitus (HCC)    ROBSON (obstructive sleep apnea)    Back pain at L4-L5 level    Anxiety    Microalbuminuria    Positive FIT (fecal immunochemical test)    H/O heart artery stent    Urinary retention    Malignant neoplasm of prostate ([address]    COPD, severe ([address]     Past Medical History:   Diagnosis Date    Abnormal stress test Sept 2012    Lateral Wall Ischemia- [facility]    CAD (coronary artery disease)     Cancer (HCC)     Prostate    Chronic low back pain     Diabetes mellitus ([address]     Diabetes mellitus ([address]     Hyperlipidemia     Hypertension     Hypertriglyceridemia     [facility]     Obesity     Sleep apnea     has CPAP    Viral pneumonia 10/15/2020      Past Surgical History:   Procedure Laterality Date    BACK SURGERY  1997    L4 & L5 fusion    CARDIAC CATHETERIZATION  10/2019   Gil De La Rosa CARDIAC SURGERY  2004    Cardiac cath with stent placement x1    COLONOSCOPY  2010    CORONARY ANGIOPLASTY WITH STENT PLACEMENT  10/17/2019    HERNIA REPAIR      Mesh repair in abdomen.  ULTRASOUND PROSTATE/TRANSRECTAL N/A 2020    TRANSRECTAL ULTRASOUND WITH PROSTATE BIOPSY performed by Dennie Moorman., MD at [address] [place] [address] History   Problem Relation Age of Onset    Diabetes Mother     Heart Disease Mother     High Blood Pressure Mother     Arthritis Father     Diabetes Father     Heart Disease Father     High Blood Pressure Father     Diabetes Sister     Diabetes Brother     Heart Disease Brother     Cancer Neg Hx     Stroke Neg Hx      Social History     Tobacco Use    Smoking status: Former Smoker     Packs/day: 0.25     Years: 2.00     Pack years: 0.50     Types: Cigarettes     Quit date: 1995     Years since quittin.0    Smokeless tobacco: Never Used   Substance Use Topics    Alcohol use: No      Current Outpatient Medications   Medication Sig Dispense Refill    albuterol (PROVENTIL) (2.5 MG/3ML) 0.083% nebulizer solution Take 3 mLs by nebulization 3 times daily 90 each 2    ALPRAZolam (XANAX) 1 MG tablet Take 1 tablet by mouth 2 times daily for 30 days.  60 tablet 0    cromolyn (OPTICROM) 4 % ophthalmic solution Instill 1 drop into each eye four times a day 10 mL 0    pantoprazole (PROTONIX) 40 MG tablet take 1 tablet by mouth once daily 90 tablet 1    metFORMIN (GLUCOPHAGE) 1000 MG tablet Take 1 tablet by mouth daily (with breakfast) 180 tablet 1    enalapril (VASOTEC) 10 MG tablet Take 1 tablet by mouth daily 90 tablet 1    insulin glargine (LANTUS SOLOSTAR) 100 UNIT/ML injection pen INJECT 40 UNITS INTO THE SKIN 2 TIMES A DAY 96 mL 0    leuprolide (LUPRON) 45 MG injection Inject 45 mg into the muscle once for 1 dose 1 each 1    oxybutynin (DITROPAN XL) 5 MG extended release tablet Take 1 tablet by mouth 2 times daily 60 tablet 6    Cyanocobalamin ER (RA VITAMIN B-12 TR) 1000 MCG TBCR take 1 tablet by mouth once daily 30 tablet 2    tamsulosin (FLOMAX) 0.4 MG capsule Take 2 capsules by mouth daily 60 capsule 3    BD INSULIN SYRINGE U/F 31G X 5/16\" 1 ML MISC use as directed three times a day 100 each 3    albuterol sulfate HFA (PROAIR HFA) 108 (90 Base) MCG/ACT inhaler Inhale 2 puffs into the lungs every 6 hours as needed for Wheezing or Shortness of Breath 1 each 3    fluticasone (FLONASE) 50 MCG/ACT nasal spray One spray in each nostril q hs 3 each 1    ascorbic acid (RA VITAMIN C/ERNA HIPS) 500 MG tablet Take 1 tablet by mouth daily 30 tablet 3    insulin lispro (HUMALOG) 100 UNIT/ML injection vial inject 10 units subcutaneously twice a day if needed for HIGH BLOOD SUGAR. 30 mL 3    B-D ULTRAFINE III SHORT PEN 31G X 8 MM MISC USE TWICE DAILY WITH THE [employer] PEN.  200 each 3    ferrous sulfate (IRON 325) 325 (65 Fe) MG tablet Take 1 tablet by mouth every 48 hours 30 tablet 3    bicalutamide (CASODEX) 50 MG chemo tablet Take 1 tablet by mouth daily 90 tablet 3    Insulin Syringes, Disposable, U-100 1 ML MISC 1 each by Does not apply route 3 times daily 31 gauge x 8 mm  ultrafine 2 100 each 5    amLODIPine (NORVASC) 10 MG tablet Take 10 mg by mouth daily      clopidogrel (PLAVIX) 75 MG tablet take 1 tablet by mouth once daily 90 tablet 1    metoprolol tartrate (LOPRESSOR) 25 MG tablet Take 1 tablet by mouth 2 times daily  0    Vitamin D, Cholecalciferol, 25 MCG (1000 UT) TABS Take 1,000 Units by mouth daily      pravastatin (PRAVACHOL) 40 MG tablet Take 1 tablet by mouth nightly  0    gemfibrozil (LOPID) 600 MG tablet Take 1 tablet by mouth 2 times daily 180 tablet 1    aspirin 325 MG tablet Take 325 mg by mouth daily.  BEVESPI AEROSPHERE 9-4.8 MCG/ACT AERO inhale 2 puffs by mouth and INTO THE LUNGS twice a day (Patient not taking: Reported on 1/4/2022)      umeclidinium-vilanterol (ANORO ELLIPTA) 62.5-25 MCG/INH AEPB inhaler Inhale 1 puff into the lungs daily (Patient not taking: Reported on 1/4/2022) 3 each 3    nitroGLYCERIN (NITROSTAT) 0.4 MG SL tablet Place 0.4 mg under the tongue every 5 minutes as needed for Chest pain        No current facility-administered medications for this visit. Allergies   Allergen Reactions    Ace Inhibitors      When asked Oct 2020, patient was unsure of allergy/reaction. Patient takes/tolerates enalapril.     Baclofen Other (See Comments)     Lightheadedness, dizziness    Darvocet [Propoxyphene N-Acetaminophen] Nausea Only     Felt sickly    Darvon [Propoxyphene Hcl]      Felt sickly    Flexeril [Cyclobenzaprine] Other (See Comments)     Headache    Morphine Nausea Only     Pt reported feeling sickly    Motrin [Ibuprofen Micronized] Nausea Only     Pt reported feeling very sick    Tylenol [Acetaminophen] Nausea Only    Ultram [Tramadol] Itching     Health Maintenance   Topic Date Due    Hepatitis C screen  Never done    HIV screen  Never done    DTaP/Tdap/Td vaccine (1 - Tdap) Never done    Shingles Vaccine (1 of 2) Never done    Diabetic retinal exam  07/31/2019    Diabetic microalbuminuria test  08/25/2021    COVID-19 Vaccine (3 - Moderna risk 4-dose series) 01/17/2022    Colon cancer screen colonoscopy  02/26/2022    Diabetic foot exam  03/01/2022    Lipid screen  09/08/2022    Depression Screen  09/08/2022    Potassium monitoring  09/08/2022    Creatinine monitoring  09/08/2022    PSA counseling  11/30/2022    A1C test (Diabetic or Prediabetic)  01/04/2023    Pneumococcal 0-64 years Vaccine (2 of 4 - PPSV23) 01/11/2023    Flu vaccine  Completed    Hepatitis A vaccine  Aged Out    Hib vaccine  Aged Out    Meningococcal (ACWY) vaccine  Aged Out       Objective:  BP (!) 150/68 (Site: Left Upper Arm, Position: Sitting, Cuff Size: Medium Adult)   Pulse 79   Temp 97.2 °F (36.2 °C) (Temporal)   Resp 18   Ht 5' 9\" (1.753 m)   Wt 288 lb 12.8 oz (131 kg)   SpO2 97%   BMI 42.65 kg/m²   Physical Exam  Vitals reviewed. Constitutional:       General: He is not in acute distress. Appearance: He is obese. He is not ill-appearing. Pulmonary:      Effort: Pulmonary effort is normal. No respiratory distress. Neurological:      Mental Status: He is alert. Mental status is at baseline. Psychiatric:         Mood and Affect: Mood normal.         Behavior: Behavior normal.         Neck: No tenderness of midline C-spine. Has tenderness of right upper trapezius and right paraspinals at about C4-C5. Good range of motion in flexion, extension, lateral flexion and rotation bilaterally    Strength:  Right Upper Extremity  Left Upper Extremity  Shoulder abduction:  5/5    5/5  Elbow flexion:   5/5    5/5  Wrist extension:  5/5    5/5  Elbow extension:  5/5    5/5  Wrist flexion:   5/5    5/5  Finger abduction:  5/5    5/5      Impression/Plan:  1. Neck pain  Acute problem. Possible muscular etiology versus arthritis flare. Will obtain x-ray to evaluate for arthritis. Refer to physical therapy. MRI is not indicated at this time. Recommend Tylenol  - XR CERVICAL SPINE (2-3 VIEWS); Future  - [facility] [place]    2. Essential hypertension  Chronic. Uncontrolled. Increase enalapril dose. Continue metoprolol and Norvasc  - enalapril (VASOTEC) 20 MG tablet; Take 1 tablet by mouth daily  Dispense: 90 tablet; Refill: 1    3. Malignant neoplasm of prostate (HCC)  Chronic. Managed by urology      They voiced understanding. All questions answered. They agreed with treatment plan. See patient instructions for any educational materials that may have been given.   Discussed use, benefit, and side effects of prescribed medications. Reviewed health maintenance. (Please note that portions of this note may have been completed with a voice recognition program.  Efforts were made to edit the dictation but occasionally words are mis-transcribed.)    Return in about 6 weeks (around 3/10/2022) for neck pain and HTN.       Electronically signed by Sasha Lipscomb MD on 1/27/2022 at 11:09 AM

## 2022-01-27 NOTE — TELEPHONE ENCOUNTER
----- Message from Melvin Burch MD sent at 1/27/2022  1:41 PM EST -----  Torticollis present suggesting muscle spasm. Some arthritis is present. No fracture. Please advise patient.   Melvin Burch MD

## 2022-02-01 ENCOUNTER — HOSPITAL ENCOUNTER (OUTPATIENT)
Dept: PHYSICAL THERAPY | Age: 62
Setting detail: THERAPIES SERIES
Discharge: HOME | End: 2022-02-01
Payer: MEDICAID

## 2022-02-01 PROCEDURE — 97161 PT EVAL LOW COMPLEX 20 MIN: CPT

## 2022-02-01 NOTE — PROGRESS NOTES
** PLEASE SIGN, DATE AND TIME CERTIFICATION BELOW AND RETURN TO [facility] OUTPATIENT REHABILITATION (FAX #: [phone]). ATTEST/CO-SIGN IF ACCESSING VIA [employer]. THANK YOU.**    I certify that I have examined the patient below and determined that Physical Medicine and Rehabilitation service is necessary and that I approve the established plan of care for up to 90 days or as specifically noted. Attestation, signature or co-signature of physician indicates approval of certification requirements.    ________________________ ____________ __________  Physician Signature   Date   Time  [address] [facility]  PHYSICAL THERAPY  [x] EVALUATION  [] DAILY NOTE (LAND) [] DAILY NOTE (AQUATIC ) [] PROGRESS NOTE [] DISCHARGE NOTE    [] [address] [facility]   [x] [place] [address]    [] [address] [facility]   [] Albino Slate    Date: 2022  Patient Name:  Gracia Antoine  : 1960  MRN: [id number]  CSN: [id number]    Referring Practitioner Shaka Gomez MD   Diagnosis Cervicalgia [M54.2]    Treatment Diagnosis Acute cervical pain, painful neck and shoulder ROM, postural weakness   Date of Evaluation 22    Additional Pertinent History MI, HTN, COPD, Diabetes. Functional Outcome Measure Used NDI   Functional Outcome Score 1050 (22)       Insurance: Primary: Payor: [employer] /  /  / ,   Secondary:    Authorization Information: PRECERT REQUIRED:  Precertification required after evaluation. INSURANCE THERAPY BENEFIT:  PT/OT/ST 30 visits per calender year. 30 visits is a hard limit. FCE is a covered benefit. AQUATICS COVERED: Yes  MODALITIES COVERED:  Yes, however iontophoresis (30127) is not a covered benefit. Hot/Cold Packs are not covered.      TELEHEALTH COVERED: Yes   Visit # 1, 1/10 for progress note   Visits Allowed: 1   Recertification Date:    Physician Follow-Up: 3/11/22   Physician Orders:    History of Present Illness: Pt presents with neck pain that started about a month ago. Pt was stretching arms overhead and felt pain in back of neck with pain ever since. XR showed mild torticollis and C5-6 hypertrophic spurring. SUBJECTIVE: Pain when elevating shoulders, bilateral. Pain affecting UE dressing and washing hair. Pt denies taking pain medication. Pt has used ice and heat which helps during application. Pt notes pulling in neck with rotation. Pt able to turn with full motion but painful. Social/Functional History and Current Status:  Medications and Allergies have been reviewed and are listed on Medical History Questionnaire. Cuco Lanza lives alone in a single story home with 4 stairs and a handrail to enter.     Task Previous Current   ADLs  Independent Independent   IADL's Independent Independent   Ambulation Independent Independent   Transfers Independent Independent   Recreation Not Applicable Not Applicable   Community Integration Independent Independent   Driving Active  Active    Work On Disability  On Disability       Objective:    GENERAL   Pain 6/10 bilateral upper trap, right worse than left   Palpation Tender and tight right upper trap, levator   Sensation intact   Observation Overweight, broad shoulders   Edema    Accessory Motion          POSTURE     Comments   Forward Head x    Rounded Shoulders x    Kyphosis     Lordosis     Lateral Shift     Scoliosis         NECK RANGE OF MOTION   Flexion 20   Extension 45   Rotation Right 52   Rotation Left 62   Sidebending Right 25   Sidebending Left 35   Retraction    Protraction    Neck Range of Motion is [facility] [place]  []     UPPER EXTREMITY RANGE OF MOTION    Left Right Comments   Shoulder Flexion      Shoulder Extension      Shoulder Abduction      Shoulder Adduction      Shoulder External Rotation      Shoulder Internal Rotation      Shoulder Range of Motion is [facility] [place]  [x] with pain for last 50% of range      Elbow Flexion      Elbow Extension skilled PT to address listed impairments to improve ROM, strength and posture. Body Structures/Functions/Activity Limitations: impaired ROM, impaired strength, pain and abnormal posture  Prognosis: good    GOALS:  Patient Goal: Get rid of pain in neck and shoulders    Short Term Goals: 4 weeks  Patient will improve bilateral shoulder flexion and ABD ROM to 120 degrees and reach behind head without pain for ease with upper body dressing and bathing. Patient will demonstrate improved postural awareness, requiring <3 cues during therapy session to carry over to sitting position at home. Long Term Goals: 8 weeks  Patient will improve AROM of cervical rotation right from 52 to 60 degrees and right lateral flexion from 25 to 35 degrees for ease driving and scanning environment. Patient will demonstrate good postural awareness without cues during therapy session to reduce pain to <3/10. Patient will improve cervical and shoulder strength to 4+/5 for spinal stabilization when reaching or lifting overhead into cabinets. Patient will be independent and compliant with HEP daily to achieve above goals. Patient Education:   [x]  HEP/Education Completed: Plan of Care, Goals, attendance policy, neck stretch, postural exercises, neck rotation   Medbridge Access Code: [facility]  []  No new Education completed  []  Reviewed Prior HEP      [x]  Patient verbalized and/or demonstrated understanding of education provided. []  Patient unable to verbalize and/or demonstrate understanding of education provided. Will continue education. []  Barriers to learning:     PLAN:  Treatment Recommendations: Strengthening, Range of Motion, Manual Therapy - Soft Tissue Mobilization, Home Exercise Program, Patient Education, Integrative Dry Needling and Modalities    [x]  Plan of care initiated. Plan to see patient 2-3 times per week for 8 weeks to address the treatment planned outlined above.   []  Continue with current plan of care  []  Modify plan of care as follows:    []  Hold pending physician visit  []  Discharge    Time In 9344   Time Out 0905   Timed Code Minutes: 0 min   Total Treatment Time: 26 min       Electronically Signed by: Maggie Deshpande PT

## 2022-02-07 DIAGNOSIS — F41.9 ANXIETY: ICD-10-CM

## 2022-02-07 RX ORDER — ALPRAZOLAM 1 MG/1
1 TABLET ORAL 2 TIMES DAILY
Qty: 60 TABLET | Refills: 0 | Status: SHIPPED | OUTPATIENT
Start: 2022-02-07 | End: 2022-03-08 | Stop reason: SDUPTHER

## 2022-02-07 NOTE — TELEPHONE ENCOUNTER
----- Message from Fanny Carmine sent at 2/4/2022  3:13 PM EST -----  Subject: Refill Request    QUESTIONS  Name of Medication? ALPRAZolam (XANAX) 1 MG tablet  Patient-reported dosage and instructions? Take 1 tablet by mouth 2 times   daily for 30 days. How many days do you have left? 0  Preferred Pharmacy? [id number] [facility] phone number (if available)? [phone]  ---------------------------------------------------------------------------  --------------  CALL BACK INFO  What is the best way for the office to contact you? OK to leave message on   voicemail  Preferred Call Back Phone Number?  [phone]

## 2022-02-07 NOTE — TELEPHONE ENCOUNTER
Eleonora Rivera needs refill of   Requested Prescriptions     Pending Prescriptions Disp Refills    ALPRAZolam (XANAX) 1 MG tablet 60 tablet 0     Sig: Take 1 tablet by mouth 2 times daily for 30 days.        Last Filled on:  1/4/22 60*0    Last Visit Date:  1/27/2022    Next Visit Date:    3/11/2022

## 2022-02-09 ENCOUNTER — HOSPITAL ENCOUNTER (OUTPATIENT)
Dept: PHYSICAL THERAPY | Age: 62
Setting detail: THERAPIES SERIES
Discharge: HOME | End: 2022-02-09
Payer: MEDICAID

## 2022-02-09 PROCEDURE — 97140 MANUAL THERAPY 1/> REGIONS: CPT

## 2022-02-09 PROCEDURE — 97110 THERAPEUTIC EXERCISES: CPT

## 2022-02-09 NOTE — PROGRESS NOTES
[address] [facility]  PHYSICAL THERAPY  [] EVALUATION  [x] DAILY NOTE (LAND) [] DAILY NOTE (AQUATIC ) [] PROGRESS NOTE [] DISCHARGE NOTE    [] [address] [facility]   [x] [address]    [] [facility]   [] Ester Remy    Date: 2022  Patient Name:  Jeannette Leal  : 1960  MRN: [id number]  CSN: [id number]    Referring Practitioner Roseanne Vanegas MD   Diagnosis Cervicalgia [M54.2]    Treatment Diagnosis Acute cervical pain, painful neck and shoulder ROM, postural weakness   Date of Evaluation 22    Additional Pertinent History MI, HTN, COPD, Diabetes. Functional Outcome Measure Used NDI   Functional Outcome Score 10/50 (22)       Insurance: Primary: Payor: Mena Basilio /  /  / ,   Secondary:    Authorization Information: PRECERT REQUIRED:  Precertification required after evaluation. INSURANCE THERAPY BENEFIT:  PT/OT/ST 30 visits per calender year. 30 visits is a hard limit. FCE is a covered benefit. AQUATICS COVERED: Yes  MODALITIES COVERED:  Yes, however iontophoresis (73508) is not a covered benefit. Hot/Cold Packs are not covered. TELEHEALTH COVERED: Yes   Visit # 2, 2/10 for progress note   Visits Allowed: 1   Recertification Date:    Physician Follow-Up: 3/11/22   Physician Orders:    History of Present Illness: Pt presents with neck pain that started about a month ago. Pt was stretching arms overhead and felt pain in back of neck with pain ever since. XR showed mild torticollis and C5-6 hypertrophic spurring. SUBJECTIVE: Pt reports some soreness from last appt. States un able to get comfortable at night. Has been compliant with HEP. Pain cervical region to R UT, B shoulders a with occasional pain down to his back.     Objective:    TREATMENT   Precautions:    Pain: 8/10    X in shaded column indicates activity completed today   Modalities Parameters/  Location  Notes Manual Therapy Time/Technique  Notes   MFR UT, cervical/thoracic region, LT, Mu, R>L 10 min x Pt seated in chair               Exercise/Intervention   Notes   Upper trap and levator stretch 3 10 sec x Reviewed for HEP   Backward shoulder rolls 10  x \"   Scapular retraction 10 3 sec x \"   Neck rotation 10  x \"   Chin tucks 10 3 x    Post scap stretch B 3 10 x                  pulleys                       Specific Interventions Next Treatment: manual work to cervical region-traction, MFR, etc., neck and postural stretches, shoulder ROM unloaded progressing to standing as tolerated, postural strengthening, cervical stabilizations, modalities as needed    Activity/Treatment Tolerance:  [x]  Patient tolerated treatment well  []  Patient limited by fatigue  []  Patient limited by pain   []  Patient limited by medical complications  []  Other:     Assessment:  Pt tolerated session fairly well, no increased pain at end. Introduced [address] with multiple locations of mm tightness noted. Pt to monitor symptoms and continue with HEP    GOALS:  Patient Goal: Get rid of pain in neck and shoulders    Short Term Goals: 4 weeks  Patient will improve bilateral shoulder flexion and ABD ROM to 120 degrees and reach behind head without pain for ease with upper body dressing and bathing. Patient will demonstrate improved postural awareness, requiring <3 cues during therapy session to carry over to sitting position at home. Long Term Goals: 8 weeks  Patient will improve AROM of cervical rotation right from 52 to 60 degrees and right lateral flexion from 25 to 35 degrees for ease driving and scanning environment. Patient will demonstrate good postural awareness without cues during therapy session to reduce pain to <3/10. Patient will improve cervical and shoulder strength to 4+/5 for spinal stabilization when reaching or lifting overhead into cabinets.    Patient will be independent and compliant with HEP daily to achieve above goals. Patient Education:   []  HEP/Education Completed: Plan of Care, Goals, attendance policy, neck stretch, postural exercises, neck rotation   Medbridge Access Code: [facility]  []  No new Education completed  [x]  Reviewed Prior HEP      [x]  Patient verbalized and/or demonstrated understanding of education provided. []  Patient unable to verbalize and/or demonstrate understanding of education provided. Will continue education. []  Barriers to learning:     PLAN:  Treatment Recommendations: Strengthening, Range of Motion, Manual Therapy - Soft Tissue Mobilization, Home Exercise Program, Patient Education, Integrative Dry Needling and Modalities    []  Plan of care initiated. Plan to see patient 2-3 times per week for 8 weeks to address the treatment planned outlined above.   [x]  Continue with current plan of care  []  Modify plan of care as follows:    []  Hold pending physician visit  []  Discharge    Time In 1030   Time Out 1102   Timed Code Minutes: 32 min   Total Treatment Time: 32 min       Electronically Signed by: Liliana Livingston PTA

## 2022-02-11 RX ORDER — BICALUTAMIDE 50 MG/1
50 TABLET, FILM COATED ORAL DAILY
Qty: 90 TABLET | Refills: 3 | Status: SHIPPED | OUTPATIENT
Start: 2022-02-11 | End: 2022-03-11 | Stop reason: ALTCHOICE

## 2022-02-11 NOTE — TELEPHONE ENCOUNTER
Lauren Van called requesting a refill on the following medications:  Requested Prescriptions     Pending Prescriptions Disp Refills    bicalutamide (CASODEX) 50 MG chemo tablet 90 tablet 3     Sig: Take 1 tablet by mouth daily     Pharmacy verified: [facility] in [place]   . pv      Date of last visit: 12/27/2021  Date of next visit (if applicable): 0/96/7111

## 2022-02-14 RX ORDER — ASCORBIC ACID 500 MG
TABLET ORAL
Qty: 30 TABLET | Refills: 3 | Status: SHIPPED | OUTPATIENT
Start: 2022-02-14 | End: 2022-06-13

## 2022-02-14 NOTE — TELEPHONE ENCOUNTER
[facility] the [facility]:  Requested Prescriptions     Pending Prescriptions Disp Refills    RA VITAMIN C/ERNA HIPS 500 MG tablet [Pharmacy Med Name: RA VITAMIN C 500 MG TABLET] 30 tablet 3     Sig: TAKE 1 TABLET BY MOUTH DAILY       Date of last visit: 1/27/2022  Date of next visit (if applicable):3/11/2022  Date of last refill: 6/21/2021  Pharmacy Name: Winnie Sanchez,  Cora Christine LPN

## 2022-02-15 ENCOUNTER — HOSPITAL ENCOUNTER (OUTPATIENT)
Dept: PHYSICAL THERAPY | Age: 62
Setting detail: THERAPIES SERIES
Discharge: HOME | End: 2022-02-15
Payer: MEDICAID

## 2022-02-15 PROCEDURE — 97110 THERAPEUTIC EXERCISES: CPT

## 2022-02-15 PROCEDURE — 97140 MANUAL THERAPY 1/> REGIONS: CPT

## 2022-02-15 NOTE — PROGRESS NOTES
[address] [facility]  PHYSICAL THERAPY  [] EVALUATION  [x] DAILY NOTE (LAND) [] DAILY NOTE (AQUATIC ) [] PROGRESS NOTE [] DISCHARGE NOTE    [] [facility]   [x] [address]    [] [facility]   [] Katy Leung    Date: 2/15/2022  Patient Name:  Prem Connelly  : 1960  MRN: [id number]  CSN: [id number]    Referring Practitioner Roscoe Narayan MD   Diagnosis Cervicalgia [M54.2]    Treatment Diagnosis Acute cervical pain, painful neck and shoulder ROM, postural weakness   Date of Evaluation 22    Additional Pertinent History MI, HTN, COPD, Diabetes. Functional Outcome Measure Used NDI   Functional Outcome Score 10/50 (22)       Insurance: Primary: Payor: Ivan 58 /  /  / ,   Secondary:    Authorization Information: PRECERT REQUIRED:  Precertification required after evaluation. INSURANCE THERAPY BENEFIT:  PT/OT/ST 30 visits per calender year. 30 visits is a hard limit. FCE is a covered benefit. AQUATICS COVERED: Yes  MODALITIES COVERED:  Yes, however iontophoresis (90822) is not a covered benefit. Hot/Cold Packs are not covered. RECEIVED AUTH FOR PT  FROM 22 TO 22  TOTAL OF 48 UNITS  CPT CODES:  14705, 52136, 21760  TELEHEALTH COVERED: Yes   Visit # 3, 3/10 for progress note   Visits Allowed: eval + 48 units   Recertification Date: 24   Physician Follow-Up: 3/11/22   Physician Orders:    History of Present Illness: Pt presents with neck pain that started about a month ago. Pt was stretching arms overhead and felt pain in back of neck with pain ever since. XR showed mild torticollis and C5-6 hypertrophic spurring. SUBJECTIVE: Pt reports pain/soreness in neck, left worse than right. Pt notes he felt ok after last session but moving around did irritate it. Pt notes shoulder pain if he uses hands to push up from chair.      Objective:    TREATMENT   Precautions:    Pain: 8/10 neck, radiating into upper traps    X in shaded column indicates activity completed today   Modalities Parameters/  Location  Notes                     Manual Therapy Time/Technique  Notes   MFR UT, cervical/thoracic region, LT, Mu, R>L 10 min  Pt seated in chair   Hawk  to upper trap, levator and paraspinals B 10 min x Seated in chair   Trigger point release B upper trap 5 min x    Exercise/Intervention   Notes   Upper trap and levator stretch 3x15 sec  x    Backward shoulder rolls 10  x    Scapular retraction 10x3 sec  x    Neck rotation 10  x    Chin tucks 10x3 sec  x    Post scap stretch B 3x15 sec  x    SNAG for right rotation 5x  x C/o shoulder pain, hold next session          Pulleys flexion 5x  x                    Specific Interventions Next Treatment: manual work to cervical region-traction, MFR, etc., neck and postural stretches, shoulder ROM unloaded progressing to standing as tolerated, postural strengthening, cervical stabilizations, modalities as needed    Activity/Treatment Tolerance:  [x]  Patient tolerated treatment well  []  Patient limited by fatigue  []  Patient limited by pain   []  Patient limited by medical complications  []  Other:     Assessment:  Started with manual work with trigger points noted in B upper trap muscle. Introduced SNAG for right rotation but aggravated shoulders holding and pulling on towel. Added pulleys for shoulder mobility. Pt reports no change in pain at end of session. GOALS:  Patient Goal: Get rid of pain in neck and shoulders    Short Term Goals: 4 weeks  Patient will improve bilateral shoulder flexion and ABD ROM to 120 degrees and reach behind head without pain for ease with upper body dressing and bathing. Patient will demonstrate improved postural awareness, requiring <3 cues during therapy session to carry over to sitting position at home.      Long Term Goals: 8 weeks  Patient will improve AROM of cervical rotation right from 52 to 60 degrees and right lateral flexion from 25 to 35 degrees for ease driving and scanning environment. Patient will demonstrate good postural awareness without cues during therapy session to reduce pain to <3/10. Patient will improve cervical and shoulder strength to 4+/5 for spinal stabilization when reaching or lifting overhead into cabinets. Patient will be independent and compliant with HEP daily to achieve above goals. Patient Education:   []  HEP/Education Completed: Plan of Care, Goals, attendance policy, neck stretch, postural exercises, neck rotation   Medbridge Access Code: [facility]  []  No new Education completed  [x]  Reviewed Prior HEP      [x]  Patient verbalized and/or demonstrated understanding of education provided. []  Patient unable to verbalize and/or demonstrate understanding of education provided. Will continue education. []  Barriers to learning:     PLAN:  Treatment Recommendations: Strengthening, Range of Motion, Manual Therapy - Soft Tissue Mobilization, Home Exercise Program, Patient Education, Integrative Dry Needling and Modalities    []  Plan of care initiated. Plan to see patient 2-3 times per week for 8 weeks to address the treatment planned outlined above.   [x]  Continue with current plan of care  []  Modify plan of care as follows:    []  Hold pending physician visit  []  Discharge    Time In 1002   Time Out 1030   Timed Code Minutes: 28 min   Total Treatment Time: 28 min   Cumulative billed units 4       Electronically Signed by: Meryle Plain, PT

## 2022-02-17 ENCOUNTER — HOSPITAL ENCOUNTER (OUTPATIENT)
Dept: PHYSICAL THERAPY | Age: 62
Setting detail: THERAPIES SERIES
Discharge: HOME | End: 2022-02-17
Payer: MEDICAID

## 2022-02-17 PROCEDURE — 97140 MANUAL THERAPY 1/> REGIONS: CPT

## 2022-02-17 PROCEDURE — 97110 THERAPEUTIC EXERCISES: CPT

## 2022-02-17 NOTE — PROGRESS NOTES
[address] [facility]  PHYSICAL THERAPY  [] EVALUATION  [x] DAILY NOTE (LAND) [] DAILY NOTE (AQUATIC ) [] PROGRESS NOTE [] DISCHARGE NOTE    [] [facility]   [x] [address]    [] [facility]   [] Garcia Pac    Date: 2022  Patient Name:  Laci Galvan  : 1960  MRN: [id number]  CSN: [id number]    Referring Practitioner Anjum Mobley MD   Diagnosis Cervicalgia [M54.2]    Treatment Diagnosis Acute cervical pain, painful neck and shoulder ROM, postural weakness   Date of Evaluation 22    Additional Pertinent History MI, HTN, COPD, Diabetes. Functional Outcome Measure Used NDI   Functional Outcome Score 10/50 (22)       Insurance: Primary: Payor: Carol Ann Jarrett /  /  / ,   Secondary:    Authorization Information: PRECERT REQUIRED:  Precertification required after evaluation. INSURANCE THERAPY BENEFIT:  PT/OT/ST 30 visits per calender year. 30 visits is a hard limit. FCE is a covered benefit. AQUATICS COVERED: Yes  MODALITIES COVERED:  Yes, however iontophoresis (17859) is not a covered benefit. Hot/Cold Packs are not covered. RECEIVED AUTH FOR PT  FROM 22 TO 22  TOTAL OF 48 UNITS  CPT CODES:  24587, 90871, 22467  TELEHEALTH COVERED: Yes   Visit # 4, 4/10 for progress note   Visits Allowed: eval + 48 units   Recertification Date: 6/10/27   Physician Follow-Up: 3/11/22   Physician Orders:    History of Present Illness: Pt presents with neck pain that started about a month ago. Pt was stretching arms overhead and felt pain in back of neck with pain ever since. XR showed mild torticollis and C5-6 hypertrophic spurring. SUBJECTIVE: Pt reports increased pain down middle of neck and across shoulders last night, as applied aspercreme which helped make him comfortable to sleep. Pt feels therapy loosens him up for most of the day.      Objective:    TREATMENT   Precautions:    Pain: 6/10 neck, radiating into upper traps    X in shaded column indicates activity completed today   Modalities Parameters/  Location  Notes                     Manual Therapy Time/Technique  Notes   MFR UT, cervical/thoracic region, LT, Mu, R>L 10 min  Pt seated in chair   Hawk  to upper trap, levator and paraspinals B 12 min x Seated in chair   Trigger point release B upper trap 5 min     Exercise/Intervention   Notes   Upper trap and levator stretch 3x15 sec  x    Backward shoulder rolls 15  x    Scapular retraction 10x3 sec  x    Neck rotation 10  x    Chin tucks 10x3 sec  x    Post scap stretch B 3x15 sec  x    SNAG for right rotation 5x   C/o shoulder pain, hold next session   Supine cane: shoulder flexion, ER 5  x    Pulleys flexion 10  x                    Specific Interventions Next Treatment: manual work to cervical region-traction, MFR, etc., neck and postural stretches, shoulder ROM unloaded progressing to standing as tolerated, postural strengthening, cervical stabilizations, modalities as needed    Activity/Treatment Tolerance:  [x]  Patient tolerated treatment well  []  Patient limited by fatigue  []  Patient limited by pain   []  Patient limited by medical complications  []  Other:     Assessment:  Continued with manual work with hawk , with focus on right side. Introduced supine cane shoulder ROM and progressed reps with backward shoulder rolls. Pt notes 6-7/10 pain at end of session. GOALS:  Patient Goal: Get rid of pain in neck and shoulders    Short Term Goals: 4 weeks  Patient will improve bilateral shoulder flexion and ABD ROM to 120 degrees and reach behind head without pain for ease with upper body dressing and bathing. Patient will demonstrate improved postural awareness, requiring <3 cues during therapy session to carry over to sitting position at home.      Long Term Goals: 8 weeks  Patient will improve AROM of cervical rotation right from 52 to 60 degrees and right lateral flexion from 25 to 35 degrees for ease driving and scanning environment. Patient will demonstrate good postural awareness without cues during therapy session to reduce pain to <3/10. Patient will improve cervical and shoulder strength to 4+/5 for spinal stabilization when reaching or lifting overhead into cabinets. Patient will be independent and compliant with HEP daily to achieve above goals. Patient Education:   []  HEP/Education Completed: Plan of Care, Goals, attendance policy, neck stretch, postural exercises, neck rotation   Medbridge Access Code: [facility]  []  No new Education completed  [x]  Reviewed Prior HEP      [x]  Patient verbalized and/or demonstrated understanding of education provided. []  Patient unable to verbalize and/or demonstrate understanding of education provided. Will continue education. []  Barriers to learning:     PLAN:  Treatment Recommendations: Strengthening, Range of Motion, Manual Therapy - Soft Tissue Mobilization, Home Exercise Program, Patient Education, Integrative Dry Needling and Modalities    []  Plan of care initiated. Plan to see patient 2-3 times per week for 8 weeks to address the treatment planned outlined above.   [x]  Continue with current plan of care  []  Modify plan of care as follows:    []  Hold pending physician visit  []  Discharge    Time In 0940   Time Out 1010   Timed Code Minutes: 30 min   Total Treatment Time: 30 min   Cumulative billed units 6       Electronically Signed by: Wayne Mohamud, PT

## 2022-02-22 ENCOUNTER — HOSPITAL ENCOUNTER (OUTPATIENT)
Dept: PHYSICAL THERAPY | Age: 62
Setting detail: THERAPIES SERIES
Discharge: HOME | End: 2022-02-22
Payer: MEDICAID

## 2022-02-22 PROCEDURE — 97110 THERAPEUTIC EXERCISES: CPT

## 2022-02-22 PROCEDURE — 97032 APPL MODALITY 1+ESTIM EA 15: CPT

## 2022-02-22 NOTE — PROGRESS NOTES
[facility]  PHYS[facility]ERAP[facility]  [] EVALUATION  [x] DAILY NOTE (LAND) [] DAILY NOTE (AQUATIC ) [] PROGRESS NOTE [] DISCHARGE NOTE    [] [address] [facility]   [x] [address]    [] [facility]   [] Jim Méndez    Date: 2022  Patient Name:  Yamileth Gaspar  : 1960  MRN: [id number]  CSN: [id number]    Referring Practitioner Cordell Shin MD   Diagnosis Cervicalgia [M54.2]    Treatment Diagnosis Acute cervical pain, painful neck and shoulder ROM, postural weakness   Date of Evaluation 22    Additional Pertinent History MI, HTN, COPD, Diabetes. Functional Outcome Measure Used NDI   Functional Outcome Score 10/50 (22)       Insurance: Primary: Payor: Gene Peña /  /  / ,   Secondary:    Authorization Information: PRECERT REQUIRED:  Precertification required after evaluation. INSURANCE THERAPY BENEFIT:  PT/OT/ST 30 visits per calender year. 30 visits is a hard limit. FCE is a covered benefit. AQUATICS COVERED: Yes  MODALITIES COVERED:  Yes, however iontophoresis (28552) is not a covered benefit. Hot/Cold Packs are not covered. RECEIVED AUTH FOR PT  FROM 22 TO 22  TOTAL OF 48 UNITS  CPT CODES:  74756, 39599, 01796, 02294  TELEHEALTH COVERED: Yes   Visit # 5, 510 for progress note   Visits Allowed: eval + 48 units   Recertification Date:    Physician Follow-Up: 3/11/22   Physician Orders:    History of Present Illness: Pt presents with neck pain that started about a month ago. Pt was stretching arms overhead and felt pain in back of neck with pain ever since. XR showed mild torticollis and C5-6 hypertrophic spurring. SUBJECTIVE: Pt reports right neck and shoulder has been bothering him more yesterday and today. Pt notes he had a rough weekend as his dad was in the hospital and not doing well.      Objective:    TREATMENT   Precautions:    Pain: 8-9/10 right neck/UT X in shaded column indicates activity completed today   Modalities Parameters/  Location  Notes   IFC estim- seated Intensity 9, 8 min x supervision to monitor response               Manual Therapy Time/Technique  Notes   MFR UT, cervical/thoracic region, LT, Mu, R>L 10 min  Pt seated in chair   Hawk  to upper trap, levator and paraspinals B 12 min  Seated in chair   Trigger point release B upper trap 5 min     Exercise/Intervention   Notes   Upper trap and levator stretch 3x15 sec  x    Backward shoulder rolls 10  x    Scapular retraction 10x3 sec  x    Neck rotation 10  x    Chin tucks 10x3 sec  x    Post scap stretch B 3x15 sec  x    SNAG for right rotation 5x   C/o shoulder pain, hold next session   Supine cane: shoulder flexion, ER 5      Pulleys flexion 10                      Specific Interventions Next Treatment: manual work to cervical region-traction, MFR, etc., neck and postural stretches, shoulder ROM unloaded progressing to standing as tolerated, postural strengthening, cervical stabilizations, modalities as needed    Activity/Treatment Tolerance:  [x]  Patient tolerated treatment well  []  Patient limited by fatigue  []  Patient limited by pain   []  Patient limited by medical complications  []  Other:     Assessment:  Introduced IFC estim to right upper trap at start of session. Pt apprehensive to try d/t bad experience in past; however, felt different and better today, decreasing pain. Pt c/o 8/10 pain at end of session and grabs at right shoulder multiples times during exercises. Pt to monitor response to estim; if minimal relief pt may benefit from  Ultrasound. GOALS:  Patient Goal: Get rid of pain in neck and shoulders    Short Term Goals: 4 weeks  Patient will improve bilateral shoulder flexion and ABD ROM to 120 degrees and reach behind head without pain for ease with upper body dressing and bathing.    Patient will demonstrate improved postural awareness, requiring <3 cues during therapy session to carry over to sitting position at home. Long Term Goals: 8 weeks  Patient will improve AROM of cervical rotation right from 52 to 60 degrees and right lateral flexion from 25 to 35 degrees for ease driving and scanning environment. Patient will demonstrate good postural awareness without cues during therapy session to reduce pain to <3/10. Patient will improve cervical and shoulder strength to 4+/5 for spinal stabilization when reaching or lifting overhead into cabinets. Patient will be independent and compliant with HEP daily to achieve above goals. Patient Education:   [x]  HEP/Education Completed: monitor response to Estim   Medbridge Access Code: [facility]  []  No new Education completed  [x]  Reviewed Prior HEP      [x]  Patient verbalized and/or demonstrated understanding of education provided. []  Patient unable to verbalize and/or demonstrate understanding of education provided. Will continue education. []  Barriers to learning:     PLAN:  Treatment Recommendations: Strengthening, Range of Motion, Manual Therapy - Soft Tissue Mobilization, Home Exercise Program, Patient Education, Integrative Dry Needling and Modalities    []  Plan of care initiated. Plan to see patient 2-3 times per week for 8 weeks to address the treatment planned outlined above.   [x]  Continue with current plan of care  []  Modify plan of care as follows:    []  Hold pending physician visit  []  Discharge    Time In 1004   Time Out 1029   Timed Code Minutes: 25 min   Total Treatment Time: 25 min   Cumulative billed units 8       Electronically Signed by: Denise Mazariegos PT

## 2022-02-24 ENCOUNTER — HOSPITAL ENCOUNTER (OUTPATIENT)
Dept: PHYSICAL THERAPY | Age: 62
Setting detail: THERAPIES SERIES
Discharge: HOME | End: 2022-02-24
Payer: MEDICAID

## 2022-02-24 PROCEDURE — 97032 APPL MODALITY 1+ESTIM EA 15: CPT

## 2022-02-24 PROCEDURE — 97110 THERAPEUTIC EXERCISES: CPT

## 2022-02-24 NOTE — PROGRESS NOTES
to B shoulders, down right tricep    X in shaded column indicates activity completed today   Modalities Parameters/  Location  Notes   IFC estim- seated Intensity 9, 10 min x supervision to monitor response               Manual Therapy Time/Technique  Notes   MFR UT, cervical/thoracic region, LT, Mu, R>L 10 min  Pt seated in chair   Hawk  to upper trap, levator and paraspinals B 12 min  Seated in chair   Trigger point release B upper trap 5 min     Exercise/Intervention   Notes   Upper trap and levator stretch 3x15 sec  x    Backward shoulder rolls 10  x    Scapular retraction 10x5 sec  x    Neck rotation 10  x    Cervical retraction 10x3 sec  x    Post scap stretch B 3x15 sec  x    SNAG for right rotation 5x   C/o shoulder pain, hold next session   Supine cane: shoulder flexion, ER 5      Pulleys flexion 10                      Specific Interventions Next Treatment: manual work to cervical region-traction, MFR, etc., neck and postural stretches, shoulder ROM unloaded progressing to standing as tolerated, postural strengthening, cervical stabilizations, modalities as needed    Activity/Treatment Tolerance:  [x]  Patient tolerated treatment well  []  Patient limited by fatigue  []  Patient limited by pain   []  Patient limited by medical complications  []  Other:     Assessment:  Continued with IFC estim but to both sides of neck. Pt denies change in pain at end of session and continues to c/o right upper trap more than anything throughout session. GOALS:  Patient Goal: Get rid of pain in neck and shoulders    Short Term Goals: 4 weeks  Patient will improve bilateral shoulder flexion and ABD ROM to 120 degrees and reach behind head without pain for ease with upper body dressing and bathing. Patient will demonstrate improved postural awareness, requiring <3 cues during therapy session to carry over to sitting position at home.      Long Term Goals: 8 weeks  Patient will improve AROM of cervical rotation right from 52 to 60 degrees and right lateral flexion from 25 to 35 degrees for ease driving and scanning environment. Patient will demonstrate good postural awareness without cues during therapy session to reduce pain to <3/10. Patient will improve cervical and shoulder strength to 4+/5 for spinal stabilization when reaching or lifting overhead into cabinets. Patient will be independent and compliant with HEP daily to achieve above goals. Patient Education:   []  HEP/Education Completed: monitor response to Estim   [address] Access Code: [facility]  []  No new Education completed  [x]  Reviewed Prior HEP      [x]  Patient verbalized and/or demonstrated understanding of education provided. []  Patient unable to verbalize and/or demonstrate understanding of education provided. Will continue education. []  Barriers to learning:     PLAN:  Treatment Recommendations: Strengthening, Range of Motion, Manual Therapy - Soft Tissue Mobilization, Home Exercise Program, Patient Education, Integrative Dry Needling and Modalities    []  Plan of care initiated. Plan to see patient 2-3 times per week for 8 weeks to address the treatment planned outlined above.   [x]  Continue with current plan of care  []  Modify plan of care as follows:    []  Hold pending physician visit  []  Discharge    Time In 1000   Time Out 1025   Timed Code Minutes: 25 min   Total Treatment Time: 25 min   Cumulative billed units 10       Electronically Signed by: John York PT

## 2022-02-28 ENCOUNTER — OFFICE VISIT (OUTPATIENT)
Dept: UROLOGY | Age: 62
End: 2022-02-28
Payer: MEDICAID

## 2022-02-28 VITALS
HEIGHT: 69 IN | WEIGHT: 294 LBS | SYSTOLIC BLOOD PRESSURE: 132 MMHG | DIASTOLIC BLOOD PRESSURE: 72 MMHG | BODY MASS INDEX: 43.55 KG/M2

## 2022-02-28 DIAGNOSIS — N40.1 BENIGN PROSTATIC HYPERPLASIA WITH URINARY RETENTION: ICD-10-CM

## 2022-02-28 DIAGNOSIS — R33.8 BENIGN PROSTATIC HYPERPLASIA WITH URINARY RETENTION: ICD-10-CM

## 2022-02-28 DIAGNOSIS — C61 PROSTATE CANCER (HCC): Primary | ICD-10-CM

## 2022-02-28 PROCEDURE — 99214 OFFICE O/P EST MOD 30 MIN: CPT | Performed by: UROLOGY

## 2022-02-28 PROCEDURE — 81003 URINALYSIS AUTO W/O SCOPE: CPT | Performed by: UROLOGY

## 2022-02-28 PROCEDURE — G8417 CALC BMI ABV UP PARAM F/U: HCPCS | Performed by: UROLOGY

## 2022-02-28 PROCEDURE — 1036F TOBACCO NON-USER: CPT | Performed by: UROLOGY

## 2022-02-28 PROCEDURE — G8427 DOCREV CUR MEDS BY ELIG CLIN: HCPCS | Performed by: UROLOGY

## 2022-02-28 PROCEDURE — G8482 FLU IMMUNIZE ORDER/ADMIN: HCPCS | Performed by: UROLOGY

## 2022-02-28 PROCEDURE — 3017F COLORECTAL CA SCREEN DOC REV: CPT | Performed by: UROLOGY

## 2022-02-28 NOTE — PROGRESS NOTES
Dr. Katia Terry MD MD  [facility] Consultation / Follow up Visit    Patient:  Karyle Art  YOB: 1960  Date: 2/28/2022    HISTORY OF PRESENT ILLNESS:   The patient is a 62 y.o. male who presents today for follow-up for the following problem(s): elevated PSA, BPH with luts,  Urinary retention, Bladder stone  Overall the problem(s) : are worsening. Associated Symptoms: No dysuria, gross hematuria. Pain Severity:       Today visit:   2/28/22   Samir Wolf follows with us for HR prostate cancer s/p EBRT Radiation (3/2021) and on ADT (lupron Q 6 months x 2-3 years for HR PCa). He is still taking Casodex, we discuss that he can discontinue. His PSA has responded well. PSA:  < 0.02. He also had BPH with LUTs and urinary retention - improved on ADT. He is also on Flomax 0.8 mg   Oxybutynin for Urinary urgency. 5/24/21  Cystoscopy Operative Note  Surgeon: Katia Terry MD   Anesthesia: Urethral 2%  Indications: BPH with retention  Position: supine  Findings:   The patient was prepped and draped in the usual sterile fashion. The flexible cystoscope was advanced through the urethra and into the bladder. The bladder was thoroughly inspected and the following was noted:    Residual Urine: Moderate  Urethra: No abnormalities of the urethra are noted. Prostate: Complete obstruction by lateral lobe of prostate. Bladder: No tumors or CIS noted. No bladder diverticulum. Moderate trabeculation noted. Ureters: Clear efflux from both ureters. Orifices with normal configuration and location. The cystoscope was removed. The patient tolerated the procedure well. Void trial      4/26/21   [facility] follows with us for HR PCa. We reviewed old records. Pathology reviewed: High volume [place] 4+5 = 9, with PNI, (Grade Group 5). Diagnotic PSA: 16.28   He has finished radiation thearpy. He is still on Bicaluatmide, discussed lupron injection. 1/15/21  Bone scan  1.  Abnormal radiotracer accumulation in the left hemipelvis and right femur, possibly secondary to a patulous disease. Osseous metastatic disease cannot be excluded. 2. Probable degenerative arthropathic changes as detailed above. CT  1. 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 2. The previously a few low-density foci in the right kidney, likely cysts. Confirmation with ultrasound recommended. 3. Findings of constipation. No evidence to suggest metastatic disease         12/18/20  [facility] follow up after prostate biopsy and cystolithalopaxy for large bladder stone. Doing well, no fevers, hematuria resolved. Pathology reviewed: High volume [place] 4+5 = 9, with PNI, (Grade Group 5). PSA: 16.28    10/26/20  [facility] presents with history of BPH with LUTs, and elevated PSA. An MRI was denies by his insurance, so we review options of elevated PSA. He also has worsening LUTs, with retention and hematuria. He is on Home O2 for recent bronchitis, which is improving. He has history of CAD on ASA and Plavix. Cystoscopy Operative Note  Surgeon: Venkata Alcaraz   Anesthesia: Urethral 2%  Indications: BPH with LUTs  Position: supine  Findings:   The patient was prepped and draped in the usual sterile fashion. The flexible cystoscope was advanced through the urethra and into the bladder. The bladder was thoroughly inspected and the following was noted:  Residual Urine: Moderate  Urethra: No abnormalities of the urethra are noted. Prostate: Large gland Complete obstruction by latera lobe of prostate. Bladder: No tumors or CIS noted. No bladder diverticulum. Large bladder stone. Ureters: Clear efflux from both ureters. Orifices with normal configuration and location. The cystoscope was removed. The patient tolerated the procedure well.   Plan  Standard TURP  Cystolithalopaxy     9/18/20  61 yo male that presents with LUTs and difficulty emptying his bladder. He has incomplete emptying, frequency, and Nocturia, that are worsening over the last couple years. On flomax but he is stating his symptoms are worsening. AUASS: 11/4. Denies hematuria. PVR: 48 ml. Smoking history: quit 20 years ago.  FH: none. ANTONIO: plavix, for CAD s/p stents (10/2019).   Has elevated PSA, 16.          Summary of old records:   (Patient's old records, notes and chart reviewed and summarized above.)     Last several PSA's:  Lab Results   Component Value Date    PSA <0.02 11/30/2021    PSA 0.02 09/08/2021    PSA 0.81 06/08/2021       Last total testosterone:  No results found for: TESTOSTERONE    Urinalysis today:  Results for POC orders placed in visit on 02/28/22   POCT Urinalysis No Micro (Auto)   Result Value Ref Range    Glucose, Ur Negative NEGATIVE mg/dl    Bilirubin Urine Negative     Ketones, Urine Negative NEGATIVE    Specific Gravity, Urine 1.010 1.002 - 1.030    Blood, UA POC Negative NEGATIVE    pH, Urine 5.50 5.0 - 9.0    Protein, Urine Negative NEGATIVE mg/dl    Urobilinogen, Urine 0.20 0.0 - 1.0 eu/dl    Nitrite, Urine Negative NEGATIVE    Leukocyte Clumps, Urine Negative NEGATIVE    Color, Urine Yellow YELLOW-STRAW    Character, Urine Clear CLR-SL.CLOUD       Last BUN and creatinine:  Lab Results   Component Value Date    BUN 27 (H) 09/08/2021     Lab Results   Component Value Date    CREATININE 1.0 09/08/2021       Imaging Reviewed during this Office Visit:   (results were independently reviewed by physician and radiology report verified)    PAST MEDICAL, FAMILY AND SOCIAL HISTORY UPDATE:  Past Medical History:   Diagnosis Date    Abnormal stress test Sept 2012    Lateral Wall Ischemia- done at [facility]    CAD (coronary artery disease)     Cancer ([address]     Prostate    Chronic low back pain     Diabetes mellitus ([address]     Diabetes mellitus ([address]     Hyperlipidemia     Hypertension     Hypertriglyceridemia     MI (myocardial infarction) ([address]     Obesity     Sleep apnea     has CPAP    Viral pneumonia 10/15/2020     Past Surgical History:   Procedure Laterality Date    BACK SURGERY  1997    L4 & L5 fusion    CARDIAC CATHETERIZATION  10/2019   [place] CARDIAC SURGERY  2004    Cardiac cath with stent placement x1    COLONOSCOPY  2010    CORONARY ANGIOPLASTY WITH STENT PLACEMENT  10/17/2019    HERNIA REPAIR  1991    Mesh repair in abdomen.     ULTRASOUND PROSTATE/TRANSRECTAL N/A 2020    TRANSRECTAL ULTRASOUND WITH PROSTATE BIOPSY performed by Sho Atkinson MD at [address] [place] [address] History   Problem Relation Age of Onset    Diabetes Mother     Heart Disease Mother     High Blood Pressure Mother     Arthritis Father     Diabetes Father     Heart Disease Father     High Blood Pressure Father     Diabetes Sister     Diabetes Brother     Heart Disease Brother     Cancer Neg Hx     Stroke Neg Hx      No outpatient medications have been marked as taking for the 22 encounter (Office Visit) with Sho Atkinson MD.       Ace inhibitors, Baclofen, Darvocet [propoxyphene n-acetaminophen], Darvon [propoxyphene hcl], Flexeril [cyclobenzaprine], Morphine, Motrin [ibuprofen micronized], Tylenol [acetaminophen], and Ultram [tramadol]  Social History     Tobacco Use   Smoking Status Former Smoker    Packs/day: 0.25    Years: 2.00    Pack years: 0.50    Types: Cigarettes    Quit date: 1995    Years since quittin.1   Smokeless Tobacco Never Used       Social History     Substance and Sexual Activity   Alcohol Use No       REVIEW OF SYSTEMS:  Constitutional: negative  Eyes: negative  Respiratory: negative  Cardiovascular: negative  Gastrointestinal: negative  Musculoskeletal: negative  Genitourinary: negative  Skin: negative   Neurological: negative  Hematological/Lymphatic: negative  Psychological: negative    Physical Exam:      Vitals:    22 1030   BP: 132/72     Constitutional: Patient in no acute distress   Neuro: alert and oriented to person place and time. Psych: Mood and affect normal.  Head: atraumatic normocephalic  Eyes: EOMi  HEENT: neck supple, trachea midline  Lungs: Respiratory effort normal  Cardiovascular:  Normal peripheral pulses  Abdomen: Soft, non-tender, non-distended, No CVA  Bladder: non-tender and not distended. FROMx4, no cyanosis clubbing edema  Skin: warm and dry        Assessment and Plan      1. Prostate cancer ([facility]    2. Benign prostatic hyperplasia with urinary retention           Plan:      No follow-ups on file. Path: HR prostate cancer, high volume G9 (4+5)  - SP EBRT on ADT   - Lupron injection for ADT therapy - will continue 6 month injections for 2-3 years.   - discontinue bicalutamide   - Good PSA response - PSA is undetectable  - has urinary urgency - will order oxybutynin 5 mg ER   - Flomax 0.8 mg    Cysto and void trial for BPH with retention - doing well  S/p Cystolithalopaxy - bladder stone resolved

## 2022-03-01 ENCOUNTER — HOSPITAL ENCOUNTER (OUTPATIENT)
Dept: PHYSICAL THERAPY | Age: 62
Setting detail: THERAPIES SERIES
Discharge: HOME | End: 2022-03-01
Payer: MEDICAID

## 2022-03-01 PROCEDURE — 97110 THERAPEUTIC EXERCISES: CPT

## 2022-03-01 PROCEDURE — 97032 APPL MODALITY 1+ESTIM EA 15: CPT

## 2022-03-01 NOTE — PROGRESS NOTES
[facility]  PHYSICAL THERAPY  [x] DAILY NOTE (LAND) [] DAILY NOTE (AQUATIC ) [] PROGRESS NOTE [] DISCHARGE NOTE    [] [facility]   [x] [address]    [] [facility]   [] Anish Frank    Date: 3/1/2022  Patient Name:  Dianna Noriega  : 1960  MRN: [id number]  CSN: [id number]    Referring Practitioner Shira Duque MD   Diagnosis Cervicalgia [M54.2]    Treatment Diagnosis Acute cervical pain, painful neck and shoulder ROM, postural weakness   Date of Evaluation 22    Additional Pertinent History MI, HTN, COPD, Diabetes. Functional Outcome Measure Used NDI   Functional Outcome Score 10/50 (22)       Insurance: Primary: Payor: Laxmi Hummel /  /  / ,   Secondary:    Authorization Information: PRECERT REQUIRED:  Precertification required after evaluation. INSURANCE THERAPY BENEFIT:  PT/OT/ST 30 visits per calender year. 30 visits is a hard limit. FCE is a covered benefit. AQUATICS COVERED: Yes  MODALITIES COVERED:  Yes, however iontophoresis (81205) is not a covered benefit. Hot/Cold Packs are not covered. RECEIVED AUTH FOR PT  FROM 22 TO 22  TOTAL OF 48 UNITS  CPT CODES:  69377, 63271, 27021, 48007  TELEHEALTH COVERED: Yes   Visit # 7, 10 for progress note   Visits Allowed: eval + 48 units   Recertification Date:    Physician Follow-Up: 3/11/22   Physician Orders:    History of Present Illness: Pt presents with neck pain that started about a month ago. Pt was stretching arms overhead and felt pain in back of neck with pain ever since. XR showed mild torticollis and C5-6 hypertrophic spurring. SUBJECTIVE: Patient reporting pain level 8/10 today and denies taking anything for pain.     Objective:    TREATMENT   Precautions:    Pain: 8/10 neck out to B shoulders, down right triceps    X in shaded column indicates activity completed today   Modalities Parameters/  Location  Notes   IFC estim- seated Intensity 9, 15 min x supervision to monitor response               Manual Therapy Time/Technique  Notes   MFR UT, cervical/thoracic region, LT, Mu, R>L 10 min  Pt seated in chair   Hawk  to upper trap, levator and paraspinals B 12 min  Seated in chair   Trigger point release B upper trap 5 min     Exercise/Intervention   Notes   Upper trap and levator stretch 3x15 sec  x    Backward shoulder rolls 2x10  x    Scapular retraction 2x10x5 sec  x    Neck rotation 10  x    Cervical retraction 10x3 sec  x    Post scap stretch B 3x15 sec  x    SNAG for right rotation 5x   C/o shoulder pain, hold next session   Supine cane: shoulder flexion, ER 5      Pulleys flexion 10                      Specific Interventions Next Treatment: manual work to cervical region-traction, MFR, etc., neck and postural stretches, shoulder ROM unloaded progressing to standing as tolerated, postural strengthening, cervical stabilizations, modalities as needed    Activity/Treatment Tolerance:  [x]  Patient tolerated treatment well  []  Patient limited by fatigue  []  Patient limited by pain   []  Patient limited by medical complications  []  Other:     Assessment:  Continued with estim and exercises with patient tolerating well. Patient reporting pain level 4/10 at end of session. GOALS:  Patient Goal: Get rid of pain in neck and shoulders    Short Term Goals: 4 weeks  Patient will improve bilateral shoulder flexion and ABD ROM to 120 degrees and reach behind head without pain for ease with upper body dressing and bathing. Patient will demonstrate improved postural awareness, requiring <3 cues during therapy session to carry over to sitting position at home. Long Term Goals: 8 weeks  Patient will improve AROM of cervical rotation right from 52 to 60 degrees and right lateral flexion from 25 to 35 degrees for ease driving and scanning environment.    Patient will demonstrate good postural awareness without cues during therapy session to reduce pain to <3/10. Patient will improve cervical and shoulder strength to 4+/5 for spinal stabilization when reaching or lifting overhead into cabinets. Patient will be independent and compliant with HEP daily to achieve above goals. Patient Education:   [x]  HEP/Education Completed: monitor response to Estim   Medbridge Access Code: [facility]  []  No new Education completed  [x]  Reviewed Prior HEP      [x]  Patient verbalized and/or demonstrated understanding of education provided. []  Patient unable to verbalize and/or demonstrate understanding of education provided. Will continue education. []  Barriers to learning:     PLAN:  Treatment Recommendations: Strengthening, Range of Motion, Manual Therapy - Soft Tissue Mobilization, Home Exercise Program, Patient Education, Integrative Dry Needling and Modalities    []  Plan of care initiated. Plan to see patient 2-3 times per week for 8 weeks to address the treatment planned outlined above.   [x]  Continue with current plan of care  []  Modify plan of care as follows:    []  Hold pending physician visit  []  Discharge    Time In 1010   Time Out 1039   Timed Code Minutes: 29 min   Total Treatment Time: 29 min   Cumulative billed units 12       Electronically Signed by: Al Wheeler PTA

## 2022-03-03 ENCOUNTER — HOSPITAL ENCOUNTER (OUTPATIENT)
Dept: PHYSICAL THERAPY | Age: 62
Setting detail: THERAPIES SERIES
Discharge: HOME | End: 2022-03-03
Payer: MEDICAID

## 2022-03-03 PROCEDURE — G0283 ELEC STIM OTHER THAN WOUND: HCPCS

## 2022-03-03 PROCEDURE — 97110 THERAPEUTIC EXERCISES: CPT

## 2022-03-03 NOTE — PROGRESS NOTES
[address] [facility]  PHYSICAL THERAPY  [x] DAILY NOTE (LAND) [] DAILY NOTE (AQUATIC ) [] PROGRESS NOTE [] DISCHARGE NOTE    [] [facility]   [x] [address]    [] [facility]   [] Lea Giles    Date: 3/3/2022  Patient Name:  Juancarlos Martinez  : 1960  MRN: [id number]  CSN: [id number]    Referring Practitioner Levi Bob MD   Diagnosis Cervicalgia [M54.2]    Treatment Diagnosis Acute cervical pain, painful neck and shoulder ROM, postural weakness   Date of Evaluation 22    Additional Pertinent History MI, HTN, COPD, Diabetes. Functional Outcome Measure Used NDI   Functional Outcome Score 10/50 (22)       Insurance: Primary: Payor: Jose Raul Payne /  /  / ,   Secondary:    Authorization Information: PRECERT REQUIRED:  Precertification required after evaluation. INSURANCE THERAPY BENEFIT:  PT/OT/ST 30 visits per calender year. 30 visits is a hard limit. FCE is a covered benefit. AQUATICS COVERED: Yes  MODALITIES COVERED:  Yes, however iontophoresis (95075) is not a covered benefit. Hot/Cold Packs are not covered. RECEIVED AUTH FOR PT  FROM 22 TO 22  TOTAL OF 48 UNITS  CPT CODES:  10761, 01746, 71024, 64459  TELEHEALTH COVERED: Yes   Visit # 8, 8/10 for progress note   Visits Allowed: eval + 48 units   Recertification Date:    Physician Follow-Up: 3/11/22   Physician Orders:    History of Present Illness: Pt presents with neck pain that started about a month ago. Pt was stretching arms overhead and felt pain in back of neck with pain ever since. XR showed mild torticollis and C5-6 hypertrophic spurring. SUBJECTIVE: Patient continues to report high pain levels at 8/10.     Objective:    TREATMENT   Precautions:    Pain: 8/10 neck out to B shoulders, down right triceps    X in shaded column indicates activity completed today   Modalities Parameters/  Location  Notes   IFC estim- seated Intensity 7, 15 min x                Manual Therapy Time/Technique  Notes   MFR UT, cervical/thoracic region, LT, Mu, R>L 10 min  Pt seated in chair   Hawk  to upper trap, levator and paraspinals B 12 min  Seated in chair   Trigger point release B upper trap 5 min     Exercise/Intervention   Notes   Upper trap and levator stretch 3x15 sec  x    Backward shoulder rolls 2x10  x    Scapular retraction 2x10x5 sec  x    Neck rotation 10  x    Cervical retraction 10x3 sec  x    Post scap stretch B 3x15 sec  x    SNAG for right rotation 5x   C/o shoulder pain, hold next session   Supine cane: shoulder flexion, ER 5      Pulleys flexion 10      Cervical Isometrics  4 way  5x5 sec  x    Seated ER with cane L/R 10  x      Specific Interventions Next Treatment: manual work to cervical region-traction, MFR, etc., neck and postural stretches, shoulder ROM unloaded progressing to standing as tolerated, postural strengthening, cervical stabilizations, modalities as needed    Activity/Treatment Tolerance:  [x]  Patient tolerated treatment well  []  Patient limited by fatigue  []  Patient limited by pain   []  Patient limited by medical complications  []  Other:     Assessment: Added cervical isometrics and ER with cane with patient having good tolerance. Patient reporting pain 7/10 following exercises today and  4/10 following estim. GOALS:  Patient Goal: Get rid of pain in neck and shoulders    Short Term Goals: 4 weeks  Patient will improve bilateral shoulder flexion and ABD ROM to 120 degrees and reach behind head without pain for ease with upper body dressing and bathing. Patient will demonstrate improved postural awareness, requiring <3 cues during therapy session to carry over to sitting position at home. Long Term Goals: 8 weeks  Patient will improve AROM of cervical rotation right from 52 to 60 degrees and right lateral flexion from 25 to 35 degrees for ease driving and scanning environment. Patient will demonstrate good postural awareness without cues during therapy session to reduce pain to <3/10. Patient will improve cervical and shoulder strength to 4+/5 for spinal stabilization when reaching or lifting overhead into cabinets. Patient will be independent and compliant with HEP daily to achieve above goals. Patient Education:   [x]  HEP/Education Completed: cervical isometrics, seated ER   Medbridge Access Code: [facility]  []  No new Education completed  [x]  Reviewed Prior HEP      [x]  Patient verbalized and/or demonstrated understanding of education provided. []  Patient unable to verbalize and/or demonstrate understanding of education provided. Will continue education. []  Barriers to learning:     PLAN:  Treatment Recommendations: Strengthening, Range of Motion, Manual Therapy - Soft Tissue Mobilization, Home Exercise Program, Patient Education, Integrative Dry Needling and Modalities    []  Plan of care initiated. Plan to see patient 2-3 times per week for 8 weeks to address the treatment planned outlined above.   [x]  Continue with current plan of care  []  Modify plan of care as follows:    []  Hold pending physician visit  []  Discharge    Time In 1006   Time Out 1037   Timed Code Minutes: 16 min   Total Treatment Time: 31 min   Cumulative billed units 14       Electronically Signed by: Laron Patel PTA

## 2022-03-04 ENCOUNTER — HOSPITAL ENCOUNTER (OUTPATIENT)
Age: 62
Discharge: HOME | End: 2022-03-04
Payer: MEDICAID

## 2022-03-04 LAB
ALBUMIN SERPL-MCNC: 4.4 G/DL (ref 3.5–5.1)
ALP BLD-CCNC: 111 U/L (ref 38–126)
ALT SERPL-CCNC: 8 U/L (ref 11–66)
ANION GAP SERPL CALCULATED.3IONS-SCNC: 11 MEQ/L (ref 8–16)
AST SERPL-CCNC: 8 U/L (ref 5–40)
BILIRUB SERPL-MCNC: 0.3 MG/DL (ref 0.3–1.2)
BILIRUBIN DIRECT: < 0.2 MG/DL (ref 0–0.3)
BUN BLDV-MCNC: 18 MG/DL (ref 7–22)
CALCIUM SERPL-MCNC: 10.4 MG/DL (ref 8.5–10.5)
CHLORIDE BLD-SCNC: 104 MEQ/L (ref 98–111)
CHOLESTEROL, TOTAL: 142 MG/DL (ref 100–199)
CO2: 23 MEQ/L (ref 23–33)
CREAT SERPL-MCNC: 1 MG/DL (ref 0.4–1.2)
ERYTHROCYTE [DISTWIDTH] IN BLOOD BY AUTOMATED COUNT: 14.5 % (ref 11.5–14.5)
ERYTHROCYTE [DISTWIDTH] IN BLOOD BY AUTOMATED COUNT: 46.8 FL (ref 35–45)
GFR SERPL CREATININE-BSD FRML MDRD: 76 ML/MIN/1.73M2
GLUCOSE BLD-MCNC: 196 MG/DL (ref 70–108)
HCT VFR BLD CALC: 34.2 % (ref 42–52)
HDLC SERPL-MCNC: 28 MG/DL
HEMOGLOBIN: 10.6 GM/DL (ref 14–18)
LDL CHOLESTEROL CALCULATED: 83 MG/DL
MCH RBC QN AUTO: 27.5 PG (ref 26–33)
MCHC RBC AUTO-ENTMCNC: 31 GM/DL (ref 32.2–35.5)
MCV RBC AUTO: 88.6 FL (ref 80–94)
PLATELET # BLD: 252 THOU/MM3 (ref 130–400)
PMV BLD AUTO: 9.9 FL (ref 9.4–12.4)
POTASSIUM SERPL-SCNC: 5.1 MEQ/L (ref 3.5–5.2)
PROSTATE SPECIFIC ANTIGEN: < 0.02 NG/ML (ref 0–1)
RBC # BLD: 3.86 MILL/MM3 (ref 4.7–6.1)
SODIUM BLD-SCNC: 138 MEQ/L (ref 135–145)
TOTAL PROTEIN: 6.9 G/DL (ref 6.1–8)
TRIGL SERPL-MCNC: 153 MG/DL (ref 0–199)
WBC # BLD: 6.6 THOU/MM3 (ref 4.8–10.8)

## 2022-03-04 PROCEDURE — 85027 COMPLETE CBC AUTOMATED: CPT

## 2022-03-04 PROCEDURE — 84153 ASSAY OF PSA TOTAL: CPT

## 2022-03-04 PROCEDURE — 80053 COMPREHEN METABOLIC PANEL: CPT

## 2022-03-04 PROCEDURE — 80061 LIPID PANEL: CPT

## 2022-03-04 PROCEDURE — 36415 COLL VENOUS BLD VENIPUNCTURE: CPT

## 2022-03-04 PROCEDURE — 82248 BILIRUBIN DIRECT: CPT

## 2022-03-08 ENCOUNTER — HOSPITAL ENCOUNTER (OUTPATIENT)
Dept: PHYSICAL THERAPY | Age: 62
Setting detail: THERAPIES SERIES
Discharge: HOME | End: 2022-03-08
Payer: MEDICAID

## 2022-03-08 DIAGNOSIS — F41.9 ANXIETY: ICD-10-CM

## 2022-03-08 PROCEDURE — 97032 APPL MODALITY 1+ESTIM EA 15: CPT

## 2022-03-08 PROCEDURE — 97110 THERAPEUTIC EXERCISES: CPT

## 2022-03-08 RX ORDER — ALPRAZOLAM 1 MG/1
1 TABLET ORAL 2 TIMES DAILY
Qty: 60 TABLET | Refills: 0 | Status: SHIPPED | OUTPATIENT
Start: 2022-03-08 | End: 2022-04-05 | Stop reason: SDUPTHER

## 2022-03-08 NOTE — TELEPHONE ENCOUNTER
Silva Zuleima is requesting a refill on the following medications:  Requested Prescriptions     Pending Prescriptions Disp Refills    ALPRAZolam (XANAX) 1 MG tablet 60 tablet 0     Sig: Take 1 tablet by mouth 2 times daily for 30 days.        Date of last visit: 1/27/2022  Date of next visit (if applicable):3/11/2022  Date of last refill: 2/7/2022  Pharmacy Name: [address] [place] [address]      Thanks,  [place], [place]

## 2022-03-08 NOTE — PROGRESS NOTES
[address]  PHYSICAL THERAPY  [x] DAILY NOTE (LAND) [] DAILY NOTE (AQUATIC ) [] PROGRESS NOTE [] DISCHARGE NOTE    [] [facility]   [x] [address]    [] [facility]   [] Mamta Freeman    Date: 3/8/2022  Patient Name:  Gladys Gaxiola  : 1960  MRN: [id number]  CSN: [id number]    Referring Practitioner Castro Pack MD   Diagnosis Cervicalgia [M54.2]    Treatment Diagnosis Acute cervical pain, painful neck and shoulder ROM, postural weakness   Date of Evaluation 22    Additional Pertinent History MI, HTN, COPD, Diabetes. Functional Outcome Measure Used NDI   Functional Outcome Score 10/50 (22)       Insurance: Primary: Payor: Adrienne Guillory /  /  / ,   Secondary:    Authorization Information: PRECERT REQUIRED:  Precertification required after evaluation. INSURANCE THERAPY BENEFIT:  PT/OT/ST 30 visits per calender year. 30 visits is a hard limit. FCE is a covered benefit. AQUATICS COVERED: Yes  MODALITIES COVERED:  Yes, however iontophoresis (54469) is not a covered benefit. Hot/Cold Packs are not covered. RECEIVED AUTH FOR PT  FROM 22 TO 22  TOTAL OF 48 UNITS  CPT CODES:  49412, 43875, [id number], [id number]  TELEHEALTH COVERED: Yes   Visit # 9, 9/10 for progress note   Visits Allowed: eval + 48 units   Recertification Date:    Physician Follow-Up: 3/11/22   Physician Orders:    History of Present Illness: Pt presents with neck pain that started about a month ago. Pt was stretching arms overhead and felt pain in back of neck with pain ever since. XR showed mild torticollis and C5-6 hypertrophic spurring. SUBJECTIVE: Patient continues to reporting pain level 4/10 today and stating today and yesterday were better days for him.     Objective:    TREATMENT   Precautions:    Pain: 4/10 neck out to B shoulders, down right triceps    X in shaded column indicates activity completed today   Modalities Parameters/  Location  Notes   IFC estim- seated Intensity 10, 12 min x Seated with pt during               Manual Therapy Time/Technique  Notes   MFR UT, cervical/thoracic region, LT, Mu, R>L 10 min  Pt seated in chair   Hawk  to upper trap, levator and paraspinals B 12 min  Seated in chair   Trigger point release B upper trap 5 min     Exercise/Intervention   Notes   Upper trap and levator stretch 3x15 sec  x    Backward shoulder rolls 2x10  x    Scapular retraction 2x10x5 sec  x    Neck rotation 10  x    Cervical retraction 10x3 sec  x    Post scap stretch B 3x15 sec  x    SNAG for right rotation 5x   C/o shoulder pain, hold next session   Supine cane: shoulder flexion, ER 5      [place]: rows, abduction, triceps extension and bicep curls 10  x    Cervical Isometrics  4 way  5x5 sec  x    Seated ER with cane L/R 10  x      Specific Interventions Next Treatment: manual work to cervical region-traction, MFR, etc., neck and postural stretches, shoulder ROM unloaded progressing to standing as tolerated, postural strengthening, cervical stabilizations, modalities as needed    Activity/Treatment Tolerance:  [x]  Patient tolerated treatment well  []  Patient limited by fatigue  []  Patient limited by pain   []  Patient limited by medical complications  []  Other:     Assessment: Added more strengthening exercises today with patient tolerating well. Overall reporting pain 2/10 at end of session. Still does need cues for posture. GOALS:  Patient Goal: Get rid of pain in neck and shoulders    Short Term Goals: 4 weeks  Patient will improve bilateral shoulder flexion and ABD ROM to 120 degrees and reach behind head without pain for ease with upper body dressing and bathing. Patient will demonstrate improved postural awareness, requiring <3 cues during therapy session to carry over to sitting position at home.      Long Term Goals: 8 weeks  Patient will improve AROM of cervical rotation right from 52 to 60 degrees and right lateral flexion from 25 to 35 degrees for ease driving and scanning environment. Patient will demonstrate good postural awareness without cues during therapy session to reduce pain to <3/10. Patient will improve cervical and shoulder strength to 4+/5 for spinal stabilization when reaching or lifting overhead into cabinets. Patient will be independent and compliant with HEP daily to achieve above goals. Patient Education:   [x]  HEP/Education Completed:hand out given for 4 way isometric   [employer] Access Code: Cleopatra De Santiago  []  No new Education completed  [x]  Reviewed Prior HEP      [x]  Patient verbalized and/or demonstrated understanding of education provided. []  Patient unable to verbalize and/or demonstrate understanding of education provided. Will continue education. []  Barriers to learning:     PLAN:  Treatment Recommendations: Strengthening, Range of Motion, Manual Therapy - Soft Tissue Mobilization, Home Exercise Program, Patient Education, Integrative Dry Needling and Modalities    []  Plan of care initiated. Plan to see patient 2-3 times per week for 8 weeks to address the treatment planned outlined above.   [x]  Continue with current plan of care  []  Modify plan of care as follows:    []  Hold pending physician visit  []  Discharge    Time In 956   Time Out 1028   Timed Code Minutes: 32 min   Total Treatment Time: 32 min   Cumulative billed units 16       Electronically Signed by: Maikol Alcaraz PTA

## 2022-03-10 ENCOUNTER — HOSPITAL ENCOUNTER (OUTPATIENT)
Dept: PHYSICAL THERAPY | Age: 62
Setting detail: THERAPIES SERIES
Discharge: HOME | End: 2022-03-10
Payer: MEDICAID

## 2022-03-10 PROCEDURE — 97110 THERAPEUTIC EXERCISES: CPT

## 2022-03-10 PROCEDURE — 97032 APPL MODALITY 1+ESTIM EA 15: CPT

## 2022-03-10 NOTE — DISCHARGE SUMMARY
[address] [facility]  PHYSICAL THERAPY  [] DAILY NOTE (LAND) [] DAILY NOTE (AQUATIC ) [] PROGRESS NOTE [x] DISCHARGE NOTE    [] [address] [facility]   [x] [address]    [] [facility]   [] Katy Leung    Date: 3/10/2022  Patient Name:  Prem Connelly  : 1960  MRN: [id number]  CSN: [id number]    Referring Practitioner Roscoe Narayan MD   Diagnosis Cervicalgia [M54.2]    Treatment Diagnosis Acute cervical pain, painful neck and shoulder ROM, postural weakness   Date of Evaluation 22    Additional Pertinent History MI, HTN, COPD, Diabetes. Functional Outcome Measure Used NDI   Functional Outcome Score 10/50 (22)  (3/10/22)      Insurance: Primary: Payor: Marjorie Rodriguez /  /  / ,   Secondary:    Authorization Information: PRECERT REQUIRED:  Precertification required after evaluation. INSURANCE THERAPY BENEFIT:  PT/OT/ST 30 visits per calender year. 30 visits is a hard limit. FCE is a covered benefit. AQUATICS COVERED: Yes  MODALITIES COVERED:  Yes, however iontophoresis (02334) is not a covered benefit. Hot/Cold Packs are not covered. RECEIVED AUTH FOR PT  FROM 22 TO 22  TOTAL OF 48 UNITS  CPT CODES:  74304, 43837, 61809, 41179  TELEHEALTH COVERED: Yes   Visit # 10, 1010 for progress note   Visits Allowed: eval + 48 units   Recertification Date:    Physician Follow-Up: 3/11/22   Physician Orders:    History of Present Illness: Pt presents with neck pain that started about a month ago. Pt was stretching arms overhead and felt pain in back of neck with pain ever since. XR showed mild torticollis and C5-6 hypertrophic spurring. SUBJECTIVE: Patient c/o stiffness in neck and shoulders but overall feeling better. Pt feels therapy is helping. Pt notes pain with right shoulder ABD and hears popping and cracking.      Objective:    TREATMENT   Precautions:    Pain: 4/10 neck out to B shoulders, down right triceps    X in shaded column indicates activity completed today   Modalities Parameters/  Location  Notes   IFC estim- seated, B cervical 10 min x Assessment completed during estim               Manual Therapy Time/Technique  Notes   MFR UT, cervical/thoracic region, LT, Mu, R>L 10 min  Pt seated in chair   Hawk  to upper trap, levator and paraspinals B 12 min  Seated in chair   Trigger point release B upper trap 5 min     Exercise/Intervention   Notes   Upper trap and levator stretch 3x10 sec  x    Backward shoulder rolls 2x10  x    Scapular retraction 2x10x5 sec  x    Neck rotation 10  x    Cervical retraction 10x5 sec  x    Post scap stretch B 3x15 sec  x    SNAG for right rotation 5x   C/o shoulder pain, hold next session   Supine cane: shoulder flexion, ER 5      [place]: rows, abduction, triceps extension and bicep curls 10      Cervical Isometrics  4 way  5x5 sec  x    Seated ER with cane L/R 10        Specific Interventions Next Treatment: manual work to cervical region-traction, MFR, etc., neck and postural stretches, shoulder ROM unloaded progressing to standing as tolerated, postural strengthening, cervical stabilizations, modalities as needed    Activity/Treatment Tolerance:  [x]  Patient tolerated treatment well  []  Patient limited by fatigue  []  Patient limited by pain   []  Patient limited by medical complications  []  Other:     Assessment: Pt demos fair progress toward goals. Neck ROM improved a few degrees each directions but shoulders remain limited, right more than left. Pt rests with forward head and rounded shoulder posture. Right shoulder continues to be weak with pain causing difficulty lifting and reaching overhead. Pt feels comfortable managing symptoms independently with HEP, as he has a lot of doctors appointments coming up. Plan discharge at this time.      GOALS:  Patient Goal: Get rid of pain in neck and shoulders    Short Term Goals: 4 weeks  Patient will improve bilateral shoulder flexion and ABD ROM to 120 degrees and reach behind head without pain for ease with upper body dressing and bathing. GOAL NOT MET: shoulder flexion right 95 deg, left 110 deg, ABD right 84 deg, left 122 deg; upper body dressing is easier per pt. Discontinue Goal  Patient will demonstrate improved postural awareness, requiring <3 cues during therapy session to carry over to sitting position at home. GOAL MET:  Pt requires 1-2 cues for posture during session and pt reports improved awareness at home. Discontinue Goal    Long Term Goals: 8 weeks  Patient will improve AROM of cervical rotation right from 52 to 60 degrees and right lateral flexion from 25 to 35 degrees for ease driving and scanning environment. GOAL NOT MET: neck rotation B 55 degrees, lateral flexion right 28 deg, left 25 deg; pt reports turning head when driving is easier. Discontinue Goal  Patient will demonstrate good postural awareness without cues during therapy session to reduce pain to <3/10. GOAL NOT MET: Pt rests with forward head and rounded shoulders so couple cues needed to remind him to correct, pain ranges from 4-9/10. Discontinue Goal  Patient will improve cervical and shoulder strength to 4+/5 for spinal stabilization when reaching or lifting overhead into cabinets. GOAL NOT MET: shoulder right 4-/5 with pain, 4/5 on left with continued difficulty reaching overhead and lifting even a case of water. Discontinue Goal  Patient will be independent and compliant with HEP daily to achieve above goals. GOAL MET:  Pt verbalizes compliance with HEP daily which helps keep him stretched out. Discontinue Goal      Patient Education:   [x]  HEP/Education Completed:hand out provided for TENS unit   [employer] Access Code: Phyllis Ruggiero  []  No new Education completed  [x]  Reviewed Prior HEP      [x]  Patient verbalized and/or demonstrated understanding of education provided.   []  Patient unable to verbalize and/or demonstrate understanding of education provided. Will continue education. []  Barriers to learning:     PLAN:  Treatment Recommendations: Strengthening, Range of Motion, Manual Therapy - Soft Tissue Mobilization, Home Exercise Program, Patient Education, Integrative Dry Needling and Modalities    []  Plan of care initiated. Plan to see patient 2-3 times per week for 8 weeks to address the treatment planned outlined above.   []  Continue with current plan of care  []  Modify plan of care as follows:    []  Hold pending physician visit  [x]  Discharge    Time In 1000   Time Out 1032   Timed Code Minutes: 32 min   Total Treatment Time: 32 min   Cumulative billed units 18       Electronically Signed by: Brooklyn Mayorga, PT

## 2022-03-11 ENCOUNTER — OFFICE VISIT (OUTPATIENT)
Dept: FAMILY MEDICINE CLINIC | Age: 62
End: 2022-03-11
Payer: MEDICAID

## 2022-03-11 VITALS
DIASTOLIC BLOOD PRESSURE: 64 MMHG | RESPIRATION RATE: 20 BRPM | WEIGHT: 292 LBS | SYSTOLIC BLOOD PRESSURE: 128 MMHG | OXYGEN SATURATION: 96 % | TEMPERATURE: 97.1 F | HEART RATE: 75 BPM | HEIGHT: 69 IN | BODY MASS INDEX: 43.25 KG/M2

## 2022-03-11 DIAGNOSIS — M54.2 NECK PAIN: Primary | ICD-10-CM

## 2022-03-11 DIAGNOSIS — I10 ESSENTIAL HYPERTENSION: ICD-10-CM

## 2022-03-11 DIAGNOSIS — J30.1 SEASONAL ALLERGIC RHINITIS DUE TO POLLEN: ICD-10-CM

## 2022-03-11 DIAGNOSIS — E11.9 WELL CONTROLLED TYPE 2 DIABETES MELLITUS (HCC): ICD-10-CM

## 2022-03-11 PROCEDURE — 3017F COLORECTAL CA SCREEN DOC REV: CPT | Performed by: FAMILY MEDICINE

## 2022-03-11 PROCEDURE — 1036F TOBACCO NON-USER: CPT | Performed by: FAMILY MEDICINE

## 2022-03-11 PROCEDURE — 3051F HG A1C>EQUAL 7.0%<8.0%: CPT | Performed by: FAMILY MEDICINE

## 2022-03-11 PROCEDURE — 2022F DILAT RTA XM EVC RTNOPTHY: CPT | Performed by: FAMILY MEDICINE

## 2022-03-11 PROCEDURE — 99213 OFFICE O/P EST LOW 20 MIN: CPT | Performed by: FAMILY MEDICINE

## 2022-03-11 PROCEDURE — G8417 CALC BMI ABV UP PARAM F/U: HCPCS | Performed by: FAMILY MEDICINE

## 2022-03-11 PROCEDURE — G8482 FLU IMMUNIZE ORDER/ADMIN: HCPCS | Performed by: FAMILY MEDICINE

## 2022-03-11 PROCEDURE — G8427 DOCREV CUR MEDS BY ELIG CLIN: HCPCS | Performed by: FAMILY MEDICINE

## 2022-03-11 RX ORDER — CROMOLYN SODIUM 40 MG/ML
SOLUTION/ DROPS OPHTHALMIC
Qty: 10 ML | Refills: 0 | Status: SHIPPED | OUTPATIENT
Start: 2022-03-11 | End: 2022-07-08 | Stop reason: SDUPTHER

## 2022-03-11 ASSESSMENT — ENCOUNTER SYMPTOMS: SHORTNESS OF BREATH: 0

## 2022-03-11 NOTE — PROGRESS NOTES
[facility]  [address] [facility]  Dept: [phone]  Dept Fax: [phone]  Loc: [phone]  PROGRESS NOTE      Visit Date: 3/11/2022    Beulah Alas is a 62 y.o. male who presents today for:  Chief Complaint   Patient presents with    Neck Pain     States improvement with therapy    Hypertension     6 week follow up   Luciana Cannon last summer; Due for colonoscopy ([facility])       Subjective:  HPI    6 week f/u    Neck pain:  Has been doing PT. Pain is much better. Pain is intermittent. ROM is better. No weakness in arms. No numbness in arms. Happy with improvement. Pain currently is 0/10. HTN:  Enalapril was increased at last appt. On norvasc and metoprolol as well.     seasonal allergies. On opticrom drops for eyes which are effective. Review of Systems   Respiratory: Negative for shortness of breath. Cardiovascular: Negative for chest pain. Musculoskeletal: Negative for neck pain. Neurological: Negative for weakness and numbness.      Patient Active Problem List   Diagnosis    Hypertension    Hyperlipidemia    CAD (coronary artery disease)    Chest pain, atypical    Chronic low back pain    Hypertriglyceridemia    Morbidly obese (HCC)    Abnormal stress test    Os trigonum    Elevated PSA    Lumbar spinal stenosis    Well controlled type 2 diabetes mellitus (HCC)    ROBSON (obstructive sleep apnea)    Back pain at L4-L5 level    Anxiety    Microalbuminuria    Positive FIT (fecal immunochemical test)    H/O heart artery stent    Urinary retention    Malignant neoplasm of prostate ([address]    COPD, severe ([address]     Past Medical History:   Diagnosis Date    Abnormal stress test Sept 2012    Lateral Wall Ischemia- [facility]    CAD (coronary artery disease)     Cancer (HCC)     Prostate    Chronic low back pain     Diabetes mellitus ([address]     Diabetes mellitus [facility]     Hyperlipidemia     Hypertension     Hypertriglyceridemia     MI (myocardial infarction) ([facility]     Obesity     Sleep apnea     has CPAP    Viral pneumonia 10/15/2020      Past Surgical History:   Procedure Laterality Date    BACK SURGERY  1997    L4 & L5 fusion    CARDIAC CATHETERIZATION  10/2019   Debi Diaz CARDIAC SURGERY  2004    Cardiac cath with stent placement x1    COLONOSCOPY  2010    CORONARY ANGIOPLASTY WITH STENT PLACEMENT  10/17/2019    HERNIA REPAIR  1991    Mesh repair in abdomen.  ULTRASOUND PROSTATE/TRANSRECTAL N/A 2020    TRANSRECTAL ULTRASOUND WITH PROSTATE BIOPSY performed by Umm Adkins MD at [address] [place] [address] History   Problem Relation Age of Onset    Diabetes Mother     Heart Disease Mother     High Blood Pressure Mother     Arthritis Father     Diabetes Father     Heart Disease Father     High Blood Pressure Father     Diabetes Sister     Diabetes Brother     Heart Disease Brother     Cancer Neg Hx     Stroke Neg Hx      Social History     Tobacco Use    Smoking status: Former Smoker     Packs/day: 0.25     Years: 2.00     Pack years: 0.50     Types: Cigarettes     Quit date: 1995     Years since quittin.2    Smokeless tobacco: Never Used   Substance Use Topics    Alcohol use: No      Current Outpatient Medications   Medication Sig Dispense Refill    ALPRAZolam (XANAX) 1 MG tablet Take 1 tablet by mouth 2 times daily for 30 days.  60 tablet 0    RA VITAMIN C/ERNA HIPS 500 MG tablet TAKE 1 TABLET BY MOUTH DAILY 30 tablet 3    enalapril (VASOTEC) 20 MG tablet Take 1 tablet by mouth daily 90 tablet 1    albuterol (PROVENTIL) (2.5 MG/3ML) 0.083% nebulizer solution Take 3 mLs by nebulization 3 times daily 90 each 2    cromolyn (OPTICROM) 4 % ophthalmic solution Instill 1 drop into each eye four times a day 10 mL 0    pantoprazole (PROTONIX) 40 MG tablet take 1 tablet by mouth once daily 90 tablet 1    metFORMIN (GLUCOPHAGE) 1000 MG tablet Take 1 tablet by mouth daily (with breakfast) 180 tablet 1    insulin glargine (LANTUS SOLOSTAR) 100 UNIT/ML injection pen INJECT 40 UNITS INTO THE SKIN 2 TIMES A DAY 96 mL 0    leuprolide (LUPRON) 45 MG injection Inject 45 mg into the muscle once for 1 dose 1 each 1    oxybutynin (DITROPAN XL) 5 MG extended release tablet Take 1 tablet by mouth 2 times daily 60 tablet 6    Cyanocobalamin ER (RA VITAMIN B-12 TR) 1000 MCG TBCR take 1 tablet by mouth once daily 30 tablet 2    tamsulosin (FLOMAX) 0.4 MG capsule Take 2 capsules by mouth daily 60 capsule 3    BD INSULIN SYRINGE U/F 31G X 5/16\" 1 ML MISC use as directed three times a day 100 each 3    albuterol sulfate HFA (PROAIR HFA) 108 (90 Base) MCG/ACT inhaler Inhale 2 puffs into the lungs every 6 hours as needed for Wheezing or Shortness of Breath 1 each 3    fluticasone (FLONASE) 50 MCG/ACT nasal spray One spray in each nostril q hs 3 each 1    insulin lispro (HUMALOG) 100 UNIT/ML injection vial inject 10 units subcutaneously twice a day if needed for HIGH BLOOD SUGAR. 30 mL 3    B-D ULTRAFINE III SHORT PEN 31G X 8 MM MISC USE TWICE DAILY WITH THE [facility]AR PEN.  200 each 3    ferrous sulfate (IRON 325) 325 (65 Fe) MG tablet Take 1 tablet by mouth every 48 hours 30 tablet 3    Insulin Syringes, Disposable, U-100 1 ML MISC 1 each by Does not apply route 3 times daily 31 gauge x 8 mm  ultrafine 2 100 each 5    amLODIPine (NORVASC) 10 MG tablet Take 10 mg by mouth daily      clopidogrel (PLAVIX) 75 MG tablet take 1 tablet by mouth once daily 90 tablet 1    metoprolol tartrate (LOPRESSOR) 25 MG tablet Take 1 tablet by mouth 2 times daily  0    Vitamin D, Cholecalciferol, 25 MCG (1000 UT) TABS Take 1,000 Units by mouth daily      pravastatin (PRAVACHOL) 40 MG tablet Take 1 tablet by mouth nightly  0    gemfibrozil (LOPID) 600 MG tablet Take 1 tablet by mouth 2 times daily 180 tablet 1    aspirin 325 MG tablet Take 325 mg by mouth daily.  BEVESPI AEROSPHERE 9-4.8 MCG/ACT AERO inhale 2 puffs by mouth and INTO THE LUNGS twice a day (Patient not taking: Reported on 1/4/2022)      umeclidinium-vilanterol (ANORO ELLIPTA) 62.5-25 MCG/INH AEPB inhaler Inhale 1 puff into the lungs daily (Patient not taking: Reported on 1/4/2022) 3 each 3    nitroGLYCERIN (NITROSTAT) 0.4 MG SL tablet Place 0.4 mg under the tongue every 5 minutes as needed for Chest pain        No current facility-administered medications for this visit. Allergies   Allergen Reactions    Ace Inhibitors      When asked Oct 2020, patient was unsure of allergy/reaction. Patient takes/tolerates enalapril.     Baclofen Other (See Comments)     Lightheadedness, dizziness    Darvocet [Propoxyphene N-Acetaminophen] Nausea Only     Felt sickly    Darvon [Propoxyphene Hcl]      Felt sickly    Flexeril [Cyclobenzaprine] Other (See Comments)     Headache    Morphine Nausea Only     Pt reported feeling sickly    Motrin [Ibuprofen Micronized] Nausea Only     Pt reported feeling very sick    Tylenol [Acetaminophen] Nausea Only    Ultram [Tramadol] Itching     Health Maintenance   Topic Date Due    Hepatitis C screen  Never done    HIV screen  Never done    DTaP/Tdap/Td vaccine (1 - Tdap) Never done    Shingles Vaccine (1 of 2) Never done    Diabetic retinal exam  07/31/2019    Diabetic microalbuminuria test  08/25/2021    COVID-19 Vaccine (3 - Moderna risk 4-dose series) 01/17/2022    Colorectal Cancer Screen  02/26/2022    Depression Screen  09/08/2022    A1C test (Diabetic or Prediabetic)  01/04/2023    Pneumococcal 0-64 years Vaccine (2 of 4 - PPSV23) 01/11/2023    Lipid screen  03/04/2023    Potassium monitoring  03/04/2023    Creatinine monitoring  03/04/2023    PSA counseling  03/04/2023    Diabetic foot exam  03/11/2023    Flu vaccine  Completed    Hepatitis A vaccine  Aged Out    Hib vaccine  Aged Out    Meningococcal (ACWY) vaccine  Aged Out       Objective:  /64 (Site: Left Upper Arm, Position: Sitting, Cuff Size: Medium Adult)   Pulse 75   Temp 97.1 °F (36.2 °C) (Temporal)   Resp 20   Ht 5' 9\" (1.753 m)   Wt 292 lb (132.5 kg)   SpO2 96%   BMI 43.12 kg/m²   Physical Exam  Vitals reviewed. Constitutional:       Appearance: He is well-developed. He is obese. He is not ill-appearing. Interventions: Nasal cannula in place. Cardiovascular:      Rate and Rhythm: Normal rate and regular rhythm. Heart sounds: No murmur heard. Pulmonary:      Effort: Pulmonary effort is normal. No respiratory distress. Breath sounds: Normal breath sounds. No wheezing or rhonchi. Musculoskeletal:      Left lower leg: No edema. Neurological:      Mental Status: He is alert. Mental status is at baseline. Psychiatric:         Mood and Affect: Mood normal.         Behavior: Behavior normal.         Strength:  Right Upper Extremity  Left Upper Extremity  Shoulder abduction:  5/5    5/5  Elbow flexion:   5/5    5/5  Wrist extension:  5/5    5/5  Elbow extension:  5/5    5/5  Wrist flexion:   5/5    5/5  Finger abduction:  5/5    5/5    Neck: No tenderness. Normal range of motion in flexion-extension. Slightly decreased lateral flexion bilaterally. Fairly normal rotation bilaterally    Impression/Plan:  1. Neck pain  Follow-up neck pain. Much better/almost resolved with PT. Continue home exercise program.  has some degenerative changes on x-ray. No further work-up at this time. If pain recurs, consider MRI    2. Essential hypertension  Chronic. Controlled. Continue enalapril, Norvasc, and metoprolol. 3. Seasonal allergic rhinitis due to pollen  Chronic. Controlled. Refill med  - cromolyn (OPTICROM) 4 % ophthalmic solution; Instill 1 drop into each eye four times a day  Dispense: 10 mL; Refill: 0    4.  Well controlled type 2 diabetes mellitus ([address]  -  DIABETES FOOT EXAM      He is working on scheduling colonoscopy    They voiced understanding. All questions answered. They agreed with treatment plan. See patient instructions for any educational materials that may have been given. Discussed use, benefit, and side effects of prescribed medications. Reviewed health maintenance. (Please note that portions of this note may have been completed with a voice recognition program.  Efforts were made to edit the dictation but occasionally words are mis-transcribed.)    Return if symptoms worsen or fail to improve.       Electronically signed by Joycelyn Parker MD on 3/11/2022 at 10:41 AM

## 2022-03-14 ENCOUNTER — HOSPITAL ENCOUNTER (OUTPATIENT)
Dept: RADIATION ONCOLOGY | Age: 62
Discharge: HOME | End: 2022-03-14
Attending: RADIOLOGY
Payer: MEDICAID

## 2022-03-14 VITALS
HEART RATE: 77 BPM | TEMPERATURE: 97.2 F | DIASTOLIC BLOOD PRESSURE: 65 MMHG | BODY MASS INDEX: 42.97 KG/M2 | OXYGEN SATURATION: 93 % | RESPIRATION RATE: 20 BRPM | WEIGHT: 291 LBS | SYSTOLIC BLOOD PRESSURE: 142 MMHG

## 2022-03-14 PROCEDURE — 99212 OFFICE O/P EST SF 10 MIN: CPT | Performed by: NURSE PRACTITIONER

## 2022-03-14 PROCEDURE — 99215 OFFICE O/P EST HI 40 MIN: CPT | Performed by: NURSE PRACTITIONER

## 2022-03-14 NOTE — PROGRESS NOTES
[address] [facility], [address]  Phone: [phone]   Toll Free: [phone]   Fax: [phone]    RADIATION ONCOLOGY FOLLOW UP REPORT    PATIENT NAME:  Hailey Garcia     : 1960  MEDICAL RECORD NO: [id number]    LOCATION: [facility] NO: [id number]      PROVIDER: PIEDAD Greenfield CNP        DATE OF SERVICE: 3/14/2022    FOLLOW UP PHYSICIANS: Yohan Castro     DIAGNOSIS:  F46 -- Malignant neoplasm of the prostate; Adenocarcinoma, Hamlin's 4+5=9, Grade Group 5; PSA 16.28; cT1c N0 M0, Stage IIIC     DATE OF DIAGNOSIS: 2020     END OF TREATMENT DATE: 4/15/2021     ECOG PERFORMANCE STATUS: 0     PAIN: Denies     CHAPERONE: Declined        HPI: Laureano Sousa has known prostatic hypertrophy with lower urinary tract symptoms (for which he has been initiated on tamsulosin) and history of urinary retention. Bruce  received a recommendation for prostate biopsy, which was performed in 2020.  The biopsy returned showing adenocarcinoma Merline's 4+5 = 9 grade group 5 with cancer and multiple cores from both sides of the prostate.  Perineural invasion was identified, but there was no definite evidence of extraprostatic extension seen on the core specimens.  Pool reviewed his biopsy results during his urology follow-up. [facility] has significant coronary artery disease with coronary artery stent placement in 2019 and requires Plavix.  Because of these issues, Laureano Sousa is not considered a good candidate for prostatectomy. [facility] was seen 2021 for evaluation and discussion regarding the potential role of radiation therapy in the management of his very high risk prostate cancer.      Pool tolerated radiation treatment fairly well. He had an indwelling catheter throughout the course of treatment. This was clamped to allow for bladder filling prior to treatment.  He did have issues with urinary leaking around the catheter. Adjusted to timing of clamping it and that did improve the leaking. He was also treated for a UTI during his course of radiation treatment.      INTERVAL HISTORY: Roxana Weems presents to [address] [place] [address] for a post treatment follow up after completing radiation treatment. Roxana Weems states he is doing well since his last visit. He recently completed PT for neck and upper back pain which he reports has helped. He stopped taking Casodex later last month. He continues on Lupron at this time. Placed on Oxybutynin for urinary urgency, he also continues on  BID. Reports nocturia 3 times per week. He denies leaking, or weak stream. He denies chest pain, sob, fever, chills, abdominal pain, arthralgias, changes in bowel habits, unintentional weight loss or loss of appetite. AUA Symptom Score: 6 (Moderate)  Quality of Life Score: 3 (Mixed)  LILI Inventory: Not filled out     LAB RESULTS:   PSAs:  3/4/22: <0.02  -- Casodex stopped 2/2022; Continues on Lupron  11/30/21: <0.02  9/8/21: 0.02  6/8/21: 0.81  -- Casodex started 2/2021; XRT completed 4/15/21; Lupron started 5/24/21  10/23/2020: 16.28  8/25/2020: 16.31  6/8/16: 7.61      CBC:   Lab Results   Component Value Date    WBC 6.6 03/04/2022    RBC 3.86 03/04/2022    HGB 10.6 03/04/2022    HCT 34.2 03/04/2022    MCV 88.6 03/04/2022    MCH 27.5 03/04/2022    MCHC 31.0 03/04/2022     03/04/2022    MPV 9.9 03/04/2022     CMP:  Lab Results   Component Value Date     03/04/2022    K 5.1 03/04/2022     03/04/2022    CO2 23 03/04/2022    BUN 18 03/04/2022    CREATININE 1.0 03/04/2022    LABGLOM 76 03/04/2022    GLUCOSE 196 03/04/2022    PROT 6.9 03/04/2022    LABALBU 4.4 03/04/2022    CALCIUM 10.4 03/04/2022    BILITOT 0.3 03/04/2022    ALKPHOS 111 03/04/2022    AST 8 03/04/2022    ALT 8 03/04/2022       RADIOLOGY RESULTS:   No recent imaging. ROS: As noted in the HPI and Interval history, otherwise negative.        EXAMINATION:   GENERAL: Roxana Weems is a pleasant well-developed adult male. He is alert and oriented x3 in no acute distress. VITAL SIGNS: Weight 132 kg, temperature 36.2 °C, pulse 77, blood pressure 142/65, respirations 20, pulse ox 93% on room air. HEENT: Normocephalic, atraumatic. PERRL. EOMI. Ears, nose and lips are within normal limits on external examination. Oropharynx unremarkable. NECK: Supple without palpable cervical adenopathy. LYMPH: Without palpable supraclavicular axillary adenopathy. LUNGS: Clear without adventitious sounds. Respirations easy and unlabored. HEART: Regular rate and rhythm. Without murmur. ABDOMEN: Soft, round, nontender. Active bowel sounds x4. EXTREMITIES: Without clubbing or cyanosis. NEUROLOGIC EXAMINATION: Cranial nerves grossly intact. MUSCULOSKELETAL: Without bony point tenderness.        ASSESSMENT: Parul Cruz was diagnosed with prostate cancer in December 2020. He received recommendation for ADT and radiation therapy for which he was agreeable. Parul Cruz started Casodex in 2/2021 and Initial dose Lupron was on 5/24/21. Parul Cruz PSA continues to be <0.02. He stopped taking Casodex per Urology in February 2022. He continues on Lupron. He also remains on Flomax BID and was started on Oxybutynin for urgency. Last cystoscopy on 5/24/21 showed no tumors and bladder stones were resolved. Parul Cruz does not have concerning findings on today's physical exam.     PLAN:  1. 12/10/21 PSA remains stable at <0.02.  2. PSA ordered pr Urology for June. 3. Urinary urgency and frequency: no improvement on Myrbetric. Now on oxybutynin and taking Flomax BID. 4. Casodex stopped 2/2022  5. Continues Lupron  6. Continue follow up with other providers as scheduled. 7. Follow up here in 6 months  8. Parul Cruz is aware he can call for questions, concerns or changes in condition.       Electronically signed by PIEDAD Barahona CNP on 3/14/22 at 10:12 AM EDT    ATTESTATION:  I have spent 40 minutes reviewing previous notes, test results and face to face with the patient discussing the diagnosis and importance of compliance with the treatment plan as well as documenting on the day of the visit.     CC: Yohan Choi; Jayce Pineda

## 2022-04-04 NOTE — TELEPHONE ENCOUNTER
Charles Jaimes called requesting a refill on the following medications:  Requested Prescriptions     Pending Prescriptions Disp Refills    tamsulosin (FLOMAX) 0.4 MG capsule 60 capsule 3     Sig: Take 2 capsules by mouth daily     [facility]   . pv      Date of last visit:   Date of next visit (if applicable): Visit date not found

## 2022-04-05 ENCOUNTER — OFFICE VISIT (OUTPATIENT)
Dept: FAMILY MEDICINE CLINIC | Age: 62
End: 2022-04-05
Payer: MEDICAID

## 2022-04-05 VITALS
HEIGHT: 69 IN | RESPIRATION RATE: 14 BRPM | TEMPERATURE: 96.8 F | HEART RATE: 86 BPM | DIASTOLIC BLOOD PRESSURE: 72 MMHG | OXYGEN SATURATION: 96 % | SYSTOLIC BLOOD PRESSURE: 128 MMHG | BODY MASS INDEX: 42.75 KG/M2 | WEIGHT: 288.6 LBS

## 2022-04-05 DIAGNOSIS — D50.9 IRON DEFICIENCY ANEMIA, UNSPECIFIED IRON DEFICIENCY ANEMIA TYPE: ICD-10-CM

## 2022-04-05 DIAGNOSIS — Z79.4 TYPE 2 DIABETES MELLITUS WITH HYPERGLYCEMIA, WITH LONG-TERM CURRENT USE OF INSULIN (HCC): Primary | ICD-10-CM

## 2022-04-05 DIAGNOSIS — F41.9 ANXIETY: ICD-10-CM

## 2022-04-05 DIAGNOSIS — J30.1 CHRONIC SEASONAL ALLERGIC RHINITIS DUE TO POLLEN: ICD-10-CM

## 2022-04-05 DIAGNOSIS — E11.65 TYPE 2 DIABETES MELLITUS WITH HYPERGLYCEMIA, WITH LONG-TERM CURRENT USE OF INSULIN (HCC): Primary | ICD-10-CM

## 2022-04-05 LAB — HBA1C MFR BLD: 7.7 %

## 2022-04-05 PROCEDURE — 83036 HEMOGLOBIN GLYCOSYLATED A1C: CPT | Performed by: FAMILY MEDICINE

## 2022-04-05 PROCEDURE — 99214 OFFICE O/P EST MOD 30 MIN: CPT | Performed by: FAMILY MEDICINE

## 2022-04-05 PROCEDURE — 2022F DILAT RTA XM EVC RTNOPTHY: CPT | Performed by: FAMILY MEDICINE

## 2022-04-05 PROCEDURE — 1036F TOBACCO NON-USER: CPT | Performed by: FAMILY MEDICINE

## 2022-04-05 PROCEDURE — G8427 DOCREV CUR MEDS BY ELIG CLIN: HCPCS | Performed by: FAMILY MEDICINE

## 2022-04-05 PROCEDURE — G8417 CALC BMI ABV UP PARAM F/U: HCPCS | Performed by: FAMILY MEDICINE

## 2022-04-05 PROCEDURE — 3051F HG A1C>EQUAL 7.0%<8.0%: CPT | Performed by: FAMILY MEDICINE

## 2022-04-05 PROCEDURE — 3017F COLORECTAL CA SCREEN DOC REV: CPT | Performed by: FAMILY MEDICINE

## 2022-04-05 RX ORDER — TAMSULOSIN HYDROCHLORIDE 0.4 MG/1
0.8 CAPSULE ORAL DAILY
Qty: 60 CAPSULE | Refills: 3 | Status: SHIPPED | OUTPATIENT
Start: 2022-04-05 | End: 2022-08-02 | Stop reason: SDUPTHER

## 2022-04-05 RX ORDER — ALPRAZOLAM 1 MG/1
1 TABLET ORAL 2 TIMES DAILY
Qty: 60 TABLET | Refills: 0 | Status: SHIPPED | OUTPATIENT
Start: 2022-04-05 | End: 2022-05-04 | Stop reason: SDUPTHER

## 2022-04-05 RX ORDER — INSULIN GLARGINE 100 [IU]/ML
INJECTION, SOLUTION SUBCUTANEOUS
Qty: 96 ML | Refills: 0 | Status: SHIPPED | OUTPATIENT
Start: 2022-04-05 | End: 2022-08-16

## 2022-04-05 RX ORDER — FERROUS SULFATE 325(65) MG
650 TABLET ORAL
Qty: 78 TABLET | Refills: 1 | Status: SHIPPED | OUTPATIENT
Start: 2022-04-06 | End: 2022-10-10 | Stop reason: SDUPTHER

## 2022-04-05 RX ORDER — FLUTICASONE PROPIONATE 50 MCG
SPRAY, SUSPENSION (ML) NASAL
Qty: 3 EACH | Refills: 1 | Status: SHIPPED | OUTPATIENT
Start: 2022-04-05 | End: 2022-10-10 | Stop reason: SDUPTHER

## 2022-04-05 ASSESSMENT — ENCOUNTER SYMPTOMS
WHEEZING: 0
SHORTNESS OF BREATH: 0

## 2022-04-05 NOTE — PROGRESS NOTES
[facility]  [address] [facility]  Dept: [phone]  Dept Fax: [phone]  Loc: [phone]  PROGRESS NOTE      Visit Date: 4/5/2022    Oumar Branch is a 62 y.o. male who presents today for:  Chief Complaint   Patient presents with    3 Month Follow-Up    Hypertension    Diabetes    Anxiety    Other     would liek referral for endoscopy and colonoscopy       Subjective:  Hypertension  Pertinent negatives include no shortness of breath. Diabetes  Hypoglycemia symptoms include nervousness/anxiousness. Other  Pertinent negatives include no chills or fever. 3 month f/u    Anxiety:  On xanax 1 mg 2x per day.  Not on any other anxiety meds.  Denies illicit drug use.  mood is good. Sleeping well. Having some increased anxiety    DM:  a1c 7.9% in jan. Glucose 115-250s. On lantus 40 units twice daily, lispro 10 units 3x per day, and metformin. States he needs to exercise more. Checks glucose 3x per day. A few pound of weight loss. No hypoglycemic episods. Anemia:  Planning on EGD and colonoscopy. On plavix and aspirin. Was referred to Dr. Prerna Winchester and next appt is apr 25th. On protonix. Taking iron pill 2 pills 3x per week. Review of Systems   Constitutional: Negative for chills and fever. Respiratory: Negative for shortness of breath and wheezing. Psychiatric/Behavioral: Negative for sleep disturbance. The patient is nervous/anxious.       Patient Active Problem List   Diagnosis    Hypertension    Hyperlipidemia    CAD (coronary artery disease)    Chest pain, atypical    Chronic low back pain    Hypertriglyceridemia    Morbidly obese (HCC)    Abnormal stress test    Os trigonum    Elevated PSA    Lumbar spinal stenosis    Well controlled type 2 diabetes mellitus (HCC)    ROBSON (obstructive sleep apnea)    Back pain at L4-L5 level    Anxiety    Microalbuminuria    Positive FIT (fecal immunochemical test)    H/O heart artery stent    Urinary retention    Malignant neoplasm of prostate ([address]    COPD, severe ([address]     Past Medical History:   Diagnosis Date    Abnormal stress test 2012    Lateral Wall Ischemia- [facility] at [facility]-    CAD (coronary artery disease)     Cancer ([address]     Prostate    Chronic low back pain     Diabetes mellitus ([address]     Diabetes mellitus ([address]     Hyperlipidemia     Hypertension     Hypertriglyceridemia     MI (myocardial infarction) ([address]     Obesity     Sleep apnea     has CPAP    Viral pneumonia 10/15/2020      Past Surgical History:   Procedure Laterality Date    BACK SURGERY  1997    L4 & L5 fusion    CARDIAC CATHETERIZATION  10/2019   [facility] CARDIAC SURGERY  2004    Cardiac cath with stent placement x1    COLONOSCOPY  2010    CORONARY ANGIOPLASTY WITH STENT PLACEMENT  10/17/2019    HERNIA REPAIR      Mesh repair in abdomen.     ULTRASOUND PROSTATE/TRANSRECTAL N/A 2020    TRANSRECTAL ULTRASOUND WITH PROSTATE BIOPSY performed by Sherri Spaulding MD at [address] [place] [address] History   Problem Relation Age of Onset    Diabetes Mother     Heart Disease Mother     High Blood Pressure Mother     Arthritis Father     Diabetes Father     Heart Disease Father     High Blood Pressure Father     Diabetes Sister     Diabetes Brother     Heart Disease Brother     Cancer Neg Hx     Stroke Neg Hx      Social History     Tobacco Use    Smoking status: Former Smoker     Packs/day: 0.25     Years: 2.00     Pack years: 0.50     Types: Cigarettes     Quit date: 1995     Years since quittin.2    Smokeless tobacco: Never Used   Substance Use Topics    Alcohol use: No      Current Outpatient Medications   Medication Sig Dispense Refill    cromolyn (OPTICROM) 4 % ophthalmic solution Instill 1 drop into each eye four times a day 10 mL 0    ALPRAZolam (XANAX) 1 MG tablet Take 1 tablet by mouth 2 times daily for 30 days. 60 tablet 0    RA VITAMIN C/ERNA HIPS 500 MG tablet TAKE 1 TABLET BY MOUTH DAILY 30 tablet 3    enalapril (VASOTEC) 20 MG tablet Take 1 tablet by mouth daily 90 tablet 1    albuterol (PROVENTIL) (2.5 MG/3ML) 0.083% nebulizer solution Take 3 mLs by nebulization 3 times daily 90 each 2    pantoprazole (PROTONIX) 40 MG tablet take 1 tablet by mouth once daily 90 tablet 1    metFORMIN (GLUCOPHAGE) 1000 MG tablet Take 1 tablet by mouth daily (with breakfast) 180 tablet 1    insulin glargine (LANTUS SOLOSTAR) 100 UNIT/ML injection pen INJECT 40 UNITS INTO THE SKIN 2 TIMES A DAY 96 mL 0    leuprolide (LUPRON) 45 MG injection Inject 45 mg into the muscle once for 1 dose 1 each 1    oxybutynin (DITROPAN XL) 5 MG extended release tablet Take 1 tablet by mouth 2 times daily 60 tablet 6    Cyanocobalamin ER (RA VITAMIN B-12 TR) 1000 MCG TBCR take 1 tablet by mouth once daily 30 tablet 2    tamsulosin (FLOMAX) 0.4 MG capsule Take 2 capsules by mouth daily 60 capsule 3    BD INSULIN SYRINGE U/F 31G X 5/16\" 1 ML MISC use as directed three times a day 100 each 3    albuterol sulfate HFA (PROAIR HFA) 108 (90 Base) MCG/ACT inhaler Inhale 2 puffs into the lungs every 6 hours as needed for Wheezing or Shortness of Breath 1 each 3    fluticasone (FLONASE) 50 MCG/ACT nasal spray One spray in each nostril q hs 3 each 1    insulin lispro (HUMALOG) 100 UNIT/ML injection vial inject 10 units subcutaneously twice a day if needed for HIGH BLOOD SUGAR. 30 mL 3    B-D ULTRAFINE III SHORT PEN 31G X 8 MM MISC USE TWICE DAILY WITH THE [employer] PEN.  200 each 3    ferrous sulfate (IRON 325) 325 (65 Fe) MG tablet Take 1 tablet by mouth every 48 hours (Patient taking differently: Take 325 mg by mouth every 48 hours Monday Wednesday and fridays 2 times) 30 tablet 3    Insulin Syringes, Disposable, U-100 1 ML MISC 1 each by Does not apply route 3 times daily 31 gauge x 8 mm  ultrafine 2 100 each 5    amLODIPine (NORVASC) 10 MG tablet Take 10 mg by mouth daily      clopidogrel (PLAVIX) 75 MG tablet take 1 tablet by mouth once daily 90 tablet 1    metoprolol tartrate (LOPRESSOR) 25 MG tablet Take 1 tablet by mouth 2 times daily  0    Vitamin D, Cholecalciferol, 25 MCG (1000 UT) TABS Take 1,000 Units by mouth daily      pravastatin (PRAVACHOL) 40 MG tablet Take 1 tablet by mouth nightly  0    gemfibrozil (LOPID) 600 MG tablet Take 1 tablet by mouth 2 times daily 180 tablet 1    nitroGLYCERIN (NITROSTAT) 0.4 MG SL tablet Place 0.4 mg under the tongue every 5 minutes as needed for Chest pain       aspirin 325 MG tablet Take 325 mg by mouth daily.  BEVESPI AEROSPHERE 9-4.8 MCG/ACT AERO inhale 2 puffs by mouth and INTO THE LUNGS twice a day (Patient not taking: Reported on 1/4/2022)      umeclidinium-vilanterol (ANORO ELLIPTA) 62.5-25 MCG/INH AEPB inhaler Inhale 1 puff into the lungs daily (Patient not taking: Reported on 1/4/2022) 3 each 3     No current facility-administered medications for this visit. Allergies   Allergen Reactions    Ace Inhibitors      When asked Oct 2020, patient was unsure of allergy/reaction. Patient takes/tolerates enalapril.     Baclofen Other (See Comments)     Lightheadedness, dizziness    Darvocet [Propoxyphene N-Acetaminophen] Nausea Only     Felt sickly    Darvon [Propoxyphene Hcl]      Felt sickly    Flexeril [Cyclobenzaprine] Other (See Comments)     Headache    Morphine Nausea Only     Pt reported feeling sickly    Motrin [Ibuprofen Micronized] Nausea Only     Pt reported feeling very sick    Tylenol [Acetaminophen] Nausea Only    Ultram [Tramadol] Itching     Health Maintenance   Topic Date Due    Hepatitis C screen  Never done    HIV screen  Never done    DTaP/Tdap/Td vaccine (1 - Tdap) Never done    Shingles Vaccine (1 of 2) Never done    Diabetic retinal exam  07/31/2019    Diabetic microalbuminuria test  08/25/2021    COVID-19 Vaccine (3 - Moderna risk 4-dose series) 01/17/2022    Colorectal Cancer Screen  02/26/2022    Depression Screen  09/08/2022    A1C test (Diabetic or Prediabetic)  01/04/2023    Pneumococcal 0-64 years Vaccine (2 of 4 - PPSV23) 01/11/2023    Lipid screen  03/04/2023    Potassium monitoring  03/04/2023    Creatinine monitoring  03/04/2023    PSA counseling  03/04/2023    Diabetic foot exam  03/11/2023    Flu vaccine  Completed    Hepatitis A vaccine  Aged Out    Hib vaccine  Aged Out    Meningococcal (ACWY) vaccine  Aged Out       Objective:  /72 (Site: Left Upper Arm, Position: Sitting)   Pulse 86   Temp 96.8 °F (36 °C)   Resp 14   Ht 5' 9\" (1.753 m)   Wt 288 lb 9.6 oz (130.9 kg)   SpO2 96%   BMI 42.62 kg/m²   Physical Exam  Vitals reviewed. Constitutional:       Appearance: He is well-developed. He is obese. He is not ill-appearing. Interventions: Nasal cannula in place. Cardiovascular:      Rate and Rhythm: Normal rate and regular rhythm. Heart sounds: No murmur heard. Pulmonary:      Effort: Pulmonary effort is normal. No respiratory distress. Breath sounds: Normal breath sounds. No wheezing or rhonchi. Musculoskeletal:      Right lower leg: Edema (trace) present. Left lower leg: Edema (trace) present. Neurological:      Mental Status: He is alert. Mental status is at baseline. Psychiatric:         Mood and Affect: Mood normal.         Behavior: Behavior normal.       Lab Results   Component Value Date    WBC 6.6 03/04/2022    HGB 10.6 (L) 03/04/2022    HCT 34.2 (L) 03/04/2022     03/04/2022    CHOL 142 03/04/2022    TRIG 153 03/04/2022    HDL 28 03/04/2022    ALT 8 (L) 03/04/2022    AST 8 03/04/2022     03/04/2022    K 5.1 03/04/2022     03/04/2022    CREATININE 1.0 03/04/2022    BUN 18 03/04/2022    CO2 23 03/04/2022    PSA <0.02 03/04/2022    INR 1.05 12/07/2020    GLUF 115 (H) 03/02/2021    LABA1C 7.7 04/05/2022    LABMICR 7.17 10/09/2018         Impression/Plan:  1. Type 2 diabetes mellitus with hyperglycemia, with long-term current use of insulin (HCC)  Chronic. Mildly uncontrolled with A1c of 7.7%. Continue Metformin, Lantus 40 units twice a day, and Humalog 10 units 3 times a day. When glucose is over 200 he should give himself 12 units of Humalog and 300+  Then [id number] units  - metFORMIN (GLUCOPHAGE) 1000 MG tablet; Take 1 tablet by mouth daily (with breakfast)  Dispense: 180 tablet; Refill: 1  - insulin glargine (LANTUS SOLOSTAR) 100 UNIT/ML injection pen; INJECT 40 UNITS INTO THE SKIN 2 TIMES A DAY  Dispense: 96 mL; Refill: 0  - POCT glycosylated hemoglobin (Hb A1C)    2. Anxiety  Chronic. Slightly worse. Continue Xanax  - ALPRAZolam (XANAX) 1 MG tablet; Take 1 tablet by mouth 2 times daily for 30 days. Dispense: 60 tablet; Refill: 0    3. Iron deficiency anemia, unspecified iron deficiency anemia type  Chronic. Slightly worse than previous. Continue iron supplement. Has seen hematology. Refer to GI for possible endoscopy. Continue antiplatelets  - ferrous sulfate (IRON 325) 325 (65 Fe) MG tablet; Take 2 tablets by mouth three times a week  Dispense: 78 tablet; Refill: 1  - AFL - Claritza Arzate MD, Gastroenterology, [facility] DONIS [facility]ON    4. Chronic seasonal allergic rhinitis due to pollen  Med refill  - fluticasone (FLONASE) 50 MCG/ACT nasal spray; One spray in each nostril q hs  Dispense: 3 each; Refill: 1        They voiced understanding. All questions answered. They agreed with treatment plan. See patient instructions for any educational materials that may have been given. Discussed use, benefit, and side effects of prescribed medications. Reviewed health maintenance. (Please note that portions of this note may have been completed with a voice recognition program.  Efforts were made to edit the dictation but occasionally words are mis-transcribed.)    Return in about 3 months (around 7/5/2022) for DM, HTN.       Electronically signed by Juanita Dove MD on 4/5/2022 at 10:34 AM

## 2022-04-12 ENCOUNTER — OFFICE VISIT (OUTPATIENT)
Dept: PULMONOLOGY | Age: 62
End: 2022-04-12
Payer: MEDICAID

## 2022-04-12 VITALS
HEIGHT: 69 IN | SYSTOLIC BLOOD PRESSURE: 136 MMHG | BODY MASS INDEX: 43.1 KG/M2 | OXYGEN SATURATION: 98 % | HEART RATE: 60 BPM | TEMPERATURE: 97.8 F | DIASTOLIC BLOOD PRESSURE: 68 MMHG | WEIGHT: 291 LBS

## 2022-04-12 DIAGNOSIS — J43.9 MIXED RESTRICTIVE AND OBSTRUCTIVE LUNG DISEASE (HCC): Primary | ICD-10-CM

## 2022-04-12 DIAGNOSIS — E66.2 OBESITY HYPOVENTILATION SYNDROME (HCC): ICD-10-CM

## 2022-04-12 DIAGNOSIS — J98.4 MIXED RESTRICTIVE AND OBSTRUCTIVE LUNG DISEASE (HCC): Primary | ICD-10-CM

## 2022-04-12 DIAGNOSIS — E66.01 MORBIDLY OBESE (HCC): ICD-10-CM

## 2022-04-12 PROCEDURE — G8417 CALC BMI ABV UP PARAM F/U: HCPCS | Performed by: NURSE PRACTITIONER

## 2022-04-12 PROCEDURE — G8427 DOCREV CUR MEDS BY ELIG CLIN: HCPCS | Performed by: NURSE PRACTITIONER

## 2022-04-12 PROCEDURE — 3023F SPIROM DOC REV: CPT | Performed by: NURSE PRACTITIONER

## 2022-04-12 PROCEDURE — 1036F TOBACCO NON-USER: CPT | Performed by: NURSE PRACTITIONER

## 2022-04-12 PROCEDURE — 3017F COLORECTAL CA SCREEN DOC REV: CPT | Performed by: NURSE PRACTITIONER

## 2022-04-12 PROCEDURE — 99214 OFFICE O/P EST MOD 30 MIN: CPT | Performed by: NURSE PRACTITIONER

## 2022-04-12 RX ORDER — UMECLIDINIUM BROMIDE AND VILANTEROL TRIFENATATE 62.5; 25 UG/1; UG/1
1 POWDER RESPIRATORY (INHALATION) DAILY
Qty: 1 EACH | Refills: 11 | Status: SHIPPED | OUTPATIENT
Start: 2022-04-12

## 2022-04-12 ASSESSMENT — ENCOUNTER SYMPTOMS
SHORTNESS OF BREATH: 1
STRIDOR: 0
CHEST TIGHTNESS: 0
DIARRHEA: 0
BACK PAIN: 1
GASTROINTESTINAL NEGATIVE: 1
NAUSEA: 0
EYES NEGATIVE: 1
WHEEZING: 0
VOMITING: 0
ALLERGIC/IMMUNOLOGIC NEGATIVE: 1

## 2022-04-12 NOTE — PROGRESS NOTES
[facility]    Patient: Jaime Bennett, 62 y.o.   : 1960  2022    Pt of Dr. Kaylee Hooks   Patient presents with    Follow-up     6 month COPD follow up with [address] download         HPI  Milburn Phalen is here for follow up for COPD. Patient is on BiPAP for OHS/ROBSON- awake and alert today in office   Present today on room air and is only using Albuterol for inhaler therapy and only using once a day. Previously tried Meriwether and [employer] would not cover and patient did not feel need to try any other inhaler therapy. May try Anoro again doing PA today in office   Former smoker quit  unsure of how long he smoked   Now seeing GI and Hematology for some anemia issues   Sees urology and oncology for hx of prostate cancer   No new pulmonary issues today   Home oxygen supplementation only bled into BiPAP   AHI controlled today at 3.4 but mask is leaking a lot and patient is waking up with dry reservoir- discussed needing new mask     Progress History:   Since last visit any new medical issues? No  New ER or hospital visits? No  Any new or changes in medicines? No  Using inhalers? No  Are they helpful?  No  Flu vaccine- UTD  Pneumonia vaccine UTD  Covid vaccinations done plus booster   Past Medical hx   PMH:  Past Medical History:   Diagnosis Date    Abnormal stress test 2012    Lateral Wall Ischemia- done at [facility]    CAD (coronary artery disease)     Cancer ([address]     Prostate    Chronic low back pain     Diabetes mellitus ([address]     Diabetes mellitus ([address]     Hyperlipidemia     Hypertension     Hypertriglyceridemia     MI (myocardial infarction) ([address]     Obesity     Sleep apnea     has CPAP    Viral pneumonia 10/15/2020     SURGICAL HISTORY:  Past Surgical History:   Procedure Laterality Date    BACK SURGERY  1997    L4 & L5 fusion    CARDIAC CATHETERIZATION  10/2019   [place] CARDIAC SURGERY  2004 Cardiac cath with stent placement x1    COLONOSCOPY  2010    CORONARY ANGIOPLASTY WITH STENT PLACEMENT  10/17/2019    HERNIA REPAIR      Mesh repair in abdomen.  ULTRASOUND PROSTATE/TRANSRECTAL N/A 2020    TRANSRECTAL ULTRASOUND WITH PROSTATE BIOPSY performed by Wan Quinn MD at [address] [place] [address]:  Social History     Tobacco Use    Smoking status: Former Smoker     Packs/day: 0.25     Years: 2.00     Pack years: 0.50     Types: Cigarettes     Quit date: 1995     Years since quittin.2    Smokeless tobacco: Never Used   Vaping Use    Vaping Use: Never used   Substance Use Topics    Alcohol use: No    Drug use: No     ALLERGIES:  Allergies   Allergen Reactions    Ace Inhibitors      When asked Oct 2020, patient was unsure of allergy/reaction. Patient takes/tolerates enalapril.     Baclofen Other (See Comments)     Lightheadedness, dizziness    Darvocet [Propoxyphene N-Acetaminophen] Nausea Only     Felt sickly    Darvon [Propoxyphene Hcl]      Felt sickly    Flexeril [Cyclobenzaprine] Other (See Comments)     Headache    Morphine Nausea Only     Pt reported feeling sickly    Motrin [Ibuprofen Micronized] Nausea Only     Pt reported feeling very sick    Tylenol [Acetaminophen] Nausea Only    Ultram [Tramadol] Itching     FAMILY HISTORY:  Family History   Problem Relation Age of Onset    Diabetes Mother     Heart Disease Mother     High Blood Pressure Mother     Arthritis Father     Diabetes Father     Heart Disease Father     High Blood Pressure Father     Diabetes Sister     Diabetes Brother     Heart Disease Brother     Cancer Neg Hx     Stroke Neg Hx      CURRENT MEDICATIONS:  Current Outpatient Medications   Medication Sig Dispense Refill    umeclidinium-vilanterol (ANORO ELLIPTA) 62.5-25 MCG/INH AEPB inhaler Inhale 1 puff into the lungs daily 1 each 11    tamsulosin (FLOMAX) 0.4 MG capsule Take 2 capsules by mouth daily 60 capsule 3    fluticasone (FLONASE) 50 MCG/ACT nasal spray One spray in each nostril q hs 3 each 1    ALPRAZolam (XANAX) 1 MG tablet Take 1 tablet by mouth 2 times daily for 30 days. 60 tablet 0    ferrous sulfate (IRON 325) 325 (65 Fe) MG tablet Take 2 tablets by mouth three times a week 78 tablet 1    metFORMIN (GLUCOPHAGE) 1000 MG tablet Take 1 tablet by mouth daily (with breakfast) 180 tablet 1    insulin glargine (LANTUS SOLOSTAR) 100 UNIT/ML injection pen INJECT 40 UNITS INTO THE SKIN 2 TIMES A DAY 96 mL 0    cromolyn (OPTICROM) 4 % ophthalmic solution Instill 1 drop into each eye four times a day 10 mL 0    RA VITAMIN C/ERNA HIPS 500 MG tablet TAKE 1 TABLET BY MOUTH DAILY 30 tablet 3    enalapril (VASOTEC) 20 MG tablet Take 1 tablet by mouth daily 90 tablet 1    albuterol (PROVENTIL) (2.5 MG/3ML) 0.083% nebulizer solution Take 3 mLs by nebulization 3 times daily 90 each 2    pantoprazole (PROTONIX) 40 MG tablet take 1 tablet by mouth once daily 90 tablet 1    leuprolide (LUPRON) 45 MG injection Inject 45 mg into the muscle once for 1 dose 1 each 1    oxybutynin (DITROPAN XL) 5 MG extended release tablet Take 1 tablet by mouth 2 times daily 60 tablet 6    Cyanocobalamin ER (RA VITAMIN B-12 TR) 1000 MCG TBCR take 1 tablet by mouth once daily 30 tablet 2    BD INSULIN SYRINGE U/F 31G X 5/16\" 1 ML MISC use as directed three times a day 100 each 3    albuterol sulfate HFA (PROAIR HFA) 108 (90 Base) MCG/ACT inhaler Inhale 2 puffs into the lungs every 6 hours as needed for Wheezing or Shortness of Breath 1 each 3    insulin lispro (HUMALOG) 100 UNIT/ML injection vial inject 10 units subcutaneously twice a day if needed for HIGH BLOOD SUGAR. 30 mL 3    B-D ULTRAFINE III SHORT PEN 31G X 8 MM MISC USE TWICE DAILY WITH THE [employer] PEN.  200 each 3    Insulin Syringes, Disposable, U-100 1 ML MISC 1 each by Does not apply route 3 times daily 31 gauge x 8 mm  ultrafine 2 100 each 5    amLODIPine (NORVASC) 10 MG tablet Take 10 mg by mouth daily      clopidogrel (PLAVIX) 75 MG tablet take 1 tablet by mouth once daily 90 tablet 1    metoprolol tartrate (LOPRESSOR) 25 MG tablet Take 1 tablet by mouth 2 times daily  0    Vitamin D, Cholecalciferol, 25 MCG (1000 UT) TABS Take 1,000 Units by mouth daily      pravastatin (PRAVACHOL) 40 MG tablet Take 1 tablet by mouth nightly  0    gemfibrozil (LOPID) 600 MG tablet Take 1 tablet by mouth 2 times daily 180 tablet 1    nitroGLYCERIN (NITROSTAT) 0.4 MG SL tablet Place 0.4 mg under the tongue every 5 minutes as needed for Chest pain       aspirin 325 MG tablet Take 325 mg by mouth daily. No current facility-administered medications for this visit. ROS   Review of Systems   Constitutional: Negative. Negative for chills, fever and unexpected weight change. HENT: Negative. Eyes: Negative. Respiratory: Positive for shortness of breath (only with exertion ). Negative for chest tightness, wheezing and stridor. Cardiovascular: Positive for leg swelling (intermittent ). Negative for chest pain. Gastrointestinal: Negative. Negative for diarrhea, nausea and vomiting. Endocrine: Negative. Genitourinary: Positive for frequency. Negative for dysuria. Musculoskeletal: Positive for back pain. Skin: Negative. Allergic/Immunologic: Negative. Neurological: Negative. Hematological: Negative. Psychiatric/Behavioral: Negative. Physical exam   /68   Pulse 60   Temp 97.8 °F (36.6 °C)   Ht 5' 9\" (1.753 m)   Wt 291 lb (132 kg)   SpO2 98% Comment: svetlana  BMI 42.97 kg/m²    Wt Readings from Last 3 Encounters:   04/12/22 291 lb (132 kg)   04/05/22 288 lb 9.6 oz (130.9 kg)   03/14/22 291 lb (132 kg)       Physical Exam  Vitals and nursing note reviewed. Constitutional:       General: He is not in acute distress. Appearance: He is well-developed. He is obese. HENT:      Head: Normocephalic and atraumatic.    Neck:      Trachea: No tracheal deviation. Cardiovascular:      Rate and Rhythm: Normal rate and regular rhythm. Heart sounds: Normal heart sounds. No murmur heard. Pulmonary:      Effort: Pulmonary effort is normal. No respiratory distress. Breath sounds: No stridor. Examination of the right-lower field reveals decreased breath sounds. Examination of the left-lower field reveals decreased breath sounds. Decreased breath sounds present. No wheezing or rales. Chest:      Chest wall: No tenderness. Abdominal:      General: Bowel sounds are normal. There is no distension. Palpations: Abdomen is soft. Musculoskeletal:      Right lower le+ Edema present. Left lower le+ Edema present. Skin:     General: Skin is warm and dry. Capillary Refill: Capillary refill takes less than 2 seconds. Neurological:      Mental Status: He is alert and oriented to person, place, and time. Psychiatric:         Behavior: Behavior normal.         Thought Content: Thought content normal.         Judgment: Judgment normal.          results   Lung Nodule Screening     [] Qualifies    [x] Does not qualify   [] Declined    [] Completed  The USPSTF recommends annual screening for lung cancer with low-dose computed tomography (LDCT) in adults aged 48 to [de-identified] years who have a 30 pack-year smoking history and currently smoke or have quit within the past 20 years. Screening should be discontinued once a person has not smoked for 20 years or develops a health problem that substantially limits life expectancy or the ability or willingness to have curative lung surgery. 3/9/21- Mixed obstructive restrictive pattern with (+) BD response in FEV1    Assessment      Diagnosis Orders   1. Mixed restrictive and obstructive lung disease (HCC)  umeclidinium-vilanterol (ANORO ELLIPTA) 62.5-25 MCG/INH AEPB inhaler   2. Obesity hypoventilation syndrome ([address]  DME Order for CPAP as OP   3.  Morbidly obese (HCC)           Plan   -BiPAP DL reviewed for OHS AHI controlled no changes needed in pressures but leak needs fixed discussed needing new mask   -DME order placed for mask refit due to high leaking   -Will start patient on Anoro 1puff daily in am. Burleigh Sacks educated and demonstrated by me in my office how to use Anoro. Patient verbalizes understanding. He was detailed about mechanism of action of drug along with associated side effects. He agreed to take the risk and medication. He verbalizes understanding.  - Download reviewed and discussed with patient  - He  was advised to continue current positive airway pressure therapy with above described pressure. - He  advised to keep good compliance with current recommended pressure to get optimal results and clinical improvement  - Recommend 7-9 hours of sleep with PAP  - He was advised to call DME company regarding supplies if needed.   -He call my office for earlier appointment if needed for worsening of sleep symptoms.   - He was instructed on weight loss  - Esteban Horvath was educated about my impression and plan.  Patient verbalizesunderstanding.  -Advised to maintain pneumonia vaccine with PCP and to take flu vaccine this coming season.  -Advised patient to call office with any changes, questions, or concerns regarding respiratory status    Will see Burleigh Sacks back in: 6 months    Fazal Degree, [address]  4/12/2022

## 2022-04-22 RX ORDER — PEN NEEDLE, DIABETIC 29 G X1/2"
NEEDLE, DISPOSABLE MISCELLANEOUS
Qty: 100 EACH | Refills: 3 | Status: SHIPPED | OUTPATIENT
Start: 2022-04-22

## 2022-04-22 NOTE — TELEPHONE ENCOUNTER
Jaylen Anne is requesting a refill on the following medications:  Requested Prescriptions     Pending Prescriptions Disp Refills    BD INSULIN SYRINGE U/F 31G X 5/16\" 1 ML MISC [Pharmacy Med Name: BD INS SYR U-F II 1ML [id number]] 100 each 3     Sig: USE AS DIRECTED THREE TIMES A DAY.        Date of last visit: 4/5/2022  Date of next visit (if applicable):7/5/2022  Date of last refill: 8/20/2021  Pharmacy Name: [address] [place] [address]

## 2022-05-04 DIAGNOSIS — F41.9 ANXIETY: ICD-10-CM

## 2022-05-04 RX ORDER — ALPRAZOLAM 1 MG/1
1 TABLET ORAL 2 TIMES DAILY
Qty: 60 TABLET | Refills: 0 | Status: SHIPPED | OUTPATIENT
Start: 2022-05-04 | End: 2022-06-03 | Stop reason: SDUPTHER

## 2022-05-04 NOTE — TELEPHONE ENCOUNTER
Jodi Pope called requesting a refill on the following medications:  Requested Prescriptions     Pending Prescriptions Disp Refills    ALPRAZolam (XANAX) 1 MG tablet 60 tablet 0     Sig: Take 1 tablet by mouth 2 times daily for 30 days.        Date of last visit: 4/5/2022  Date of next visit (if applicable):Visit date not found  Date of last refill: 04/05/22  Pharmacy Name: [facility]      Thanks,  Benito Alexander LPN

## 2022-06-03 DIAGNOSIS — J43.9 MIXED RESTRICTIVE AND OBSTRUCTIVE LUNG DISEASE (HCC): ICD-10-CM

## 2022-06-03 DIAGNOSIS — J98.4 MIXED RESTRICTIVE AND OBSTRUCTIVE LUNG DISEASE (HCC): ICD-10-CM

## 2022-06-03 DIAGNOSIS — F41.9 ANXIETY: ICD-10-CM

## 2022-06-03 RX ORDER — ALBUTEROL SULFATE 90 UG/1
2 AEROSOL, METERED RESPIRATORY (INHALATION) EVERY 6 HOURS PRN
Qty: 1 EACH | Refills: 3 | Status: SHIPPED | OUTPATIENT
Start: 2022-06-03 | End: 2022-10-10 | Stop reason: SDUPTHER

## 2022-06-03 RX ORDER — ALPRAZOLAM 1 MG/1
1 TABLET ORAL 2 TIMES DAILY
Qty: 60 TABLET | Refills: 0 | Status: SHIPPED | OUTPATIENT
Start: 2022-06-03 | End: 2022-07-05 | Stop reason: SDUPTHER

## 2022-06-03 NOTE — TELEPHONE ENCOUNTER
Rosa Isela Phillips called requesting a refill on the following medications:  Requested Prescriptions     Pending Prescriptions Disp Refills    albuterol sulfate HFA (PROAIR HFA) 108 (90 Base) MCG/ACT inhaler 1 each 3     Sig: Inhale 2 puffs into the lungs every 6 hours as needed for Wheezing or Shortness of Breath    ALPRAZolam (XANAX) 1 MG tablet 60 tablet 0     Sig: Take 1 tablet by mouth 2 times daily for 30 days.        Date of last visit: 4/5/2022  Date of next visit (if applicable):Visit date not found  Date of last refill: 05/04/22  Pharmacy Name: [address] [place] [address]      Coral Sanchez LPN

## 2022-06-06 RX ORDER — ALBUTEROL SULFATE 2.5 MG/3ML
SOLUTION RESPIRATORY (INHALATION)
Qty: 150 ML | Refills: 1 | Status: SHIPPED | OUTPATIENT
Start: 2022-06-06 | End: 2022-09-15

## 2022-06-06 NOTE — TELEPHONE ENCOUNTER
Shannan Cabrera is requesting a refill on the following medications:  Requested Prescriptions     Pending Prescriptions Disp Refills    albuterol (PROVENTIL) (2.5 MG/3ML) 0.083% nebulizer solution [Pharmacy Med Name: ALBUTEROL SUL 2.5 MG/3 ML SOLN] 150 mL      Sig: inhale contents of 1 vial in nebulizer by mouth three times a day       Date of last visit: 4/5/2022  Date of next visit (if applicable):7/5/2022  Date of last refill: 1/04/2022  Pharmacy Name: [facility]      Deepthi Sanchez [place]

## 2022-06-13 RX ORDER — ASCORBIC ACID 500 MG
TABLET ORAL
Qty: 30 TABLET | Refills: 3 | Status: SHIPPED | OUTPATIENT
Start: 2022-06-13 | End: 2022-10-10 | Stop reason: SDUPTHER

## 2022-06-13 NOTE — TELEPHONE ENCOUNTER
Oumar Branch is requesting a refill on the following medications:  Requested Prescriptions     Pending Prescriptions Disp Refills    RA VITAMIN C/ERNA HIPS 500 MG tablet [Pharmacy Med Name: RA VITAMIN C 500 MG TABLET] 30 tablet 3     Sig: TAKE 1 TABLET BY MOUTH DAILY       Date of last visit: 4/5/2022  Date of next visit (if applicable):7/5/2022  Date of last refill: 2/14/2022  Pharmacy Name: [address] [place] [address] [place]      Thanks, Darnell Shone, [place]

## 2022-06-17 DIAGNOSIS — I10 ESSENTIAL HYPERTENSION: ICD-10-CM

## 2022-06-17 RX ORDER — ENALAPRIL MALEATE 20 MG/1
TABLET ORAL
Qty: 90 TABLET | Refills: 1 | OUTPATIENT
Start: 2022-06-17

## 2022-06-21 LAB — PSA, ULTRASENSITIVE: <0.02 NG/ML

## 2022-06-27 ENCOUNTER — OFFICE VISIT (OUTPATIENT)
Dept: UROLOGY | Age: 62
End: 2022-06-27
Payer: MEDICAID

## 2022-06-27 VITALS
BODY MASS INDEX: 42.21 KG/M2 | DIASTOLIC BLOOD PRESSURE: 60 MMHG | SYSTOLIC BLOOD PRESSURE: 132 MMHG | HEIGHT: 69 IN | WEIGHT: 285 LBS

## 2022-06-27 DIAGNOSIS — N40.1 BENIGN PROSTATIC HYPERPLASIA WITH URINARY RETENTION: ICD-10-CM

## 2022-06-27 DIAGNOSIS — C61 PROSTATE CANCER (HCC): Primary | ICD-10-CM

## 2022-06-27 DIAGNOSIS — R33.8 BENIGN PROSTATIC HYPERPLASIA WITH URINARY RETENTION: ICD-10-CM

## 2022-06-27 DIAGNOSIS — C61 MALIGNANT NEOPLASM OF PROSTATE (HCC): ICD-10-CM

## 2022-06-27 PROCEDURE — 96402 CHEMO HORMON ANTINEOPL SQ/IM: CPT | Performed by: UROLOGY

## 2022-06-27 PROCEDURE — 1036F TOBACCO NON-USER: CPT | Performed by: UROLOGY

## 2022-06-27 PROCEDURE — G8417 CALC BMI ABV UP PARAM F/U: HCPCS | Performed by: UROLOGY

## 2022-06-27 PROCEDURE — 99214 OFFICE O/P EST MOD 30 MIN: CPT | Performed by: UROLOGY

## 2022-06-27 PROCEDURE — 81003 URINALYSIS AUTO W/O SCOPE: CPT | Performed by: UROLOGY

## 2022-06-27 PROCEDURE — G8427 DOCREV CUR MEDS BY ELIG CLIN: HCPCS | Performed by: UROLOGY

## 2022-06-27 PROCEDURE — 3017F COLORECTAL CA SCREEN DOC REV: CPT | Performed by: UROLOGY

## 2022-06-27 NOTE — PROGRESS NOTES
MD MD [facility] Consultation / Follow up Visit    Patient:  Shannan Cabrera  YOB: 1960  Date: 6/27/2022    HISTORY OF PRESENT ILLNESS:   The patient is a 62 y.o. male who presents today for follow-up for the following problem(s): elevated PSA, BPH with luts,  Urinary retention, Bladder stone  Overall the problem(s) : are worsening. Associated Symptoms: No dysuria, gross hematuria. Pain Severity:       Today visit:   6/27/22   Haydee Calixto follows with us for HR prostate cancer s/p EBRT Radiation (3/2021) and on ADT (lupron Q 6 months x 2-3 years for HR PCa). His PSA has responded well and remains undectable. PSA:  < 0.02. He also had BPH with LUTs and urinary retention - improved on ADT. AUASS: 13/3. He is also on Flomax 0.8 mg   Oxybutynin for Urinary urgency. 5/24/21  Cystoscopy Operative Note  Surgeon: Beverly Ndiaye MD   Anesthesia: Urethral 2%  Indications: BPH with retention  Position: supine  Findings:   The patient was prepped and draped in the usual sterile fashion. The flexible cystoscope was advanced through the urethra and into the bladder. The bladder was thoroughly inspected and the following was noted:    Residual Urine: Moderate  Urethra: No abnormalities of the urethra are noted. Prostate: Complete obstruction by lateral lobe of prostate. Bladder: No tumors or CIS noted. No bladder diverticulum. Moderate trabeculation noted. Ureters: Clear efflux from both ureters. Orifices with normal configuration and location. The cystoscope was removed. The patient tolerated the procedure well. Void trial      4/26/21   [facility] follows with us for HR PCa. We reviewed old records. Pathology reviewed: High volume [place] 4+5 = 9, with PNI, (Grade Group 5). Diagnotic PSA: 16.28   He has finished radiation thearpy. He is still on Bicaluatmide, discussed lupron injection. 1/15/21  Bone scan  1.  Abnormal radiotracer accumulation in the left hemipelvis and right femur, possibly secondary to a patulous disease. Osseous metastatic disease cannot be excluded. 2. Probable degenerative arthropathic changes as detailed above. CT  1. 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 2. The previously a few low-density foci in the right kidney, likely cysts. Confirmation with ultrasound recommended. 3. Findings of constipation. No evidence to suggest metastatic disease         12/18/20  Isabelle Quintero follow up after prostate biopsy and cystolithalopaxy for large bladder stone. Doing well, no fevers, hematuria resolved. Pathology reviewed: High volume [place] 4+5 = 9, with PNI, (Grade Group 5). PSA: 16.28    10/26/20  Isabelle Quintero presents with history of BPH with LUTs, and elevated PSA. An MRI was denies by his insurance, so we review options of elevated PSA. He also has worsening LUTs, with retention and hematuria. He is on Home O2 for recent bronchitis, which is improving. He has history of CAD on ASA and Plavix. Cystoscopy Operative Note  Surgeon: Cristofer Pereira   Anesthesia: Urethral 2%  Indications: BPH with LUTs  Position: supine  Findings:   The patient was prepped and draped in the usual sterile fashion. The flexible cystoscope was advanced through the urethra and into the bladder. The bladder was thoroughly inspected and the following was noted:  Residual Urine: Moderate  Urethra: No abnormalities of the urethra are noted. Prostate: Large gland Complete obstruction by latera lobe of prostate. Bladder: No tumors or CIS noted. No bladder diverticulum. Large bladder stone. Ureters: Clear efflux from both ureters. Orifices with normal configuration and location. The cystoscope was removed. The patient tolerated the procedure well. Plan  Standard TURP  Cystolithalopaxy     9/18/20  63 yo male that presents with LUTs and difficulty emptying his bladder.   He has incomplete emptying, frequency, and Nocturia, that are worsening over the last couple years. On flomax but he is stating his symptoms are worsening. AUASS: 11/4. Denies hematuria. PVR: 48 ml. Smoking history: quit 20 years ago.  FH: none. ANTONIO: plavix, for CAD s/p stents (10/2019).   Has elevated PSA, 16.          Summary of old records:   (Patient's old records, notes and chart reviewed and summarized above.)     Last several PSA's:  Lab Results   Component Value Date    PSA <0.02 03/04/2022    PSA <0.02 11/30/2021    PSA 0.02 09/08/2021       Last total testosterone:  No results found for: TESTOSTERONE    Urinalysis today:  Results for POC orders placed in visit on 06/27/22   POCT Urinalysis No Micro (Auto)   Result Value Ref Range    Glucose, Ur Negative NEGATIVE mg/dl    Bilirubin Urine Negative     Ketones, Urine Negative NEGATIVE    Specific Gravity, Urine 1.010 1.002 - 1.030    Blood, UA POC Negative NEGATIVE    pH, Urine 5.50 5.0 - 9.0    Protein, Urine Negative NEGATIVE mg/dl    Urobilinogen, Urine 0.20 0.0 - 1.0 eu/dl    Nitrite, Urine Negative NEGATIVE    Leukocyte Clumps, Urine Negative NEGATIVE    Color, Urine Yellow [id number]    Character, Urine Clear [id number]       Last BUN and creatinine:  Lab Results   Component Value Date    BUN 18 03/04/2022     Lab Results   Component Value Date    CREATININE 1.0 03/04/2022       Imaging Reviewed during this Office Visit:   (results were independently reviewed by physician and radiology report verified)    PAST MEDICAL, FAMILY AND SOCIAL HISTORY UPDATE:  Past Medical History:   Diagnosis Date    Abnormal stress test Sept 2012    [facility]    CAD (coronary artery disease)     Cancer ([address]     Prostate    Chronic low back pain     Diabetes mellitus ([address]     Diabetes mellitus ([address]     Hyperlipidemia     Hypertension     Hypertriglyceridemia     MI (myocardial infarction) ([address] 2004    Obesity     Sleep apnea has CPAP    Viral pneumonia 10/15/2020     Past Surgical History:   Procedure Laterality Date    BACK SURGERY  November 1997    L4 & L5 fusion    CARDIAC CATHETERIZATION  10/2019   [facility] CARDIAC SURGERY  October 22, 2004    Cardiac cath with stent placement x1    COLONOSCOPY  October 2010    CORONARY ANGIOPLASTY WITH STENT PLACEMENT  10/17/2019    HERNIA REPAIR  1991    Mesh repair in abdomen.  ULTRASOUND PROSTATE/TRANSRECTAL N/A 12/7/2020    TRANSRECTAL ULTRASOUND WITH PROSTATE BIOPSY performed by Nathan Hawkins MD at [address] [place] [address] History   Problem Relation Age of Onset    Diabetes Mother     Heart Disease Mother     High Blood Pressure Mother     Arthritis Father     Diabetes Father     Heart Disease Father     High Blood Pressure Father     Diabetes Sister     Diabetes Brother     Heart Disease Brother     Cancer Neg Hx     Stroke Neg Hx      Outpatient Medications Marked as Taking for the 6/27/22 encounter (Office Visit) with Nathan Hawkins MD   Medication Sig Dispense Refill    RA VITAMIN C/ERNA HIPS 500 MG tablet TAKE 1 TABLET BY MOUTH DAILY 30 tablet 3    albuterol (PROVENTIL) (2.5 MG/3ML) 0.083% nebulizer solution inhale contents of 1 vial in nebulizer by mouth three times a day 150 mL 1    albuterol sulfate HFA (PROAIR HFA) 108 (90 Base) MCG/ACT inhaler Inhale 2 puffs into the lungs every 6 hours as needed for Wheezing or Shortness of Breath 1 each 3    ALPRAZolam (XANAX) 1 MG tablet Take 1 tablet by mouth 2 times daily for 30 days. 60 tablet 0    BD INSULIN SYRINGE U/F 31G X 5/16\" 1 ML MISC USE AS DIRECTED THREE TIMES A DAY.  100 each 3    umeclidinium-vilanterol (ANORO ELLIPTA) 62.5-25 MCG/INH AEPB inhaler Inhale 1 puff into the lungs daily 1 each 11    fluticasone (FLONASE) 50 MCG/ACT nasal spray One spray in each nostril q hs 3 each 1    ferrous sulfate (IRON 325) 325 (65 Fe) MG tablet Take 2 tablets by mouth three times a week 78 tablet 1    metFORMIN (GLUCOPHAGE) 1000 MG tablet Take 1 tablet by mouth daily (with breakfast) 180 tablet 1    insulin glargine (LANTUS SOLOSTAR) 100 UNIT/ML injection pen INJECT 40 UNITS INTO THE SKIN 2 TIMES A DAY 96 mL 0    cromolyn (OPTICROM) 4 % ophthalmic solution Instill 1 drop into each eye four times a day 10 mL 0    enalapril (VASOTEC) 20 MG tablet Take 1 tablet by mouth daily 90 tablet 1    pantoprazole (PROTONIX) 40 MG tablet take 1 tablet by mouth once daily 90 tablet 1    leuprolide (LUPRON) 45 MG injection Inject 45 mg into the muscle once for 1 dose 1 each 1    oxybutynin (DITROPAN XL) 5 MG extended release tablet Take 1 tablet by mouth 2 times daily 60 tablet 6    Cyanocobalamin ER (RA VITAMIN B-12 TR) 1000 MCG TBCR take 1 tablet by mouth once daily 30 tablet 2    insulin lispro (HUMALOG) 100 UNIT/ML injection vial inject 10 units subcutaneously twice a day if needed for HIGH BLOOD SUGAR. 30 mL 3    B-D ULTRAFINE III SHORT PEN 31G X 8 MM MISC USE TWICE DAILY WITH THE [employer] PEN. 200 each 3    Insulin Syringes, Disposable, U-100 1 ML MISC 1 each by Does not apply route 3 times daily 31 gauge x 8 mm  ultrafine 2 100 each 5    amLODIPine (NORVASC) 10 MG tablet Take 10 mg by mouth daily      clopidogrel (PLAVIX) 75 MG tablet take 1 tablet by mouth once daily 90 tablet 1    metoprolol tartrate (LOPRESSOR) 25 MG tablet Take 1 tablet by mouth 2 times daily  0    Vitamin D, Cholecalciferol, 25 MCG (1000 UT) TABS Take 1,000 Units by mouth daily      pravastatin (PRAVACHOL) 40 MG tablet Take 1 tablet by mouth nightly  0    gemfibrozil (LOPID) 600 MG tablet Take 1 tablet by mouth 2 times daily 180 tablet 1    nitroGLYCERIN (NITROSTAT) 0.4 MG SL tablet Place 0.4 mg under the tongue every 5 minutes as needed for Chest pain       aspirin 325 MG tablet Take 325 mg by mouth daily.            Ace inhibitors, Baclofen, Darvocet [propoxyphene n-acetaminophen], Darvon [propoxyphene hcl], Flexeril [cyclobenzaprine],

## 2022-06-27 NOTE — PROGRESS NOTES
Patient has given me verbal consent to perform Lupron Injection  Yes      Following Dr. Katie Hernandez of [facility]. LUPRON 45 MG GIVEN I.M Left UOQ HIP  Lot Number: [id number]  Expiration Date: 09/29/2024  Arianna Castellano #: [id number]    After Injection was given there were no reactions at injection site and patient was feeling well. Patient was notified that possible side effects from injections include: Redness, swelling and itching at the injection site. Possible side effects of androgen deprivation therapy, including hot flashes, flushing of the skin, increased weight, decreased sex drive, and difficulties with ED. Patient was instructed to call the office with any further questions or concerns. Patient supplied their own medications No      Pt [address] therapy first initiated on 5-.

## 2022-07-05 DIAGNOSIS — F41.9 ANXIETY: ICD-10-CM

## 2022-07-05 RX ORDER — ALPRAZOLAM 1 MG/1
1 TABLET ORAL 2 TIMES DAILY
Qty: 6 TABLET | Refills: 0 | Status: SHIPPED | OUTPATIENT
Start: 2022-07-05 | End: 2022-07-08 | Stop reason: SDUPTHER

## 2022-07-05 NOTE — TELEPHONE ENCOUNTER
Ada Lucio called requesting a refill on the following medications:  Requested Prescriptions     Pending Prescriptions Disp Refills    ALPRAZolam (XANAX) 1 MG tablet 60 tablet 0     Sig: Take 1 tablet by mouth 2 times daily for 30 days.        Date of last visit: 4/5/2022  Date of next visit (if applicable):Visit date not found  Date of last refill: 06/03/22  Pharmacy Name: [facility]      Thanks,  Renay Wetzel LPN      Needs enough till office visit

## 2022-07-08 ENCOUNTER — OFFICE VISIT (OUTPATIENT)
Dept: FAMILY MEDICINE CLINIC | Age: 62
End: 2022-07-08
Payer: MEDICAID

## 2022-07-08 VITALS
DIASTOLIC BLOOD PRESSURE: 60 MMHG | SYSTOLIC BLOOD PRESSURE: 128 MMHG | TEMPERATURE: 96.9 F | HEIGHT: 69 IN | RESPIRATION RATE: 16 BRPM | BODY MASS INDEX: 42.6 KG/M2 | HEART RATE: 75 BPM | WEIGHT: 287.6 LBS | OXYGEN SATURATION: 97 %

## 2022-07-08 DIAGNOSIS — K21.9 GASTROESOPHAGEAL REFLUX DISEASE WITHOUT ESOPHAGITIS: ICD-10-CM

## 2022-07-08 DIAGNOSIS — R80.9 MICROALBUMINURIA: ICD-10-CM

## 2022-07-08 DIAGNOSIS — Z79.4 TYPE 2 DIABETES MELLITUS WITH HYPERGLYCEMIA, WITH LONG-TERM CURRENT USE OF INSULIN (HCC): Primary | ICD-10-CM

## 2022-07-08 DIAGNOSIS — F41.9 ANXIETY: ICD-10-CM

## 2022-07-08 DIAGNOSIS — D50.9 IRON DEFICIENCY ANEMIA, UNSPECIFIED IRON DEFICIENCY ANEMIA TYPE: ICD-10-CM

## 2022-07-08 DIAGNOSIS — I10 ESSENTIAL HYPERTENSION: ICD-10-CM

## 2022-07-08 DIAGNOSIS — E11.65 TYPE 2 DIABETES MELLITUS WITH HYPERGLYCEMIA, WITH LONG-TERM CURRENT USE OF INSULIN (HCC): Primary | ICD-10-CM

## 2022-07-08 DIAGNOSIS — J30.1 SEASONAL ALLERGIC RHINITIS DUE TO POLLEN: ICD-10-CM

## 2022-07-08 LAB
BILIRUBIN, POC: NORMAL
BLOOD URINE, POC: NORMAL
CLARITY, POC: NORMAL
COLOR, POC: NORMAL
CREATININE URINE POCT: 50
GLUCOSE URINE, POC: NORMAL
HBA1C MFR BLD: 7.6 %
KETONES, POC: NORMAL
LEUKOCYTE EST, POC: NORMAL
MICROALBUMIN/CREAT 24H UR: 10 MG/G{CREAT}
MICROALBUMIN/CREAT UR-RTO: NORMAL
NITRITE, POC: NORMAL
PH, POC: NORMAL
PROTEIN, POC: NORMAL
SPECIFIC GRAVITY, POC: NORMAL
UROBILINOGEN, POC: NORMAL

## 2022-07-08 PROCEDURE — 83036 HEMOGLOBIN GLYCOSYLATED A1C: CPT | Performed by: FAMILY MEDICINE

## 2022-07-08 PROCEDURE — G8427 DOCREV CUR MEDS BY ELIG CLIN: HCPCS | Performed by: FAMILY MEDICINE

## 2022-07-08 PROCEDURE — 3017F COLORECTAL CA SCREEN DOC REV: CPT | Performed by: FAMILY MEDICINE

## 2022-07-08 PROCEDURE — 99214 OFFICE O/P EST MOD 30 MIN: CPT | Performed by: FAMILY MEDICINE

## 2022-07-08 PROCEDURE — G8417 CALC BMI ABV UP PARAM F/U: HCPCS | Performed by: FAMILY MEDICINE

## 2022-07-08 PROCEDURE — 2022F DILAT RTA XM EVC RTNOPTHY: CPT | Performed by: FAMILY MEDICINE

## 2022-07-08 PROCEDURE — 82044 UR ALBUMIN SEMIQUANTITATIVE: CPT | Performed by: FAMILY MEDICINE

## 2022-07-08 PROCEDURE — 3051F HG A1C>EQUAL 7.0%<8.0%: CPT | Performed by: FAMILY MEDICINE

## 2022-07-08 PROCEDURE — 1036F TOBACCO NON-USER: CPT | Performed by: FAMILY MEDICINE

## 2022-07-08 RX ORDER — CROMOLYN SODIUM 40 MG/ML
SOLUTION/ DROPS OPHTHALMIC
Qty: 10 ML | Refills: 5 | Status: SHIPPED | OUTPATIENT
Start: 2022-07-08

## 2022-07-08 RX ORDER — ALPRAZOLAM 1 MG/1
1 TABLET ORAL 2 TIMES DAILY
Qty: 180 TABLET | Refills: 0 | Status: SHIPPED | OUTPATIENT
Start: 2022-07-08 | End: 2022-10-06

## 2022-07-08 ASSESSMENT — ENCOUNTER SYMPTOMS
DIARRHEA: 0
COUGH: 0
NAUSEA: 0
VOMITING: 0
SHORTNESS OF BREATH: 0

## 2022-07-08 ASSESSMENT — PATIENT HEALTH QUESTIONNAIRE - PHQ9
SUM OF ALL RESPONSES TO PHQ QUESTIONS 1-9: 0
SUM OF ALL RESPONSES TO PHQ9 QUESTIONS 1 & 2: 0
1. LITTLE INTEREST OR PLEASURE IN DOING THINGS: 0
2. FEELING DOWN, DEPRESSED OR HOPELESS: 0

## 2022-07-08 NOTE — PROGRESS NOTES
Wagner Rascon is a 62 y.o. male who presents today for:  Chief Complaint   Patient presents with    3 Month Follow-Up    Diabetes    Hypertension         HPI:   Wagner Rascon is 62 y.o. who presents today for 3 month follow up for diabetes. Diabetes:  Patient states that sugars have been averaging in the 120s to 130s. He said that around 220 with the highest he has had in the past 3 months. Continues metformin, Lantus 40 units twice daily and lispro 10 units 3 times a day. States he has been attempting to exercise almost every day. Weight stable. No hypoglycemic episodes. Hypertension:  Stable, /60 today. Denies lightheadedness, dizziness, headaches. Anemia:  Followed with Dr. Taya Garsia, has EGD and Colonoscopy. Noted nodular gastritis  Continues on iron regiment and Protonix    Anxiety:  Doing better. States he feels much of his anxiety over the last few months was due to frequent doctors appointments. On Xanax. Needs refill on Xanax and Cromolyn eye drops. Patient states eye drops have been working well for him.      Objective:     Vitals:    07/08/22 1142   BP: 128/60   Site: Left Upper Arm   Position: Sitting   Pulse: 75   Resp: 16   Temp: 96.9 °F (36.1 °C)   SpO2: 97%   Weight: 287 lb 9.6 oz (130.5 kg)   Height: 5' 9\" (1.753 m)       Wt Readings from Last 3 Encounters:   07/08/22 287 lb 9.6 oz (130.5 kg)   06/27/22 285 lb (129.3 kg)   04/12/22 291 lb (132 kg)       BP Readings from Last 3 Encounters:   07/08/22 128/60   06/27/22 132/60   04/12/22 136/68       Lab Results   Component Value Date    WBC 6.6 03/04/2022    HGB 10.6 (L) 03/04/2022    HCT 34.2 (L) 03/04/2022    MCV 88.6 03/04/2022     03/04/2022     Lab Results   Component Value Date     03/04/2022    K 5.1 03/04/2022     03/04/2022    CO2 23 03/04/2022    BUN 18 03/04/2022    CREATININE 1.0 03/04/2022    GLUCOSE 196 (H) 03/04/2022    CALCIUM 10.4 03/04/2022    PROT 6.9 03/04/2022    LABALBU 4.4 03/04/2022    BILITOT 0.3 03/04/2022    ALKPHOS 111 03/04/2022    AST 8 03/04/2022    ALT 8 (L) 03/04/2022    LABGLOM 76 (A) 03/04/2022     No results found for: TSH, M7AKNKV, W5CFNBO, THYROIDAB, FT3, T4FREE  Lab Results   Component Value Date    LABA1C 7.6 07/08/2022     No results found for: EAG  Lab Results   Component Value Date    CHOL 142 03/04/2022    CHOL 182 09/08/2021    CHOL 146 08/25/2020     Lab Results   Component Value Date    TRIG 153 03/04/2022    TRIG 217 (H) 09/08/2021    TRIG 118 08/25/2020     Lab Results   Component Value Date    HDL 28 03/04/2022    HDL 32 09/08/2021    HDL 29 03/02/2021     Lab Results   Component Value Date    LDLCALC 83 03/04/2022    LDLCALC 107 09/08/2021    [address] 03/02/2021       Lab Results   Component Value Date    LABMICR 7.17 10/09/2018       Review of Systems   Constitutional: Negative for fatigue. Eyes: Negative for visual disturbance. Respiratory: Negative for cough and shortness of breath. Cardiovascular: Negative for chest pain, palpitations and leg swelling. Gastrointestinal: Negative for diarrhea, nausea and vomiting. Endocrine: Negative. Genitourinary: Negative for dysuria. Neurological: Negative for dizziness, light-headedness and headaches. Psychiatric/Behavioral: Negative. Physical Exam  Constitutional:       General: He is not in acute distress. Appearance: Normal appearance. He is obese. He is not ill-appearing. HENT:      Head: Normocephalic and atraumatic. Nose: Nose normal.   Eyes:      General: No scleral icterus. Right eye: No discharge. Left eye: No discharge. Extraocular Movements: Extraocular movements intact. Conjunctiva/sclera: Conjunctivae normal.      Pupils: Pupils are equal, round, and reactive to light. Cardiovascular:      Rate and Rhythm: Normal rate and regular rhythm. Heart sounds: Normal heart sounds. No murmur heard. No gallop.     Pulmonary:      Effort: Pulmonary effort is normal. No respiratory distress. Breath sounds: Normal breath sounds. No stridor. No wheezing, rhonchi or rales. Abdominal:      General: There is no distension. Palpations: Abdomen is soft. Tenderness: There is no abdominal tenderness. There is no guarding. Musculoskeletal:      Right lower leg: Edema (trace) present. Left lower leg: Edema (trace) present. Skin:     General: Skin is warm. Neurological:      General: No focal deficit present. Mental Status: He is alert and oriented to person, place, and time. Mental status is at baseline. Psychiatric:         Mood and Affect: Mood normal.         Behavior: Behavior normal.         Thought Content:  Thought content normal.         Judgment: Judgment normal.         Immunization History   Administered Date(s) Administered    COVID-19, MODERNA BLUE border, Primary or Immunocompromised, (age 12y+), IM, 100 mcg/0.5mL 03/18/2021, 04/15/2021    COVID-19, MODERNA Booster BLUE border, (age 18y+), IM, 50mcg/0.25mL 03/18/2021, 04/15/2021, 12/20/2021    Influenza Virus Vaccine 11/06/2018, 10/30/2020    Influenza, MDCK Quadv, IM, PF (Flucelvax 2 yrs and older) 10/27/2020    Influenza, Donnella Hilaria, IM, (6 mo and older Fluzone, Flulaval, Fluarix and 3 yrs and older Afluria) 11/04/2016, 01/11/2018, 10/08/2019, 10/30/2020    Influenza, Quadv, IM, PF (6 mo and older Fluzone, Flulaval, Fluarix, and 3 yrs and older Afluria) 09/23/2021    Pneumococcal Polysaccharide ([id number]) 01/11/2018       Health Maintenance Due   Topic Date Due    HIV screen  Never done    Hepatitis C screen  Never done    DTaP/Tdap/Td vaccine (1 - Tdap) Never done    Shingles vaccine (1 of 2) Never done    Pneumococcal 0-64 years Vaccine (2 - PCV) 01/11/2019    Diabetic retinal exam  07/31/2019    Diabetic microalbuminuria test  08/25/2021    COVID-19 Vaccine (3 - Moderna risk 4-dose series) 01/17/2022          Food Insecurity: No Food Insecurity  Worried About Running Out of Food in the Last Year: Never true    [place] of Food in the Last Year: Never true       Assessment / Plan:      Diagnosis Orders   1. Type 2 diabetes mellitus with hyperglycemia, with long-term current use of insulin (HCC)  POCT glycosylated hemoglobin (Hb A1C)    POCT Urinalysis No Micro (Auto)    POCT microalbumin   2. Anxiety  ALPRAZolam (XANAX) 1 MG tablet   3. Gastroesophageal reflux disease without esophagitis     4. Seasonal allergic rhinitis due to pollen  cromolyn (OPTICROM) 4 % ophthalmic solution   5. Essential hypertension     6. Iron deficiency anemia, unspecified iron deficiency anemia type     7. Microalbuminuria       A1c 7.6 today, stable. Microalbumin abnormal, continue Enalapril. Continue current diabetic regiment of Lantus, Lispro and Metformin. Recheck A1c in 3 months. Anxiety is improved, continue Xanax. Refill Cromolyn eye drops for seasonal allergies  BP stable 128/60, continue current regiment. Continue Iron supplementation for anemia       Return in about 3 months (around 10/8/2022) for DM and anxiety. Medications Prescribed:  Orders Placed This Encounter   Medications    ALPRAZolam (XANAX) 1 MG tablet     Sig: Take 1 tablet by mouth 2 times daily for 90 days. Dispense:  180 tablet     Refill:  0    cromolyn (OPTICROM) 4 % ophthalmic solution     Sig: Instill 1 drop into each eye four times a day     Dispense:  10 mL     Refill:  5       Future Appointments   Date Time Provider Gricelda Dillard   9/27/2022  8:15 AM MD Delilah Eckert [facility]   10/10/2022 10:15 AM Quyen Hair MD SRP[facility] [place]   10/24/2022 10:00 AM Ar Laughlin APRN - CNP [facility] [place]   1/9/2023  8:45 AM Shar Terry MD [id number]       Patient given educational materials - see patient instructions. Discussed use, benefit, and sideeffects of prescribed medications. All patient questions answered.   Pt voiced understanding. Reviewed health maintenance. Instructed to continue current medications, diet and exercise. Patient agreed with treatment plan. Follow up as directed.      Electronically signed by Belita Care on 7/8/2022 at 12:53 PM

## 2022-07-08 NOTE — PROGRESS NOTES
Attending Supervising [de-identified] Attestation Statement  The patient is a 62 y.o. male. I have performed a history and physical examination of the patient. I discussed the case with the resident physician. I reviewed the patient's Past Medical History, Past Surgical History, Medications, and Allergies. Physical Exam:  Vitals:    07/08/22 1142   BP: 128/60   Site: Left Upper Arm   Position: Sitting   Pulse: 75   Resp: 16   Temp: 96.9 °F (36.1 °C)   SpO2: 97%   Weight: 287 lb 9.6 oz (130.5 kg)   Height: 5' 9\" (1.753 m)         Gen:  No distress. Well developed and well nourished. obese  Cardiac: Regular rate and rhythm. No murmur  Pulmonary: Clear to auscultation bilaterally. No wheezes, rales, or rhonchi. No respiratory distress  Neurological: Alert. Psych: Normal mood and affect. Normal attention. Normal behavior  Trace BLE edema    Impression/Plan  I reviewed and agree with the findings and plan documented in his note . Diagnosis Orders   1. Type 2 diabetes mellitus with hyperglycemia, with long-term current use of insulin (HCC)  POCT glycosylated hemoglobin (Hb A1C)    POCT Urinalysis No Micro (Auto)   2. Anxiety  ALPRAZolam (XANAX) 1 MG tablet   3. Gastroesophageal reflux disease without esophagitis     4. Seasonal allergic rhinitis due to pollen  cromolyn (OPTICROM) 4 % ophthalmic solution   5. Essential hypertension     6. Iron deficiency anemia, unspecified iron deficiency anemia type       Diabetes: A1c of 7.6%. Diabetes is mostly controlled. Chronic problem. Continue metformin, Lantus, and Humalog    Anxiety: Controlled.   Continue Xanax    Electronically signed by Alan Clay MD on 7/8/22 at 12:14 PM EDT

## 2022-08-02 DIAGNOSIS — R35.1 NOCTURIA: ICD-10-CM

## 2022-08-02 RX ORDER — OXYBUTYNIN CHLORIDE 5 MG/1
5 TABLET, EXTENDED RELEASE ORAL 2 TIMES DAILY
Qty: 60 TABLET | Refills: 6 | Status: SHIPPED | OUTPATIENT
Start: 2022-08-02 | End: 2022-10-24

## 2022-08-02 RX ORDER — TAMSULOSIN HYDROCHLORIDE 0.4 MG/1
0.8 CAPSULE ORAL DAILY
Qty: 60 CAPSULE | Refills: 3 | Status: SHIPPED | OUTPATIENT
Start: 2022-08-02 | End: 2022-10-24

## 2022-08-02 NOTE — TELEPHONE ENCOUNTER
Ana Cristina Pereira called requesting a refill on the following medications:  Requested Prescriptions     Pending Prescriptions Disp Refills    tamsulosin (FLOMAX) 0.4 MG capsule 60 capsule 3     Sig: Take 2 capsules by mouth in the morning. oxybutynin (DITROPAN XL) 5 MG extended release tablet 60 tablet 6     Sig: Take 1 tablet by mouth in the morning and 1 tablet before bedtime. Pharmacy verified: [facility]  .       Date of last visit: 6/27/2022  Date of next visit (if applicable): 9/5/2103

## 2022-08-16 DIAGNOSIS — Z79.4 TYPE 2 DIABETES MELLITUS WITH HYPERGLYCEMIA, WITH LONG-TERM CURRENT USE OF INSULIN (HCC): ICD-10-CM

## 2022-08-16 DIAGNOSIS — E11.65 TYPE 2 DIABETES MELLITUS WITH HYPERGLYCEMIA, WITH LONG-TERM CURRENT USE OF INSULIN (HCC): ICD-10-CM

## 2022-08-16 RX ORDER — INSULIN GLARGINE 100 [IU]/ML
INJECTION, SOLUTION SUBCUTANEOUS
Qty: 96 ML | Refills: 0 | Status: SHIPPED | OUTPATIENT
Start: 2022-08-16

## 2022-08-16 NOTE — TELEPHONE ENCOUNTER
Keyona Smith is requesting a refill on the following medications:  Requested Prescriptions     Pending Prescriptions Disp Refills    insulin glargine (LANTUS SOLOSTAR) 100 UNIT/ML injection pen [Pharmacy Med Name: LANTUS SOLOSTAR 100 UNIT/ML] 96 mL 0     Sig: INJECT 40 UNITS INTO THE SKIN 2 TIMES DAILY.        Date of last visit: 7/8/2022  Date of next visit (if applicable):10/10/2022  Date of last refill: 4/05/2022  Pharmacy Name: [place], [place]

## 2022-09-08 DIAGNOSIS — I10 ESSENTIAL HYPERTENSION: ICD-10-CM

## 2022-09-09 RX ORDER — ENALAPRIL MALEATE 20 MG/1
TABLET ORAL
Qty: 45 TABLET | Refills: 1 | Status: SHIPPED | OUTPATIENT
Start: 2022-09-09 | End: 2022-10-24 | Stop reason: SDUPTHER

## 2022-09-14 DIAGNOSIS — J98.4 MIXED RESTRICTIVE AND OBSTRUCTIVE LUNG DISEASE (HCC): ICD-10-CM

## 2022-09-14 DIAGNOSIS — J43.9 MIXED RESTRICTIVE AND OBSTRUCTIVE LUNG DISEASE (HCC): ICD-10-CM

## 2022-09-14 NOTE — TELEPHONE ENCOUNTER
Gilda Levinscal is requesting a refill on the following medications:  Requested Prescriptions     Pending Prescriptions Disp Refills    albuterol (PROVENTIL) (2.5 MG/3ML) 0.083% nebulizer solution [Pharmacy Med Name: ALBUTEROL SUL 2.5 MG/3 ML SOLN] 150 mL 1     Sig: inhale contents of 1 vial in nebulizer by mouth three times a day       Date of last visit: 7/8/2022  Date of next visit (if applicable):10/10/2022  Date of last refill: 6/6/2022  Pharmacy Name: [facility]  [place], [place]

## 2022-09-15 RX ORDER — ALBUTEROL SULFATE 2.5 MG/3ML
SOLUTION RESPIRATORY (INHALATION)
Qty: 150 ML | Refills: 1 | Status: SHIPPED | OUTPATIENT
Start: 2022-09-15

## 2022-09-27 ENCOUNTER — HOSPITAL ENCOUNTER (OUTPATIENT)
Dept: RADIATION ONCOLOGY | Age: 62
Discharge: HOME | End: 2022-09-27
Attending: RADIOLOGY
Payer: MEDICAID

## 2022-09-27 VITALS
OXYGEN SATURATION: 93 % | RESPIRATION RATE: 20 BRPM | BODY MASS INDEX: 43.42 KG/M2 | SYSTOLIC BLOOD PRESSURE: 126 MMHG | WEIGHT: 294 LBS | TEMPERATURE: 97.5 F | DIASTOLIC BLOOD PRESSURE: 60 MMHG | HEART RATE: 85 BPM

## 2022-09-27 DIAGNOSIS — E11.9 WELL CONTROLLED TYPE 2 DIABETES MELLITUS (HCC): ICD-10-CM

## 2022-09-27 PROCEDURE — 99213 OFFICE O/P EST LOW 20 MIN: CPT | Performed by: RADIOLOGY

## 2022-09-27 PROCEDURE — 99212 OFFICE O/P EST SF 10 MIN: CPT | Performed by: RADIOLOGY

## 2022-09-27 RX ORDER — PEN NEEDLE, DIABETIC 31 GX5/16"
NEEDLE, DISPOSABLE MISCELLANEOUS
Qty: 200 EACH | Refills: 1 | Status: SHIPPED | OUTPATIENT
Start: 2022-09-27 | End: 2022-10-10 | Stop reason: SDUPTHER

## 2022-09-27 ASSESSMENT — ENCOUNTER SYMPTOMS
ANAL BLEEDING: 0
BLOOD IN STOOL: 0
DIARRHEA: 0
RECTAL PAIN: 0
CONSTIPATION: 0
NAUSEA: 0
ABDOMINAL PAIN: 0
ABDOMINAL DISTENTION: 0
VOMITING: 0
SHORTNESS OF BREATH: 0
COUGH: 0

## 2022-09-27 NOTE — PROGRESS NOTES
[facility], [address]  Phone: [phone]   Toll Free: [phone]   Fax: [phone]    RADIATION ONCOLOGY FOLLOW UP REPORT    PATIENT NAME:  Tavo Deluca              : 1960  MEDICAL RECORD NO: [id number]    LOCATION: Radiation Oncology  [facility] NO: [id number]      PROVIDER: Leeann Mojica PhD, MD    DATE OF SERVICE: 2022      FOLLOW UP PHYSICIANS: Yohan West; Haydee Case      DIAGNOSIS:  C61 -- Malignant neoplasm of the prostate; Adenocarcinoma, Merline's 4+5=9, Grade Group 5; PSA 16.28; cT1c N0 M0, Stage IIIC     DATE OF DIAGNOSIS: 2020      ASSESSMENT:  ***      PLAN:  ***      RADIATION THERAPY TREATMENT HISTORY:      Treatment Course Number: 1     Treatment Site (s) Modality Dose (cGy) From To Fractions/  Elapsed Days   Prostate 6MV photons 7000 cGy 3/09/2021 4/15/2021 28/ 37      Cumulative Dose to Prostate: 7000 cGy  Cumulative Dose to Seminal Vesicles: 5400 cGy  Cumulative Dose to Pelvic Lymph Nodes: 5040 cGy      CHAPERONE: Declined      HISTORY OF PRESENT ILLNESS:   Otilio Pepper has known prostatic hypertrophy with lower urinary tract symptoms (for which he has been initiated on tamsulosin) and history of urinary retention. Otilio Pepper received a recommendation for prostate biopsy, which was performed in 2020. The biopsy returned showing adenocarcinoma [place]'s 4+5 = 9 grade group 5 with cancer and multiple cores from both sides of the prostate. Perineural invasion was identified, but there was no definite evidence of extraprostatic extension seen on the core specimens. Otilio Pepper reviewed his biopsy results during his urology follow-up. He has significant coronary artery disease with coronary artery stent placement in 2019 and requires Plavix. Because of these issues, Otilio Pepper is not considered a good candidate for prostatectomy.   He was seen 2021 for evaluation and discussion N/A 12/7/2020    TRANSRECTAL ULTRASOUND WITH PROSTATE BIOPSY performed by Ora Branch MD at [facility]:   Current Outpatient Medications   Medication Sig Dispense Refill    B-D ULTRAFINE III SHORT PEN 31G X 8 MM MISC USE TWICE DAILY WITH THE BASAGLAR PEN. 200 each 1    albuterol (PROVENTIL) (2.5 MG/3ML) 0.083% nebulizer solution inhale contents of 1 vial in nebulizer by mouth three times a day 150 mL 1    insulin glargine (LANTUS SOLOSTAR) 100 UNIT/ML injection pen INJECT 40 UNITS INTO THE SKIN 2 TIMES DAILY. 96 mL 0    tamsulosin (FLOMAX) 0.4 MG capsule Take 2 capsules by mouth in the morning. 60 capsule 3    oxybutynin (DITROPAN XL) 5 MG extended release tablet Take 1 tablet by mouth in the morning and 1 tablet before bedtime. 60 tablet 6    enalapril (VASOTEC) 20 MG tablet take 1/2 tablet by mouth once daily 45 tablet 1    ALPRAZolam (XANAX) 1 MG tablet Take 1 tablet by mouth 2 times daily for 90 days. 180 tablet 0    cromolyn (OPTICROM) 4 % ophthalmic solution Instill 1 drop into each eye four times a day 10 mL 5    leuprolide (LUPRON) 45 MG injection Inject 45 mg into the muscle once for 1 dose 6 months 1 each 0    RA VITAMIN C/ERNA HIPS 500 MG tablet TAKE 1 TABLET BY MOUTH DAILY 30 tablet 3    albuterol sulfate HFA (PROAIR HFA) 108 (90 Base) MCG/ACT inhaler Inhale 2 puffs into the lungs every 6 hours as needed for Wheezing or Shortness of Breath 1 each 3    BD INSULIN SYRINGE U/F 31G X 5/16\" 1 ML MISC USE AS DIRECTED THREE TIMES A DAY.  100 each 3    umeclidinium-vilanterol (ANORO ELLIPTA) 62.5-25 MCG/INH AEPB inhaler Inhale 1 puff into the lungs daily 1 each 11    fluticasone (FLONASE) 50 MCG/ACT nasal spray One spray in each nostril q hs 3 each 1    ferrous sulfate (IRON 325) 325 (65 Fe) MG tablet Take 2 tablets by mouth three times a week 78 tablet 1    metFORMIN (GLUCOPHAGE) 1000 MG tablet Take 1 tablet by mouth daily (with breakfast) 180 tablet 1    pantoprazole (PROTONIX) 40 MG tablet take 1 tablet by mouth once daily 90 tablet 1    leuprolide (LUPRON) 45 MG injection Inject 45 mg into the muscle once for 1 dose 1 each 1    Cyanocobalamin ER (RA VITAMIN B-12 TR) 1000 MCG TBCR take 1 tablet by mouth once daily 30 tablet 2    insulin lispro (HUMALOG) 100 UNIT/ML injection vial inject 10 units subcutaneously twice a day if needed for HIGH BLOOD SUGAR. 30 mL 3    Insulin Syringes, Disposable, U-100 1 ML MISC 1 each by Does not apply route 3 times daily 31 gauge x 8 mm  ultrafine 2 100 each 5    amLODIPine (NORVASC) 10 MG tablet Take 10 mg by mouth daily      clopidogrel (PLAVIX) 75 MG tablet take 1 tablet by mouth once daily 90 tablet 1    metoprolol tartrate (LOPRESSOR) 25 MG tablet Take 1 tablet by mouth 2 times daily  0    Vitamin D, Cholecalciferol, 25 MCG (1000 UT) TABS Take 1,000 Units by mouth daily      pravastatin (PRAVACHOL) 40 MG tablet Take 1 tablet by mouth nightly  0    gemfibrozil (LOPID) 600 MG tablet Take 1 tablet by mouth 2 times daily 180 tablet 1    nitroGLYCERIN (NITROSTAT) 0.4 MG SL tablet Place 0.4 mg under the tongue every 5 minutes as needed for Chest pain       aspirin 325 MG tablet Take 325 mg by mouth daily. No current facility-administered medications for this encounter.          TESTS:***  RADIOLOGIC STUDIES: ***    LABORATORY STUDIES: ***   CBC:   Lab Results   Component Value Date/Time    WBC 6.6 03/04/2022 08:30 AM    RBC 3.86 03/04/2022 08:30 AM    HGB 10.6 03/04/2022 08:30 AM    HCT 34.2 03/04/2022 08:30 AM    MCV 88.6 03/04/2022 08:30 AM    MCH 27.5 03/04/2022 08:30 AM    MCHC 31.0 03/04/2022 08:30 AM    RDW 17.7 06/11/2018 09:09 AM     03/04/2022 08:30 AM    MPV 9.9 03/04/2022 94:42 AM     MetabolicPanel:  Lab Results   Component Value Date/Time     03/04/2022 08:30 AM    K 5.1 03/04/2022 08:30 AM    K 4.9 10/15/2020 05:53 AM     03/04/2022 08:30 AM    CO2 23 03/04/2022 08:30 AM    BUN 18 03/04/2022 08:30 AM    CREATININE 1.0 03/04/2022 08:30 AM    LABGLOM 76 03/04/2022 08:30 AM    GLUCOSE 196 03/04/2022 08:30 AM    PROT 6.9 03/04/2022 08:30 AM    LABALBU 4.4 03/04/2022 08:30 AM    CALCIUM 10.4 03/04/2022 08:30 AM    BILITOT 0.3 03/04/2022 08:30 AM    ALKPHOS 111 03/04/2022 08:30 AM    AST 8 03/04/2022 08:30 AM    ALT 8 03/04/2022 08:30 AM     Onc labs:   Lab Results   Component Value Date/Time    PSA <0.02 03/04/2022 08:30 AM     10/14/2020 08:44 PM           REVIEW OF SYSTEMS:  Review of Systems   Constitutional:  Negative for activity change, appetite change, fatigue and unexpected weight change. Respiratory:  Negative for cough and shortness of breath. Cardiovascular:  Negative for chest pain. Gastrointestinal:  Negative for abdominal distention, abdominal pain, anal bleeding, blood in stool, constipation, diarrhea, nausea, rectal pain and vomiting. Genitourinary:  Negative for dysuria, frequency, hematuria and urgency. Neurological:  Negative for dizziness, weakness, numbness and headaches. A complete review of systems was performed and found to be negative except as presented above. EXAMINATION:   GENERAL: Well-developed adult ***, alert and oriented ×3 in no obvious distress. Clear mentation with appropriate affect. VITAL SIGNS:  /60   Pulse 85   Temp 97.5 °F (36.4 °C) (Infrared)   Resp 20   Wt 294 lb (133.4 kg)   SpO2 93%   BMI 43.42 kg/m²   PAIN: 0  ECOG: ***  HEENT: Atraumatic, normocephalic. PERRL/EOMI; ears, nose and lips unremarkable on external examination; oral cavity within normal limits***  NECK: No JVD. No palpable cervical lymphadenopathy. ***  THORAX: No palpable supraclavicular or axillary lymphadenopathy. ***  LUNGS: Clear to auscultation. ***  HEART: Non-tachycardic, regular***  BREASTS: ***  ABDOMEN: Soft, nondistended, nontender with unremarkable bowel sounds. ***  EXTREMITIES: No clubbing or cyanosis. ***  NEUROLOGIC EXAMINATION: Cranial nerves grossly intact.   No obvious focal neurologic deficits. ***  SKIN: ***    Electronically signed by Leslie Daigle on 9/27/22   Radiation Oncology    CC: Yohan Krishnamurthy Ra  ACC: [facility]    This document was created using a voice-recognition program.  Computer generated transcription errors may be present.

## 2022-09-27 NOTE — TELEPHONE ENCOUNTER
Erik Kandace is requesting a refill on the following medications:  Requested Prescriptions     Pending Prescriptions Disp Refills    B-D ULTRAFINE III SHORT PEN 31G X 8 MM MISC [Pharmacy Med Name: BD U/F SHORT PEN [id number]]       Sig: USE TWICE DAILY WITH THE BASAGLAR PEN.        Date of last visit: 7/8/2022  Date of next visit (if applicable):10/10/2022  Date of last refill: 8/20/2021  Pharmacy Name: [facility], [place]      [address]  [place], [place]

## 2022-10-06 DIAGNOSIS — F41.9 ANXIETY: ICD-10-CM

## 2022-10-06 RX ORDER — ALPRAZOLAM 1 MG/1
TABLET ORAL
Qty: 180 TABLET | Refills: 0 | Status: SHIPPED | OUTPATIENT
Start: 2022-10-06 | End: 2023-01-04

## 2022-10-06 NOTE — TELEPHONE ENCOUNTER
Jose Strauss called requesting a refill on the following medications:  Requested Prescriptions     Pending Prescriptions Disp Refills    ALPRAZolam (XANAX) 1 MG tablet [Pharmacy Med Name: ALPRAZOLAM 1 MG TABLET] 180 tablet      Sig: take 1 tablet by mouth twice a day       Date of last visit: 7/8/2022  Date of next visit (if applicable):10/10/2022  Date of last refill:   Pharmacy Name: [address] [place], [place]

## 2022-10-10 ENCOUNTER — OFFICE VISIT (OUTPATIENT)
Dept: FAMILY MEDICINE CLINIC | Age: 62
End: 2022-10-10
Payer: MEDICAID

## 2022-10-10 VITALS
RESPIRATION RATE: 16 BRPM | HEIGHT: 69 IN | OXYGEN SATURATION: 96 % | SYSTOLIC BLOOD PRESSURE: 136 MMHG | TEMPERATURE: 97.6 F | BODY MASS INDEX: 43.25 KG/M2 | WEIGHT: 292 LBS | DIASTOLIC BLOOD PRESSURE: 72 MMHG | HEART RATE: 74 BPM

## 2022-10-10 DIAGNOSIS — F41.9 ANXIETY: ICD-10-CM

## 2022-10-10 DIAGNOSIS — J43.9 MIXED RESTRICTIVE AND OBSTRUCTIVE LUNG DISEASE (HCC): ICD-10-CM

## 2022-10-10 DIAGNOSIS — E11.65 TYPE 2 DIABETES MELLITUS WITH HYPERGLYCEMIA, WITH LONG-TERM CURRENT USE OF INSULIN (HCC): Primary | ICD-10-CM

## 2022-10-10 DIAGNOSIS — D50.9 IRON DEFICIENCY ANEMIA, UNSPECIFIED IRON DEFICIENCY ANEMIA TYPE: ICD-10-CM

## 2022-10-10 DIAGNOSIS — Z79.4 TYPE 2 DIABETES MELLITUS WITH HYPERGLYCEMIA, WITH LONG-TERM CURRENT USE OF INSULIN (HCC): Primary | ICD-10-CM

## 2022-10-10 DIAGNOSIS — K21.9 GASTROESOPHAGEAL REFLUX DISEASE WITHOUT ESOPHAGITIS: ICD-10-CM

## 2022-10-10 DIAGNOSIS — J98.4 MIXED RESTRICTIVE AND OBSTRUCTIVE LUNG DISEASE (HCC): ICD-10-CM

## 2022-10-10 DIAGNOSIS — E11.9 WELL CONTROLLED TYPE 2 DIABETES MELLITUS (HCC): Chronic | ICD-10-CM

## 2022-10-10 DIAGNOSIS — J30.1 CHRONIC SEASONAL ALLERGIC RHINITIS DUE TO POLLEN: ICD-10-CM

## 2022-10-10 PROBLEM — R19.5 POSITIVE FIT (FECAL IMMUNOCHEMICAL TEST): Status: RESOLVED | Noted: 2019-01-18 | Resolved: 2022-10-10

## 2022-10-10 LAB — HBA1C MFR BLD: 8.1 %

## 2022-10-10 PROCEDURE — G8427 DOCREV CUR MEDS BY ELIG CLIN: HCPCS | Performed by: FAMILY MEDICINE

## 2022-10-10 PROCEDURE — 3023F SPIROM DOC REV: CPT | Performed by: FAMILY MEDICINE

## 2022-10-10 PROCEDURE — 99214 OFFICE O/P EST MOD 30 MIN: CPT | Performed by: FAMILY MEDICINE

## 2022-10-10 PROCEDURE — G8417 CALC BMI ABV UP PARAM F/U: HCPCS | Performed by: FAMILY MEDICINE

## 2022-10-10 PROCEDURE — 2022F DILAT RTA XM EVC RTNOPTHY: CPT | Performed by: FAMILY MEDICINE

## 2022-10-10 PROCEDURE — 1036F TOBACCO NON-USER: CPT | Performed by: FAMILY MEDICINE

## 2022-10-10 PROCEDURE — G8484 FLU IMMUNIZE NO ADMIN: HCPCS | Performed by: FAMILY MEDICINE

## 2022-10-10 PROCEDURE — 3017F COLORECTAL CA SCREEN DOC REV: CPT | Performed by: FAMILY MEDICINE

## 2022-10-10 PROCEDURE — 3052F HG A1C>EQUAL 8.0%<EQUAL 9.0%: CPT | Performed by: FAMILY MEDICINE

## 2022-10-10 PROCEDURE — 83036 HEMOGLOBIN GLYCOSYLATED A1C: CPT | Performed by: FAMILY MEDICINE

## 2022-10-10 RX ORDER — FLUTICASONE PROPIONATE 50 MCG
SPRAY, SUSPENSION (ML) NASAL
Qty: 3 EACH | Refills: 1 | Status: SHIPPED | OUTPATIENT
Start: 2022-10-10

## 2022-10-10 RX ORDER — ALBUTEROL SULFATE 90 UG/1
2 AEROSOL, METERED RESPIRATORY (INHALATION) EVERY 6 HOURS PRN
Qty: 1 EACH | Refills: 3 | Status: SHIPPED | OUTPATIENT
Start: 2022-10-10 | End: 2023-10-10

## 2022-10-10 RX ORDER — PANTOPRAZOLE SODIUM 40 MG/1
TABLET, DELAYED RELEASE ORAL
Qty: 90 TABLET | Refills: 1 | Status: SHIPPED | OUTPATIENT
Start: 2022-10-10

## 2022-10-10 RX ORDER — ASCORBIC ACID 500 MG
TABLET ORAL
Qty: 30 TABLET | Refills: 3 | Status: SHIPPED | OUTPATIENT
Start: 2022-10-10

## 2022-10-10 RX ORDER — FERROUS SULFATE 325(65) MG
650 TABLET ORAL
Qty: 78 TABLET | Refills: 1 | Status: SHIPPED | OUTPATIENT
Start: 2022-10-10

## 2022-10-10 RX ORDER — PEN NEEDLE, DIABETIC 31 GX5/16"
NEEDLE, DISPOSABLE MISCELLANEOUS
Qty: 200 EACH | Refills: 1 | Status: SHIPPED | OUTPATIENT
Start: 2022-10-10

## 2022-10-10 SDOH — ECONOMIC STABILITY: FOOD INSECURITY: WITHIN THE PAST 12 MONTHS, YOU WORRIED THAT YOUR FOOD WOULD RUN OUT BEFORE YOU GOT MONEY TO BUY MORE.: NEVER TRUE

## 2022-10-10 SDOH — ECONOMIC STABILITY: FOOD INSECURITY: WITHIN THE PAST 12 MONTHS, THE FOOD YOU BOUGHT JUST DIDN'T LAST AND YOU DIDN'T HAVE MONEY TO GET MORE.: NEVER TRUE

## 2022-10-10 ASSESSMENT — SOCIAL DETERMINANTS OF HEALTH (SDOH): HOW HARD IS IT FOR YOU TO PAY FOR THE VERY BASICS LIKE FOOD, HOUSING, MEDICAL CARE, AND HEATING?: NOT HARD AT ALL

## 2022-10-10 ASSESSMENT — ENCOUNTER SYMPTOMS
WHEEZING: 0
COUGH: 0
SHORTNESS OF BREATH: 0

## 2022-10-10 NOTE — PROGRESS NOTES
SRPX  [facility]  [address] [facility]  Dept: [phone]  Dept Fax: [phone]  Loc: [phone]  PROGRESS NOTE      Visit Date: 10/10/2022    Alejandro Sanford is a 65 y.o. male who presents today for:  Chief Complaint   Patient presents with    Diabetes       Subjective:  Diabetes  Pertinent negatives for hypoglycemia include no nervousness/anxiousness. Pertinent negatives for diabetes include no chest pain. 3 month f/u    Anxiety:  On xanax 1 mg 2x per day. Not on any other anxiety meds. Denies illicit drug use. mood is good. Sleeping well. anxiety is fairly controlled. DM:  a1c 7.6% in july. Glucose 206 this morning. On lantus 40 units twice daily, lispro 10 units 3x per day (sometimes 15 units), and metformin. Exercise:  walking. Checks glucose 3x per day. No hypoglycemic episodes    Prostate cancer. Seen by urology. On lupron    Review of Systems   Constitutional:  Negative for chills and fever. HENT:  Positive for congestion. Respiratory:  Negative for cough, shortness of breath and wheezing. Cardiovascular:  Negative for chest pain. Psychiatric/Behavioral:  Negative for sleep disturbance. The patient is not nervous/anxious.     Patient Active Problem List   Diagnosis    Hypertension    Hyperlipidemia    CAD (coronary artery disease)    Chest pain, atypical    Chronic low back pain    Hypertriglyceridemia    Morbidly obese (HCC)    Abnormal stress test    Os trigonum    Elevated PSA    Lumbar spinal stenosis    Well controlled type 2 diabetes mellitus (HCC)    ROBSON (obstructive sleep apnea)    Back pain at L4-L5 level    Anxiety    Microalbuminuria    Positive FIT (fecal immunochemical test)    H/O heart artery stent    Urinary retention    Malignant neoplasm of prostate (HCC)    COPD, severe ([address]     Past Medical History:   Diagnosis Date    Abnormal stress test Sept 2012    Lateral Wall Ischemia- done at [facility]    CAD (coronary artery disease)     Cancer ([facility] [address]     Prostate    Chronic low back pain     Diabetes mellitus ([facility] [address]     Diabetes mellitus ([facility] [address]     Hyperlipidemia     Hypertension     Hypertriglyceridemia     MI (myocardial infarction) ([facility] [address]     Obesity     Sleep apnea     has CPAP    Viral pneumonia 10/15/2020      Past Surgical History:   Procedure Laterality Date    BACK SURGERY  1997    L4 & L5 fusion    CARDIAC CATHETERIZATION  10/2019    CARDIAC SURGERY  2004    Cardiac cath with stent placement x1    COLONOSCOPY  2010    CORONARY ANGIOPLASTY WITH STENT PLACEMENT  10/17/2019    HERNIA REPAIR  1991    Mesh repair in abdomen. ULTRASOUND PROSTATE/TRANSRECTAL N/A 2020    TRANSRECTAL ULTRASOUND WITH PROSTATE BIOPSY performed by Luh Sloan MD at [address] [place] [address] History   Problem Relation Age of Onset    Diabetes Mother     Heart Disease Mother     High Blood Pressure Mother     Arthritis Father     Diabetes Father     Heart Disease Father     High Blood Pressure Father     Diabetes Sister     Diabetes Brother     Heart Disease Brother     Cancer Neg Hx     Stroke Neg Hx      Social History     Tobacco Use    Smoking status: Former     Packs/day: 0.25     Years: 2.00     Pack years: 0.50     Types: Cigarettes     Quit date: 1995     Years since quittin.7    Smokeless tobacco: Never   Substance Use Topics    Alcohol use: No      Current Outpatient Medications   Medication Sig Dispense Refill    ALPRAZolam (XANAX) 1 MG tablet take 1 tablet by mouth twice a day 180 tablet 0    B-D ULTRAFINE III SHORT PEN 31G X 8 MM MISC USE TWICE DAILY WITH THE [employer] PEN.  200 each 1    albuterol (PROVENTIL) (2.5 MG/3ML) 0.083% nebulizer solution inhale contents of 1 vial in nebulizer by mouth three times a day 150 mL 1    enalapril (VASOTEC) 20 MG tablet take 1/2 tablet by mouth once daily 45 tablet 1    insulin glargine (LANTUS SOLOSTAR) 100 UNIT/ML injection pen INJECT 40 UNITS INTO THE SKIN 2 TIMES DAILY. 96 mL 0    tamsulosin (FLOMAX) 0.4 MG capsule Take 2 capsules by mouth in the morning. 60 capsule 3    oxybutynin (DITROPAN XL) 5 MG extended release tablet Take 1 tablet by mouth in the morning and 1 tablet before bedtime. 60 tablet 6    cromolyn (OPTICROM) 4 % ophthalmic solution Instill 1 drop into each eye four times a day 10 mL 5    RA VITAMIN C/ERNA HIPS 500 MG tablet TAKE 1 TABLET BY MOUTH DAILY 30 tablet 3    albuterol sulfate HFA (PROAIR HFA) 108 (90 Base) MCG/ACT inhaler Inhale 2 puffs into the lungs every 6 hours as needed for Wheezing or Shortness of Breath 1 each 3    BD INSULIN SYRINGE U/F 31G X 5/16\" 1 ML MISC USE AS DIRECTED THREE TIMES A DAY. 100 each 3    umeclidinium-vilanterol (ANORO ELLIPTA) 62.5-25 MCG/INH AEPB inhaler Inhale 1 puff into the lungs daily 1 each 11    fluticasone (FLONASE) 50 MCG/ACT nasal spray One spray in each nostril q hs 3 each 1    ferrous sulfate (IRON 325) 325 (65 Fe) MG tablet Take 2 tablets by mouth three times a week 78 tablet 1    metFORMIN (GLUCOPHAGE) 1000 MG tablet Take 1 tablet by mouth daily (with breakfast) 180 tablet 1    pantoprazole (PROTONIX) 40 MG tablet take 1 tablet by mouth once daily 90 tablet 1    leuprolide (LUPRON) 45 MG injection Inject 45 mg into the muscle once for 1 dose 1 each 1    Cyanocobalamin ER (RA VITAMIN B-12 TR) 1000 MCG TBCR take 1 tablet by mouth once daily 30 tablet 2    insulin lispro (HUMALOG) 100 UNIT/ML injection vial inject 10 units subcutaneously twice a day if needed for HIGH BLOOD SUGAR.  30 mL 3    Insulin Syringes, Disposable, U-100 1 ML MISC 1 each by Does not apply route 3 times daily 31 gauge x 8 mm  ultrafine 2 100 each 5    amLODIPine (NORVASC) 10 MG tablet Take 10 mg by mouth daily      clopidogrel (PLAVIX) 75 MG tablet take 1 tablet by mouth once daily 90 tablet 1    metoprolol tartrate (LOPRESSOR) 25 MG tablet Take 1 tablet by mouth 2 times daily  0    Vitamin D, Cholecalciferol, 25 MCG (1000 UT) TABS Take 1,000 Units by mouth daily      pravastatin (PRAVACHOL) 40 MG tablet Take 1 tablet by mouth nightly  0    gemfibrozil (LOPID) 600 MG tablet Take 1 tablet by mouth 2 times daily 180 tablet 1    nitroGLYCERIN (NITROSTAT) 0.4 MG SL tablet Place 0.4 mg under the tongue every 5 minutes as needed for Chest pain       aspirin 325 MG tablet Take 325 mg by mouth daily. leuprolide (LUPRON) 45 MG injection Inject 45 mg into the muscle once for 1 dose 6 months 1 each 0     No current facility-administered medications for this visit. Allergies   Allergen Reactions    Ace Inhibitors      When asked Oct 2020, patient was unsure of allergy/reaction. Patient takes/tolerates enalapril.     Baclofen Other (See Comments)     Lightheadedness, dizziness    Darvocet [Propoxyphene N-Acetaminophen] Nausea Only     Felt sickly    Darvon [Propoxyphene Hcl]      Felt sickly    Flexeril [Cyclobenzaprine] Other (See Comments)     Headache    Morphine Nausea Only     Pt reported feeling sickly    Motrin [Ibuprofen Micronized] Nausea Only     Pt reported feeling very sick    Tylenol [Acetaminophen] Nausea Only    Ultram [Tramadol] Itching     Health Maintenance   Topic Date Due    HIV screen  Never done    Hepatitis C screen  Never done    DTaP/Tdap/Td vaccine (1 - Tdap) Never done    Shingles vaccine (1 of 2) Never done    Diabetic retinal exam  07/31/2019    COVID-19 Vaccine (4 - Booster) 12/16/2022    Lipids  03/04/2023    Diabetic foot exam  03/11/2023    Prostate Specific Antigen (PSA) Screening or Monitoring  06/20/2023    A1C test (Diabetic or Prediabetic)  07/08/2023    Diabetic microalbuminuria test  07/08/2023    Depression Screen  07/08/2023    Colorectal Cancer Screen  05/04/2025    Flu vaccine  Completed    Pneumococcal 0-64 years Vaccine  Completed    Hepatitis A vaccine  Aged Out    Hib vaccine  Aged Out    Meningococcal (ACWY) vaccine  Aged Out       Objective:  /72 Pulse 74   Temp 97.6 °F (36.4 °C)   Resp 16   Ht 5' 9\" (1.753 m)   Wt 292 lb (132.5 kg)   SpO2 96%   BMI 43.12 kg/m²   Physical Exam  Vitals reviewed. Constitutional:       Appearance: He is well-developed. He is obese. He is not ill-appearing. Interventions: Nasal cannula in place. Cardiovascular:      Rate and Rhythm: Normal rate and regular rhythm. Heart sounds: No murmur heard. Pulmonary:      Effort: Pulmonary effort is normal. No respiratory distress. Breath sounds: Normal breath sounds. No wheezing or rhonchi. Musculoskeletal:      Right lower leg: Edema (trace) present. Left lower leg: Edema (trace) present. Neurological:      Mental Status: He is alert. Mental status is at baseline. Psychiatric:         Mood and Affect: Mood normal.         Behavior: Behavior normal.       Impression/Plan:  1. Type 2 diabetes mellitus with hyperglycemia, with long-term current use of insulin (HCC)  Chronic. Uncontrolled with A1c of 8.1%. Increase Basaglar to 42 units twice a day. Continue metformin and Humalog  - metFORMIN (GLUCOPHAGE) 1000 MG tablet; Take 1 tablet by mouth daily (with breakfast)  Dispense: 180 tablet; Refill: 1  - Insulin Pen Needle (B-D ULTRAFINE III SHORT PEN) 31G X 8 MM MISC; USE TWICE DAILY WITH THE BASAGLAR PEN. Dispense: 200 each; Refill: 1  - POCT glycosylated hemoglobin (Hb A1C)    2. Anxiety  Chronic. Stable. Has failed multiple other meds. Continue Xanax    3. Chronic seasonal allergic rhinitis due to pollen  Chronic. Stable. Continue Flonase  - fluticasone (FLONASE) 50 MCG/ACT nasal spray; One spray in each nostril q hs  Dispense: 3 each; Refill: 1    4. Gastroesophageal reflux disease without esophagitis  Chronic. Controlled. Refill med  - pantoprazole (PROTONIX) 40 MG tablet; take 1 tablet by mouth once daily  Dispense: 90 tablet; Refill: 1    5. Mixed restrictive and obstructive lung disease (HCC)  Chronic.   Refill med  - albuterol sulfate HFA (PROAIR HFA) 108 (90 Base) MCG/ACT inhaler; Inhale 2 puffs into the lungs every 6 hours as needed for Wheezing or Shortness of Breath  Dispense: 1 each; Refill: 3    6. Iron deficiency anemia, unspecified iron deficiency anemia type  Chronic. Refill med. Follows with hematology  - ferrous sulfate (IRON 325) 325 (65 Fe) MG tablet; Take 2 tablets by mouth three times a week  Dispense: 78 tablet; Refill: 1      They voiced understanding. All questions answered. They agreed with treatment plan. See patient instructions for any educational materials that may have been given. Discussed use, benefit, and side effects of prescribed medications. Reviewed health maintenance. (Please note that portions of this note may have been completed with a voice recognition program.  Efforts were made to edit the dictation but occasionally words are mis-transcribed.)    Return in about 3 months (around 1/10/2023) for anxiety, DM (poc 1c).       Electronically signed by Caroll Simmonds, MD on 10/10/2022 at 10:23 AM

## 2022-10-10 NOTE — TELEPHONE ENCOUNTER
Quinton Langton called in stating that wrong script was called in. He needs BD insulin syringes called into [facility].

## 2022-10-17 ENCOUNTER — HOSPITAL ENCOUNTER (OUTPATIENT)
Age: 62
Discharge: HOME | End: 2022-10-17
Payer: MEDICAID

## 2022-10-17 LAB
ALBUMIN SERPL-MCNC: 4.1 G/DL (ref 3.5–5.1)
ALP BLD-CCNC: 125 U/L (ref 38–126)
ALT SERPL-CCNC: 14 U/L (ref 11–66)
ANION GAP SERPL CALCULATED.3IONS-SCNC: 13 MEQ/L (ref 8–16)
AST SERPL-CCNC: 13 U/L (ref 5–40)
BILIRUB SERPL-MCNC: 0.3 MG/DL (ref 0.3–1.2)
BILIRUBIN DIRECT: < 0.2 MG/DL (ref 0–0.3)
BUN BLDV-MCNC: 20 MG/DL (ref 7–22)
CALCIUM SERPL-MCNC: 9.9 MG/DL (ref 8.5–10.5)
CHLORIDE BLD-SCNC: 103 MEQ/L (ref 98–111)
CHOLESTEROL, TOTAL: 161 MG/DL (ref 100–199)
CO2: 23 MEQ/L (ref 23–33)
CREAT SERPL-MCNC: 1 MG/DL (ref 0.4–1.2)
ERYTHROCYTE [DISTWIDTH] IN BLOOD BY AUTOMATED COUNT: 13.9 % (ref 11.5–14.5)
ERYTHROCYTE [DISTWIDTH] IN BLOOD BY AUTOMATED COUNT: 43 FL (ref 35–45)
GLUCOSE BLD-MCNC: 199 MG/DL (ref 70–108)
HCT VFR BLD CALC: 36.8 % (ref 42–52)
HDLC SERPL-MCNC: 31 MG/DL
HEMOGLOBIN: 11.9 GM/DL (ref 14–18)
LDL CHOLESTEROL CALCULATED: 95 MG/DL
MCH RBC QN AUTO: 27.4 PG (ref 26–33)
MCHC RBC AUTO-ENTMCNC: 32.3 GM/DL (ref 32.2–35.5)
MCV RBC AUTO: 84.8 FL (ref 80–94)
PLATELET # BLD: 262 THOU/MM3 (ref 130–400)
PMV BLD AUTO: 10.1 FL (ref 9.4–12.4)
POTASSIUM SERPL-SCNC: 4.8 MEQ/L (ref 3.5–5.2)
RBC # BLD: 4.34 MILL/MM3 (ref 4.7–6.1)
SODIUM BLD-SCNC: 139 MEQ/L (ref 135–145)
TOTAL PROTEIN: 6.9 G/DL (ref 6.1–8)
TRIGL SERPL-MCNC: 175 MG/DL (ref 0–199)
WBC # BLD: 7 THOU/MM3 (ref 4.8–10.8)

## 2022-10-17 PROCEDURE — 85027 COMPLETE CBC AUTOMATED: CPT

## 2022-10-17 PROCEDURE — 36415 COLL VENOUS BLD VENIPUNCTURE: CPT

## 2022-10-17 PROCEDURE — 80061 LIPID PANEL: CPT

## 2022-10-17 PROCEDURE — 80053 COMPREHEN METABOLIC PANEL: CPT

## 2022-10-17 PROCEDURE — 82248 BILIRUBIN DIRECT: CPT

## 2022-10-24 ENCOUNTER — OFFICE VISIT (OUTPATIENT)
Dept: CARDIOLOGY CLINIC | Age: 62
End: 2022-10-24
Payer: MEDICAID

## 2022-10-24 ENCOUNTER — OFFICE VISIT (OUTPATIENT)
Dept: PULMONOLOGY | Age: 62
End: 2022-10-24
Payer: MEDICAID

## 2022-10-24 VITALS
HEART RATE: 71 BPM | WEIGHT: 282 LBS | SYSTOLIC BLOOD PRESSURE: 126 MMHG | HEIGHT: 69 IN | BODY MASS INDEX: 41.77 KG/M2 | RESPIRATION RATE: 18 BRPM | DIASTOLIC BLOOD PRESSURE: 59 MMHG

## 2022-10-24 VITALS
TEMPERATURE: 98.3 F | DIASTOLIC BLOOD PRESSURE: 60 MMHG | HEART RATE: 78 BPM | OXYGEN SATURATION: 95 % | BODY MASS INDEX: 43.07 KG/M2 | WEIGHT: 290.8 LBS | HEIGHT: 69 IN | SYSTOLIC BLOOD PRESSURE: 132 MMHG

## 2022-10-24 DIAGNOSIS — I10 ESSENTIAL HYPERTENSION: ICD-10-CM

## 2022-10-24 DIAGNOSIS — I10 PRIMARY HYPERTENSION: Primary | ICD-10-CM

## 2022-10-24 DIAGNOSIS — J43.9 MIXED RESTRICTIVE AND OBSTRUCTIVE LUNG DISEASE (HCC): ICD-10-CM

## 2022-10-24 DIAGNOSIS — Z87.891 PERSONAL HISTORY OF TOBACCO USE: ICD-10-CM

## 2022-10-24 DIAGNOSIS — E66.2 OBESITY HYPOVENTILATION SYNDROME (HCC): Primary | ICD-10-CM

## 2022-10-24 DIAGNOSIS — J98.4 MIXED RESTRICTIVE AND OBSTRUCTIVE LUNG DISEASE (HCC): ICD-10-CM

## 2022-10-24 DIAGNOSIS — I25.10 CORONARY ARTERY DISEASE INVOLVING NATIVE CORONARY ARTERY OF NATIVE HEART WITHOUT ANGINA PECTORIS: ICD-10-CM

## 2022-10-24 DIAGNOSIS — E78.49 OTHER HYPERLIPIDEMIA: ICD-10-CM

## 2022-10-24 DIAGNOSIS — E66.01 MORBIDLY OBESE (HCC): ICD-10-CM

## 2022-10-24 LAB — GFR SERPL CREATININE-BSD FRML MDRD: > 60 ML/MIN/1.73M2

## 2022-10-24 PROCEDURE — G8417 CALC BMI ABV UP PARAM F/U: HCPCS | Performed by: INTERNAL MEDICINE

## 2022-10-24 PROCEDURE — 99213 OFFICE O/P EST LOW 20 MIN: CPT | Performed by: NURSE PRACTITIONER

## 2022-10-24 PROCEDURE — G8484 FLU IMMUNIZE NO ADMIN: HCPCS | Performed by: NURSE PRACTITIONER

## 2022-10-24 PROCEDURE — G8417 CALC BMI ABV UP PARAM F/U: HCPCS | Performed by: NURSE PRACTITIONER

## 2022-10-24 PROCEDURE — G8427 DOCREV CUR MEDS BY ELIG CLIN: HCPCS | Performed by: NURSE PRACTITIONER

## 2022-10-24 PROCEDURE — G8427 DOCREV CUR MEDS BY ELIG CLIN: HCPCS | Performed by: INTERNAL MEDICINE

## 2022-10-24 PROCEDURE — 3017F COLORECTAL CA SCREEN DOC REV: CPT | Performed by: INTERNAL MEDICINE

## 2022-10-24 PROCEDURE — 3023F SPIROM DOC REV: CPT | Performed by: NURSE PRACTITIONER

## 2022-10-24 PROCEDURE — 93000 ELECTROCARDIOGRAM COMPLETE: CPT | Performed by: INTERNAL MEDICINE

## 2022-10-24 PROCEDURE — 99214 OFFICE O/P EST MOD 30 MIN: CPT | Performed by: INTERNAL MEDICINE

## 2022-10-24 PROCEDURE — G8484 FLU IMMUNIZE NO ADMIN: HCPCS | Performed by: INTERNAL MEDICINE

## 2022-10-24 PROCEDURE — 1036F TOBACCO NON-USER: CPT | Performed by: NURSE PRACTITIONER

## 2022-10-24 PROCEDURE — 3017F COLORECTAL CA SCREEN DOC REV: CPT | Performed by: NURSE PRACTITIONER

## 2022-10-24 PROCEDURE — 1036F TOBACCO NON-USER: CPT | Performed by: INTERNAL MEDICINE

## 2022-10-24 RX ORDER — PRAVASTATIN SODIUM 40 MG
80 TABLET ORAL NIGHTLY
Qty: 180 TABLET | Refills: 4 | Status: SHIPPED | OUTPATIENT
Start: 2022-10-24

## 2022-10-24 RX ORDER — CLOPIDOGREL BISULFATE 75 MG/1
TABLET ORAL
Qty: 90 TABLET | Refills: 4 | Status: SHIPPED | OUTPATIENT
Start: 2022-10-24

## 2022-10-24 RX ORDER — AMLODIPINE BESYLATE 10 MG/1
10 TABLET ORAL DAILY
Qty: 90 TABLET | Refills: 4 | Status: SHIPPED | OUTPATIENT
Start: 2022-10-24

## 2022-10-24 RX ORDER — GEMFIBROZIL 600 MG/1
600 TABLET, FILM COATED ORAL 2 TIMES DAILY
Qty: 180 TABLET | Refills: 4 | Status: SHIPPED | OUTPATIENT
Start: 2022-10-24

## 2022-10-24 RX ORDER — ENALAPRIL MALEATE 20 MG/1
TABLET ORAL
Qty: 45 TABLET | Refills: 4 | Status: SHIPPED | OUTPATIENT
Start: 2022-10-24

## 2022-10-24 RX ORDER — PRAVASTATIN SODIUM 40 MG
40 TABLET ORAL NIGHTLY
Qty: 90 TABLET | Refills: 4 | Status: SHIPPED | OUTPATIENT
Start: 2022-10-24 | End: 2022-10-24

## 2022-10-24 RX ORDER — NITROGLYCERIN 0.4 MG/1
0.4 TABLET SUBLINGUAL EVERY 5 MIN PRN
Qty: 25 TABLET | Refills: 3 | Status: SHIPPED | OUTPATIENT
Start: 2022-10-24

## 2022-10-24 ASSESSMENT — ENCOUNTER SYMPTOMS
APNEA: 0
BLOOD IN STOOL: 0
RESPIRATORY NEGATIVE: 1
WHEEZING: 0
COLOR CHANGE: 0
NAUSEA: 0
TROUBLE SWALLOWING: 0
ABDOMINAL DISTENTION: 0
GASTROINTESTINAL NEGATIVE: 1
CHEST TIGHTNESS: 0
EYES NEGATIVE: 1
VOMITING: 0
ALLERGIC/IMMUNOLOGIC NEGATIVE: 1
DIARRHEA: 0
VOMITING: 0
COUGH: 0
NAUSEA: 0
ABDOMINAL PAIN: 0
ANAL BLEEDING: 0
STRIDOR: 0
CHOKING: 0
WHEEZING: 0
CHEST TIGHTNESS: 0
SHORTNESS OF BREATH: 0
VOICE CHANGE: 0
STRIDOR: 0

## 2022-10-24 NOTE — PROGRESS NOTES
Patient is experiencing SOB: No    Patient is experiencing wheezing: No    Patient states they have had a Absent = 4 cough. Phlegm is daily, color:  green    Patient is coughing up blood: no    Patient has been experiencing chest pains: non-existent    Patient is currently taking the following inhaler(s): Anoro, Albuterol    Patient is using their rescue inhaler 3-4 times per day. Patient is currently using the following nebulizer: Albuterol    Patient is currently using 2 liters of oxygen during sleep.

## 2022-10-24 NOTE — PROGRESS NOTES
[address] [facility] [address]  Dept: [address]: [phone]     10/24/2022       Carla Calderon is here today for   Chief Complaint   Patient presents with    Follow-up           Referring Physician:  No ref. provider found     Patient Active Problem List   Diagnosis    Hypertension    Hyperlipidemia    CAD (coronary artery disease)    Chest pain, atypical    Chronic low back pain    Hypertriglyceridemia    Morbidly obese (HCC)    Abnormal stress test    Os trigonum    Elevated PSA    Lumbar spinal stenosis    Well controlled type 2 diabetes mellitus (HCC)    ROBSON (obstructive sleep apnea)    Back pain at L4-L5 level    Anxiety    Microalbuminuria    H/O heart artery stent    Urinary retention    Malignant neoplasm of prostate (HCC)    COPD, severe (HCC)       Review of Systems   Constitutional:  Negative for activity change, appetite change, fatigue, fever and unexpected weight change. HENT:  Negative for congestion, trouble swallowing and voice change. Eyes:  Negative for visual disturbance. Respiratory:  Negative for apnea, cough, choking, chest tightness, shortness of breath, wheezing and stridor. Cardiovascular:  Negative for chest pain, palpitations and leg swelling. Gastrointestinal:  Negative for abdominal distention, abdominal pain, anal bleeding, blood in stool, nausea and vomiting. Endocrine: Negative for cold intolerance and heat intolerance. Genitourinary:  Negative for hematuria. Musculoskeletal:  Negative for arthralgias, gait problem, joint swelling and myalgias. Skin:  Negative for color change and rash. Allergic/Immunologic: Negative for environmental allergies and food allergies. Neurological:  Negative for dizziness, tremors, syncope, facial asymmetry, weakness, light-headedness, numbness and headaches. Hematological:  Does not bruise/bleed easily. Psychiatric/Behavioral:  Negative for agitation, behavioral problems and sleep disturbance. Past Medical History:   Diagnosis Date    Abnormal stress test Sept 2012    Lateral Wall Ischemia- done at [facility]    CAD (coronary artery disease)     Cancer ([facility] [address]     Prostate    Chronic low back pain     Diabetes mellitus ([facility] [address]     Diabetes mellitus ([facility] [address]     Hyperlipidemia     Hypertension     Hypertriglyceridemia     MI (myocardial infarction) ([email or web address] 2004    Obesity     Sleep apnea     has CPAP    Viral pneumonia 10/15/2020       Allergies   Allergen Reactions    Ace Inhibitors      When asked Oct 2020, patient was unsure of allergy/reaction. Patient takes/tolerates enalapril.     Baclofen Other (See Comments)     Lightheadedness, dizziness    Darvocet [Propoxyphene N-Acetaminophen] Nausea Only     Felt sickly    Darvon [Propoxyphene Hcl]      Felt sickly    Flexeril [Cyclobenzaprine] Other (See Comments)     Headache    Morphine Nausea Only     Pt reported feeling sickly    Motrin [Ibuprofen Micronized] Nausea Only     Pt reported feeling very sick    Tylenol [Acetaminophen] Nausea Only    Ultram [Tramadol] Itching       Current Outpatient Medications   Medication Sig Dispense Refill    metoprolol tartrate (LOPRESSOR) 25 MG tablet Take 1 tablet by mouth 2 times daily 180 tablet 4    nitroGLYCERIN (NITROSTAT) 0.4 MG SL tablet Place 1 tablet under the tongue every 5 minutes as needed for Chest pain 25 tablet 3    gemfibrozil (LOPID) 600 MG tablet Take 1 tablet by mouth 2 times daily 180 tablet 4    clopidogrel (PLAVIX) 75 MG tablet take 1 tablet by mouth once daily 90 tablet 4    amLODIPine (NORVASC) 10 MG tablet Take 1 tablet by mouth daily 90 tablet 4    enalapril (VASOTEC) 20 MG tablet take 1/2 tablet by mouth once daily 45 tablet 4    pravastatin (PRAVACHOL) 40 MG tablet Take 2 tablets by mouth nightly 180 tablet 4    metFORMIN (GLUCOPHAGE) 1000 MG tablet Take 1 tablet by mouth daily (with breakfast) 180 tablet 1 fluticasone (FLONASE) 50 MCG/ACT nasal spray One spray in each nostril q hs 3 each 1    pantoprazole (PROTONIX) 40 MG tablet take 1 tablet by mouth once daily 90 tablet 1    albuterol sulfate HFA (PROAIR HFA) 108 (90 Base) MCG/ACT inhaler Inhale 2 puffs into the lungs every 6 hours as needed for Wheezing or Shortness of Breath 1 each 3    ascorbic acid (RA VITAMIN C/ERNA HIPS) 500 MG tablet TAKE 1 TABLET BY MOUTH DAILY 30 tablet 3    ferrous sulfate (IRON 325) 325 (65 Fe) MG tablet Take 2 tablets by mouth three times a week 78 tablet 1    Insulin Pen Needle (B-D ULTRAFINE III SHORT PEN) 31G X 8 MM MISC USE TWICE DAILY WITH THE [employer] PEN. 200 each 1    Insulin Syringes, Disposable, U-100 1 ML MISC 1 each by Does not apply route 3 times daily 31 gauge x 8 mm  ultrafine 2 100 each 5    ALPRAZolam (XANAX) 1 MG tablet take 1 tablet by mouth twice a day 180 tablet 0    albuterol (PROVENTIL) (2.5 MG/3ML) 0.083% nebulizer solution inhale contents of 1 vial in nebulizer by mouth three times a day 150 mL 1    insulin glargine (LANTUS SOLOSTAR) 100 UNIT/ML injection pen INJECT 40 UNITS INTO THE SKIN 2 TIMES DAILY. 96 mL 0    tamsulosin (FLOMAX) 0.4 MG capsule Take 2 capsules by mouth in the morning. 60 capsule 3    oxybutynin (DITROPAN XL) 5 MG extended release tablet Take 1 tablet by mouth in the morning and 1 tablet before bedtime. 60 tablet 6    cromolyn (OPTICROM) 4 % ophthalmic solution Instill 1 drop into each eye four times a day 10 mL 5    BD INSULIN SYRINGE U/F 31G X 5/16\" 1 ML MISC USE AS DIRECTED THREE TIMES A DAY.  100 each 3    umeclidinium-vilanterol (ANORO ELLIPTA) 62.5-25 MCG/INH AEPB inhaler Inhale 1 puff into the lungs daily 1 each 11    leuprolide (LUPRON) 45 MG injection Inject 45 mg into the muscle once for 1 dose 1 each 1    Cyanocobalamin ER (RA VITAMIN B-12 TR) 1000 MCG TBCR take 1 tablet by mouth once daily 30 tablet 2    insulin lispro (HUMALOG) 100 UNIT/ML injection vial inject 10 units subcutaneously twice a day if needed for HIGH BLOOD SUGAR. 30 mL 3    Vitamin D, Cholecalciferol, 25 MCG (1000 UT) TABS Take 1,000 Units by mouth daily      aspirin 325 MG tablet Take 325 mg by mouth daily. leuprolide (LUPRON) 45 MG injection Inject 45 mg into the muscle once for 1 dose 6 months 1 each 0     No current facility-administered medications for this visit. Social History     Socioeconomic History    Marital status: Single     Spouse name: None    Number of children: None    Years of education: None    Highest education level: None   Tobacco Use    Smoking status: Former     Packs/day: 0.25     Years: 2.00     Pack years: 0.50     Types: Cigarettes     Quit date: 1995     Years since quittin.8    Smokeless tobacco: Never   Vaping Use    Vaping Use: Never used   Substance and Sexual Activity    Alcohol use: No    Drug use: No    Sexual activity: Yes     Partners: Female     Social Determinants of Health     Financial Resource Strain: Low Risk     Difficulty of Paying Living Expenses: Not hard at all   Food Insecurity: No Food Insecurity    Worried About Running Out of Food in the Last Year: Never true    Ran Out of Food in the Last Year: Never true       Family History   Problem Relation Age of Onset    Diabetes Mother     Heart Disease Mother     High Blood Pressure Mother     Arthritis Father     Diabetes Father     Heart Disease Father     High Blood Pressure Father     Diabetes Sister     Diabetes Brother     Heart Disease Brother     Cancer Neg Hx     Stroke Neg Hx        Blood pressure (!) 126/59, pulse 71, resp. rate 18, height 5' 9\" (1.753 m), weight 282 lb (127.9 kg).     Physical Exam:    General Appearance: alert and oriented to person, place and time, in no acute distress  Cardiovascular: normal rate, regular rhythm, normal S1 and S2, no murmurs, rubs, clicks, or gallops, distal pulses intact, no carotid bruits, no JVD  Pulmonary/Chest: clear to auscultation bilaterally- no wheezes, rales or rhonchi, normal air movement, no respiratory distress  Abdomen: soft, non-tender, non-distended, normal bowel sounds, no masses   Extremities: no cyanosis, clubbing or edema, pulse   Skin: warm and dry, no rash or erythema  Head: normocephalic and atraumatic  Eyes: pupils equal, round, and reactive to light  Neck: supple and non-tender without mass, no thyromegaly   Musculoskeletal: normal range of motion, no joint swelling, deformity or tenderness  Neurological: alert, oriented, normal speech, no focal findings or movement disorder noted    Lab Data:    Cardiac Enzymes:  No results for input(s): CKTOTAL, CKMB, CKMBINDEX, TROPONINI in the last 72 hours.     CBC:   Lab Results   Component Value Date/Time    WBC 7.0 10/17/2022 08:40 AM    RBC 4.34 10/17/2022 08:40 AM    HGB 11.9 10/17/2022 08:40 AM    HCT 36.8 10/17/2022 08:40 AM     10/17/2022 08:40 AM       CMP:    Lab Results   Component Value Date/Time     10/17/2022 08:40 AM    K 4.8 10/17/2022 08:40 AM    K 4.9 10/15/2020 05:53 AM     10/17/2022 08:40 AM    CO2 23 10/17/2022 08:40 AM    BUN 20 10/17/2022 08:40 AM    CREATININE 1.0 10/17/2022 08:40 AM    LABGLOM >60 10/17/2022 08:40 AM    GLUCOSE 199 10/17/2022 08:40 AM    CALCIUM 9.9 10/17/2022 08:40 AM       Hepatic Function Panel:    Lab Results   Component Value Date/Time    ALKPHOS 125 10/17/2022 08:40 AM    ALT 14 10/17/2022 08:40 AM    AST 13 10/17/2022 08:40 AM    PROT 6.9 10/17/2022 08:40 AM    BILITOT 0.3 10/17/2022 08:40 AM    BILIDIR <0.2 10/17/2022 08:40 AM    LABALBU 4.1 10/17/2022 08:40 AM       Magnesium:  No results found for: MG    PT/INR:    Lab Results   Component Value Date/Time    INR 1.05 12/07/2020 07:37 AM       HgBA1c:    Lab Results   Component Value Date/Time    LABA1C 8.1 10/10/2022 10:48 AM       FLP:    Lab Results   Component Value Date/Time    TRIG 175 10/17/2022 08:40 AM    HDL 31 10/17/2022 08:40 AM    LDLCALC 95 10/17/2022 08:40 AM       TSH: No results found for: TSH     Diagnosis Orders   1. Primary hypertension  02100 - [place] ELECTROCARDIOGRAM, COMPLETE    CBC    Basic Metabolic Panel    Lipid Panel    Hepatic Function Panel      2. Other hyperlipidemia  gemfibrozil (LOPID) 600 MG tablet    CBC    Basic Metabolic Panel    Lipid Panel    Hepatic Function Panel      3. Coronary artery disease involving native coronary artery of native heart without angina pectoris  clopidogrel (PLAVIX) 75 MG tablet    CBC    Basic Metabolic Panel    Lipid Panel    Hepatic Function Panel      4. Essential hypertension  enalapril (VASOTEC) 20 MG tablet    CBC    Basic Metabolic Panel    Lipid Panel    Hepatic Function Panel           Assessment/Plan    Patient 65years old gentleman with history of extensive coronary artery disease he is known to have history of total occlusion of the RCA with a left-to-right collaterals. He has a history of prior intervention of the LAD the patient is morbidly obese has history of hypertension history of hyper cholesteremia morbid obesity and sleep apnea. He had a lab work which showed his LDL is 94 I increase his pravastatin from 40 to an 80 mg he was advised about diet and exercise patient denies chest pain no change in his exercise tolerance he wants to continue with the medical treatment patient encouraged about weight loss and risk factor modification. The patient utilizing a CPAP he is to follow-up with the pulmonary service for that. His EKG showed sinus rhythm bundle branch block but no significant change. The medication were reconciled and was sent to his pharmacy with the above changes the patient will be seen annually he is to seek medical attention with any change in clinical condition follow-up with family physician lab work was ordered.   End of dictation thank    Orders Placed This Encounter   Procedures    CBC     Standing Status:   Future     Standing Expiration Date:   92/92/9111    Basic Metabolic Panel     Standing Status:   Future     Standing Expiration Date:   10/24/2023    Lipid Panel     Standing Status:   Future     Standing Expiration Date:   10/24/2023     Order Specific Question:   Is Patient Fasting?/# of Hours     Answer:   12 hours    Hepatic Function Panel     Standing Status:   Future     Standing Expiration Date:   10/24/2023    92303 - [place] ELECTROCARDIOGRAM, COMPLETE       No follow-ups on file.      Maggie Pruitt MD

## 2022-10-24 NOTE — PROGRESS NOTES
[facility]    Patient: Zamzam Livingston, 65 y.o.   : 1960  10/24/2022    Pt of Dr. Guru Grey   Patient presents with    Follow-up     6 month f/u with DL. DIOR  Mendel Moccasin is here for follow up for his [facility] patient also with mixed obstructive/restrictive lung disease. Patient on BiPAP for his [facility] download reviewed today with controlled AHI   Patient awake and alert today in the office   Morbidly obese BMI 43  Current inhaler use of Anoro and Albuterol PRN for SOB/wheezing   SAQLI 95  ESS 1  C/o mask leaking and machine is \"bone dry\" in the AM     Patient is experiencing SOB: No     Patient is experiencing wheezing: No     Patient states they have had a Absent = 4 cough. Phlegm is daily, color:  green     Patient is coughing up blood: no     Patient has been experiencing chest pains: non-existent     Patient is currently taking the following inhaler(s): Anoro, Albuterol     Patient is using their rescue inhaler 3-4 times per day. Patient is currently using the following nebulizer: Albuterol     Patient is currently using 2 liters of oxygen during sleep. Progress History:   Since last visit any new medical issues? No  New ER or hospital visits? No  Any new or changes in medicines? No  Using inhalers? Yes   Are they helpful?  Yes   Flu vaccine- done 22  Pneumonia vaccine- series completed   Covid vaccine done x 2 plus 4 boosters     Past Medical hx   PMH:  Past Medical History:   Diagnosis Date    Abnormal stress test 2012    Lateral Wall Ischemia- done at [facility]    CAD (coronary artery disease)     Cancer ([facility]     Prostate    Chronic low back pain     Diabetes mellitus ([facility]     Diabetes mellitus ([facility] [facility]     Hyperlipidemia     Hypertension     Hypertriglyceridemia     MI (myocardial infarction) ([facility] [facility]     Obesity     Sleep apnea     has CPAP    Viral pneumonia 10/15/2020     SURGICAL HISTORY:  Past Surgical History: Procedure Laterality Date    BACK SURGERY  1997    L4 & L5 fusion    CARDIAC CATHETERIZATION  10/2019    CARDIAC SURGERY  2004    Cardiac cath with stent placement x1    COLONOSCOPY  2010    CORONARY ANGIOPLASTY WITH STENT PLACEMENT  10/17/2019    HERNIA REPAIR      Mesh repair in abdomen. ULTRASOUND PROSTATE/TRANSRECTAL N/A 2020    TRANSRECTAL ULTRASOUND WITH PROSTATE BIOPSY performed by Cassia Jules MD at [address] [place] [address] Road:  Social History     Tobacco Use    Smoking status: Former     Packs/day: 0.25     Years: 2.00     Pack years: 0.50     Types: Cigarettes     Quit date: 1995     Years since quittin.8    Smokeless tobacco: Never   Vaping Use    Vaping Use: Never used   Substance Use Topics    Alcohol use: No    Drug use: No     ALLERGIES:  Allergies   Allergen Reactions    Ace Inhibitors      When asked Oct 2020, patient was unsure of allergy/reaction. Patient takes/tolerates enalapril.     Baclofen Other (See Comments)     Lightheadedness, dizziness    Darvocet [Propoxyphene N-Acetaminophen] Nausea Only     Felt sickly    Darvon [Propoxyphene Hcl]      Felt sickly    Flexeril [Cyclobenzaprine] Other (See Comments)     Headache    Morphine Nausea Only     Pt reported feeling sickly    Motrin [Ibuprofen Micronized] Nausea Only     Pt reported feeling very sick    Tylenol [Acetaminophen] Nausea Only    Ultram [Tramadol] Itching     FAMILY HISTORY:  Family History   Problem Relation Age of Onset    Diabetes Mother     Heart Disease Mother     High Blood Pressure Mother     Arthritis Father     Diabetes Father     Heart Disease Father     High Blood Pressure Father     Diabetes Sister     Diabetes Brother     Heart Disease Brother     Cancer Neg Hx     Stroke Neg Hx      CURRENT MEDICATIONS:  Current Outpatient Medications   Medication Sig Dispense Refill    metFORMIN (GLUCOPHAGE) 1000 MG tablet Take 1 tablet by mouth daily (with breakfast) 180 tablet 1    fluticasone (FLONASE) 50 MCG/ACT nasal spray One spray in each nostril q hs 3 each 1    pantoprazole (PROTONIX) 40 MG tablet take 1 tablet by mouth once daily 90 tablet 1    albuterol sulfate HFA (PROAIR HFA) 108 (90 Base) MCG/ACT inhaler Inhale 2 puffs into the lungs every 6 hours as needed for Wheezing or Shortness of Breath 1 each 3    ascorbic acid (RA VITAMIN C/ERNA HIPS) 500 MG tablet TAKE 1 TABLET BY MOUTH DAILY 30 tablet 3    ferrous sulfate (IRON 325) 325 (65 Fe) MG tablet Take 2 tablets by mouth three times a week 78 tablet 1    Insulin Pen Needle (B-D ULTRAFINE III SHORT PEN) 31G X 8 MM MISC USE TWICE DAILY WITH THE Zoodles PEN. 200 each 1    Insulin Syringes, Disposable, U-100 1 ML MISC 1 each by Does not apply route 3 times daily 31 gauge x 8 mm  ultrafine 2 100 each 5    ALPRAZolam (XANAX) 1 MG tablet take 1 tablet by mouth twice a day 180 tablet 0    albuterol (PROVENTIL) (2.5 MG/3ML) 0.083% nebulizer solution inhale contents of 1 vial in nebulizer by mouth three times a day 150 mL 1    enalapril (VASOTEC) 20 MG tablet take 1/2 tablet by mouth once daily 45 tablet 1    insulin glargine (LANTUS SOLOSTAR) 100 UNIT/ML injection pen INJECT 40 UNITS INTO THE SKIN 2 TIMES DAILY. 96 mL 0    cromolyn (OPTICROM) 4 % ophthalmic solution Instill 1 drop into each eye four times a day 10 mL 5    BD INSULIN SYRINGE U/F 31G X 5/16\" 1 ML MISC USE AS DIRECTED THREE TIMES A DAY. 100 each 3    umeclidinium-vilanterol (ANORO ELLIPTA) 62.5-25 MCG/INH AEPB inhaler Inhale 1 puff into the lungs daily 1 each 11    leuprolide (LUPRON) 45 MG injection Inject 45 mg into the muscle once for 1 dose 1 each 1    Cyanocobalamin ER (RA VITAMIN B-12 TR) 1000 MCG TBCR take 1 tablet by mouth once daily 30 tablet 2    insulin lispro (HUMALOG) 100 UNIT/ML injection vial inject 10 units subcutaneously twice a day if needed for HIGH BLOOD SUGAR.  30 mL 3    amLODIPine (NORVASC) 10 MG tablet Take 10 mg by mouth daily clopidogrel (PLAVIX) 75 MG tablet take 1 tablet by mouth once daily 90 tablet 1    metoprolol tartrate (LOPRESSOR) 25 MG tablet Take 1 tablet by mouth 2 times daily  0    Vitamin D, Cholecalciferol, 25 MCG (1000 UT) TABS Take 1,000 Units by mouth daily      pravastatin (PRAVACHOL) 40 MG tablet Take 1 tablet by mouth nightly  0    gemfibrozil (LOPID) 600 MG tablet Take 1 tablet by mouth 2 times daily 180 tablet 1    nitroGLYCERIN (NITROSTAT) 0.4 MG SL tablet Place 0.4 mg under the tongue every 5 minutes as needed for Chest pain       aspirin 325 MG tablet Take 325 mg by mouth daily. tamsulosin (FLOMAX) 0.4 MG capsule Take 2 capsules by mouth in the morning. 60 capsule 3    oxybutynin (DITROPAN XL) 5 MG extended release tablet Take 1 tablet by mouth in the morning and 1 tablet before bedtime. 60 tablet 6    leuprolide (LUPRON) 45 MG injection Inject 45 mg into the muscle once for 1 dose 6 months 1 each 0     No current facility-administered medications for this visit. ROS   Review of Systems   Constitutional: Negative. Negative for chills, fever and unexpected weight change. HENT: Negative. Eyes: Negative. Respiratory: Negative. Negative for chest tightness, wheezing and stridor. Cardiovascular:  Negative for chest pain and leg swelling. Gastrointestinal: Negative. Negative for diarrhea, nausea and vomiting. Endocrine: Negative. Genitourinary: Negative. Negative for dysuria. Musculoskeletal: Negative. Skin: Negative. Allergic/Immunologic: Negative. Neurological: Negative. Hematological: Negative. Psychiatric/Behavioral: Negative.         Physical exam   /60   Pulse 78   Temp 98.3 °F (36.8 °C)   Ht 5' 9\" (1.753 m)   Wt 290 lb 12.8 oz (131.9 kg)   SpO2 95%   BMI 42.94 kg/m²    Wt Readings from Last 3 Encounters:   10/24/22 290 lb 12.8 oz (131.9 kg)   10/10/22 292 lb (132.5 kg)   09/27/22 294 lb (133.4 kg)       Physical Exam  Vitals and nursing note reviewed. Constitutional:       General: He is not in acute distress. Appearance: He is morbidly obese. HENT:      Head: Normocephalic and atraumatic. Neck:      Trachea: No tracheal deviation. Cardiovascular:      Rate and Rhythm: Normal rate and regular rhythm. Heart sounds: Normal heart sounds. No murmur heard. Pulmonary:      Effort: Pulmonary effort is normal. No respiratory distress. Breath sounds: Normal breath sounds. No stridor. No wheezing or rales. Chest:      Chest wall: No tenderness. Abdominal:      General: Bowel sounds are normal. There is no distension. Palpations: Abdomen is soft. Skin:     General: Skin is warm and dry. Capillary Refill: Capillary refill takes less than 2 seconds. Neurological:      Mental Status: He is alert and oriented to person, place, and time. Psychiatric:         Behavior: Behavior normal.         Thought Content: Thought content normal.         Judgment: Judgment normal.        Results   Lung Nodule Screening     [] Qualifies    [x] Does not qualify   [] Declined    [] Completed    The USPSTF recommends annual screening for lung cancer with low-dose computed tomography (LDCT) in adults aged 48 to [de-identified] years who have a 30 pack-year smoking history and currently smoke or have quit within the past 20 years. Screening should be discontinued once a person has not smoked for 20 years or develops a health problem that substantially limits life expectancy or the ability or willingness to have curative lung surgery. Assessment      Diagnosis Orders   1. Obesity hypoventilation syndrome ([address]  DME Order for CPAP as OP      2. Mixed restrictive and obstructive lung disease ([address]        3. Morbidly obese ([address]        4. Personal history of tobacco use              Plan   - Download reviewed and discussed with patient  - He  was advised to continue current positive airway pressure therapy with above described pressure.    - He advised to keep good compliance with current recommended pressure to get optimal results and clinical improvement  - Recommend 7-9 hours of sleep with PAP  - He was advised to call DME company regarding supplies if needed.   -He call my office for earlier appointment if needed for worsening of sleep symptoms.   - He was instructed on weight loss  - Cordelia Rushing was educated about my impression and plan.  Patient verbalizesunderstanding.  -Weight loss encouraged   -Continue current inhaler regimen of Anoro and Albuterol PRN   -Advised to maintain pneumonia vaccine with PCP and to take flu vaccine this coming season.  -Advised patient to call office with any changes, questions, or concerns regarding respiratory status  -Mask refit order faxed to patient DME company today   -Sample mask given today of Mirlande Fus view FFM size medium     Will see Macie Felty back in: 6 MONTHS w/dl and to refill medications and PAP supplies     Verónica Herzog CNP  10/24/2022

## 2022-10-24 NOTE — PROGRESS NOTES
[facility]      Kristina Darby         [id number]  10/24/2022   Chief Complaint   Patient presents with    Follow-up     6 month f/u with DL. Pt of Dr. Isi Ruiz    PAP Download:   Original or initial AHI: ***     Date of initial study: ***      Compliant  ***%     Noncompliant *** %     PAP Type ***Level  ***   Avg Hrs/Day ***  AHI: ***   Recorded compliance dates , {Time; dates multiple:47105}  to {Time; dates multiple:78466}   Machine/Mfg:   [] ResMed    [] Respironics/Dreamstation   Interface:   [] Nasal    [] Nasal pillows   [] FFM      Provider:      [] [facility]     [][employer]     [] [employer]    [] Alejandrina Meliton    [] [facility]               [] [employer]      [] [employer]    [] [address]      [] Other    Neck Size: ***  Mallampati {MALLAMPATI:866480125}  ESS:  {Numbers;0-30:73206}  SAQLI:     Here is a scan of the most recent download:  ***    Presentation:   Lena England presents for sleep medicine follow up for {Sleep apnea diagnosis:18279}  Since the last visit, Lenana December ***    Equipment issues: The pressure {IS/IS NOT:30974}  acceptable, the mask is {Desc; acceptable/unacceptable:12201}     Sleep issues:  Do you feel better? {YES / QO:75262}  More rested? {YES, NO, SOMETIMES:7371371682}   Better concentration? {YES/NO:46591}    Progress History:   Since last visit any new medical issues? {EXAM; MBH/NQ:55484}  New ER or hospital visits? {EXAM; YES/NO:19492}  Any new or changes in medicines? {EXAM;YES/NO:19492}  Any new sleep medicines? {EXAM; YES/NO:19492}    Review of Systems -   Review of Systems     Physical Exam:    BMI:  There is no height or weight on file to calculate BMI. Wt Readings from Last 3 Encounters:   10/10/22 292 lb (132.5 kg)   09/27/22 294 lb (133.4 kg)   07/08/22 287 lb 9.6 oz (130.5 kg)     Weight {WEIGHT LOSS/GAIN 2:19682}  Vitals: There were no vitals taken for this visit. Physical Exam      ASSESSMENT/DIAGNOSIS     Diagnosis Orders   1.  Mixed restrictive and obstructive lung disease ([facility]        2. Obesity hypoventilation syndrome ([facility]        3. Morbidly obese ([facility]        4. Personal history of tobacco use                 Plan   Do you need any equipment today?  {EXAM; [facility]/ESTER:93249}      10/24/2022

## 2022-11-07 DIAGNOSIS — Z79.4 TYPE 2 DIABETES MELLITUS WITH HYPERGLYCEMIA, WITH LONG-TERM CURRENT USE OF INSULIN (HCC): Primary | ICD-10-CM

## 2022-11-07 DIAGNOSIS — E11.65 TYPE 2 DIABETES MELLITUS WITH HYPERGLYCEMIA, WITH LONG-TERM CURRENT USE OF INSULIN (HCC): Primary | ICD-10-CM

## 2022-11-07 RX ORDER — INSULIN LISPRO 100 [IU]/ML
INJECTION, SOLUTION INTRAVENOUS; SUBCUTANEOUS
Qty: 30 ML | OUTPATIENT
Start: 2022-11-07

## 2022-11-07 NOTE — TELEPHONE ENCOUNTER
Terrell Boykin called requesting a refill on the following medications:  Requested Prescriptions     Pending Prescriptions Disp Refills    insulin lispro (HUMALOG) 100 UNIT/ML injection vial 30 mL 3     Sig: inject 10 units subcutaneously twice a day if needed for HIGH BLOOD SUGAR.        Date of last visit: 10/10/2022  Date of next visit (if applicable):1/12/2023  Date of last refill: 10/7/2021  [facility]: [facility] [place]

## 2022-11-15 DIAGNOSIS — J98.4 MIXED RESTRICTIVE AND OBSTRUCTIVE LUNG DISEASE (HCC): ICD-10-CM

## 2022-11-15 DIAGNOSIS — J43.9 MIXED RESTRICTIVE AND OBSTRUCTIVE LUNG DISEASE (HCC): ICD-10-CM

## 2022-11-15 RX ORDER — PEN NEEDLE, DIABETIC 29 G X1/2"
NEEDLE, DISPOSABLE MISCELLANEOUS
Qty: 100 EACH | Refills: 3 | Status: SHIPPED | OUTPATIENT
Start: 2022-11-15

## 2022-11-15 RX ORDER — ALBUTEROL SULFATE 2.5 MG/3ML
SOLUTION RESPIRATORY (INHALATION)
Qty: 150 ML | Refills: 1 | Status: SHIPPED | OUTPATIENT
Start: 2022-11-15

## 2022-11-15 NOTE — TELEPHONE ENCOUNTER
Valdemar Melo called requesting a refill on the following medications:  Requested Prescriptions     Pending Prescriptions Disp Refills    albuterol (PROVENTIL) (2.5 MG/3ML) 0.083% nebulizer solution [Pharmacy Med Name: ALBUTEROL SUL 2.5 MG/3 ML SOLN] 150 mL 1     Sig: inhale contents of 1 vial in nebulizer by mouth three times a day    BD INSULIN SYRINGE U/F 31G X 5/16\" 1 ML MISC [Pharmacy Med Name: BD INS SYR U-F II 1ML [id number]] 100 each 3     Sig: USE AS DIRECTED THREE TIMES A DAY.        Date of last visit: 10/10/2022  Date of next visit (if applicable):1/12/2023  Date of last refill:   Pharmacy Name: [facility], [place]

## 2022-11-29 RX ORDER — TAMSULOSIN HYDROCHLORIDE 0.4 MG/1
0.8 CAPSULE ORAL DAILY
Qty: 60 CAPSULE | Refills: 0 | Status: SHIPPED | OUTPATIENT
Start: 2022-11-29 | End: 2022-12-29

## 2022-11-29 NOTE — TELEPHONE ENCOUNTER
Clint Escalera called requesting a refill on the following medications:  Requested Prescriptions     Pending Prescriptions Disp Refills    tamsulosin (FLOMAX) 0.4 MG capsule 60 capsule 3     Sig: Take 2 capsules by mouth daily     Pharmacy verified: [id number] [facility] in [place]  . pv      Date of last visit: 6/27/2022  Date of next visit (if applicable): 5/9/8805

## 2022-12-07 DIAGNOSIS — Z79.4 TYPE 2 DIABETES MELLITUS WITH HYPERGLYCEMIA, WITH LONG-TERM CURRENT USE OF INSULIN (HCC): ICD-10-CM

## 2022-12-07 DIAGNOSIS — E11.65 TYPE 2 DIABETES MELLITUS WITH HYPERGLYCEMIA, WITH LONG-TERM CURRENT USE OF INSULIN (HCC): ICD-10-CM

## 2022-12-08 RX ORDER — INSULIN GLARGINE 100 [IU]/ML
INJECTION, SOLUTION SUBCUTANEOUS
Qty: 96 ML | Refills: 0 | Status: SHIPPED | OUTPATIENT
Start: 2022-12-08

## 2022-12-08 NOTE — TELEPHONE ENCOUNTER
Alejandro Garrison called requesting a refill on the following medications:  Requested Prescriptions     Pending Prescriptions Disp Refills    LANTUS SOLOSTAR 100 UNIT/ML injection pen [Pharmacy Med Name: LANTUS SOLOSTAR 100 UNIT/ML] 96 mL 0     Sig: INJECT 40 UNITS INTO THE SKIN 2 TIMES DAILY.        Date of last visit: 10/10/2022  Date of next visit (if applicable):1/12/2023  Date of last refill: 11-11-22  Pharmacy Name: [facility]    Rx pending  #96mL/0      Thanks,  Komal Garza LPN

## 2022-12-20 LAB — PSA, ULTRASENSITIVE: 0.02 NG/ML

## 2022-12-21 RX ORDER — TAMSULOSIN HYDROCHLORIDE 0.4 MG/1
CAPSULE ORAL
Qty: 180 CAPSULE | Refills: 3 | Status: SHIPPED | OUTPATIENT
Start: 2022-12-21

## 2022-12-21 NOTE — TELEPHONE ENCOUNTER
Meenu Bean called requesting a refill on the following medications:  Requested Prescriptions     Pending Prescriptions Disp Refills    tamsulosin (FLOMAX) 0.4 MG capsule [Pharmacy Med Name: TAMSULOSIN HCL 0.4 MG CAPSULE] 180 capsule 3     Sig: take 2 capsules by mouth once daily     Pharmacy verified:  .[email or web address]      Date of last visit:   Date of next visit (if applicable): 8/2/6417

## 2022-12-29 ENCOUNTER — OFFICE VISIT (OUTPATIENT)
Dept: FAMILY MEDICINE CLINIC | Age: 62
End: 2022-12-29
Payer: MEDICAID

## 2022-12-29 VITALS
RESPIRATION RATE: 18 BRPM | DIASTOLIC BLOOD PRESSURE: 72 MMHG | TEMPERATURE: 98.3 F | HEART RATE: 81 BPM | OXYGEN SATURATION: 96 % | SYSTOLIC BLOOD PRESSURE: 138 MMHG | WEIGHT: 291.4 LBS | HEIGHT: 69 IN | BODY MASS INDEX: 43.16 KG/M2

## 2022-12-29 DIAGNOSIS — J01.90 ACUTE BACTERIAL SINUSITIS: Primary | ICD-10-CM

## 2022-12-29 DIAGNOSIS — B96.89 ACUTE BACTERIAL SINUSITIS: Primary | ICD-10-CM

## 2022-12-29 PROCEDURE — 99213 OFFICE O/P EST LOW 20 MIN: CPT | Performed by: NURSE PRACTITIONER

## 2022-12-29 PROCEDURE — 3078F DIAST BP <80 MM HG: CPT | Performed by: NURSE PRACTITIONER

## 2022-12-29 PROCEDURE — 3074F SYST BP LT 130 MM HG: CPT | Performed by: NURSE PRACTITIONER

## 2022-12-29 RX ORDER — INSULIN LISPRO 100 [IU]/ML
INJECTION, SOLUTION INTRAVENOUS; SUBCUTANEOUS
COMMUNITY
Start: 2022-12-05

## 2022-12-29 RX ORDER — ENALAPRIL MALEATE 10 MG/1
TABLET ORAL
COMMUNITY
Start: 2022-11-29

## 2022-12-29 RX ORDER — AMOXICILLIN AND CLAVULANATE POTASSIUM 875; 125 MG/1; MG/1
1 TABLET, FILM COATED ORAL 2 TIMES DAILY
Qty: 14 TABLET | Refills: 0 | Status: SHIPPED | OUTPATIENT
Start: 2022-12-29 | End: 2023-01-05

## 2022-12-29 ASSESSMENT — PATIENT HEALTH QUESTIONNAIRE - PHQ9
SUM OF ALL RESPONSES TO PHQ QUESTIONS 1-9: 0
SUM OF ALL RESPONSES TO PHQ9 QUESTIONS 1 & 2: 0
SUM OF ALL RESPONSES TO PHQ QUESTIONS 1-9: 0
1. LITTLE INTEREST OR PLEASURE IN DOING THINGS: 0
SUM OF ALL RESPONSES TO PHQ QUESTIONS 1-9: 0
SUM OF ALL RESPONSES TO PHQ QUESTIONS 1-9: 0
2. FEELING DOWN, DEPRESSED OR HOPELESS: 0

## 2022-12-29 ASSESSMENT — ENCOUNTER SYMPTOMS
SINUS PRESSURE: 1
SORE THROAT: 1
HOARSE VOICE: 1
COUGH: 1

## 2022-12-29 NOTE — PROGRESS NOTES
Quang Mckeon (:  1960) is a 65 y.o. male,Established patient, here for evaluation of the following chief complaint(s):  Head Congestion and Cough         ASSESSMENT/PLAN:  1. Acute bacterial sinusitis  -     amoxicillin-clavulanate (AUGMENTIN) 875-125 MG per tablet; Take 1 tablet by mouth 2 times daily for 7 days, Disp-14 tablet, R-0Normal    Return if symptoms worsen or fail to improve. Continue flonase. Push fluids. Patient given educational materials - see patient instructions. Discussed use, benefit, and side effects of prescribed medications. All patient questions answered. Pt voiced understanding. Reviewed health maintenance. Patient agreed with treatment plan. Follow up as directed. Subjective   SUBJECTIVE/OBJECTIVE:  Sinusitis  This is a new problem. The current episode started in the past 7 days. The problem is unchanged. There has been no fever. Associated symptoms include congestion, coughing, a hoarse voice, sinus pressure and a sore throat. Pertinent negatives include no chills or ear pain. Past treatments include nothing. The treatment provided no relief. Review of Systems   Constitutional:  Negative for chills and fever. HENT:  Positive for congestion, hoarse voice, postnasal drip, sinus pressure and sore throat. Negative for ear pain. Respiratory:  Positive for cough. Objective   Physical Exam  Constitutional:       General: He is not in acute distress. Appearance: Normal appearance. He is ill-appearing. He is not toxic-appearing. HENT:      Head: Normocephalic and atraumatic. Right Ear: Tympanic membrane is erythematous. Left Ear: Tympanic membrane is erythematous. Nose: Congestion present. Right Turbinates: Swollen. Left Turbinates: Swollen. Right Sinus: Frontal sinus tenderness present. Left Sinus: Frontal sinus tenderness present.       Mouth/Throat:      Mouth: Mucous membranes are moist.      Pharynx: Oropharynx is clear. Cardiovascular:      Rate and Rhythm: Normal rate and regular rhythm. Pulses: Normal pulses. Heart sounds: Normal heart sounds. Pulmonary:      Effort: Pulmonary effort is normal.      Breath sounds: Normal breath sounds. Skin:     General: Skin is warm and dry. Capillary Refill: Capillary refill takes less than 2 seconds. Neurological:      Mental Status: He is alert. An electronic signature was used to authenticate this note.     --PIEDAD Alcala - CNP

## 2023-01-02 DIAGNOSIS — F41.9 ANXIETY: ICD-10-CM

## 2023-01-04 RX ORDER — ALPRAZOLAM 1 MG/1
TABLET ORAL
Qty: 180 TABLET | Refills: 0 | Status: SHIPPED | OUTPATIENT
Start: 2023-01-04 | End: 2023-04-04

## 2023-01-04 NOTE — TELEPHONE ENCOUNTER
Medhat Ng called requesting a refill on the following medications:  Requested Prescriptions     Pending Prescriptions Disp Refills    ALPRAZolam (XANAX) 1 MG tablet [Pharmacy Med Name: ALPRAZOLAM 1 MG TABLET] 180 tablet      Sig: take 1 tablet by mouth twice a day       Date of last visit: 12/29/2022  Date of next visit (if applicable):1/12/2023  Date of last refill: 10/6/22 for 3 mo. Supply. Confirmed with patient by phone he will run out before his appt. On 1/12/23.   Pharmacy Name: [facility]      Imelda Sanchez [place]

## 2023-01-09 ENCOUNTER — OFFICE VISIT (OUTPATIENT)
Dept: UROLOGY | Age: 63
End: 2023-01-09
Payer: MEDICAID

## 2023-01-09 ENCOUNTER — TELEPHONE (OUTPATIENT)
Dept: UROLOGY | Age: 63
End: 2023-01-09

## 2023-01-09 VITALS
WEIGHT: 293 LBS | BODY MASS INDEX: 43.4 KG/M2 | HEIGHT: 69 IN | DIASTOLIC BLOOD PRESSURE: 64 MMHG | SYSTOLIC BLOOD PRESSURE: 126 MMHG

## 2023-01-09 DIAGNOSIS — N40.1 BENIGN PROSTATIC HYPERPLASIA WITH URINARY RETENTION: ICD-10-CM

## 2023-01-09 DIAGNOSIS — C61 PROSTATE CANCER (HCC): Primary | ICD-10-CM

## 2023-01-09 DIAGNOSIS — N21.0 BLADDER STONE: ICD-10-CM

## 2023-01-09 DIAGNOSIS — R33.8 BENIGN PROSTATIC HYPERPLASIA WITH URINARY RETENTION: ICD-10-CM

## 2023-01-09 PROCEDURE — G8417 CALC BMI ABV UP PARAM F/U: HCPCS | Performed by: UROLOGY

## 2023-01-09 PROCEDURE — 3078F DIAST BP <80 MM HG: CPT | Performed by: UROLOGY

## 2023-01-09 PROCEDURE — G8482 FLU IMMUNIZE ORDER/ADMIN: HCPCS | Performed by: UROLOGY

## 2023-01-09 PROCEDURE — 99214 OFFICE O/P EST MOD 30 MIN: CPT | Performed by: UROLOGY

## 2023-01-09 PROCEDURE — 3017F COLORECTAL CA SCREEN DOC REV: CPT | Performed by: UROLOGY

## 2023-01-09 PROCEDURE — 3074F SYST BP LT 130 MM HG: CPT | Performed by: UROLOGY

## 2023-01-09 PROCEDURE — 1036F TOBACCO NON-USER: CPT | Performed by: UROLOGY

## 2023-01-09 PROCEDURE — 96402 CHEMO HORMON ANTINEOPL SQ/IM: CPT | Performed by: UROLOGY

## 2023-01-09 PROCEDURE — G8427 DOCREV CUR MEDS BY ELIG CLIN: HCPCS | Performed by: UROLOGY

## 2023-01-09 RX ORDER — LEUPROLIDE ACETATE 45 MG
45 KIT INTRAMUSCULAR ONCE
Qty: 1 EACH | Refills: 0 | Status: SHIPPED | OUTPATIENT
Start: 2023-01-09 | End: 2023-01-09

## 2023-01-09 NOTE — PROGRESS NOTES
Patient has given me verbal consent to perform Lupron Injection  Yes      Following Dr. Hines Core of [facility]. LUPRON 45 MG GIVEN [employer] HIP  Lot Number: [id number]  Expiration Date: 12/04/2024  Arianna Castellano #: [id number]    After Injection was given there were no reactions at injection site and patient was feeling well. Patient was notified that possible side effects from injections include: Redness, swelling and itching at the injection site. Possible side effects of androgen deprivation therapy, including hot flashes, flushing of the skin, increased weight, decreased sex drive, and difficulties with ED. Patient was instructed to call the office with any further questions or concerns. Patient supplied their own medications No      Pt [facility] therapy (Roberts Kocher, Lupron) first initiated on 05/24/2021.

## 2023-01-09 NOTE — PROGRESS NOTES
MD MD [facility] Consultation / Follow up Visit    Patient:  Leana Dong  YOB: 1960  Date: 1/9/2023    HISTORY OF PRESENT ILLNESS:   The patient is a 62 y.o. male who presents today for follow-up for the following problem(s): elevated PSA, BPH with luts,  Urinary retention, Bladder stone  Overall the problem(s) : are worsening. Associated Symptoms: No dysuria, gross hematuria. Pain Severity:       Today visit:   1/9/23   Kori Kaur follows with us for HR prostate cancer:   High volume Rhodes 4+5 = 9, with PNI, (Grade Group 5). PSA: 16.28 s/p EBRT Radiation (3/2021) and on ADT (lupron Q 6 months x 2-3 years for HR PCa). His PSA has responded well and remains undectable. PSA: 0.02, < 0.02. He also had BPH with LUTs and urinary retention   -  Sp Cystolithalopaxy Standard TURP  - improved on ADT. AUASS: 11/3, 13/3.    - He is also on Flomax 0.8 mg & Oxybutynin for Urinary urgency. ANTONIO: plavix, for CAD s/p stents (10/2019). Has elevated PSA, 16.          Summary of old records:   (Patient's old records, notes and chart reviewed and summarized above.)     Last several PSA's:  Lab Results   Component Value Date    PSA <0.02 03/04/2022    PSA <0.02 11/30/2021    PSA 0.02 09/08/2021       Last total testosterone:  No results found for: TESTOSTERONE    Urinalysis today:  No results found for this visit on 01/09/23.       Last BUN and creatinine:  Lab Results   Component Value Date    BUN 20 10/17/2022     Lab Results   Component Value Date    CREATININE 1.0 10/17/2022       Imaging Reviewed during this Office Visit:   (results were independently reviewed by physician and radiology report verified)    PAST MEDICAL, FAMILY AND SOCIAL HISTORY UPDATE:  Past Medical History:   Diagnosis Date    Abnormal stress test Sept 2012    Lateral Wall Ischemia- done at [facility]    CAD (coronary artery disease)     Cancer ([address]     Prostate    Chronic low back pain     Diabetes mellitus [facility]     Diabetes mellitus ([facility]     Hyperlipidemia     Hypertension     Hypertriglyceridemia     MI (myocardial infarction) ([facility]     Obesity     Sleep apnea     has CPAP    Viral pneumonia 10/15/2020     Past Surgical History:   Procedure Laterality Date    BACK SURGERY  1997    L4 & L5 fusion    CARDIAC CATHETERIZATION  10/2019    CARDIAC SURGERY  2004    Cardiac cath with stent placement x1    COLONOSCOPY  2010    CORONARY ANGIOPLASTY WITH STENT PLACEMENT  10/17/2019    HERNIA REPAIR      Mesh repair in abdomen.     ULTRASOUND PROSTATE/TRANSRECTAL N/A 2020    TRANSRECTAL ULTRASOUND WITH PROSTATE BIOPSY performed by Mary Jane Herron MD at [address] [place] [address] History   Problem Relation Age of Onset    Diabetes Mother     Heart Disease Mother     High Blood Pressure Mother     Arthritis Father     Diabetes Father     Heart Disease Father     High Blood Pressure Father     Diabetes Sister     Diabetes Brother     Heart Disease Brother     Cancer Neg Hx     Stroke Neg Hx      No outpatient medications have been marked as taking for the 23 encounter (Appointment) with Mary Jane Herron MD.       Ace inhibitors, Baclofen, Darvocet [propoxyphene n-acetaminophen], Darvon [propoxyphene hcl], Flexeril [cyclobenzaprine], Morphine, Motrin [ibuprofen micronized], Tylenol [acetaminophen], and Ultram [tramadol]  Social History     Tobacco Use   Smoking Status Former    Packs/day: 0.25    Years: 2.00    Pack years: 0.50    Types: Cigarettes    Quit date: 1995    Years since quittin.0   Smokeless Tobacco Never       Social History     Substance and Sexual Activity   Alcohol Use No       REVIEW OF SYSTEMS:  Constitutional: negative  Eyes: negative  Respiratory: negative  Cardiovascular: negative  Gastrointestinal: negative  Musculoskeletal: negative  Genitourinary: negative  Skin: negative   Neurological: negative  Hematological/Lymphatic: negative  Psychological: negative    Physical Exam:      There were no vitals filed for this visit. Constitutional: Patient in no acute distress   Neuro: alert and oriented to person place and time. Psych: Mood and affect normal.  Head: atraumatic normocephalic  Eyes: EOMi  HEENT: neck supple, trachea midline  Lungs: Respiratory effort normal  Cardiovascular:  Normal peripheral pulses  Abdomen: Soft, non-tender, non-distended, No CVA  Bladder: non-tender and not distended. FROMx4, no cyanosis clubbing edema  Skin: warm and dry        Assessment and Plan      1. Prostate cancer ([address]    2. Benign prostatic hyperplasia with urinary retention    3. Bladder stone             Plan:      No follow-ups on file. Path: HR prostate cancer, high volume G9 (4+5)  - SP EBRT on ADT - PSA undetectable. - Lupron injection for ADT therapy - will continue 6 month injections for 2-3 years.   - has urinary urgency - will order oxybutynin 5 mg ER   - Flomax 0.8 mg    Cysto and void trial for BPH with retention - doing well  S/p Cystolithalopaxy - bladder stone resolved

## 2023-01-17 ENCOUNTER — OFFICE VISIT (OUTPATIENT)
Dept: FAMILY MEDICINE CLINIC | Age: 63
End: 2023-01-17
Payer: MEDICAID

## 2023-01-17 VITALS
WEIGHT: 291.4 LBS | RESPIRATION RATE: 18 BRPM | SYSTOLIC BLOOD PRESSURE: 118 MMHG | DIASTOLIC BLOOD PRESSURE: 68 MMHG | HEIGHT: 69 IN | BODY MASS INDEX: 43.16 KG/M2 | HEART RATE: 75 BPM

## 2023-01-17 DIAGNOSIS — F41.9 ANXIETY: ICD-10-CM

## 2023-01-17 DIAGNOSIS — J98.4 MIXED RESTRICTIVE AND OBSTRUCTIVE LUNG DISEASE (HCC): ICD-10-CM

## 2023-01-17 DIAGNOSIS — J43.9 MIXED RESTRICTIVE AND OBSTRUCTIVE LUNG DISEASE (HCC): ICD-10-CM

## 2023-01-17 DIAGNOSIS — E11.65 TYPE 2 DIABETES MELLITUS WITH HYPERGLYCEMIA, WITH LONG-TERM CURRENT USE OF INSULIN (HCC): Primary | ICD-10-CM

## 2023-01-17 DIAGNOSIS — Z79.4 TYPE 2 DIABETES MELLITUS WITH HYPERGLYCEMIA, WITH LONG-TERM CURRENT USE OF INSULIN (HCC): Primary | ICD-10-CM

## 2023-01-17 LAB — HBA1C MFR BLD: 8.1 %

## 2023-01-17 PROCEDURE — G8417 CALC BMI ABV UP PARAM F/U: HCPCS | Performed by: FAMILY MEDICINE

## 2023-01-17 PROCEDURE — 3078F DIAST BP <80 MM HG: CPT | Performed by: FAMILY MEDICINE

## 2023-01-17 PROCEDURE — G8427 DOCREV CUR MEDS BY ELIG CLIN: HCPCS | Performed by: FAMILY MEDICINE

## 2023-01-17 PROCEDURE — 3052F HG A1C>EQUAL 8.0%<EQUAL 9.0%: CPT | Performed by: FAMILY MEDICINE

## 2023-01-17 PROCEDURE — 2022F DILAT RTA XM EVC RTNOPTHY: CPT | Performed by: FAMILY MEDICINE

## 2023-01-17 PROCEDURE — 83036 HEMOGLOBIN GLYCOSYLATED A1C: CPT | Performed by: FAMILY MEDICINE

## 2023-01-17 PROCEDURE — 3017F COLORECTAL CA SCREEN DOC REV: CPT | Performed by: FAMILY MEDICINE

## 2023-01-17 PROCEDURE — 3023F SPIROM DOC REV: CPT | Performed by: FAMILY MEDICINE

## 2023-01-17 PROCEDURE — 1036F TOBACCO NON-USER: CPT | Performed by: FAMILY MEDICINE

## 2023-01-17 PROCEDURE — 99214 OFFICE O/P EST MOD 30 MIN: CPT | Performed by: FAMILY MEDICINE

## 2023-01-17 PROCEDURE — G8482 FLU IMMUNIZE ORDER/ADMIN: HCPCS | Performed by: FAMILY MEDICINE

## 2023-01-17 PROCEDURE — 3074F SYST BP LT 130 MM HG: CPT | Performed by: FAMILY MEDICINE

## 2023-01-17 RX ORDER — ALBUTEROL SULFATE 2.5 MG/3ML
SOLUTION RESPIRATORY (INHALATION)
Qty: 150 ML | Refills: 1 | OUTPATIENT
Start: 2023-01-17

## 2023-01-17 RX ORDER — INSULIN GLARGINE 100 [IU]/ML
44 INJECTION, SOLUTION SUBCUTANEOUS 2 TIMES DAILY
Qty: 82 ML | Refills: 0 | Status: ON HOLD | OUTPATIENT
Start: 2023-01-17

## 2023-01-17 RX ORDER — ALBUTEROL SULFATE 90 UG/1
2 AEROSOL, METERED RESPIRATORY (INHALATION) EVERY 6 HOURS PRN
Qty: 1 EACH | Refills: 3 | Status: ON HOLD | OUTPATIENT
Start: 2023-01-17 | End: 2024-01-17

## 2023-01-17 RX ORDER — ALBUTEROL SULFATE 2.5 MG/3ML
2.5 SOLUTION RESPIRATORY (INHALATION) EVERY 6 HOURS PRN
Qty: 150 ML | Refills: 1 | Status: ON HOLD | OUTPATIENT
Start: 2023-01-17

## 2023-01-17 ASSESSMENT — PATIENT HEALTH QUESTIONNAIRE - PHQ9
SUM OF ALL RESPONSES TO PHQ9 QUESTIONS 1 & 2: 0
SUM OF ALL RESPONSES TO PHQ QUESTIONS 1-9: 0
2. FEELING DOWN, DEPRESSED OR HOPELESS: 0
1. LITTLE INTEREST OR PLEASURE IN DOING THINGS: 0
SUM OF ALL RESPONSES TO PHQ QUESTIONS 1-9: 0

## 2023-01-17 ASSESSMENT — ENCOUNTER SYMPTOMS
COUGH: 0
SHORTNESS OF BREATH: 0
WHEEZING: 0

## 2023-01-17 NOTE — PROGRESS NOTES
[facility]  [address] [facility] with [facility]  Dept: [phone]  Dept Fax: [phone]  Loc: [phone]  PROGRESS NOTE      Visit Date: 1/17/2023    Imelda Araiza is a 65 y.o. male who presents today for:  Chief Complaint   Patient presents with    3 Month Follow-Up     Anxiety, DM       Subjective:  Diabetes  Pertinent negatives for hypoglycemia include no nervousness/anxiousness. Pertinent negatives for diabetes include no chest pain. 3 month f/u    Anxiety:  On xanax 1 mg 2x per day. Not on any other anxiety meds. Denies illicit drug use. mood is ok as his brother-in-law is sick with cancer. Sleeping well. anxiety is fairly controlled. DM:  a1c 8.1% in oct. On lantus 42 units twice daily, lispro 10 units 3x per day (sometimes 15 units), and metformin. Glucose 144 this morning. Had a few low 200s. Exercise:  walking. Checks glucose 3x per day. No hypoglycemic episodes      COPD:  Mixed restrictive and obstructive disease. On albuterol 2-3 times per day. On anoro ellipta. Review of Systems   Constitutional:  Negative for chills and fever. Respiratory:  Negative for cough, shortness of breath and wheezing. Cardiovascular:  Negative for chest pain. Psychiatric/Behavioral:  Negative for sleep disturbance. The patient is not nervous/anxious.     Patient Active Problem List   Diagnosis    Hypertension    Hyperlipidemia    CAD (coronary artery disease)    Chest pain, atypical    Chronic low back pain    Hypertriglyceridemia    Morbidly obese (HCC)    Abnormal stress test    Os trigonum    Elevated PSA    Lumbar spinal stenosis    Well controlled type 2 diabetes mellitus (HCC)    ROBSON (obstructive sleep apnea)    Back pain at L4-L5 level    Anxiety    Microalbuminuria    H/O heart artery stent    Urinary retention    Malignant neoplasm of prostate (HCC)    COPD, severe ([address]     Past Medical History:   Diagnosis Date    Abnormal stress test 2012    Lateral Wall Ischemia- done at [facility]-    CAD (coronary artery disease)     Cancer ([facility]     Prostate    Chronic low back pain     Diabetes mellitus ([facility]     Diabetes mellitus ([facility]     Hyperlipidemia     Hypertension     Hypertriglyceridemia     MI (myocardial infarction) ([facility]     Obesity     Sleep apnea     has CPAP    Viral pneumonia 10/15/2020      Past Surgical History:   Procedure Laterality Date    BACK SURGERY  1997    L4 & L5 fusion    CARDIAC CATHETERIZATION  10/2019    CARDIAC SURGERY  2004    Cardiac cath with stent placement x1    COLONOSCOPY  2010    CORONARY ANGIOPLASTY WITH STENT PLACEMENT  10/17/2019    HERNIA REPAIR      Mesh repair in abdomen.     ULTRASOUND PROSTATE/TRANSRECTAL N/A 2020    TRANSRECTAL ULTRASOUND WITH PROSTATE BIOPSY performed by Mat Stevens MD at [address] [place] [address] History   Problem Relation Age of Onset    Diabetes Mother     Heart Disease Mother     High Blood Pressure Mother     Arthritis Father     Diabetes Father     Heart Disease Father     High Blood Pressure Father     Diabetes Sister     Diabetes Brother     Heart Disease Brother     Cancer Neg Hx     Stroke Neg Hx      Social History     Tobacco Use    Smoking status: Former     Packs/day: 0.25     Years: 2.00     Pack years: 0.50     Types: Cigarettes     Quit date: 1995     Years since quittin.0    Smokeless tobacco: Never   Substance Use Topics    Alcohol use: No      Current Outpatient Medications   Medication Sig Dispense Refill    ALPRAZolam (XANAX) 1 MG tablet take 1 tablet by mouth twice a day 180 tablet 0    enalapril (VASOTEC) 10 MG tablet take 1 tablet by mouth once daily      insulin lispro (HUMALOG) 100 UNIT/ML SOLN injection vial inject 10 units subcutaneously twice a day if needed for HIGH BLOOD SUGAR      tamsulosin (FLOMAX) 0.4 MG capsule take 2 capsules by mouth once daily 180 capsule 3    LANTUS SOLOSTAR 100 UNIT/ML injection pen INJECT 40 UNITS INTO THE SKIN 2 TIMES DAILY. 96 mL 0    albuterol (PROVENTIL) (2.5 MG/3ML) 0.083% nebulizer solution inhale contents of 1 vial in nebulizer by mouth three times a day 150 mL 1    BD INSULIN SYRINGE U/F 31G X 5/16\" 1 ML MISC USE AS DIRECTED THREE TIMES A DAY. 100 each 3    insulin lispro (HUMALOG) 100 UNIT/ML injection vial inject 10 units subcutaneously twice a day if needed for HIGH BLOOD SUGAR. 30 mL 3    metoprolol tartrate (LOPRESSOR) 25 MG tablet Take 1 tablet by mouth 2 times daily 180 tablet 4    nitroGLYCERIN (NITROSTAT) 0.4 MG SL tablet Place 1 tablet under the tongue every 5 minutes as needed for Chest pain 25 tablet 3    gemfibrozil (LOPID) 600 MG tablet Take 1 tablet by mouth 2 times daily 180 tablet 4    clopidogrel (PLAVIX) 75 MG tablet take 1 tablet by mouth once daily 90 tablet 4    amLODIPine (NORVASC) 10 MG tablet Take 1 tablet by mouth daily 90 tablet 4    pravastatin (PRAVACHOL) 40 MG tablet Take 2 tablets by mouth nightly 180 tablet 4    metFORMIN (GLUCOPHAGE) 1000 MG tablet Take 1 tablet by mouth daily (with breakfast) 180 tablet 1    fluticasone (FLONASE) 50 MCG/ACT nasal spray One spray in each nostril q hs 3 each 1    pantoprazole (PROTONIX) 40 MG tablet take 1 tablet by mouth once daily 90 tablet 1    albuterol sulfate HFA (PROAIR HFA) 108 (90 Base) MCG/ACT inhaler Inhale 2 puffs into the lungs every 6 hours as needed for Wheezing or Shortness of Breath 1 each 3    ascorbic acid (RA VITAMIN C/ERNA HIPS) 500 MG tablet TAKE 1 TABLET BY MOUTH DAILY 30 tablet 3    Insulin Pen Needle (B-D ULTRAFINE III SHORT PEN) 31G X 8 MM MISC USE TWICE DAILY WITH THE [employer] PEN.  200 each 1    Insulin Syringes, Disposable, U-100 1 ML MISC 1 each by Does not apply route 3 times daily 31 gauge x 8 mm  ultrafine 2 100 each 5    cromolyn (OPTICROM) 4 % ophthalmic solution Instill 1 drop into each eye four times a day 10 mL 5    umeclidinium-vilanterol (ANORO ELLIPTA) 62.5-25 MCG/INH AEPB inhaler Inhale 1 puff into the lungs daily 1 each 11    Cyanocobalamin ER (RA VITAMIN B-12 TR) 1000 MCG TBCR take 1 tablet by mouth once daily 30 tablet 2    Vitamin D, Cholecalciferol, 25 MCG (1000 UT) TABS Take 1,000 Units by mouth daily      aspirin 325 MG tablet Take 325 mg by mouth daily. leuprolide (LUPRON DEPOT, 6-MONTH,) 45 MG injection Inject 45 mg into the muscle once for 1 dose 1 each 0    ferrous sulfate (IRON 325) 325 (65 Fe) MG tablet Take 2 tablets by mouth three times a week (Patient not taking: No sig reported) 78 tablet 1    oxybutynin (DITROPAN XL) 5 MG extended release tablet Take 1 tablet by mouth in the morning and 1 tablet before bedtime. 60 tablet 6     No current facility-administered medications for this visit. Allergies   Allergen Reactions    Ace Inhibitors      When asked Oct 2020, patient was unsure of allergy/reaction. Patient takes/tolerates enalapril.     Baclofen Other (See Comments)     Lightheadedness, dizziness    Darvocet [Propoxyphene N-Acetaminophen] Nausea Only     Felt sickly    Darvon [Propoxyphene Hcl]      Felt sickly    Flexeril [Cyclobenzaprine] Other (See Comments)     Headache    Morphine Nausea Only     Pt reported feeling sickly    Motrin [Ibuprofen Micronized] Nausea Only     Pt reported feeling very sick    Tylenol [Acetaminophen] Nausea Only    Ultram [Tramadol] Itching     Health Maintenance   Topic Date Due    HIV screen  Never done    Hepatitis C screen  Never done    DTaP/Tdap/Td vaccine (1 - Tdap) Never done    Shingles vaccine (1 of 2) Never done    Diabetic retinal exam  07/31/2019    COVID-19 Vaccine (4 - Booster) 11/18/2022    Diabetic foot exam  03/11/2023    Diabetic Alb to Cr ratio (uACR) test  07/08/2023    A1C test (Diabetic or Prediabetic)  10/10/2023    Lipids  10/17/2023    GFR test (Diabetes, CKD 3-4, OR last GFR 15-59)  10/17/2023    Prostate Specific Antigen (PSA) Screening or Monitoring  12/19/2023 Depression Screen  12/29/2023    Colorectal Cancer Screen  05/04/2025    Flu vaccine  Completed    Pneumococcal 0-64 years Vaccine  Completed    Hepatitis A vaccine  Aged Out    Hib vaccine  Aged Out    Meningococcal (ACWY) vaccine  Aged Out       Objective:  /68 (Site: Left Upper Arm, Position: Sitting, Cuff Size: Large Adult)   Pulse 75   Resp 18   Ht 5' 9\" (1.753 m)   Wt 291 lb 6.4 oz (132.2 kg)   BMI 43.03 kg/m²   Physical Exam  Vitals reviewed. Constitutional:       Appearance: He is well-developed. He is obese. He is not ill-appearing. Interventions: Nasal cannula in place. Cardiovascular:      Rate and Rhythm: Normal rate and regular rhythm. Heart sounds: No murmur heard. Pulmonary:      Effort: Pulmonary effort is normal. No respiratory distress. Breath sounds: Normal breath sounds. No wheezing or rhonchi. Musculoskeletal:      Right lower leg: Edema (trace) present. Left lower leg: Edema (trace) present. Neurological:      Mental Status: He is alert. Mental status is at baseline. Psychiatric:         Mood and Affect: Mood normal.         Behavior: Behavior normal.       Impression/Plan:  1. Type 2 diabetes mellitus with hyperglycemia, with long-term current use of insulin (HCC)  Chronic. Uncontrolled with A1c of 8.1%. Increase Lantus to 44 units twice a day. Continue Humalog and metformin  - POCT glycosylated hemoglobin (Hb A1C)  - insulin glargine (LANTUS SOLOSTAR) 100 UNIT/ML injection pen; Inject 44 Units into the skin 2 times daily  Dispense: 82 mL; Refill: 0    2. Mixed restrictive and obstructive lung disease (HCC)  Chronic. Controlled. Continue Anoro Ellipta and albuterol  - albuterol (PROVENTIL) (2.5 MG/3ML) 0.083% nebulizer solution; Take 3 mLs by nebulization every 6 hours as needed for Wheezing  Dispense: 150 mL; Refill: 1  - albuterol sulfate HFA (PROAIR HFA) 108 (90 Base) MCG/ACT inhaler;  Inhale 2 puffs into the lungs every 6 hours as needed for Wheezing or Shortness of Breath  Dispense: 1 each; Refill: 3    3. Anxiety  Chronic.  Stable.  Continue Xanax twice a day      PDMP Monitoring:    Last PDMP [place]ali as Reviewed:  Review User Review Instant Review Result   RAGHAV LAURENT 1/17/2023 10:46 AM Reviewed PDMP [1]     Last Controlled Substance Monitoring Documentation      Flowsheet Row Office Visit from 1/7/2020 in [facility]   Periodic Controlled Substance Monitoring Possible medication side effects, risk of tolerance/dependence & alternative treatments discussed., No signs of potential drug abuse or diversion identified. filed at 01/07/2020 0954          Urine Drug Screenings (1 yr)       Urine Drug Screen  Collected: 10/9/2018  8:55 AM (Final result)                  Medication Contract and Consent for Opioid Use Documents Filed       Patient Documents       Type of Document Status Date Received Received By Description    Medication Contract [Status Missing]  DARVIN CANO DMA Medication Agreement  10/3/17                    They voiced understanding.  All questions answered. They agreed with treatment plan.   See patient instructions for any educational materials that may have been given.  Discussed use, benefit, and side effects of prescribed medications.     Reviewed health maintenance.    (Please note that portions of this note may have been completed with a voice recognition program.  Efforts were made to edit the dictation but occasionally words are mis-transcribed.)    Return in about 3 months (around 4/17/2023) for DM (Poc a1c) and anxiety.      Electronically signed by Raghav Laurent MD on 1/17/2023 at 10:14 AM

## 2023-01-19 ENCOUNTER — HOSPITAL ENCOUNTER (EMERGENCY)
Age: 63
Discharge: HOME | DRG: 720 | End: 2023-01-19
Payer: MEDICAID

## 2023-01-19 ENCOUNTER — APPOINTMENT (OUTPATIENT)
Dept: GENERAL RADIOLOGY | Age: 63
DRG: 720 | End: 2023-01-19
Payer: MEDICAID

## 2023-01-19 VITALS
RESPIRATION RATE: 24 BRPM | TEMPERATURE: 99.8 F | DIASTOLIC BLOOD PRESSURE: 89 MMHG | HEART RATE: 112 BPM | BODY MASS INDEX: 43.1 KG/M2 | OXYGEN SATURATION: 93 % | WEIGHT: 291 LBS | SYSTOLIC BLOOD PRESSURE: 168 MMHG | HEIGHT: 69 IN

## 2023-01-19 DIAGNOSIS — U07.1 COVID-19 VIRUS INFECTION: Primary | ICD-10-CM

## 2023-01-19 LAB
FLUAV AG SPEC QL: NEGATIVE
FLUBV AG SPEC QL: NEGATIVE
SARS-COV-2 RDRP RESP QL NAA+PROBE: DETECTED

## 2023-01-19 PROCEDURE — 71046 X-RAY EXAM CHEST 2 VIEWS: CPT

## 2023-01-19 PROCEDURE — 99214 OFFICE O/P EST MOD 30 MIN: CPT | Performed by: EMERGENCY MEDICINE

## 2023-01-19 PROCEDURE — 87804 INFLUENZA ASSAY W/OPTIC: CPT

## 2023-01-19 PROCEDURE — 87635 SARS-COV-2 COVID-19 AMP PRB: CPT

## 2023-01-19 PROCEDURE — 6370000000 HC RX 637 (ALT 250 FOR IP): Performed by: EMERGENCY MEDICINE

## 2023-01-19 RX ORDER — ACETAMINOPHEN 325 MG/1
975 TABLET ORAL ONCE
Status: COMPLETED | OUTPATIENT
Start: 2023-01-19 | End: 2023-01-19

## 2023-01-19 RX ADMIN — ACETAMINOPHEN 975 MG: 325 TABLET ORAL at 18:10

## 2023-01-19 ASSESSMENT — ENCOUNTER SYMPTOMS
WHEEZING: 0
SHORTNESS OF BREATH: 1
VOMITING: 0
RHINORRHEA: 1
COUGH: 1
DIARRHEA: 0
ABDOMINAL PAIN: 1
SINUS PRESSURE: 1

## 2023-01-19 ASSESSMENT — PAIN SCALES - GENERAL: PAINLEVEL_OUTOF10: 5

## 2023-01-19 ASSESSMENT — PAIN DESCRIPTION - DESCRIPTORS: DESCRIPTORS: ACHING

## 2023-01-19 ASSESSMENT — PAIN DESCRIPTION - PAIN TYPE: TYPE: ACUTE PAIN

## 2023-01-19 ASSESSMENT — PAIN - FUNCTIONAL ASSESSMENT
PAIN_FUNCTIONAL_ASSESSMENT: 0-10
PAIN_FUNCTIONAL_ASSESSMENT: ACTIVITIES ARE NOT PREVENTED

## 2023-01-19 ASSESSMENT — PAIN DESCRIPTION - LOCATION: LOCATION: BACK

## 2023-01-19 ASSESSMENT — PAIN DESCRIPTION - ONSET: ONSET: GRADUAL

## 2023-01-19 ASSESSMENT — PAIN DESCRIPTION - FREQUENCY: FREQUENCY: CONTINUOUS

## 2023-01-20 ENCOUNTER — TELEPHONE (OUTPATIENT)
Dept: FAMILY MEDICINE CLINIC | Age: 63
End: 2023-01-20

## 2023-01-20 NOTE — LETTER
.. [address] [facility]  Phone:   [phone]   Fax: [phone]    January 20, 2023    [place]  [address] [place] [address]  [place] [address]    Dear Florence Stockton,    Thank you for choosing our [facility] on 1/19/23. Dr. Jaja Shin wanted to make sure that you understand your discharge instructions and that you were able to fill any prescriptions that may have been ordered for you. Please contact the office at the above phone number if advised you to follow up with Dr. Jaja Shin, or if you have any further questions or needs. Also, did you know -                             [facility]) practices can often offer you an appointment on the same day that you call for acute issues. *We have some [phone] [facility] offices that offer Walk-in appointments; check our website for availability in your community, [email or web address].      *Evisits are now available for patients through [address] If you do not have MyChart and are interested, please contact the office and a staff member may assist you or go to [email or web address].     Sincerely,     Jaja Shin MD and your [facility]

## 2023-01-20 NOTE — ED PROVIDER NOTES
[address]  Urgent Care Encounter       CHIEF COMPLAINT       Chief Complaint   Patient presents with    Fever    Abdominal Pain    Sinusitis    Chills       Nurses Notes reviewed and I agree except as noted in the HPI. HISTORY OF PRESENT ILLNESS   Prem Geiger is a 65 y.o. male who presents for complaints of fever, shortness of breath, burning in his sinuses, abdominal cramping. Symptoms x2 days. Body aches and chills, fatigue. HPI    REVIEW OF SYSTEMS     Review of Systems   Constitutional:  Positive for activity change, diaphoresis, fatigue and fever. HENT:  Positive for congestion, rhinorrhea and sinus pressure. Respiratory:  Positive for cough and shortness of breath. Negative for wheezing. Cardiovascular:  Negative for chest pain. Gastrointestinal:  Positive for abdominal pain. Negative for diarrhea and vomiting. Musculoskeletal:  Positive for myalgias. Neurological:  Positive for headaches. PAST MEDICAL HISTORY         Diagnosis Date    Abnormal stress test Sept 2012    Lateral Wall Ischemia- done at [facility]    CAD (coronary artery disease)     Cancer ([address]     Prostate    Chronic low back pain     Diabetes mellitus ([address]     Diabetes mellitus ([address]     Hyperlipidemia     Hypertension     Hypertriglyceridemia     MI (myocardial infarction) ([address] 2004    Obesity     Sleep apnea     has CPAP    Viral pneumonia 10/15/2020       SURGICALHISTORY     Patient  has a past surgical history that includes hernia repair (1991); back surgery (November 1997); Colonoscopy (October 2010); Cardiac surgery (October 22, 2004); Cardiac catheterization (10/2019); Coronary angioplasty with stent (10/17/2019); and Ultrasound Prostate/Transrectal (N/A, 12/7/2020).     CURRENT MEDICATIONS       Discharge Medication List as of 1/19/2023  7:05 PM        CONTINUE these medications which have NOT CHANGED    Details   albuterol (PROVENTIL) (2.5 MG/3ML) 0.083% nebulizer solution Take 3 mLs by nebulization every 6 hours as needed for Wheezing, Disp-150 mL, R-1Normal      albuterol sulfate HFA (PROAIR HFA) 108 (90 Base) MCG/ACT inhaler Inhale 2 puffs into the lungs every 6 hours as needed for Wheezing or Shortness of Breath, Disp-1 each, R-3Normal      insulin glargine (LANTUS SOLOSTAR) 100 UNIT/ML injection pen Inject 44 Units into the skin 2 times daily, Disp-82 mL, R-0Normal      leuprolide (LUPRON DEPOT, 6-MONTH,) 45 MG injection Inject 45 mg into the muscle once for 1 dose, Disp-1 each, R-0Normal      ALPRAZolam (XANAX) 1 MG tablet take 1 tablet by mouth twice a day, Disp-180 tablet, R-0Normal      enalapril (VASOTEC) 10 MG tablet take 1 tablet by mouth once dailyHistorical Med      !! insulin lispro (HUMALOG) 100 UNIT/ML SOLN injection vial inject 10 units subcutaneously twice a day if needed for HIGH BLOOD SUGARHistorical Med      tamsulosin (FLOMAX) 0.4 MG capsule take 2 capsules by mouth once daily, Disp-180 capsule, R-3Normal      BD INSULIN SYRINGE U/F 31G X 5/16\" 1 ML MISC USE AS DIRECTED THREE TIMES A DAY., Disp-100 each, R-3, DAWNormal      !! insulin lispro (HUMALOG) 100 UNIT/ML injection vial inject 10 units subcutaneously twice a day if needed for HIGH BLOOD SUGAR., Disp-30 mL, R-3Normal      metoprolol tartrate (LOPRESSOR) 25 MG tablet Take 1 tablet by mouth 2 times daily, Disp-180 tablet, R-4Normal      nitroGLYCERIN (NITROSTAT) 0.4 MG SL tablet Place 1 tablet under the tongue every 5 minutes as needed for Chest pain, Disp-25 tablet, R-3Normal      gemfibrozil (LOPID) 600 MG tablet Take 1 tablet by mouth 2 times daily, Disp-180 tablet, R-4Normal      clopidogrel (PLAVIX) 75 MG tablet take 1 tablet by mouth once daily, Disp-90 tablet, R-4Normal      amLODIPine (NORVASC) 10 MG tablet Take 1 tablet by mouth daily, Disp-90 tablet, R-4Normal      pravastatin (PRAVACHOL) 40 MG tablet Take 2 tablets by mouth nightly, Disp-180 tablet, R-4Normal      metFORMIN (GLUCOPHAGE) 1000 MG tablet Take 1 tablet by mouth daily (with breakfast), Disp-180 tablet, R-1Normal      fluticasone (FLONASE) 50 MCG/ACT nasal spray One spray in each nostril q hs, Disp-3 each, R-1Normal      pantoprazole (PROTONIX) 40 MG tablet take 1 tablet by mouth once daily, Disp-90 tablet, R-1Normal      ascorbic acid (RA VITAMIN C/ERNA HIPS) 500 MG tablet TAKE 1 TABLET BY MOUTH DAILY, Disp-30 tablet, R-3Normal      Insulin Pen Needle (B-D ULTRAFINE III SHORT PEN) 31G X 8 MM MISC Disp-200 each, R-1, NormalUSE TWICE DAILY WITH THE BASAGLAR PEN. Insulin Syringes, Disposable, U-100 1 ML MISC 3 TIMES DAILY Starting Mon 10/10/2022, Disp-100 each, R-5, Uiseef33 gauge x 8 mm  ultrafine 2      oxybutynin (DITROPAN XL) 5 MG extended release tablet Take 1 tablet by mouth in the morning and 1 tablet before bedtime. , Disp-60 tablet, R-6Normal      cromolyn (OPTICROM) 4 % ophthalmic solution Instill 1 drop into each eye four times a day, Disp-10 mL, R-5Normal      umeclidinium-vilanterol (ANORO ELLIPTA) 62.5-25 MCG/INH AEPB inhaler Inhale 1 puff into the lungs daily, Disp-1 each, R-11Normal      Cyanocobalamin ER (RA VITAMIN B-12 TR) 1000 MCG TBCR take 1 tablet by mouth once daily, Disp-30 tablet, R-2Normal      Vitamin D, Cholecalciferol, 25 MCG (1000 UT) TABS Take 1,000 Units by mouth dailyHistorical Med      aspirin 325 MG tablet Take 325 mg by mouth daily. !! - Potential duplicate medications found. Please discuss with provider. ALLERGIES     Patient is is allergic to ace inhibitors, baclofen, darvocet [propoxyphene n-acetaminophen], darvon [propoxyphene hcl], flexeril [cyclobenzaprine], morphine, motrin [ibuprofen micronized], tylenol [acetaminophen], and ultram [tramadol].     Patients   Immunization History   Administered Date(s) Administered    COVID-19, MODERNA BLUE border, Primary or Immunocompromised, (age 12y+), IM, 100 mcg/0.5mL 03/18/2021, 04/15/2021    COVID-19, MODERNA Booster BLUE border, (age 18y+), IM, 50mcg/0.25mL 03/18/2021, 04/15/2021, 12/20/2021    COVID-19, PFIZER Bivalent BOOSTER, DO NOT Dilute, (age 12y+), IM, 30 mcg/0.3 mL 09/23/2022    Influenza Virus Vaccine 11/06/2018, 10/30/2020    Influenza, AFLURIA (age 1 yrs+), FLUZONE, (age 10 mo+), MDV, 0.5mL 11/04/2016, 01/11/2018, 10/08/2019, 10/30/2020    Influenza, FLUARIX, FLULAVAL, FLUZONE (age 10 mo+) AND AFLURIA, (age 1 y+), PF, 0.5mL 11/06/2018, 09/23/2021, 09/23/2022    Influenza, FLUCELVAX, (age 10 mo+), MDCK, PF, 0.5mL 10/27/2020    Pneumococcal Polysaccharide ([id number]) 01/11/2018       FAMILY HISTORY     Patient's family history includes Arthritis in his father; Diabetes in his brother, father, mother, and sister; Heart Disease in his brother, father, and mother; High Blood Pressure in his father and mother. SOCIAL HISTORY     Patient  reports that he quit smoking about 28 years ago. His smoking use included cigarettes. He has a 0.50 pack-year smoking history. He has never used smokeless tobacco. He reports that he does not drink alcohol and does not use drugs. PHYSICAL EXAM     ED TRIAGE VITALS  BP: (!) 168/89, Temp: 99.8 °F (37.7 °C), Heart Rate: (!) 112, Resp: 24, SpO2: 93 %,Estimated body mass index is 42.97 kg/m² as calculated from the following:    Height as of this encounter: 5' 9\" (1.753 m). Weight as of this encounter: 291 lb (132 kg). ,No LMP for male patient. Physical Exam  Constitutional:       General: He is not in acute distress. Appearance: He is ill-appearing. HENT:      Head: Normocephalic. Mouth/Throat:      Mouth: Mucous membranes are moist.   Cardiovascular:      Rate and Rhythm: Regular rhythm. Tachycardia present. Heart sounds: Normal heart sounds. Pulmonary:      Effort: Tachypnea present. Breath sounds: Decreased breath sounds present. Abdominal:      General: Abdomen is flat. Bowel sounds are normal. There is no distension. Palpations: Abdomen is soft. Tenderness:  There is no abdominal tenderness. Skin:     General: Skin is warm and dry. Neurological:      General: No focal deficit present. Mental Status: He is alert. Psychiatric:         Mood and Affect: Mood normal.       DIAGNOSTIC RESULTS     Labs:  Results for orders placed or performed during the hospital encounter of 01/19/23   COVID-19, Rapid   Result Value Ref Range    SARS-CoV-2, SOLEDAD DETECTED (AA) NOT DETECTED   Rapid influenza A/B antigens   Result Value Ref Range    Flu A Antigen Negative NEGATIVE    Flu B Antigen Negative NEGATIVE       IMAGING:    XR CHEST (2 VW)   Final Result   Stable radiographic appearance of the chest. No evidence of an acute process. **This report has been created using voice recognition software. It may contain minor errors which are inherent in voice recognition technology. **      Final report electronically signed by Dr. Tylor Cooper MD on 1/19/2023 6:40 PM            EKG:      URGENT CARE COURSE:     Vitals:    01/19/23 1801 01/19/23 1845   BP: (!) 168/89    Pulse: (!) 125 (!) 112   Resp: 24    Temp: (!) 102.5 °F (39.2 °C) 99.8 °F (37.7 °C)   TempSrc: Temporal    SpO2: 93% 93%   Weight: 291 lb (132 kg)    Height: 5' 9\" (1.753 m)        Medications   acetaminophen (TYLENOL) tablet 975 mg (975 mg Oral Given 1/19/23 1810)            PROCEDURES:  None    FINAL IMPRESSION      1. COVID-19 virus infection          DISPOSITION/ PLAN     Patient presents for what is determined to be COVID-19 viral infection. Influenza testing negative. Patient has oxygen saturation 93% on room air after ambulation. Patient had 102.5 temperature on arrival.  He was administered Tylenol and this decreased to 99.8. He became diaphoretic as the fever broke. The patient is on a host of medications for multiple comorbidities. Patient is not a candidate for Paxlovid treatment. I will prescribe molnupiravir for treatment of COVID-19.   Unfortunately, this medication is not available in [facility] and had to be sent to pharmacy and [facility]. Patient is advised to have a family member help him get this medication. The sooner he starts this medication the better. Patient does have home oxygen if needed. He has a pulse oximeter device at home. Is advised to watch his pulse oximetry closely and to go to the emergency department for O2 saturations at 92% or below. He is also advised to go for worsening shortness of breath, chest pain, extreme fatigue or any new concerns. Patient verbalized understanding of all instructions. PATIENT REFERRED TO:  Armen Gross MD  [address] [place] [address] / [place] [phone]      DISCHARGE MEDICATIONS:  Discharge Medication List as of 1/19/2023  7:05 PM        START taking these medications    Details   molnupiravir 200 MG capsule Take 4 capsules by mouth in the morning and 4 capsules in the evening. Do all this for 5 days. , Disp-40 capsule, R-0Normal             Discharge Medication List as of 1/19/2023  7:05 PM          Discharge Medication List as of 1/19/2023  7:05 PM          PIEDAD Galarza CNP    (Please note that portions of this note were completed with a voice recognition program. Efforts were made to edit the dictations but occasionally words are mis-transcribed.)           PIEDAD Galarza CNP  01/19/23 1914

## 2023-01-20 NOTE — DISCHARGE INSTRUCTIONS
Start the molnupiravir as soon as possible. Take as directed    Tylenol every 6 hours as needed for fever    Use your oxygen at home as needed. Check your pulse oximetry reading frequently at home. If your O2 saturation is dropping below 92%, go to the emergency department    Go to the emergency department for chest pain, worsening shortness of breath, uncontrolled fever, severe weakness or any other new concerns    Distance yourself from others per CDC guidelines.   See handout

## 2023-01-21 ENCOUNTER — APPOINTMENT (OUTPATIENT)
Dept: CT IMAGING | Age: 63
DRG: 720 | End: 2023-01-21
Payer: MEDICAID

## 2023-01-21 ENCOUNTER — HOSPITAL ENCOUNTER (INPATIENT)
Age: 63
LOS: 3 days | Discharge: HOME HEALTH | DRG: 720 | End: 2023-01-24
Attending: FAMILY MEDICINE
Payer: MEDICAID

## 2023-01-21 ENCOUNTER — APPOINTMENT (OUTPATIENT)
Dept: GENERAL RADIOLOGY | Age: 63
DRG: 720 | End: 2023-01-21
Payer: MEDICAID

## 2023-01-21 DIAGNOSIS — M62.82 NON-TRAUMATIC RHABDOMYOLYSIS: ICD-10-CM

## 2023-01-21 DIAGNOSIS — N39.0 URINARY TRACT INFECTION IN MALE: ICD-10-CM

## 2023-01-21 DIAGNOSIS — U07.1 COVID-19: Primary | ICD-10-CM

## 2023-01-21 DIAGNOSIS — U07.1 PNEUMONIA DUE TO COVID-19 VIRUS: ICD-10-CM

## 2023-01-21 DIAGNOSIS — N17.9 AKI (ACUTE KIDNEY INJURY) (HCC): ICD-10-CM

## 2023-01-21 DIAGNOSIS — R77.8 ELEVATED TROPONIN: ICD-10-CM

## 2023-01-21 DIAGNOSIS — E87.1 HYPONATREMIA: ICD-10-CM

## 2023-01-21 DIAGNOSIS — J12.82 PNEUMONIA DUE TO COVID-19 VIRUS: ICD-10-CM

## 2023-01-21 LAB
ACINETOBACTER CALCOACETICUS-BAUMANNII BY PCR: NOT DETECTED
ALBUMIN SERPL BCG-MCNC: 3.6 G/DL (ref 3.5–5.1)
ALP SERPL-CCNC: 80 U/L (ref 38–126)
ALT SERPL W/O P-5'-P-CCNC: 13 U/L (ref 11–66)
ANION GAP SERPL CALC-SCNC: 11 MEQ/L (ref 8–16)
ANION GAP SERPL CALC-SCNC: 12 MEQ/L (ref 8–16)
ANTIBODY SCREEN: NORMAL
AST SERPL-CCNC: 21 U/L (ref 5–40)
BACTERIA: ABNORMAL
BASOPHILS ABSOLUTE: 0 THOU/MM3 (ref 0–0.1)
BASOPHILS NFR BLD AUTO: 0.2 %
BILIRUB SERPL-MCNC: 0.5 MG/DL (ref 0.3–1.2)
BILIRUB UR QL STRIP: NEGATIVE
BLACTX-M ISLT/SPM QL: NORMAL
BLAIMP ISLT/SPM QL: NORMAL
BLAKPC ISLT/SPM QL: NORMAL
BLAOXA-48-LIKE ISLT/SPM QL: NORMAL
BLAVIM ISLT/SPM QL: NORMAL
BUN SERPL-MCNC: 35 MG/DL (ref 7–22)
BUN SERPL-MCNC: 39 MG/DL (ref 7–22)
C PNEUM DNA LOWER RESP QL NAA+NON-PROBE: NOT DETECTED
CALCIUM SERPL-MCNC: 8.2 MG/DL (ref 8.5–10.5)
CALCIUM SERPL-MCNC: 8.3 MG/DL (ref 8.5–10.5)
CASTS #/AREA URNS LPF: ABNORMAL /LPF
CASTS #/AREA URNS LPF: ABNORMAL /LPF
CHARACTER UR: ABNORMAL
CHARCOAL URNS QL MICRO: ABNORMAL
CHLORIDE 24H UR-SRATE: < 20 MEQ/L
CHLORIDE SERPL-SCNC: 100 MEQ/L (ref 98–111)
CHLORIDE SERPL-SCNC: 94 MEQ/L (ref 98–111)
CK SERPL-CCNC: 812 U/L (ref 55–170)
CO2 SERPL-SCNC: 21 MEQ/L (ref 23–33)
CO2 SERPL-SCNC: 22 MEQ/L (ref 23–33)
COLOR UR: YELLOW
CREAT SERPL-MCNC: 1.8 MG/DL (ref 0.4–1.2)
CREAT SERPL-MCNC: 2.2 MG/DL (ref 0.4–1.2)
CREAT UR-MCNC: 94.8 MG/DL
CREAT UR-MCNC: 94.8 MG/DL
CREAT UR-MCNC: 95.3 MG/DL
CRP SERPL-MCNC: 19.76 MG/DL (ref 0–1)
CRYSTALS URNS QL MICRO: ABNORMAL
DEPRECATED RDW RBC AUTO: 42.5 FL (ref 35–45)
ENTEROBACTER CLOACAE COMPLEX BY PCR: NOT DETECTED
EOSINOPHIL NFR BLD AUTO: 0 %
EOSINOPHIL SMEAR, URINE: NORMAL
EOSINOPHILS ABSOLUTE: 0 THOU/MM3 (ref 0–0.4)
EPITHELIAL CELLS, UA: ABNORMAL /HPF
ERYTHROCYTE [DISTWIDTH] IN BLOOD BY AUTOMATED COUNT: 14.5 % (ref 11.5–14.5)
ESCHERICHIA COLI BY PCR: NOT DETECTED
FERRITIN SERPL IA-MCNC: 322 NG/ML (ref 22–322)
FLUAV RNA LOWER RESP QL NAA+NON-PROBE: NOT DETECTED
FLUBV RNA LOWER RESP QL NAA+NON-PROBE: NOT DETECTED
FOLATE SERPL-MCNC: 13.5 NG/ML (ref 4.8–24.2)
GFR SERPL CREATININE-BSD FRML MDRD: 33 ML/MIN/1.73M2
GFR SERPL CREATININE-BSD FRML MDRD: 42 ML/MIN/1.73M2
GLUCOSE BLD STRIP.AUTO-MCNC: 142 MG/DL (ref 70–108)
GLUCOSE BLD STRIP.AUTO-MCNC: 159 MG/DL (ref 70–108)
GLUCOSE SERPL-MCNC: 143 MG/DL (ref 70–108)
GLUCOSE SERPL-MCNC: 258 MG/DL (ref 70–108)
GLUCOSE UR QL STRIP.AUTO: NEGATIVE MG/DL
HADV DNA LOWER RESP QL NAA+NON-PROBE: NOT DETECTED
HAEMOPHILUS INFLUENZAE BY PCR: NOT DETECTED
HCOV RNA LOWER RESP QL NAA+NON-PROBE: NOT DETECTED
HCT VFR BLD AUTO: 28.5 % (ref 42–52)
HGB BLD-MCNC: 9.4 GM/DL (ref 14–18)
HGB RETIC QN AUTO: 26.5 PG (ref 28.2–35.7)
HGB UR QL STRIP.AUTO: ABNORMAL
HMPV RNA LOWER RESP QL NAA+NON-PROBE: NOT DETECTED
HPIV RNA LOWER RESP QL NAA+NON-PROBE: NOT DETECTED
IMM GRANULOCYTES # BLD AUTO: 0.05 THOU/MM3 (ref 0–0.07)
IMM GRANULOCYTES NFR BLD AUTO: 0.5 %
IMM RETICS NFR: 10.7 % (ref 2.3–13.4)
IRON SATN MFR SERPL: 6 % (ref 20–50)
IRON SERPL-MCNC: 12 UG/DL (ref 65–195)
KETONES UR QL STRIP.AUTO: ABNORMAL
KLEBSIELLA AEROGENES BY PCR: NOT DETECTED
KLEBSIELLA OXYTOCA BY PCR: NOT DETECTED
KLEBSIELLA PNEUMONIAE GROUP BY PCR: NOT DETECTED
L PNEUMO DNA LOWER RESP QL NAA+NON-PROBE: NOT DETECTED
L PNEUMO1 AG UR QL IA.RAPID: NEGATIVE
LACTIC ACID, SEPSIS: 1.1 MMOL/L (ref 0.5–1.9)
LEUKOCYTE ESTERASE UR QL STRIP.AUTO: ABNORMAL
LYMPHOCYTES ABSOLUTE: 0.5 THOU/MM3 (ref 1–4.8)
LYMPHOCYTES NFR BLD AUTO: 5.2 %
M PNEUMO DNA LOWER RESP QL NAA+NON-PROBE: NOT DETECTED
MCH RBC QN AUTO: 27 PG (ref 26–33)
MCHC RBC AUTO-ENTMCNC: 33 GM/DL (ref 32.2–35.5)
MCV RBC AUTO: 81.9 FL (ref 80–94)
MICROALBUMIN UR-MCNC: 24.79 MG/DL
MICROALBUMIN/CREAT RATIO PNL UR: 261 MG/G (ref 0–30)
MONOCYTES ABSOLUTE: 0.7 THOU/MM3 (ref 0.4–1.3)
MONOCYTES NFR BLD AUTO: 7.1 %
MORAXELLA CATARRHALIS BY PCR: NOT DETECTED
NEUTROPHILS NFR BLD AUTO: 87 %
NITRITE UR QL STRIP.AUTO: NEGATIVE
NRBC BLD AUTO-RTO: 0 /100 WBC
NT-PROBNP SERPL IA-MCNC: 554.5 PG/ML (ref 0–124)
OSMOLALITY SERPL CALC.SUM OF ELEC: 273.5 MOSMOL/KG (ref 275–300)
OSMOLALITY UR: 278 MOSMOL/KG (ref 250–750)
PH UR STRIP.AUTO: 5 [PH] (ref 5–9)
PLATELET # BLD AUTO: 180 THOU/MM3 (ref 130–400)
PMV BLD AUTO: 9.3 FL (ref 9.4–12.4)
POTASSIUM SERPL-SCNC: 3.9 MEQ/L (ref 3.5–5.2)
POTASSIUM SERPL-SCNC: 4.2 MEQ/L (ref 3.5–5.2)
POTASSIUM UR-SCNC: 26.4 MEQ/L
PROCALCITONIN SERPL IA-MCNC: 30.32 NG/ML (ref 0.01–0.09)
PROT SERPL-MCNC: 6.5 G/DL (ref 6.1–8)
PROT UR STRIP.AUTO-MCNC: 300 MG/DL
PROT UR-MCNC: 58.9 MG/DL
PROT/CREAT 24H UR: 0.62 MG/G{CREAT}
PROTEUS SPECIES BY PCR: NOT DETECTED
PSEUDOMONAS AERUGINOSA BY PCR: NOT DETECTED
RBC # BLD AUTO: 3.48 MILL/MM3 (ref 4.7–6.1)
RBC #/AREA URNS HPF: ABNORMAL /HPF
RENAL EPI CELLS #/AREA URNS HPF: ABNORMAL /[HPF]
RESISTANT GENE MECA/C & MREJ BY PCR: NORMAL
RESISTANT GENE NDM BY PCR: NORMAL
RETICS # AUTO: 42 THOU/MM3 (ref 20–115)
RETICS/RBC NFR AUTO: 1.3 % (ref 0.5–2)
RSV RNA LOWER RESP QL NAA+NON-PROBE: NOT DETECTED
RV+EV RNA LOWER RESP QL NAA+NON-PROBE: NOT DETECTED
SEGMENTED NEUTROPHILS ABSOLUTE COUNT: 8.4 THOU/MM3 (ref 1.8–7.7)
SERRATIA MARCESCENS BY PCR: NOT DETECTED
SODIUM SERPL-SCNC: 127 MEQ/L (ref 135–145)
SODIUM SERPL-SCNC: 133 MEQ/L (ref 135–145)
SODIUM UR-SCNC: < 20 MEQ/L
SOURCE: NORMAL
SPECIFIC GRAVITY UA: 1.02 (ref 1–1.03)
SPECIMEN ACCEPTABILITY: NORMAL
STAPH AUREUS BY PCR: NOT DETECTED
STREP AGALACTIAE BY PCR: NOT DETECTED
STREP PNEUMONIAE BY PCR: NOT DETECTED
STREP PYOGENES BY PCR: NOT DETECTED
TIBC SERPL-MCNC: 215 UG/DL (ref 171–450)
TROPONIN T: 0.02 NG/ML
TROPONIN T: 0.02 NG/ML
TSH SERPL DL<=0.005 MIU/L-ACNC: 0.41 UIU/ML (ref 0.4–4.2)
UROBILINOGEN, URINE: 0.2 EU/DL (ref 0–1)
VIT B12 SERPL-MCNC: 1301 PG/ML (ref 211–911)
WBC # BLD AUTO: 9.7 THOU/MM3 (ref 4.8–10.8)
WBC #/AREA URNS HPF: > 100 /HPF
YEAST LIKE FUNGI URNS QL MICRO: ABNORMAL

## 2023-01-21 PROCEDURE — 87186 SC STD MICRODIL/AGAR DIL: CPT

## 2023-01-21 PROCEDURE — 84300 ASSAY OF URINE SODIUM: CPT

## 2023-01-21 PROCEDURE — 87086 URINE CULTURE/COLONY COUNT: CPT

## 2023-01-21 PROCEDURE — 87798 DETECT AGENT NOS DNA AMP: CPT

## 2023-01-21 PROCEDURE — 83605 ASSAY OF LACTIC ACID: CPT

## 2023-01-21 PROCEDURE — 6360000002 HC RX W HCPCS: Performed by: STUDENT IN AN ORGANIZED HEALTH CARE EDUCATION/TRAINING PROGRAM

## 2023-01-21 PROCEDURE — 6370000000 HC RX 637 (ALT 250 FOR IP): Performed by: STUDENT IN AN ORGANIZED HEALTH CARE EDUCATION/TRAINING PROGRAM

## 2023-01-21 PROCEDURE — 84443 ASSAY THYROID STIM HORMONE: CPT

## 2023-01-21 PROCEDURE — 36415 COLL VENOUS BLD VENIPUNCTURE: CPT

## 2023-01-21 PROCEDURE — 82436 ASSAY OF URINE CHLORIDE: CPT

## 2023-01-21 PROCEDURE — 93005 ELECTROCARDIOGRAM TRACING: CPT | Performed by: STUDENT IN AN ORGANIZED HEALTH CARE EDUCATION/TRAINING PROGRAM

## 2023-01-21 PROCEDURE — 73700 CT LOWER EXTREMITY W/O DYE: CPT

## 2023-01-21 PROCEDURE — 1200000003 HC TELEMETRY R&B

## 2023-01-21 PROCEDURE — 83010 ASSAY OF HAPTOGLOBIN QUANT: CPT

## 2023-01-21 PROCEDURE — 83880 ASSAY OF NATRIURETIC PEPTIDE: CPT

## 2023-01-21 PROCEDURE — 93005 ELECTROCARDIOGRAM TRACING: CPT | Performed by: FAMILY MEDICINE

## 2023-01-21 PROCEDURE — 84156 ASSAY OF PROTEIN URINE: CPT

## 2023-01-21 PROCEDURE — 86140 C-REACTIVE PROTEIN: CPT

## 2023-01-21 PROCEDURE — 85046 RETICYTE/HGB CONCENTRATE: CPT

## 2023-01-21 PROCEDURE — 51798 US URINE CAPACITY MEASURE: CPT

## 2023-01-21 PROCEDURE — 87449 NOS EACH ORGANISM AG IA: CPT

## 2023-01-21 PROCEDURE — 87486 CHLMYD PNEUM DNA AMP PROBE: CPT

## 2023-01-21 PROCEDURE — 82948 REAGENT STRIP/BLOOD GLUCOSE: CPT

## 2023-01-21 PROCEDURE — 80053 COMPREHEN METABOLIC PANEL: CPT

## 2023-01-21 PROCEDURE — 89190 NASAL SMEAR FOR EOSINOPHILS: CPT

## 2023-01-21 PROCEDURE — 87631 RESP VIRUS 3-5 TARGETS: CPT

## 2023-01-21 PROCEDURE — 87077 CULTURE AEROBIC IDENTIFY: CPT

## 2023-01-21 PROCEDURE — 6370000000 HC RX 637 (ALT 250 FOR IP)

## 2023-01-21 PROCEDURE — 83935 ASSAY OF URINE OSMOLALITY: CPT

## 2023-01-21 PROCEDURE — 84145 PROCALCITONIN (PCT): CPT

## 2023-01-21 PROCEDURE — 99285 EMERGENCY DEPT VISIT HI MDM: CPT

## 2023-01-21 PROCEDURE — 82550 ASSAY OF CK (CPK): CPT

## 2023-01-21 PROCEDURE — 83550 IRON BINDING TEST: CPT

## 2023-01-21 PROCEDURE — 2580000003 HC RX 258: Performed by: STUDENT IN AN ORGANIZED HEALTH CARE EDUCATION/TRAINING PROGRAM

## 2023-01-21 PROCEDURE — 71045 X-RAY EXAM CHEST 1 VIEW: CPT

## 2023-01-21 PROCEDURE — 82607 VITAMIN B-12: CPT

## 2023-01-21 PROCEDURE — 82746 ASSAY OF FOLIC ACID SERUM: CPT

## 2023-01-21 PROCEDURE — 86850 RBC ANTIBODY SCREEN: CPT

## 2023-01-21 PROCEDURE — 85025 COMPLETE CBC W/AUTO DIFF WBC: CPT

## 2023-01-21 PROCEDURE — 82570 ASSAY OF URINE CREATININE: CPT

## 2023-01-21 PROCEDURE — 72131 CT LUMBAR SPINE W/O DYE: CPT

## 2023-01-21 PROCEDURE — 87541 LEGION PNEUMO DNA AMP PROB: CPT

## 2023-01-21 PROCEDURE — 87040 BLOOD CULTURE FOR BACTERIA: CPT

## 2023-01-21 PROCEDURE — 84133 ASSAY OF URINE POTASSIUM: CPT

## 2023-01-21 PROCEDURE — 81001 URINALYSIS AUTO W/SCOPE: CPT

## 2023-01-21 PROCEDURE — 83540 ASSAY OF IRON: CPT

## 2023-01-21 PROCEDURE — 82043 UR ALBUMIN QUANTITATIVE: CPT

## 2023-01-21 PROCEDURE — 6370000000 HC RX 637 (ALT 250 FOR IP): Performed by: INTERNAL MEDICINE

## 2023-01-21 PROCEDURE — 82728 ASSAY OF FERRITIN: CPT

## 2023-01-21 PROCEDURE — 99223 1ST HOSP IP/OBS HIGH 75: CPT | Performed by: INTERNAL MEDICINE

## 2023-01-21 PROCEDURE — 6370000000 HC RX 637 (ALT 250 FOR IP): Performed by: FAMILY MEDICINE

## 2023-01-21 PROCEDURE — 84484 ASSAY OF TROPONIN QUANT: CPT

## 2023-01-21 PROCEDURE — 94640 AIRWAY INHALATION TREATMENT: CPT

## 2023-01-21 PROCEDURE — 87581 M.PNEUMON DNA AMP PROBE: CPT

## 2023-01-21 RX ORDER — POTASSIUM CHLORIDE 20 MEQ/1
40 TABLET, EXTENDED RELEASE ORAL PRN
Status: DISCONTINUED | OUTPATIENT
Start: 2023-01-21 | End: 2023-01-24 | Stop reason: HOSPADM

## 2023-01-21 RX ORDER — ONDANSETRON 2 MG/ML
4 INJECTION INTRAMUSCULAR; INTRAVENOUS EVERY 6 HOURS PRN
Status: DISCONTINUED | OUTPATIENT
Start: 2023-01-21 | End: 2023-01-24 | Stop reason: HOSPADM

## 2023-01-21 RX ORDER — AZITHROMYCIN 250 MG/1
250 TABLET, FILM COATED ORAL DAILY
Status: DISCONTINUED | OUTPATIENT
Start: 2023-01-22 | End: 2023-01-24 | Stop reason: HOSPADM

## 2023-01-21 RX ORDER — 0.9 % SODIUM CHLORIDE 0.9 %
500 INTRAVENOUS SOLUTION INTRAVENOUS ONCE
Status: DISCONTINUED | OUTPATIENT
Start: 2023-01-21 | End: 2023-01-24 | Stop reason: HOSPADM

## 2023-01-21 RX ORDER — ALPRAZOLAM 0.5 MG/1
1 TABLET ORAL 2 TIMES DAILY PRN
Status: DISCONTINUED | OUTPATIENT
Start: 2023-01-21 | End: 2023-01-24 | Stop reason: HOSPADM

## 2023-01-21 RX ORDER — ACETAMINOPHEN 500 MG
1000 TABLET ORAL ONCE
Status: COMPLETED | OUTPATIENT
Start: 2023-01-21 | End: 2023-01-21

## 2023-01-21 RX ORDER — ENOXAPARIN SODIUM 100 MG/ML
30 INJECTION SUBCUTANEOUS EVERY 12 HOURS
Status: DISCONTINUED | OUTPATIENT
Start: 2023-01-21 | End: 2023-01-24 | Stop reason: HOSPADM

## 2023-01-21 RX ORDER — SODIUM CHLORIDE 9 MG/ML
INJECTION, SOLUTION INTRAVENOUS PRN
Status: DISCONTINUED | OUTPATIENT
Start: 2023-01-21 | End: 2023-01-24 | Stop reason: HOSPADM

## 2023-01-21 RX ORDER — ONDANSETRON 4 MG/1
4 TABLET, ORALLY DISINTEGRATING ORAL EVERY 8 HOURS PRN
Status: DISCONTINUED | OUTPATIENT
Start: 2023-01-21 | End: 2023-01-24 | Stop reason: HOSPADM

## 2023-01-21 RX ORDER — FLUTICASONE PROPIONATE 50 MCG
1 SPRAY, SUSPENSION (ML) NASAL DAILY
Status: DISCONTINUED | OUTPATIENT
Start: 2023-01-22 | End: 2023-01-24 | Stop reason: HOSPADM

## 2023-01-21 RX ORDER — INSULIN LISPRO 100 [IU]/ML
0-4 INJECTION, SOLUTION INTRAVENOUS; SUBCUTANEOUS NIGHTLY
Status: DISCONTINUED | OUTPATIENT
Start: 2023-01-21 | End: 2023-01-24 | Stop reason: HOSPADM

## 2023-01-21 RX ORDER — INSULIN LISPRO 100 [IU]/ML
0-8 INJECTION, SOLUTION INTRAVENOUS; SUBCUTANEOUS
Status: DISCONTINUED | OUTPATIENT
Start: 2023-01-21 | End: 2023-01-24 | Stop reason: HOSPADM

## 2023-01-21 RX ORDER — CROMOLYN SODIUM 40 MG/ML
2 SOLUTION/ DROPS OPHTHALMIC 4 TIMES DAILY
Status: DISCONTINUED | OUTPATIENT
Start: 2023-01-21 | End: 2023-01-21 | Stop reason: RX

## 2023-01-21 RX ORDER — VITAMIN B COMPLEX
1000 TABLET ORAL DAILY
Status: DISCONTINUED | OUTPATIENT
Start: 2023-01-22 | End: 2023-01-24 | Stop reason: HOSPADM

## 2023-01-21 RX ORDER — DEXTROSE MONOHYDRATE 100 MG/ML
INJECTION, SOLUTION INTRAVENOUS CONTINUOUS PRN
Status: DISCONTINUED | OUTPATIENT
Start: 2023-01-21 | End: 2023-01-24 | Stop reason: HOSPADM

## 2023-01-21 RX ORDER — SODIUM CHLORIDE 9 MG/ML
INJECTION, SOLUTION INTRAVENOUS CONTINUOUS
Status: DISCONTINUED | OUTPATIENT
Start: 2023-01-21 | End: 2023-01-21

## 2023-01-21 RX ORDER — ENALAPRIL MALEATE 10 MG/1
10 TABLET ORAL DAILY
Status: DISCONTINUED | OUTPATIENT
Start: 2023-01-21 | End: 2023-01-24 | Stop reason: HOSPADM

## 2023-01-21 RX ORDER — ASPIRIN 81 MG/1
324 TABLET, CHEWABLE ORAL ONCE
Status: COMPLETED | OUTPATIENT
Start: 2023-01-21 | End: 2023-01-21

## 2023-01-21 RX ORDER — ACETAMINOPHEN 325 MG/1
650 TABLET ORAL EVERY 6 HOURS PRN
Status: DISCONTINUED | OUTPATIENT
Start: 2023-01-21 | End: 2023-01-24 | Stop reason: HOSPADM

## 2023-01-21 RX ORDER — ASPIRIN 325 MG
325 TABLET ORAL DAILY
Status: DISCONTINUED | OUTPATIENT
Start: 2023-01-22 | End: 2023-01-24 | Stop reason: HOSPADM

## 2023-01-21 RX ORDER — SODIUM CHLORIDE 0.9 % (FLUSH) 0.9 %
5-40 SYRINGE (ML) INJECTION PRN
Status: DISCONTINUED | OUTPATIENT
Start: 2023-01-21 | End: 2023-01-24 | Stop reason: HOSPADM

## 2023-01-21 RX ORDER — SODIUM PHOSPHATE, DIBASIC AND SODIUM PHOSPHATE, MONOBASIC 7; 19 G/133ML; G/133ML
1 ENEMA RECTAL DAILY PRN
Status: DISCONTINUED | OUTPATIENT
Start: 2023-01-21 | End: 2023-01-24 | Stop reason: HOSPADM

## 2023-01-21 RX ORDER — SODIUM CHLORIDE 0.9 % (FLUSH) 0.9 %
5-40 SYRINGE (ML) INJECTION EVERY 12 HOURS SCHEDULED
Status: DISCONTINUED | OUTPATIENT
Start: 2023-01-21 | End: 2023-01-24 | Stop reason: HOSPADM

## 2023-01-21 RX ORDER — AMLODIPINE BESYLATE 10 MG/1
10 TABLET ORAL DAILY
Status: DISCONTINUED | OUTPATIENT
Start: 2023-01-22 | End: 2023-01-24 | Stop reason: HOSPADM

## 2023-01-21 RX ORDER — ACETAMINOPHEN 325 MG/1
650 TABLET ORAL ONCE
Status: DISCONTINUED | OUTPATIENT
Start: 2023-01-21 | End: 2023-01-21

## 2023-01-21 RX ORDER — INSULIN GLARGINE 100 [IU]/ML
44 INJECTION, SOLUTION SUBCUTANEOUS 2 TIMES DAILY
Status: DISCONTINUED | OUTPATIENT
Start: 2023-01-21 | End: 2023-01-21

## 2023-01-21 RX ORDER — AZITHROMYCIN 250 MG/1
500 TABLET, FILM COATED ORAL DAILY
Status: COMPLETED | OUTPATIENT
Start: 2023-01-21 | End: 2023-01-21

## 2023-01-21 RX ORDER — INSULIN GLARGINE 100 [IU]/ML
40 INJECTION, SOLUTION SUBCUTANEOUS 2 TIMES DAILY
Status: DISCONTINUED | OUTPATIENT
Start: 2023-01-21 | End: 2023-01-24 | Stop reason: HOSPADM

## 2023-01-21 RX ORDER — ALBUTEROL SULFATE 2.5 MG/3ML
2.5 SOLUTION RESPIRATORY (INHALATION) 4 TIMES DAILY
Status: DISCONTINUED | OUTPATIENT
Start: 2023-01-21 | End: 2023-01-21

## 2023-01-21 RX ORDER — POTASSIUM CHLORIDE 7.45 MG/ML
10 INJECTION INTRAVENOUS PRN
Status: DISCONTINUED | OUTPATIENT
Start: 2023-01-21 | End: 2023-01-24 | Stop reason: HOSPADM

## 2023-01-21 RX ORDER — 0.9 % SODIUM CHLORIDE 0.9 %
1500 INTRAVENOUS SOLUTION INTRAVENOUS ONCE
Status: COMPLETED | OUTPATIENT
Start: 2023-01-21 | End: 2023-01-21

## 2023-01-21 RX ORDER — ALBUTEROL SULFATE 90 UG/1
2 AEROSOL, METERED RESPIRATORY (INHALATION) 3 TIMES DAILY
Status: DISCONTINUED | OUTPATIENT
Start: 2023-01-21 | End: 2023-01-22

## 2023-01-21 RX ORDER — ACETAMINOPHEN 650 MG/1
650 SUPPOSITORY RECTAL EVERY 6 HOURS PRN
Status: DISCONTINUED | OUTPATIENT
Start: 2023-01-21 | End: 2023-01-24 | Stop reason: HOSPADM

## 2023-01-21 RX ORDER — CLOPIDOGREL BISULFATE 75 MG/1
75 TABLET ORAL DAILY
Status: DISCONTINUED | OUTPATIENT
Start: 2023-01-22 | End: 2023-01-24 | Stop reason: HOSPADM

## 2023-01-21 RX ORDER — PRAVASTATIN SODIUM 80 MG/1
80 TABLET ORAL NIGHTLY
Status: DISCONTINUED | OUTPATIENT
Start: 2023-01-21 | End: 2023-01-24 | Stop reason: HOSPADM

## 2023-01-21 RX ORDER — MAGNESIUM SULFATE IN WATER 40 MG/ML
2000 INJECTION, SOLUTION INTRAVENOUS PRN
Status: DISCONTINUED | OUTPATIENT
Start: 2023-01-21 | End: 2023-01-24 | Stop reason: HOSPADM

## 2023-01-21 RX ORDER — PANTOPRAZOLE SODIUM 40 MG/1
40 TABLET, DELAYED RELEASE ORAL
Status: DISCONTINUED | OUTPATIENT
Start: 2023-01-22 | End: 2023-01-24 | Stop reason: HOSPADM

## 2023-01-21 RX ORDER — TAMSULOSIN HYDROCHLORIDE 0.4 MG/1
0.4 CAPSULE ORAL DAILY
Status: DISCONTINUED | OUTPATIENT
Start: 2023-01-22 | End: 2023-01-24 | Stop reason: HOSPADM

## 2023-01-21 RX ORDER — ALBUTEROL SULFATE 90 UG/1
2 AEROSOL, METERED RESPIRATORY (INHALATION) EVERY 4 HOURS PRN
Status: DISCONTINUED | OUTPATIENT
Start: 2023-01-21 | End: 2023-01-24 | Stop reason: HOSPADM

## 2023-01-21 RX ADMIN — SODIUM CHLORIDE 1500 ML: 9 INJECTION, SOLUTION INTRAVENOUS at 12:57

## 2023-01-21 RX ADMIN — ASPIRIN 324 MG: 81 TABLET, CHEWABLE ORAL at 10:17

## 2023-01-21 RX ADMIN — INSULIN GLARGINE 40 UNITS: 100 INJECTION, SOLUTION SUBCUTANEOUS at 22:03

## 2023-01-21 RX ADMIN — METOPROLOL TARTRATE 25 MG: 25 TABLET, FILM COATED ORAL at 20:36

## 2023-01-21 RX ADMIN — ALPRAZOLAM 1 MG: 0.5 TABLET ORAL at 23:08

## 2023-01-21 RX ADMIN — CEFTRIAXONE SODIUM 1000 MG: 1 INJECTION, POWDER, FOR SOLUTION INTRAMUSCULAR; INTRAVENOUS at 15:46

## 2023-01-21 RX ADMIN — AZITHROMYCIN MONOHYDRATE 500 MG: 250 TABLET ORAL at 16:25

## 2023-01-21 RX ADMIN — ACETAMINOPHEN 1000 MG: 500 TABLET ORAL at 23:08

## 2023-01-21 RX ADMIN — ALBUTEROL SULFATE 2 PUFF: 90 AEROSOL, METERED RESPIRATORY (INHALATION) at 21:50

## 2023-01-21 RX ADMIN — ENOXAPARIN SODIUM 30 MG: 100 INJECTION SUBCUTANEOUS at 23:08

## 2023-01-21 RX ADMIN — ENOXAPARIN SODIUM 30 MG: 100 INJECTION SUBCUTANEOUS at 12:57

## 2023-01-21 RX ADMIN — PRAVASTATIN SODIUM 80 MG: 80 TABLET ORAL at 20:36

## 2023-01-21 RX ADMIN — ALBUTEROL SULFATE 2.5 MG: 2.5 SOLUTION RESPIRATORY (INHALATION) at 13:32

## 2023-01-21 RX ADMIN — SODIUM CHLORIDE, PRESERVATIVE FREE 10 ML: 5 INJECTION INTRAVENOUS at 12:55

## 2023-01-21 RX ADMIN — SODIUM CHLORIDE, PRESERVATIVE FREE 10 ML: 5 INJECTION INTRAVENOUS at 20:36

## 2023-01-21 RX ADMIN — ACETAMINOPHEN 1000 MG: 500 TABLET ORAL at 09:13

## 2023-01-21 RX ADMIN — ACETAMINOPHEN 650 MG: 325 TABLET ORAL at 18:14

## 2023-01-21 ASSESSMENT — PAIN SCALES - GENERAL
PAINLEVEL_OUTOF10: 5
PAINLEVEL_OUTOF10: 6
PAINLEVEL_OUTOF10: 6

## 2023-01-21 ASSESSMENT — HEART SCORE: ECG: 1

## 2023-01-21 ASSESSMENT — PAIN DESCRIPTION - ORIENTATION: ORIENTATION: RIGHT;LOWER

## 2023-01-21 ASSESSMENT — PAIN DESCRIPTION - LOCATION: LOCATION: BACK;LEG

## 2023-01-21 ASSESSMENT — PAIN - FUNCTIONAL ASSESSMENT: PAIN_FUNCTIONAL_ASSESSMENT: 0-10

## 2023-01-21 NOTE — FLOWSHEET NOTE
01/21/23 1321   Safe Environment   Safety Measures Other (comment)  (VN attempted admission. Staff at bedside.  Will return)

## 2023-01-21 NOTE — ED NOTES
ED to inpatient nurses report    Chief Complaint   Patient presents with    Positive For Covid-19    Fatigue     weakness    Back Pain      Present to ED from home  LOC: alert and orientated to name, place, date  Vital signs   Vitals:    01/21/23 0850 01/21/23 1015   BP: 126/85 119/60   Pulse: (!) 107 97   Resp: 18 18   Temp: (!) 102.4 °F (39.1 °C) 99.4 °F (37.4 °C)   TempSrc: Oral Oral   SpO2: 95% 99%   Weight: 291 lb (132 kg)    Height: 5' 9\" (1.753 m)       Oxygen Baseline 3L NC    Current needs required none   Bipap/Cpap Yes, at night time per patient  LDAs:   Peripheral IV 01/21/23 Left Wrist (Active)   Site Assessment Clean, dry & intact 01/21/23 1021   Line Status Normal saline locked 01/21/23 1021   Dressing Status Clean, dry & intact 01/21/23 1021     Mobility: Fully dependent  Pending ED orders: none  Present condition: stable      C-SSRS Risk of Suicide: No Risk  Swallow Screening    Preferred Language: [place]     Electronically signed by Alfonzo Vieira, RN on 1/21/2023 at 10:52 AM       Alfonzo Vieira RN  01/21/23 6469

## 2023-01-21 NOTE — FLOWSHEET NOTE
01/21/23 1612   Safe Environment   Safety Measures Other (comment)  (VN completed admission.)   VN called into patients room and introduced myself and role. Patient answered and permitted video. Video activated. . Patient resting comfortably in bed. . Patient voiced no needs or concerns at this time. Call light within reach.

## 2023-01-21 NOTE — RT PROTOCOL NOTE
RT Inhaler-Nebulizer Bronchodilator Protocol Note    There is a bronchodilator order in the chart from a provider indicating to follow the RT Bronchodilator Protocol and there is an Initiate RT Inhaler-Nebulizer Bronchodilator Protocol order as well (see protocol at bottom of note). CXR Findings:  XR CHEST PORTABLE    Result Date: 1/21/2023  1. No interval change since previous study dated 1/19/2023. Tye Bradley **This report has been created using voice recognition software. It may contain minor errors which are inherent in voice recognition technology. ** Final report electronically signed by DR Kiara Bansal on 1/21/2023 9:46 AM      The findings from the last RT Protocol Assessment were as follows:   History Pulmonary Disease: Chronic pulmonary disease  Respiratory Pattern: Dyspnea on exertion or RR 21-25 bpm  Breath Sounds: Slightly diminished and/or crackles  Cough: Strong, productive  Indication for Bronchodilator Therapy: Decreased or absent breath sounds, On home bronchodilators (does aerosols and MDI PRN at home with ventolin, pt OK with aerosol or MDI for ventolin)  Bronchodilator Assessment Score: 7    Pt changed to Ventolin MDI times 2 puffs TID and PRN per protocol and score. Aerosolized bronchodilator medication orders have been revised according to the RT Inhaler-Nebulizer Bronchodilator Protocol below. Respiratory Therapist to perform RT Therapy Protocol Assessment initially then follow the protocol. Repeat RT Therapy Protocol Assessment PRN for score 0-3 or on second treatment, BID, and PRN for scores above 3. No Indications - adjust the frequency to every 6 hours PRN wheezing or bronchospasm, if no treatments needed after 48 hours then discontinue using Per Protocol order mode. If indication present, adjust the RT bronchodilator orders based on the Bronchodilator Assessment Score as indicated below.   Use Inhaler orders unless patient has one or more of the following: on home nebulizer, not able to hold breath for 10 seconds, is not alert and oriented, cannot activate and use MDI correctly, or respiratory rate 25 breaths per minute or more, then use the equivalent nebulizer order(s) with same Frequency and PRN reasons based on the score. If a patient is on this medication at home then do not decrease Frequency below that used at home. 0-3 - enter or revise RT bronchodilator order(s) to equivalent RT Bronchodilator order with Frequency of every 4 hours PRN for wheezing or increased work of breathing using Per Protocol order mode. 4-6 - enter or revise RT Bronchodilator order(s) to two equivalent RT bronchodilator orders with one order with BID Frequency and one order with Frequency of every 4 hours PRN wheezing or increased work of breathing using Per Protocol order mode. 7-10 - enter or revise RT Bronchodilator order(s) to two equivalent RT bronchodilator orders with one order with TID Frequency and one order with Frequency of every 4 hours PRN wheezing or increased work of breathing using Per Protocol order mode. 11-13 - enter or revise RT Bronchodilator order(s) to one equivalent RT bronchodilator order with QID Frequency and an Albuterol order with Frequency of every 4 hours PRN wheezing or increased work of breathing using Per Protocol order mode. Greater than 13 - enter or revise RT Bronchodilator order(s) to one equivalent RT bronchodilator order with every 4 hours Frequency and an Albuterol order with Frequency of every 2 hours PRN wheezing or increased work of breathing using Per Protocol order mode. RT to enter RT Home Evaluation for COPD & MDI Assessment order using Per Protocol order mode.     Electronically signed by Juana Segovia RCP on 1/21/2023 at 1:46 PM

## 2023-01-21 NOTE — PROGRESS NOTES
Pt admitted to  6K{NUMBERS 0-31:28103} {transports:251669}. Complaints: {[facility] NURSING PT COMPLAINTS:[id number]}. IV {iv fluids:700494} infusing into the {iv locations:341562} at a rate of {NUMBERS;  BY 25:39617} mls/ hour with about {NUMBERS; 100-1000 :34928} mls in the bag still. IV site free of s/s of infection or infiltration. Vital signs obtained. Assessment and data collection initiated. Two nurse skin assessment performed by *** RN and *** RN. Oriented to room. Policies and procedures for  explained. ***RN discussed hourly rounding with patient addressing 5 P's. Fall prevention and safety brochure discussed with patient. Bed alarm on. Call light in reach. The best day to schedule a follow up Dr appointment is:  {Time; days of week:28905} {AM/PM:[id number]}  Explained patients right to have family, representative or physician notified of their admission. Patient has {requested/declined:2058165673} for physician to be notified. Patient has {requested/declined:[id number]} for family/representative to be notified. All questions answered with no further questions at this time.        Which family member is the designated  *** number***  Do you want them contacted on admission and updated every shift ***

## 2023-01-21 NOTE — PROCEDURES
A Bladder scan was performed at 1320 . The patient's last void was at 1310 . The residual amount was measured to be 89 ML. Report of results was given to [facility].

## 2023-01-21 NOTE — FLOWSHEET NOTE
01/21/23 1415   Safe Environment   Safety Measures Other (comment)  (VN attempted admission.  Pt not in room at this time.)

## 2023-01-21 NOTE — ED NOTES
Pt transported to [facility] on cart in stable condition. Floor contacted and spoke with Gerardo Loya prior to transport.      Rose Mary Rueda  01/21/23 1915

## 2023-01-21 NOTE — ED NOTES
Patient resting quietly in cot showing no signs of distress at this time. Medicated per MAR. Updated on POC. Will continue to monitor.       Kanwal Faye RN  01/21/23 [facility], [address] [place] [address]  01/21/23 1023

## 2023-01-21 NOTE — H&P
Hospitalist - History & Physical      Patient: Natalio Kawasaki    Unit/Bed:-11/011-A  YOB: 1960  MRN: [id number]   Acct: [de-identified]   PCP: Alan Clay MD    Date of Service: Pt seen/examined on 01/21/23  and Admitted to Inpatient with expected LOS greater than two midnights due to medical therapy. Chief Complaint:  weakness    Assessment and Plan:-  Sepsis 2/2 CAP and COVID-19 -POA. T 38 °C, tachycardic. CXR with left lower lobe consolidation. Pro-Jeffrey 30. CRP 19.76. COVID-positive. Also possible superimposed left lobar PNA. Send pneumonia PCR. Blood cultures x2. Possible UTI however unclear if he has symptoms. History of urinary retention, bladder scan and insert Mccartney if retaining. Right hip pain ?fx vs OM-going for lumbar and hip CT. Sepsis fluids ordered. ELEANOR -likely prerenal.  Cr 2.2, BUN 39 on arrival.  Baseline 0.9. Multifactorial, due to poor p.o. intake, found down on the floor for few hours with some component rhabdo, and ACE inhibitor use. Sending further studies. Fluids as above. Holding nephrotoxic agents. Hypovolemic hyponatremia -sodium 127 on arrival.  Anticipate improvement with fluids as above. Elevated CK secondary to muscle injury -not quite high enough for rhabdomyolysis. Anticipate improvement with fluids. Troponin elevation -very mild and most likely type II demand related. Troponin on admission 0.016, will repeat. EKG without acute changes. Chronic normocytic anemia -anemia work-up. IDDM 2 -restarted home Lantus 44 U bid. Medium dose ISS for now. Hypoglycemia treatment orders. Holding metformin due to ELEANOR. ROBSON -CPAP 10 mmH2O  COPD -not in exacerbation. Not on home oxygen. Continue home inhalers. CAD -on daily  mg and Plavix 75 mg. Chronic medical conditions -reviewed. Restarted appropriate meds.         History Of Present Illness:    Patient is a 61-year-old male who was admitted to [address] after presenting to the ED after a fall late hours the night prior. According to him he woke up around 3 AM to go to the bathroom however as soon as he got up he felt weak and fell down on his knees and could not get up for a couple of hours until he was able to get in touch with his brother who called the squad.  2 days ago patient was in urgent care and tested positive for COVID at that time he was started on molnupivar because he was not a candidate for back slowly due to multiple comorbidities and interactions with other medications. Patient endorses cough, generalized weakness as well as back pain and right hip pain radiating down right lower extremity. He also has a headache and takes Tylenol every 4 hours as needed. Has been running subjective fever as well. Denies chest pain, LOC, N/V/D. Past Medical History:        Diagnosis Date    Abnormal stress test Sept 2012    Lateral Wall Ischemia- done at [facility]    CAD (coronary artery disease)     Cancer ([facility]     Prostate    Chronic low back pain     Diabetes mellitus ([facility]     Diabetes mellitus ([facility]     Hyperlipidemia     Hypertension     Hypertriglyceridemia     MI (myocardial infarction) ([facility] 2004    Obesity     Sleep apnea     has CPAP    Viral pneumonia 10/15/2020       Past Surgical History:        Procedure Laterality Date    BACK SURGERY  November 1997    L4 & L5 fusion    CARDIAC CATHETERIZATION  10/2019    CARDIAC SURGERY  October 22, 2004    Cardiac cath with stent placement x1    COLONOSCOPY  October 2010    CORONARY ANGIOPLASTY WITH STENT PLACEMENT  10/17/2019    HERNIA REPAIR  1991    Mesh repair in abdomen. ULTRASOUND PROSTATE/TRANSRECTAL N/A 12/7/2020    TRANSRECTAL ULTRASOUND WITH PROSTATE BIOPSY performed by Geovanni Mendoza MD at [address] [place] [address] Medications:   No current facility-administered medications on file prior to encounter.      Current Outpatient Medications on File Prior to Encounter   Medication Sig Dispense Refill    molnupiravir 200 MG capsule Take 4 capsules by mouth in the morning and 4 capsules in the evening. Do all this for 5 days. 40 capsule 0    albuterol (PROVENTIL) (2.5 MG/3ML) 0.083% nebulizer solution Take 3 mLs by nebulization every 6 hours as needed for Wheezing 150 mL 1    albuterol sulfate HFA (PROAIR HFA) 108 (90 Base) MCG/ACT inhaler Inhale 2 puffs into the lungs every 6 hours as needed for Wheezing or Shortness of Breath 1 each 3    insulin glargine (LANTUS SOLOSTAR) 100 UNIT/ML injection pen Inject 44 Units into the skin 2 times daily 82 mL 0    leuprolide (LUPRON DEPOT, 6-MONTH,) 45 MG injection Inject 45 mg into the muscle once for 1 dose 1 each 0    ALPRAZolam (XANAX) 1 MG tablet take 1 tablet by mouth twice a day 180 tablet 0    enalapril (VASOTEC) 10 MG tablet take 1 tablet by mouth once daily      insulin lispro (HUMALOG) 100 UNIT/ML SOLN injection vial inject 10 units subcutaneously twice a day if needed for HIGH BLOOD SUGAR      tamsulosin (FLOMAX) 0.4 MG capsule take 2 capsules by mouth once daily 180 capsule 3    BD INSULIN SYRINGE U/F 31G X 5/16\" 1 ML MISC USE AS DIRECTED THREE TIMES A DAY. 100 each 3    insulin lispro (HUMALOG) 100 UNIT/ML injection vial inject 10 units subcutaneously twice a day if needed for HIGH BLOOD SUGAR.  30 mL 3    metoprolol tartrate (LOPRESSOR) 25 MG tablet Take 1 tablet by mouth 2 times daily 180 tablet 4    nitroGLYCERIN (NITROSTAT) 0.4 MG SL tablet Place 1 tablet under the tongue every 5 minutes as needed for Chest pain 25 tablet 3    gemfibrozil (LOPID) 600 MG tablet Take 1 tablet by mouth 2 times daily 180 tablet 4    clopidogrel (PLAVIX) 75 MG tablet take 1 tablet by mouth once daily 90 tablet 4    amLODIPine (NORVASC) 10 MG tablet Take 1 tablet by mouth daily 90 tablet 4    pravastatin (PRAVACHOL) 40 MG tablet Take 2 tablets by mouth nightly 180 tablet 4    metFORMIN (GLUCOPHAGE) 1000 MG tablet Take 1 tablet by mouth daily (with breakfast) 180 tablet 1    fluticasone (FLONASE) 50 MCG/ACT nasal spray One spray in each nostril q hs 3 each 1    pantoprazole (PROTONIX) 40 MG tablet take 1 tablet by mouth once daily 90 tablet 1    ascorbic acid (RA VITAMIN C/ERNA HIPS) 500 MG tablet TAKE 1 TABLET BY MOUTH DAILY 30 tablet 3    Insulin Pen Needle (B-D ULTRAFINE III SHORT PEN) 31G X 8 MM MISC USE TWICE DAILY WITH THE BASAGLAR PEN. 200 each 1    Insulin Syringes, Disposable, U-100 1 ML MISC 1 each by Does not apply route 3 times daily 31 gauge x 8 mm  ultrafine 2 100 each 5    oxybutynin (DITROPAN XL) 5 MG extended release tablet Take 1 tablet by mouth in the morning and 1 tablet before bedtime. 60 tablet 6    cromolyn (OPTICROM) 4 % ophthalmic solution Instill 1 drop into each eye four times a day 10 mL 5    umeclidinium-vilanterol (ANORO ELLIPTA) 62.5-25 MCG/INH AEPB inhaler Inhale 1 puff into the lungs daily 1 each 11    Cyanocobalamin ER (RA VITAMIN B-12 TR) 1000 MCG TBCR take 1 tablet by mouth once daily 30 tablet 2    Vitamin D, Cholecalciferol, 25 MCG (1000 UT) TABS Take 1,000 Units by mouth daily      aspirin 325 MG tablet Take 325 mg by mouth daily. Allergies:    Ace inhibitors, Baclofen, Darvocet [propoxyphene n-acetaminophen], Darvon [propoxyphene hcl], Flexeril [cyclobenzaprine], Morphine, Motrin [ibuprofen micronized], Tylenol [acetaminophen], and Ultram [tramadol]    Social History:    reports that he quit smoking about 28 years ago. His smoking use included cigarettes. He has a 0.50 pack-year smoking history. He has never used smokeless tobacco. He reports that he does not drink alcohol and does not use drugs. Family History:       Problem Relation Age of Onset    Diabetes Mother     Heart Disease Mother     High Blood Pressure Mother     Arthritis Father     Diabetes Father     Heart Disease Father     High Blood Pressure Father     Diabetes Sister     Diabetes Brother     Heart Disease Brother     Cancer Neg Hx     Stroke Neg Hx        Diet:  ADULT DIET;  Regular; 4 carb choices (60 gm/meal)    Review of systems:   Pertinent positives as noted in the HPI. All other systems reviewed and negative.    PHYSICAL EXAM:  /60   Pulse 92   Temp 98.1 °F (36.7 °C) (Oral)   Resp 18   Ht 5' 9\" (1.753 m)   Wt 282 lb 3 oz (128 kg)   SpO2 97%   BMI 41.67 kg/m²   General appearance: No apparent distress, appears stated age and cooperative.  HEENT: Normal cephalic, atraumatic without obvious deformity. Pupils equal, round, and reactive to light.  Extra ocular muscles intact. Conjunctivae/corneas clear.  Neck: Supple, with full range of motion. No jugular venous distention. Trachea midline.  Respiratory:  Normal respiratory effort. Clear to auscultation, bilaterally without Rales/Wheezes/Rhonchi.  Cardiovascular: Regular rate and rhythm with normal S1/S2 without murmurs, rubs or gallops.  Abdomen: Soft, non-tender, non-distended with normal bowel sounds.  Musculoskeletal:  No clubbing, cyanosis or edema bilaterally.  Bruising bilateral knees.  Right hip and lumbar spinal tenderness.  Skin: Skin color, texture, turgor normal.  No rashes or lesions.  Neurologic:  Neurovascularly intact without any focal sensory/motor deficits. Cranial nerves: II-XII intact, grossly non-focal.  Psychiatric: Alert and oriented, thought content appropriate, normal insight  Capillary Refill: Brisk,< 3 seconds   Peripheral Pulses: +2 palpable, equal bilaterally     Labs:   Recent Labs     01/21/23  0904   WBC 9.7   HGB 9.4*   HCT 28.5*        Recent Labs     01/21/23  0904   *   K 4.2   CL 94*   CO2 21*   BUN 39*   CREATININE 2.2*   CALCIUM 8.3*     Recent Labs     01/21/23  0904   AST 21   ALT 13   BILITOT 0.5   ALKPHOS 80     No results for input(s): INR in the last 72 hours.  Recent Labs     01/21/23  0904   CKTOTAL 812*       Urinalysis:    Lab Results   Component Value Date/Time    NITRU NEGATIVE 01/21/2023 09:40 AM    WBCUA > 100 01/21/2023 09:40 AM    BACTERIA MANY 01/21/2023 09:40 AM    RBCUA NONE SEEN  01/21/2023 09:40 AM    BLOODU LARGE 01/21/2023 09:40 AM    SPECGRAV 1.017 01/21/2023 09:40 AM    GLUCOSEU Negative 06/27/2022 10:01 AM       Radiology:   XR CHEST PORTABLE   Final Result   1. No interval change since previous study dated 1/19/2023. Tavon Crook **This report has been created using voice recognition software. It may contain minor errors which are inherent in voice recognition technology. **      Final report electronically signed by DR Starr Stapleton on 1/21/2023 9:46 AM      CT HIP RIGHT WO CONTRAST    (Results Pending)   CT LUMBAR SPINE WO CONTRAST    (Results Pending)     XR CHEST PORTABLE    Result Date: 1/21/2023  PROCEDURE: XR CHEST PORTABLE CLINICAL INFORMATION: Covid+, weakness, fever. COMPARISON: Chest x-ray dated 1/19/2023. Tavon Crook TECHNIQUE: AP upright view of the chest. FINDINGS: There is cardiomegaly. .The mediastinum is not widened. There is slightly increased density at the left lung base which may represent involvement by  Covid 19 infection. There are no effusions. . The pulmonary vascularity is normal. There is mild thoracic spondylosis. 1. No interval change since previous study dated 1/19/2023. Tavon Crook **This report has been created using voice recognition software. It may contain minor errors which are inherent in voice recognition technology. ** Final report electronically signed by DR Starr Stapleton on 1/21/2023 9:46 AM        EKG:  nonacute EKG    Electronically signed by Lori Lentz MD on 1/21/2023 at 1:20 PM

## 2023-01-21 NOTE — ED NOTES
Patient presents to ED via EMS for weakness and right lower back pain that radiates down right leg. Patient states he began having chills, headache, and cough Wednesday morning and [facility]. Patient reports around 0300hrs today he attempted to get out of bed to take Tylenol and get a drink of water and \"slid to the floor because I was too weak. \"  Patient notes he laid on the floor for approximately 4-5hrs because he was unable to get up. Patient reports when he was able to \"finally find my phone I called my brother and he came over and called 46. \"  Patient reports history of chronic back pain but notes the pain is radiating down right leg with numbness. Rates pain 6/10. Shows no signs of distress. Skin warm and dry. Respirations even and unlabored.       Kobi Howard RN  01/21/23 [id number]

## 2023-01-21 NOTE — ED PROVIDER NOTES
[facility] [address] [address] EMERGENCY DEPT      EMERGENCY MEDICINE     Pt Name: Minnie Wall  MRN: [id number]  [place] 1960  Date of evaluation: 1/21/2023  Resident Physician: Lakesha Vazquez MD  Supervising Physician: Virginia Conrad MD    CHIEF COMPLAINT       Chief Complaint   Patient presents with    Positive For Covid-19    Fatigue     weakness    Back Pain     HISTORY OF PRESENT ILLNESS   Minnie Wall is a pleasant 65 y.o. male who presents to the emergency department from home via EMS for complaints of leg weakness and hypoxia. Woke up around 0300 to take   Cough is productive of green sputum. States he does not have a productive cough at baseline. Wears O2 nightly, but since being diagnosed with COVID 2 days ago he has been wearing 3 L NC throughout the day. History of stents, last 1 year ago.      PASTMEDICAL HISTORY     Past Medical History:   Diagnosis Date    Abnormal stress test Sept 2012    Lateral Wall Ischemia- done at [facility]    CAD (coronary artery disease)     Cancer ([address]     Prostate    Chronic low back pain     Diabetes mellitus ([address]     Diabetes mellitus ([address]     Hyperlipidemia     Hypertension     Hypertriglyceridemia     MI (myocardial infarction) ([address] 2004    Obesity     Sleep apnea     has CPAP    Viral pneumonia 10/15/2020       Patient Active Problem List   Diagnosis Code    Hypertension I10    Hyperlipidemia E78.5    CAD (coronary artery disease) I25.10    Chest pain, atypical R07.89    Chronic low back pain M54.50, G89.29    Hypertriglyceridemia E78.1    Morbidly obese (HCC) E66.01    Abnormal stress test R94.39    Os trigonum Q68.8    Elevated PSA R97.20    Lumbar spinal stenosis M48.061    Well controlled type 2 diabetes mellitus (HCC) E11.9    ROBSON (obstructive sleep apnea) G47.33    Back pain at L4-L5 level M54.50    Anxiety F41.9    Microalbuminuria R80.9    H/O heart artery stent Z95.5    Urinary retention R33.9    Malignant neoplasm of prostate ([address] C61    COPD, severe ([address] J44.9 Pneumonia due to COVID-19 virus U07.1, J12.82     SURGICAL HISTORY       Past Surgical History:   Procedure Laterality Date    BACK SURGERY  November 1997    L4 & L5 fusion    CARDIAC CATHETERIZATION  10/2019    CARDIAC SURGERY  October 22, 2004    Cardiac cath with stent placement x1    COLONOSCOPY  October 2010    CORONARY ANGIOPLASTY WITH STENT PLACEMENT  10/17/2019    HERNIA REPAIR  1991    Mesh repair in abdomen. ULTRASOUND PROSTATE/TRANSRECTAL N/A 12/7/2020    TRANSRECTAL ULTRASOUND WITH PROSTATE BIOPSY performed by Hannah Moss MD at [address] [facility]       Previous Medications    ALBUTEROL (PROVENTIL) (2.5 MG/3ML) 0.083% NEBULIZER SOLUTION    Take 3 mLs by nebulization every 6 hours as needed for Wheezing    ALBUTEROL SULFATE HFA (PROAIR HFA) 108 (90 BASE) MCG/ACT INHALER    Inhale 2 puffs into the lungs every 6 hours as needed for Wheezing or Shortness of Breath    ALPRAZOLAM (XANAX) 1 MG TABLET    take 1 tablet by mouth twice a day    AMLODIPINE (NORVASC) 10 MG TABLET    Take 1 tablet by mouth daily    ASCORBIC ACID (RA VITAMIN C/ERNA HIPS) 500 MG TABLET    TAKE 1 TABLET BY MOUTH DAILY    ASPIRIN 325 MG TABLET    Take 325 mg by mouth daily. BD INSULIN SYRINGE U/F 31G X 5/16\" 1 ML MISC    USE AS DIRECTED THREE TIMES A DAY.     CLOPIDOGREL (PLAVIX) 75 MG TABLET    take 1 tablet by mouth once daily    CROMOLYN (OPTICROM) 4 % OPHTHALMIC SOLUTION    Instill 1 drop into each eye four times a day    CYANOCOBALAMIN ER (RA VITAMIN B-12 TR) 1000 MCG TBCR    take 1 tablet by mouth once daily    ENALAPRIL (VASOTEC) 10 MG TABLET    take 1 tablet by mouth once daily    FLUTICASONE (FLONASE) 50 MCG/ACT NASAL SPRAY    One spray in each nostril q hs    GEMFIBROZIL (LOPID) 600 MG TABLET    Take 1 tablet by mouth 2 times daily    INSULIN GLARGINE (LANTUS SOLOSTAR) 100 UNIT/ML INJECTION PEN    Inject 44 Units into the skin 2 times daily    INSULIN LISPRO (HUMALOG) 100 UNIT/ML INJECTION VIAL inject 10 units subcutaneously twice a day if needed for HIGH BLOOD SUGAR. INSULIN LISPRO (HUMALOG) 100 UNIT/ML SOLN INJECTION VIAL    inject 10 units subcutaneously twice a day if needed for HIGH BLOOD SUGAR    INSULIN PEN NEEDLE (B-D ULTRAFINE III SHORT PEN) 31G X 8 MM MISC    USE TWICE DAILY WITH THE BASAGLAR PEN. INSULIN SYRINGES, DISPOSABLE, U-100 1 ML MISC    1 each by Does not apply route 3 times daily 31 gauge x 8 mm  ultrafine 2    LEUPROLIDE (LUPRON DEPOT, 6-MONTH,) 45 MG INJECTION    Inject 45 mg into the muscle once for 1 dose    METFORMIN (GLUCOPHAGE) 1000 MG TABLET    Take 1 tablet by mouth daily (with breakfast)    METOPROLOL TARTRATE (LOPRESSOR) 25 MG TABLET    Take 1 tablet by mouth 2 times daily    MOLNUPIRAVIR 200 MG CAPSULE    Take 4 capsules by mouth in the morning and 4 capsules in the evening. Do all this for 5 days. NITROGLYCERIN (NITROSTAT) 0.4 MG SL TABLET    Place 1 tablet under the tongue every 5 minutes as needed for Chest pain    OXYBUTYNIN (DITROPAN XL) 5 MG EXTENDED RELEASE TABLET    Take 1 tablet by mouth in the morning and 1 tablet before bedtime. PANTOPRAZOLE (PROTONIX) 40 MG TABLET    take 1 tablet by mouth once daily    PRAVASTATIN (PRAVACHOL) 40 MG TABLET    Take 2 tablets by mouth nightly    TAMSULOSIN (FLOMAX) 0.4 MG CAPSULE    take 2 capsules by mouth once daily    UMECLIDINIUM-VILANTEROL (ANORO ELLIPTA) 62.5-25 MCG/INH AEPB INHALER    Inhale 1 puff into the lungs daily    VITAMIN D, CHOLECALCIFEROL, 25 MCG (1000 UT) TABS    Take 1,000 Units by mouth daily       ALLERGIES     is allergic to ace inhibitors, baclofen, darvocet [propoxyphene n-acetaminophen], darvon [propoxyphene hcl], flexeril [cyclobenzaprine], morphine, motrin [ibuprofen micronized], tylenol [acetaminophen], and ultram [tramadol]. FAMILY HISTORY     He indicated that his mother is alive. He indicated that his father is alive. He indicated that his sister is alive.  He indicated that both of his brothers are alive. He indicated that the status of his neg hx is unknown. SOCIAL HISTORY       Social History     Tobacco Use    Smoking status: Former     Packs/day: 0.25     Years: 2.00     Pack years: 0.50     Types: Cigarettes     Quit date: 1995     Years since quittin.0    Smokeless tobacco: Never   Vaping Use    Vaping Use: Never used   Substance Use Topics    Alcohol use: No    Drug use: No       PHYSICAL EXAM       ED Triage Vitals [23 0850]   BP Temp Temp Source Heart Rate Resp SpO2 Height Weight   126/85 (!) 102.4 °F (39.1 °C) Oral (!) 107 18 95 % 5' 9\" (1.753 m) 291 lb (132 kg)       Physical Exam  Vitals and nursing note reviewed. Constitutional:       Appearance: He is obese. He is ill-appearing. HENT:      Head: Normocephalic and atraumatic. Right Ear: External ear normal.      Left Ear: External ear normal.      Nose: Nose normal. No congestion. Mouth/Throat:      Mouth: Mucous membranes are moist.      Pharynx: No posterior oropharyngeal erythema. Eyes:      General: No scleral icterus. Extraocular Movements: Extraocular movements intact. Pupils: Pupils are equal, round, and reactive to light. Cardiovascular:      Rate and Rhythm: Regular rhythm. Tachycardia present. Pulses: Normal pulses. Heart sounds: Normal heart sounds. No murmur heard. No friction rub. No gallop. Pulmonary:      Effort: Pulmonary effort is normal.      Breath sounds: Normal breath sounds. No wheezing, rhonchi or rales. Abdominal:      General: Abdomen is flat. There is no distension. Palpations: Abdomen is soft. Tenderness: There is no abdominal tenderness. There is no guarding or rebound. Musculoskeletal:         General: No tenderness (calf). Cervical back: Neck supple. No rigidity. Right lower leg: No edema. Left lower leg: No edema. Skin:     General: Skin is warm. Capillary Refill: Capillary refill takes less than 2 seconds. Findings: No bruising, erythema or rash. Neurological:      General: No focal deficit present. Mental Status: He is alert and oriented to person, place, and time. Cranial Nerves: No cranial nerve deficit. Sensory: No sensory deficit. Motor: No weakness. Psychiatric:         Mood and Affect: Mood normal.         Behavior: Behavior normal.         Thought Content: Thought content normal.         Judgment: Judgment normal.       FORMAL DIAGNOSTIC RESULTS     RADIOLOGY: Interpretation per the Radiologist below, if available at the time of this note (none if blank):    XR CHEST PORTABLE   Final Result   1. No interval change since previous study dated 1/19/2023. Tavon Crook **This report has been created using voice recognition software. It may contain minor errors which are inherent in voice recognition technology. **      Final report electronically signed by DR Starr Stapleton on 1/21/2023 9:46 AM          LABS: (none if blank)  Labs Reviewed   CBC WITH AUTO DIFFERENTIAL - Abnormal; Notable for the following components:       Result Value    RBC 3.48 (*)     Hemoglobin 9.4 (*)     Hematocrit 28.5 (*)     MPV 9.3 (*)     Segs Absolute 8.4 (*)     Lymphocytes Absolute 0.5 (*)     All other components within normal limits   BRAIN NATRIURETIC PEPTIDE - Abnormal; Notable for the following components:    Pro-.5 (*)     All other components within normal limits   COMPREHENSIVE METABOLIC PANEL W/ REFLEX TO MG FOR LOW K - Abnormal; Notable for the following components:    Glucose 258 (*)     Creatinine 2.2 (*)     BUN 39 (*)     Sodium 127 (*)     Chloride 94 (*)     CO2 21 (*)     Calcium 8.3 (*)     All other components within normal limits   TROPONIN - Abnormal; Notable for the following components:    Troponin T 0.017 (*)     All other components within normal limits   CK - Abnormal; Notable for the following components:     Total  (*)     All other components within normal limits URINALYSIS WITH MICROSCOPIC - Abnormal; Notable for the following components:    Ketones, Urine TRACE (*)     Blood, Urine LARGE (*)     Protein,  (*)     Leukocyte Esterase, Urine MODERATE (*)     Character, Urine CLOUDY (*)     All other components within normal limits   GLOMERULAR FILTRATION RATE, ESTIMATED - Abnormal; Notable for the following components:    Est, Glom Filt Rate 33 (*)     All other components within normal limits   OSMOLALITY - Abnormal; Notable for the following components:    Osmolality Calc 273.5 (*)     All other components within normal limits   LEGIONELLA ANTIGEN, URINE   CULTURE, URINE   LACTATE, SEPSIS   ANION GAP   C-REACTIVE PROTEIN   PROCALCITONIN       (Any cultures that may have been sent were not resulted at the time of this patient visit)    [address] / ED COURSE:     1) Number and Complexity of Problems            Problem List This Visit:         Chief Complaint   Patient presents with    Positive For Covid-19    Fatigue     weakness    Back Pain          Differential Diagnosis includes (but not limited to): COVID, COPD, CHF, ACS, rhabdo, PNA        Diagnoses Considered but I have low suspicion of:   PTX, tamponade, CVA             Pertinent Comorbid Conditions:    COPD, CAD, COVID-19 diagnosed 2 days ago, IDDM    2)  Data Reviewed (none if left blank)          My Independent interpretations:     EKG:      Sinus tachycardia, rate 108, , QTc 463. RBBB. No ST elevations or depressions. Imaging: CXR stable from previous, no consolidations or effusions. Labs:      Elevated CK. UA blood, no RBCs. Pyuria with many bacteria with moderate leukocyte esterase. Elevated troponin and BNP. Creatinine 2.2 from 1.03 months ago. Hyponatremia 127. Hemoglobin stable and no leukocytosis.                  Decision Rules/Clinical Scores utilized:    History: 0  EC  Patient Age: 2  *Risk factors for Atherosclerotic disease: Coronary Artery Disease  Risk Factors: 2  Troponin: 1  Heart Score Total: 5              External Documentation Reviewed:         Previous patient encounter documents & history available on EMR was reviewed including urgent care visit 1/19/2023, cardiology office visit 10/24/2022             See Formal Diagnostic Results above for the lab and radiology tests and orders. 3)  Treatment and Disposition         ED Reassessment: Fever and tachycardia improved with Tylenol. Patient continued to deny chest pain. Case discussed with consulting clinician: Hospitalist Mindi CHARLTON         Shared Decision-Making was performed and disposition discussed with the patient and family and questions answered. Discussed patient's renal function and he was in agreement with admission. Summary of Patient Presentation:  80-year-old male with PMH CAD, COPD on 2L at night,  On floor x4 to 5 hours this a.m. due to weakness. Diagnosed with COVID-19 2 days ago, receiving molnupiravir. Productive cough. Febrile on arrival and tachycardic which resolved in the ED. Saturating well on room air. Rhabdomyolysis, ELEANOR, UTI, possible PNA. CXR normal.  Case discussed with hospitalist, admitted.     MDM  Number of Diagnoses or Management Options  ELEANOR (acute kidney injury) ([facility] [address]): new, needed workup  COVID-19: established, worsening  Hyponatremia: new, needed workup     Amount and/or Complexity of Data Reviewed  Clinical lab tests: ordered and reviewed  Tests in the radiology section of CPT®: ordered and reviewed  Tests in the medicine section of CPT®: ordered and reviewed  Review and summarize past medical records: yes  Discuss the patient with other providers: yes  Independent visualization of images, tracings, or specimens: yes    Risk of Complications, Morbidity, and/or Mortality  Presenting problems: moderate  Diagnostic procedures: high  Management options: high    /   Vitals Reviewed:    Vitals:    01/21/23 0850 01/21/23 1015   BP: 126/85 119/60 Pulse: (!) 107 97   Resp: 18 18   Temp: (!) 102.4 °F (39.1 °C) 99.4 °F (37.4 °C)   TempSrc: Oral Oral   SpO2: 95% 99%   Weight: 291 lb (132 kg)    Height: 5' 9\" (1.753 m)        The patient was seen and examined. Appropriate diagnostic testing was performed and results reviewed with the patient. The results of pertinent diagnostic studies and exam findings were discussed. The patients provisional diagnosis and plan of care were discussed with the patient and present family who expressed understanding. Any medications were reviewed and indications and risks of medications were discussed with the patient /family present. ED Medications administered this visit:  (None if blank)  Medications   acetaminophen (TYLENOL) tablet 1,000 mg (1,000 mg Oral Given 1/21/23 0913)   aspirin chewable tablet 324 mg (324 mg Oral Given 1/21/23 1017)         PROCEDURES: (None if blank)  Procedures:       DISCHARGE PRESCRIPTIONS: (None if blank)  New Prescriptions    No medications on file       FINAL IMPRESSION      1. COVID-19    2. ELEANOR (acute kidney injury) ([facility] [address]    3. Hyponatremia    4. Non-traumatic rhabdomyolysis    5. Urinary tract infection in male    6.  Elevated troponin          DISPOSITION/PLAN   DISPOSITION Admitted 01/21/2023 10:20:43 AM      MD Joy Braga MD  Resident  01/21/23 6630

## 2023-01-21 NOTE — PLAN OF CARE
Problem: Respiratory - Adult  Goal: Clear lung sounds  Outcome: Progressing   Pt given aerosol at this time for COPD maintenance and to clear airways/improve aeration. Pt will be changed to MDI TID and PRN per protocol and score. Patient mutually agreed on goals.

## 2023-01-22 ENCOUNTER — APPOINTMENT (OUTPATIENT)
Dept: GENERAL RADIOLOGY | Age: 63
DRG: 720 | End: 2023-01-22
Payer: MEDICAID

## 2023-01-22 LAB
ALBUMIN SERPL BCG-MCNC: 3.5 G/DL (ref 3.5–5.1)
ANION GAP SERPL CALC-SCNC: 9 MEQ/L (ref 8–16)
BASOPHILS ABSOLUTE: 0 THOU/MM3 (ref 0–0.1)
BASOPHILS NFR BLD AUTO: 0.1 %
BUN SERPL-MCNC: 28 MG/DL (ref 7–22)
CA-I BLD ISE-SCNC: 1.21 MMOL/L (ref 1.12–1.32)
CALCIUM SERPL-MCNC: 8.5 MG/DL (ref 8.5–10.5)
CHLORIDE SERPL-SCNC: 102 MEQ/L (ref 98–111)
CK SERPL-CCNC: 4089 U/L (ref 55–170)
CO2 SERPL-SCNC: 25 MEQ/L (ref 23–33)
CREAT SERPL-MCNC: 1.4 MG/DL (ref 0.4–1.2)
DEPRECATED RDW RBC AUTO: 44.5 FL (ref 35–45)
EKG ATRIAL RATE: 108 BPM
EKG ATRIAL RATE: 89 BPM
EKG P AXIS: 50 DEGREES
EKG P AXIS: 59 DEGREES
EKG P-R INTERVAL: 176 MS
EKG P-R INTERVAL: 194 MS
EKG Q-T INTERVAL: 346 MS
EKG Q-T INTERVAL: 410 MS
EKG QRS DURATION: 138 MS
EKG QRS DURATION: 150 MS
EKG QTC CALCULATION (BAZETT): 463 MS
EKG QTC CALCULATION (BAZETT): 498 MS
EKG R AXIS: 74 DEGREES
EKG R AXIS: 75 DEGREES
EKG T AXIS: 15 DEGREES
EKG T AXIS: 16 DEGREES
EKG VENTRICULAR RATE: 108 BPM
EKG VENTRICULAR RATE: 89 BPM
EOSINOPHIL NFR BLD AUTO: 0.3 %
EOSINOPHILS ABSOLUTE: 0 THOU/MM3 (ref 0–0.4)
ERYTHROCYTE [DISTWIDTH] IN BLOOD BY AUTOMATED COUNT: 14.5 % (ref 11.5–14.5)
GFR SERPL CREATININE-BSD FRML MDRD: 57 ML/MIN/1.73M2
GLUCOSE BLD STRIP.AUTO-MCNC: 139 MG/DL (ref 70–108)
GLUCOSE BLD STRIP.AUTO-MCNC: 155 MG/DL (ref 70–108)
GLUCOSE BLD STRIP.AUTO-MCNC: 164 MG/DL (ref 70–108)
GLUCOSE BLD STRIP.AUTO-MCNC: 189 MG/DL (ref 70–108)
GLUCOSE SERPL-MCNC: 146 MG/DL (ref 70–108)
HCT VFR BLD AUTO: 29.2 % (ref 42–52)
HGB BLD-MCNC: 9.5 GM/DL (ref 14–18)
IMM GRANULOCYTES # BLD AUTO: 0.02 THOU/MM3 (ref 0–0.07)
IMM GRANULOCYTES NFR BLD AUTO: 0.3 %
LACTATE SERPL-SCNC: 0.8 MMOL/L (ref 0.5–2)
LYMPHOCYTES ABSOLUTE: 0.6 THOU/MM3 (ref 1–4.8)
LYMPHOCYTES NFR BLD AUTO: 9.1 %
MAGNESIUM SERPL-MCNC: 2 MG/DL (ref 1.6–2.4)
MCH RBC QN AUTO: 27.4 PG (ref 26–33)
MCHC RBC AUTO-ENTMCNC: 32.5 GM/DL (ref 32.2–35.5)
MCV RBC AUTO: 84.1 FL (ref 80–94)
MONOCYTES ABSOLUTE: 0.7 THOU/MM3 (ref 0.4–1.3)
MONOCYTES NFR BLD AUTO: 10.8 %
NEUTROPHILS NFR BLD AUTO: 79.4 %
NRBC BLD AUTO-RTO: 0 /100 WBC
PHOSPHATE SERPL-MCNC: 3 MG/DL (ref 2.4–4.7)
PLATELET # BLD AUTO: 185 THOU/MM3 (ref 130–400)
PMV BLD AUTO: 9.7 FL (ref 9.4–12.4)
POTASSIUM SERPL-SCNC: 4.6 MEQ/L (ref 3.5–5.2)
RBC # BLD AUTO: 3.47 MILL/MM3 (ref 4.7–6.1)
SEGMENTED NEUTROPHILS ABSOLUTE COUNT: 5.5 THOU/MM3 (ref 1.8–7.7)
SODIUM SERPL-SCNC: 136 MEQ/L (ref 135–145)
URATE SERPL-MCNC: 8 MG/DL (ref 3.7–7)
WBC # BLD AUTO: 6.9 THOU/MM3 (ref 4.8–10.8)

## 2023-01-22 PROCEDURE — 6370000000 HC RX 637 (ALT 250 FOR IP): Performed by: PHYSICIAN ASSISTANT

## 2023-01-22 PROCEDURE — 83735 ASSAY OF MAGNESIUM: CPT

## 2023-01-22 PROCEDURE — 82948 REAGENT STRIP/BLOOD GLUCOSE: CPT

## 2023-01-22 PROCEDURE — 94640 AIRWAY INHALATION TREATMENT: CPT

## 2023-01-22 PROCEDURE — 84550 ASSAY OF BLOOD/URIC ACID: CPT

## 2023-01-22 PROCEDURE — 6360000002 HC RX W HCPCS: Performed by: PHYSICIAN ASSISTANT

## 2023-01-22 PROCEDURE — 82550 ASSAY OF CK (CPK): CPT

## 2023-01-22 PROCEDURE — 6370000000 HC RX 637 (ALT 250 FOR IP): Performed by: STUDENT IN AN ORGANIZED HEALTH CARE EDUCATION/TRAINING PROGRAM

## 2023-01-22 PROCEDURE — 85025 COMPLETE CBC W/AUTO DIFF WBC: CPT

## 2023-01-22 PROCEDURE — 1200000003 HC TELEMETRY R&B

## 2023-01-22 PROCEDURE — 6370000000 HC RX 637 (ALT 250 FOR IP): Performed by: INTERNAL MEDICINE

## 2023-01-22 PROCEDURE — 94760 N-INVAS EAR/PLS OXIMETRY 1: CPT

## 2023-01-22 PROCEDURE — 94660 CPAP INITIATION&MGMT: CPT

## 2023-01-22 PROCEDURE — 82330 ASSAY OF CALCIUM: CPT

## 2023-01-22 PROCEDURE — 2580000003 HC RX 258: Performed by: STUDENT IN AN ORGANIZED HEALTH CARE EDUCATION/TRAINING PROGRAM

## 2023-01-22 PROCEDURE — 93010 ELECTROCARDIOGRAM REPORT: CPT | Performed by: INTERNAL MEDICINE

## 2023-01-22 PROCEDURE — 99232 SBSQ HOSP IP/OBS MODERATE 35: CPT | Performed by: PHYSICIAN ASSISTANT

## 2023-01-22 PROCEDURE — 36415 COLL VENOUS BLD VENIPUNCTURE: CPT

## 2023-01-22 PROCEDURE — 6360000002 HC RX W HCPCS: Performed by: STUDENT IN AN ORGANIZED HEALTH CARE EDUCATION/TRAINING PROGRAM

## 2023-01-22 PROCEDURE — 83605 ASSAY OF LACTIC ACID: CPT

## 2023-01-22 PROCEDURE — 73630 X-RAY EXAM OF FOOT: CPT

## 2023-01-22 PROCEDURE — 80069 RENAL FUNCTION PANEL: CPT

## 2023-01-22 PROCEDURE — 2580000003 HC RX 258: Performed by: PHYSICIAN ASSISTANT

## 2023-01-22 RX ORDER — ALBUTEROL SULFATE 90 UG/1
2 AEROSOL, METERED RESPIRATORY (INHALATION) 2 TIMES DAILY
Status: DISCONTINUED | OUTPATIENT
Start: 2023-01-22 | End: 2023-01-24 | Stop reason: HOSPADM

## 2023-01-22 RX ADMIN — INSULIN GLARGINE 40 UNITS: 100 INJECTION, SOLUTION SUBCUTANEOUS at 09:51

## 2023-01-22 RX ADMIN — ACETAMINOPHEN 650 MG: 325 TABLET ORAL at 09:46

## 2023-01-22 RX ADMIN — ACETAMINOPHEN 650 MG: 325 TABLET ORAL at 21:01

## 2023-01-22 RX ADMIN — ALBUTEROL SULFATE 2 PUFF: 90 AEROSOL, METERED RESPIRATORY (INHALATION) at 07:32

## 2023-01-22 RX ADMIN — ALBUTEROL SULFATE 2 PUFF: 90 AEROSOL, METERED RESPIRATORY (INHALATION) at 16:50

## 2023-01-22 RX ADMIN — TIOTROPIUM BROMIDE AND OLODATEROL 2 PUFF: 3.124; 2.736 SPRAY, METERED RESPIRATORY (INHALATION) at 07:32

## 2023-01-22 RX ADMIN — METOPROLOL TARTRATE 25 MG: 25 TABLET, FILM COATED ORAL at 21:00

## 2023-01-22 RX ADMIN — CEFEPIME 2000 MG: 2 INJECTION, POWDER, FOR SOLUTION INTRAVENOUS at 16:07

## 2023-01-22 RX ADMIN — FLUTICASONE PROPIONATE 1 SPRAY: 50 SPRAY, METERED NASAL at 09:46

## 2023-01-22 RX ADMIN — METOPROLOL TARTRATE 25 MG: 25 TABLET, FILM COATED ORAL at 09:47

## 2023-01-22 RX ADMIN — INSULIN GLARGINE 40 UNITS: 100 INJECTION, SOLUTION SUBCUTANEOUS at 22:39

## 2023-01-22 RX ADMIN — PANTOPRAZOLE SODIUM 40 MG: 40 TABLET, DELAYED RELEASE ORAL at 06:46

## 2023-01-22 RX ADMIN — PRAVASTATIN SODIUM 80 MG: 80 TABLET ORAL at 21:00

## 2023-01-22 RX ADMIN — AZITHROMYCIN MONOHYDRATE 250 MG: 250 TABLET ORAL at 09:47

## 2023-01-22 RX ADMIN — TAMSULOSIN HYDROCHLORIDE 0.4 MG: 0.4 CAPSULE ORAL at 09:47

## 2023-01-22 RX ADMIN — CLOPIDOGREL BISULFATE 75 MG: 75 TABLET ORAL at 09:46

## 2023-01-22 RX ADMIN — SODIUM CHLORIDE, PRESERVATIVE FREE 10 ML: 5 INJECTION INTRAVENOUS at 21:00

## 2023-01-22 RX ADMIN — Medication 1000 UNITS: at 09:47

## 2023-01-22 RX ADMIN — ENOXAPARIN SODIUM 30 MG: 100 INJECTION SUBCUTANEOUS at 11:27

## 2023-01-22 RX ADMIN — SODIUM CHLORIDE, PRESERVATIVE FREE 10 ML: 5 INJECTION INTRAVENOUS at 09:46

## 2023-01-22 RX ADMIN — ASPIRIN 325 MG: 325 TABLET ORAL at 09:46

## 2023-01-22 RX ADMIN — AMLODIPINE BESYLATE 10 MG: 10 TABLET ORAL at 09:47

## 2023-01-22 RX ADMIN — ALPRAZOLAM 1 MG: 0.5 TABLET ORAL at 22:39

## 2023-01-22 ASSESSMENT — PAIN SCALES - GENERAL
PAINLEVEL_OUTOF10: 5
PAINLEVEL_OUTOF10: 0
PAINLEVEL_OUTOF10: 7

## 2023-01-22 ASSESSMENT — PAIN DESCRIPTION - LOCATION: LOCATION: LEG

## 2023-01-22 ASSESSMENT — PAIN - FUNCTIONAL ASSESSMENT: PAIN_FUNCTIONAL_ASSESSMENT: ACTIVITIES ARE NOT PREVENTED

## 2023-01-22 ASSESSMENT — PAIN DESCRIPTION - PAIN TYPE: TYPE: ACUTE PAIN

## 2023-01-22 ASSESSMENT — PAIN DESCRIPTION - DESCRIPTORS: DESCRIPTORS: ACHING

## 2023-01-22 ASSESSMENT — PAIN DESCRIPTION - ORIENTATION: ORIENTATION: RIGHT

## 2023-01-22 NOTE — PLAN OF CARE
Problem: Discharge Planning  Goal: Discharge to home or other facility with appropriate resources  Outcome: Progressing  Flowsheets (Taken 1/21/2023 2224)  Discharge to home or other facility with appropriate resources:   Identify barriers to discharge with patient and caregiver   Arrange for needed discharge resources and transportation as appropriate   Identify discharge learning needs (meds, wound care, etc)   Arrange for interpreters to assist at discharge as needed   Refer to discharge planning if patient needs post-hospital services based on physician order or complex needs related to functional status, cognitive ability or social support system     Problem: Pain  Goal: Verbalizes/displays adequate comfort level or baseline comfort level  Outcome: Progressing  Flowsheets (Taken 1/21/2023 2224)  Verbalizes/displays adequate comfort level or baseline comfort level:   Encourage patient to monitor pain and request assistance   Assess pain using appropriate pain scale   Administer analgesics based on type and severity of pain and evaluate response   Implement non-pharmacological measures as appropriate and evaluate response   Consider cultural and social influences on pain and pain management   Notify Licensed Independent Practitioner if interventions unsuccessful or patient reports new pain     Problem: Respiratory - Adult  Goal: Clear lung sounds  1/21/2023 2224 by Sandra Betlran RN  Outcome: Progressing     Problem: Respiratory - Adult  Goal: Achieves optimal ventilation and oxygenation  Outcome: Progressing  Flowsheets (Taken 1/21/2023 2224)  Achieves optimal ventilation and oxygenation:   Assess for changes in respiratory status   Assess for changes in mentation and behavior   Initiate smoking cessation protocol as indicated   Position to facilitate oxygenation and minimize respiratory effort   Oxygen supplementation based on oxygen saturation or arterial blood gases   Encourage broncho-pulmonary hygiene including cough, deep breathe, incentive spirometry   Assess the need for suctioning and aspirate as needed   Assess and instruct to report shortness of breath or any respiratory difficulty   Respiratory therapy support as indicated     Problem: Skin/Tissue Integrity - Adult  Goal: Skin integrity remains intact  Outcome: Progressing  Flowsheets  Taken 1/21/2023 2224  Skin Integrity Remains Intact:   Monitor for areas of redness and/or skin breakdown   Assess vascular access sites hourly   Every 4-6 hours minimum: Change oxygen saturation probe site   Every 4-6 hours: If on nasal continuous positive airway pressure, respiratory therapy assesses nares and determine need for appliance change or resting period  Taken 1/21/2023 2223  Skin Integrity Remains Intact:   Monitor for areas of redness and/or skin breakdown   Assess vascular access sites hourly  Goal: Incisions, wounds, or drain sites healing without S/S of infection  Outcome: Progressing  Flowsheets (Taken 1/21/2023 2224)  Incisions, Wounds, or Drain Sites Healing Without Sign and Symptoms of Infection:   ADMISSION and DAILY: Assess and document risk factors for pressure ulcer development   TWICE DAILY: Assess and document skin integrity   TWICE DAILY: Assess and document dressing/incision, wound bed, drain sites and surrounding tissue   Implement wound care per orders   Initiate isolation precautions as appropriate   Initiate pressure ulcer prevention bundle as indicated  Goal: Oral mucous membranes remain intact  Outcome: Progressing  Flowsheets (Taken 1/21/2023 2224)  Oral Mucous Membranes Remain Intact:   Assess oral mucosa and hygiene practices   Implement oral medicated treatments as ordered   Implement preventative oral hygiene regimen     Problem: Infection - Adult  Goal: Absence of infection at discharge  Outcome: Progressing  Flowsheets (Taken 1/21/2023 2224)  Absence of infection at discharge:   Assess and monitor for signs and symptoms of infection   Monitor lab/diagnostic results   Monitor all insertion sites i.e., indwelling lines, tubes and drains   Monitor endotracheal (as able) and nasal secretions for changes in amount and color   [place] appropriate cooling/warming therapies per order   Administer medications as ordered   Instruct and encourage patient and family to use good hand hygiene technique   Identify and instruct in appropriate isolation precautions for identified infection/condition  Goal: Absence of infection during hospitalization  Outcome: Progressing  Goal: Absence of fever/infection during anticipated neutropenic period  Outcome: Progressing     Problem: Metabolic/Fluid and Electrolytes - Adult  Goal: Electrolytes maintained within normal limits  Outcome: Progressing  Flowsheets (Taken 1/21/2023 2224)  Electrolytes maintained within normal limits:   Monitor labs and assess patient for signs and symptoms of electrolyte imbalances   Administer electrolyte replacement as ordered   Monitor response to electrolyte replacements, including repeat lab results as appropriate   Fluid restriction as ordered   Instruct patient on fluid and nutrition restrictions as appropriate  Goal: Hemodynamic stability and optimal renal function maintained  Outcome: Progressing  Goal: Glucose maintained within prescribed range  Outcome: Progressing     Problem: Skin/Tissue Integrity  Goal: Absence of new skin breakdown  Description: 1. Monitor for areas of redness and/or skin breakdown  2. Assess vascular access sites hourly  3. Every 4-6 hours minimum:  Change oxygen saturation probe site  4. Every 4-6 hours:  If on nasal continuous positive airway pressure, respiratory therapy assess nares and determine need for appliance change or resting period.   Outcome: Progressing     Problem: Safety - Adult  Goal: Free from fall injury  Outcome: Progressing  Flowsheets (Taken 1/21/2023 2224)  Free From Fall Injury:   Based on caregiver fall risk screen, instruct family/caregiver to ask for assistance with transferring infant if caregiver noted to have fall risk factors   Instruct family/caregiver on patient safety     Problem: ABCDS Injury Assessment  Goal: Absence of physical injury  Outcome: Progressing  Flowsheets (Taken 1/21/2023 2224)  Absence of Physical Injury: Implement safety measures based on patient assessment     Problem: Chronic Conditions and Co-morbidities  Goal: Patient's chronic conditions and co-morbidity symptoms are monitored and maintained or improved  Outcome: Progressing  Flowsheets (Taken 1/21/2023 2224)  Care Plan - Patient's Chronic Conditions and Co-Morbidity Symptoms are Monitored and Maintained or Improved:   Monitor and assess patient's chronic conditions and comorbid symptoms for stability, deterioration, or improvement   Collaborate with multidisciplinary team to address chronic and comorbid conditions and prevent exacerbation or deterioration   Update acute care plan with appropriate goals if chronic or comorbid symptoms are exacerbated and prevent overall improvement and discharge  Care plan reviewed with patient. Patient verbalize understanding of the plan of care and contribute to goal setting.

## 2023-01-22 NOTE — RT PROTOCOL NOTE
RT Inhaler-Nebulizer Bronchodilator Protocol Note    There is a bronchodilator order in the chart from a provider indicating to follow the RT Bronchodilator Protocol and there is an Initiate RT Inhaler-Nebulizer Bronchodilator Protocol order as well (see protocol at bottom of note). CXR Findings:  XR CHEST PORTABLE    Result Date: 1/21/2023  1. No interval change since previous study dated 1/19/2023. Shayna Carmona **This report has been created using voice recognition software. It may contain minor errors which are inherent in voice recognition technology. ** Final report electronically signed by DR Carla Tolbert on 1/21/2023 9:46 AM      The findings from the last RT Protocol Assessment were as follows:   History Pulmonary Disease: Chronic pulmonary disease  Respiratory Pattern: Dyspnea on exertion or RR 21-25 bpm  Breath Sounds: Slightly diminished and/or crackles  Cough: Strong, productive  Indication for Bronchodilator Therapy: Decreased or absent breath sounds  Bronchodilator Assessment Score: 7    Aerosolized bronchodilator medication orders have been revised according to the RT Inhaler-Nebulizer Bronchodilator Protocol below. Respiratory Therapist to perform RT Therapy Protocol Assessment initially then follow the protocol. Repeat RT Therapy Protocol Assessment PRN for score 0-3 or on second treatment, BID, and PRN for scores above 3. No Indications - adjust the frequency to every 6 hours PRN wheezing or bronchospasm, if no treatments needed after 48 hours then discontinue using Per Protocol order mode. If indication present, adjust the RT bronchodilator orders based on the Bronchodilator Assessment Score as indicated below.   Use Inhaler orders unless patient has one or more of the following: on home nebulizer, not able to hold breath for 10 seconds, is not alert and oriented, cannot activate and use MDI correctly, or respiratory rate 25 breaths per minute or more, then use the equivalent nebulizer order(s) with same Frequency and PRN reasons based on the score. If a patient is on this medication at home then do not decrease Frequency below that used at home. 0-3 - enter or revise RT bronchodilator order(s) to equivalent RT Bronchodilator order with Frequency of every 4 hours PRN for wheezing or increased work of breathing using Per Protocol order mode. 4-6 - enter or revise RT Bronchodilator order(s) to two equivalent RT bronchodilator orders with one order with BID Frequency and one order with Frequency of every 4 hours PRN wheezing or increased work of breathing using Per Protocol order mode. 7-10 - enter or revise RT Bronchodilator order(s) to two equivalent RT bronchodilator orders with one order with TID Frequency and one order with Frequency of every 4 hours PRN wheezing or increased work of breathing using Per Protocol order mode. 11-13 - enter or revise RT Bronchodilator order(s) to one equivalent RT bronchodilator order with QID Frequency and an Albuterol order with Frequency of every 4 hours PRN wheezing or increased work of breathing using Per Protocol order mode. Greater than 13 - enter or revise RT Bronchodilator order(s) to one equivalent RT bronchodilator order with every 4 hours Frequency and an Albuterol order with Frequency of every 2 hours PRN wheezing or increased work of breathing using Per Protocol order mode. RT to enter RT Home Evaluation for COPD & MDI Assessment order using Per Protocol order mode.     Electronically signed by Ryan Chester RCP on 1/21/2023 at 10:20 PM

## 2023-01-22 NOTE — PLAN OF CARE
Problem: Respiratory - Adult  Goal: Clear lung sounds  1/21/2023 2221 by Aundrea Cervantes RCP  Outcome: Progressing   Will continue to give pt treatments to help with breath sounds and to decrease work of breathing

## 2023-01-22 NOTE — RT PROTOCOL NOTE
RT Inhaler-Nebulizer Bronchodilator Protocol Note    There is a bronchodilator order in the chart from a provider indicating to follow the RT Bronchodilator Protocol and there is an Initiate RT Inhaler-Nebulizer Bronchodilator Protocol order as well (see protocol at bottom of note). CXR Findings:  XR CHEST PORTABLE    Result Date: 1/21/2023  1. No interval change since previous study dated 1/19/2023. Daniel Huerta **This report has been created using voice recognition software. It may contain minor errors which are inherent in voice recognition technology. ** Final report electronically signed by DR Lucila Valdez on 1/21/2023 9:46 AM      The findings from the last RT Protocol Assessment were as follows:   History Pulmonary Disease: Chronic pulmonary disease  Respiratory Pattern: Dyspnea on exertion or RR 21-25 bpm  Breath Sounds: Slightly diminished and/or crackles  Cough: Strong, spontaneous, non-productive  Indication for Bronchodilator Therapy: Decreased or absent breath sounds, On home bronchodilators  Bronchodilator Assessment Score: 6    Aerosolized bronchodilator medication orders have been revised according to the RT Inhaler-Nebulizer Bronchodilator Protocol below. Respiratory Therapist to perform RT Therapy Protocol Assessment initially then follow the protocol. Repeat RT Therapy Protocol Assessment PRN for score 0-3 or on second treatment, BID, and PRN for scores above 3. No Indications - adjust the frequency to every 6 hours PRN wheezing or bronchospasm, if no treatments needed after 48 hours then discontinue using Per Protocol order mode. If indication present, adjust the RT bronchodilator orders based on the Bronchodilator Assessment Score as indicated below.   Use Inhaler orders unless patient has one or more of the following: on home nebulizer, not able to hold breath for 10 seconds, is not alert and oriented, cannot activate and use MDI correctly, or respiratory rate 25 breaths per minute or more, then use the equivalent nebulizer order(s) with same Frequency and PRN reasons based on the score. If a patient is on this medication at home then do not decrease Frequency below that used at home. 0-3 - enter or revise RT bronchodilator order(s) to equivalent RT Bronchodilator order with Frequency of every 4 hours PRN for wheezing or increased work of breathing using Per Protocol order mode. 4-6 - enter or revise RT Bronchodilator order(s) to two equivalent RT bronchodilator orders with one order with BID Frequency and one order with Frequency of every 4 hours PRN wheezing or increased work of breathing using Per Protocol order mode. 7-10 - enter or revise RT Bronchodilator order(s) to two equivalent RT bronchodilator orders with one order with TID Frequency and one order with Frequency of every 4 hours PRN wheezing or increased work of breathing using Per Protocol order mode. 11-13 - enter or revise RT Bronchodilator order(s) to one equivalent RT bronchodilator order with QID Frequency and an Albuterol order with Frequency of every 4 hours PRN wheezing or increased work of breathing using Per Protocol order mode. Greater than 13 - enter or revise RT Bronchodilator order(s) to one equivalent RT bronchodilator order with every 4 hours Frequency and an Albuterol order with Frequency of every 2 hours PRN wheezing or increased work of breathing using Per Protocol order mode. RT to enter RT Home Evaluation for COPD & MDI Assessment order using Per Protocol order mode.     Electronically signed by Lady Yoder RCP on 1/22/2023 at 7:38 AM

## 2023-01-22 NOTE — PROGRESS NOTES
Hospitalist Progress Note      Patient:  Ana Cristina Pereira    Unit/Bed:6K-11/011-A  YOB: 1960  MRN: [id number]   Acct: [de-identified]   PCP: Coleen Hamman, MD  Date of Admission: 1/21/2023    Assessment/Plan:    Sepsis 2/2 CAP vs UTI vs COVID: POA. 30 cc/kg bolus given. IV ABX, transitioned to Cefepime from Ceftriaxone. PT has been afebrile now. HR 90s. WBC is WNL. CRP 19.76.   UTI, POA: UA suspicious. Urine culture growing Enteri GNB. IV Cefepime. ELEANOR: Creatinine 2.2, 1.8, 1.4. IVF. Pre-renal likely. Elevated CK: 800 to 4000. IVF. Creatinine improving. Elevated Troponin: 0.016, 0.017, likely demand. Pt has no chest pain. Anemia, chronic, normocytic: Hgb stable ~9. CBC in the AM.   IDDMII: BS controlled. Continue insulin regimen. COPD  CAD     Chief Complaint: Weakness     Initial H and P:-    Initial H&P \"Patient is a 55-year-old male who was admitted to [address] after presenting to the ED after a fall late hours the night prior. According to him he woke up around 3 AM to go to the bathroom however as soon as he got up he felt weak and fell down on his knees and could not get up for a couple of hours until he was able to get in touch with his brother who called the squad.  2 days ago patient was in urgent care and tested positive for COVID at that time he was started on molnupivar because he was not a candidate for back slowly due to multiple comorbidities and interactions with other medications. Patient endorses cough, generalized weakness as well as back pain and right hip pain radiating down right lower extremity. He also has a headache and takes Tylenol every 4 hours as needed. Has been running subjective fever as well. Denies chest pain, LOC, N/V/D.\"     Subjective (past 24 hours):   No acute events overnight. Pt complaining of foot pain. Pt states that today he has not had a fever. Pt states that he is very weak.        Past medical history, family history, social history and allergies reviewed again and is unchanged since admission. ROS (All review of systems completed. Pertinent positives noted. Otherwise All other systems reviewed and negative.)     Medications:  Reviewed    Infusion Medications    sodium chloride      dextrose       Scheduled Medications    albuterol sulfate HFA  2 puff Inhalation BID    cefepime  2,000 mg IntraVENous Q12H    sodium chloride flush  5-40 mL IntraVENous 2 times per day    enoxaparin  30 mg SubCUTAneous Q12H    sodium chloride  500 mL IntraVENous Once    calcium replacement protocol   Other RX Placeholder    amLODIPine  10 mg Oral Daily    aspirin  325 mg Oral Daily    clopidogrel  75 mg Oral Daily    [Held by provider] enalapril  10 mg Oral Daily    fluticasone  1 spray Each Nostril Daily    metoprolol tartrate  25 mg Oral BID    pantoprazole  40 mg Oral QAM AC    pravastatin  80 mg Oral Nightly    tamsulosin  0.4 mg Oral Daily    Vitamin D  1,000 Units Oral Daily    tiotropium-olodaterol  2 puff Inhalation Daily    azithromycin  250 mg Oral Daily    insulin lispro  0-8 Units SubCUTAneous TID WC    insulin lispro  0-4 Units SubCUTAneous Nightly    insulin glargine  40 Units SubCUTAneous BID    molnupiravir  800 mg Oral Q12H     PRN Meds: sodium chloride flush, sodium chloride, ondansetron **OR** ondansetron, acetaminophen **OR** acetaminophen, magnesium hydroxide, sodium phosphate, potassium chloride **OR** potassium alternative oral replacement **OR** potassium chloride, magnesium sulfate, ALPRAZolam, glucose, dextrose bolus **OR** dextrose bolus, glucagon (rDNA), dextrose, albuterol sulfate HFA      Intake/Output Summary (Last 24 hours) at 1/22/2023 1740  Last data filed at 1/22/2023 0950  Gross per 24 hour   Intake 1800 ml   Output 3225 ml   Net -1425 ml       Diet:  ADULT DIET;  Regular; 4 carb choices (60 gm/meal)    Physical Exam:  BP (!) 146/68   Pulse 92   Temp 98.9 °F (37.2 °C) (Oral)   Resp 18   Ht 5' 9\" (1.753 m)   Wt 282 lb 3 oz (128 kg)   SpO2 92%   BMI 41.67 kg/m²   General appearance: No apparent distress, appears stated age and cooperative.  HEENT: Pupils equal, round, and reactive to light. Conjunctivae/corneas clear.  Neck: Supple, with full range of motion. No jugular venous distention. Trachea midline.  Respiratory:  Normal respiratory effort. Clear to auscultation, bilaterally without Rales/Wheezes/Rhonchi.  Cardiovascular: Regular rate and rhythm with normal S1/S2 without murmurs, rubs or gallops.  Abdomen: Soft, non-tender, non-distended with normal bowel sounds.  Musculoskeletal: passive and active ROM x 4 extremities.  Skin: Skin color, texture, turgor normal.  No rashes or lesions.  Neurologic:  Neurovascularly intact without any focal sensory/motor deficits. Cranial nerves: II-XII intact, grossly non-focal.  Psychiatric: Alert and oriented, thought content appropriate, normal insight  Capillary Refill: Brisk,< 3 seconds   Peripheral Pulses: +2 palpable, equal bilaterally     Labs:   Recent Labs     01/21/23  0904 01/22/23  0526   WBC 9.7 6.9   HGB 9.4* 9.5*   HCT 28.5* 29.2*    185     Recent Labs     01/21/23  0904 01/21/23  1545 01/22/23  0526   * 133* 136   K 4.2 3.9 4.6   CL 94* 100 102   CO2 21* 22* 25   BUN 39* 35* 28*   CREATININE 2.2* 1.8* 1.4*   CALCIUM 8.3* 8.2* 8.5   PHOS  --   --  3.0     Recent Labs     01/21/23  0904   AST 21   ALT 13   BILITOT 0.5   ALKPHOS 80     No results for input(s): INR in the last 72 hours.  Recent Labs     01/21/23  0904 01/22/23  0526   CKTOTAL 812* 4,089*       Microbiology:    Blood culture #1:   Lab Results   Component Value Date/Time    BC No growth 24 hours. 01/21/2023 12:12 PM       Blood culture #2:No results found for: BLOODCULT2    Organism:  Lab Results   Component Value Date/Time    ORG enteric gram negative bacilli 01/21/2023 12:01 PM       No results found for: LABGRAM    MRSA culture only:No results found for:  [address] [place] [address]    Urine culture:   Lab Results   Component Value Date/Time    LABURIN [place] count: >100,000 CFU/mL 01/21/2023 12:01 PM    LABURIN 50 07/08/2022 01:15 PM       Respiratory culture: No results found for: CULTRESP    Aerobic and Anaerobic :  No results found for: LABAERO  No results found for: LABANAE    Urinalysis:      Lab Results   Component Value Date/Time    NITRU NEGATIVE 01/21/2023 09:40 AM    WBCUA > 100 01/21/2023 09:40 AM    BACTERIA MANY 01/21/2023 09:40 AM    RBCUA NONE SEEN 01/21/2023 09:40 AM    BLOODU LARGE 01/21/2023 09:40 AM    SPECGRAV 1.017 01/21/2023 09:40 AM    GLUCOSEU Negative 06/27/2022 10:01 AM       Radiology:  XR FOOT RIGHT (MIN 3 VIEWS)   Final Result   Soft tissue swelling with no acute fracture or dislocation. **This report has been created using voice recognition software. It may contain minor errors which are inherent in voice recognition technology. **      Final report electronically signed by Dr Dixie Wyatt on 1/22/2023 3:56 PM      CT HIP RIGHT WO CONTRAST   Final Result       1. Degenerative change involving the right hip and sacroiliac joints. 2. No fracture, avascular necrosis or other bony abnormality involving the right hip joint. 3. Abnormal appearance of the left hemipelvis possibly secondary to Paget's disease. Please correlate clinically. 4. Enlarged prostate gland. 5. Vascular calcification. .               **This report has been created using voice recognition software. It may contain minor errors which are inherent in voice recognition technology. **      Final report electronically signed by DR Janas Opitz on 1/21/2023 2:41 PM      CT LUMBAR SPINE WO CONTRAST   Final Result      1. Postoperative changes within the L4-5 disc space. Laminectomy defect at this level. 2. No acute compression fracture. 3. There is mild-to-moderate canal, moderate severe right and moderate left-sided foraminal stenosis at this level.    4. There is mild-to-moderate canal and bilateral foraminal stenosis, right greater than left at L2-3.   5. There is mild canal and mild-to-moderate bilateral foraminal stenosis at L5-S1.   6. There is degenerative change involving the sacroiliac joints bilaterally. 7. There is sclerosis in the left hemipelvis possibly secondary to Paget's disease. Please correlate clinically. **This report has been created using voice recognition software. It may contain minor errors which are inherent in voice recognition technology. **      Final report electronically signed by DR Lila Covarrubias on 1/21/2023 2:48 PM      XR CHEST PORTABLE   Final Result   1. No interval change since previous study dated 1/19/2023. Joi Dias **This report has been created using voice recognition software. It may contain minor errors which are inherent in voice recognition technology. **      Final report electronically signed by DR Lila Covarrubias on 1/21/2023 9:46 AM      VL DUP LOWER EXTREMITY VENOUS BILATERAL    (Results Pending)     Electronically signed by ZOLTAN Gomez on 1/22/2023 at 5:40 PM

## 2023-01-22 NOTE — PLAN OF CARE
Problem: Respiratory - Adult  Goal: Clear lung sounds  1/22/2023 0739 by Rod Turcios RCP  Outcome: Progressing  Note:  Patient lung sounds are considered normal for their current lung condition. No signs of distress noted. Current treatment regimen appropriate   Patient mutually agreed on goals.

## 2023-01-23 ENCOUNTER — APPOINTMENT (OUTPATIENT)
Dept: INTERVENTIONAL RADIOLOGY/VASCULAR | Age: 63
DRG: 720 | End: 2023-01-23
Payer: MEDICAID

## 2023-01-23 LAB
ALBUMIN SERPL BCG-MCNC: 3.3 G/DL (ref 3.5–5.1)
ANION GAP SERPL CALC-SCNC: 11 MEQ/L (ref 8–16)
BACTERIA UR CULT: ABNORMAL
BASOPHILS ABSOLUTE: 0 THOU/MM3 (ref 0–0.1)
BASOPHILS NFR BLD AUTO: 0.6 %
BUN SERPL-MCNC: 22 MG/DL (ref 7–22)
CALCIUM SERPL-MCNC: 8.9 MG/DL (ref 8.5–10.5)
CHLORIDE SERPL-SCNC: 100 MEQ/L (ref 98–111)
CK SERPL-CCNC: 3032 U/L (ref 55–170)
CO2 SERPL-SCNC: 25 MEQ/L (ref 23–33)
CREAT SERPL-MCNC: 1.1 MG/DL (ref 0.4–1.2)
DEPRECATED RDW RBC AUTO: 42.5 FL (ref 35–45)
EOSINOPHIL NFR BLD AUTO: 1.6 %
EOSINOPHILS ABSOLUTE: 0.1 THOU/MM3 (ref 0–0.4)
ERYTHROCYTE [DISTWIDTH] IN BLOOD BY AUTOMATED COUNT: 14.2 % (ref 11.5–14.5)
GFR SERPL CREATININE-BSD FRML MDRD: > 60 ML/MIN/1.73M2
GLUCOSE BLD STRIP.AUTO-MCNC: 119 MG/DL (ref 70–108)
GLUCOSE BLD STRIP.AUTO-MCNC: 146 MG/DL (ref 70–108)
GLUCOSE BLD STRIP.AUTO-MCNC: 162 MG/DL (ref 70–108)
GLUCOSE SERPL-MCNC: 119 MG/DL (ref 70–108)
HCT VFR BLD AUTO: 28.9 % (ref 42–52)
HGB BLD-MCNC: 9.3 GM/DL (ref 14–18)
IMM GRANULOCYTES # BLD AUTO: 0.03 THOU/MM3 (ref 0–0.07)
IMM GRANULOCYTES NFR BLD AUTO: 0.6 %
LV EF: 50 %
LVEF MODALITY: NORMAL
LYMPHOCYTES ABSOLUTE: 0.6 THOU/MM3 (ref 1–4.8)
LYMPHOCYTES NFR BLD AUTO: 11.2 %
MAGNESIUM SERPL-MCNC: 1.8 MG/DL (ref 1.6–2.4)
MCH RBC QN AUTO: 26.6 PG (ref 26–33)
MCHC RBC AUTO-ENTMCNC: 32.2 GM/DL (ref 32.2–35.5)
MCV RBC AUTO: 82.8 FL (ref 80–94)
MONOCYTES ABSOLUTE: 0.6 THOU/MM3 (ref 0.4–1.3)
MONOCYTES NFR BLD AUTO: 12.4 %
NEUTROPHILS NFR BLD AUTO: 73.6 %
NRBC BLD AUTO-RTO: 0 /100 WBC
ORGANISM: ABNORMAL
PHOSPHATE SERPL-MCNC: 2.3 MG/DL (ref 2.4–4.7)
PLATELET # BLD AUTO: 202 THOU/MM3 (ref 130–400)
PMV BLD AUTO: 9.3 FL (ref 9.4–12.4)
POTASSIUM SERPL-SCNC: 4.4 MEQ/L (ref 3.5–5.2)
RBC # BLD AUTO: 3.49 MILL/MM3 (ref 4.7–6.1)
SEGMENTED NEUTROPHILS ABSOLUTE COUNT: 3.7 THOU/MM3 (ref 1.8–7.7)
SODIUM SERPL-SCNC: 136 MEQ/L (ref 135–145)
WBC # BLD AUTO: 5 THOU/MM3 (ref 4.8–10.8)

## 2023-01-23 PROCEDURE — 6370000000 HC RX 637 (ALT 250 FOR IP): Performed by: INTERNAL MEDICINE

## 2023-01-23 PROCEDURE — 6360000002 HC RX W HCPCS: Performed by: PHYSICIAN ASSISTANT

## 2023-01-23 PROCEDURE — 94660 CPAP INITIATION&MGMT: CPT

## 2023-01-23 PROCEDURE — 93306 TTE W/DOPPLER COMPLETE: CPT

## 2023-01-23 PROCEDURE — 85025 COMPLETE CBC W/AUTO DIFF WBC: CPT

## 2023-01-23 PROCEDURE — 99232 SBSQ HOSP IP/OBS MODERATE 35: CPT | Performed by: PHYSICIAN ASSISTANT

## 2023-01-23 PROCEDURE — 2580000003 HC RX 258: Performed by: STUDENT IN AN ORGANIZED HEALTH CARE EDUCATION/TRAINING PROGRAM

## 2023-01-23 PROCEDURE — 93970 EXTREMITY STUDY: CPT

## 2023-01-23 PROCEDURE — 94760 N-INVAS EAR/PLS OXIMETRY 1: CPT

## 2023-01-23 PROCEDURE — 2580000003 HC RX 258: Performed by: PHYSICIAN ASSISTANT

## 2023-01-23 PROCEDURE — 94640 AIRWAY INHALATION TREATMENT: CPT

## 2023-01-23 PROCEDURE — 6360000002 HC RX W HCPCS: Performed by: STUDENT IN AN ORGANIZED HEALTH CARE EDUCATION/TRAINING PROGRAM

## 2023-01-23 PROCEDURE — 1200000003 HC TELEMETRY R&B

## 2023-01-23 PROCEDURE — 6370000000 HC RX 637 (ALT 250 FOR IP): Performed by: STUDENT IN AN ORGANIZED HEALTH CARE EDUCATION/TRAINING PROGRAM

## 2023-01-23 PROCEDURE — 80069 RENAL FUNCTION PANEL: CPT

## 2023-01-23 PROCEDURE — 36415 COLL VENOUS BLD VENIPUNCTURE: CPT

## 2023-01-23 PROCEDURE — 82948 REAGENT STRIP/BLOOD GLUCOSE: CPT

## 2023-01-23 PROCEDURE — 83735 ASSAY OF MAGNESIUM: CPT

## 2023-01-23 PROCEDURE — 82550 ASSAY OF CK (CPK): CPT

## 2023-01-23 RX ADMIN — INSULIN GLARGINE 40 UNITS: 100 INJECTION, SOLUTION SUBCUTANEOUS at 09:22

## 2023-01-23 RX ADMIN — CEFEPIME 2000 MG: 2 INJECTION, POWDER, FOR SOLUTION INTRAVENOUS at 15:32

## 2023-01-23 RX ADMIN — ACETAMINOPHEN 650 MG: 325 TABLET ORAL at 11:52

## 2023-01-23 RX ADMIN — SODIUM CHLORIDE, PRESERVATIVE FREE 10 ML: 5 INJECTION INTRAVENOUS at 20:41

## 2023-01-23 RX ADMIN — ACETAMINOPHEN 650 MG: 325 TABLET ORAL at 20:40

## 2023-01-23 RX ADMIN — METOPROLOL TARTRATE 25 MG: 25 TABLET, FILM COATED ORAL at 09:17

## 2023-01-23 RX ADMIN — CLOPIDOGREL BISULFATE 75 MG: 75 TABLET ORAL at 09:17

## 2023-01-23 RX ADMIN — INSULIN GLARGINE 40 UNITS: 100 INJECTION, SOLUTION SUBCUTANEOUS at 20:43

## 2023-01-23 RX ADMIN — ALPRAZOLAM 1 MG: 0.5 TABLET ORAL at 20:45

## 2023-01-23 RX ADMIN — Medication 1000 UNITS: at 09:17

## 2023-01-23 RX ADMIN — ENOXAPARIN SODIUM 30 MG: 100 INJECTION SUBCUTANEOUS at 02:19

## 2023-01-23 RX ADMIN — METOPROLOL TARTRATE 25 MG: 25 TABLET, FILM COATED ORAL at 20:40

## 2023-01-23 RX ADMIN — TIOTROPIUM BROMIDE AND OLODATEROL 2 PUFF: 3.124; 2.736 SPRAY, METERED RESPIRATORY (INHALATION) at 07:53

## 2023-01-23 RX ADMIN — ALBUTEROL SULFATE 2 PUFF: 90 AEROSOL, METERED RESPIRATORY (INHALATION) at 07:52

## 2023-01-23 RX ADMIN — ASPIRIN 325 MG: 325 TABLET ORAL at 09:17

## 2023-01-23 RX ADMIN — ALBUTEROL SULFATE 2 PUFF: 90 AEROSOL, METERED RESPIRATORY (INHALATION) at 16:46

## 2023-01-23 RX ADMIN — TAMSULOSIN HYDROCHLORIDE 0.4 MG: 0.4 CAPSULE ORAL at 09:18

## 2023-01-23 RX ADMIN — PANTOPRAZOLE SODIUM 40 MG: 40 TABLET, DELAYED RELEASE ORAL at 06:39

## 2023-01-23 RX ADMIN — SODIUM CHLORIDE, PRESERVATIVE FREE 10 ML: 5 INJECTION INTRAVENOUS at 10:24

## 2023-01-23 RX ADMIN — AZITHROMYCIN MONOHYDRATE 250 MG: 250 TABLET ORAL at 09:18

## 2023-01-23 RX ADMIN — CEFEPIME 2000 MG: 2 INJECTION, POWDER, FOR SOLUTION INTRAVENOUS at 04:17

## 2023-01-23 RX ADMIN — AMLODIPINE BESYLATE 10 MG: 10 TABLET ORAL at 09:18

## 2023-01-23 RX ADMIN — PRAVASTATIN SODIUM 80 MG: 80 TABLET ORAL at 20:40

## 2023-01-23 RX ADMIN — ENOXAPARIN SODIUM 30 MG: 100 INJECTION SUBCUTANEOUS at 11:52

## 2023-01-23 ASSESSMENT — PAIN DESCRIPTION - FREQUENCY
FREQUENCY: INTERMITTENT

## 2023-01-23 ASSESSMENT — PAIN DESCRIPTION - ORIENTATION
ORIENTATION: RIGHT;LEFT
ORIENTATION: OTHER (COMMENT)
ORIENTATION: RIGHT;LEFT
ORIENTATION: RIGHT;LEFT

## 2023-01-23 ASSESSMENT — PAIN DESCRIPTION - PAIN TYPE
TYPE: ACUTE PAIN

## 2023-01-23 ASSESSMENT — PAIN DESCRIPTION - LOCATION
LOCATION: LEG
LOCATION: HEAD;FOOT

## 2023-01-23 ASSESSMENT — PAIN SCALES - GENERAL
PAINLEVEL_OUTOF10: 5
PAINLEVEL_OUTOF10: 4
PAINLEVEL_OUTOF10: 5
PAINLEVEL_OUTOF10: 4

## 2023-01-23 ASSESSMENT — PAIN DESCRIPTION - DESCRIPTORS: DESCRIPTORS: ACHING;DISCOMFORT

## 2023-01-23 NOTE — PLAN OF CARE
Problem: Respiratory - Adult  Goal: Clear lung sounds  1/23/2023 0757 by Hafsa Oh, AMANDA  Outcome: Progressing     Problem: Respiratory - Adult  Goal: Achieves optimal ventilation and oxygenation  1/23/2023 0758 by Hafsa Oh, AMANDA  Outcome: Progressing

## 2023-01-23 NOTE — PROGRESS NOTES
Hospitalist Progress Note      Patient:  Wagner Rascon    Unit/Bed:6K-11/011-A  YOB: 1960  MRN: [id number]   Acct: [de-identified]   PCP: Keyonna Santiago MD  Date of Admission: 1/21/2023    Assessment/Plan:    Sepsis 2/2 CAP vs UTI vs COVID: POA. 30 cc/kg bolus given. IV ABX, transitioned to Cefepime from Ceftriaxone. PT has been afebrile now. HR 90s. WBC is WNL. CRP 19.76.   UTI, POA: UA suspicious. Urine culture growing E Coli, IV Cefepime sensitive. ELEANOR: Creatinine 2.2, 1.8, 1.4, 1.1. IVF. Pre-renal likely. Elevated CK: 800 to 4000. IVF. Creatinine improving. CK pending. Elevated Troponin: 0.016, 0.017, likely demand. Pt has no chest pain. Anemia, chronic, normocytic: Hgb stable ~9. CBC in the AM.   IDDMII: BS controlled. Continue insulin regimen. COPD  CAD     Dispo: Awaiting therapy recommendations     Chief Complaint: Weakness     Initial H and P:-    Initial H&P \"Patient is a 78-year-old male who was admitted to [address] after presenting to the ED after a fall late hours the night prior. According to him he woke up around 3 AM to go to the bathroom however as soon as he got up he felt weak and fell down on his knees and could not get up for a couple of hours until he was able to get in touch with his brother who called the squad.  2 days ago patient was in urgent care and tested positive for COVID at that time he was started on molnupivar because he was not a candidate for back slowly due to multiple comorbidities and interactions with other medications. Patient endorses cough, generalized weakness as well as back pain and right hip pain radiating down right lower extremity. He also has a headache and takes Tylenol every 4 hours as needed. Has been running subjective fever as well. Denies chest pain, LOC, N/V/D.\"     1/22: No acute events overnight. Pt complaining of foot pain. Pt states that today he has not had a fever.  Pt states that he is very weak. Subjective (past 24 hours):   No acute events overnight. Pt still complaining of weakness in his legs. Pt resting in the bed with no issues. Past medical history, family history, social history and allergies reviewed again and is unchanged since admission. ROS (All review of systems completed. Pertinent positives noted.  Otherwise All other systems reviewed and negative.)     Medications:  Reviewed    Infusion Medications    sodium chloride      dextrose       Scheduled Medications    albuterol sulfate HFA  2 puff Inhalation BID    cefepime  2,000 mg IntraVENous Q12H    sodium chloride flush  5-40 mL IntraVENous 2 times per day    enoxaparin  30 mg SubCUTAneous Q12H    sodium chloride  500 mL IntraVENous Once    calcium replacement protocol   Other RX Placeholder    amLODIPine  10 mg Oral Daily    aspirin  325 mg Oral Daily    clopidogrel  75 mg Oral Daily    [Held by provider] enalapril  10 mg Oral Daily    fluticasone  1 spray Each Nostril Daily    metoprolol tartrate  25 mg Oral BID    pantoprazole  40 mg Oral QAM AC    pravastatin  80 mg Oral Nightly    tamsulosin  0.4 mg Oral Daily    Vitamin D  1,000 Units Oral Daily    tiotropium-olodaterol  2 puff Inhalation Daily    azithromycin  250 mg Oral Daily    insulin lispro  0-8 Units SubCUTAneous TID WC    insulin lispro  0-4 Units SubCUTAneous Nightly    insulin glargine  40 Units SubCUTAneous BID    molnupiravir  800 mg Oral Q12H     PRN Meds: sodium chloride flush, sodium chloride, ondansetron **OR** ondansetron, acetaminophen **OR** acetaminophen, magnesium hydroxide, sodium phosphate, potassium chloride **OR** potassium alternative oral replacement **OR** potassium chloride, magnesium sulfate, ALPRAZolam, glucose, dextrose bolus **OR** dextrose bolus, glucagon (rDNA), dextrose, albuterol sulfate HFA      Intake/Output Summary (Last 24 hours) at 1/23/2023 1623  Last data filed at 1/23/2023 1526  Gross per 24 hour   Intake -- Output 3500 ml   Net -3500 ml       Diet:  ADULT DIET; Regular; 4 carb choices (60 gm/meal)    Physical Exam:  /65   Pulse 97   Temp 98.4 °F (36.9 °C) (Oral)   Resp 20   Ht 5' 9\" (1.753 m)   Wt 282 lb 3 oz (128 kg)   SpO2 94%   BMI 41.67 kg/m²   General appearance: No apparent distress, appears stated age and cooperative. HEENT: Pupils equal, round, and reactive to light. Conjunctivae/corneas clear. Neck: Supple, with full range of motion. No jugular venous distention. Trachea midline. Respiratory:  Normal respiratory effort. Clear to auscultation, bilaterally without Rales/Wheezes/Rhonchi. Cardiovascular: Regular rate and rhythm with normal S1/S2 without murmurs, rubs or gallops. Abdomen: Soft, non-tender, non-distended with normal bowel sounds. Musculoskeletal: passive and active ROM x 4 extremities. Skin: Skin color, texture, turgor normal.  No rashes or lesions. Neurologic:  Neurovascularly intact without any focal sensory/motor deficits. Cranial nerves: II-XII intact, grossly non-focal.  Psychiatric: Alert and oriented, thought content appropriate, normal insight  Capillary Refill: Brisk,< 3 seconds   Peripheral Pulses: +2 palpable, equal bilaterally     Labs:   Recent Labs     01/21/23  0904 01/22/23  0526 01/23/23  0704   WBC 9.7 6.9 5.0   HGB 9.4* 9.5* 9.3*   HCT 28.5* 29.2* 28.9*    185 202     Recent Labs     01/21/23  1545 01/22/23  0526 01/23/23  0704   * 136 136   K 3.9 4.6 4.4    102 100   CO2 22* 25 25   BUN 35* 28* 22   CREATININE 1.8* 1.4* 1.1   CALCIUM 8.2* 8.5 8.9   PHOS  --  3.0 2.3*     Recent Labs     01/21/23  0904   AST 21   ALT 13   BILITOT 0.5   ALKPHOS 80     No results for input(s): INR in the last 72 hours. Recent Labs     01/21/23  0904 01/22/23  0526   CKTOTAL 812* 4,089*       Microbiology:    Blood culture #1:   Lab Results   Component Value Date/Time    BC No growth 24 hours. No growth 48 hours.  01/21/2023 12:12 PM       Blood culture #2:No results found for: BLOODCULT2    Organism:  Lab Results   Component Value Date/Time    ORG Escherichia coli 01/21/2023 12:01 PM       No results found for: LABGRAM    MRSA culture only:No results found for: [facility]    Urine culture:   Lab Results   Component Value Date/Time    LABURIN [place] count: >100,000 CFU/mL 01/21/2023 12:01 PM    Rachel Perez 07/08/2022 01:15 PM       Respiratory culture: No results found for: CULTRESP    Aerobic and Anaerobic :  No results found for: LABAERO  No results found for: LABANAE    Urinalysis:      Lab Results   Component Value Date/Time    NITRU NEGATIVE 01/21/2023 09:40 AM    WBCUA > 100 01/21/2023 09:40 AM    BACTERIA MANY 01/21/2023 09:40 AM    RBCUA NONE SEEN 01/21/2023 09:40 AM    BLOODU LARGE 01/21/2023 09:40 AM    SPECGRAV 1.017 01/21/2023 09:40 AM    GLUCOSEU Negative 06/27/2022 10:01 AM       Radiology:  VL DUP LOWER EXTREMITY VENOUS BILATERAL   Final Result      1. No evidence of a DVT in the right and left lower extremities. .               **This report has been created using voice recognition software. It may contain minor errors which are inherent in voice recognition technology. **      Final report electronically signed by DR Starr Stapleton on 1/23/2023 2:52 PM      XR FOOT RIGHT (MIN 3 VIEWS)   Final Result   Soft tissue swelling with no acute fracture or dislocation. **This report has been created using voice recognition software. It may contain minor errors which are inherent in voice recognition technology. **      Final report electronically signed by Dr Codi Roberts on 1/22/2023 3:56 PM      CT HIP RIGHT WO CONTRAST   Final Result       1. Degenerative change involving the right hip and sacroiliac joints. 2. No fracture, avascular necrosis or other bony abnormality involving the right hip joint. 3. Abnormal appearance of the left hemipelvis possibly secondary to Paget's disease. Please correlate clinically. 4. Enlarged prostate gland.    5. Vascular calcification. .               **This report has been created using voice recognition software. It may contain minor errors which are inherent in voice recognition technology. **      Final report electronically signed by DR Lucila Valdez on 1/21/2023 2:41 PM      CT LUMBAR SPINE WO CONTRAST   Final Result      1. Postoperative changes within the L4-5 disc space. Laminectomy defect at this level. 2. No acute compression fracture. 3. There is mild-to-moderate canal, moderate severe right and moderate left-sided foraminal stenosis at this level. 4. There is mild-to-moderate canal and bilateral foraminal stenosis, right greater than left at L2-3.   5. There is mild canal and mild-to-moderate bilateral foraminal stenosis at L5-S1.   6. There is degenerative change involving the sacroiliac joints bilaterally. 7. There is sclerosis in the left hemipelvis possibly secondary to Paget's disease. Please correlate clinically. **This report has been created using voice recognition software. It may contain minor errors which are inherent in voice recognition technology. **      Final report electronically signed by DR Lucila Valdez on 1/21/2023 2:48 PM      XR CHEST PORTABLE   Final Result   1. No interval change since previous study dated 1/19/2023. [facility] **This report has been created using voice recognition software. It may contain minor errors which are inherent in voice recognition technology. **      Final report electronically signed by DR Lucila Valdez on 1/21/2023 9:46 AM        Electronically signed by ZOLTAN Fragoso on 1/23/2023 at 4:23 PM

## 2023-01-23 NOTE — PLAN OF CARE
Problem: Discharge Planning  Goal: Discharge to home or other facility with appropriate resources  Outcome: Progressing  Flowsheets (Taken 1/22/2023 2208)  Discharge to home or other facility with appropriate resources:   Identify barriers to discharge with patient and caregiver   Arrange for needed discharge resources and transportation as appropriate   Identify discharge learning needs (meds, wound care, etc)   Arrange for interpreters to assist at discharge as needed   Refer to discharge planning if patient needs post-hospital services based on physician order or complex needs related to functional status, cognitive ability or social support system     Problem: Pain  Goal: Verbalizes/displays adequate comfort level or baseline comfort level  Outcome: Progressing  Flowsheets (Taken 1/22/2023 2208)  Verbalizes/displays adequate comfort level or baseline comfort level:   Encourage patient to monitor pain and request assistance   Assess pain using appropriate pain scale   Administer analgesics based on type and severity of pain and evaluate response   Implement non-pharmacological measures as appropriate and evaluate response   Consider cultural and social influences on pain and pain management   Notify Licensed Independent Practitioner if interventions unsuccessful or patient reports new pain     Problem: Respiratory - Adult  Goal: Clear lung sounds  Outcome: Progressing  Goal: Achieves optimal ventilation and oxygenation  Outcome: Progressing  Flowsheets (Taken 1/22/2023 2208)  Achieves optimal ventilation and oxygenation:   Assess for changes in respiratory status   Assess for changes in mentation and behavior   Position to facilitate oxygenation and minimize respiratory effort   Oxygen supplementation based on oxygen saturation or arterial blood gases   Initiate smoking cessation protocol as indicated   Encourage broncho-pulmonary hygiene including cough, deep breathe, incentive spirometry   Assess the need for suctioning and aspirate as needed   Assess and instruct to report shortness of breath or any respiratory difficulty   Respiratory therapy support as indicated     Problem: Skin/Tissue Integrity - Adult  Goal: Skin integrity remains intact  Outcome: Progressing  Flowsheets (Taken 1/22/2023 2208)  Skin Integrity Remains Intact:   Monitor for areas of redness and/or skin breakdown   Assess vascular access sites hourly   Every 4-6 hours minimum: Change oxygen saturation probe site   Every 4-6 hours: If on nasal continuous positive airway pressure, respiratory therapy assesses nares and determine need for appliance change or resting period  Goal: Incisions, wounds, or drain sites healing without S/S of infection  Outcome: Progressing  Flowsheets (Taken 1/22/2023 2208)  Incisions, Wounds, or Drain Sites Healing Without Sign and Symptoms of Infection:   ADMISSION and DAILY: Assess and document risk factors for pressure ulcer development   TWICE DAILY: Assess and document skin integrity   TWICE DAILY: Assess and document dressing/incision, wound bed, drain sites and surrounding tissue   Implement wound care per orders   Initiate isolation precautions as appropriate   Initiate pressure ulcer prevention bundle as indicated  Goal: Oral mucous membranes remain intact  Outcome: Progressing  Flowsheets (Taken 1/22/2023 2208)  Oral Mucous Membranes Remain Intact:   Assess oral mucosa and hygiene practices   Implement oral medicated treatments as ordered   Implement preventative oral hygiene regimen     Problem: Infection - Adult  Goal: Absence of infection at discharge  Outcome: Progressing  Flowsheets (Taken 1/22/2023 2208)  Absence of infection at discharge:   Assess and monitor for signs and symptoms of infection   Monitor lab/diagnostic results   Monitor all insertion sites i.e., indwelling lines, tubes and drains   Monitor endotracheal (as able) and nasal secretions for changes in amount and color   [place] appropriate cooling/warming therapies per order   Administer medications as ordered   Instruct and encourage patient and family to use good hand hygiene technique   Identify and instruct in appropriate isolation precautions for identified infection/condition  Goal: Absence of infection during hospitalization  Outcome: Progressing  Flowsheets (Taken 1/22/2023 2208)  Absence of infection during hospitalization:   Assess and monitor for signs and symptoms of infection   Monitor lab/diagnostic results   Monitor all insertion sites i.e., indwelling lines, tubes and drains   Monitor endotracheal (as able) and nasal secretions for changes in amount and color   [place] appropriate cooling/warming therapies per order   Administer medications as ordered   Instruct and encourage patient and family to use good hand hygiene technique   Identify and instruct in appropriate isolation precautions for identified infection/condition  Goal: Absence of fever/infection during anticipated neutropenic period  Outcome: Progressing  Flowsheets (Taken 1/22/2023 2208)  Absence of fever/infection during anticipated neutropenic period:   Monitor white blood cell count   Administer growth factors as ordered   Implement neutropenic guidelines     Problem: Metabolic/Fluid and Electrolytes - Adult  Goal: Electrolytes maintained within normal limits  Outcome: Progressing  Flowsheets (Taken 1/22/2023 2208)  Electrolytes maintained within normal limits:   Monitor labs and assess patient for signs and symptoms of electrolyte imbalances   Administer electrolyte replacement as ordered   Monitor response to electrolyte replacements, including repeat lab results as appropriate   Fluid restriction as ordered   Instruct patient on fluid and nutrition restrictions as appropriate  Goal: Hemodynamic stability and optimal renal function maintained  Outcome: Progressing  Flowsheets (Taken 1/22/2023 2208)  Hemodynamic stability and optimal renal function maintained: Monitor labs and assess for signs and symptoms of volume excess or deficit   Monitor intake, output and patient weight   Monitor urine specific gravity, serum osmolarity and serum sodium as indicated or ordered   Monitor response to interventions for patient's volume status, including labs, urine output, blood pressure (other measures as available)   Encourage oral intake as appropriate   Instruct patient on fluid and nutrition restrictions as appropriate  Goal: Glucose maintained within prescribed range  Outcome: Progressing  Flowsheets (Taken 1/22/2023 2208)  Glucose maintained within prescribed range:   Monitor blood glucose as ordered   Assess for signs and symptoms of hyperglycemia and hypoglycemia   Administer ordered medications to maintain glucose within target range   Assess barriers to adequate nutritional intake and initiate nutrition consult as needed   Instruct patient on self management of diabetes and initiate consult as needed     Problem: Skin/Tissue Integrity  Goal: Absence of new skin breakdown  Description: 1. Monitor for areas of redness and/or skin breakdown  2. Assess vascular access sites hourly  3. Every 4-6 hours minimum:  Change oxygen saturation probe site  4. Every 4-6 hours:  If on nasal continuous positive airway pressure, respiratory therapy assess nares and determine need for appliance change or resting period.   Outcome: Progressing     Problem: Safety - Adult  Goal: Free from fall injury  Outcome: Progressing  Flowsheets (Taken 1/22/2023 2208)  Free From Fall Injury:   Instruct family/caregiver on patient safety   Based on caregiver fall risk screen, instruct family/caregiver to ask for assistance with transferring infant if caregiver noted to have fall risk factors     Problem: ABCDS Injury Assessment  Goal: Absence of physical injury  Outcome: Progressing  Flowsheets (Taken 1/22/2023 2208)  Absence of Physical Injury: Implement safety measures based on patient assessment Problem: Chronic Conditions and Co-morbidities  Goal: Patient's chronic conditions and co-morbidity symptoms are monitored and maintained or improved  Outcome: Progressing  Flowsheets (Taken 1/22/2023 2208)  Care Plan - Patient's Chronic Conditions and Co-Morbidity Symptoms are Monitored and Maintained or Improved:   Monitor and assess patient's chronic conditions and comorbid symptoms for stability, deterioration, or improvement   Collaborate with multidisciplinary team to address chronic and comorbid conditions and prevent exacerbation or deterioration   Update acute care plan with appropriate goals if chronic or comorbid symptoms are exacerbated and prevent overall improvement and discharge  Care plan reviewed with patient. Patient verbalize understanding of the plan of care and contribute to goal setting.

## 2023-01-23 NOTE — PLAN OF CARE
Problem: Discharge Planning  Goal: Discharge to home or other facility with appropriate resources  1/23/2023 1032 by Don Christine  Outcome: Jorge Patel (Taken 1/23/2023 0917)  Discharge to home or other facility with appropriate resources:   Identify barriers to discharge with patient and caregiver   Identify discharge learning needs (meds, wound care, etc)  1/22/2023 2208 by Hira Bess RN  Outcome: Progressing  Flowsheets (Taken 1/22/2023 2208)  Discharge to home or other facility with appropriate resources:   Identify barriers to discharge with patient and caregiver   Arrange for needed discharge resources and transportation as appropriate   Identify discharge learning needs (meds, wound care, etc)   Arrange for interpreters to assist at discharge as needed   Refer to discharge planning if patient needs post-hospital services based on physician order or complex needs related to functional status, cognitive ability or social support system     Problem: Pain  Goal: Verbalizes/displays adequate comfort level or baseline comfort level  1/23/2023 1032 by Don Christine  Outcome: Progressing  1/22/2023 2208 by Hira Bess RN  Outcome: Progressing  Flowsheets (Taken 1/22/2023 2208)  Verbalizes/displays adequate comfort level or baseline comfort level:   Encourage patient to monitor pain and request assistance   Assess pain using appropriate pain scale   Administer analgesics based on type and severity of pain and evaluate response   Implement non-pharmacological measures as appropriate and evaluate response   Consider cultural and social influences on pain and pain management   Notify Licensed Independent Practitioner if interventions unsuccessful or patient reports new pain     Problem: Respiratory - Adult  Goal: Clear lung sounds  1/23/2023 1032 by Kerry Tirado  Outcome: Progressing  1/23/2023 0757 by Lauren Salter RCP  Outcome: Progressing  1/22/2023 2208 by Hira Bess RN  Outcome: Progressing  Goal: Achieves optimal ventilation and oxygenation  1/23/2023 1032 by Don Christine  Outcome: Jose Raul Clemens (Taken 1/22/2023 2208 by Zuleyka Vasquez RN)  Achieves optimal ventilation and oxygenation:   Assess for changes in respiratory status   Assess for changes in mentation and behavior   Position to facilitate oxygenation and minimize respiratory effort   Oxygen supplementation based on oxygen saturation or arterial blood gases   Initiate smoking cessation protocol as indicated   Encourage broncho-pulmonary hygiene including cough, deep breathe, incentive spirometry   Assess the need for suctioning and aspirate as needed   Assess and instruct to report shortness of breath or any respiratory difficulty   Respiratory therapy support as indicated  1/23/2023 0758 by Chinmay Mata RCP  Outcome: Progressing  1/22/2023 2208 by Zuleyka Vasquez RN  Outcome: Progressing  Flowsheets (Taken 1/22/2023 2208)  Achieves optimal ventilation and oxygenation:   Assess for changes in respiratory status   Assess for changes in mentation and behavior   Position to facilitate oxygenation and minimize respiratory effort   Oxygen supplementation based on oxygen saturation or arterial blood gases   Initiate smoking cessation protocol as indicated   Encourage broncho-pulmonary hygiene including cough, deep breathe, incentive spirometry   Assess the need for suctioning and aspirate as needed   Assess and instruct to report shortness of breath or any respiratory difficulty   Respiratory therapy support as indicated     Problem: Skin/Tissue Integrity  Goal: Absence of new skin breakdown  Description: 1. Monitor for areas of redness and/or skin breakdown  2. Assess vascular access sites hourly  3. Every 4-6 hours minimum:  Change oxygen saturation probe site  4.   Every 4-6 hours:  If on nasal continuous positive airway pressure, respiratory therapy assess nares and determine need for appliance change or resting period.   1/23/2023 1032 by Don 95  Outcome: Progressing  1/22/2023 2208 by Anderson Rodriguez RN  Outcome: Progressing     Problem: Safety - Adult  Goal: Free from fall injury  1/23/2023 1032 by Don 95  Outcome: Jeffrey Cox (Taken 1/22/2023 2208 by Anderson Rodriguez RN)  Free From Fall Injury:   [facility] safety   Based on caregiver fall risk screen, instruct family/caregiver to ask for assistance with transferring infant if caregiver noted to have fall risk factors  1/22/2023 2208 by Anderson Rodriguez RN  Outcome: Progressing  Flowsheets (Taken 1/22/2023 2208)  Free From Fall Injury:   [facility] family/caregiver on patient safety   Based on caregiver fall risk screen, instruct family/caregiver to ask for assistance with transferring infant if caregiver noted to have fall risk factors  Goal: Isolation precautions  Description: Isolation precautions  Outcome: Progressing     Problem: ABCDS Injury Assessment  Goal: Absence of physical injury  1/23/2023 1032 by Don 95  Outcome: Progressing  1/22/2023 2208 by Anderson Rodriguez RN  Outcome: Progressing  Flowsheets (Taken 1/22/2023 2208)  Absence of Physical Injury: Implement safety measures based on patient assessment     Problem: Chronic Conditions and Co-morbidities  Goal: Patient's chronic conditions and co-morbidity symptoms are monitored and maintained or improved  1/23/2023 1032 by Kerry Tirado  Outcome: Progressing  Flowsheets (Taken 1/23/2023 0917)  Care Plan - Patient's Chronic Conditions and Co-Morbidity Symptoms are Monitored and Maintained or Improved: Monitor and assess patient's chronic conditions and comorbid symptoms for stability, deterioration, or improvement  1/22/2023 2208 by Anderson Rodriguez RN  Outcome: Progressing  Flowsheets (Taken 1/22/2023 2208)  Care Plan - Patient's Chronic Conditions and Co-Morbidity Symptoms are Monitored and Maintained or Improved:   Monitor and assess patient's chronic conditions and comorbid symptoms for stability, deterioration, or improvement   Collaborate with multidisciplinary team to address chronic and comorbid conditions and prevent exacerbation or deterioration   Update acute care plan with appropriate goals if chronic or comorbid symptoms are exacerbated and prevent overall improvement and discharge     Problem: Skin/Tissue Integrity - Adult  Goal: Skin integrity remains intact  1/23/2023 1032 by Kerry Tirado  Outcome: Progressing  Flowsheets (Taken 1/23/2023 0917)  Skin Integrity Remains Intact:   Monitor for areas of redness and/or skin breakdown   Assess vascular access sites hourly  1/22/2023 2208 by Gregg Park RN  Outcome: Progressing  Flowsheets (Taken 1/22/2023 2208)  Skin Integrity Remains Intact:   Monitor for areas of redness and/or skin breakdown   Assess vascular access sites hourly   Every 4-6 hours minimum: Change oxygen saturation probe site   Every 4-6 hours: If on nasal continuous positive airway pressure, respiratory therapy assesses nares and determine need for appliance change or resting period  Goal: Incisions, wounds, or drain sites healing without S/S of infection  1/23/2023 1032 by Don Christine  Outcome: Progressing  Flowsheets (Taken 1/23/2023 0917)  Incisions, Wounds, or Drain Sites Healing Without Sign and Symptoms of Infection: ADMISSION and DAILY: Assess and document risk factors for pressure ulcer development  1/22/2023 2208 by Gregg Park RN  Outcome: Progressing  Flowsheets (Taken 1/22/2023 2208)  Incisions, Wounds, or Drain Sites Healing Without Sign and Symptoms of Infection:   ADMISSION and DAILY: Assess and document risk factors for pressure ulcer development   TWICE DAILY: Assess and document skin integrity   TWICE DAILY: Assess and document dressing/incision, wound bed, drain sites and surrounding tissue   Implement wound care per orders   Initiate isolation precautions as appropriate   Initiate pressure ulcer prevention bundle as indicated  Goal: Oral mucous membranes remain intact  1/23/2023 1032 by Don Christine  Outcome: Progressing  Flowsheets (Taken 1/23/2023 0917)  Oral Mucous Membranes Remain Intact: Assess oral mucosa and hygiene practices  1/22/2023 2208 by Tara Lowery RN  Outcome: Progressing  Flowsheets (Taken 1/22/2023 2208)  Oral Mucous Membranes Remain Intact:   Assess oral mucosa and hygiene practices   Implement oral medicated treatments as ordered   Implement preventative oral hygiene regimen     Problem: Infection - Adult  Goal: Absence of infection at discharge  1/23/2023 1032 by Don Christine  Outcome: Progressing  Flowsheets (Taken 1/23/2023 0917)  Absence of infection at discharge:   Assess and monitor for signs and symptoms of infection   Monitor lab/diagnostic results   Administer medications as ordered  1/22/2023 2208 by Tara Lowery RN  Outcome: Progressing  Flowsheets (Taken 1/22/2023 2208)  Absence of infection at discharge:   Assess and monitor for signs and symptoms of infection   Monitor lab/diagnostic results   Monitor all insertion sites i.e., indwelling lines, tubes and drains   Monitor endotracheal (as able) and nasal secretions for changes in amount and color   [place] appropriate cooling/warming therapies per order   Administer medications as ordered   Instruct and encourage patient and family to use good hand hygiene technique   Identify and instruct in appropriate isolation precautions for identified infection/condition  Goal: Absence of infection during hospitalization  1/23/2023 1032 by Kerry Tirado  Outcome: Progressing  [address] (Taken 1/23/2023 0917)  Absence of infection during hospitalization:   Assess and monitor for signs and symptoms of infection   Monitor lab/diagnostic results   Monitor all insertion sites i.e., indwelling lines, tubes and drains  1/22/2023 2208 by Tara Lowery RN  Outcome: Progressing  Flowsheets (Taken 1/22/2023 2208)  Absence of infection during hospitalization:   Assess and monitor for signs and symptoms of infection   Monitor lab/diagnostic results   Monitor all insertion sites i.e., indwelling lines, tubes and drains   Monitor endotracheal (as able) and nasal secretions for changes in amount and color   [place] appropriate cooling/warming therapies per order   Administer medications as ordered   Instruct and encourage patient and family to use good hand hygiene technique   Identify and instruct in appropriate isolation precautions for identified infection/condition  Goal: Absence of fever/infection during anticipated neutropenic period  1/23/2023 1032 by Don Christine  Outcome: Progressing  Flowsheets (Taken 1/23/2023 0917)  Absence of fever/infection during anticipated neutropenic period:   Monitor white blood cell count   Administer growth factors as ordered  1/22/2023 2208 by Higinio Jaime RN  Outcome: Progressing  Flowsheets (Taken 1/22/2023 2208)  Absence of fever/infection during anticipated neutropenic period:   Monitor white blood cell count   Administer growth factors as ordered   Implement neutropenic guidelines     Problem: Metabolic/Fluid and Electrolytes - Adult  Goal: Electrolytes maintained within normal limits  1/23/2023 1032 by Kerry Tirado  Outcome: Progressing  Flowsheets (Taken 1/23/2023 0917)  Electrolytes maintained within normal limits: Monitor labs and assess patient for signs and symptoms of electrolyte imbalances  1/22/2023 2208 by Higinio Jaime RN  Outcome: Progressing  Flowsheets (Taken 1/22/2023 2208)  Electrolytes maintained within normal limits:   Monitor labs and assess patient for signs and symptoms of electrolyte imbalances   Administer electrolyte replacement as ordered   Monitor response to electrolyte replacements, including repeat lab results as appropriate   Fluid restriction as ordered   Instruct patient on fluid and nutrition restrictions as appropriate  Goal: Hemodynamic stability and optimal renal function maintained  1/23/2023 1032 by Don Christine  Outcome: Progressing  Flowsheets (Taken 1/23/2023 1839)  Hemodynamic stability and optimal renal function maintained:   Monitor labs and assess for signs and symptoms of volume excess or deficit   Monitor intake, output and patient weight   Monitor urine specific gravity, serum osmolarity and serum sodium as indicated or ordered  1/22/2023 2208 by Mariel Manley RN  Outcome: Progressing  Flowsheets (Taken 1/22/2023 2208)  Hemodynamic stability and optimal renal function maintained:   Monitor labs and assess for signs and symptoms of volume excess or deficit   Monitor intake, output and patient weight   Monitor urine specific gravity, serum osmolarity and serum sodium as indicated or ordered   Monitor response to interventions for patient's volume status, including labs, urine output, blood pressure (other measures as available)   Encourage oral intake as appropriate   Instruct patient on fluid and nutrition restrictions as appropriate  Goal: Glucose maintained within prescribed range  1/23/2023 1032 by Kerry Tirado  Outcome: Progressing  Flowsheets (Taken 1/23/2023 0917)  Glucose maintained within prescribed range:   Monitor blood glucose as ordered   Assess for signs and symptoms of hyperglycemia and hypoglycemia  1/22/2023 2208 by Mariel Manley RN  Outcome: Progressing  Flowsheets (Taken 1/22/2023 2208)  Glucose maintained within prescribed range:   Monitor blood glucose as ordered   Assess for signs and symptoms of hyperglycemia and hypoglycemia   Administer ordered medications to maintain glucose within target range   Assess barriers to adequate nutritional intake and initiate nutrition consult as needed   Instruct patient on self management of diabetes and initiate consult as needed

## 2023-01-23 NOTE — PLAN OF CARE
Problem: Respiratory - Adult  Goal: Clear lung sounds  1/23/2023 1649 by Joseline Foster RCP  Outcome: Progressing     Problem: Respiratory - Adult  Goal: Achieves optimal ventilation and oxygenation  1/23/2023 1649 by Joseline Foster RCP  Outcome: Progressing   Patient mutually agreed on goals.

## 2023-01-23 NOTE — CARE COORDINATION
Case Management Assessment  Initial Evaluation    Date/Time of Evaluation: 1/23/2023 11:53 AM  Assessment Completed by: Tamia Alvarez RN    If patient is discharged prior to next notation, then this note serves as note for discharge by case management. Patient Name: Laura Estrada                   YOB: 1960  Diagnosis: Hyponatremia [E87.1]  Elevated troponin [R77.8]  ELEANOR (acute kidney injury) ([facility] [address] [N17.9]  Non-traumatic rhabdomyolysis [M62.82]  Urinary tract infection in male [N39.0]  Pneumonia due to COVID-19 virus [U07.1, J12.82]  COVID-19 [U07.1]                   Date / Time: 1/21/2023  8:41 AM  Location: [facility]     Patient Admission Status: Inpatient   Readmission Risk (Low < 19, Mod (19-27), High > 27): Readmission Risk Score: 15.5    Current PCP: Rebecca Hansen MD  PCP verified by CM? Yes    Chart Reviewed: Yes      History Provided by: Patient  Patient Orientation: Alert and Oriented    Patient Cognition: Alert    Hospitalization in the last 30 days (Readmission):  No    If yes, Readmission Assessment in CM Navigator will be completed. Advance Directives:      Code Status: Full Code   Patient's Primary Decision Maker is: Patient Declined (Legal Next of Kin Remains as Decision Maker)      Discharge Planning:    Patient lives with: Alone Type of Home: House  Primary Care Giver: Self  Patient Support Systems include: Family Members   Current Financial resources: Medicaid  Current community resources: None  Current services prior to admission: Oxygen Therapy (2L at hs only from 's)            Current DME:              Type of Home Care services:  None    ADLS  Prior functional level: Independent in ADLs/IADLs  Current functional level: Assistance with the following:, Mobility    Family can provide assistance at DC: Yes  Would you like Case Management to discuss the discharge plan with any other family members/significant others, and if so, who?  No  Plans to Return to Present Housing: Yes  Other Identified Issues/Barriers to RETURNING to current housing: weakness, unable to walk or lift legs  Potential Assistance needed at discharge: [address], Durable Medical Equipment            Potential DME: Walker  Patient expects to discharge to: [address] [place] [address] for transportation at discharge: Family    Financial    Payor: Ivan Lay / Plan: Ivan Lay / Product Type: *No Product type* /     Does insurance require precert for SNF: Yes    Potential assistance Purchasing Medications: No  Meds-to-Beds request: Yes      Cristy King [address]  [place] [address]. [id number]  Phone: [phone] Fax: [phone]    [address] [place] [address] [facility] [phone] [facility] [phone]  [address] [place] [address]  Phone: [phone] Fax: [phone]      Notes:    Factors facilitating achievement of predicted outcomes: Cooperative and Pleasant    Barriers to discharge: Decreased endurance and Lower extremity weakness    Additional Case Management Notes: covid+. Phos 2.3, uric acid 8.0, CK 4089. Temp of 101.9 last night. 93% RA. IV maxipime, po zithromax, albuterol inh. Doppler studies BLE and Echo pending. PT/OT. The Plan for Transition of Care is related to the following treatment goals of Hyponatremia [E87.1]  Elevated troponin [R77.8]  ELEANOR (acute kidney injury) ([facility] [address] [N17.9]  Non-traumatic rhabdomyolysis [M62.82]  Urinary tract infection in male [N39.0]  Pneumonia due to COVID-19 virus [U07.1, J12.82]  COVID-19 [U07.1]    Patient Goals/Plan/Treatment Preferences: Met with Calli Garcia, he is from home alone. He is concerned about how weak his legs are and states he fell at home. He is uncertain of needs at this time as he hopes his mobility will improve. Transportation/Food Security/Housekeeping Addressed: No issues identified.      Fidel Barroso RN  Case Management Department

## 2023-01-24 ENCOUNTER — APPOINTMENT (OUTPATIENT)
Dept: GENERAL RADIOLOGY | Age: 63
DRG: 720 | End: 2023-01-24
Payer: MEDICAID

## 2023-01-24 ENCOUNTER — TELEPHONE (OUTPATIENT)
Dept: FAMILY MEDICINE CLINIC | Age: 63
End: 2023-01-24

## 2023-01-24 VITALS
HEIGHT: 69 IN | RESPIRATION RATE: 16 BRPM | TEMPERATURE: 97.9 F | HEART RATE: 77 BPM | OXYGEN SATURATION: 98 % | DIASTOLIC BLOOD PRESSURE: 64 MMHG | BODY MASS INDEX: 41.8 KG/M2 | WEIGHT: 282.19 LBS | SYSTOLIC BLOOD PRESSURE: 139 MMHG

## 2023-01-24 LAB
ALBUMIN SERPL BCG-MCNC: 3.3 G/DL (ref 3.5–5.1)
ANION GAP SERPL CALC-SCNC: 12 MEQ/L (ref 8–16)
BASOPHILS ABSOLUTE: 0 THOU/MM3 (ref 0–0.1)
BASOPHILS NFR BLD AUTO: 0.6 %
BUN SERPL-MCNC: 19 MG/DL (ref 7–22)
CALCIUM SERPL-MCNC: 9.1 MG/DL (ref 8.5–10.5)
CHLORIDE SERPL-SCNC: 102 MEQ/L (ref 98–111)
CK SERPL-CCNC: 3338 U/L (ref 55–170)
CO2 SERPL-SCNC: 23 MEQ/L (ref 23–33)
CREAT SERPL-MCNC: 1.1 MG/DL (ref 0.4–1.2)
DEPRECATED RDW RBC AUTO: 42.2 FL (ref 35–45)
EOSINOPHIL NFR BLD AUTO: 2.7 %
EOSINOPHILS ABSOLUTE: 0.1 THOU/MM3 (ref 0–0.4)
ERYTHROCYTE [DISTWIDTH] IN BLOOD BY AUTOMATED COUNT: 14.1 % (ref 11.5–14.5)
GFR SERPL CREATININE-BSD FRML MDRD: > 60 ML/MIN/1.73M2
GLUCOSE BLD STRIP.AUTO-MCNC: 147 MG/DL (ref 70–108)
GLUCOSE BLD STRIP.AUTO-MCNC: 149 MG/DL (ref 70–108)
GLUCOSE SERPL-MCNC: 140 MG/DL (ref 70–108)
HAPTOGLOB SERPL-MCNC: 387 MG/DL (ref 30–200)
HCT VFR BLD AUTO: 29.7 % (ref 42–52)
HGB BLD-MCNC: 9.8 GM/DL (ref 14–18)
IMM GRANULOCYTES # BLD AUTO: 0.03 THOU/MM3 (ref 0–0.07)
IMM GRANULOCYTES NFR BLD AUTO: 0.6 %
LYMPHOCYTES ABSOLUTE: 0.6 THOU/MM3 (ref 1–4.8)
LYMPHOCYTES NFR BLD AUTO: 13.3 %
MAGNESIUM SERPL-MCNC: 1.7 MG/DL (ref 1.6–2.4)
MCH RBC QN AUTO: 27.1 PG (ref 26–33)
MCHC RBC AUTO-ENTMCNC: 33 GM/DL (ref 32.2–35.5)
MCV RBC AUTO: 82.3 FL (ref 80–94)
MONOCYTES ABSOLUTE: 0.6 THOU/MM3 (ref 0.4–1.3)
MONOCYTES NFR BLD AUTO: 12.5 %
NEUTROPHILS NFR BLD AUTO: 70.3 %
NRBC BLD AUTO-RTO: 0 /100 WBC
PHOSPHATE SERPL-MCNC: 3.3 MG/DL (ref 2.4–4.7)
PLATELET # BLD AUTO: 225 THOU/MM3 (ref 130–400)
PMV BLD AUTO: 9.2 FL (ref 9.4–12.4)
POTASSIUM SERPL-SCNC: 4.8 MEQ/L (ref 3.5–5.2)
RBC # BLD AUTO: 3.61 MILL/MM3 (ref 4.7–6.1)
SEGMENTED NEUTROPHILS ABSOLUTE COUNT: 3.4 THOU/MM3 (ref 1.8–7.7)
SODIUM SERPL-SCNC: 137 MEQ/L (ref 135–145)
WBC # BLD AUTO: 4.8 THOU/MM3 (ref 4.8–10.8)

## 2023-01-24 PROCEDURE — 80069 RENAL FUNCTION PANEL: CPT

## 2023-01-24 PROCEDURE — 6360000002 HC RX W HCPCS: Performed by: STUDENT IN AN ORGANIZED HEALTH CARE EDUCATION/TRAINING PROGRAM

## 2023-01-24 PROCEDURE — 2580000003 HC RX 258: Performed by: PHYSICIAN ASSISTANT

## 2023-01-24 PROCEDURE — 2580000003 HC RX 258: Performed by: STUDENT IN AN ORGANIZED HEALTH CARE EDUCATION/TRAINING PROGRAM

## 2023-01-24 PROCEDURE — 6370000000 HC RX 637 (ALT 250 FOR IP): Performed by: STUDENT IN AN ORGANIZED HEALTH CARE EDUCATION/TRAINING PROGRAM

## 2023-01-24 PROCEDURE — 83735 ASSAY OF MAGNESIUM: CPT

## 2023-01-24 PROCEDURE — 73501 X-RAY EXAM HIP UNI 1 VIEW: CPT

## 2023-01-24 PROCEDURE — 94640 AIRWAY INHALATION TREATMENT: CPT

## 2023-01-24 PROCEDURE — 85025 COMPLETE CBC W/AUTO DIFF WBC: CPT

## 2023-01-24 PROCEDURE — 97530 THERAPEUTIC ACTIVITIES: CPT

## 2023-01-24 PROCEDURE — 6370000000 HC RX 637 (ALT 250 FOR IP): Performed by: INTERNAL MEDICINE

## 2023-01-24 PROCEDURE — 82948 REAGENT STRIP/BLOOD GLUCOSE: CPT

## 2023-01-24 PROCEDURE — 36415 COLL VENOUS BLD VENIPUNCTURE: CPT

## 2023-01-24 PROCEDURE — 97166 OT EVAL MOD COMPLEX 45 MIN: CPT

## 2023-01-24 PROCEDURE — 82550 ASSAY OF CK (CPK): CPT

## 2023-01-24 PROCEDURE — 6360000002 HC RX W HCPCS: Performed by: PHYSICIAN ASSISTANT

## 2023-01-24 RX ORDER — AZITHROMYCIN 250 MG/1
250 TABLET, FILM COATED ORAL DAILY
Qty: 1 TABLET | Refills: 0 | Status: SHIPPED | OUTPATIENT
Start: 2023-01-25 | End: 2023-01-26

## 2023-01-24 RX ADMIN — CEFEPIME 2000 MG: 2 INJECTION, POWDER, FOR SOLUTION INTRAVENOUS at 03:08

## 2023-01-24 RX ADMIN — ACETAMINOPHEN 650 MG: 325 TABLET ORAL at 09:11

## 2023-01-24 RX ADMIN — FLUTICASONE PROPIONATE 1 SPRAY: 50 SPRAY, METERED NASAL at 08:58

## 2023-01-24 RX ADMIN — ASPIRIN 325 MG: 325 TABLET ORAL at 08:57

## 2023-01-24 RX ADMIN — METOPROLOL TARTRATE 25 MG: 25 TABLET, FILM COATED ORAL at 08:58

## 2023-01-24 RX ADMIN — CLOPIDOGREL BISULFATE 75 MG: 75 TABLET ORAL at 08:57

## 2023-01-24 RX ADMIN — TIOTROPIUM BROMIDE AND OLODATEROL 2 PUFF: 3.124; 2.736 SPRAY, METERED RESPIRATORY (INHALATION) at 08:14

## 2023-01-24 RX ADMIN — SODIUM CHLORIDE, PRESERVATIVE FREE 10 ML: 5 INJECTION INTRAVENOUS at 08:58

## 2023-01-24 RX ADMIN — PANTOPRAZOLE SODIUM 40 MG: 40 TABLET, DELAYED RELEASE ORAL at 05:42

## 2023-01-24 RX ADMIN — Medication 1000 UNITS: at 08:58

## 2023-01-24 RX ADMIN — ENOXAPARIN SODIUM 30 MG: 100 INJECTION SUBCUTANEOUS at 01:11

## 2023-01-24 RX ADMIN — ALPRAZOLAM 1 MG: 0.5 TABLET ORAL at 09:10

## 2023-01-24 RX ADMIN — INSULIN GLARGINE 40 UNITS: 100 INJECTION, SOLUTION SUBCUTANEOUS at 09:00

## 2023-01-24 RX ADMIN — AZITHROMYCIN MONOHYDRATE 250 MG: 250 TABLET ORAL at 08:57

## 2023-01-24 RX ADMIN — ENOXAPARIN SODIUM 30 MG: 100 INJECTION SUBCUTANEOUS at 11:51

## 2023-01-24 RX ADMIN — TAMSULOSIN HYDROCHLORIDE 0.4 MG: 0.4 CAPSULE ORAL at 08:58

## 2023-01-24 RX ADMIN — ALBUTEROL SULFATE 2 PUFF: 90 AEROSOL, METERED RESPIRATORY (INHALATION) at 08:13

## 2023-01-24 RX ADMIN — AMLODIPINE BESYLATE 10 MG: 10 TABLET ORAL at 08:57

## 2023-01-24 ASSESSMENT — PAIN DESCRIPTION - ORIENTATION: ORIENTATION: LEFT;RIGHT

## 2023-01-24 ASSESSMENT — PAIN SCALES - GENERAL
PAINLEVEL_OUTOF10: 6
PAINLEVEL_OUTOF10: 0

## 2023-01-24 ASSESSMENT — PAIN DESCRIPTION - LOCATION: LOCATION: FOOT;HIP

## 2023-01-24 NOTE — PROGRESS NOTES
Discharge instruction completed by virtual nurse, patient voiced no questions at this time after virtual nurse completed patients AVS.

## 2023-01-24 NOTE — PROGRESS NOTES
Sorin Dennis was evaluated today and a DME order was entered for a wheeled walker because he requires this to successfully complete daily living tasks of eating and toileting. A wheeled walker is necessary due to the patient's unsteady gait, upper body weakness, and inability to  an ambulation device; and he can ambulate only by pushing a walker instead of a lesser assistive device such as a cane, crutch, or standard walker. The need for this equipment was discussed with the patient and he understands and is in agreement.      Electronically signed by ZOLTAN Ferguson on 1/24/2023 at 12:12 PM

## 2023-01-24 NOTE — PROGRESS NOTES
This patient stated that he went to turn in bed and felt like his left hip popped and had immediate pain for a minute (no longer complaining of pain unless he moves his left leg), and now he has decreased mobility in that left leg. ZOLTAN Staton notified. X-ray of Left hip ordered.

## 2023-01-24 NOTE — TELEPHONE ENCOUNTER
Hospital follow up appointment scheduled for 1/31/23 at [phone] with Dr. Brendan Don. Patient will need a NAIDA call on 1-25-23 or 1-26-23. Patient being discharged today 1/24/23 per hospital staff.

## 2023-01-24 NOTE — FLOWSHEET NOTE
01/24/23 1401   Safe Environment   Safety Measures Other (comment)  (VN reviewed dc education medication , rx and follow ups , provided home health phone number to pt , pt would like prescriptions delivered to room if possible , message sent to bedside nurse via perfect serve)   Call placed , pt responds to audio , permits video . Pt denied any voiced concerns or complaints at this time , call light within reach , no s/s distress noted .

## 2023-01-24 NOTE — PROGRESS NOTES
Alber Wilkins 61  INPATIENT OCCUPATIONAL THERAPY  [facility] RENAL TELEMETRY 6K  EVALUATION    Time:   Time In:   Time Out: 8066  Timed Code Treatment Minutes: 32 Minutes  Minutes: 34          Date: 2023  Patient Name: Jan Crook,   Gender: male      MRN: [id number]  : 1960  (65 y.o.)  Referring Practitioner: Wayne Vee MD  Diagnosis: Hyponatremia  Additional Pertinent Hx: Initial H&P \"Patient is a 67-year-old male who was admitted to [address] after presenting to the ED after a fall late hours the night prior. According to him he woke up around 3 AM to go to the bathroom however as soon as he got up he felt weak and fell down on his knees and could not get up for a couple of hours until he was able to get in touch with his brother who called the squad.  2 days ago patient was in urgent care and tested positive for COVID at that time he was started on molnupivar because he was not a candidate for back slowly due to multiple comorbidities and interactions with other medications. Patient endorses cough, generalized weakness as well as back pain and right hip pain radiating down right lower extremity. He also has a headache and takes Tylenol every 4 hours as needed. Has been running subjective fever as well. Denies chest pain, LOC, N/V/D.\"      : No acute events overnight. Pt complaining of foot pain. Pt states that today he has not had a fever. Pt states that he is very weak. Restrictions/Precautions:  Restrictions/Precautions: General Precautions, Fall Risk  Position Activity Restriction  Other position/activity restrictions: -room air    Subjective  Chart Reviewed: Yes, Orders, Progress Notes, History and Physical, Imaging  Patient assessed for rehabilitation services?: Yes    Subjective: RN okayed OT session. Upon arrival patient was sitting up in bed. Pt was nervous to get OOB this date. Pt was agreeable to OT session.     Pain: 5/10: Left Hip pain     Vitals: Vitals not assessed per clinical judgement, see nursing flowsheet    Social/Functional History:  Lives With: Alone  Type of Home: House  Home Layout: One level  Home Access: Stairs to enter without rails  Entrance Stairs - Number of Steps: 4 CHAMP   Bathroom Shower/Tub: Walk-in shower  Bathroom Toilet: Standard  Bathroom Accessibility: Accessible     ADL Assistance: Independent  Homemaking Assistance: Independent  Homemaking Responsibilities: Yes  Ambulation Assistance: Independent  Transfer Assistance: Independent    Active : Yes  Mode of Transportation: Car  Occupation: On disability       VISION:WFL    HEARING:  WFL    COGNITION: WFL    RANGE OF MOTION:  Bilateral Upper Extremity:  WFL    STRENGTH:  Bilateral Upper Extremity:  Impaired - deconditioned. Grossly 4/5    SENSATION:   WFL    ADL:   Lower Extremity Dressing: Maximum Assistance. Socks . BALANCE:  Sitting Balance:  Stand By Assistance. Sitting EOB. Upon initial sitting task pt reports dizziness. Dizziness subsided after deep breathing. Standing Balance: Contact Guard Assistance. BUE on walker x 4 minutes. BED MOBILITY:  Supine to Sit: Minimal Assistance, with head of bed raised, with rail, with increased time for completion    Scooting: Contact Guard Assistance, with increased time for completion to scoot B hips to EOB. TRANSFERS:  Sit to Stand:  [address], with increased time for completion, cues for hand placement. Stand to Sit: Contact Guard Assistance. FUNCTIONAL MOBILITY:  Assistive Device: Rolling Walker  Assist Level:  Contact Guard Assistance. Distance:  EOB to recliner ~ 4 feet   Slow pace, No LOB. Activity Tolerance:  Patient tolerance of  treatment: good. Assessment: This 65year old male presents with Covid 19 and s/p fall. Pt demonstrates weakness, decreased balance, decreased endurance.  Pt requires skilled OT intervention to increase indep and safety with all self cares, transfers, mobility, and IADLs to return to PLOF. Without skilled OT intervention patient is at increased risk for falls, caregiver burden, and hospital readmission after discharge. Pt would benefit from OT at discharge. Performance deficits / Impairments: Decreased functional mobility , Decreased strength, Decreased endurance, Decreased ADL status, Decreased balance, Decreased high-level IADLs  Prognosis: Good  REQUIRES OT FOLLOW-UP: Yes    Treatment Initiated: Treatment and education initiated within context of evaluation. Evaluation time included review of current medical information, gathering information related to past medical, social and functional history, completion of standardized testing, formal and informal observation of tasks, assessment of data and development of plan of care and goals. Treatment time included skilled education and facilitation of tasks to increase safety and independence with ADL's for improved functional independence and quality of life. Discharge Recommendations:  Continue to assess pending progress, Home with Home health OT    Patient Education:     Patient Education  Education Given To: Patient  Education Provided: Role of Therapy, Plan of Care, Precautions, ADL Adaptive Strategies, Transfer Training  Education Method: Demonstration, Verbal  Barriers to Learning: None  Education Outcome: Continued education needed    Equipment Recommendations:  Equipment Needed: Yes  Other: walker? Plan:  Times Per Week: 5x  Times Per Day: Once a day  Current Treatment Recommendations: Strengthening, Balance training, Functional mobility training, Endurance training, Equipment evaluation, education, & procurement, Safety education & training, Patient/Caregiver education & training, Self-Care / ADL, Home management training. See long-term goal time frame for expected duration of plan of care. If no long-term goals established, a short length of stay is anticipated.     Goals:  Patient goals : Go Home  Short Term Goals  Time Frame for Short Term Goals: Until discharge  Short Term Goal 1: Pt will complete BUE strengthening exercises with min vcs for technique to increase indep and endurance with all self cares and transfers. Short Term Goal 2: Pt will complete dynamic standing task x 5 minutes with 1-2 UE relrease and s to increase indep and endurance with all sinkside grooming. Short Term Goal 3: Pt will complete functional mobility to/from BR and HH distances with S and 0 vcs for safety to increase indep with toileting. Short Term Goal 4: Pt will complete BADL routine with S and 0 vcs for safety to increase indep in home environment. Additional Goals?: No         Following session, patient left in safe position with all fall risk precautions in place.

## 2023-01-24 NOTE — PLAN OF CARE
Problem: Respiratory - Adult  Goal: Clear lung sounds  Outcome: Progressing     Problem: Respiratory - Adult  Goal: Achieves optimal ventilation and oxygenation  Outcome: Progressing   Continue therapy as ordered to achieve and maintain clear breath sounds and improve aeration. Pt mutually agrees with the plan.

## 2023-01-24 NOTE — PROGRESS NOTES
Pt was sitting as he was dealing with covid.  He was hopeful and wanted prayer to cope and heal.    01/24/23 1439   Encounter Summary   Encounter Overview/Reason  Initial Encounter   Service Provided For: Patient   Referral/Consult From: [address] [place] [address] Family members   Last Encounter  01/24/23   Complexity of Encounter Low   Begin Time 1350   End Time  1355   Total Time Calculated 5 min   Spiritual/Emotional needs   Type Spiritual Support   Assessment/Intervention/Outcome   Assessment Coping   Intervention Empowerment

## 2023-01-24 NOTE — CARE COORDINATION
1/24/23, 1:17 PM EST    Discharge to home alone. Nickolas Lebron has decided to borrow a walker from his mother, due to [employer] no longer providing delivery for DME. Denies further needs. Patient goals/plan/ treatment preferences discussed by  and . Patient goals/plan/ treatment preferences reviewed with patient/ family. Patient/ family verbalize understanding of discharge plan and are in agreement with goal/plan/treatment preferences. Understanding was demonstrated using the teach back method. AVS provided by RN at time of discharge, which includes all necessary medical information pertaining to the patients current course of illness, treatment, post-discharge goals of care, and treatment preferences.      Services At/After Discharge: None Plastic Surgery

## 2023-01-24 NOTE — CARE COORDINATION
1/24/23, 1:35 PM EST    DISCHARGE PLANNING EVALUATION    ESTER was advised by RN Evelyn that pt would like [facility] as he has had it in the past.    SW left message for [facility] to call SW regarding new referral.     2:22 PM update:  received call back from [facility], referral completed.

## 2023-01-25 NOTE — TELEPHONE ENCOUNTER
Care Transitions Initial Follow Up Call    Outreach made within 2 business days of discharge: Yes    Patient: Pedro Wells Patient : 1960   MRN: [id number]  Reason for Admission: There are no discharge diagnoses documented for the most recent discharge. Discharge Date: 23       Spoke with: Demetra Goddard    Discharge department/facility: [facility]    TCM Interactive Patient Contact:  Was patient able to fill all prescriptions: Yes  Was patient instructed to bring all medications to the follow-up visit: Yes  Is patient taking all medications as directed in the discharge summary?  Yes  Does patient understand their discharge instructions: Yes  Does patient have questions or concerns that need addressed prior to 7-14 day follow up office visit: no    Scheduled appointment with PCP within 7-14 days    Follow Up  Future Appointments   Date Time Provider Gricelda Dillard   2023 11:45 AM MD PAPA Heaton [facility] [place]   3/31/2023 11:30 AM Mindi Lyons [place] [address]   2023 10:15 AM MD PAPA He[facility] [place]   2023  9:20 AM PIEDAD Ponce - D[place] [facility] - MADELYN HUSSEIN  OFFENE IICHASE   2023  9:30 AM MD Myra Manzo [address] [facility]   10/30/2023 10:45 AM Fernando Weinberg MD [address] [place]  Alexa Cummins, [address] [place] [address]

## 2023-01-26 LAB
BACTERIA BLD AEROBE CULT: NORMAL
BACTERIA BLD AEROBE CULT: NORMAL

## 2023-01-31 ENCOUNTER — OFFICE VISIT (OUTPATIENT)
Dept: FAMILY MEDICINE CLINIC | Age: 63
End: 2023-01-31
Payer: MEDICAID

## 2023-01-31 VITALS
OXYGEN SATURATION: 97 % | WEIGHT: 283.2 LBS | RESPIRATION RATE: 16 BRPM | HEART RATE: 72 BPM | BODY MASS INDEX: 41.95 KG/M2 | HEIGHT: 69 IN | SYSTOLIC BLOOD PRESSURE: 132 MMHG | DIASTOLIC BLOOD PRESSURE: 72 MMHG | TEMPERATURE: 97.6 F

## 2023-01-31 DIAGNOSIS — J12.82 PNEUMONIA DUE TO COVID-19 VIRUS: Primary | ICD-10-CM

## 2023-01-31 DIAGNOSIS — U07.1 PNEUMONIA DUE TO COVID-19 VIRUS: Primary | ICD-10-CM

## 2023-01-31 DIAGNOSIS — N39.0 E. COLI UTI: ICD-10-CM

## 2023-01-31 DIAGNOSIS — Z09 HOSPITAL DISCHARGE FOLLOW-UP: ICD-10-CM

## 2023-01-31 DIAGNOSIS — B96.20 E. COLI UTI: ICD-10-CM

## 2023-01-31 PROCEDURE — 1111F DSCHRG MED/CURRENT MED MERGE: CPT | Performed by: FAMILY MEDICINE

## 2023-01-31 PROCEDURE — 99214 OFFICE O/P EST MOD 30 MIN: CPT | Performed by: FAMILY MEDICINE

## 2023-01-31 ASSESSMENT — ENCOUNTER SYMPTOMS
WHEEZING: 0
COUGH: 0
SHORTNESS OF BREATH: 0

## 2023-01-31 NOTE — PROGRESS NOTES
Post-Discharge Transitional Care Follow Up      Jose Varma   YOB: 1960    Date of Office Visit:  1/31/2023  Date of Hospital Admission: 1/21/23  Date of Hospital Discharge: 1/24/23  Readmission Risk Score (high >=14%. Medium >=10%):Readmission Risk Score: 15.5      Care management risk score Rising risk (score 2-5) and Complex Care (Scores >=6): No Risk Score On File     Non face to face  following discharge, date last encounter closed (first attempt may have been earlier): 01/24/2023     Call initiated 2 business days of discharge: Yes     Pneumonia due to COVID-19 virus  E. coli UTI  Hospital discharge follow-up  -     MA DISCHARGE MEDS RECONCILED W/ CURRENT OUTPATIENT MED LIST    Medical Decision Making: moderate complexity  Return if symptoms worsen or fail to improve. Status post hospital admission for pneumonia due to COVID and E. coli UTI. Some physical deconditioning. Continue with home health. Subjective:   HPI    Inpatient course: Discharge summary reviewed- see chart. Interval history/Current status:     Sepsis due to UTI and covid infection. Eating and drinking  Gets muscle spasms in LLE. Has not needed lispro. No hypoglycemia. Getting home health  Still feels weak.     Patient Active Problem List   Diagnosis    Hypertension    Hyperlipidemia    CAD (coronary artery disease)    Chest pain, atypical    Chronic low back pain    Hypertriglyceridemia    Morbidly obese (HCC)    Abnormal stress test    Os trigonum    Elevated PSA    Lumbar spinal stenosis    Well controlled type 2 diabetes mellitus (HCC)    ROBSON (obstructive sleep apnea)    Back pain at L4-L5 level    Anxiety    Microalbuminuria    H/O heart artery stent    Urinary retention    Malignant neoplasm of prostate (HCC)    COPD, severe ([address]    Pneumonia due to COVID-19 virus       Medications listed as ordered at the time of discharge from hospital     Medication List            Accurate as of January 31, 2023 11:55 AM. If you have any questions, ask your nurse or doctor. CONTINUE taking these medications      * albuterol (2.5 MG/3ML) 0.083% nebulizer solution  Commonly known as: PROVENTIL  Take 3 mLs by nebulization every 6 hours as needed for Wheezing     * albuterol sulfate  (90 Base) MCG/ACT inhaler  Commonly known as: ProAir HFA  Inhale 2 puffs into the lungs every 6 hours as needed for Wheezing or Shortness of Breath     ALPRAZolam 1 MG tablet  Commonly known as: XANAX  take 1 tablet by mouth twice a day     amLODIPine 10 MG tablet  Commonly known as: NORVASC  Take 1 tablet by mouth daily     Anoro Ellipta 62.5-25 MCG/ACT Aepb  Generic drug: Umeclidinium-Vilanterol  Inhale 1 puff into the lungs daily     ascorbic acid 500 MG tablet  Commonly known as: RA Vitamin C/Margareth Hips  TAKE 1 TABLET BY MOUTH DAILY     aspirin 325 MG tablet     B-D ULTRAFINE III SHORT PEN 31G X 8 MM Misc  Generic drug: Insulin Pen Needle  USE TWICE DAILY WITH THE BASAGLAR PEN. BD Insulin Syringe U/F 31G X 5/16\" 1 ML Misc  Generic drug: Insulin Syringe-Needle U-100  USE AS DIRECTED THREE TIMES A DAY. clopidogrel 75 MG tablet  Commonly known as: PLAVIX  take 1 tablet by mouth once daily     cromolyn 4 % ophthalmic solution  Commonly known as: OPTICROM  Instill 1 drop into each eye four times a day     enalapril 10 MG tablet  Commonly known as: VASOTEC     fluticasone 50 MCG/ACT nasal spray  Commonly known as: Flonase  One spray in each nostril q hs     gemfibrozil 600 MG tablet  Commonly known as: LOPID  Take 1 tablet by mouth 2 times daily     * insulin lispro 100 UNIT/ML injection vial  Commonly known as: HUMALOG  inject 10 units subcutaneously twice a day if needed for HIGH BLOOD SUGAR.      * insulin lispro 100 UNIT/ML Soln injection vial  Commonly known as: HUMALOG     Insulin Syringes (Disposable) U-100 1 ML Misc  1 each by Does not apply route 3 times daily 31 gauge x 8 mm  ultrafine 2     Lantus SoloStar 100 UNIT/ML injection pen  Generic drug: insulin glargine  Inject 44 Units into the skin 2 times daily     metFORMIN 1000 MG tablet  Commonly known as: GLUCOPHAGE  Take 1 tablet by mouth daily (with breakfast)     metoprolol tartrate 25 MG tablet  Commonly known as: LOPRESSOR  Take 1 tablet by mouth 2 times daily     nitroGLYCERIN 0.4 MG SL tablet  Commonly known as: NITROSTAT  Place 1 tablet under the tongue every 5 minutes as needed for Chest pain     oxybutynin 5 MG extended release tablet  Commonly known as: Ditropan XL  Take 1 tablet by mouth in the morning and 1 tablet before bedtime. pantoprazole 40 MG tablet  Commonly known as: PROTONIX  take 1 tablet by mouth once daily     pravastatin 40 MG tablet  Commonly known as: PRAVACHOL  Take 2 tablets by mouth nightly     RA Vitamin B-12 TR 1000 MCG Tbcr  Generic drug: Cyanocobalamin ER  take 1 tablet by mouth once daily     tamsulosin 0.4 MG capsule  Commonly known as: FLOMAX  take 2 capsules by mouth once daily     Vitamin D (Cholecalciferol) 25 MCG (1000 UT) Tabs           * This list has 4 medication(s) that are the same as other medications prescribed for you. Read the directions carefully, and ask your doctor or other care provider to review them with you.                    Medications marked \"taking\" at this time  Outpatient Medications Marked as Taking for the 1/31/23 encounter (Office Visit) with Ramsey Haji MD   Medication Sig Dispense Refill    albuterol (PROVENTIL) (2.5 MG/3ML) 0.083% nebulizer solution Take 3 mLs by nebulization every 6 hours as needed for Wheezing 150 mL 1    albuterol sulfate HFA (PROAIR HFA) 108 (90 Base) MCG/ACT inhaler Inhale 2 puffs into the lungs every 6 hours as needed for Wheezing or Shortness of Breath 1 each 3    insulin glargine (LANTUS SOLOSTAR) 100 UNIT/ML injection pen Inject 44 Units into the skin 2 times daily 82 mL 0    ALPRAZolam (XANAX) 1 MG tablet take 1 tablet by mouth twice a day 180 tablet 0 enalapril (VASOTEC) 10 MG tablet take 1 tablet by mouth once daily      insulin lispro (HUMALOG) 100 UNIT/ML SOLN injection vial inject 10 units subcutaneously twice a day if needed for HIGH BLOOD SUGAR      tamsulosin (FLOMAX) 0.4 MG capsule take 2 capsules by mouth once daily 180 capsule 3    BD INSULIN SYRINGE U/F 31G X 5/16\" 1 ML MISC USE AS DIRECTED THREE TIMES A DAY. 100 each 3    insulin lispro (HUMALOG) 100 UNIT/ML injection vial inject 10 units subcutaneously twice a day if needed for HIGH BLOOD SUGAR. 30 mL 3    metoprolol tartrate (LOPRESSOR) 25 MG tablet Take 1 tablet by mouth 2 times daily 180 tablet 4    nitroGLYCERIN (NITROSTAT) 0.4 MG SL tablet Place 1 tablet under the tongue every 5 minutes as needed for Chest pain 25 tablet 3    gemfibrozil (LOPID) 600 MG tablet Take 1 tablet by mouth 2 times daily 180 tablet 4    clopidogrel (PLAVIX) 75 MG tablet take 1 tablet by mouth once daily 90 tablet 4    amLODIPine (NORVASC) 10 MG tablet Take 1 tablet by mouth daily 90 tablet 4    pravastatin (PRAVACHOL) 40 MG tablet Take 2 tablets by mouth nightly 180 tablet 4    metFORMIN (GLUCOPHAGE) 1000 MG tablet Take 1 tablet by mouth daily (with breakfast) 180 tablet 1    fluticasone (FLONASE) 50 MCG/ACT nasal spray One spray in each nostril q hs 3 each 1    pantoprazole (PROTONIX) 40 MG tablet take 1 tablet by mouth once daily 90 tablet 1    ascorbic acid (RA VITAMIN C/ERNA HIPS) 500 MG tablet TAKE 1 TABLET BY MOUTH DAILY 30 tablet 3    Insulin Pen Needle (B-D ULTRAFINE III SHORT PEN) 31G X 8 MM MISC USE TWICE DAILY WITH THE [employer] PEN.  200 each 1    Insulin Syringes, Disposable, U-100 1 ML MISC 1 each by Does not apply route 3 times daily 31 gauge x 8 mm  ultrafine 2 100 each 5    cromolyn (OPTICROM) 4 % ophthalmic solution Instill 1 drop into each eye four times a day 10 mL 5    umeclidinium-vilanterol (ANORO ELLIPTA) 62.5-25 MCG/INH AEPB inhaler Inhale 1 puff into the lungs daily 1 each 11    Cyanocobalamin ER (RA VITAMIN B-12 TR) 1000 MCG TBCR take 1 tablet by mouth once daily 30 tablet 2    Vitamin D, Cholecalciferol, 25 MCG (1000 UT) TABS Take 1,000 Units by mouth daily      aspirin 325 MG tablet Take 325 mg by mouth daily. Medications patient taking as of now reconciled against medications ordered at time of hospital discharge: Yes    Review of Systems   Constitutional:  Negative for chills and fever. Respiratory:  Negative for cough, shortness of breath and wheezing. Cardiovascular:  Negative for chest pain. Genitourinary:  Negative for dysuria and frequency. Objective:    /72   Pulse 72   Temp 97.6 °F (36.4 °C)   Resp 16   Ht 5' 9\" (1.753 m)   Wt 283 lb 3.2 oz (128.5 kg)   SpO2 97%   BMI 41.82 kg/m²   Physical Exam  Vitals reviewed. Constitutional:       Appearance: He is well-developed. He is obese. He is not ill-appearing. Interventions: Nasal cannula in place. Cardiovascular:      Rate and Rhythm: Normal rate and regular rhythm. Heart sounds: No murmur heard. Pulmonary:      Effort: Pulmonary effort is normal. No respiratory distress. Breath sounds: Normal breath sounds. No wheezing or rhonchi. Musculoskeletal:      Right lower leg: Edema (trace) present. Left lower leg: Edema (trace) present. Neurological:      Mental Status: He is alert. Mental status is at baseline. Psychiatric:         Mood and Affect: Mood normal.         Behavior: Behavior normal.       An electronic signature was used to authenticate this note.   --Raghavendra Cameron MD

## 2023-02-04 DIAGNOSIS — J98.4 MIXED RESTRICTIVE AND OBSTRUCTIVE LUNG DISEASE (HCC): ICD-10-CM

## 2023-02-04 DIAGNOSIS — J43.9 MIXED RESTRICTIVE AND OBSTRUCTIVE LUNG DISEASE (HCC): ICD-10-CM

## 2023-02-06 RX ORDER — ALBUTEROL SULFATE 2.5 MG/3ML
SOLUTION RESPIRATORY (INHALATION)
Qty: 150 ML | Refills: 1 | Status: SHIPPED | OUTPATIENT
Start: 2023-02-06

## 2023-02-06 NOTE — TELEPHONE ENCOUNTER
History and physical  Patient: Sheldon Boone Jr. Date: 8/15/2019   male, 84 year old  Admit Date: 8/15/2019   Attending: Juliette Coronado MD    Date of service 8/15/2019    PRIMARY CARE PROVIDER:  Mike Davis MD   aTTENDING Physician    Juliette Coronado MD  CHIEF COMPLAINT    Acute kidney injury   HPI    Sheldon Boone Jr. is a 84 year old male  With PMH significant for prostate cancer post prostatectomy with maintenance hormonal therapy, who presents with acute kidney injury. Pt was seen by urology and had cystoscopy with stent placement for hydronephrosis. Pt was seen in clinic yesterday and had elevated creatinine. Cystogram was suggestive of persistent obstruction. 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 He reports he had left sided flank pain earlier which has since resolved. He denies difficulty with urination or change in urination and reports blood in urine has resolved. He denies chest pain, nausea, vomiting, diarrhea. Pt reports constipation and last had bowel movement on Monday after enema.     PAST MEDICAL & SURGICAL HISTORY    Past Medical History:   Diagnosis Date   • Ankylosing spondylitis (CMS/HCC)    • Cataract    • GERD (gastroesophageal reflux disease)    • High cholesterol    • Hypothyroid    • Osteopenia    • Prostate cancer (CMS/HCC)    • PSA elevation    • Papa Goff auricular syndrome    • Stroke (cerebrum) (CMS/HCC)      Past Surgical History:   Procedure Laterality Date   • Anesth,lower arm surgery      Removal of radial head   • Eye surgery      Lid reconstruction    • Prostatectomy     • Tonsillectomy and adenoidectomy         CURRENT MEDICATIONS    Medications Prior to Admission   Medication Sig Dispense Refill   • diclofenac (VOLTAREN) 0.1 % ophthalmic solution      • HYDROcodone-acetaminophen (NORCO) 5-325 MG per tablet Take 1 tablet by mouth every 6 hours as needed for Pain. 20 tablet 0   •  Perri Pendleton called requesting a refill on the following medications:  Requested Prescriptions     Pending Prescriptions Disp Refills    albuterol (PROVENTIL) (2.5 MG/3ML) 0.083% nebulizer solution [Pharmacy Med Name: ALBUTEROL SUL 2.5 MG/3 ML SOLN] 150 mL 1     Sig: inhale contents of 1 vial ( 3 milliliters ) in nebulizer by mouth and INTO THE LUNGS every 6 hours if needed for wheezing       Date of last visit: 1/31/2023  Date of next visit (if applicable):4/18/2023  Date of last refill: 01/17/2023, #150ml/1  Pharmacy Name:[facility], [place]      Thanks,  Carolina Rhoades, [address] [place] [address] Arabella Walker phenazopyridine (PYRIDIUM) 100 MG tablet Take 1 tablet by mouth 3 times daily as needed for Pain. 30 tablet 1   • [] cephalexin (KEFLEX) 500 MG capsule Take 1 capsule by mouth 3 times daily for 7 days. 21 capsule 0   • Leuprolide Acetate, 3 Month, (LUPRON DEPOT, 3-MONTH, IM)      • Polyethyl Glycol-Propyl Glycol (SYSTANE) 0.4-0.3 % Gel Systane gel drops     • calcium carbonate (TUMS EX) 750 MG chewable tablet Chew by mouth every 2 hours as needed.     • prednisoLONE acetate (PRED FORTE, OMNIPRED) 1 % ophthalmic suspension Place 1 drop into right eye 3 times daily. Start drops AFTER surgery and continue until gone. 5 mL 0   • moxifloxacin (VIGAMOX) 0.5 % ophthalmic solution Place 1 drop into right eye 3 times daily. Start 3 days BEFORE surgery and bring to surgery 1 Bottle 0   • prednisoLONE acetate (PRED FORTE, OMNIPRED) 1 % ophthalmic suspension Place 1 drop into left eye 3 times daily. Start drops AFTER surgery and continue until gone. 5 mL 0   • moxifloxacin (VIGAMOX) 0.5 % ophthalmic solution Place 1 drop into left eye 3 times daily. Start 3 days BEFORE  surgery and bring to surgery 1 Bottle 0   • bromfenac (PROLENSA) 0.07 % ophthalmic solution Start 3 days BEFORE surgery and bring to surgery. Place 1 drop in the surgical eye daily . Use for 2 weeks in the surgical eye then stop. 3 mL 0   • acetaminophen (TYLENOL) 500 MG tablet Take 500 mg by mouth every 6 hours as needed for Pain.     • Cholecalciferol (BIO-D-MULSION FORTE) 2000 UNT/0.03ML Liquid      • CALCIUM CITRATE-VITAMIN D PO Take by mouth daily.      • Coenzyme Q10 (COQ10 PO)      • clindamycin (CLEOCIN T) 1 % topical solution Apply topically 2 times daily as needed. Indications: Uses for face - ingrown hairs      • ATORVASTATIN CALCIUM PO Take 40 mg by mouth.      • Clopidogrel Bisulfate (PLAVIX PO) Take 75 mg by mouth.      • GABAPENTIN PO Take 100 mg by mouth 2 times daily. 1 tab in AM & 2 tabs @ HS     • LEVOTHYROXINE SODIUM PO Take 137 mcg by  mouth.      • Multiple Vitamin (MULTI-VITAMIN DAILY PO)      • DICLOFENAC SODIUM PO Take by mouth as needed.      • DENOSUMAB SC        ALLERGIES    ALLERGIES:   Allergen Reactions   • Diazepam DIZZINESS     Heavy sedation.      SOCIAL HISTORY    Social History     Tobacco Use   • Smoking status: Never Smoker   • Smokeless tobacco: Never Used   Substance Use Topics   • Alcohol use: Not Currently     Frequency: Never     Comment: very rare   • Drug use: Never     FAMILY HISTORY    Family History   Problem Relation Age of Onset   • Heart disease Mother    • Stroke Mother    • Cataracts Mother    • Cataracts Father    • Cancer Brother      REVIEW OF SYSTEMS    All systems reviewed and negative except for those mentioned in the history of present illness    PHYSICAL EXAMINATION    Vital 24 Hour Range Most Recent Value   Temperature Temp  Min: 97.5 °F (36.4 °C)  Max: 97.8 °F (36.6 °C) 97.8 °F (36.6 °C)   Pulse Pulse  Min: 68  Max: 69 69   Resp{ iratory Resp  Min: 15  Max: 16 16   Blood Pressure BP  Min: 112/62  Max: 139/66 139/66   Pulse Oximetry SpO2  Min: 95 %  Max: 95 % 95 %   General:  Well-developed and well nourished.   Neurologic:  Alert & oriented x 3. Facial asymmetry with drooping of right side  Psych: Well appearing male in no distress. Good eye contact. Speech is of a normal content and speed.  HEENT:  PERRLA, EOMI. The external auditory canals are normal bilaterally. Nares are clear. The oral mucosa is moist and without lesions. The pharynx is clear.   Neck:  No masses, thyromegaly or adenopathy of neck or supraclavicular area.   Back:  Normal thoracic kyphosis and lumbar lordosis. The spine is non-tender. No costovertebral angle tenderness.   Chest:  Chest is symmetric. Breathing is non-labored. Lungs are clear to auscultation. No rales, rhonchi or wheezes. Breath sounds are equal bilaterally.   Heart:  Regular rate and rhythm. Normal S1 and S2 without S3 or S4. No murmur, rub or gallops.   Abdomen:  Soft,  non-distended, non-tender, bowel sounds are active. No hepatomegaly or splenomegaly. No masses.       Labs    Recent Labs   Lab 08/14/19  1417   SODIUM 132*   POTASSIUM 4.7   CHLORIDE 98   CO2 26   BUN 69*   CREATININE 4.66*   GLUCOSE 119*     No results found  No results found for this or any previous visit.   No results found for this or any previous visit.  No results found    Code Status- Prior    Assessment & plan:    There are no active hospital problems to display for this patient.      Acute on chronic kidney disease stage II-Will monitor urine output. Pt does not appear to have need for acute dialysis but will monitor closely. Likely cause is post renal given known bilateral obstruction but will get urine electrolytes and urinalysis which may be of limited benefit given recent instrumentation. Will get repeat BMP. Plan is for nephrostomy tube placement with IR when off plavix for four days   Prostate cancer-chronic. Pt is receiving therapy as outpatient for elevated PSA after prostatectomy but no metastasis have been seen.   Stroke history-Plavix held at this time for procedure. Explained to family difference between blood thinner and anti platelet agent and that blood thinner would be of little benefit if it was not felt to be an embolic stroke.   Constipation-daily miralax and PRN orders placed. Family advised to avoid sodium phosphate enemas if they are buying over the counter products.   Hypothyroidism-stable. Will continue home supplementation   Hyponatremia-mild. Likely from acute kidney injury. Will recheck at this time.   Dilating ascending thoracic aneurysm-per documentation provided by family this was stable on last scan last year     · DVT Prophylaxis-heparin  · Disposition- admit to [facility]     GOALS OF CARE :     Goals of care were discussed with the patient and family which included but not limited to anticipated treatment course during the current hospitalization, recovery from current  event, discharge planning and transitions of care responsibilities and expected outcomes. Code Status was addressed on admission as well.  End of life care discussion Not done    Is the patient expected to require a two midnight stay in the hospital? Yes I certify that I do expect inpatient services for greater than two midnights are medically necessary for this patient. Please see H&P and MD Progress Notes for additional information about the patient's course of treatment.    Juliette Coronado MD  8/15/2019  12:56 PM

## 2023-02-20 DIAGNOSIS — R35.1 NOCTURIA: ICD-10-CM

## 2023-02-20 NOTE — TELEPHONE ENCOUNTER
Maricel Henao called requesting a refill on the following medications:  Requested Prescriptions     Pending Prescriptions Disp Refills    oxybutynin (DITROPAN XL) 5 MG extended release tablet 180 tablet 3     Sig: Take 1 tablet by mouth 2 times daily     Pharmacy verified:  [email or web address]      Date of last visit: 01/09/23  Date of next visit (if applicable): 1/41/0581

## 2023-02-21 RX ORDER — OXYBUTYNIN CHLORIDE 5 MG/1
5 TABLET, EXTENDED RELEASE ORAL 2 TIMES DAILY
Qty: 180 TABLET | Refills: 0 | Status: SHIPPED | OUTPATIENT
Start: 2023-02-21 | End: 2023-03-23

## 2023-02-28 DIAGNOSIS — J98.4 MIXED RESTRICTIVE AND OBSTRUCTIVE LUNG DISEASE (HCC): ICD-10-CM

## 2023-02-28 DIAGNOSIS — J43.9 MIXED RESTRICTIVE AND OBSTRUCTIVE LUNG DISEASE (HCC): ICD-10-CM

## 2023-02-28 RX ORDER — ALBUTEROL SULFATE 2.5 MG/3ML
SOLUTION RESPIRATORY (INHALATION)
Qty: 150 ML | Refills: 1 | OUTPATIENT
Start: 2023-02-28

## 2023-03-29 DIAGNOSIS — K21.9 GASTROESOPHAGEAL REFLUX DISEASE WITHOUT ESOPHAGITIS: ICD-10-CM

## 2023-03-29 DIAGNOSIS — J30.1 CHRONIC SEASONAL ALLERGIC RHINITIS DUE TO POLLEN: ICD-10-CM

## 2023-03-29 DIAGNOSIS — J43.9 MIXED RESTRICTIVE AND OBSTRUCTIVE LUNG DISEASE (HCC): ICD-10-CM

## 2023-03-29 DIAGNOSIS — J98.4 MIXED RESTRICTIVE AND OBSTRUCTIVE LUNG DISEASE (HCC): ICD-10-CM

## 2023-03-29 RX ORDER — PANTOPRAZOLE SODIUM 40 MG/1
TABLET, DELAYED RELEASE ORAL
Qty: 90 TABLET | Refills: 3 | Status: SHIPPED | OUTPATIENT
Start: 2023-03-29

## 2023-03-29 RX ORDER — UMECLIDINIUM BROMIDE AND VILANTEROL TRIFENATATE 62.5; 25 UG/1; UG/1
POWDER RESPIRATORY (INHALATION)
Qty: 1 EACH | Refills: 11 | Status: SHIPPED | OUTPATIENT
Start: 2023-03-29

## 2023-03-30 ENCOUNTER — APPOINTMENT (OUTPATIENT)
Dept: RADIATION ONCOLOGY | Age: 63
End: 2023-03-30
Attending: RADIOLOGY
Payer: MEDICAID

## 2023-03-30 ENCOUNTER — HOSPITAL ENCOUNTER (OUTPATIENT)
Dept: RADIATION ONCOLOGY | Age: 63
Discharge: HOME | End: 2023-03-30
Attending: RADIOLOGY
Payer: MEDICAID

## 2023-03-30 VITALS
BODY MASS INDEX: 41.64 KG/M2 | TEMPERATURE: 97.7 F | RESPIRATION RATE: 18 BRPM | DIASTOLIC BLOOD PRESSURE: 58 MMHG | OXYGEN SATURATION: 94 % | HEART RATE: 69 BPM | SYSTOLIC BLOOD PRESSURE: 126 MMHG | WEIGHT: 282 LBS

## 2023-03-30 PROCEDURE — 99212 OFFICE O/P EST SF 10 MIN: CPT | Performed by: NURSE PRACTITIONER

## 2023-03-30 PROCEDURE — 99213 OFFICE O/P EST LOW 20 MIN: CPT | Performed by: NURSE PRACTITIONER

## 2023-03-30 RX ORDER — FLUTICASONE PROPIONATE 50 MCG
SPRAY, SUSPENSION (ML) NASAL
Qty: 48 G | Refills: 0 | Status: SHIPPED | OUTPATIENT
Start: 2023-03-30

## 2023-03-30 ASSESSMENT — ENCOUNTER SYMPTOMS
ANAL BLEEDING: 0
CONSTIPATION: 0
ABDOMINAL PAIN: 0
RECTAL PAIN: 0
DIARRHEA: 0
VOMITING: 0
BLOOD IN STOOL: 0
SHORTNESS OF BREATH: 0
ABDOMINAL DISTENTION: 0
COUGH: 0
NAUSEA: 0

## 2023-03-30 NOTE — PROGRESS NOTES
history of urinary retention. Calli Garcia received a recommendation for prostate biopsy, which was performed in December 2020. The biopsy returned showing adenocarcinoma Merline's 4+5 = 9 grade group 5 with cancer and multiple cores from both sides of the prostate. Perineural invasion was identified, but there was no definite evidence of extraprostatic extension seen on the core specimens. Calli Garcia reviewed his biopsy results during his urology follow-up. He has significant coronary artery disease with coronary artery stent placement in October 2019 and requires Plavix. Because of these issues, Calli Garcia is not considered a good candidate for prostatectomy. He was seen 1/22/2021 for evaluation and discussion regarding the potential role of radiation therapy in the management of his very high risk prostate cancer. Pool tolerated radiation treatment fairly well. He had an indwelling catheter throughout the course of treatment. This was clamped to allow for bladder filling prior to treatment. He did have issues with urinary leaking around the catheter. Adjusted to timing of clamping it and that did improve the leaking. He was also treated for a UTI during his course of radiation treatment. INTERVAL HISTORY:   Calli Garcia presents to the [facility] today, 03/30/2023, for a post-treatment follow-up after completing radiation treatment. Calli Garcia states he had his last Lupron injection in January 2023 Reports nocturia 3 times per week. He denies leaking, or weak stream. He denies chest pain, sob, fever, chills, abdominal pain, arthralgias, changes in bowel habits, unintentional weight loss or loss of appetite.      Past Medical History:   Diagnosis Date    Abnormal stress test Sept 2012    Lateral Wall Ischemia- done at [facility]    CAD (coronary artery disease)     Cancer ([facility]     Prostate    Chronic low back pain     Diabetes mellitus ([facility] [facility]     Diabetes mellitus ([facility] [address]     Hyperlipidemia     Hypertension

## 2023-03-30 NOTE — TELEPHONE ENCOUNTER
Adelaide Quintero called requesting a refill on the following medications:  Requested Prescriptions     Pending Prescriptions Disp Refills    fluticasone (FLONASE) 50 MCG/ACT nasal spray [Pharmacy Med Name: FLUTICASONE PROP 50 MCG SPRAY] 48 g 0     Sig: instill 1 spray into each nostril at bedtime       Date of last visit: 1/31/2023  Date of next visit (if applicable):4/18/2023  Date of last refill: 10/10/2022  Pharmacy Name: [facility]

## 2023-04-03 DIAGNOSIS — F41.9 ANXIETY: ICD-10-CM

## 2023-04-04 RX ORDER — ALPRAZOLAM 1 MG/1
TABLET ORAL
Qty: 180 TABLET | Refills: 0 | Status: SHIPPED | OUTPATIENT
Start: 2023-04-04 | End: 2023-07-03

## 2023-04-04 NOTE — TELEPHONE ENCOUNTER
Dali Bojorquez called requesting a refill on the following medications:  Requested Prescriptions     Pending Prescriptions Disp Refills    ALPRAZolam (XANAX) 1 MG tablet [Pharmacy Med Name: ALPRAZOLAM 1 MG TABLET] 180 tablet 0     Sig: take 1 tablet by mouth twice a day       Date of last visit: 1/31/2023  Date of next visit (if applicable):4/18/2023  Date of last refill: 1/4/2023  Pharmacy Name: [facility], [place]

## 2023-04-08 NOTE — PROGRESS NOTES
[facility]    Hospital Course:  Antonette Calderon is a 62 y.o. male with PMHx of CAD s/p DELFIN to mid-LAD on 11/11/2019 with Dr. Berto Haas, IDDM2, HLD, HTN, chronic low back pain who presented to [facility] emergency department on 10/14/2020 for progressive productive cough with green/yellow sputum that began on 10/10/2020. He presented to the urgent care earlier in the day the urgent care center and he had desaturation of oxygen and sent via EMS to the ED. Patient denied fever, chills, chest pain, chest tightness, nausea, vomiting, and change in bowel/bladder. Within the ED the patient was afebrile, received 1 Duoneb treatment with improved symptoms and 1 dose of azithromycin. Chest xray revealed mixed airspace interstitial opacities concerning for atypical or viral-type pneumonia. CTA of chest negative for PE.    10/14/2020: Negative respiratory panel, influenza A/B, VRE, COVID, blood cultures, procalcitonin, troponin, MRSA. Subjective (past 24 hours):  - Per nursing staff patient had episodes of oxygen desaturation while sleeping.  - Patient seen and evaluated at bedside with nasal congestion, productive cough, but without any additional complaints. ROS (12 point review of systems completed. Pertinent positives noted. Otherwise ROS is negative).     Medications:  Reviewed    Infusion Medications    dextrose       Scheduled Medications    ALPRAZolam  1 mg Oral BID    amLODIPine  10 mg Oral Daily    aspirin  325 mg Oral Daily    cromolyn  1 drop Both Eyes 4x Daily    enalapril  20 mg Oral Daily    fluticasone  1 spray Each Nostril Daily    gemfibrozil  600 mg Oral BID    insulin glargine  53 Units Subcutaneous BID    metoprolol tartrate  25 mg Oral BID    pantoprazole  40 mg Oral Daily    pravastatin  40 mg Oral Nightly    tamsulosin  0.4 mg Oral Daily    Vitamin D  1,000 Units Oral Daily    sodium chloride flush  10 mL Intravenous 2 times per day    enoxaparin  40 mg Subcutaneous Daily    ipratropium-albuterol  1 ampule Inhalation Q4H WA    insulin lispro  0-6 Units Subcutaneous TID WC    insulin lispro  0-3 Units Subcutaneous Nightly    clopidogrel  75 mg Oral Daily     PRN Meds: albuterol, sodium chloride flush, acetaminophen **OR** acetaminophen, polyethylene glycol, promethazine **OR** ondansetron, glucose, dextrose, glucagon (rDNA), dextrose, benzonatate      Intake/Output Summary (Last 24 hours) at 10/15/2020 1002  Last data filed at 10/15/2020 0753  Gross per 24 hour   Intake 210 ml   Output 750 ml   Net -540 ml       Diet:  DIET CARB CONTROL; Exam:  BP (!) 145/64   Pulse 60   Temp 96.3 °F (35.7 °C) (Oral)   Resp 17   Ht 5' 9\" (1.753 m)   Wt 290 lb 4.8 oz (131.7 kg)   SpO2 91%   BMI 42.87 kg/m²     Physical Exam  Vitals signs and nursing note reviewed. Constitutional:       Appearance: Normal appearance. He is obese. HENT:      Head: Normocephalic and atraumatic. Right Ear: External ear normal.      Left Ear: External ear normal.      Nose: Nose normal.      Mouth/Throat:      Mouth: Mucous membranes are moist.      Pharynx: Oropharynx is clear. Eyes:      Conjunctiva/sclera: Conjunctivae normal.      Pupils: Pupils are equal, round, and reactive to light. Neck:      Musculoskeletal: Normal range of motion and neck supple. Cardiovascular:      Rate and Rhythm: Normal rate and regular rhythm. Pulses: Normal pulses. Heart sounds: Normal heart sounds. Pulmonary:      Effort: Pulmonary effort is normal.      Breath sounds: Wheezing present. Abdominal:      General: Bowel sounds are normal.      Palpations: Abdomen is soft. Musculoskeletal: Normal range of motion. Skin:     General: Skin is warm and dry. Capillary Refill: Capillary refill takes less than 2 seconds. Neurological:      General: No focal deficit present. Mental Status: He is alert. Mental status is at baseline. He is disoriented.    Psychiatric:         Mood and Affect: Mood .

## 2023-04-18 ENCOUNTER — OFFICE VISIT (OUTPATIENT)
Dept: FAMILY MEDICINE CLINIC | Age: 63
End: 2023-04-18
Payer: MEDICAID

## 2023-04-18 ENCOUNTER — HOSPITAL ENCOUNTER (OUTPATIENT)
Dept: GENERAL RADIOLOGY | Age: 63
Discharge: HOME | End: 2023-04-18
Payer: MEDICAID

## 2023-04-18 ENCOUNTER — HOSPITAL ENCOUNTER (OUTPATIENT)
Age: 63
Discharge: HOME | End: 2023-04-18
Payer: MEDICAID

## 2023-04-18 VITALS
WEIGHT: 291.4 LBS | BODY MASS INDEX: 43.16 KG/M2 | HEART RATE: 64 BPM | HEIGHT: 69 IN | RESPIRATION RATE: 16 BRPM | DIASTOLIC BLOOD PRESSURE: 68 MMHG | OXYGEN SATURATION: 96 % | SYSTOLIC BLOOD PRESSURE: 120 MMHG | TEMPERATURE: 97.5 F

## 2023-04-18 DIAGNOSIS — E11.65 TYPE 2 DIABETES MELLITUS WITH HYPERGLYCEMIA, WITH LONG-TERM CURRENT USE OF INSULIN (HCC): Primary | ICD-10-CM

## 2023-04-18 DIAGNOSIS — R07.81 RIB PAIN: ICD-10-CM

## 2023-04-18 DIAGNOSIS — M88.9 PAGET DISEASE OF BONE: ICD-10-CM

## 2023-04-18 DIAGNOSIS — F41.9 ANXIETY: ICD-10-CM

## 2023-04-18 DIAGNOSIS — Z79.4 TYPE 2 DIABETES MELLITUS WITH HYPERGLYCEMIA, WITH LONG-TERM CURRENT USE OF INSULIN (HCC): Primary | ICD-10-CM

## 2023-04-18 DIAGNOSIS — M21.962 ACQUIRED DEFORMITY OF BOTH FEET: ICD-10-CM

## 2023-04-18 DIAGNOSIS — M21.961 ACQUIRED DEFORMITY OF BOTH FEET: ICD-10-CM

## 2023-04-18 DIAGNOSIS — C61 MALIGNANT NEOPLASM OF PROSTATE (HCC): ICD-10-CM

## 2023-04-18 LAB — HBA1C MFR BLD: 7.7 %

## 2023-04-18 PROCEDURE — 3017F COLORECTAL CA SCREEN DOC REV: CPT | Performed by: FAMILY MEDICINE

## 2023-04-18 PROCEDURE — G8427 DOCREV CUR MEDS BY ELIG CLIN: HCPCS | Performed by: FAMILY MEDICINE

## 2023-04-18 PROCEDURE — 2022F DILAT RTA XM EVC RTNOPTHY: CPT | Performed by: FAMILY MEDICINE

## 2023-04-18 PROCEDURE — 99214 OFFICE O/P EST MOD 30 MIN: CPT | Performed by: FAMILY MEDICINE

## 2023-04-18 PROCEDURE — G8417 CALC BMI ABV UP PARAM F/U: HCPCS | Performed by: FAMILY MEDICINE

## 2023-04-18 PROCEDURE — 3051F HG A1C>EQUAL 7.0%<8.0%: CPT | Performed by: FAMILY MEDICINE

## 2023-04-18 PROCEDURE — 83036 HEMOGLOBIN GLYCOSYLATED A1C: CPT | Performed by: FAMILY MEDICINE

## 2023-04-18 PROCEDURE — 71111 X-RAY EXAM RIBS/CHEST4/> VWS: CPT

## 2023-04-18 PROCEDURE — 3078F DIAST BP <80 MM HG: CPT | Performed by: FAMILY MEDICINE

## 2023-04-18 PROCEDURE — 3074F SYST BP LT 130 MM HG: CPT | Performed by: FAMILY MEDICINE

## 2023-04-18 PROCEDURE — 1036F TOBACCO NON-USER: CPT | Performed by: FAMILY MEDICINE

## 2023-04-18 SDOH — ECONOMIC STABILITY: HOUSING INSECURITY
IN THE LAST 12 MONTHS, WAS THERE A TIME WHEN YOU DID NOT HAVE A STEADY PLACE TO SLEEP OR SLEPT IN A SHELTER (INCLUDING NOW)?: NO

## 2023-04-18 SDOH — ECONOMIC STABILITY: INCOME INSECURITY: HOW HARD IS IT FOR YOU TO PAY FOR THE VERY BASICS LIKE FOOD, HOUSING, MEDICAL CARE, AND HEATING?: NOT HARD AT ALL

## 2023-04-18 SDOH — ECONOMIC STABILITY: FOOD INSECURITY: WITHIN THE PAST 12 MONTHS, YOU WORRIED THAT YOUR FOOD WOULD RUN OUT BEFORE YOU GOT MONEY TO BUY MORE.: NEVER TRUE

## 2023-04-18 SDOH — ECONOMIC STABILITY: FOOD INSECURITY: WITHIN THE PAST 12 MONTHS, THE FOOD YOU BOUGHT JUST DIDN'T LAST AND YOU DIDN'T HAVE MONEY TO GET MORE.: NEVER TRUE

## 2023-04-18 ASSESSMENT — ENCOUNTER SYMPTOMS
WHEEZING: 0
COUGH: 0
SHORTNESS OF BREATH: 0

## 2023-04-18 NOTE — PROGRESS NOTES
feet  Chronic. Referral podiatry  - External Referral To Podiatry    4. Malignant neoplasm of prostate (HCC)  Chronic. Follow-up with urology  - XR RIBS BILATERAL (MIN 4 VIEWS); Future    5. Paget disease of bone  Monitor. Not on medication  - XR RIBS BILATERAL (MIN 4 VIEWS); Future    6. Rib pain  New problem for several months. Bilateral rib pains. Obtain x-rays to further evaluate given prostate CA and Pagets. - XR RIBS BILATERAL (MIN 4 VIEWS); Future      They voiced understanding. All questions answered. They agreed with treatment plan. See patient instructions for any educational materials that may have been given. Discussed use, benefit, and side effects of prescribed medications. Reviewed health maintenance. (Please note that portions of this note may have been completed with a voice recognition program.  Efforts were made to edit the dictation but occasionally words are mis-transcribed.)    Return in about 20 days (around 5/8/2023) for rib pains.       Electronically signed by Zaheer Allred MD on 4/18/2023 at 10:24 AM

## 2023-04-19 ENCOUNTER — TELEPHONE (OUTPATIENT)
Dept: FAMILY MEDICINE CLINIC | Age: 63
End: 2023-04-19

## 2023-04-19 NOTE — TELEPHONE ENCOUNTER
----- Message from Anna Galvan MD sent at 4/19/2023  1:04 PM EDT -----  Normal x-rays of ribs. Please advise patient.   Anna Galvan MD

## 2023-04-24 NOTE — PROGRESS NOTES
Ann Wade MD at [address] [place] [address] HISTORY:  Social History     Tobacco Use    Smoking status: Former     Packs/day: 0.25     Years: 2.00     Pack years: 0.50     Types: Cigarettes     Quit date: 1995     Years since quittin.3    Smokeless tobacco: Never    Tobacco comments:     Quit smoking    Vaping Use    Vaping Use: Never used   Substance Use Topics    Alcohol use: No    Drug use: No     ALLERGIES:  Allergies   Allergen Reactions    Ace Inhibitors      When asked Oct 2020, patient was unsure of allergy/reaction. Patient takes/tolerates enalapril.     Baclofen Other (See Comments)     Lightheadedness, dizziness    Darvocet [Propoxyphene N-Acetaminophen] Nausea Only     Felt sickly    Darvon [Propoxyphene Hcl]      Felt sickly    Flexeril [Cyclobenzaprine] Other (See Comments)     Headache    Morphine Nausea Only     Pt reported feeling sickly    Motrin [Ibuprofen Micronized] Nausea Only     Pt reported feeling very sick    Tylenol [Acetaminophen] Nausea Only    Ultram [Tramadol] Itching     FAMILY HISTORY:  Family History   Problem Relation Age of Onset    Diabetes Mother     Heart Disease Mother     High Blood Pressure Mother     Arthritis Father     Diabetes Father     Heart Disease Father     High Blood Pressure Father     Diabetes Sister     Diabetes Brother     Heart Disease Brother     Cancer Neg Hx     Stroke Neg Hx      CURRENT MEDICATIONS:  Current Outpatient Medications   Medication Sig Dispense Refill    ferrous sulfate (FEROSUL) 325 (65 Fe) MG tablet Take 1 tablet by mouth three times a week      ALPRAZolam (XANAX) 1 MG tablet take 1 tablet by mouth twice a day 180 tablet 0    fluticasone (FLONASE) 50 MCG/ACT nasal spray instill 1 spray into each nostril at bedtime 48 g 0    pantoprazole (PROTONIX) 40 MG tablet take 1 tablet by mouth once daily 90 tablet 3    ANORO ELLIPTA 62.5-25 MCG/ACT inhaler inhale 1 puff by mouth and INTO THE LUNGS once daily 1 each 11    albuterol

## 2023-04-25 ENCOUNTER — OFFICE VISIT (OUTPATIENT)
Dept: PULMONOLOGY | Age: 63
End: 2023-04-25
Payer: MEDICAID

## 2023-04-25 VITALS
SYSTOLIC BLOOD PRESSURE: 128 MMHG | WEIGHT: 288.8 LBS | DIASTOLIC BLOOD PRESSURE: 52 MMHG | HEART RATE: 70 BPM | HEIGHT: 69 IN | BODY MASS INDEX: 42.78 KG/M2 | TEMPERATURE: 98.2 F | OXYGEN SATURATION: 94 %

## 2023-04-25 DIAGNOSIS — Z87.891 PERSONAL HISTORY OF TOBACCO USE: ICD-10-CM

## 2023-04-25 DIAGNOSIS — J43.9 MIXED RESTRICTIVE AND OBSTRUCTIVE LUNG DISEASE (HCC): Primary | ICD-10-CM

## 2023-04-25 DIAGNOSIS — J98.4 MIXED RESTRICTIVE AND OBSTRUCTIVE LUNG DISEASE (HCC): Primary | ICD-10-CM

## 2023-04-25 DIAGNOSIS — E66.2 OBESITY HYPOVENTILATION SYNDROME (HCC): ICD-10-CM

## 2023-04-25 DIAGNOSIS — E66.01 MORBIDLY OBESE (HCC): ICD-10-CM

## 2023-04-25 PROCEDURE — 99214 OFFICE O/P EST MOD 30 MIN: CPT | Performed by: NURSE PRACTITIONER

## 2023-04-25 PROCEDURE — 3074F SYST BP LT 130 MM HG: CPT | Performed by: NURSE PRACTITIONER

## 2023-04-25 PROCEDURE — G8427 DOCREV CUR MEDS BY ELIG CLIN: HCPCS | Performed by: NURSE PRACTITIONER

## 2023-04-25 PROCEDURE — 3023F SPIROM DOC REV: CPT | Performed by: NURSE PRACTITIONER

## 2023-04-25 PROCEDURE — G8417 CALC BMI ABV UP PARAM F/U: HCPCS | Performed by: NURSE PRACTITIONER

## 2023-04-25 PROCEDURE — 1036F TOBACCO NON-USER: CPT | Performed by: NURSE PRACTITIONER

## 2023-04-25 PROCEDURE — 3017F COLORECTAL CA SCREEN DOC REV: CPT | Performed by: NURSE PRACTITIONER

## 2023-04-25 PROCEDURE — 3078F DIAST BP <80 MM HG: CPT | Performed by: NURSE PRACTITIONER

## 2023-04-25 RX ORDER — FERROUS SULFATE 325(65) MG
325 TABLET ORAL
COMMUNITY

## 2023-04-25 ASSESSMENT — ENCOUNTER SYMPTOMS
DIARRHEA: 0
WHEEZING: 0
STRIDOR: 0
VOMITING: 0
NAUSEA: 0
EYES NEGATIVE: 1
SHORTNESS OF BREATH: 1
CHEST TIGHTNESS: 0
GASTROINTESTINAL NEGATIVE: 1
ALLERGIC/IMMUNOLOGIC NEGATIVE: 1

## 2023-05-06 DIAGNOSIS — J43.9 MIXED RESTRICTIVE AND OBSTRUCTIVE LUNG DISEASE (HCC): ICD-10-CM

## 2023-05-06 DIAGNOSIS — J98.4 MIXED RESTRICTIVE AND OBSTRUCTIVE LUNG DISEASE (HCC): ICD-10-CM

## 2023-05-08 RX ORDER — ALBUTEROL SULFATE 90 UG/1
AEROSOL, METERED RESPIRATORY (INHALATION)
Qty: 18 G | OUTPATIENT
Start: 2023-05-08

## 2023-05-10 ENCOUNTER — OFFICE VISIT (OUTPATIENT)
Dept: FAMILY MEDICINE CLINIC | Age: 63
End: 2023-05-10
Payer: MEDICAID

## 2023-05-10 VITALS
DIASTOLIC BLOOD PRESSURE: 60 MMHG | WEIGHT: 286.8 LBS | RESPIRATION RATE: 16 BRPM | HEART RATE: 61 BPM | TEMPERATURE: 97.7 F | HEIGHT: 69 IN | SYSTOLIC BLOOD PRESSURE: 128 MMHG | BODY MASS INDEX: 42.48 KG/M2 | OXYGEN SATURATION: 97 %

## 2023-05-10 DIAGNOSIS — K21.9 GASTROESOPHAGEAL REFLUX DISEASE WITHOUT ESOPHAGITIS: ICD-10-CM

## 2023-05-10 DIAGNOSIS — J43.9 MIXED RESTRICTIVE AND OBSTRUCTIVE LUNG DISEASE (HCC): ICD-10-CM

## 2023-05-10 DIAGNOSIS — R07.81 RIB PAIN: Primary | ICD-10-CM

## 2023-05-10 DIAGNOSIS — J98.4 MIXED RESTRICTIVE AND OBSTRUCTIVE LUNG DISEASE (HCC): ICD-10-CM

## 2023-05-10 PROCEDURE — 3017F COLORECTAL CA SCREEN DOC REV: CPT | Performed by: FAMILY MEDICINE

## 2023-05-10 PROCEDURE — 1036F TOBACCO NON-USER: CPT | Performed by: FAMILY MEDICINE

## 2023-05-10 PROCEDURE — 3023F SPIROM DOC REV: CPT | Performed by: FAMILY MEDICINE

## 2023-05-10 PROCEDURE — 3074F SYST BP LT 130 MM HG: CPT | Performed by: FAMILY MEDICINE

## 2023-05-10 PROCEDURE — 3078F DIAST BP <80 MM HG: CPT | Performed by: FAMILY MEDICINE

## 2023-05-10 PROCEDURE — G8427 DOCREV CUR MEDS BY ELIG CLIN: HCPCS | Performed by: FAMILY MEDICINE

## 2023-05-10 PROCEDURE — G8417 CALC BMI ABV UP PARAM F/U: HCPCS | Performed by: FAMILY MEDICINE

## 2023-05-10 PROCEDURE — 99213 OFFICE O/P EST LOW 20 MIN: CPT | Performed by: FAMILY MEDICINE

## 2023-05-10 RX ORDER — ALBUTEROL SULFATE 90 UG/1
2 AEROSOL, METERED RESPIRATORY (INHALATION) EVERY 6 HOURS PRN
Qty: 1 EACH | Refills: 3 | Status: SHIPPED | OUTPATIENT
Start: 2023-05-10 | End: 2024-05-09

## 2023-05-10 NOTE — PROGRESS NOTES
[facility]  [address] [facility]  Dept: [phone]  Dept Fax: [phone]  Loc: [phone]  PROGRESS NOTE      Visit Date: 5/10/2023    Jayesh López is a 65 y.o. male who presents today for:  Chief Complaint   Patient presents with    Rib Pain     Feeling better in the ribs but has some pain in the left shoulder area       Subjective:  HPI    22 day f/u    Rib pain bilaterally. Had xrays. Was taking tylenol but stopped due to stomach pains: on protonix. Rib pains are improving. No SOB. No rashes. Using topical analgesic.      Review of Systems  Patient Active Problem List   Diagnosis    Hypertension    Hyperlipidemia    CAD (coronary artery disease)    Chest pain, atypical    Chronic low back pain    Hypertriglyceridemia    Morbidly obese (HCC)    Abnormal stress test    Os trigonum    Elevated PSA    Lumbar spinal stenosis    Well controlled type 2 diabetes mellitus (HCC)    ROBSON (obstructive sleep apnea)    Back pain at L4-L5 level    Anxiety    Microalbuminuria    H/O heart artery stent    Urinary retention    Malignant neoplasm of prostate (HCC)    COPD, severe ([address]    Pneumonia due to COVID-19 virus    Paget disease of bone     Past Medical History:   Diagnosis Date    Abnormal stress test Sept 2012    Lateral Wall Ischemia- done at [facility]    CAD (coronary artery disease)     Cancer ([address]     Prostate    Chronic low back pain     Diabetes mellitus ([address]     Diabetes mellitus ([address]     Hyperlipidemia     Hypertension     Hypertriglyceridemia     MI (myocardial infarction) ([address] 2004    Obesity     Sleep apnea     has CPAP    Viral pneumonia 10/15/2020      Past Surgical History:   Procedure Laterality Date    BACK SURGERY  November 1997    L4 & L5 fusion    CARDIAC CATHETERIZATION  10/2019    CARDIAC SURGERY  October 22, 2004    Cardiac cath with stent placement x1    COLONOSCOPY  October 2010    CORONARY ANGIOPLASTY

## 2023-05-14 DIAGNOSIS — E78.49 OTHER HYPERLIPIDEMIA: ICD-10-CM

## 2023-05-14 DIAGNOSIS — R35.1 NOCTURIA: ICD-10-CM

## 2023-05-15 RX ORDER — GEMFIBROZIL 600 MG/1
TABLET, FILM COATED ORAL
Qty: 180 TABLET | Refills: 4 | Status: SHIPPED | OUTPATIENT
Start: 2023-05-15

## 2023-05-16 DIAGNOSIS — Z79.4 TYPE 2 DIABETES MELLITUS WITH HYPERGLYCEMIA, WITH LONG-TERM CURRENT USE OF INSULIN (HCC): ICD-10-CM

## 2023-05-16 DIAGNOSIS — E11.65 TYPE 2 DIABETES MELLITUS WITH HYPERGLYCEMIA, WITH LONG-TERM CURRENT USE OF INSULIN (HCC): ICD-10-CM

## 2023-05-16 RX ORDER — AMLODIPINE BESYLATE 10 MG/1
TABLET ORAL
Qty: 90 TABLET | Refills: 2 | Status: SHIPPED | OUTPATIENT
Start: 2023-05-16

## 2023-05-18 RX ORDER — OXYBUTYNIN CHLORIDE 5 MG/1
TABLET, EXTENDED RELEASE ORAL
Qty: 180 TABLET | Refills: 0 | Status: SHIPPED | OUTPATIENT
Start: 2023-05-18

## 2023-05-18 NOTE — TELEPHONE ENCOUNTER
I spoke with [facility] and she said he cannot fill his metformin because his prescription is  due to not refilling it within 6 months, he needs a new script sent in please, thank you!

## 2023-06-20 DIAGNOSIS — J30.1 CHRONIC SEASONAL ALLERGIC RHINITIS DUE TO POLLEN: ICD-10-CM

## 2023-06-21 RX ORDER — FLUTICASONE PROPIONATE 50 MCG
SPRAY, SUSPENSION (ML) NASAL
Qty: 48 G | Refills: 0 | Status: SHIPPED | OUTPATIENT
Start: 2023-06-21

## 2023-06-21 NOTE — TELEPHONE ENCOUNTER
Chalo Valentin called requesting a refill on the following medications:  Requested Prescriptions     Pending Prescriptions Disp Refills    fluticasone (FLONASE) 50 MCG/ACT nasal spray [Pharmacy Med Name: FLUTICASONE PROP 50 MCG SPRAY] 48 g 0     Sig: instill 1 spray into each nostril at bedtime       Date of last visit: 5/10/2023  Date of next visit (if applicable):Visit date not found  Date of last refill: 3/30/23  Pharmacy Name: [facility] [place]    Rx pending #48g/0      Thanks,  Darrel Jung LPN

## 2023-06-29 DIAGNOSIS — E11.65 TYPE 2 DIABETES MELLITUS WITH HYPERGLYCEMIA, WITH LONG-TERM CURRENT USE OF INSULIN (HCC): ICD-10-CM

## 2023-06-29 DIAGNOSIS — Z79.4 TYPE 2 DIABETES MELLITUS WITH HYPERGLYCEMIA, WITH LONG-TERM CURRENT USE OF INSULIN (HCC): ICD-10-CM

## 2023-06-29 RX ORDER — INSULIN GLARGINE 100 [IU]/ML
INJECTION, SOLUTION SUBCUTANEOUS
Qty: 81 ML | Refills: 0 | Status: SHIPPED | OUTPATIENT
Start: 2023-06-29

## 2023-06-30 DIAGNOSIS — F41.9 ANXIETY: ICD-10-CM

## 2023-07-01 RX ORDER — ALPRAZOLAM 1 MG/1
TABLET ORAL
Qty: 180 TABLET | Refills: 0 | Status: SHIPPED | OUTPATIENT
Start: 2023-07-01 | End: 2023-09-29

## 2023-07-24 ENCOUNTER — OFFICE VISIT (OUTPATIENT)
Dept: UROLOGY | Age: 63
End: 2023-07-24
Payer: MEDICAID

## 2023-07-24 VITALS — BODY MASS INDEX: 42.36 KG/M2 | WEIGHT: 286 LBS | HEIGHT: 69 IN | RESPIRATION RATE: 16 BRPM

## 2023-07-24 DIAGNOSIS — R33.8 BENIGN PROSTATIC HYPERPLASIA WITH URINARY RETENTION: ICD-10-CM

## 2023-07-24 DIAGNOSIS — C61 PROSTATE CANCER (HCC): Primary | ICD-10-CM

## 2023-07-24 DIAGNOSIS — R39.15 URINARY URGENCY: ICD-10-CM

## 2023-07-24 DIAGNOSIS — N40.1 BENIGN PROSTATIC HYPERPLASIA WITH URINARY RETENTION: ICD-10-CM

## 2023-07-24 PROCEDURE — G8417 CALC BMI ABV UP PARAM F/U: HCPCS | Performed by: UROLOGY

## 2023-07-24 PROCEDURE — 99214 OFFICE O/P EST MOD 30 MIN: CPT | Performed by: UROLOGY

## 2023-07-24 PROCEDURE — 3017F COLORECTAL CA SCREEN DOC REV: CPT | Performed by: UROLOGY

## 2023-07-24 PROCEDURE — 1036F TOBACCO NON-USER: CPT | Performed by: UROLOGY

## 2023-07-24 PROCEDURE — G8427 DOCREV CUR MEDS BY ELIG CLIN: HCPCS | Performed by: UROLOGY

## 2023-07-24 PROCEDURE — 96402 CHEMO HORMON ANTINEOPL SQ/IM: CPT | Performed by: UROLOGY

## 2023-07-24 NOTE — PROGRESS NOTES
Dr. Nancy Myers MD MD  [facility] Urology Clinic Consultation / Follow up Visit    Patient:  Katherine Jaimes  YOB: 1960  Date: 7/24/2023    HISTORY OF PRESENT ILLNESS:   The patient is a 62 y.o. male who presents today for follow-up for the following problem(s): elevated PSA, BPH with luts,  Urinary retention, Bladder stone  Overall the problem(s) : are worsening. Associated Symptoms: No dysuria, gross hematuria. Pain Severity:       Today visit:   7/24/23    Filipe Mccollum follows with us for HR prostate cancer s/p EBRT Radiation (3/2021) and on ADT (lupron Q 6 months x 2-3 years for HR PCa). His PSA has responded well and remains undectable. PSA:  < 0.02. He also had BPH with LUTs and urinary retention - improved on ADT. AUASS: 13/3. He is also on Flomax 0.8 mg   Oxybutynin for Urinary urgency. 5/24/21  Cystoscopy Operative Note  Surgeon: Nancy Myers MD   Anesthesia: Urethral 2%  Indications: BPH with retention  Position: supine  Findings:   The patient was prepped and draped in the usual sterile fashion. The flexible cystoscope was advanced through the urethra and into the bladder. The bladder was thoroughly inspected and the following was noted:    Residual Urine: Moderate  Urethra: No abnormalities of the urethra are noted. Prostate: Complete obstruction by lateral lobe of prostate. Bladder: No tumors or CIS noted. No bladder diverticulum. Moderate trabeculation noted. Ureters: Clear efflux from both ureters. Orifices with normal configuration and location. The cystoscope was removed. The patient tolerated the procedure well. Void trial      4/26/21   [facility] follows with us for HR PCa. We reviewed old records. Pathology reviewed: High volume Indian 4+5 = 9, with PNI, (Grade Group 5). Diagnotic PSA: 16.28   He has finished radiation thearpy. He is still on Bicaluatmide, discussed lupron injection. 1/15/21  Bone scan  1.  Abnormal radiotracer accumulation in

## 2023-07-24 NOTE — PROGRESS NOTES
Patient has given me verbal consent to perform Lupron Injection  Yes      Following Dr. Carey John of [facility]. LUPRON 45 MG GIVEN I.M left UOQ HIP  Lot Number: [id number]  Expiration Date: 12/4/24  [facility] #: [id number]    After Injection was given there were no reactions at injection site and patient was feeling well. Patient was notified that possible side effects from injections include: Redness, swelling and itching at the injection site. Possible side effects of androgen deprivation therapy, including hot flashes, flushing of the skin, increased weight, decreased sex drive, and difficulties with ED. Patient was instructed to call the office with any further questions or concerns. Patient supplied their own medications Yes      Pt [place] therapy (Randall Michelle) first initiated on 5/24/21.

## 2023-08-03 RX ORDER — PEN NEEDLE, DIABETIC 29 G X1/2"
NEEDLE, DISPOSABLE MISCELLANEOUS
Qty: 100 EACH | Refills: 3 | OUTPATIENT
Start: 2023-08-03

## 2023-08-03 RX ORDER — PEN NEEDLE, DIABETIC 29 G X1/2"
1 NEEDLE, DISPOSABLE MISCELLANEOUS 3 TIMES DAILY
Qty: 300 EACH | Refills: 3 | Status: SHIPPED | OUTPATIENT
Start: 2023-08-03

## 2023-08-07 ENCOUNTER — OFFICE VISIT (OUTPATIENT)
Dept: FAMILY MEDICINE CLINIC | Age: 63
End: 2023-08-07
Payer: MEDICAID

## 2023-08-07 ENCOUNTER — HOSPITAL ENCOUNTER (OUTPATIENT)
Age: 63
Discharge: HOME | End: 2023-08-07
Payer: MEDICAID

## 2023-08-07 ENCOUNTER — TELEPHONE (OUTPATIENT)
Dept: UROLOGY | Age: 63
End: 2023-08-07

## 2023-08-07 VITALS
DIASTOLIC BLOOD PRESSURE: 70 MMHG | HEART RATE: 68 BPM | BODY MASS INDEX: 42.69 KG/M2 | HEIGHT: 69 IN | OXYGEN SATURATION: 94 % | RESPIRATION RATE: 16 BRPM | SYSTOLIC BLOOD PRESSURE: 130 MMHG | WEIGHT: 288.2 LBS | TEMPERATURE: 97.7 F

## 2023-08-07 DIAGNOSIS — J30.1 SEASONAL ALLERGIC RHINITIS DUE TO POLLEN: ICD-10-CM

## 2023-08-07 DIAGNOSIS — I25.10 CORONARY ARTERY DISEASE INVOLVING NATIVE CORONARY ARTERY OF NATIVE HEART WITHOUT ANGINA PECTORIS: ICD-10-CM

## 2023-08-07 DIAGNOSIS — E11.65 TYPE 2 DIABETES MELLITUS WITH HYPERGLYCEMIA, WITH LONG-TERM CURRENT USE OF INSULIN (HCC): Primary | ICD-10-CM

## 2023-08-07 DIAGNOSIS — J98.4 MIXED RESTRICTIVE AND OBSTRUCTIVE LUNG DISEASE (HCC): ICD-10-CM

## 2023-08-07 DIAGNOSIS — J43.9 MIXED RESTRICTIVE AND OBSTRUCTIVE LUNG DISEASE (HCC): ICD-10-CM

## 2023-08-07 DIAGNOSIS — D50.9 IRON DEFICIENCY ANEMIA, UNSPECIFIED IRON DEFICIENCY ANEMIA TYPE: ICD-10-CM

## 2023-08-07 DIAGNOSIS — Z79.4 TYPE 2 DIABETES MELLITUS WITH HYPERGLYCEMIA, WITH LONG-TERM CURRENT USE OF INSULIN (HCC): Primary | ICD-10-CM

## 2023-08-07 LAB — HBA1C MFR BLD: 7.6 %

## 2023-08-07 PROCEDURE — 3023F SPIROM DOC REV: CPT | Performed by: FAMILY MEDICINE

## 2023-08-07 PROCEDURE — 85025 COMPLETE CBC W/AUTO DIFF WBC: CPT

## 2023-08-07 PROCEDURE — G8427 DOCREV CUR MEDS BY ELIG CLIN: HCPCS | Performed by: FAMILY MEDICINE

## 2023-08-07 PROCEDURE — 36415 COLL VENOUS BLD VENIPUNCTURE: CPT

## 2023-08-07 PROCEDURE — 3017F COLORECTAL CA SCREEN DOC REV: CPT | Performed by: FAMILY MEDICINE

## 2023-08-07 PROCEDURE — 3074F SYST BP LT 130 MM HG: CPT | Performed by: FAMILY MEDICINE

## 2023-08-07 PROCEDURE — 83037 HB GLYCOSYLATED A1C HOME DEV: CPT | Performed by: FAMILY MEDICINE

## 2023-08-07 PROCEDURE — 3078F DIAST BP <80 MM HG: CPT | Performed by: FAMILY MEDICINE

## 2023-08-07 PROCEDURE — 2022F DILAT RTA XM EVC RTNOPTHY: CPT | Performed by: FAMILY MEDICINE

## 2023-08-07 PROCEDURE — G8417 CALC BMI ABV UP PARAM F/U: HCPCS | Performed by: FAMILY MEDICINE

## 2023-08-07 PROCEDURE — 1036F TOBACCO NON-USER: CPT | Performed by: FAMILY MEDICINE

## 2023-08-07 PROCEDURE — 99214 OFFICE O/P EST MOD 30 MIN: CPT | Performed by: FAMILY MEDICINE

## 2023-08-07 PROCEDURE — 3051F HG A1C>EQUAL 7.0%<8.0%: CPT | Performed by: FAMILY MEDICINE

## 2023-08-07 RX ORDER — INSULIN GLARGINE 100 [IU]/ML
INJECTION, SOLUTION SUBCUTANEOUS
Qty: 81 ML | Refills: 1 | Status: SHIPPED | OUTPATIENT
Start: 2023-08-07

## 2023-08-07 RX ORDER — FERROUS SULFATE 325(65) MG
325 TABLET ORAL 2 TIMES DAILY
Qty: 180 TABLET | Refills: 3 | Status: SHIPPED | OUTPATIENT
Start: 2023-08-07 | End: 2024-08-06

## 2023-08-07 RX ORDER — ALBUTEROL SULFATE 90 UG/1
2 AEROSOL, METERED RESPIRATORY (INHALATION) EVERY 6 HOURS PRN
Qty: 1 EACH | Refills: 3 | Status: SHIPPED | OUTPATIENT
Start: 2023-08-07 | End: 2024-08-06

## 2023-08-07 RX ORDER — DULAGLUTIDE 0.75 MG/.5ML
0.75 INJECTION, SOLUTION SUBCUTANEOUS WEEKLY
Qty: 2 ML | Refills: 1 | Status: SHIPPED | OUTPATIENT
Start: 2023-08-07

## 2023-08-07 RX ORDER — INSULIN LISPRO 100 [IU]/ML
INJECTION, SOLUTION INTRAVENOUS; SUBCUTANEOUS
Qty: 10 ML | Refills: 5 | Status: SHIPPED | OUTPATIENT
Start: 2023-08-07

## 2023-08-07 RX ORDER — CROMOLYN SODIUM 40 MG/ML
SOLUTION/ DROPS OPHTHALMIC
Qty: 10 ML | Refills: 5 | Status: SHIPPED | OUTPATIENT
Start: 2023-08-07

## 2023-08-07 RX ORDER — ALBUTEROL SULFATE 2.5 MG/3ML
SOLUTION RESPIRATORY (INHALATION)
Qty: 150 ML | Refills: 1 | Status: SHIPPED | OUTPATIENT
Start: 2023-08-07

## 2023-08-07 NOTE — PROGRESS NOTES
Khushboo Bustillos (:  1960) is a 59 y.o. male,Established patient, here for evaluation of the following chief complaint(s):  Diabetes (Usually around 167-180. Doesn't usually feel physical symptoms.  //Needs order for humalog. Not letting me pend that refill.)       ASSESSMENT/PLAN:  1. Type 2 diabetes mellitus with hyperglycemia, with long-term current use of insulin (HCC)  -     POCT glycosylated hemoglobin (Hb A1C)  -     insulin lispro (HUMALOG) 100 UNIT/ML SOLN injection vial; 10 units subcutaneous BID prn high glucose > 155, Disp-10 mL, R-5Normal  -     Dulaglutide (TRULICITY) 7.15 HK/5.4CN SOPN; Inject 0.75 mg into the skin once a week, Disp-2 mL, R-1Normal  -     insulin glargine (LANTUS SOLOSTAR) 100 UNIT/ML injection pen; inject 44 units subcutaneously twice a day BUT decrease to 35 units at start of Trulicity, SVPE-42 mL, [id number]  2. Seasonal allergic rhinitis due to pollen  -     cromolyn (OPTICROM) 4 % ophthalmic solution; Instill 1 drop into each eye four times a day, Disp-10 mL, R-5Normal  3. Mixed restrictive and obstructive lung disease (HCC)  -     albuterol (PROVENTIL) (2.5 MG/3ML) 0.083% nebulizer solution; inhale contents of 1 vial ( 3 milliliters ) in nebulizer by mouth and INTO THE LUNGS every 6 hours if needed for wheezing, Disp-150 mL, R-1Normal  -     albuterol sulfate HFA (PROAIR HFA) 108 (90 Base) MCG/ACT inhaler; Inhale 2 puffs into the lungs every 6 hours as needed for Wheezing or Shortness of Breath, Disp-1 each, R-3Normal  4. Coronary artery disease involving native coronary artery of native heart without angina pectoris  5. Iron deficiency anemia, unspecified iron deficiency anemia type  -     ferrous sulfate (IRON 325) 325 (65 Fe) MG tablet; Take 1 tablet by mouth in the morning and at bedtime, Disp-180 tablet, R-3Normal  -     CBC with Auto Differential; Future    We discussed goals for his diabetes to reduce risk of complications.   He would like to do better with his

## 2023-08-07 NOTE — PATIENT INSTRUCTIONS
At first decrease lantus to 35 units twice a day when starting trulicity. Then if you find your glucose levels in am are < 100, then decrease lantus further to 30 units twice a day and continue monitoring.      Call if needing more guidance on insulin Interval History:  12/21/22: No new complaints    MEDICATIONS  (STANDING):  aspirin enteric coated 81 milliGRAM(s) Oral daily  atorvastatin 80 milliGRAM(s) Oral at bedtime  cholecalciferol 1000 Unit(s) Oral daily  dexAMETHasone  Injectable 6 milliGRAM(s) IV Push daily  DULoxetine 60 milliGRAM(s) Oral daily  enoxaparin Injectable 40 milliGRAM(s) SubCutaneous every 24 hours  gabapentin 300 milliGRAM(s) Oral at bedtime  guaiFENesin  milliGRAM(s) Oral every 12 hours  primidone 50 milliGRAM(s) Oral four times a day  remdesivir  IVPB   IV Intermittent   remdesivir  IVPB 100 milliGRAM(s) IV Intermittent every 24 hours  rOPINIRole 0.25 milliGRAM(s) Oral daily  tiotropium 2.5 MICROgram(s) Inhaler 2 Puff(s) Inhalation daily    MEDICATIONS  (PRN):  albuterol    90 MICROgram(s) HFA Inhaler 2 Puff(s) Inhalation every 6 hours PRN Shortness of Breath and/or Wheezing  traMADol 50 milliGRAM(s) Oral every 6 hours PRN Moderate Pain (4 - 6)  zolpidem 5 milliGRAM(s) Oral at bedtime PRN Insomnia  zolpidem 5 milliGRAM(s) Oral at bedtime PRN Insomnia      Allergies    No Known Allergies    Intolerances        PHYSICAL EXAM:  Vital Signs Last 24 Hrs  T(F): 97.9 (12-21-22 @ 08:33)  HR: 89 (12-21-22 @ 08:33)  BP: 102/59 (12-21-22 @ 08:33)  RR: 18 (12-21-22 @ 08:33)      GENERAL: NAD, well-groomed  HEAD:  Atraumatic, Normocephalic  Neuro:  HF: A x O x 3. Appropriately interactive, normal affect. Speech fluent, No Aphasia or paraphasic errors. Naming /repetition intact   CN: ADAN, EOMI, VFF, facial sensation normal, +flattened left NL fold, tongue midline, Palate moves equally, SCM equal bilaterally  Motor: 4/5 right hand , no pronator drift, RLE 4/5, LUE 5/5, LLE 5/5 proximally, 2/5 left foot dorsiflexion (chronic)  Sens: Intact to light touch   Reflexes: 2+ in LUE/LLE (except for 0 ankle jerk), relatively brisk in RUE/RLE, downgoing toe on left, mute toe on right  Coord:  No dysmetria  LABS:                        13.1   6.50  )-----------( 200      ( 20 Dec 2022 07:48 )             39.0     12-20    138  |  106  |  22  ----------------------------<  94  4.2   |  25  |  0.61    Ca    8.2<L>      20 Dec 2022 07:48    TPro  6.0  /  Alb  2.9<L>  /  TBili  0.3  /  DBili  0.1  /  AST  49<H>  /  ALT  35  /  AlkPhos  58  12-21          RADIOLOGY & ADDITIONAL STUDIES:    CT head 12/17/22:  No acute intracranial hemorrhage, vasogenic edema, extra-axial collection   or hydrocephalus. Tiny chronic lacunar infarcts head of the right caudate   nucleus and right corona radiata.

## 2023-08-08 DIAGNOSIS — R35.1 NOCTURIA: ICD-10-CM

## 2023-08-08 LAB
BASOPHILS ABSOLUTE: 0.1 THOU/MM3 (ref 0–0.1)
BASOPHILS NFR BLD AUTO: 0.9 %
DEPRECATED RDW RBC AUTO: 46.5 FL (ref 35–45)
EOSINOPHIL NFR BLD AUTO: 2.9 %
EOSINOPHILS ABSOLUTE: 0.2 THOU/MM3 (ref 0–0.4)
ERYTHROCYTE [DISTWIDTH] IN BLOOD BY AUTOMATED COUNT: 14.6 % (ref 11.5–14.5)
HCT VFR BLD AUTO: 34.7 % (ref 42–52)
HGB BLD-MCNC: 10.7 GM/DL (ref 14–18)
IMM GRANULOCYTES # BLD AUTO: 0.05 THOU/MM3 (ref 0–0.07)
IMM GRANULOCYTES NFR BLD AUTO: 0.7 %
LYMPHOCYTES ABSOLUTE: 1.2 THOU/MM3 (ref 1–4.8)
LYMPHOCYTES NFR BLD AUTO: 15.4 %
MCH RBC QN AUTO: 27 PG (ref 26–33)
MCHC RBC AUTO-ENTMCNC: 30.8 GM/DL (ref 32.2–35.5)
MCV RBC AUTO: 87.6 FL (ref 80–94)
MONOCYTES ABSOLUTE: 0.6 THOU/MM3 (ref 0.4–1.3)
MONOCYTES NFR BLD AUTO: 7.7 %
NEUTROPHILS NFR BLD AUTO: 72.4 %
NRBC BLD AUTO-RTO: 0 /100 WBC
PLATELET # BLD AUTO: 241 THOU/MM3 (ref 130–400)
PMV BLD AUTO: 10.1 FL (ref 9.4–12.4)
RBC # BLD AUTO: 3.96 MILL/MM3 (ref 4.7–6.1)
SEGMENTED NEUTROPHILS ABSOLUTE COUNT: 5.6 THOU/MM3 (ref 1.8–7.7)
WBC # BLD AUTO: 7.7 THOU/MM3 (ref 4.8–10.8)

## 2023-08-08 RX ORDER — OXYBUTYNIN CHLORIDE 5 MG/1
TABLET, EXTENDED RELEASE ORAL
Qty: 180 TABLET | Refills: 0 | Status: SHIPPED | OUTPATIENT
Start: 2023-08-08

## 2023-08-08 ASSESSMENT — ENCOUNTER SYMPTOMS: SHORTNESS OF BREATH: 0

## 2023-08-08 NOTE — TELEPHONE ENCOUNTER
Joon Starr called requesting a refill on the following medications:  Requested Prescriptions     Pending Prescriptions Disp Refills    oxybutynin (DITROPAN-XL) 5 MG extended release tablet [Pharmacy Med Name: OXYBUTYNIN CL ER 5 MG TABLET] 180 tablet 0     Sig: take 1 tablet by mouth twice a day     Pharmacy verified:  [email or web address]      Date of last visit: 07/24/2023  Date of next visit (if applicable): 20/24/8830

## 2023-08-08 NOTE — TELEPHONE ENCOUNTER
Done.     Follow-up as scheduled     The patient should go to the ED should they develop fever, chills, nausea, vomiting, chest pain, SOB, calf pain, feelings of incomplete emptying, or should they otherwise feel they need evaluated

## 2023-08-10 ENCOUNTER — TELEPHONE (OUTPATIENT)
Dept: FAMILY MEDICINE CLINIC | Age: 63
End: 2023-08-10

## 2023-08-10 NOTE — RESULT ENCOUNTER NOTE
Please let patient know that his anemia follow up shows improvement of his anemia. But not back to normal.  Continue with iron supplementation and vitamin C. Also decreasing sweets and increasing healthy foods will also help with anemia and blood sugar. Follow up with Dr. Javier Hagan.

## 2023-08-10 NOTE — TELEPHONE ENCOUNTER
----- Message from Racquel Padilla MD sent at 8/9/2023 10:19 PM EDT -----  Please let patient know that his anemia follow up shows improvement of his anemia. But not back to normal.  Continue with iron supplementation and vitamin C. Also decreasing sweets and increasing healthy foods will also help with anemia and blood sugar. Follow up with Dr. Jessee Eaton.

## 2023-08-16 DIAGNOSIS — I25.10 CORONARY ARTERY DISEASE INVOLVING NATIVE CORONARY ARTERY OF NATIVE HEART WITHOUT ANGINA PECTORIS: ICD-10-CM

## 2023-08-16 RX ORDER — CLOPIDOGREL BISULFATE 75 MG/1
TABLET ORAL
Qty: 90 TABLET | Refills: 4 | OUTPATIENT
Start: 2023-08-16

## 2023-08-17 ENCOUNTER — TELEPHONE (OUTPATIENT)
Dept: CARDIOLOGY CLINIC | Age: 63
End: 2023-08-17

## 2023-08-27 DIAGNOSIS — J98.4 MIXED RESTRICTIVE AND OBSTRUCTIVE LUNG DISEASE (HCC): ICD-10-CM

## 2023-08-27 DIAGNOSIS — J43.9 MIXED RESTRICTIVE AND OBSTRUCTIVE LUNG DISEASE (HCC): ICD-10-CM

## 2023-08-28 RX ORDER — ALBUTEROL SULFATE 2.5 MG/3ML
SOLUTION RESPIRATORY (INHALATION)
Qty: 150 ML | Refills: 1 | Status: SHIPPED | OUTPATIENT
Start: 2023-08-28

## 2023-08-28 NOTE — TELEPHONE ENCOUNTER
Haritha Thrasher called requesting a refill on the following medications:  Requested Prescriptions     Pending Prescriptions Disp Refills    albuterol (PROVENTIL) (2.5 MG/3ML) 0.083% nebulizer solution [Pharmacy Med Name: ALBUTEROL SUL 2.5 MG/3 ML SOLN] 150 mL 1     Sig: inhale contents of 1 vial ( 3 milliliters ) in nebulizer by mouth and INTO THE LUNGS every 6 hours if needed for wheezing       Date of last visit: 8/7/2023  Date of next visit (if applicable):Visit date not found  Date of last refill: 8/7/2023  Pharmacy Name: [facility], [place]

## 2023-09-11 ENCOUNTER — HOSPITAL ENCOUNTER (OUTPATIENT)
Dept: UROLOGY | Age: 63
Discharge: HOME | End: 2023-09-11
Payer: MEDICAID

## 2023-09-11 VITALS
SYSTOLIC BLOOD PRESSURE: 131 MMHG | BODY MASS INDEX: 41.1 KG/M2 | HEIGHT: 69 IN | HEART RATE: 96 BPM | WEIGHT: 277.5 LBS | RESPIRATION RATE: 16 BRPM | TEMPERATURE: 97.4 F | DIASTOLIC BLOOD PRESSURE: 65 MMHG

## 2023-09-11 PROCEDURE — 52000 CYSTOURETHROSCOPY: CPT

## 2023-09-11 RX ORDER — DOXYCYCLINE HYCLATE 100 MG
100 TABLET ORAL 2 TIMES DAILY
Qty: 6 TABLET | Refills: 0 | Status: SHIPPED | OUTPATIENT
Start: 2023-09-11 | End: 2023-09-14

## 2023-09-11 NOTE — PROGRESS NOTES
Patient [facility] in [facility] for C[facility]  This procedure has been fully reviewed with the patient and written informed consent has been obtained. [id number] Procedure started with Dr. Jacky Bethea Procedure completed; patient tolerated well. Dr. Tomas Daley talked to patient in length about procedure findings. Patient discharged. PLAN     Greenlight PVP- needs cardiac clearance. Office will call to schedule.

## 2023-09-11 NOTE — DISCHARGE INSTRUCTIONS
Discharge instructions: Cystoscopy  You May experience painful urination and see blood in the urine after your procedure. This should resolve over time. Pt ok to discharge home in good condition  No heavy lifting, >10 lbs for today  Pt should avoid strenuous activity for today  Pt should walk moderately at home  Pt ok to shower   Pt may resume diet as tolerated  Please call attending physician or hospital  with questions  Call or Present to ED if fever (> 101F), intractable nausea vomiting or pain. Begin doxycycline    Greenlight procedure. Information given. Office will call to schedule.

## 2023-09-11 NOTE — OP NOTE
Cystoscopy Operative Note  Surgeon: Coreen Lefort, MD   Anesthesia: Urethral 2%  Indications:  BPH with LUTs  Position: supine  EBL: 1 cc  Specimen: none  Findings:   The patient was prepped and draped in the usual sterile fashion. The flexible cystoscope was advanced through the urethra and into the bladder. The bladder was thoroughly inspected and the following was noted:    Residual Urine: Moderate   Urethra: No abnormalities of the urethra are noted. Prostate:  Medium gland (<80 gm) Complete obstruction by lateral  & median lobe of prostate. Bladder: No tumors or CIS noted. No bladder diverticulum. Moderate  trabeculation noted. Ureters: Clear efflux from both ureters. Orifices with normal configuration and location. The cystoscope was removed. The patient tolerated the procedure well.    Plan: Good candidate for TURP vs Greenlight PVP (channel)

## 2023-09-11 NOTE — H&P
[facility]  Urology H&P Note     Patient:  Tai Cardoso  MRN: [id number]  YOB: 1960    ATTENDING: Joe Roque MD     CHIEF COMPLAINT:  LLUTs    HISTORY OF PRESENT ILLNESS:   The patient is a 59 y.o. male who presents with LUTs s/p EBRT for HR PCa    Patient's old records, notes and chart reviewed and summarized above. Past Medical History:    Past Medical History:   Diagnosis Date    Abnormal stress test 09/2012    Lateral Wall Ischemia- done at [facility]-    Benign prostatic hyperplasia with urinary retention     CAD (coronary artery disease)     Cancer (HCC)     Prostate    Chronic low back pain     Diabetes mellitus ([address])     Diabetes mellitus ([address])     Hyperlipidemia     Hypertension     Hypertriglyceridemia     MI (myocardial infarction) ([address]) 2004    Obesity     Prostate cancer ([address])     Sleep apnea     has CPAP    Urinary urgency     Viral pneumonia 10/15/2020       Past Surgical History:    Past Surgical History:   Procedure Laterality Date    BACK SURGERY  November 1997    L4 & L5 fusion    CARDIAC CATHETERIZATION  10/2019    CARDIAC SURGERY  October 22, 2004    Cardiac cath with stent placement x1    COLONOSCOPY  October 2010    CORONARY ANGIOPLASTY WITH STENT PLACEMENT  10/17/2019    HERNIA REPAIR  1991    Mesh repair in abdomen. ULTRASOUND PROSTATE/TRANSRECTAL N/A 12/7/2020    TRANSRECTAL ULTRASOUND WITH PROSTATE BIOPSY performed by Joe Roque., MD at [facility]       Medications Prior to Admission:   Prior to Admission medications    Medication Sig Start Date End Date Taking?  Authorizing Provider   albuterol (PROVENTIL) (2.5 MG/3ML) 0.083% nebulizer solution inhale contents of 1 vial ( 3 milliliters ) in nebulizer by mouth and INTO THE LUNGS every 6 hours if needed for wheezing 8/28/23   Warren Trevizo MD   oxybutynin (DITROPAN-XL) 5 MG extended release tablet take 1 tablet by mouth twice a day 8/8/23   Chris Almonte PA-C   cromolyn (OPTICROM) 4 % ophthalmic

## 2023-09-13 ENCOUNTER — TELEPHONE (OUTPATIENT)
Dept: UROLOGY | Age: 63
End: 2023-09-13

## 2023-09-13 DIAGNOSIS — R33.8 BENIGN PROSTATIC HYPERPLASIA WITH URINARY RETENTION: Primary | ICD-10-CM

## 2023-09-13 DIAGNOSIS — Z01.818 PRE-OP TESTING: ICD-10-CM

## 2023-09-13 DIAGNOSIS — Z01.818 PRE-OP TESTING: Primary | ICD-10-CM

## 2023-09-13 DIAGNOSIS — N40.1 BENIGN PROSTATIC HYPERPLASIA WITH URINARY RETENTION: Primary | ICD-10-CM

## 2023-09-13 DIAGNOSIS — N40.1 BENIGN PROSTATIC HYPERPLASIA WITH URINARY RETENTION: ICD-10-CM

## 2023-09-13 DIAGNOSIS — Z01.810 PREOP CARDIOVASCULAR EXAM: Primary | ICD-10-CM

## 2023-09-13 DIAGNOSIS — R33.8 BENIGN PROSTATIC HYPERPLASIA WITH URINARY RETENTION: ICD-10-CM

## 2023-09-13 NOTE — TELEPHONE ENCOUNTER
SURGERY [address] [facility] [address] [facility], [address] [facility]      Phone *[phone] *[phone]   Surgical Scheduling Direct Line Phone *[phone] Fax *[phone]      Paulette Pool 1960 male    [address] [facility] [address] [facility]  [address]   Marital Status: Single         Home Phone: [phone]      Cell Phone:    Telephone Information:   Mobile [phone]          Surgeon: Dr. Hayley Orozco Surgery Date: 10/16/23   Time: 9:30 am    Procedure: Transurethral Resection of the Prostate vs Greenlight Photovaporization of the Prostate ( Channel)    Diagnosis: BPH with LUTS    Important Medical History:  In [facility]    Special Inst/Equip:                                 For Chase # [id number] per Lex Kanner    CPT Codes:    72109,85232  Latex Allergy: No     Cardiac Device:  No    Anesthesia:  General          Admission Type:  Same Day                        Admit Prior to Day of Surgery: No    Case Location:  Main OR            Preadmission Testing:  Phone Call          PAT Date and Time:______________________________________________________    PAT Confirmation #: ______________________________________________________    Post Op Visit: ___________________________________________________________    Need Preop Cardiac Clearance: Yes    Does Patient have Cardiologist/physician?     Dr Denise Johnson Confirmation #: __________________________________________________    Sandra Mems: ________________________   Date: __________________________     [employer] Name: [address] [place] [address]

## 2023-09-13 NOTE — TELEPHONE ENCOUNTER
Patient is scheduled for surgery with  on 10/16/23. Surgery consent to be done on arrival. Dr. Mary Overcast to clear. Patient to do pre op urine culture and fasting BMP on 10/5/23. Surgery instructions gone over with patient verbally in the office or mailed to the patient. For Chase # [id number] per Jose Gomez    Patient informed an adult over the age of 25 must be with them at the time of surgery, upon discharge and at home for 24 hours after the procedure.

## 2023-09-13 NOTE — TELEPHONE ENCOUNTER
CARDIAC CLEARANCE FORM    Clearance From  Dr Donya Gomez   Appointment Date   Time       Jose Harris  1960  Surgeon:  Dr Marilou Nuñez    Procedure:  Transurethral Resection of the Prostate vs Greenlight Photovaporization of the Prostate  Date:  10/16/23  Facility: [address] [place] [address]  DM CAD PVD CVA DVT/PE MI CHFMalignant Hyperthemia HTN Tobacco/ETOH Sleep Apnea GERD Hyperlipidemia Renal Insufficiency COPD/Asthma Bleeding Disorder Pacemaker/AICD  II. CURRENT MEDICATIONS: Attach list or complete     Pt is on following meds that need special instructions for surgery:  Anticoagulants Heart Meds ASA Insulin Oral anti-diabetics NSAIDS Diuretics     K replacements  III. ALLERGIES:   IV.  FUNCTIONAL CAPACITY  >4 METS (CAN VACUUM/HOUSEWORK,CLIMB FLIGHT STAIRS WITHOUT DYSPNEA)  <4METS (FLIGHT OF STEPS CAUSES DYSPNEA/CARDIAC SYMPTOMS)   Stress Test Recommended:    Stress tests or Cardiac Cath in last 5 years:  Yes (attach report)  No   Results: WNL   ABN  Any Change in Cardiac symptoms: Yes  NO  Comments:    Revascularization in last 5 years: Yes  NO  CABG: Yes  No   Comments:     Stents:  Date: ________  Any change in cardiac symptoms  Yes  NO  Comments:   V.  REVIEW OF SYSTEMS:  (Pertinent positive or negative)    VI. PHYSICIAL EXAM  HEENT:1.  Dentations   Good Poor          HT:_______ WT:______      2. Neck Pathology: Rheumatoid DDD C-spine  BP:______ P:________  PULMONARY:  CARDIAC  ABDOMEN  EXTREMITIES  OTHER  VII.   Testing Ordered by Surgeon  Reviewed by Clearance Physician  Test      Result  Plan,if Abnormal  ___CBS     WNL  ABN____________________  ___BMP/BUN/CR    WNL  ABN____________________  ___K+      WNL  ABN____________________  ___UA      WNL  ABN____________________  ___CXR     WNL  ABN____________________  ___EKG     WNL  ABN____________________  ___MRSA     WNL  ABN____________________  VIII.  ___Acceptable risk for surgery ___Risk Unacceptable-Communication to Follow

## 2023-09-13 NOTE — TELEPHONE ENCOUNTER
DO NOT TAKE  FISH OIL, MOBIC, IBUPROFEN, MOTRIN-LIKE DRUGS AND ANY MULTIVITAMINS OR OVER THE COUNTER SUPPLEMENTS 14 DAYS PRIOR TO SURGERY. HOLD ASPIRIN 5 DAYS PRIOR TO SURGERY    HOLD THE PLAVIX 5 DAYS PRIOR    HOLD THE INSULIN THE MORNING OF SURGERY    HOLD THE TRULICITY 1 WEEK PRIOR TO THE SURGERY    MUST HAVE AN ADULT OVER THE AGE OF 18 WITH YOU AT THE TIME OF THE DISCHARGE AND WITH YOU AT HOME AFTER THE PROCEDURE FOR 24 HOURS          Marsprasanth Rivera 1960 Diagnosis:     Surgical Physician: Dr. Vilma Sanchez have been scheduled for the procedure marked below:      Surgery: Transurethral Resection of the Prostate vs Greenlight Photovaporization of the Prostate         Date: 10/16/23     Anesthesia: Anesthesiologist (General/Spinal)     Place of Service: [facility] Second Floor Same Day Surgery         Arrive to same day surgery at:  7:30 am  (Surgery time is subject to change)      INSTRUCTIONS AS MARKED BELOW:    1.  DO NOT eat or drink anything after midnight before surgery. 2.  We prefer you shower or bathe with an antibacterial soap (Dial) the morning of surgery. 3  Please bring a current medication list, photo ID and insurance card(s) with you  4. Okay to take Tylenol  5. If you take Glucophage, Metformin or Janumet, hold 48-hours prior to surgery  6  Take blood pressure or heart medication as directed, if taken in the morning take with a small sip of water  7. The office will call you in 1-2 days after your procedure to schedule a follow up. 8.  Possible overnight stay at the hospital the day of surgery    DATE SENSITIVE TESTING-DO ON THE DATE LISTED *WALK IN *NO APPOINTMENT. DO THE PRE OP URINE CULTURE, FASTING BMP ON 10/5/23.  ORDERS INCLUDED         Date: 9/13/2023

## 2023-09-19 DIAGNOSIS — J43.9 MIXED RESTRICTIVE AND OBSTRUCTIVE LUNG DISEASE (HCC): ICD-10-CM

## 2023-09-19 DIAGNOSIS — J98.4 MIXED RESTRICTIVE AND OBSTRUCTIVE LUNG DISEASE (HCC): ICD-10-CM

## 2023-09-19 RX ORDER — ALBUTEROL SULFATE 2.5 MG/3ML
SOLUTION RESPIRATORY (INHALATION)
Qty: 150 ML | Refills: 1 | OUTPATIENT
Start: 2023-09-19

## 2023-09-21 ENCOUNTER — PREP FOR PROCEDURE (OUTPATIENT)
Dept: UROLOGY | Age: 63
End: 2023-09-21

## 2023-09-29 ENCOUNTER — HOSPITAL ENCOUNTER (OUTPATIENT)
Dept: NON INVASIVE DIAGNOSTICS | Age: 63
Discharge: HOME | End: 2023-09-29
Payer: MEDICAID

## 2023-09-29 VITALS — BODY MASS INDEX: 41.18 KG/M2 | HEIGHT: 69 IN | WEIGHT: 278 LBS

## 2023-09-29 DIAGNOSIS — F41.9 ANXIETY: ICD-10-CM

## 2023-09-29 DIAGNOSIS — Z01.810 PREOP CARDIOVASCULAR EXAM: ICD-10-CM

## 2023-09-29 PROCEDURE — 3430000000 HC RX DIAGNOSTIC RADIOPHARMACEUTICAL: Performed by: NUCLEAR MEDICINE

## 2023-09-29 PROCEDURE — 78452 HT MUSCLE IMAGE SPECT MULT: CPT | Performed by: NUCLEAR MEDICINE

## 2023-09-29 PROCEDURE — 93017 CV STRESS TEST TRACING ONLY: CPT | Performed by: NUCLEAR MEDICINE

## 2023-09-29 PROCEDURE — A9500 TC99M SESTAMIBI: HCPCS | Performed by: NUCLEAR MEDICINE

## 2023-09-29 PROCEDURE — 6360000002 HC RX W HCPCS

## 2023-09-29 RX ORDER — TETRAKIS(2-METHOXYISOBUTYLISOCYANIDE)COPPER(I) TETRAFLUOROBORATE 1 MG/ML
10.2 INJECTION, POWDER, LYOPHILIZED, FOR SOLUTION INTRAVENOUS
Status: COMPLETED | OUTPATIENT
Start: 2023-09-29 | End: 2023-09-29

## 2023-09-29 RX ORDER — TETRAKIS(2-METHOXYISOBUTYLISOCYANIDE)COPPER(I) TETRAFLUOROBORATE 1 MG/ML
31.6 INJECTION, POWDER, LYOPHILIZED, FOR SOLUTION INTRAVENOUS
Status: COMPLETED | OUTPATIENT
Start: 2023-09-29 | End: 2023-09-29

## 2023-09-29 RX ORDER — ALPRAZOLAM 1 MG/1
1 TABLET ORAL 2 TIMES DAILY
Qty: 180 TABLET | Refills: 0 | Status: SHIPPED | OUTPATIENT
Start: 2023-09-29 | End: 2023-12-28

## 2023-09-29 RX ADMIN — Medication 10.2 MILLICURIE: at 07:55

## 2023-09-29 RX ADMIN — Medication 31.6 MILLICURIE: at 09:00

## 2023-09-29 NOTE — TELEPHONE ENCOUNTER
Bhupinder Rojas called requesting a refill on the following medications:  Requested Prescriptions     Pending Prescriptions Disp Refills    ALPRAZolam (XANAX) 1 MG tablet 180 tablet 0     Sig: Take 1 tablet by mouth 2 times daily for 90 days.        Date of last visit: 8/7/2023  Date of next visit (if applicable):11/7/2023  Date of last refill: 7/1/23  Pharmacy Name: [facility]    Rx pending #180/0      Daniel,  Lencho Morales LPN

## 2023-10-02 DIAGNOSIS — Z79.4 TYPE 2 DIABETES MELLITUS WITH HYPERGLYCEMIA, WITH LONG-TERM CURRENT USE OF INSULIN (HCC): ICD-10-CM

## 2023-10-02 DIAGNOSIS — E11.65 TYPE 2 DIABETES MELLITUS WITH HYPERGLYCEMIA, WITH LONG-TERM CURRENT USE OF INSULIN (HCC): ICD-10-CM

## 2023-10-02 RX ORDER — SODIUM CHLORIDE 0.9 % (FLUSH) 0.9 %
5-40 SYRINGE (ML) INJECTION EVERY 12 HOURS SCHEDULED
Status: CANCELLED | OUTPATIENT
Start: 2023-10-02

## 2023-10-02 RX ORDER — SODIUM CHLORIDE 9 MG/ML
INJECTION, SOLUTION INTRAVENOUS PRN
Status: CANCELLED | OUTPATIENT
Start: 2023-10-02

## 2023-10-02 RX ORDER — IPRATROPIUM BROMIDE AND ALBUTEROL SULFATE 2.5; .5 MG/3ML; MG/3ML
1 SOLUTION RESPIRATORY (INHALATION) ONCE
Status: CANCELLED | OUTPATIENT
Start: 2023-10-16 | End: 2023-10-16

## 2023-10-02 RX ORDER — SODIUM CHLORIDE 0.9 % (FLUSH) 0.9 %
5-40 SYRINGE (ML) INJECTION PRN
Status: CANCELLED | OUTPATIENT
Start: 2023-10-02

## 2023-10-02 RX ORDER — DULAGLUTIDE 0.75 MG/.5ML
INJECTION, SOLUTION SUBCUTANEOUS
Qty: 2 ML | Refills: 1 | Status: SHIPPED | OUTPATIENT
Start: 2023-10-02

## 2023-10-02 NOTE — TELEPHONE ENCOUNTER
Jose Harris called requesting a refill on the following medications:  Requested Prescriptions     Pending Prescriptions Disp Refills    [facility] 9.[facility] GX/2.3GU SOPN [Pharmacy Med Name: [facility] 4.57 GO/6.5 ML PEN] 2 mL 1     Sig: inject 0.5 milliliters ( 0.75 milligrams ) subcutaneously every week IN THE ABDOMEN THIGHS OR OUTER AREA OF UPPER ARM ROTATE INJECTION SITES       Date of last visit: 8/7/2023  Date of next visit (if applicable):Visit date not found  Date of last refill: 8/7/2023  Pharmacy Name: [facility]       Thanks, Claudetta Christ, LPN

## 2023-10-03 DIAGNOSIS — E11.65 TYPE 2 DIABETES MELLITUS WITH HYPERGLYCEMIA, WITH LONG-TERM CURRENT USE OF INSULIN (HCC): ICD-10-CM

## 2023-10-03 DIAGNOSIS — Z79.4 TYPE 2 DIABETES MELLITUS WITH HYPERGLYCEMIA, WITH LONG-TERM CURRENT USE OF INSULIN (HCC): ICD-10-CM

## 2023-10-04 RX ORDER — PEN NEEDLE, DIABETIC 31 GX5/16"
NEEDLE, DISPOSABLE MISCELLANEOUS
Qty: 200 EACH | Refills: 1 | Status: SHIPPED | OUTPATIENT
Start: 2023-10-04

## 2023-10-04 RX ORDER — PEN NEEDLE, DIABETIC 31 GX5/16"
NEEDLE, DISPOSABLE MISCELLANEOUS
COMMUNITY
Start: 2023-08-19

## 2023-10-04 NOTE — TELEPHONE ENCOUNTER
David March called requesting a refill on the following medications:  Requested Prescriptions     Pending Prescriptions Disp Refills    Insulin Pen Needle (DROPLET PEN NEEDLES) 31G X 8 MM MISC [Pharmacy Med Name: Juan Corona 31GX5/16\"] 200 each 1     Sig: use as directed twice a day       Date of last visit: 8/7/2023  Date of next visit (if applicable):11/7/2023  Date of last refill:   Pharmacy Name: Nicole Melvin RN

## 2023-10-05 ENCOUNTER — HOSPITAL ENCOUNTER (OUTPATIENT)
Age: 63
Discharge: HOME | End: 2023-10-05
Payer: MEDICAID

## 2023-10-05 DIAGNOSIS — I10 ESSENTIAL HYPERTENSION: ICD-10-CM

## 2023-10-05 DIAGNOSIS — N40.1 BENIGN PROSTATIC HYPERPLASIA WITH URINARY RETENTION: ICD-10-CM

## 2023-10-05 DIAGNOSIS — E78.49 OTHER HYPERLIPIDEMIA: ICD-10-CM

## 2023-10-05 DIAGNOSIS — I10 PRIMARY HYPERTENSION: ICD-10-CM

## 2023-10-05 DIAGNOSIS — I25.10 CORONARY ARTERY DISEASE INVOLVING NATIVE CORONARY ARTERY OF NATIVE HEART WITHOUT ANGINA PECTORIS: ICD-10-CM

## 2023-10-05 DIAGNOSIS — Z01.818 PRE-OP TESTING: ICD-10-CM

## 2023-10-05 DIAGNOSIS — R33.8 BENIGN PROSTATIC HYPERPLASIA WITH URINARY RETENTION: ICD-10-CM

## 2023-10-05 LAB
ALBUMIN SERPL BCG-MCNC: 4.2 G/DL (ref 3.5–5.1)
ALP SERPL-CCNC: 124 U/L (ref 38–126)
ALT SERPL W/O P-5'-P-CCNC: 10 U/L (ref 11–66)
ANION GAP SERPL CALC-SCNC: 11 MEQ/L (ref 8–16)
AST SERPL-CCNC: 10 U/L (ref 5–40)
BILIRUB CONJ SERPL-MCNC: < 0.2 MG/DL (ref 0–0.3)
BILIRUB SERPL-MCNC: 0.3 MG/DL (ref 0.3–1.2)
BUN SERPL-MCNC: 23 MG/DL (ref 7–22)
CALCIUM SERPL-MCNC: 9.9 MG/DL (ref 8.5–10.5)
CHLORIDE SERPL-SCNC: 106 MEQ/L (ref 98–111)
CHOLEST SERPL-MCNC: 122 MG/DL (ref 100–199)
CO2 SERPL-SCNC: 23 MEQ/L (ref 23–33)
CREAT SERPL-MCNC: 1.2 MG/DL (ref 0.4–1.2)
DEPRECATED RDW RBC AUTO: 47.4 FL (ref 35–45)
ERYTHROCYTE [DISTWIDTH] IN BLOOD BY AUTOMATED COUNT: 14.7 % (ref 11.5–14.5)
GFR SERPL CREATININE-BSD FRML MDRD: > 60 ML/MIN/1.73M2
GLUCOSE SERPL-MCNC: 151 MG/DL (ref 70–108)
HCT VFR BLD AUTO: 35 % (ref 42–52)
HDLC SERPL-MCNC: 31 MG/DL
HGB BLD-MCNC: 11 GM/DL (ref 14–18)
LDLC SERPL CALC-MCNC: 67 MG/DL
MCH RBC QN AUTO: 27.4 PG (ref 26–33)
MCHC RBC AUTO-ENTMCNC: 31.4 GM/DL (ref 32.2–35.5)
MCV RBC AUTO: 87.3 FL (ref 80–94)
PLATELET # BLD AUTO: 255 THOU/MM3 (ref 130–400)
PMV BLD AUTO: 9.9 FL (ref 9.4–12.4)
POTASSIUM SERPL-SCNC: 4.7 MEQ/L (ref 3.5–5.2)
PROT SERPL-MCNC: 6.8 G/DL (ref 6.1–8)
RBC # BLD AUTO: 4.01 MILL/MM3 (ref 4.7–6.1)
SODIUM SERPL-SCNC: 140 MEQ/L (ref 135–145)
TRIGL SERPL-MCNC: 121 MG/DL (ref 0–199)
WBC # BLD AUTO: 7.3 THOU/MM3 (ref 4.8–10.8)

## 2023-10-05 PROCEDURE — 82248 BILIRUBIN DIRECT: CPT

## 2023-10-05 PROCEDURE — 85027 COMPLETE CBC AUTOMATED: CPT

## 2023-10-05 PROCEDURE — 80061 LIPID PANEL: CPT

## 2023-10-05 PROCEDURE — 87086 URINE CULTURE/COLONY COUNT: CPT

## 2023-10-05 PROCEDURE — 36415 COLL VENOUS BLD VENIPUNCTURE: CPT

## 2023-10-05 PROCEDURE — 80053 COMPREHEN METABOLIC PANEL: CPT

## 2023-10-07 LAB — BACTERIA UR CULT: NORMAL

## 2023-10-11 NOTE — FLOWSHEET NOTE
Follow all instructions given by your physician  NPO after midnight  Sips of water am of surgery with allowed medications  Bring insurance info and 's license  Wear clean comfortable , loose-fitting clothing  No jewelry or contact lenses to be worn day of surgery  No glue on dentures morning of surgery; you will be asked to remove them for surgery. Case for glasses. Shower the night before and the morning of surgery with a liquid antibacterial soap, dry with new fresh clean towel after each shower, no lotions, creams or powder. Clean sheets and pillowcase on bed night before surgery  Bring medications in original bottles  Bring CPAP/BIPAP machine if you have one ( you may be charged if one is needed in recovery room )yes is bringing no pacemaker     [address] has a Meds to [facility] program where they will deliver any new prescriptions you may have to your room before you leave. Our Pharmacy will clear it through your insurance; for example (same co pay). This enables you to take your new RX as soon as you need when you get home and avoids stop/wait delays on the way home. Please have a form of payment with you and have someone designated as your Pharmacy contact with their phone # as you may not feel well or still be under the influence of anesthesia. Yes would like this     Please refer to the SSI-Surgical Site Infection Flyer you hopefully received in the mail-together we can prevent infections; signs and symptoms reviewed. When discharged be sure you understand how to care for your wound and that you have the Dr./office phone # to call if you have any concerns or questions about your wound.  needed at discharge and someone over 18 to stay with you for 24 hours overnight (surgery may be cancelled if you don't have this) has no one at this time will get if going home would like to stay.    Report to [facility] on 2nd floor  If you would become ill prior to surgery, please call the surgeon  May have a

## 2023-10-16 ENCOUNTER — ANESTHESIA (OUTPATIENT)
Dept: OPERATING ROOM | Age: 63
End: 2023-10-16
Payer: MEDICAID

## 2023-10-16 ENCOUNTER — ANESTHESIA EVENT (OUTPATIENT)
Dept: OPERATING ROOM | Age: 63
End: 2023-10-16
Payer: MEDICAID

## 2023-10-16 ENCOUNTER — HOSPITAL ENCOUNTER (OUTPATIENT)
Age: 63
Setting detail: OBSERVATION
Discharge: HOME | End: 2023-10-17
Attending: UROLOGY | Admitting: UROLOGY
Payer: MEDICAID

## 2023-10-16 DIAGNOSIS — G89.18 POST-OPERATIVE PAIN: Primary | ICD-10-CM

## 2023-10-16 PROBLEM — N13.8 BPH WITH OBSTRUCTION/LOWER URINARY TRACT SYMPTOMS: Status: ACTIVE | Noted: 2023-10-16

## 2023-10-16 PROBLEM — N40.1 BPH WITH OBSTRUCTION/LOWER URINARY TRACT SYMPTOMS: Status: ACTIVE | Noted: 2023-10-16

## 2023-10-16 LAB
GLUCOSE BLD STRIP.AUTO-MCNC: 199 MG/DL (ref 70–108)
GLUCOSE BLD STRIP.AUTO-MCNC: 313 MG/DL (ref 70–108)
GLUCOSE BLD STRIP.AUTO-MCNC: 388 MG/DL (ref 70–108)

## 2023-10-16 PROCEDURE — 2580000003 HC RX 258: Performed by: UROLOGY

## 2023-10-16 PROCEDURE — 2580000003 HC RX 258

## 2023-10-16 PROCEDURE — 6370000000 HC RX 637 (ALT 250 FOR IP): Performed by: UROLOGY

## 2023-10-16 PROCEDURE — 2720000010 HC SURG SUPPLY STERILE: Performed by: UROLOGY

## 2023-10-16 PROCEDURE — 3600000013 HC SURGERY LEVEL 3 ADDTL 15MIN: Performed by: UROLOGY

## 2023-10-16 PROCEDURE — 6360000002 HC RX W HCPCS

## 2023-10-16 PROCEDURE — 2500000003 HC RX 250 WO HCPCS

## 2023-10-16 PROCEDURE — 2709999900 HC NON-CHARGEABLE SUPPLY: Performed by: UROLOGY

## 2023-10-16 PROCEDURE — G0378 HOSPITAL OBSERVATION PER HR: HCPCS

## 2023-10-16 PROCEDURE — 3700000000 HC ANESTHESIA ATTENDED CARE: Performed by: UROLOGY

## 2023-10-16 PROCEDURE — 6360000002 HC RX W HCPCS: Performed by: UROLOGY

## 2023-10-16 PROCEDURE — 7100000010 HC PHASE II RECOVERY - FIRST 15 MIN: Performed by: UROLOGY

## 2023-10-16 PROCEDURE — 7100000011 HC PHASE II RECOVERY - ADDTL 15 MIN: Performed by: UROLOGY

## 2023-10-16 PROCEDURE — 82948 REAGENT STRIP/BLOOD GLUCOSE: CPT

## 2023-10-16 PROCEDURE — 3700000001 HC ADD 15 MINUTES (ANESTHESIA): Performed by: UROLOGY

## 2023-10-16 PROCEDURE — 94640 AIRWAY INHALATION TREATMENT: CPT

## 2023-10-16 PROCEDURE — 7100000001 HC PACU RECOVERY - ADDTL 15 MIN: Performed by: UROLOGY

## 2023-10-16 PROCEDURE — 3600000003 HC SURGERY LEVEL 3 BASE: Performed by: UROLOGY

## 2023-10-16 PROCEDURE — 7100000000 HC PACU RECOVERY - FIRST 15 MIN: Performed by: UROLOGY

## 2023-10-16 RX ORDER — SODIUM CHLORIDE 0.9 % (FLUSH) 0.9 %
5-40 SYRINGE (ML) INJECTION EVERY 12 HOURS SCHEDULED
Status: DISCONTINUED | OUTPATIENT
Start: 2023-10-16 | End: 2023-10-16 | Stop reason: HOSPADM

## 2023-10-16 RX ORDER — ALBUTEROL SULFATE 2.5 MG/3ML
2.5 SOLUTION RESPIRATORY (INHALATION)
Status: DISCONTINUED | OUTPATIENT
Start: 2023-10-17 | End: 2023-10-17 | Stop reason: HOSPADM

## 2023-10-16 RX ORDER — CROMOLYN SODIUM 40 MG/ML
4 SOLUTION/ DROPS OPHTHALMIC 4 TIMES DAILY
Status: DISCONTINUED | OUTPATIENT
Start: 2023-10-16 | End: 2023-10-17 | Stop reason: HOSPADM

## 2023-10-16 RX ORDER — PROPOFOL 10 MG/ML
INJECTION, EMULSION INTRAVENOUS PRN
Status: DISCONTINUED | OUTPATIENT
Start: 2023-10-16 | End: 2023-10-16 | Stop reason: SDUPTHER

## 2023-10-16 RX ORDER — SODIUM CHLORIDE 0.9 % (FLUSH) 0.9 %
5-40 SYRINGE (ML) INJECTION PRN
Status: DISCONTINUED | OUTPATIENT
Start: 2023-10-16 | End: 2023-10-16 | Stop reason: HOSPADM

## 2023-10-16 RX ORDER — ASCORBIC ACID 500 MG
500 TABLET ORAL DAILY
Status: DISCONTINUED | OUTPATIENT
Start: 2023-10-17 | End: 2023-10-17 | Stop reason: HOSPADM

## 2023-10-16 RX ORDER — AMLODIPINE BESYLATE 10 MG/1
10 TABLET ORAL DAILY
Status: DISCONTINUED | OUTPATIENT
Start: 2023-10-17 | End: 2023-10-17 | Stop reason: HOSPADM

## 2023-10-16 RX ORDER — LIDOCAINE HYDROCHLORIDE 20 MG/ML
INJECTION, SOLUTION INTRAVENOUS PRN
Status: DISCONTINUED | OUTPATIENT
Start: 2023-10-16 | End: 2023-10-16 | Stop reason: SDUPTHER

## 2023-10-16 RX ORDER — PRAVASTATIN SODIUM 80 MG/1
80 TABLET ORAL NIGHTLY
Status: DISCONTINUED | OUTPATIENT
Start: 2023-10-16 | End: 2023-10-17 | Stop reason: HOSPADM

## 2023-10-16 RX ORDER — SODIUM CHLORIDE 9 MG/ML
INJECTION, SOLUTION INTRAVENOUS CONTINUOUS
Status: DISCONTINUED | OUTPATIENT
Start: 2023-10-16 | End: 2023-10-17 | Stop reason: HOSPADM

## 2023-10-16 RX ORDER — SODIUM CHLORIDE 9 MG/ML
INJECTION, SOLUTION INTRAVENOUS CONTINUOUS PRN
Status: DISCONTINUED | OUTPATIENT
Start: 2023-10-16 | End: 2023-10-16 | Stop reason: SDUPTHER

## 2023-10-16 RX ORDER — PANTOPRAZOLE SODIUM 40 MG/1
40 TABLET, DELAYED RELEASE ORAL EVERY EVENING
Status: DISCONTINUED | OUTPATIENT
Start: 2023-10-16 | End: 2023-10-17 | Stop reason: HOSPADM

## 2023-10-16 RX ORDER — FENTANYL CITRATE 50 UG/ML
50 INJECTION, SOLUTION INTRAMUSCULAR; INTRAVENOUS EVERY 5 MIN PRN
Status: DISCONTINUED | OUTPATIENT
Start: 2023-10-16 | End: 2023-10-16 | Stop reason: HOSPADM

## 2023-10-16 RX ORDER — HYDROCODONE BITARTRATE AND ACETAMINOPHEN 5; 325 MG/1; MG/1
1 TABLET ORAL EVERY 4 HOURS PRN
Status: DISCONTINUED | OUTPATIENT
Start: 2023-10-16 | End: 2023-10-17 | Stop reason: HOSPADM

## 2023-10-16 RX ORDER — INSULIN LISPRO 100 [IU]/ML
10 INJECTION, SOLUTION INTRAVENOUS; SUBCUTANEOUS 2 TIMES DAILY PRN
Status: DISCONTINUED | OUTPATIENT
Start: 2023-10-16 | End: 2023-10-17 | Stop reason: HOSPADM

## 2023-10-16 RX ORDER — FENTANYL CITRATE 50 UG/ML
25 INJECTION, SOLUTION INTRAMUSCULAR; INTRAVENOUS EVERY 5 MIN PRN
Status: DISCONTINUED | OUTPATIENT
Start: 2023-10-16 | End: 2023-10-16 | Stop reason: HOSPADM

## 2023-10-16 RX ORDER — SUCCINYLCHOLINE CHLORIDE 20 MG/ML
INJECTION INTRAMUSCULAR; INTRAVENOUS PRN
Status: DISCONTINUED | OUTPATIENT
Start: 2023-10-16 | End: 2023-10-16 | Stop reason: SDUPTHER

## 2023-10-16 RX ORDER — ENOXAPARIN SODIUM 100 MG/ML
30 INJECTION SUBCUTANEOUS 2 TIMES DAILY
Status: DISCONTINUED | OUTPATIENT
Start: 2023-10-17 | End: 2023-10-17 | Stop reason: HOSPADM

## 2023-10-16 RX ORDER — PHENAZOPYRIDINE HYDROCHLORIDE 100 MG/1
100 TABLET, FILM COATED ORAL 3 TIMES DAILY PRN
Qty: 21 TABLET | Refills: 0 | Status: SHIPPED | OUTPATIENT
Start: 2023-10-16 | End: 2023-10-23

## 2023-10-16 RX ORDER — INSULIN GLARGINE 100 [IU]/ML
35 INJECTION, SOLUTION SUBCUTANEOUS 2 TIMES DAILY
Status: DISCONTINUED | OUTPATIENT
Start: 2023-10-16 | End: 2023-10-17 | Stop reason: HOSPADM

## 2023-10-16 RX ORDER — SODIUM CHLORIDE 9 MG/ML
INJECTION, SOLUTION INTRAVENOUS PRN
Status: DISCONTINUED | OUTPATIENT
Start: 2023-10-16 | End: 2023-10-16 | Stop reason: HOSPADM

## 2023-10-16 RX ORDER — IPRATROPIUM BROMIDE AND ALBUTEROL SULFATE 2.5; .5 MG/3ML; MG/3ML
1 SOLUTION RESPIRATORY (INHALATION) ONCE
Status: DISCONTINUED | OUTPATIENT
Start: 2023-10-16 | End: 2023-10-16 | Stop reason: HOSPADM

## 2023-10-16 RX ORDER — ALPRAZOLAM 0.5 MG/1
1 TABLET ORAL 2 TIMES DAILY
Status: DISCONTINUED | OUTPATIENT
Start: 2023-10-16 | End: 2023-10-17 | Stop reason: HOSPADM

## 2023-10-16 RX ORDER — ONDANSETRON 2 MG/ML
4 INJECTION INTRAMUSCULAR; INTRAVENOUS EVERY 6 HOURS PRN
Status: DISCONTINUED | OUTPATIENT
Start: 2023-10-16 | End: 2023-10-17 | Stop reason: HOSPADM

## 2023-10-16 RX ORDER — SODIUM CHLORIDE 0.9 % (FLUSH) 0.9 %
5-40 SYRINGE (ML) INJECTION EVERY 12 HOURS SCHEDULED
Status: DISCONTINUED | OUTPATIENT
Start: 2023-10-16 | End: 2023-10-17 | Stop reason: HOSPADM

## 2023-10-16 RX ORDER — ALBUTEROL SULFATE 90 UG/1
2 AEROSOL, METERED RESPIRATORY (INHALATION) EVERY 4 HOURS PRN
Status: DISCONTINUED | OUTPATIENT
Start: 2023-10-16 | End: 2023-10-17 | Stop reason: HOSPADM

## 2023-10-16 RX ORDER — OXYBUTYNIN CHLORIDE 5 MG/1
5 TABLET, EXTENDED RELEASE ORAL 2 TIMES DAILY
Status: DISCONTINUED | OUTPATIENT
Start: 2023-10-16 | End: 2023-10-17 | Stop reason: HOSPADM

## 2023-10-16 RX ORDER — FENTANYL CITRATE 50 UG/ML
INJECTION, SOLUTION INTRAMUSCULAR; INTRAVENOUS PRN
Status: DISCONTINUED | OUTPATIENT
Start: 2023-10-16 | End: 2023-10-16 | Stop reason: SDUPTHER

## 2023-10-16 RX ORDER — DEXAMETHASONE SODIUM PHOSPHATE 10 MG/ML
INJECTION, EMULSION INTRAMUSCULAR; INTRAVENOUS PRN
Status: DISCONTINUED | OUTPATIENT
Start: 2023-10-16 | End: 2023-10-16 | Stop reason: SDUPTHER

## 2023-10-16 RX ORDER — ONDANSETRON 2 MG/ML
4 INJECTION INTRAMUSCULAR; INTRAVENOUS
Status: DISCONTINUED | OUTPATIENT
Start: 2023-10-16 | End: 2023-10-16 | Stop reason: HOSPADM

## 2023-10-16 RX ORDER — MEPERIDINE HYDROCHLORIDE 25 MG/ML
12.5 INJECTION INTRAMUSCULAR; INTRAVENOUS; SUBCUTANEOUS EVERY 5 MIN PRN
Status: DISCONTINUED | OUTPATIENT
Start: 2023-10-16 | End: 2023-10-16 | Stop reason: HOSPADM

## 2023-10-16 RX ORDER — ENALAPRIL MALEATE 10 MG/1
10 TABLET ORAL DAILY
Status: DISCONTINUED | OUTPATIENT
Start: 2023-10-17 | End: 2023-10-17 | Stop reason: HOSPADM

## 2023-10-16 RX ORDER — ONDANSETRON 2 MG/ML
INJECTION INTRAMUSCULAR; INTRAVENOUS PRN
Status: DISCONTINUED | OUTPATIENT
Start: 2023-10-16 | End: 2023-10-16 | Stop reason: SDUPTHER

## 2023-10-16 RX ORDER — DOXYCYCLINE HYCLATE 100 MG
100 TABLET ORAL 2 TIMES DAILY
Qty: 6 TABLET | Refills: 0 | Status: SHIPPED | OUTPATIENT
Start: 2023-10-16 | End: 2023-10-19

## 2023-10-16 RX ORDER — SODIUM CHLORIDE 0.9 % (FLUSH) 0.9 %
5-40 SYRINGE (ML) INJECTION PRN
Status: DISCONTINUED | OUTPATIENT
Start: 2023-10-16 | End: 2023-10-17 | Stop reason: HOSPADM

## 2023-10-16 RX ORDER — GEMFIBROZIL 600 MG/1
600 TABLET, FILM COATED ORAL 2 TIMES DAILY
Status: DISCONTINUED | OUTPATIENT
Start: 2023-10-16 | End: 2023-10-17 | Stop reason: HOSPADM

## 2023-10-16 RX ORDER — ROCURONIUM BROMIDE 10 MG/ML
INJECTION, SOLUTION INTRAVENOUS PRN
Status: DISCONTINUED | OUTPATIENT
Start: 2023-10-16 | End: 2023-10-16 | Stop reason: SDUPTHER

## 2023-10-16 RX ORDER — ALBUTEROL SULFATE 2.5 MG/3ML
2.5 SOLUTION RESPIRATORY (INHALATION)
Status: DISCONTINUED | OUTPATIENT
Start: 2023-10-16 | End: 2023-10-16

## 2023-10-16 RX ORDER — FLUTICASONE PROPIONATE 50 MCG
1 SPRAY, SUSPENSION (ML) NASAL
Status: DISCONTINUED | OUTPATIENT
Start: 2023-10-16 | End: 2023-10-17 | Stop reason: HOSPADM

## 2023-10-16 RX ORDER — ALBUTEROL SULFATE 90 UG/1
2 AEROSOL, METERED RESPIRATORY (INHALATION) EVERY 6 HOURS PRN
Status: DISCONTINUED | OUTPATIENT
Start: 2023-10-16 | End: 2023-10-16

## 2023-10-16 RX ORDER — MIDAZOLAM HYDROCHLORIDE 1 MG/ML
INJECTION INTRAMUSCULAR; INTRAVENOUS PRN
Status: DISCONTINUED | OUTPATIENT
Start: 2023-10-16 | End: 2023-10-16 | Stop reason: SDUPTHER

## 2023-10-16 RX ORDER — POLYETHYLENE GLYCOL 3350 17 G/17G
17 POWDER, FOR SOLUTION ORAL DAILY PRN
Status: DISCONTINUED | OUTPATIENT
Start: 2023-10-16 | End: 2023-10-17 | Stop reason: HOSPADM

## 2023-10-16 RX ORDER — PHENAZOPYRIDINE HYDROCHLORIDE 200 MG/1
200 TABLET, FILM COATED ORAL ONCE
Status: COMPLETED | OUTPATIENT
Start: 2023-10-16 | End: 2023-10-16

## 2023-10-16 RX ORDER — SODIUM CHLORIDE 9 MG/ML
INJECTION, SOLUTION INTRAVENOUS PRN
Status: DISCONTINUED | OUTPATIENT
Start: 2023-10-16 | End: 2023-10-17 | Stop reason: HOSPADM

## 2023-10-16 RX ORDER — DEXTROSE MONOHYDRATE 100 MG/ML
INJECTION, SOLUTION INTRAVENOUS CONTINUOUS PRN
Status: DISCONTINUED | OUTPATIENT
Start: 2023-10-16 | End: 2023-10-17 | Stop reason: HOSPADM

## 2023-10-16 RX ORDER — HYDROCODONE BITARTRATE AND ACETAMINOPHEN 5; 325 MG/1; MG/1
2 TABLET ORAL EVERY 4 HOURS PRN
Status: DISCONTINUED | OUTPATIENT
Start: 2023-10-16 | End: 2023-10-17 | Stop reason: HOSPADM

## 2023-10-16 RX ORDER — OXYBUTYNIN CHLORIDE 5 MG/1
5 TABLET, EXTENDED RELEASE ORAL DAILY
Qty: 14 TABLET | Refills: 0 | Status: SHIPPED | OUTPATIENT
Start: 2023-10-16 | End: 2023-10-30

## 2023-10-16 RX ORDER — ONDANSETRON 4 MG/1
4 TABLET, ORALLY DISINTEGRATING ORAL EVERY 8 HOURS PRN
Status: DISCONTINUED | OUTPATIENT
Start: 2023-10-16 | End: 2023-10-17 | Stop reason: HOSPADM

## 2023-10-16 RX ADMIN — PROPOFOL 150 MG: 10 INJECTION, EMULSION INTRAVENOUS at 10:56

## 2023-10-16 RX ADMIN — FENTANYL CITRATE 25 MCG: 50 INJECTION, SOLUTION INTRAMUSCULAR; INTRAVENOUS at 11:29

## 2023-10-16 RX ADMIN — METOPROLOL TARTRATE 25 MG: 25 TABLET, FILM COATED ORAL at 19:55

## 2023-10-16 RX ADMIN — ALPRAZOLAM 1 MG: 0.5 TABLET ORAL at 19:55

## 2023-10-16 RX ADMIN — DEXAMETHASONE SODIUM PHOSPHATE 8 MG: 10 INJECTION, EMULSION INTRAMUSCULAR; INTRAVENOUS at 11:11

## 2023-10-16 RX ADMIN — Medication 160 MG: at 10:56

## 2023-10-16 RX ADMIN — INSULIN LISPRO 10 UNITS: 100 INJECTION, SOLUTION INTRAVENOUS; SUBCUTANEOUS at 20:32

## 2023-10-16 RX ADMIN — GEMFIBROZIL 600 MG: 600 TABLET ORAL at 19:55

## 2023-10-16 RX ADMIN — OXYBUTYNIN CHLORIDE 5 MG: 5 TABLET, EXTENDED RELEASE ORAL at 13:52

## 2023-10-16 RX ADMIN — PROPOFOL 50 MG: 10 INJECTION, EMULSION INTRAVENOUS at 11:12

## 2023-10-16 RX ADMIN — SODIUM CHLORIDE: 9 INJECTION, SOLUTION INTRAVENOUS at 16:45

## 2023-10-16 RX ADMIN — HYDROCODONE BITARTRATE AND ACETAMINOPHEN 2 TABLET: 5; 325 TABLET ORAL at 13:11

## 2023-10-16 RX ADMIN — SODIUM CHLORIDE: 9 INJECTION, SOLUTION INTRAVENOUS at 23:12

## 2023-10-16 RX ADMIN — PANTOPRAZOLE SODIUM 40 MG: 40 TABLET, DELAYED RELEASE ORAL at 19:55

## 2023-10-16 RX ADMIN — HYDROCODONE BITARTRATE AND ACETAMINOPHEN 2 TABLET: 5; 325 TABLET ORAL at 21:16

## 2023-10-16 RX ADMIN — SODIUM CHLORIDE: 9 INJECTION, SOLUTION INTRAVENOUS at 10:48

## 2023-10-16 RX ADMIN — PRAVASTATIN SODIUM 80 MG: 80 TABLET ORAL at 19:55

## 2023-10-16 RX ADMIN — FENTANYL CITRATE 25 MCG: 50 INJECTION, SOLUTION INTRAMUSCULAR; INTRAVENOUS at 10:56

## 2023-10-16 RX ADMIN — CEFTRIAXONE SODIUM 1000 MG: 1 INJECTION, POWDER, FOR SOLUTION INTRAMUSCULAR; INTRAVENOUS at 17:10

## 2023-10-16 RX ADMIN — OXYBUTYNIN CHLORIDE 5 MG: 5 TABLET, EXTENDED RELEASE ORAL at 19:55

## 2023-10-16 RX ADMIN — Medication 3000 MG: at 11:03

## 2023-10-16 RX ADMIN — HYDROCODONE BITARTRATE AND ACETAMINOPHEN 2 TABLET: 5; 325 TABLET ORAL at 17:14

## 2023-10-16 RX ADMIN — INSULIN GLARGINE 35 UNITS: 100 INJECTION, SOLUTION SUBCUTANEOUS at 20:33

## 2023-10-16 RX ADMIN — MIDAZOLAM 2 MG: 1 INJECTION INTRAMUSCULAR; INTRAVENOUS at 10:47

## 2023-10-16 RX ADMIN — SUGAMMADEX 400 MG: 100 INJECTION, SOLUTION INTRAVENOUS at 11:41

## 2023-10-16 RX ADMIN — CROMOLYN SODIUM 4 DROP: 40 SOLUTION/ DROPS OPHTHALMIC at 19:58

## 2023-10-16 RX ADMIN — FLUTICASONE PROPIONATE 1 SPRAY: 50 SPRAY, METERED NASAL at 19:57

## 2023-10-16 RX ADMIN — LIDOCAINE HYDROCHLORIDE 60 MG: 20 INJECTION, SOLUTION INTRAVENOUS at 10:56

## 2023-10-16 RX ADMIN — INSULIN LISPRO 10 UNITS: 100 INJECTION, SOLUTION INTRAVENOUS; SUBCUTANEOUS at 17:09

## 2023-10-16 RX ADMIN — ALBUTEROL SULFATE 2.5 MG: 2.5 SOLUTION RESPIRATORY (INHALATION) at 18:08

## 2023-10-16 RX ADMIN — ONDANSETRON 4 MG: 2 INJECTION INTRAMUSCULAR; INTRAVENOUS at 11:32

## 2023-10-16 RX ADMIN — ROCURONIUM BROMIDE 40 MG: 10 INJECTION INTRAVENOUS at 11:07

## 2023-10-16 RX ADMIN — PHENAZOPYRIDINE 200 MG: 200 TABLET ORAL at 16:01

## 2023-10-16 RX ADMIN — FENTANYL CITRATE 25 MCG: 50 INJECTION, SOLUTION INTRAMUSCULAR; INTRAVENOUS at 11:10

## 2023-10-16 RX ADMIN — ROCURONIUM BROMIDE 10 MG: 10 INJECTION INTRAVENOUS at 10:56

## 2023-10-16 RX ADMIN — FENTANYL CITRATE 25 MCG: 50 INJECTION, SOLUTION INTRAMUSCULAR; INTRAVENOUS at 11:25

## 2023-10-16 RX ADMIN — SODIUM CHLORIDE: 9 INJECTION, SOLUTION INTRAVENOUS at 07:55

## 2023-10-16 ASSESSMENT — PAIN DESCRIPTION - ORIENTATION
ORIENTATION: LOWER
ORIENTATION: LOWER
ORIENTATION: MID

## 2023-10-16 ASSESSMENT — PAIN SCALES - GENERAL
PAINLEVEL_OUTOF10: 9
PAINLEVEL_OUTOF10: 8
PAINLEVEL_OUTOF10: 9
PAINLEVEL_OUTOF10: 8
PAINLEVEL_OUTOF10: 8
PAINLEVEL_OUTOF10: 7
PAINLEVEL_OUTOF10: 8
PAINLEVEL_OUTOF10: 8
PAINLEVEL_OUTOF10: 7
PAINLEVEL_OUTOF10: 7

## 2023-10-16 ASSESSMENT — PAIN DESCRIPTION - LOCATION
LOCATION: PELVIS
LOCATION: PENIS
LOCATION: PENIS
LOCATION: PELVIS
LOCATION: PENIS
LOCATION: PENIS
LOCATION: PELVIS
LOCATION: PENIS

## 2023-10-16 ASSESSMENT — PAIN DESCRIPTION - DESCRIPTORS
DESCRIPTORS: PRESSURE
DESCRIPTORS: ACHING;SORE
DESCRIPTORS: SORE;ACHING

## 2023-10-16 ASSESSMENT — PAIN DESCRIPTION - PAIN TYPE
TYPE: SURGICAL PAIN;OTHER (COMMENT)
TYPE: SURGICAL PAIN;OTHER (COMMENT)

## 2023-10-16 ASSESSMENT — PAIN - FUNCTIONAL ASSESSMENT
PAIN_FUNCTIONAL_ASSESSMENT: ACTIVITIES ARE NOT PREVENTED
PAIN_FUNCTIONAL_ASSESSMENT: 0-10
PAIN_FUNCTIONAL_ASSESSMENT: ACTIVITIES ARE NOT PREVENTED
PAIN_FUNCTIONAL_ASSESSMENT: ACTIVITIES ARE NOT PREVENTED

## 2023-10-16 NOTE — PROGRESS NOTES
Pt admitted to [facility] room 3 and oriented to unit. SCD sleeves applied. Nares swabbed. Pt verbalized permission for first name, last initial and physicians name on white board. SDS board and discharge criteria explained, pt and family verbalized understanding. Pt denies thoughts of harming self or others. Call light in reach. Family at the bedside.   Shana Lino [phone]

## 2023-10-16 NOTE — DISCHARGE INSTRUCTIONS
Discharge instructions: Greenlight Photovaporization of the prostate: The patient should have CBI weaned off in recovery. Please call if urine is not clear / pink with CBI. Traction on the catheter should be released before discharge. If taking prostate medications, continue for 1 month    You may see blood in the urine after the procedure. This should resolve over the next couple days. Please stay hydrated. You may experience frequency/urgency of urination after the procedure. We expect these symptoms to improve over the next couple weeks. Tylenol for pain control  Pt ok to discharge home in good condition  No heavy lifting, >10 lbs for today  Pt should avoid strenuous activity for today  Pt should walk moderately at home  Pt ok to shower   Pt may resume diet as tolerated  Pt should take Rx as directed  No driving while on narcotics  Please call attending physician or hospital  with questions  Call or Present to ED if fever (> 101F), intractable nausea vomiting or pain. If taking, Please hold blood thinning medications for 3-5 days till hematuria improves. Pt should follow up with Dr. Lily Oneal MD, in 3 days to have catheter removed, call to confirm appointment    Home with corea catheter. Please teach corea education and send home with leg and night bag. You may see intermittent blood in the urine while the catheter in place. If the catheter becomes obstructed and needs to be exchanged, please call.

## 2023-10-16 NOTE — H&P
([address])    Abnormal stress test    Os trigonum    Elevated PSA    Lumbar spinal stenosis    Well controlled type 2 diabetes mellitus (HCC)    ROBSON (obstructive sleep apnea)    Back pain at L4-L5 level    Anxiety    Microalbuminuria    H/O heart artery stent    Urinary retention    Malignant neoplasm of prostate (HCC)    COPD, severe ([address])    Pneumonia due to COVID-19 virus    Paget disease of bone       Plan: Greenlight PVP    Risks benefits and alternative procedures are explained, informed consent is obtained, and the patient elects to proceed.

## 2023-10-16 NOTE — PROGRESS NOTES
Pt continues to have c/o pain. RN offered pt warm blanket to help relieve pain. Dr. Alex Drummond notified new orders pyridium 200 mg once.

## 2023-10-16 NOTE — PROGRESS NOTES
Patient arrived to 6A09 by cart from [facility]. Alert and oriented. Ambulated to bed without difficulty. States he continues to have penis pain 8/10. Denies nausea. CBI infusing at slow rate, return is clear and yellow. No drainage at urethra. Oriented to room. Call light within reach.

## 2023-10-16 NOTE — PROGRESS NOTES
1152- pt to pacu, resp easy and unlabored, VSS, pt appears in no acute distress  1205- pt resting in bed with eyes closed, resp easy and unlabored, VSS, pt appears in no acute distress  1215- pt remains stable and resting in bed, pt appears in no acute distress  1222- pt meets criteria for discharge from pacu, pt transported to [facility] in stable condition to await inpatient bed placement

## 2023-10-16 NOTE — ANESTHESIA POSTPROCEDURE EVALUATION
Department of Anesthesiology  Postprocedure Note    Patient: Marina Delacruz  MRN: [id number]  YOB: 1960  Date of evaluation: 10/16/2023      Procedure Summary     Date: 10/16/23 Room / Location: [facility]  Sherry Linsey    Anesthesia Start: 1048 Anesthesia Stop: 1151    Procedure: Sanjiv Shad of the Prostate Diagnosis:       BPH with obstruction/lower urinary tract symptoms      (BPH with obstruction/lower urinary tract symptoms [N40.1, N13.8])    Surgeons: Madisyn Hodges MD Responsible Provider: Joan Daniels DO    Anesthesia Type: general ASA Status: 3          Anesthesia Type: No value filed.     Michaelle Phase I: Michaelle Score: 9    Michaelle Phase II:        Anesthesia Post Evaluation    Patient location during evaluation: PACU  Patient participation: complete - patient participated  Level of consciousness: awake and alert  Pain score: 2  Airway patency: patent  Nausea & Vomiting: no nausea and no vomiting  Complications: no  Cardiovascular status: hemodynamically stable and blood pressure returned to baseline  Respiratory status: spontaneous ventilation, room air and acceptable  Hydration status: stable  Pain management: adequate and satisfactory to patient

## 2023-10-16 NOTE — OP NOTE
FACILITY:  [address] [place] [address]   DATE:  10/16/23  Jose Harris  1960  [id number]     Surgeon: Dr. Balaji Oatse MD MD  Asst.: Dr. Balaji Oates MD MD  PREOPERATIVE DIAGNOSES:  1. Urinary retention. 2. Benign prostatic hyperplasia with obstruction. POSTOPERATIVE DIAGNOSIS:  1. Urinary retention. 2. Benign prostatic hyperplasia with obstruction. PROCEDURES PERFORMED:  1. Cystoscopy. 2. GreenLight photovaporization of the prostate. ANESTHESIA:  General.  COMPLICATIONS:  None. SPECIMENS:  Urine for culture. ESTIMATED BLOOD LOSS:  Minimal.  DRAINS:  A 22 English 3-way Mccartney catheter. INDICATIONS: Jose Harris is a 59 y.o. male presents today for GreenLight photovaporization of the prostate. After risks, benefits and alternatives of the procedure were discussed with the patient, informed consent was obtained and the patient elected to proceed. OPERATIVE SUMMARY:  The risks and benefits of the procedure were explained to the patient in the preoperative area. After informed consent was obtained, the patient was taken back to the operating room. The patient was transferred to the operating table and placed in a supine position. General anesthesia was induced and the patient was placed in the dorsal lithotomy position. He was prepped and draped in a sterile fashion and a time-out was performed to confirm patient identity and procedure. Prior to induction of anesthesia the patient was administered preoperative antibiotics and EPC cuffs were on and functioning. Our continuous flow sheath with obturator and lens was inserted through the patient's urethra and into the bladder. Upon entering the bladder we located both ureteral orifices, they were at a safe distance from the vesical neck. On evaluation of the prostate the patient was noted to have median lobe.  The GreenLight fiber was then inserted after we removed our obturator and placed our working bridge through out continous flow

## 2023-10-17 ENCOUNTER — TELEPHONE (OUTPATIENT)
Dept: UROLOGY | Age: 63
End: 2023-10-17

## 2023-10-17 VITALS
OXYGEN SATURATION: 97 % | WEIGHT: 281.2 LBS | HEIGHT: 69 IN | BODY MASS INDEX: 41.65 KG/M2 | DIASTOLIC BLOOD PRESSURE: 46 MMHG | SYSTOLIC BLOOD PRESSURE: 138 MMHG | TEMPERATURE: 97.7 F | HEART RATE: 71 BPM | RESPIRATION RATE: 18 BRPM

## 2023-10-17 LAB
ANION GAP SERPL CALC-SCNC: 15 MEQ/L (ref 8–16)
BASOPHILS ABSOLUTE: 0 THOU/MM3 (ref 0–0.1)
BASOPHILS NFR BLD AUTO: 0.2 %
BUN SERPL-MCNC: 18 MG/DL (ref 7–22)
CALCIUM SERPL-MCNC: 9.4 MG/DL (ref 8.5–10.5)
CHLORIDE SERPL-SCNC: 105 MEQ/L (ref 98–111)
CO2 SERPL-SCNC: 19 MEQ/L (ref 23–33)
CREAT SERPL-MCNC: 1 MG/DL (ref 0.4–1.2)
DEPRECATED RDW RBC AUTO: 45.1 FL (ref 35–45)
EOSINOPHIL NFR BLD AUTO: 0 %
EOSINOPHILS ABSOLUTE: 0 THOU/MM3 (ref 0–0.4)
ERYTHROCYTE [DISTWIDTH] IN BLOOD BY AUTOMATED COUNT: 14.3 % (ref 11.5–14.5)
GFR SERPL CREATININE-BSD FRML MDRD: > 60 ML/MIN/1.73M2
GLUCOSE BLD STRIP.AUTO-MCNC: 218 MG/DL (ref 70–108)
GLUCOSE SERPL-MCNC: 232 MG/DL (ref 70–108)
HCT VFR BLD AUTO: 33.6 % (ref 42–52)
HGB BLD-MCNC: 10.3 GM/DL (ref 14–18)
IMM GRANULOCYTES # BLD AUTO: 0.06 THOU/MM3 (ref 0–0.07)
IMM GRANULOCYTES NFR BLD AUTO: 0.6 %
LYMPHOCYTES ABSOLUTE: 0.8 THOU/MM3 (ref 1–4.8)
LYMPHOCYTES NFR BLD AUTO: 7.4 %
MCH RBC QN AUTO: 26.5 PG (ref 26–33)
MCHC RBC AUTO-ENTMCNC: 30.7 GM/DL (ref 32.2–35.5)
MCV RBC AUTO: 86.6 FL (ref 80–94)
MONOCYTES ABSOLUTE: 0.5 THOU/MM3 (ref 0.4–1.3)
MONOCYTES NFR BLD AUTO: 4.5 %
NEUTROPHILS NFR BLD AUTO: 87.3 %
NRBC BLD AUTO-RTO: 0 /100 WBC
PLATELET # BLD AUTO: 247 THOU/MM3 (ref 130–400)
PMV BLD AUTO: 9.4 FL (ref 9.4–12.4)
POTASSIUM SERPL-SCNC: 4.8 MEQ/L (ref 3.5–5.2)
RBC # BLD AUTO: 3.88 MILL/MM3 (ref 4.7–6.1)
SEGMENTED NEUTROPHILS ABSOLUTE COUNT: 9.4 THOU/MM3 (ref 1.8–7.7)
SODIUM SERPL-SCNC: 139 MEQ/L (ref 135–145)
WBC # BLD AUTO: 10.8 THOU/MM3 (ref 4.8–10.8)

## 2023-10-17 PROCEDURE — 99239 HOSP IP/OBS DSCHRG MGMT >30: CPT | Performed by: UROLOGY

## 2023-10-17 PROCEDURE — 6360000002 HC RX W HCPCS: Performed by: UROLOGY

## 2023-10-17 PROCEDURE — G0378 HOSPITAL OBSERVATION PER HR: HCPCS

## 2023-10-17 PROCEDURE — 2580000003 HC RX 258: Performed by: UROLOGY

## 2023-10-17 PROCEDURE — 6370000000 HC RX 637 (ALT 250 FOR IP): Performed by: UROLOGY

## 2023-10-17 PROCEDURE — 96361 HYDRATE IV INFUSION ADD-ON: CPT

## 2023-10-17 PROCEDURE — 36415 COLL VENOUS BLD VENIPUNCTURE: CPT

## 2023-10-17 PROCEDURE — 96372 THER/PROPH/DIAG INJ SC/IM: CPT

## 2023-10-17 PROCEDURE — 85025 COMPLETE CBC W/AUTO DIFF WBC: CPT

## 2023-10-17 PROCEDURE — 96360 HYDRATION IV INFUSION INIT: CPT

## 2023-10-17 PROCEDURE — 82948 REAGENT STRIP/BLOOD GLUCOSE: CPT

## 2023-10-17 PROCEDURE — 80048 BASIC METABOLIC PNL TOTAL CA: CPT

## 2023-10-17 RX ORDER — HYDROCODONE BITARTRATE AND ACETAMINOPHEN 5; 325 MG/1; MG/1
1 TABLET ORAL EVERY 8 HOURS PRN
Qty: 9 TABLET | Refills: 0 | Status: SHIPPED | OUTPATIENT
Start: 2023-10-17 | End: 2023-10-20

## 2023-10-17 RX ADMIN — METFORMIN HYDROCHLORIDE 1000 MG: 500 TABLET ORAL at 07:59

## 2023-10-17 RX ADMIN — INSULIN GLARGINE 35 UNITS: 100 INJECTION, SOLUTION SUBCUTANEOUS at 07:57

## 2023-10-17 RX ADMIN — HYDROCODONE BITARTRATE AND ACETAMINOPHEN 2 TABLET: 5; 325 TABLET ORAL at 05:33

## 2023-10-17 RX ADMIN — OXYBUTYNIN CHLORIDE 5 MG: 5 TABLET, EXTENDED RELEASE ORAL at 07:58

## 2023-10-17 RX ADMIN — ENALAPRIL MALEATE 10 MG: 10 TABLET ORAL at 07:58

## 2023-10-17 RX ADMIN — GEMFIBROZIL 600 MG: 600 TABLET ORAL at 07:59

## 2023-10-17 RX ADMIN — INSULIN LISPRO 10 UNITS: 100 INJECTION, SOLUTION INTRAVENOUS; SUBCUTANEOUS at 07:57

## 2023-10-17 RX ADMIN — AMLODIPINE BESYLATE 10 MG: 10 TABLET ORAL at 09:22

## 2023-10-17 RX ADMIN — HYDROCODONE BITARTRATE AND ACETAMINOPHEN 2 TABLET: 5; 325 TABLET ORAL at 01:18

## 2023-10-17 RX ADMIN — SODIUM CHLORIDE: 9 INJECTION, SOLUTION INTRAVENOUS at 05:01

## 2023-10-17 RX ADMIN — ENOXAPARIN SODIUM 30 MG: 100 INJECTION SUBCUTANEOUS at 07:56

## 2023-10-17 RX ADMIN — METOPROLOL TARTRATE 25 MG: 25 TABLET, FILM COATED ORAL at 07:59

## 2023-10-17 RX ADMIN — HYDROCODONE BITARTRATE AND ACETAMINOPHEN 2 TABLET: 5; 325 TABLET ORAL at 09:34

## 2023-10-17 RX ADMIN — OXYCODONE HYDROCHLORIDE AND ACETAMINOPHEN 500 MG: 500 TABLET ORAL at 07:59

## 2023-10-17 RX ADMIN — ALPRAZOLAM 1 MG: 0.5 TABLET ORAL at 07:56

## 2023-10-17 ASSESSMENT — PAIN SCALES - GENERAL
PAINLEVEL_OUTOF10: 7
PAINLEVEL_OUTOF10: 5
PAINLEVEL_OUTOF10: 8
PAINLEVEL_OUTOF10: 5
PAINLEVEL_OUTOF10: 8
PAINLEVEL_OUTOF10: 6

## 2023-10-17 ASSESSMENT — PAIN DESCRIPTION - LOCATION
LOCATION: PELVIS
LOCATION: PENIS
LOCATION: PELVIS
LOCATION: PELVIS

## 2023-10-17 ASSESSMENT — PAIN - FUNCTIONAL ASSESSMENT
PAIN_FUNCTIONAL_ASSESSMENT: ACTIVITIES ARE NOT PREVENTED

## 2023-10-17 ASSESSMENT — PAIN DESCRIPTION - DESCRIPTORS
DESCRIPTORS: ACHING;SORE
DESCRIPTORS: ACHING;SORE
DESCRIPTORS: ACHING

## 2023-10-17 ASSESSMENT — PAIN DESCRIPTION - ORIENTATION
ORIENTATION: MID
ORIENTATION: MID

## 2023-10-17 NOTE — TELEPHONE ENCOUNTER
Tyrel by mari  Follow up 3 days for catheter removal  2-4 weeks with [facility] for PVR  > 3 months for Office visit

## 2023-10-17 NOTE — PROGRESS NOTES
Discharge instructions reviewed with patient. Questions answered and he stated understanding. Patient sent with CHG soap, incentive spirometer, and home cpap machine. Assisted with transfer to leg bag. Patient has used at home in past and is comfortable with corea care.

## 2023-10-17 NOTE — CARE COORDINATION
10/17/23, 2:29 PM EDT    DISCHARGE ON GOING EVALUATION    [facility] day: 0  Location: 6A-09/009-A Reason for admit: BPH with obstruction/lower urinary tract symptoms [N40.1, N13.8]   Procedure: 10-16-23 1. Cystoscopy. 2. GreenLight photovaporization of the prostate. Barriers to Discharge: Pt had procedure as above and was discharged prior to this CM visit. Per RN note pt was familiar with corea care from prior use. Pt voiced no needs to RN  PCP: Bimal Andrews MD  Patient Goals/Plan/Treatment Preferences: Discharged home prior to [facility] visit.

## 2023-10-17 NOTE — PLAN OF CARE
Problem: Discharge Planning  Goal: Discharge to home or other facility with appropriate resources  Outcome: Progressing  Flowsheets (Taken 10/16/2023 2209)  Discharge to home or other facility with appropriate resources:   Identify barriers to discharge with patient and caregiver   Arrange for needed discharge resources and transportation as appropriate   Identify discharge learning needs (meds, wound care, etc)     Problem: Chronic Conditions and Co-morbidities  Goal: Patient's chronic conditions and co-morbidity symptoms are monitored and maintained or improved  Outcome: Progressing  Flowsheets (Taken 10/16/2023 2209)  Care Plan - Patient's Chronic Conditions and Co-Morbidity Symptoms are Monitored and Maintained or Improved:   Monitor and assess patient's chronic conditions and comorbid symptoms for stability, deterioration, or improvement   Collaborate with multidisciplinary team to address chronic and comorbid conditions and prevent exacerbation or deterioration     Problem: Pain  Goal: Verbalizes/displays adequate comfort level or baseline comfort level  Outcome: Progressing  Flowsheets (Taken 10/16/2023 2209)  Verbalizes/displays adequate comfort level or baseline comfort level:   Encourage patient to monitor pain and request assistance   Assess pain using appropriate pain scale   Administer analgesics based on type and severity of pain and evaluate response   Implement non-pharmacological measures as appropriate and evaluate response     Problem: Safety - Adult  Goal: Free from fall injury  Outcome: Progressing  Flowsheets (Taken 10/16/2023 2209)  Free From Fall Injury: Instruct family/caregiver on patient safety     Problem: Respiratory - Adult  Goal: Clear lung sounds  10/16/2023 2209 by Ari Valle RN  Outcome: Progressing     Problem: Genitourinary - Adult  Goal: Absence of urinary retention  Outcome: Progressing  Flowsheets (Taken 10/16/2023 2209)  Absence of urinary retention:   Assess

## 2023-10-19 ENCOUNTER — NURSE ONLY (OUTPATIENT)
Dept: UROLOGY | Age: 63
End: 2023-10-19

## 2023-10-19 PROCEDURE — NBSRV NON-BILLABLE SERVICE: Performed by: NURSE PRACTITIONER

## 2023-10-19 NOTE — PROGRESS NOTES
Patient has given me verbal consent to perform corea removal  Yes    30 cc of water deflated from corea balloon. 22 Fr corea removed without difficulty. Foreskin reduced back down? Yes      Pt will drink fluids and report to ER in 6-8 hours if patient unable to urinate. F/u with provider ARIEL Martins.

## 2023-10-20 DIAGNOSIS — G89.18 POST-OPERATIVE PAIN: ICD-10-CM

## 2023-10-20 NOTE — TELEPHONE ENCOUNTER
Haritha Thrasher called requesting a refill on the following medications:  Requested Prescriptions     Pending Prescriptions Disp Refills    HYDROcodone-acetaminophen (NORCO) 5-325 MG per tablet 9 tablet 0     Sig: Take 1 tablet by mouth every 8 hours as needed for Pain for up to 3 days. Intended supply: 3 days. Take lowest dose possible to manage pain Max Daily Amount: 3 tablets     Pharmacy verified: [address] [place] [address] [facility]ika in [place]  .       Date of last visit: 7/24/2023  Date of next visit (if applicable): 80/7/1884

## 2023-10-23 RX ORDER — HYDROCODONE BITARTRATE AND ACETAMINOPHEN 5; 325 MG/1; MG/1
1 TABLET ORAL EVERY 8 HOURS PRN
Qty: 9 TABLET | Refills: 0 | Status: SHIPPED | OUTPATIENT
Start: 2023-10-23 | End: 2023-10-26

## 2023-10-23 NOTE — TELEPHONE ENCOUNTER
Patient ran out of pain medication on Saturday. He has pain with urination. Rated 7-8 on scale of 0-10. He is taking pyridium. Advised to use tylenol or Ibuprofen. Patient underwent Cystoscopy.   2. GreenLight photovaporization of the prostate on 10/16/2023    Follow up 11/02/2023

## 2023-10-30 ENCOUNTER — OFFICE VISIT (OUTPATIENT)
Dept: CARDIOLOGY CLINIC | Age: 63
End: 2023-10-30
Payer: MEDICAID

## 2023-10-30 VITALS
SYSTOLIC BLOOD PRESSURE: 126 MMHG | BODY MASS INDEX: 41.32 KG/M2 | HEIGHT: 69 IN | RESPIRATION RATE: 20 BRPM | WEIGHT: 279 LBS | HEART RATE: 82 BPM | DIASTOLIC BLOOD PRESSURE: 56 MMHG

## 2023-10-30 DIAGNOSIS — R94.39 ABNORMAL NUCLEAR STRESS TEST: ICD-10-CM

## 2023-10-30 DIAGNOSIS — I25.10 CORONARY ARTERY DISEASE INVOLVING NATIVE CORONARY ARTERY OF NATIVE HEART WITHOUT ANGINA PECTORIS: Primary | ICD-10-CM

## 2023-10-30 DIAGNOSIS — I10 PRIMARY HYPERTENSION: ICD-10-CM

## 2023-10-30 DIAGNOSIS — E78.5 HYPERLIPIDEMIA LDL GOAL <70: ICD-10-CM

## 2023-10-30 PROCEDURE — 3074F SYST BP LT 130 MM HG: CPT | Performed by: NURSE PRACTITIONER

## 2023-10-30 PROCEDURE — 1036F TOBACCO NON-USER: CPT | Performed by: NURSE PRACTITIONER

## 2023-10-30 PROCEDURE — 3078F DIAST BP <80 MM HG: CPT | Performed by: NURSE PRACTITIONER

## 2023-10-30 PROCEDURE — G8427 DOCREV CUR MEDS BY ELIG CLIN: HCPCS | Performed by: NURSE PRACTITIONER

## 2023-10-30 PROCEDURE — G8484 FLU IMMUNIZE NO ADMIN: HCPCS | Performed by: NURSE PRACTITIONER

## 2023-10-30 PROCEDURE — G8417 CALC BMI ABV UP PARAM F/U: HCPCS | Performed by: NURSE PRACTITIONER

## 2023-10-30 PROCEDURE — 3017F COLORECTAL CA SCREEN DOC REV: CPT | Performed by: NURSE PRACTITIONER

## 2023-10-30 PROCEDURE — 93000 ELECTROCARDIOGRAM COMPLETE: CPT | Performed by: INTERNAL MEDICINE

## 2023-10-30 PROCEDURE — 99214 OFFICE O/P EST MOD 30 MIN: CPT | Performed by: NURSE PRACTITIONER

## 2023-10-30 RX ORDER — NITROGLYCERIN 0.4 MG/1
0.4 TABLET SUBLINGUAL EVERY 5 MIN PRN
Qty: 25 TABLET | Refills: 3 | Status: SHIPPED | OUTPATIENT
Start: 2023-10-30

## 2023-10-30 RX ORDER — ENALAPRIL MALEATE 10 MG/1
10 TABLET ORAL DAILY
Qty: 90 TABLET | Refills: 4 | Status: SHIPPED | OUTPATIENT
Start: 2023-10-30

## 2023-10-30 RX ORDER — AMLODIPINE BESYLATE 10 MG/1
10 TABLET ORAL DAILY
Qty: 90 TABLET | Refills: 4 | Status: SHIPPED | OUTPATIENT
Start: 2023-10-30

## 2023-10-30 RX ORDER — CLOPIDOGREL BISULFATE 75 MG/1
75 TABLET ORAL DAILY
Qty: 90 TABLET | Refills: 4 | Status: SHIPPED | OUTPATIENT
Start: 2023-10-30

## 2023-10-30 RX ORDER — GEMFIBROZIL 600 MG/1
600 TABLET, FILM COATED ORAL 2 TIMES DAILY
Qty: 180 TABLET | Refills: 4 | Status: SHIPPED | OUTPATIENT
Start: 2023-10-30

## 2023-10-30 RX ORDER — PRAVASTATIN SODIUM 80 MG/1
80 TABLET ORAL NIGHTLY
Qty: 90 TABLET | Refills: 4 | Status: SHIPPED | OUTPATIENT
Start: 2023-10-30

## 2023-10-30 ASSESSMENT — ENCOUNTER SYMPTOMS
RESPIRATORY NEGATIVE: 1
GASTROINTESTINAL NEGATIVE: 1

## 2023-10-30 NOTE — PROGRESS NOTES
mouth once daily) 30 tablet 2    Vitamin D, Cholecalciferol, 25 MCG (1000 UT) TABS Take 1,000 Units by mouth daily      aspirin 325 MG tablet Take 1 tablet by mouth daily       No current facility-administered medications for this visit. Social History     Socioeconomic History    Marital status: Single     Spouse name: None    Number of children: None    Years of education: None    Highest education level: None   Tobacco Use    Smoking status: Former     Packs/day: 0.25     Years: 2.00     Additional pack years: 0.00     Total pack years: 0.50     Types: Cigarettes     Quit date: 1995     Years since quittin.8    Smokeless tobacco: Never    Tobacco comments:     Quit smoking    Vaping Use    Vaping Use: Never used   Substance and Sexual Activity    Alcohol use: No    Drug use: No    Sexual activity: Yes     Partners: Female     Social Determinants of Health     Financial Resource Strain: Low Risk  (2023)    Overall Financial Resource Strain (CARDIA)     Difficulty of Paying Living Expenses: Not hard at all   Food Insecurity: No Food Insecurity (2023)    Hunger Vital Sign     Worried About Running Out of Food in the Last Year: Never true     Ran Out of Food in the Last Year: Never true   Transportation Needs: Unknown (2023)    PRAPARE - Transportation     Lack of Transportation (Non-Medical): No   Housing Stability: Unknown (2023)    Housing Stability Vital Sign     Unstable Housing in the Last Year: No       Family History   Problem Relation Age of Onset    Diabetes Mother     Heart Disease Mother     High Blood Pressure Mother     Arthritis Father     Diabetes Father     Heart Disease Father     High Blood Pressure Father     Diabetes Sister     Diabetes Brother     Heart Disease Brother     Cancer Neg Hx     Stroke Neg Hx        Blood pressure (!) 126/56, pulse 82, resp. rate 20, height 1.753 m (5' 9\"), weight 126.6 kg (279 lb).       Physical Exam  Constitutional:

## 2023-11-01 NOTE — PROGRESS NOTES
[address]  [facility] [address]  Dept: [phone]  Loc: [phone]    Visit Date: 11/2/2023        HPI:     Tai Cardoso is a 59 y.o. male who presents today for:  Chief Complaint   Patient presents with    Post-Op Check     Greenlight Photovaporization of the Prostate. Pain below left testicle since surgery. Prostate Cancer       HPI  Patient presents to urology clinic for post-operative follow-up. Mars Parada is s/p Cystoscopy, GreenLight photovaporization of the prostate by Dr. Tania Gupta on 10/16/23. PVR 54ml in office today. He reports urinary stream and frequency has improved. IPSS 15, nocturia still bothersome. Discussed avoiding liquids 2-3 hours prior to bed. Reassurance that irritable symptoms should improve on month 2-3 of procedure. 7/24/23    Pool follows with us for HR prostate cancer s/p EBRT Radiation (3/2021) and on ADT (lupron Q 6 months x 2-3 years for HR PCa). His PSA has responded well and remains undectable. PSA:  < 0.02. He also had BPH with LUTs and urinary retention - improved on ADT. AUASS: 13/3. He is also on Flomax 0.8 mg   Oxybutynin for Urinary urgency.     Current Outpatient Medications   Medication Sig Dispense Refill    metoprolol tartrate (LOPRESSOR) 25 MG tablet Take 1 tablet by mouth 2 times daily 90 tablet 4    amLODIPine (NORVASC) 10 MG tablet Take 1 tablet by mouth daily 90 tablet 4    gemfibrozil (LOPID) 600 MG tablet Take 1 tablet by mouth 2 times daily 180 tablet 4    enalapril (VASOTEC) 10 MG tablet Take 1 tablet by mouth daily 90 tablet 4    nitroGLYCERIN (NITROSTAT) 0.4 MG SL tablet Place 1 tablet under the tongue every 5 minutes as needed for Chest pain 25 tablet 3    clopidogrel (PLAVIX) 75 MG tablet Take 1 tablet by mouth daily take 1 tablet by mouth once daily 90 tablet 4    pravastatin (PRAVACHOL) 80 MG tablet Take 1 tablet by mouth nightly 90 tablet 4    Insulin

## 2023-11-02 ENCOUNTER — OFFICE VISIT (OUTPATIENT)
Dept: UROLOGY | Age: 63
End: 2023-11-02
Payer: MEDICAID

## 2023-11-02 VITALS
BODY MASS INDEX: 41.32 KG/M2 | WEIGHT: 279 LBS | DIASTOLIC BLOOD PRESSURE: 58 MMHG | HEIGHT: 69 IN | SYSTOLIC BLOOD PRESSURE: 116 MMHG

## 2023-11-02 DIAGNOSIS — N40.1 BENIGN PROSTATIC HYPERPLASIA WITH URINARY RETENTION: ICD-10-CM

## 2023-11-02 DIAGNOSIS — C61 PROSTATE CANCER (HCC): Primary | ICD-10-CM

## 2023-11-02 DIAGNOSIS — R39.15 URINARY URGENCY: ICD-10-CM

## 2023-11-02 DIAGNOSIS — R33.8 BENIGN PROSTATIC HYPERPLASIA WITH URINARY RETENTION: ICD-10-CM

## 2023-11-02 LAB — POST VOID RESIDUAL (PVR): 54 ML

## 2023-11-02 PROCEDURE — 99024 POSTOP FOLLOW-UP VISIT: CPT | Performed by: NURSE PRACTITIONER

## 2023-11-02 PROCEDURE — 51798 US URINE CAPACITY MEASURE: CPT | Performed by: NURSE PRACTITIONER

## 2023-11-02 RX ORDER — PRAVASTATIN SODIUM 40 MG
80 TABLET ORAL NIGHTLY
Qty: 180 TABLET | Refills: 4 | OUTPATIENT
Start: 2023-11-02

## 2023-11-02 NOTE — PATIENT INSTRUCTIONS
- Okay to stop Flomax for 2 weeks. If symptoms worsens restart. - After stopping Flomax for 2 weeks and no change in symptoms can trial stopping oxybutynin, if symptom worsen restart.     - Recommend scrotal elevation and supportive undergarments. - Call sooner with any questions or concerns.

## 2023-11-07 ENCOUNTER — OFFICE VISIT (OUTPATIENT)
Dept: FAMILY MEDICINE CLINIC | Age: 63
End: 2023-11-07
Payer: MEDICAID

## 2023-11-07 VITALS
WEIGHT: 272.6 LBS | RESPIRATION RATE: 18 BRPM | DIASTOLIC BLOOD PRESSURE: 65 MMHG | HEIGHT: 69 IN | SYSTOLIC BLOOD PRESSURE: 126 MMHG | OXYGEN SATURATION: 97 % | TEMPERATURE: 97.1 F | BODY MASS INDEX: 40.38 KG/M2 | HEART RATE: 83 BPM

## 2023-11-07 DIAGNOSIS — Z79.4 TYPE 2 DIABETES MELLITUS WITH HYPERGLYCEMIA, WITH LONG-TERM CURRENT USE OF INSULIN (HCC): Primary | ICD-10-CM

## 2023-11-07 DIAGNOSIS — J30.1 CHRONIC SEASONAL ALLERGIC RHINITIS DUE TO POLLEN: ICD-10-CM

## 2023-11-07 DIAGNOSIS — E11.65 TYPE 2 DIABETES MELLITUS WITH HYPERGLYCEMIA, WITH LONG-TERM CURRENT USE OF INSULIN (HCC): Primary | ICD-10-CM

## 2023-11-07 DIAGNOSIS — J43.9 MIXED RESTRICTIVE AND OBSTRUCTIVE LUNG DISEASE (HCC): ICD-10-CM

## 2023-11-07 DIAGNOSIS — E66.01 CLASS 3 SEVERE OBESITY DUE TO EXCESS CALORIES WITH SERIOUS COMORBIDITY AND BODY MASS INDEX (BMI) OF 40.0 TO 44.9 IN ADULT (HCC): ICD-10-CM

## 2023-11-07 DIAGNOSIS — J98.4 MIXED RESTRICTIVE AND OBSTRUCTIVE LUNG DISEASE (HCC): ICD-10-CM

## 2023-11-07 LAB — HBA1C MFR BLD: 7.2 %

## 2023-11-07 PROCEDURE — 3074F SYST BP LT 130 MM HG: CPT | Performed by: FAMILY MEDICINE

## 2023-11-07 PROCEDURE — 3023F SPIROM DOC REV: CPT | Performed by: FAMILY MEDICINE

## 2023-11-07 PROCEDURE — G8417 CALC BMI ABV UP PARAM F/U: HCPCS | Performed by: FAMILY MEDICINE

## 2023-11-07 PROCEDURE — G8484 FLU IMMUNIZE NO ADMIN: HCPCS | Performed by: FAMILY MEDICINE

## 2023-11-07 PROCEDURE — 3078F DIAST BP <80 MM HG: CPT | Performed by: FAMILY MEDICINE

## 2023-11-07 PROCEDURE — 3051F HG A1C>EQUAL 7.0%<8.0%: CPT | Performed by: FAMILY MEDICINE

## 2023-11-07 PROCEDURE — 99214 OFFICE O/P EST MOD 30 MIN: CPT | Performed by: FAMILY MEDICINE

## 2023-11-07 PROCEDURE — 3017F COLORECTAL CA SCREEN DOC REV: CPT | Performed by: FAMILY MEDICINE

## 2023-11-07 PROCEDURE — G8427 DOCREV CUR MEDS BY ELIG CLIN: HCPCS | Performed by: FAMILY MEDICINE

## 2023-11-07 PROCEDURE — 1036F TOBACCO NON-USER: CPT | Performed by: FAMILY MEDICINE

## 2023-11-07 PROCEDURE — 2022F DILAT RTA XM EVC RTNOPTHY: CPT | Performed by: FAMILY MEDICINE

## 2023-11-07 PROCEDURE — 83036 HEMOGLOBIN GLYCOSYLATED A1C: CPT | Performed by: FAMILY MEDICINE

## 2023-11-07 RX ORDER — FLUTICASONE PROPIONATE 50 MCG
1 SPRAY, SUSPENSION (ML) NASAL
Qty: 1 EACH | Refills: 4 | Status: SHIPPED | OUTPATIENT
Start: 2023-11-07

## 2023-11-07 RX ORDER — ALBUTEROL SULFATE 90 UG/1
2 AEROSOL, METERED RESPIRATORY (INHALATION) EVERY 6 HOURS PRN
Qty: 1 EACH | Refills: 3 | Status: SHIPPED | OUTPATIENT
Start: 2023-11-07 | End: 2024-11-06

## 2023-11-07 ASSESSMENT — ENCOUNTER SYMPTOMS
ABDOMINAL PAIN: 1
COUGH: 1
SHORTNESS OF BREATH: 0

## 2023-11-17 RX ORDER — OXYBUTYNIN CHLORIDE 5 MG/1
5 TABLET, EXTENDED RELEASE ORAL DAILY
Qty: 30 TABLET | Refills: 0 | Status: SHIPPED | OUTPATIENT
Start: 2023-11-17 | End: 2023-12-11

## 2023-11-17 NOTE — TELEPHONE ENCOUNTER
Sent    F/u as scheduled    The patient should go to the ED should they develop fever, chills, nausea, vomiting, chest pain, SOB, calf pain, feelings of incomplete emptying, or should they otherwise feel they need evaluated

## 2023-11-17 NOTE — TELEPHONE ENCOUNTER
Pool Rivera called requesting a refill on the following medications:  Requested Prescriptions     Pending Prescriptions Disp Refills    oxybutynin (DITROPAN-XL) 5 MG extended release tablet [Pharmacy Med Name: OXYBUTYNIN CL ER 5 MG TABLET] 14 tablet 0     Sig: take 1 tablet by mouth once daily     Pharmacy verified:  [email or web address]      Date of last visit: 11/02/2023  Date of next visit (if applicable): 1/22/2024

## 2023-11-20 RX ORDER — PRAVASTATIN SODIUM 40 MG
80 TABLET ORAL NIGHTLY
Qty: 180 TABLET | Refills: 4 | OUTPATIENT
Start: 2023-11-20

## 2023-11-24 DIAGNOSIS — Z79.4 TYPE 2 DIABETES MELLITUS WITH HYPERGLYCEMIA, WITH LONG-TERM CURRENT USE OF INSULIN (HCC): ICD-10-CM

## 2023-11-24 DIAGNOSIS — E11.65 TYPE 2 DIABETES MELLITUS WITH HYPERGLYCEMIA, WITH LONG-TERM CURRENT USE OF INSULIN (HCC): ICD-10-CM

## 2023-11-24 RX ORDER — DULAGLUTIDE 0.75 MG/.5ML
INJECTION, SOLUTION SUBCUTANEOUS
Qty: 2 ML | Refills: 1 | OUTPATIENT
Start: 2023-11-24

## 2023-11-24 NOTE — TELEPHONE ENCOUNTER
This medication refill is regarding a electronic request. Refill requested by  [facility] .    Requested Prescriptions     Pending Prescriptions Disp Refills    TRULICITY 0.75 MG/0.5ML SOPN [Pharmacy Med Name: TRULICITY 0.75 MG/0.5 ML PEN] 2 mL 1     Sig: inject 0.5 milliliters ( 0.75 milligrams ) subcutaneously every week IN THE ABDOMEN THIGHS OR OUTER AREA OF UPPER ARM ROTATE INJECTION SITES       Date of last visit: 11/7/2023   Date of next visit: 2/8/2024  Date of last refill: 11/7/2023  4/3    Last Lipid Panel:    Lab Results   Component Value Date/Time    CHOL 122 10/05/2023 08:23 AM    TRIG 121 10/05/2023 08:23 AM    HDL 31 10/05/2023 08:23 AM    LDLCALC 67 10/05/2023 08:23 AM     Last CMP:   Lab Results   Component Value Date     10/17/2023    K 4.8 10/17/2023     10/17/2023    CO2 19 (L) 10/17/2023    BUN 18 10/17/2023    CREATININE 1.0 10/17/2023    GLUCOSE 232 (H) 10/17/2023    CALCIUM 9.4 10/17/2023    PROT 6.8 10/05/2023    LABALBU 4.2 10/05/2023    BILITOT 0.3 10/05/2023    ALKPHOS 124 10/05/2023    AST 10 10/05/2023    ALT 10 (L) 10/05/2023    LABGLOM >60 10/17/2023       Last Thyroid:    Lab Results   Component Value Date    TSH 0.412 01/21/2023     Last Hemoglobin A1C:    Lab Results   Component Value Date/Time    LABA1C 7.2 11/07/2023 09:48 AM    AVGG 159 09/08/2021 02:13 PM       Rx verified, ordered and set to EP.

## 2023-12-06 RX ORDER — TAMSULOSIN HYDROCHLORIDE 0.4 MG/1
CAPSULE ORAL
Qty: 180 CAPSULE | Refills: 3 | OUTPATIENT
Start: 2023-12-06

## 2023-12-06 NOTE — TELEPHONE ENCOUNTER
Pool Rivera called requesting a refill on the following medications:  Requested Prescriptions     Pending Prescriptions Disp Refills    tamsulosin (FLOMAX) 0.4 MG capsule [Pharmacy Med Name: TAMSULOSIN HCL 0.4 MG CAPSULE] 180 capsule 3     Sig: take 2 capsules by mouth once daily     Pharmacy verified:  [email or web address]      Date of last visit: 11/02/2023  Date of next visit (if applicable): 1/22/2024

## 2023-12-11 RX ORDER — OXYBUTYNIN CHLORIDE 5 MG/1
5 TABLET, EXTENDED RELEASE ORAL DAILY
Qty: 30 TABLET | Refills: 2 | Status: SHIPPED | OUTPATIENT
Start: 2023-12-11

## 2023-12-11 RX ORDER — OXYBUTYNIN CHLORIDE 5 MG/1
5 TABLET, EXTENDED RELEASE ORAL DAILY
Qty: 30 TABLET | Refills: 0 | Status: SHIPPED | OUTPATIENT
Start: 2023-12-11 | End: 2023-12-11 | Stop reason: SDUPTHER

## 2023-12-11 NOTE — TELEPHONE ENCOUNTER
Trudie Koyanagi called requesting a refill on the following medications:  Requested Prescriptions     Pending Prescriptions Disp Refills    oxybutynin (DITROPAN-XL) 5 MG extended release tablet [Pharmacy Med Name: OXYBUTYNIN CL ER 5 MG TABLET] 30 tablet 0     Sig: take 1 tablet by mouth once daily     Pharmacy verified:  [email or web address]      Date of last visit: 11/02/2023  Date of next visit (if applicable): 5/39/9025

## 2023-12-28 DIAGNOSIS — F41.9 ANXIETY: ICD-10-CM

## 2023-12-28 RX ORDER — ALPRAZOLAM 1 MG/1
1 TABLET ORAL 2 TIMES DAILY
Qty: 180 TABLET | Refills: 0 | Status: SHIPPED | OUTPATIENT
Start: 2023-12-28 | End: 2024-03-27

## 2023-12-28 NOTE — TELEPHONE ENCOUNTER
Nelda Siemens called requesting a refill on the following medications:  Requested Prescriptions     Pending Prescriptions Disp Refills    ALPRAZolam (XANAX) 1 MG tablet 180 tablet 0     Sig: Take 1 tablet by mouth 2 times daily for 90 days.        Date of last visit: 11/7/2023  Date of next visit (if applicable):2/8/2024  Date of last refill: 9/29/2023  Pharmacy Name: [address] [place] [address]     Rx pending 180/0    Thanks,  Marilee Panchal LPN

## 2024-01-08 ENCOUNTER — TELEPHONE (OUTPATIENT)
Dept: UROLOGY | Age: 64
End: 2024-01-08

## 2024-01-08 ENCOUNTER — HOSPITAL ENCOUNTER (OUTPATIENT)
Age: 64
Discharge: HOME | End: 2024-01-08
Payer: MEDICAID

## 2024-01-08 DIAGNOSIS — R30.0 DYSURIA: Primary | ICD-10-CM

## 2024-01-08 DIAGNOSIS — R30.0 DYSURIA: ICD-10-CM

## 2024-01-08 LAB
BACTERIA: ABNORMAL
BILIRUB UR QL STRIP: NEGATIVE
CASTS #/AREA URNS LPF: ABNORMAL /LPF
CASTS #/AREA URNS LPF: ABNORMAL /LPF
CHARACTER UR: ABNORMAL
CHARCOAL URNS QL MICRO: ABNORMAL
COLOR UR: YELLOW
CRYSTALS URNS QL MICRO: ABNORMAL
EPITHELIAL CELLS, UA: ABNORMAL /HPF
GLUCOSE UR QL STRIP.AUTO: NEGATIVE MG/DL
HGB UR QL STRIP.AUTO: ABNORMAL
KETONES UR QL STRIP.AUTO: NEGATIVE
LEUKOCYTE ESTERASE UR QL STRIP.AUTO: ABNORMAL
NITRITE UR QL STRIP.AUTO: NEGATIVE
PH UR STRIP.AUTO: 5.5 [PH] (ref 5–9)
PROT UR STRIP.AUTO-MCNC: 30 MG/DL
RBC #/AREA URNS HPF: ABNORMAL /HPF
RENAL EPI CELLS #/AREA URNS HPF: ABNORMAL /[HPF]
SPECIFIC GRAVITY UA: 1.01 (ref 1–1.03)
UROBILINOGEN, URINE: 0.2 EU/DL (ref 0–1)
WBC #/AREA URNS HPF: ABNORMAL /HPF
YEAST LIKE FUNGI URNS QL MICRO: ABNORMAL

## 2024-01-08 PROCEDURE — 81001 URINALYSIS AUTO W/SCOPE: CPT

## 2024-01-08 PROCEDURE — 87086 URINE CULTURE/COLONY COUNT: CPT

## 2024-01-08 NOTE — TELEPHONE ENCOUNTER
Patient c/o urgency, frequency, and burning. He is taking oxybutynin. He denies fever or chills.    Orders placed for urine.

## 2024-01-09 ENCOUNTER — TELEPHONE (OUTPATIENT)
Dept: UROLOGY | Age: 64
End: 2024-01-09

## 2024-01-09 NOTE — TELEPHONE ENCOUNTER
Prior Auth initiated for lupron .  PA sent to [facility]    Approved from 01/09/2024-01/09/2025.

## 2024-01-10 LAB
BACTERIA UR CULT: ABNORMAL
ORGANISM: ABNORMAL

## 2024-01-11 DIAGNOSIS — I25.10 CORONARY ARTERY DISEASE INVOLVING NATIVE CORONARY ARTERY OF NATIVE HEART WITHOUT ANGINA PECTORIS: ICD-10-CM

## 2024-01-11 NOTE — TELEPHONE ENCOUNTER
Addressed by Madonna Loja, CNP- \"Urine culture with growth of contaminants. No antibiotics needed at this time.\"

## 2024-01-12 RX ORDER — NITROGLYCERIN 0.4 MG/1
TABLET SUBLINGUAL
Qty: 25 TABLET | Refills: 3 | OUTPATIENT
Start: 2024-01-12

## 2024-01-17 ENCOUNTER — HOSPITAL ENCOUNTER (OUTPATIENT)
Age: 64
Discharge: HOME | End: 2024-01-17
Payer: MEDICAID

## 2024-01-17 DIAGNOSIS — C61 PROSTATE CANCER (HCC): ICD-10-CM

## 2024-01-17 LAB — PSA SERPL-MCNC: < 0.02 NG/ML (ref 0–1)

## 2024-01-17 PROCEDURE — 84153 ASSAY OF PSA TOTAL: CPT

## 2024-01-17 PROCEDURE — 36415 COLL VENOUS BLD VENIPUNCTURE: CPT

## 2024-01-22 ENCOUNTER — OFFICE VISIT (OUTPATIENT)
Dept: UROLOGY | Age: 64
End: 2024-01-22
Payer: MEDICAID

## 2024-01-22 VITALS — RESPIRATION RATE: 16 BRPM | BODY MASS INDEX: 40.29 KG/M2 | HEIGHT: 69 IN | WEIGHT: 272 LBS

## 2024-01-22 DIAGNOSIS — C61 PROSTATE CANCER (HCC): Primary | ICD-10-CM

## 2024-01-22 PROCEDURE — G8417 CALC BMI ABV UP PARAM F/U: HCPCS | Performed by: UROLOGY

## 2024-01-22 PROCEDURE — 99214 OFFICE O/P EST MOD 30 MIN: CPT | Performed by: UROLOGY

## 2024-01-22 PROCEDURE — G8484 FLU IMMUNIZE NO ADMIN: HCPCS | Performed by: UROLOGY

## 2024-01-22 PROCEDURE — 96402 CHEMO HORMON ANTINEOPL SQ/IM: CPT | Performed by: UROLOGY

## 2024-01-22 PROCEDURE — 1036F TOBACCO NON-USER: CPT | Performed by: UROLOGY

## 2024-01-22 PROCEDURE — 3017F COLORECTAL CA SCREEN DOC REV: CPT | Performed by: UROLOGY

## 2024-01-22 PROCEDURE — G8427 DOCREV CUR MEDS BY ELIG CLIN: HCPCS | Performed by: UROLOGY

## 2024-01-22 RX ORDER — DOXYCYCLINE HYCLATE 100 MG
100 TABLET ORAL 2 TIMES DAILY
Qty: 6 TABLET | Refills: 0 | Status: SHIPPED | OUTPATIENT
Start: 2024-01-22 | End: 2024-01-25

## 2024-01-22 NOTE — PROGRESS NOTES
[facility] [place] [facility]  [facility]  [address]  [address]  [facility] [address]  Dept: [phone]  Loc: [phone]    Visit Date: 1/22/2024        HPI:     Pool Rivera is a 63 y.o. male who presents today for:  Chief Complaint   Patient presents with    Prostate Cancer    Follow-up     3 month follow up    Injections     Lupron       HPI  Pool is s/p Cystoscopy, GreenLight photovaporization of the prostate by Dr. Doyle on 10/2023, persistent irritative sympotms: on oxybutynin 5 mg ER  - He reports urinary stream and frequency has improved.   IPSS 15, nocturia still bothersome.   HR prostate cancer s/p EBRT Radiation (3/2021) and on ADT (lupron Q 6 months x 2-3 years for HR PCa).  PSA: 0.2      7/24/23    Pool follows with us for  His PSA has responded well and remains undectable.  PSA:  < 0.02.    He also had BPH with LUTs and urinary retention - improved on ADT.  AUASS: 13/3.  He is also on Flomax 0.8 mg   Oxybutynin for Urinary urgency.    Current Outpatient Medications   Medication Sig Dispense Refill    ALPRAZolam (XANAX) 1 MG tablet Take 1 tablet by mouth 2 times daily for 90 days. 180 tablet 0    Insulin Syringe-Needle U-100 (BD INSULIN SYRINGE U/F) 31G X 5/16\" 1 ML MISC 1 each by Other route 3 times daily 300 each 3    oxybutynin (DITROPAN-XL) 5 MG extended release tablet Take 1 tablet by mouth daily 30 tablet 2    fluticasone (FLONASE) 50 MCG/ACT nasal spray 1 spray by Nasal route nightly instill 1 spray into each nostril at bedtime 1 each 4    albuterol sulfate HFA (PROAIR HFA) 108 (90 Base) MCG/ACT inhaler Inhale 2 puffs into the lungs every 6 hours as needed for Wheezing or Shortness of Breath 1 each 3    dulaglutide (TRULICITY) 1.5 MG/0.5ML SC injection Inject 0.5 mLs into the skin once a week 4 Adjustable Dose Pre-filled Pen Syringe 3    metoprolol tartrate (LOPRESSOR) 25 MG tablet Take 1 tablet by mouth 2 times daily 90 tablet 4    amLODIPine (NORVASC)

## 2024-01-22 NOTE — PROGRESS NOTES
Patient has given me verbal consent to perform Lupron Injection  Yes      Following Dr. Doyle's plan of care.  LUPRON 45 MG GIVEN I.M right UOQ HIP  Lot Number: [id number]  Expiration Date: 08/18/25  NDC #: 4758-0523-03    After Injection was given there were no reactions at injection site and patient was feeling well. Patient was notified that possible side effects from injections include: Redness, swelling and itching at the injection site. Possible side effects of androgen deprivation therapy, including hot flashes, flushing of the skin, increased weight, decreased sex drive, and difficulties with ED. Patient was instructed to call the office with any further questions or concerns.     Patient supplied their own medications No      Pt LHRH therapy (Firmagon, Eligard, Lupron) first initiated on 05/24/2021.

## 2024-02-05 DIAGNOSIS — Z79.4 TYPE 2 DIABETES MELLITUS WITH HYPERGLYCEMIA, WITH LONG-TERM CURRENT USE OF INSULIN (HCC): ICD-10-CM

## 2024-02-05 DIAGNOSIS — E11.65 TYPE 2 DIABETES MELLITUS WITH HYPERGLYCEMIA, WITH LONG-TERM CURRENT USE OF INSULIN (HCC): ICD-10-CM

## 2024-02-06 RX ORDER — INSULIN LISPRO 100 [IU]/ML
INJECTION, SOLUTION INTRAVENOUS; SUBCUTANEOUS
Qty: 10 ML | Refills: 5 | Status: SHIPPED | OUTPATIENT
Start: 2024-02-06

## 2024-02-06 NOTE — TELEPHONE ENCOUNTER
Pool Rivera called requesting a refill on the following medications:  Requested Prescriptions     Pending Prescriptions Disp Refills    insulin lispro (HUMALOG) 100 UNIT/ML SOLN injection vial [Pharmacy Med Name: INSULIN LISPRO 100 UNIT/ML ] 10 mL 5     Sig: INJECT 10 UNITS SUBCUTANEOUSLY TWICE A DAY AS NEEDED FOR HIGH GLUCOSE GREATER THAN 155.       Date of last visit: 11/7/2023  Date of next visit (if applicable):Visit date not found  Date of last refill: 8/7/2023  Pharmacy Name: [facility],  Letha Taylor LPN

## 2024-02-08 ENCOUNTER — OFFICE VISIT (OUTPATIENT)
Dept: FAMILY MEDICINE CLINIC | Age: 64
End: 2024-02-08
Payer: MEDICAID

## 2024-02-08 VITALS
OXYGEN SATURATION: 95 % | HEART RATE: 87 BPM | DIASTOLIC BLOOD PRESSURE: 62 MMHG | TEMPERATURE: 97.2 F | SYSTOLIC BLOOD PRESSURE: 138 MMHG | BODY MASS INDEX: 39.87 KG/M2 | RESPIRATION RATE: 18 BRPM | HEIGHT: 69 IN | WEIGHT: 269.2 LBS

## 2024-02-08 DIAGNOSIS — I25.10 CORONARY ARTERY DISEASE INVOLVING NATIVE CORONARY ARTERY OF NATIVE HEART WITHOUT ANGINA PECTORIS: ICD-10-CM

## 2024-02-08 DIAGNOSIS — J43.9 MIXED RESTRICTIVE AND OBSTRUCTIVE LUNG DISEASE (HCC): ICD-10-CM

## 2024-02-08 DIAGNOSIS — Z79.4 TYPE 2 DIABETES MELLITUS WITH HYPERGLYCEMIA, WITH LONG-TERM CURRENT USE OF INSULIN (HCC): Primary | ICD-10-CM

## 2024-02-08 DIAGNOSIS — E11.65 TYPE 2 DIABETES MELLITUS WITH HYPERGLYCEMIA, WITH LONG-TERM CURRENT USE OF INSULIN (HCC): Primary | ICD-10-CM

## 2024-02-08 DIAGNOSIS — J44.1 CHRONIC OBSTRUCTIVE PULMONARY DISEASE WITH ACUTE EXACERBATION (HCC): ICD-10-CM

## 2024-02-08 DIAGNOSIS — J98.4 MIXED RESTRICTIVE AND OBSTRUCTIVE LUNG DISEASE (HCC): ICD-10-CM

## 2024-02-08 DIAGNOSIS — F41.9 ANXIETY: ICD-10-CM

## 2024-02-08 LAB
HBA1C MFR BLD: 7.2 %
Lab: NORMAL
QC PASS/FAIL: NORMAL
SARS-COV-2 RDRP RESP QL NAA+PROBE: NEGATIVE

## 2024-02-08 PROCEDURE — 87635 SARS-COV-2 COVID-19 AMP PRB: CPT | Performed by: FAMILY MEDICINE

## 2024-02-08 PROCEDURE — 3051F HG A1C>EQUAL 7.0%<8.0%: CPT | Performed by: FAMILY MEDICINE

## 2024-02-08 PROCEDURE — G8417 CALC BMI ABV UP PARAM F/U: HCPCS | Performed by: FAMILY MEDICINE

## 2024-02-08 PROCEDURE — 3078F DIAST BP <80 MM HG: CPT | Performed by: FAMILY MEDICINE

## 2024-02-08 PROCEDURE — 99214 OFFICE O/P EST MOD 30 MIN: CPT | Performed by: FAMILY MEDICINE

## 2024-02-08 PROCEDURE — 83036 HEMOGLOBIN GLYCOSYLATED A1C: CPT | Performed by: FAMILY MEDICINE

## 2024-02-08 PROCEDURE — 1036F TOBACCO NON-USER: CPT | Performed by: FAMILY MEDICINE

## 2024-02-08 PROCEDURE — 3023F SPIROM DOC REV: CPT | Performed by: FAMILY MEDICINE

## 2024-02-08 PROCEDURE — 3075F SYST BP GE 130 - 139MM HG: CPT | Performed by: FAMILY MEDICINE

## 2024-02-08 PROCEDURE — 2022F DILAT RTA XM EVC RTNOPTHY: CPT | Performed by: FAMILY MEDICINE

## 2024-02-08 PROCEDURE — G8427 DOCREV CUR MEDS BY ELIG CLIN: HCPCS | Performed by: FAMILY MEDICINE

## 2024-02-08 PROCEDURE — G8484 FLU IMMUNIZE NO ADMIN: HCPCS | Performed by: FAMILY MEDICINE

## 2024-02-08 PROCEDURE — 3017F COLORECTAL CA SCREEN DOC REV: CPT | Performed by: FAMILY MEDICINE

## 2024-02-08 RX ORDER — INSULIN GLARGINE 100 [IU]/ML
40 INJECTION, SOLUTION SUBCUTANEOUS 2 TIMES DAILY
Qty: 72 ML | Refills: 3 | Status: SHIPPED | OUTPATIENT
Start: 2024-02-08

## 2024-02-08 RX ORDER — DOXYCYCLINE HYCLATE 100 MG
100 TABLET ORAL 2 TIMES DAILY
Qty: 20 TABLET | Refills: 0 | Status: SHIPPED | OUTPATIENT
Start: 2024-02-08 | End: 2024-02-18

## 2024-02-08 RX ORDER — PREDNISONE 20 MG/1
20 TABLET ORAL 2 TIMES DAILY
Qty: 10 TABLET | Refills: 0 | Status: SHIPPED | OUTPATIENT
Start: 2024-02-08 | End: 2024-02-13

## 2024-02-08 ASSESSMENT — PATIENT HEALTH QUESTIONNAIRE - PHQ9
SUM OF ALL RESPONSES TO PHQ QUESTIONS 1-9: 0
SUM OF ALL RESPONSES TO PHQ QUESTIONS 1-9: 0
1. LITTLE INTEREST OR PLEASURE IN DOING THINGS: 0
2. FEELING DOWN, DEPRESSED OR HOPELESS: 0
SUM OF ALL RESPONSES TO PHQ QUESTIONS 1-9: 0
SUM OF ALL RESPONSES TO PHQ QUESTIONS 1-9: 0
SUM OF ALL RESPONSES TO PHQ9 QUESTIONS 1 & 2: 0

## 2024-02-08 ASSESSMENT — ENCOUNTER SYMPTOMS
WHEEZING: 0
SHORTNESS OF BREATH: 0
COUGH: 1
SORE THROAT: 1
SINUS PRESSURE: 1

## 2024-02-08 NOTE — PROGRESS NOTES
DAPT with aspirin and Plavix.  Continue pravastatin.  - Lipid Panel; Future  - Comprehensive Metabolic Panel; Future  - CBC; Future  - Handicap Placard MISC; by Does not apply route Duration:  10 years  Dispense: 1 each; Refill: 0    5. Mixed restrictive and obstructive lung disease (HCC)  - Handicap Placard MISC; by Does not apply route Duration:  10 years  Dispense: 1 each; Refill: 0      Check labs in April      They voiced understanding.  All questions answered. They agreed with treatment plan.   See patient instructions for any educational materials that may have been given.  Discussed use, benefit, and side effects of prescribed medications.     Reviewed health maintenance.    (Please note that portions of this note may have been completed with a voice recognition program.  Efforts were made to edit the dictation but occasionally words are mis-transcribed.)    Return in about 3 months (around 5/8/2024) for DM poc a1c, and anxiety.      Electronically signed by Raghav Laurent MD on 2/8/2024 at 9:23 AM

## 2024-02-09 ENCOUNTER — HOSPITAL ENCOUNTER (OUTPATIENT)
Age: 64
Discharge: HOME | End: 2024-02-09
Payer: MEDICAID

## 2024-02-09 DIAGNOSIS — E11.65 TYPE 2 DIABETES MELLITUS WITH HYPERGLYCEMIA, WITH LONG-TERM CURRENT USE OF INSULIN (HCC): ICD-10-CM

## 2024-02-09 DIAGNOSIS — Z79.4 TYPE 2 DIABETES MELLITUS WITH HYPERGLYCEMIA, WITH LONG-TERM CURRENT USE OF INSULIN (HCC): ICD-10-CM

## 2024-02-09 LAB
CREAT UR-MCNC: 41.9 MG/DL
MICROALBUMIN UR-MCNC: 5.54 MG/DL
MICROALBUMIN/CREAT RATIO PNL UR: 132 MG/G (ref 0–30)

## 2024-02-09 PROCEDURE — 82043 UR ALBUMIN QUANTITATIVE: CPT

## 2024-02-11 DIAGNOSIS — J98.4 MIXED RESTRICTIVE AND OBSTRUCTIVE LUNG DISEASE (HCC): ICD-10-CM

## 2024-02-11 DIAGNOSIS — J43.9 MIXED RESTRICTIVE AND OBSTRUCTIVE LUNG DISEASE (HCC): ICD-10-CM

## 2024-02-12 ENCOUNTER — TELEPHONE (OUTPATIENT)
Dept: FAMILY MEDICINE CLINIC | Age: 64
End: 2024-02-12

## 2024-02-12 RX ORDER — ALBUTEROL SULFATE 90 UG/1
AEROSOL, METERED RESPIRATORY (INHALATION)
Qty: 8.5 G | OUTPATIENT
Start: 2024-02-12

## 2024-02-12 NOTE — TELEPHONE ENCOUNTER
----- Message from Raghav Laurent MD sent at 2/10/2024  1:12 PM EST -----  Microalbuminuria is present which is a sign of kidney disease.  Continue enalapril.  Encourage good control of diabetes and blood pressure.    Please advise patient.  Raghav Laurent MD

## 2024-02-15 ENCOUNTER — TELEPHONE (OUTPATIENT)
Dept: FAMILY MEDICINE CLINIC | Age: 64
End: 2024-02-15

## 2024-02-15 DIAGNOSIS — J98.4 MIXED RESTRICTIVE AND OBSTRUCTIVE LUNG DISEASE (HCC): ICD-10-CM

## 2024-02-15 DIAGNOSIS — J43.9 MIXED RESTRICTIVE AND OBSTRUCTIVE LUNG DISEASE (HCC): ICD-10-CM

## 2024-02-15 RX ORDER — ALBUTEROL SULFATE 90 UG/1
2 AEROSOL, METERED RESPIRATORY (INHALATION) EVERY 6 HOURS PRN
Qty: 1 EACH | Refills: 3 | Status: SHIPPED | OUTPATIENT
Start: 2024-02-15 | End: 2025-02-14

## 2024-02-15 NOTE — TELEPHONE ENCOUNTER
Patient needs a refill on albuteral inhaler.  I verified with the pharmacy that he did get all three refills.  Please send another script to [facility] for the inhaler.

## 2024-02-15 NOTE — TELEPHONE ENCOUNTER
Pool Rivera called requesting a refill on the following medications:  Requested Prescriptions     Pending Prescriptions Disp Refills    albuterol sulfate HFA (PROAIR HFA) 108 (90 Base) MCG/ACT inhaler 1 each 3     Sig: Inhale 2 puffs into the lungs every 6 hours as needed for Wheezing or Shortness of Breath       Date of last visit: 2/8/2024  Date of next visit (if applicable):5/8/2024  Date of last refill: 11/7/23  Pharmacy Name: [facility] [place]    Rx pended      Thanks,  Kamilah Monreal LPN

## 2024-02-26 DIAGNOSIS — J98.4 MIXED RESTRICTIVE AND OBSTRUCTIVE LUNG DISEASE (HCC): ICD-10-CM

## 2024-02-26 DIAGNOSIS — J43.9 MIXED RESTRICTIVE AND OBSTRUCTIVE LUNG DISEASE (HCC): ICD-10-CM

## 2024-02-27 NOTE — TELEPHONE ENCOUNTER
Received refill request for anoro ellipta. Medication was last ordered by karen matthew. Medication was last ordered on 3-29-23 with 11 refills.   Patient was last seen in the office 4-25-23. Patient has a scheduled follow up 4-23-24.   Medication needs to be sent to [facility].

## 2024-02-28 RX ORDER — UMECLIDINIUM BROMIDE AND VILANTEROL TRIFENATATE 62.5; 25 UG/1; UG/1
POWDER RESPIRATORY (INHALATION)
Qty: 1 EACH | Refills: 11 | Status: SHIPPED | OUTPATIENT
Start: 2024-02-28

## 2024-03-06 ENCOUNTER — TELEPHONE (OUTPATIENT)
Dept: FAMILY MEDICINE CLINIC | Age: 64
End: 2024-03-06

## 2024-03-10 DIAGNOSIS — K21.9 GASTROESOPHAGEAL REFLUX DISEASE WITHOUT ESOPHAGITIS: ICD-10-CM

## 2024-03-11 RX ORDER — PANTOPRAZOLE SODIUM 40 MG/1
TABLET, DELAYED RELEASE ORAL
Qty: 90 TABLET | Refills: 3 | Status: SHIPPED | OUTPATIENT
Start: 2024-03-11

## 2024-03-13 ENCOUNTER — TELEPHONE (OUTPATIENT)
Dept: UROLOGY | Age: 64
End: 2024-03-13

## 2024-03-13 ENCOUNTER — HOSPITAL ENCOUNTER (OUTPATIENT)
Age: 64
Discharge: HOME | End: 2024-03-13
Payer: MEDICAID

## 2024-03-13 DIAGNOSIS — R30.0 DYSURIA: Primary | ICD-10-CM

## 2024-03-13 DIAGNOSIS — R39.15 URINARY URGENCY: ICD-10-CM

## 2024-03-13 DIAGNOSIS — R30.0 DYSURIA: ICD-10-CM

## 2024-03-13 LAB
BACTERIA: ABNORMAL
BILIRUB UR QL STRIP: NEGATIVE
CASTS #/AREA URNS LPF: ABNORMAL /LPF
CASTS #/AREA URNS LPF: ABNORMAL /LPF
CHARACTER UR: ABNORMAL
CHARCOAL URNS QL MICRO: ABNORMAL
COLOR UR: YELLOW
CRYSTALS URNS QL MICRO: ABNORMAL
EPITHELIAL CELLS, UA: ABNORMAL /HPF
GLUCOSE UR QL STRIP.AUTO: NEGATIVE MG/DL
HGB UR QL STRIP.AUTO: ABNORMAL
KETONES UR QL STRIP.AUTO: NEGATIVE
LEUKOCYTE ESTERASE UR QL STRIP.AUTO: ABNORMAL
NITRITE UR QL STRIP.AUTO: NEGATIVE
PH UR STRIP.AUTO: 5.5 [PH] (ref 5–9)
PROT UR STRIP.AUTO-MCNC: 100 MG/DL
RBC #/AREA URNS HPF: > 200 /HPF
RENAL EPI CELLS #/AREA URNS HPF: ABNORMAL /[HPF]
SPECIFIC GRAVITY UA: 1.02 (ref 1–1.03)
UROBILINOGEN, URINE: 0.2 EU/DL (ref 0–1)
WBC #/AREA URNS HPF: > 200 /HPF
YEAST LIKE FUNGI URNS QL MICRO: ABNORMAL

## 2024-03-13 PROCEDURE — 81001 URINALYSIS AUTO W/SCOPE: CPT

## 2024-03-13 PROCEDURE — 87086 URINE CULTURE/COLONY COUNT: CPT

## 2024-03-13 NOTE — TELEPHONE ENCOUNTER
Patient is having urgency, frequency, burning and occasional blood. Orders placed for urine.    Denies fever or chills.

## 2024-03-15 ENCOUNTER — HOSPITAL ENCOUNTER (OUTPATIENT)
Dept: CT IMAGING | Age: 64
Discharge: HOME | End: 2024-03-15
Payer: MEDICAID

## 2024-03-15 ENCOUNTER — TELEPHONE (OUTPATIENT)
Dept: UROLOGY | Age: 64
End: 2024-03-15

## 2024-03-15 ENCOUNTER — OFFICE VISIT (OUTPATIENT)
Dept: UROLOGY | Age: 64
End: 2024-03-15

## 2024-03-15 VITALS — RESPIRATION RATE: 16 BRPM | HEIGHT: 69 IN | BODY MASS INDEX: 39.84 KG/M2 | WEIGHT: 269 LBS

## 2024-03-15 DIAGNOSIS — N20.0 KIDNEY STONE: ICD-10-CM

## 2024-03-15 DIAGNOSIS — N40.1 BENIGN PROSTATIC HYPERPLASIA WITH URINARY RETENTION: Primary | ICD-10-CM

## 2024-03-15 DIAGNOSIS — R33.8 BENIGN PROSTATIC HYPERPLASIA WITH URINARY RETENTION: Primary | ICD-10-CM

## 2024-03-15 LAB
BACTERIA UR CULT: ABNORMAL
ORGANISM: ABNORMAL
POST VOID RESIDUAL (PVR): 128 ML

## 2024-03-15 PROCEDURE — 74176 CT ABD & PELVIS W/O CONTRAST: CPT

## 2024-03-15 RX ORDER — SULFAMETHOXAZOLE AND TRIMETHOPRIM 800; 160 MG/1; MG/1
1 TABLET ORAL 2 TIMES DAILY
Qty: 20 TABLET | Refills: 0 | Status: SHIPPED | OUTPATIENT
Start: 2024-03-15 | End: 2024-03-25

## 2024-03-15 NOTE — TELEPHONE ENCOUNTER
Patient advised of ct scan results and to continue the bactrim. Follow up confirmed and patient will be evaluted in the ER if the symptoms worsen or corea becomes occluded

## 2024-03-15 NOTE — PROGRESS NOTES
Patient has given me verbal consent to perform catheter placement  Yes    DIAGNOSIS/INDICATION:  urinary retention     Does patient have latex allergy?  No  Does patient have shellfish or betadine allergy?  No      Following Michelle CHARLTON plan of care. Inserted 16 Fr  Catheter without difficulty.    Patient's urethra was cleansed with betadine swab. 16 Fr Coude corea was inserted using sterile water-soluble lubricant without difficulty and inflated balloon with 10 ml of water. Corea Catheter was hooked up to leg.    Foreskin reduced back down?  Yes    Patient instructed on draining catheter bags.    Patient instructed to keep leg bag above the knee to prevent pulling on catheter causing blood.    Patient instructed on how to switch from leg bag to overnight bag.   
emptying: Patient began to perform CIC again.    Reports burning with urination. UA with micro in 3/13 with >200 RBC and >200 WBC. Culture without growth.     Gross Hematuria: See above. Possibly due to infection vs trauma from CIC vs kidney stones      Recommend placement of corea catheter, empiric bactrim, and cystoscopy  Discussed importance of avoiding constipation   Need stat CT scan for flank pain and hx of kidney stones  Can consider re-starting Flomax and discontinuation of Ditropan if no acute cause of retention is identified.           Michelle Bailey PA-C  Urology

## 2024-03-15 NOTE — TELEPHONE ENCOUNTER
Patient scheduled for CT ABD PELV WO  at [facility] on [id number] STAT.Order given to the patient in the office

## 2024-03-15 NOTE — PATIENT INSTRUCTIONS
Start Bactrim  Follow-up for a cystoscopy.   Call with questions, comments, or concerns. I recommend going to the ED for further evaluation if you develop fever, chills, nausea, vomiting, chest pain, SOB, or calf pain.

## 2024-03-15 NOTE — TELEPHONE ENCOUNTER
Please notify patient that his CT did not detect any ureteral stones.     F/u as scheduled for cystoscopy. Continue Bactrim.     Present to the ED for worsening hematuria or if his corea stops draining    The patient should go to the ED should they develop fever, chills, nausea, vomiting, chest pain, SOB, calf pain, feelings of incomplete emptying, or should they otherwise feel they need evaluated

## 2024-03-18 ENCOUNTER — NURSE ONLY (OUTPATIENT)
Dept: UROLOGY | Age: 64
End: 2024-03-18

## 2024-03-18 DIAGNOSIS — N40.1 BENIGN PROSTATIC HYPERPLASIA WITH URINARY RETENTION: Primary | ICD-10-CM

## 2024-03-18 DIAGNOSIS — R33.8 BENIGN PROSTATIC HYPERPLASIA WITH URINARY RETENTION: Primary | ICD-10-CM

## 2024-03-18 PROCEDURE — NBSRV NON-BILLABLE SERVICE

## 2024-03-18 NOTE — PROGRESS NOTES
I have personally verified, reviewed, and approved of these actions as documented by Isidra Garzon LPN.      Patient to keep his corea catheter in place until his Cystoscopy scheduled in 2 days (3/20/24).

## 2024-03-19 ENCOUNTER — TELEPHONE (OUTPATIENT)
Dept: UROLOGY | Age: 64
End: 2024-03-19

## 2024-03-20 ENCOUNTER — HOSPITAL ENCOUNTER (OUTPATIENT)
Dept: UROLOGY | Age: 64
Discharge: HOME | End: 2024-03-20
Payer: MEDICAID

## 2024-03-20 VITALS
SYSTOLIC BLOOD PRESSURE: 154 MMHG | TEMPERATURE: 97.5 F | RESPIRATION RATE: 16 BRPM | WEIGHT: 277 LBS | BODY MASS INDEX: 41.03 KG/M2 | HEIGHT: 69 IN | HEART RATE: 69 BPM | OXYGEN SATURATION: 97 % | DIASTOLIC BLOOD PRESSURE: 67 MMHG

## 2024-03-20 LAB
BILIRUBIN URINE: NEGATIVE
BLOOD URINE, POC: ABNORMAL
CHARACTER, URINE: ABNORMAL
COLOR, URINE: ABNORMAL
GLUCOSE URINE: NEGATIVE MG/DL
KETONES, URINE: NEGATIVE
LEUKOCYTE CLUMPS, URINE: ABNORMAL
NITRITE, URINE: NEGATIVE
PH, URINE: 5.5 (ref 5–9)
PROTEIN, URINE: 100 MG/DL
SPECIFIC GRAVITY, URINE: 1.02 (ref 1–1.03)
UROBILINOGEN, URINE: 0.2 EU/DL (ref 0–1)

## 2024-03-20 PROCEDURE — 52000 CYSTOURETHROSCOPY: CPT

## 2024-03-20 NOTE — OP NOTE
Dr. Bubba Santana MD  Urologic Surgery        [place], [place]. [facility]  03/20/24    Patient:  Pool Rivera  MRN: [id number]  YOB: 1960    Surgeon: Dr. Bubba Santana MD  Assistant: None    Pre-op Diagnosis: Hematuria. OAB. Prostate cancer s/p radiation  Post-op Diagnosis: Same    Procedure:   Cystoscopy.    Anesthesia: Local  Complications: None  OR Blood Loss: Minimal  Fluids: Cystalloids  Specimens: None    Indication:  See above. CT negative for stones/tumors.     Narrative of the Procedure:    After informed consent was obtained in the preoperative area, the patient was taken back to the operating room and remained on the hospital gurTerry. The patient was prepped and draped in a sterile manner. A time out occurred in which two patient identifiers were used. The flexible scope was carefully placed into the bladder.    Findings:  Urethra: Normal  Prostate: shedding prostate tissues quite a bit. Prostate widely patent  Bladder Neck: Normal  Papillary lesions: None  Trabeculations: Moderate  Cellules/Diverticula: None  Bladder Stones: None  Ureteral Orifices: Normal    OVERALL IMPRESSION: Shedding prostate      Follow-Up: Might need TURP resurfacing of prostate w mari if he continues to shed prostate and clog the urethra. No large chunks today.     Bubba Santana MD  Electronically signed on 3/20/2024 at 9:56 AM

## 2024-03-20 NOTE — PROGRESS NOTES
[facility]  This procedure has been fully reviewed with the patient and written informed consent has been obtained.  1001 Procedure started with Dr. Santana  1002 Procedure completed; patient tolerated well.  Dr. Santana talked to patient in length about procedure findings.  Patient discharged.    PLAN     Follow up as scheduled with DOUG Peacock in [place] on 04/11/2024 at 10:15am

## 2024-03-20 NOTE — DISCHARGE INSTRUCTIONS
Discharge instructions: Cystoscopy  You May experience painful urination and see blood in the urine after your procedure.  This should resolve over time.      Pt ok to discharge home in good condition  No heavy lifting, >10 lbs for today  Pt should avoid strenuous activity for today  Pt should walk moderately at home  Pt ok to shower   Pt may resume diet as tolerated  Please call attending physician or hospital  with questions  Call or Present to ED if fever (> 101F), intractable nausea vomiting or pain.    Follow up as scheduled with DOUG Peacock in [place] on 04/11/2024 at 10:15am

## 2024-03-21 DIAGNOSIS — J30.1 CHRONIC SEASONAL ALLERGIC RHINITIS DUE TO POLLEN: ICD-10-CM

## 2024-03-21 RX ORDER — FLUTICASONE PROPIONATE 50 MCG
SPRAY, SUSPENSION (ML) NASAL
Qty: 16 G | Refills: 3 | Status: SHIPPED | OUTPATIENT
Start: 2024-03-21

## 2024-03-27 DIAGNOSIS — F41.9 ANXIETY: ICD-10-CM

## 2024-03-27 RX ORDER — ALPRAZOLAM 1 MG/1
1 TABLET ORAL 2 TIMES DAILY
Qty: 180 TABLET | Refills: 0 | Status: SHIPPED | OUTPATIENT
Start: 2024-03-27 | End: 2024-06-25

## 2024-03-27 NOTE — TELEPHONE ENCOUNTER
Pool Rivera called requesting a refill on the following medications:  Requested Prescriptions     Pending Prescriptions Disp Refills    ALPRAZolam (XANAX) 1 MG tablet 180 tablet 0     Sig: Take 1 tablet by mouth 2 times daily for 90 days.       Date of last visit: 2/8/2024  Date of next visit (if applicable):5/8/2024  Date of last refill: 12/28/23  [facility] Name: [facility],  Luna June MA

## 2024-03-28 ENCOUNTER — HOSPITAL ENCOUNTER (OUTPATIENT)
Age: 64
Discharge: HOME | End: 2024-03-28
Payer: MEDICAID

## 2024-03-28 DIAGNOSIS — R33.8 BENIGN PROSTATIC HYPERPLASIA WITH URINARY RETENTION: Primary | ICD-10-CM

## 2024-03-28 DIAGNOSIS — N40.1 BENIGN PROSTATIC HYPERPLASIA WITH URINARY RETENTION: ICD-10-CM

## 2024-03-28 DIAGNOSIS — R33.8 BENIGN PROSTATIC HYPERPLASIA WITH URINARY RETENTION: ICD-10-CM

## 2024-03-28 DIAGNOSIS — N40.1 BENIGN PROSTATIC HYPERPLASIA WITH URINARY RETENTION: Primary | ICD-10-CM

## 2024-03-28 PROCEDURE — 87086 URINE CULTURE/COLONY COUNT: CPT

## 2024-03-28 RX ORDER — OXYBUTYNIN CHLORIDE 5 MG/1
5 TABLET, EXTENDED RELEASE ORAL DAILY
Qty: 30 TABLET | Refills: 2 | Status: SHIPPED | OUTPATIENT
Start: 2024-03-28

## 2024-03-28 NOTE — TELEPHONE ENCOUNTER
Patient called in stating that he has extreme urgency, started a couple of days ago. He just completed an antibiotic for a UTI a few days ago and after he finished it that is when the urgency returned. He does have some burning every once in awhile.     Culture order was placed and he will be going to [facility] to get that completed.     He is also requesting a refill on his Oxybutynin he states that is why he is having the urgency but also is agreeable to have the culture completed.

## 2024-03-28 NOTE — TELEPHONE ENCOUNTER
Pool Rivera called requesting a refill on the following medications:  Requested Prescriptions     Pending Prescriptions Disp Refills    oxyBUTYnin (DITROPAN-XL) 5 MG extended release tablet 30 tablet 2     Sig: Take 1 tablet by mouth daily       Date of last visit: 03/20/24  Date of next visit (if applicable):  04/11/24

## 2024-03-30 LAB — BACTERIA UR CULT: NORMAL

## 2024-04-01 ENCOUNTER — TELEPHONE (OUTPATIENT)
Dept: FAMILY MEDICINE CLINIC | Age: 64
End: 2024-04-01

## 2024-04-01 NOTE — TELEPHONE ENCOUNTER
Patient called stating he is not able to get his Trulicity Rx. He states he has called several pharmacies around and no one is able to get it in nor do they know when they would be able to get the Trulicity in stock. Patient was advised by his pharmacy to let his PCP know.

## 2024-04-01 NOTE — TELEPHONE ENCOUNTER
Noted.  Patient may need to adjust his Lantus and/or humalog to maintain good glucose control.    Please advise patient.  Raghav Laurent MD

## 2024-04-02 DIAGNOSIS — Z79.4 TYPE 2 DIABETES MELLITUS WITH HYPERGLYCEMIA, WITH LONG-TERM CURRENT USE OF INSULIN (HCC): ICD-10-CM

## 2024-04-02 DIAGNOSIS — E11.65 TYPE 2 DIABETES MELLITUS WITH HYPERGLYCEMIA, WITH LONG-TERM CURRENT USE OF INSULIN (HCC): ICD-10-CM

## 2024-04-02 RX ORDER — PEN NEEDLE, DIABETIC 31 GX5/16"
NEEDLE, DISPOSABLE MISCELLANEOUS
Qty: 200 EACH | Refills: 3 | Status: SHIPPED | OUTPATIENT
Start: 2024-04-02

## 2024-04-02 NOTE — TELEPHONE ENCOUNTER
Pool Rivera called requesting a refill on the following medications:  Requested Prescriptions     Pending Prescriptions Disp Refills    DROPLET PEN NEEDLES 31G X 8 MM MISC [Pharmacy Med Name: DROPLET PEN NEEDLE 31GX5/16\"] 200 each 1     Sig: use as directed twice a day       Date of last visit: 2/8/2024  Date of next visit (if applicable):5/8/2024  Date of last refill: 10/04/23  Pharmacy Name: [facility],  Mere Lockwood MA

## 2024-04-09 ENCOUNTER — HOSPITAL ENCOUNTER (OUTPATIENT)
Age: 64
Discharge: HOME | End: 2024-04-09
Payer: MEDICAID

## 2024-04-09 DIAGNOSIS — E11.65 TYPE 2 DIABETES MELLITUS WITH HYPERGLYCEMIA, WITH LONG-TERM CURRENT USE OF INSULIN (HCC): ICD-10-CM

## 2024-04-09 DIAGNOSIS — C61 PROSTATE CANCER (HCC): ICD-10-CM

## 2024-04-09 DIAGNOSIS — Z79.4 TYPE 2 DIABETES MELLITUS WITH HYPERGLYCEMIA, WITH LONG-TERM CURRENT USE OF INSULIN (HCC): ICD-10-CM

## 2024-04-09 DIAGNOSIS — I25.10 CORONARY ARTERY DISEASE INVOLVING NATIVE CORONARY ARTERY OF NATIVE HEART WITHOUT ANGINA PECTORIS: ICD-10-CM

## 2024-04-09 LAB
ALBUMIN SERPL BCG-MCNC: 4.3 G/DL (ref 3.5–5.1)
ALP SERPL-CCNC: 112 U/L (ref 38–126)
ALT SERPL W/O P-5'-P-CCNC: 9 U/L (ref 11–66)
ANION GAP SERPL CALC-SCNC: 13 MEQ/L (ref 8–16)
AST SERPL-CCNC: 10 U/L (ref 5–40)
BILIRUB SERPL-MCNC: 0.3 MG/DL (ref 0.3–1.2)
BUN SERPL-MCNC: 17 MG/DL (ref 7–22)
CALCIUM SERPL-MCNC: 9.9 MG/DL (ref 8.5–10.5)
CHLORIDE SERPL-SCNC: 102 MEQ/L (ref 98–111)
CHOLEST SERPL-MCNC: 167 MG/DL (ref 100–199)
CO2 SERPL-SCNC: 23 MEQ/L (ref 23–33)
CREAT SERPL-MCNC: 1 MG/DL (ref 0.4–1.2)
DEPRECATED RDW RBC AUTO: 49.2 FL (ref 35–45)
ERYTHROCYTE [DISTWIDTH] IN BLOOD BY AUTOMATED COUNT: 15 % (ref 11.5–14.5)
GFR SERPL CREATININE-BSD FRML MDRD: 85 ML/MIN/1.73M2
GLUCOSE SERPL-MCNC: 153 MG/DL (ref 70–108)
HCT VFR BLD AUTO: 35.9 % (ref 42–52)
HDLC SERPL-MCNC: 31 MG/DL
HGB BLD-MCNC: 11 GM/DL (ref 14–18)
LDLC SERPL CALC-MCNC: 105 MG/DL
MCH RBC QN AUTO: 27.7 PG (ref 26–33)
MCHC RBC AUTO-ENTMCNC: 30.6 GM/DL (ref 32.2–35.5)
MCV RBC AUTO: 90.4 FL (ref 80–94)
PLATELET # BLD AUTO: 265 THOU/MM3 (ref 130–400)
PMV BLD AUTO: 10.1 FL (ref 9.4–12.4)
POTASSIUM SERPL-SCNC: 4.9 MEQ/L (ref 3.5–5.2)
PROT SERPL-MCNC: 6.9 G/DL (ref 6.1–8)
PSA SERPL-MCNC: < 0.02 NG/ML (ref 0–1)
RBC # BLD AUTO: 3.97 MILL/MM3 (ref 4.7–6.1)
SODIUM SERPL-SCNC: 138 MEQ/L (ref 135–145)
TRIGL SERPL-MCNC: 157 MG/DL (ref 0–199)
WBC # BLD AUTO: 7 THOU/MM3 (ref 4.8–10.8)

## 2024-04-09 PROCEDURE — 36415 COLL VENOUS BLD VENIPUNCTURE: CPT

## 2024-04-09 PROCEDURE — 80061 LIPID PANEL: CPT

## 2024-04-09 PROCEDURE — 80053 COMPREHEN METABOLIC PANEL: CPT

## 2024-04-09 PROCEDURE — 84153 ASSAY OF PSA TOTAL: CPT

## 2024-04-09 PROCEDURE — 85027 COMPLETE CBC AUTOMATED: CPT

## 2024-04-10 ENCOUNTER — TELEPHONE (OUTPATIENT)
Dept: FAMILY MEDICINE CLINIC | Age: 64
End: 2024-04-10

## 2024-04-10 NOTE — TELEPHONE ENCOUNTER
Continue current medications.  Continue iron supplement    Please advise patient.  Raghav Laurent MD

## 2024-04-10 NOTE — PROGRESS NOTES
non-distended and non-tender.    POC  Results for POC orders placed in visit on 04/11/24   poct post void residual   Result Value Ref Range    post void residual 124 ml         Patients recent PSA values are as follows  Lab Results   Component Value Date    PSA <0.02 04/09/2024    PSA <0.02 01/17/2024    PSA <0.02 03/04/2022        Recent BUN/Creatinine:  Lab Results   Component Value Date/Time    BUN 17 04/09/2024 09:42 AM    CREATININE 1.0 04/09/2024 09:42 AM       Radiology  The patient has had a CT Stone Protocol which I have independently reviewed along with its accompanying report.  The study demonstrates:    FINDINGS:      The noncontrast CT limits the evaluation for solid organ pathology, vascular pathology, and lymphadenopathy.     A small hiatal hernia is seen. A 3.8 x 3.6 cm cyst is seen in the inferior pole of the right kidney. No hydronephrosis. No obstructing ureteral calculus.     The liver, gallbladder, biliary tree, pancreas, adrenal glands and spleen are unremarkable.     No bowel obstruction or acute inflammatory bowel process. The appendix is unremarkable.     The abdominal aorta is not aneurysmal. Aortoiliac calcifications.     No significantly enlarged lymph nodes are seen.     The bladder is decompressed by a Mccartney catheter. Mild presacral and pelvic haziness related to inflammation.     Bones: The bones are demineralized. Degenerative changes of the thoracolumbar spine. Degenerative changes of the SI joints. Intervertebral disc prosthesis is seen at L4-L5. Decompressive laminectomy at L4.     IMPRESSION:  1. No obstructing ureteral calculus. No renal stone.  2. A 3.8 cm right renal cyst.  3. Other findings as described above.    Assessment/Plan:   1. Prostate cancer (HCC)  - Hx of Merline 4+5=9, grade group 5  - S/p EBRT in 3/2021 and ADT. Last Lupron injection 1/24/2024  - PSA undetectable, recheck 6 months.   - PSA Prostatic Specific Antigen; Future    2. Benign prostatic hyperplasia with

## 2024-04-10 NOTE — TELEPHONE ENCOUNTER
----- Message from Raghav Laurent MD sent at 4/9/2024  8:57 PM EDT -----  CMP shows mildly elevated glucose.  CBC shows mild anemia that is similar to previous.  Cholesterol levels show LDL that is higher than goal of 70 or less.    Please advise patient.  Raghav Laurent MD

## 2024-04-10 NOTE — TELEPHONE ENCOUNTER
Pt wants to know what he should do about this? Should he continue taking his medications the same way he has been?    Pt states he's taking his iron supplement everyday in the morning and at night.

## 2024-04-11 ENCOUNTER — OFFICE VISIT (OUTPATIENT)
Dept: UROLOGY | Age: 64
End: 2024-04-11
Payer: MEDICAID

## 2024-04-11 VITALS — WEIGHT: 284 LBS | RESPIRATION RATE: 16 BRPM | HEIGHT: 69 IN | BODY MASS INDEX: 42.06 KG/M2

## 2024-04-11 DIAGNOSIS — N40.1 BENIGN PROSTATIC HYPERPLASIA WITH URINARY RETENTION: ICD-10-CM

## 2024-04-11 DIAGNOSIS — N20.0 KIDNEY STONE: ICD-10-CM

## 2024-04-11 DIAGNOSIS — R39.15 URINARY URGENCY: ICD-10-CM

## 2024-04-11 DIAGNOSIS — C61 PROSTATE CANCER (HCC): Primary | ICD-10-CM

## 2024-04-11 DIAGNOSIS — R33.8 BENIGN PROSTATIC HYPERPLASIA WITH URINARY RETENTION: ICD-10-CM

## 2024-04-11 LAB — POST VOID RESIDUAL (PVR): 124 ML

## 2024-04-11 PROCEDURE — G8427 DOCREV CUR MEDS BY ELIG CLIN: HCPCS | Performed by: NURSE PRACTITIONER

## 2024-04-11 PROCEDURE — 51798 US URINE CAPACITY MEASURE: CPT | Performed by: NURSE PRACTITIONER

## 2024-04-11 PROCEDURE — 1036F TOBACCO NON-USER: CPT | Performed by: NURSE PRACTITIONER

## 2024-04-11 PROCEDURE — 3017F COLORECTAL CA SCREEN DOC REV: CPT | Performed by: NURSE PRACTITIONER

## 2024-04-11 PROCEDURE — 99214 OFFICE O/P EST MOD 30 MIN: CPT | Performed by: NURSE PRACTITIONER

## 2024-04-11 PROCEDURE — G8417 CALC BMI ABV UP PARAM F/U: HCPCS | Performed by: NURSE PRACTITIONER

## 2024-04-11 RX ORDER — SOLIFENACIN SUCCINATE 10 MG/1
10 TABLET, FILM COATED ORAL DAILY
Qty: 30 TABLET | Refills: 2 | Status: SHIPPED | OUTPATIENT
Start: 2024-04-11 | End: 2025-04-11

## 2024-04-11 RX ORDER — SULFAMETHOXAZOLE AND TRIMETHOPRIM 800; 160 MG/1; MG/1
1 TABLET ORAL 2 TIMES DAILY
Qty: 28 TABLET | Refills: 0 | Status: SHIPPED | OUTPATIENT
Start: 2024-04-11 | End: 2024-04-25

## 2024-04-11 NOTE — PATIENT INSTRUCTIONS
Take Bactrim 1 tablet; two times a day for 14 days.     Stop oxybutynin. Start Vesicare.     Call sooner with any questions or concerns.

## 2024-04-16 DIAGNOSIS — J43.9 MIXED RESTRICTIVE AND OBSTRUCTIVE LUNG DISEASE (HCC): ICD-10-CM

## 2024-04-16 DIAGNOSIS — J98.4 MIXED RESTRICTIVE AND OBSTRUCTIVE LUNG DISEASE (HCC): ICD-10-CM

## 2024-04-16 RX ORDER — ALBUTEROL SULFATE 2.5 MG/3ML
2.5 SOLUTION RESPIRATORY (INHALATION) EVERY 4 HOURS PRN
Qty: 150 ML | Refills: 1 | Status: SHIPPED | OUTPATIENT
Start: 2024-04-16

## 2024-04-16 NOTE — TELEPHONE ENCOUNTER
Pool Rivera called requesting a refill on the following medications:  Requested Prescriptions     Pending Prescriptions Disp Refills    albuterol (PROVENTIL) (2.5 MG/3ML) 0.083% nebulizer solution 150 mL 1       Date of last visit: 2/8/2024  Date of next visit (if applicable):5/8/2024  Date of last refill: 8/28/2023  Pharmacy Name: [facility] RN

## 2024-04-22 ASSESSMENT — ENCOUNTER SYMPTOMS
CHEST TIGHTNESS: 0
DIARRHEA: 0
NAUSEA: 0
VOMITING: 0
EYES NEGATIVE: 1
GASTROINTESTINAL NEGATIVE: 1
ALLERGIC/IMMUNOLOGIC NEGATIVE: 1
STRIDOR: 0

## 2024-04-22 NOTE — PROGRESS NOTES
[facility]    Patient: LOUISE RODRIGUEZ, 63 y.o.   : 1960  2024    Pt of Dr. Whitten     Subjective     Chief Complaint   Patient presents with    Follow-up     1yr ROBSON f/u w/[facility] download.  Doing well.  Needs script for PAP supplies.         HPI  Louise is here for follow up for his moderate copd with restriction as well most likely more 2/2 obesity.   BiPAP use with sleep for his OHS- no sleep testing ever done - 2LPM oxygen bleed in with BiPAP only   Last PFT testing done - mod severe obstruction and mild restriction- good response to BD in FEV1. Mildly decreased DLCO   Current inhaler use of Anoro daily and Albuterol PRN   MO BMI 40  Hx of of prostate cancer, grade group 5. He underwent EBRT in 3/2021 and completed ADT (Lupron every 6 months x 2-3 years). Last Lupron injection 2024. PSA has remained undetectable. He is also s/p greenlight PVP with Dr. Doyle in 10/2023.   SOB more with exertion   Intermittent cough or wheezing- no hemoptysis   Needs new nebulizer supplies     Progress History:   Since last visit any new medical issues? Yes urinary issues following with   New ER or hospital visits? No  Any new or changes in medicines? No  Using inhalers? Yes   Are they helpful? Yes   Immunization History   Administered Date(s) Administered    COVID-19, MODERNA BLUE border, Primary or Immunocompromised, (age 12y+), IM, 100 mcg/0.5mL 2021, 04/15/2021    COVID-19, MODERNA Booster BLUE border, (age 18y+), IM, 50mcg/0.25mL 2021, 04/15/2021, 2021    COVID-19, PFIZER Bivalent, DO NOT Dilute, (age 12y+), IM, 30 mcg/0.3 mL 2022    Influenza Virus Vaccine 2018, 10/30/2020    Influenza, AFLURIA (age 3 yrs+), FLUZONE, (age 6 mo+), MDV, 0.5mL 2016, 2018, 10/08/2019, 10/30/2020    Influenza, FLUARIX, FLULAVAL, FLUZONE (age 6 mo+) AND AFLURIA, (age 3 y+), PF, 0.5mL 2018, 2021, 2022    Influenza,

## 2024-04-23 ENCOUNTER — OFFICE VISIT (OUTPATIENT)
Dept: PULMONOLOGY | Age: 64
End: 2024-04-23
Payer: MEDICAID

## 2024-04-23 VITALS
WEIGHT: 276 LBS | BODY MASS INDEX: 40.88 KG/M2 | HEART RATE: 77 BPM | SYSTOLIC BLOOD PRESSURE: 116 MMHG | TEMPERATURE: 98 F | DIASTOLIC BLOOD PRESSURE: 64 MMHG | HEIGHT: 69 IN | OXYGEN SATURATION: 96 %

## 2024-04-23 DIAGNOSIS — E66.01 MORBIDLY OBESE (HCC): ICD-10-CM

## 2024-04-23 DIAGNOSIS — J98.4 MIXED RESTRICTIVE AND OBSTRUCTIVE LUNG DISEASE (HCC): Primary | ICD-10-CM

## 2024-04-23 DIAGNOSIS — J44.9 COPD, MODERATE (HCC): ICD-10-CM

## 2024-04-23 DIAGNOSIS — Z87.891 PERSONAL HISTORY OF TOBACCO USE: ICD-10-CM

## 2024-04-23 DIAGNOSIS — E66.2 OBESITY HYPOVENTILATION SYNDROME (HCC): ICD-10-CM

## 2024-04-23 DIAGNOSIS — J43.9 MIXED RESTRICTIVE AND OBSTRUCTIVE LUNG DISEASE (HCC): Primary | ICD-10-CM

## 2024-04-23 PROCEDURE — 99214 OFFICE O/P EST MOD 30 MIN: CPT | Performed by: NURSE PRACTITIONER

## 2024-04-23 PROCEDURE — 3017F COLORECTAL CA SCREEN DOC REV: CPT | Performed by: NURSE PRACTITIONER

## 2024-04-23 PROCEDURE — 3078F DIAST BP <80 MM HG: CPT | Performed by: NURSE PRACTITIONER

## 2024-04-23 PROCEDURE — G8417 CALC BMI ABV UP PARAM F/U: HCPCS | Performed by: NURSE PRACTITIONER

## 2024-04-23 PROCEDURE — 1036F TOBACCO NON-USER: CPT | Performed by: NURSE PRACTITIONER

## 2024-04-23 PROCEDURE — 3023F SPIROM DOC REV: CPT | Performed by: NURSE PRACTITIONER

## 2024-04-23 PROCEDURE — G8427 DOCREV CUR MEDS BY ELIG CLIN: HCPCS | Performed by: NURSE PRACTITIONER

## 2024-04-23 PROCEDURE — 3074F SYST BP LT 130 MM HG: CPT | Performed by: NURSE PRACTITIONER

## 2024-04-23 RX ORDER — OXYBUTYNIN CHLORIDE 5 MG/1
5 TABLET, EXTENDED RELEASE ORAL DAILY
COMMUNITY
Start: 2024-04-22

## 2024-04-23 RX ORDER — ALBUTEROL SULFATE 2.5 MG/3ML
2.5 SOLUTION RESPIRATORY (INHALATION) EVERY 4 HOURS PRN
Qty: 150 ML | Refills: 3 | Status: SHIPPED | OUTPATIENT
Start: 2024-04-23

## 2024-04-23 ASSESSMENT — ENCOUNTER SYMPTOMS
COUGH: 1
SHORTNESS OF BREATH: 1
WHEEZING: 0

## 2024-04-23 NOTE — PATIENT INSTRUCTIONS
-Continue Anoro 1 puff daily   -Continue Albuterol PRN for SOB/wheezing   -Albuterol nebulizer refilled- not to use Albuterol neb with Albuterol inhaler within 2 hours of each other   -Activity as tolerated  -BiPAP download reviewed with Pool and myself today in the office   - He  was advised to continue current positive airway pressure therapy with above described pressure.   - He  advised to keep good compliance with current recommended pressure to get optimal results and clinical improvement  - Recommend 7-9 hours of sleep with PAP  -DME order placed for BiPAP supplies   -Weight loss encouraged   -Advised to maintain pneumonia vaccine with PCP and to take flu vaccine this coming season.  -Advised patient to call office with any changes, questions, or concerns regarding respiratory status    Will see Pool Rivera back in: 1 year with download

## 2024-04-24 DIAGNOSIS — Z79.4 TYPE 2 DIABETES MELLITUS WITH HYPERGLYCEMIA, WITH LONG-TERM CURRENT USE OF INSULIN (HCC): ICD-10-CM

## 2024-04-24 DIAGNOSIS — E11.65 TYPE 2 DIABETES MELLITUS WITH HYPERGLYCEMIA, WITH LONG-TERM CURRENT USE OF INSULIN (HCC): ICD-10-CM

## 2024-04-24 NOTE — TELEPHONE ENCOUNTER
This medication refill is regarding a electronic request. Refill requested by  [facility] .    Requested Prescriptions     Pending Prescriptions Disp Refills    metFORMIN (GLUCOPHAGE) 1000 MG tablet [Pharmacy Med Name: METFORMIN HCL 1,000 MG TABLET] 180 tablet 1     Sig: take 1 tablet by mouth once daily with breakfast       Date of last visit: 2/8/2024   Date of next visit: 5/8/2024  Date of last refill: 5/18/2023  180/1    Last Lipid Panel:    Lab Results   Component Value Date/Time    CHOL 167 04/09/2024 09:42 AM    TRIG 157 04/09/2024 09:42 AM    HDL 31 04/09/2024 09:42 AM    LDLCALC 105 04/09/2024 09:42 AM     Last CMP:   Lab Results   Component Value Date     04/09/2024    K 4.9 04/09/2024     04/09/2024    CO2 23 04/09/2024    BUN 17 04/09/2024    CREATININE 1.0 04/09/2024    GLUCOSE 153 (H) 04/09/2024    CALCIUM 9.9 04/09/2024    PROT 6.9 04/09/2024    BILITOT 0.3 04/09/2024    ALKPHOS 112 04/09/2024    AST 10 04/09/2024    ALT 9 (L) 04/09/2024    LABGLOM 85 04/09/2024       Last Thyroid:    Lab Results   Component Value Date    TSH 0.412 01/21/2023     Last Hemoglobin A1C:    Lab Results   Component Value Date/Time    LABA1C 7.2 02/08/2024 09:47 AM       Rx verified, ordered and set to EP.

## 2024-04-30 NOTE — TELEPHONE ENCOUNTER
Pt called and said that the pharmacy told him that they no longer carry the Cromolyn and was told to call and ask Dr to give him a different medication. Please call pt and advise. intact

## 2024-05-03 ENCOUNTER — TELEPHONE (OUTPATIENT)
Dept: PULMONOLOGY | Age: 64
End: 2024-05-03

## 2024-05-03 NOTE — TELEPHONE ENCOUNTER
Patient called and requested that his nebulizer be sent to [facility]. Order and notes faxed confirmation scanned in

## 2024-05-08 ENCOUNTER — OFFICE VISIT (OUTPATIENT)
Dept: FAMILY MEDICINE CLINIC | Age: 64
End: 2024-05-08
Payer: MEDICAID

## 2024-05-08 VITALS
DIASTOLIC BLOOD PRESSURE: 66 MMHG | OXYGEN SATURATION: 96 % | HEIGHT: 69 IN | SYSTOLIC BLOOD PRESSURE: 128 MMHG | WEIGHT: 272.4 LBS | BODY MASS INDEX: 40.35 KG/M2 | RESPIRATION RATE: 18 BRPM | TEMPERATURE: 96.8 F | HEART RATE: 74 BPM

## 2024-05-08 DIAGNOSIS — D50.9 IRON DEFICIENCY ANEMIA, UNSPECIFIED IRON DEFICIENCY ANEMIA TYPE: ICD-10-CM

## 2024-05-08 DIAGNOSIS — R10.84 GENERALIZED ABDOMINAL PAIN: ICD-10-CM

## 2024-05-08 DIAGNOSIS — E11.65 TYPE 2 DIABETES MELLITUS WITH HYPERGLYCEMIA, WITH LONG-TERM CURRENT USE OF INSULIN (HCC): Primary | ICD-10-CM

## 2024-05-08 DIAGNOSIS — F41.9 ANXIETY: ICD-10-CM

## 2024-05-08 DIAGNOSIS — E66.01 CLASS 3 SEVERE OBESITY DUE TO EXCESS CALORIES WITH SERIOUS COMORBIDITY AND BODY MASS INDEX (BMI) OF 40.0 TO 44.9 IN ADULT (HCC): ICD-10-CM

## 2024-05-08 DIAGNOSIS — Z79.4 TYPE 2 DIABETES MELLITUS WITH HYPERGLYCEMIA, WITH LONG-TERM CURRENT USE OF INSULIN (HCC): Primary | ICD-10-CM

## 2024-05-08 LAB — HBA1C MFR BLD: 7.4 %

## 2024-05-08 PROCEDURE — 3051F HG A1C>EQUAL 7.0%<8.0%: CPT | Performed by: FAMILY MEDICINE

## 2024-05-08 PROCEDURE — 2022F DILAT RTA XM EVC RTNOPTHY: CPT | Performed by: FAMILY MEDICINE

## 2024-05-08 PROCEDURE — 1036F TOBACCO NON-USER: CPT | Performed by: FAMILY MEDICINE

## 2024-05-08 PROCEDURE — 3017F COLORECTAL CA SCREEN DOC REV: CPT | Performed by: FAMILY MEDICINE

## 2024-05-08 PROCEDURE — 99214 OFFICE O/P EST MOD 30 MIN: CPT | Performed by: FAMILY MEDICINE

## 2024-05-08 PROCEDURE — 83036 HEMOGLOBIN GLYCOSYLATED A1C: CPT | Performed by: FAMILY MEDICINE

## 2024-05-08 PROCEDURE — G8417 CALC BMI ABV UP PARAM F/U: HCPCS | Performed by: FAMILY MEDICINE

## 2024-05-08 PROCEDURE — 3078F DIAST BP <80 MM HG: CPT | Performed by: FAMILY MEDICINE

## 2024-05-08 PROCEDURE — 3074F SYST BP LT 130 MM HG: CPT | Performed by: FAMILY MEDICINE

## 2024-05-08 PROCEDURE — G8427 DOCREV CUR MEDS BY ELIG CLIN: HCPCS | Performed by: FAMILY MEDICINE

## 2024-05-08 RX ORDER — SOLIFENACIN SUCCINATE 10 MG/1
10 TABLET, FILM COATED ORAL DAILY
COMMUNITY

## 2024-05-08 SDOH — ECONOMIC STABILITY: FOOD INSECURITY: WITHIN THE PAST 12 MONTHS, YOU WORRIED THAT YOUR FOOD WOULD RUN OUT BEFORE YOU GOT MONEY TO BUY MORE.: NEVER TRUE

## 2024-05-08 SDOH — ECONOMIC STABILITY: FOOD INSECURITY: WITHIN THE PAST 12 MONTHS, THE FOOD YOU BOUGHT JUST DIDN'T LAST AND YOU DIDN'T HAVE MONEY TO GET MORE.: NEVER TRUE

## 2024-05-08 SDOH — ECONOMIC STABILITY: INCOME INSECURITY: HOW HARD IS IT FOR YOU TO PAY FOR THE VERY BASICS LIKE FOOD, HOUSING, MEDICAL CARE, AND HEATING?: NOT HARD AT ALL

## 2024-05-08 ASSESSMENT — ENCOUNTER SYMPTOMS
DIARRHEA: 1
ABDOMINAL PAIN: 1

## 2024-05-08 NOTE — PROGRESS NOTES
Medication Sig Dispense Refill    solifenacin (VESICARE) 10 MG tablet Take 1 tablet by mouth daily      metFORMIN (GLUCOPHAGE) 1000 MG tablet take 1 tablet by mouth once daily with breakfast 180 tablet 1    albuterol (PROVENTIL) (2.5 MG/3ML) 0.083% nebulizer solution Take 3 mLs by nebulization every 4 hours as needed for Wheezing or Shortness of Breath 150 mL 3    oxyBUTYnin (DITROPAN-XL) 5 MG extended release tablet Take 1 tablet by mouth daily      Insulin Pen Needle (DROPLET PEN NEEDLES) 31G X 8 MM MISC use as directed twice a day 200 each 3    ALPRAZolam (XANAX) 1 MG tablet Take 1 tablet by mouth 2 times daily for 90 days. 180 tablet 0    fluticasone (FLONASE) 50 MCG/ACT nasal spray instill 1 spray into each nostril at bedtime 16 g 3    pantoprazole (PROTONIX) 40 MG tablet take 1 tablet by mouth once daily 90 tablet 3    umeclidinium-vilanterol (ANORO ELLIPTA) 62.5-25 MCG/ACT inhaler inhale 1 puff by mouth and INTO THE LUNGS once daily 1 each 11    albuterol sulfate HFA (PROAIR HFA) 108 (90 Base) MCG/ACT inhaler Inhale 2 puffs into the lungs every 6 hours as needed for Wheezing or Shortness of Breath 1 each 3    insulin glargine (LANTUS SOLOSTAR) 100 UNIT/ML injection pen Inject 40 Units into the skin 2 times daily 72 mL 3    Handicap Placard MISC by Does not apply route Duration:  10 years 1 each 0    insulin lispro (HUMALOG) 100 UNIT/ML SOLN injection vial INJECT 10 UNITS SUBCUTANEOUSLY TWICE A DAY AS NEEDED FOR HIGH GLUCOSE GREATER THAN 155. 10 mL 5    Insulin Syringe-Needle U-100 (BD INSULIN SYRINGE U/F) 31G X 5/16\" 1 ML MISC 1 each by Other route 3 times daily 300 each 3    dulaglutide (TRULICITY) 1.5 MG/0.5ML SC injection Inject 0.5 mLs into the skin once a week 4 Adjustable Dose Pre-filled Pen Syringe 3    metoprolol tartrate (LOPRESSOR) 25 MG tablet Take 1 tablet by mouth 2 times daily 90 tablet 4    amLODIPine (NORVASC) 10 MG tablet Take 1 tablet by mouth daily 90 tablet 4    gemfibrozil (LOPID) 600

## 2024-05-09 DIAGNOSIS — E11.65 TYPE 2 DIABETES MELLITUS WITH HYPERGLYCEMIA, WITH LONG-TERM CURRENT USE OF INSULIN (HCC): ICD-10-CM

## 2024-05-09 DIAGNOSIS — Z79.4 TYPE 2 DIABETES MELLITUS WITH HYPERGLYCEMIA, WITH LONG-TERM CURRENT USE OF INSULIN (HCC): ICD-10-CM

## 2024-05-09 RX ORDER — DULAGLUTIDE 1.5 MG/.5ML
INJECTION, SOLUTION SUBCUTANEOUS
Qty: 2 ML | Refills: 3 | Status: SHIPPED | OUTPATIENT
Start: 2024-05-09

## 2024-05-09 NOTE — TELEPHONE ENCOUNTER
Pool Rivera called requesting a refill on the following medications:  Requested Prescriptions     Pending Prescriptions Disp Refills    TRULICITY 1.5 MG/0.5ML SC injection [Pharmacy Med Name: TRULICITY 1.5 MG/0.5 ML PEN] 2 mL      Sig: inject 0.5 milliliters ( 1 AND 1/2 milligrams ) subcutaneously every week IN THE ABDOMEN THIGHS OR OUTER AREA OF UPPER ARM ROTATE INJECTION SITES       Date of last visit: 5/8/2024  Date of next visit (if applicable):8/14/2024  Date of last refill: 11/07/23  [facility] Name: [facility],  Maddie Cabral, DONALDO

## 2024-05-14 DIAGNOSIS — J98.4 MIXED RESTRICTIVE AND OBSTRUCTIVE LUNG DISEASE (HCC): ICD-10-CM

## 2024-05-14 DIAGNOSIS — J43.9 MIXED RESTRICTIVE AND OBSTRUCTIVE LUNG DISEASE (HCC): ICD-10-CM

## 2024-05-14 RX ORDER — ALBUTEROL SULFATE 90 UG/1
AEROSOL, METERED RESPIRATORY (INHALATION)
Qty: 8.5 G | Refills: 3 | Status: SHIPPED | OUTPATIENT
Start: 2024-05-14

## 2024-05-14 NOTE — TELEPHONE ENCOUNTER
Pool Rivera called requesting a refill on the following medications:  Requested Prescriptions     Pending Prescriptions Disp Refills    albuterol sulfate HFA (PROVENTIL;VENTOLIN;PROAIR) 108 (90 Base) MCG/ACT inhaler [Pharmacy Med Name: ALBUTEROL HFA 90 MCG INHALER] 8.5 g      Sig: inhale 2 puffs by mouth every 6 hours if needed for wheezing or shortness of breath       Date of last visit: 5/8/2024  Date of next visit (if applicable):8/14/2024  Date of last refill: 2/15/24  [facility] Name: [facility],  Maddie Cabral LPN

## 2024-05-27 DIAGNOSIS — J98.4 MIXED RESTRICTIVE AND OBSTRUCTIVE LUNG DISEASE (HCC): ICD-10-CM

## 2024-05-27 DIAGNOSIS — J44.9 COPD, MODERATE (HCC): ICD-10-CM

## 2024-05-27 DIAGNOSIS — J43.9 MIXED RESTRICTIVE AND OBSTRUCTIVE LUNG DISEASE (HCC): ICD-10-CM

## 2024-05-28 NOTE — TELEPHONE ENCOUNTER
Received refill request for PROVENTIL NEBS. Medication was last ordered by MATEO. Medication was last ordered on 4.23.24 with 3 refills.   Patient was last seen in the office 4.23.24. Patient has a scheduled follow up 4.23.24.   [facility]

## 2024-05-29 RX ORDER — ALBUTEROL SULFATE 2.5 MG/3ML
SOLUTION RESPIRATORY (INHALATION)
Qty: 150 ML | Refills: 3 | Status: SHIPPED | OUTPATIENT
Start: 2024-05-29

## 2024-06-26 DIAGNOSIS — F41.9 ANXIETY: ICD-10-CM

## 2024-06-26 RX ORDER — ALPRAZOLAM 1 MG/1
1 TABLET ORAL 2 TIMES DAILY
Qty: 180 TABLET | Refills: 0 | Status: SHIPPED | OUTPATIENT
Start: 2024-06-26 | End: 2024-09-24

## 2024-06-26 NOTE — TELEPHONE ENCOUNTER
Pool Rivera called requesting a refill on the following medications:  Requested Prescriptions     Pending Prescriptions Disp Refills    ALPRAZolam (XANAX) 1 MG tablet 180 tablet 0     Sig: Take 1 tablet by mouth 2 times daily for 90 days.       Date of last visit: 5/8/2024  Date of next visit (if applicable):8/14/2024  Date of last refill: 3/27/2024   Pharmacy Name: [facility] [place]       Thanks,  Sandra Salazar MA

## 2024-06-28 RX ORDER — SOLIFENACIN SUCCINATE 10 MG/1
10 TABLET, FILM COATED ORAL DAILY
Qty: 90 TABLET | Refills: 0 | Status: SHIPPED | OUTPATIENT
Start: 2024-06-28

## 2024-06-28 NOTE — TELEPHONE ENCOUNTER
Pool Rivera called requesting a refill on the following medications:  Requested Prescriptions     Pending Prescriptions Disp Refills    solifenacin (VESICARE) 10 MG tablet [Pharmacy Med Name: SOLIFENACIN 10 MG TABLET] 30 tablet      Sig: take 1 tablet by mouth once daily     Pharmacy verified:  [email or web address]      Date of last visit: 04/11/2024  Date of next visit (if applicable): 7/11/2024

## 2024-07-06 DIAGNOSIS — D50.9 IRON DEFICIENCY ANEMIA, UNSPECIFIED IRON DEFICIENCY ANEMIA TYPE: ICD-10-CM

## 2024-07-08 RX ORDER — FERROUS SULFATE 325(65) MG
TABLET ORAL
Qty: 180 TABLET | Refills: 3 | Status: SHIPPED | OUTPATIENT
Start: 2024-07-08

## 2024-07-08 NOTE — TELEPHONE ENCOUNTER
Pool Rivera called requesting a refill on the following medications:  Requested Prescriptions     Pending Prescriptions Disp Refills    FEROSUL 325 (65 Fe) MG tablet [Pharmacy Med Name: FEROSUL 325 MG TABLET] 180 tablet 3     Sig: take 1 tablet by mouth every morning and at bedtime       Date of last visit: 5/8/2024  Date of next visit (if applicable):Visit date not found  Date of last refill: 8/7/2023  Pharmacy Name: [facility]      Thanks,  Sandra Salazar MA

## 2024-07-09 DIAGNOSIS — J30.1 CHRONIC SEASONAL ALLERGIC RHINITIS DUE TO POLLEN: ICD-10-CM

## 2024-07-09 RX ORDER — FLUTICASONE PROPIONATE 50 MCG
SPRAY, SUSPENSION (ML) NASAL
Qty: 16 G | Refills: 3 | Status: SHIPPED | OUTPATIENT
Start: 2024-07-09

## 2024-07-09 NOTE — TELEPHONE ENCOUNTER
Pool Rivera called requesting a refill on the following medications:  Requested Prescriptions     Pending Prescriptions Disp Refills    fluticasone (FLONASE) 50 MCG/ACT nasal spray [Pharmacy Med Name: FLUTICASONE PROP 50 MCG SPRAY] 16 g 3     Sig: instill 1 spray into each nostril at bedtime       Date of last visit: 5/8/2024  Date of next visit (if applicable):8/14/2024  Date of last refill: 03/21/24  [facility] Name: [facility],  Jaky Bobby LPN

## 2024-07-11 ENCOUNTER — OFFICE VISIT (OUTPATIENT)
Dept: UROLOGY | Age: 64
End: 2024-07-11
Payer: MEDICAID

## 2024-07-11 VITALS — RESPIRATION RATE: 22 BRPM | BODY MASS INDEX: 41.62 KG/M2 | HEIGHT: 69 IN | WEIGHT: 281 LBS

## 2024-07-11 DIAGNOSIS — R33.8 BENIGN PROSTATIC HYPERPLASIA WITH URINARY RETENTION: Primary | ICD-10-CM

## 2024-07-11 DIAGNOSIS — R39.15 URINARY URGENCY: ICD-10-CM

## 2024-07-11 DIAGNOSIS — N40.1 BENIGN PROSTATIC HYPERPLASIA WITH URINARY RETENTION: Primary | ICD-10-CM

## 2024-07-11 LAB
BACTERIA: ABNORMAL
BILIRUB UR QL STRIP: NEGATIVE
BILIRUBIN, POC: NEGATIVE
BLOOD URINE, POC: NORMAL
CASTS #/AREA URNS LPF: ABNORMAL /LPF
CASTS #/AREA URNS LPF: ABNORMAL /LPF
CHARACTER UR: CLEAR
CHARCOAL URNS QL MICRO: ABNORMAL
CLARITY, POC: CLEAR
COLOR UR: YELLOW
COLOR, POC: YELLOW
CRYSTALS URNS QL MICRO: ABNORMAL
EPITHELIAL CELLS, UA: ABNORMAL /HPF
GLUCOSE UR QL STRIP.AUTO: NEGATIVE MG/DL
GLUCOSE URINE, POC: NEGATIVE
HGB UR QL STRIP.AUTO: ABNORMAL
KETONES UR QL STRIP.AUTO: NEGATIVE
KETONES, POC: NEGATIVE
LEUKOCYTE EST, POC: NORMAL
LEUKOCYTE ESTERASE UR QL STRIP.AUTO: ABNORMAL
NITRITE UR QL STRIP.AUTO: NEGATIVE
NITRITE, POC: NEGATIVE
PH UR STRIP.AUTO: 6.5 [PH] (ref 5–9)
PH, POC: 6.5
POST VOID RESIDUAL (PVR): 97 ML
PROT UR STRIP.AUTO-MCNC: NEGATIVE MG/DL
PROTEIN, POC: NEGATIVE
RBC #/AREA URNS HPF: ABNORMAL /HPF
RENAL EPI CELLS #/AREA URNS HPF: ABNORMAL /[HPF]
SPECIFIC GRAVITY UA: < 1.005 (ref 1–1.03)
SPECIFIC GRAVITY, POC: 1
UROBILINOGEN, POC: NEGATIVE
UROBILINOGEN, URINE: 0.2 EU/DL (ref 0–1)
WBC #/AREA URNS HPF: ABNORMAL /HPF
YEAST LIKE FUNGI URNS QL MICRO: ABNORMAL

## 2024-07-11 PROCEDURE — 99214 OFFICE O/P EST MOD 30 MIN: CPT

## 2024-07-11 PROCEDURE — G8427 DOCREV CUR MEDS BY ELIG CLIN: HCPCS

## 2024-07-11 PROCEDURE — G8417 CALC BMI ABV UP PARAM F/U: HCPCS

## 2024-07-11 PROCEDURE — 1036F TOBACCO NON-USER: CPT

## 2024-07-11 PROCEDURE — 3017F COLORECTAL CA SCREEN DOC REV: CPT

## 2024-07-11 PROCEDURE — 51798 US URINE CAPACITY MEASURE: CPT

## 2024-07-11 PROCEDURE — 81002 URINALYSIS NONAUTO W/O SCOPE: CPT

## 2024-07-11 RX ORDER — MIRABEGRON 50 MG/1
50 TABLET, EXTENDED RELEASE ORAL DAILY
Qty: 30 TABLET | Refills: 1 | Status: SHIPPED | OUTPATIENT
Start: 2024-07-11 | End: 2024-09-09

## 2024-07-11 RX ORDER — SOLIFENACIN SUCCINATE 10 MG/1
10 TABLET, FILM COATED ORAL DAILY
Qty: 90 TABLET | Refills: 3 | Status: SHIPPED | OUTPATIENT
Start: 2024-07-11

## 2024-07-11 NOTE — PATIENT INSTRUCTIONS
Continue Vesicare  Start Myrbetriq. Monitor for headaches and incomplete emptying of the bladder  I will send your urine for additional studies and call with abnormal results.   Follow-up in 4-6 weeks  Call with questions, comments, or concerns. I recommend going to the ED for further evaluation if you develop fever, chills, nausea, vomiting, chest pain, SOB, or calf pain.

## 2024-07-11 NOTE — PROGRESS NOTES
[facility] [place] [facility]  [facility] UROLOGY  [address] [facility] [address]  Dept: [phone]  Loc: [phone]    Visit Date: 7/11/2024        HPI:     HPI  Mr. Rivera is a 64-year-old male that presents in follow-up.    Hx of prostate cancer, grade group 5, for which he underwent EBRT in 3/2021. Mr. Rivera then completed Lupron every 6 months for 2-3 years. Last lupron injection was in 1/2024. His PSA has otherwise remained undetectable.     Also with BPH and lower urinary tract symptoms for which he underwent a greenlight PVP with Dr. Fuad Doyle in 10/2023. Previously taking Flomax (discontinued post-operatively). However, he continued to complain of irritative symptomatology. Cystoscopy in 3/2024 noted shedding of the prostate tissue, a widely patent prostate, and moderate trabeculations of the urinary bladder. The patient took ditropan with little to no improvement. He was therefore started on vesicare. This did help, but he was not at goal, prompting initiation of Myrbetriq.     Urologic hx also remarkable for a R renal cyst and kidney stones.     Current Outpatient Medications   Medication Sig Dispense Refill    fluticasone (FLONASE) 50 MCG/ACT nasal spray instill 1 spray into each nostril at bedtime 16 g 3    FEROSUL 325 (65 Fe) MG tablet take 1 tablet by mouth every morning and at bedtime 180 tablet 3    solifenacin (VESICARE) 10 MG tablet take 1 tablet by mouth once daily 90 tablet 0    ALPRAZolam (XANAX) 1 MG tablet Take 1 tablet by mouth 2 times daily for 90 days. 180 tablet 0    albuterol (PROVENTIL) (2.5 MG/3ML) 0.083% nebulizer solution inhale contents of 1 vial in nebulizer by mouth every 4 hours if needed for wheezing or shortness of breath 150 mL 3    albuterol sulfate HFA (PROVENTIL;VENTOLIN;PROAIR) 108 (90 Base) MCG/ACT inhaler inhale 2 puffs by mouth every 6 hours if needed for wheezing or shortness of breath 8.5 g 3    dulaglutide (TRULICITY) 1.5 MG/0.5ML

## 2024-07-14 LAB
BACTERIA UR CULT: ABNORMAL
BACTERIA UR CULT: ABNORMAL
ORGANISM: ABNORMAL

## 2024-07-16 DIAGNOSIS — J30.1 SEASONAL ALLERGIC RHINITIS DUE TO POLLEN: ICD-10-CM

## 2024-07-16 RX ORDER — CROMOLYN SODIUM 40 MG/ML
SOLUTION/ DROPS OPHTHALMIC
Qty: 10 ML | Refills: 5 | Status: SHIPPED | OUTPATIENT
Start: 2024-07-16

## 2024-07-16 NOTE — TELEPHONE ENCOUNTER
Pool Rivera called requesting a refill on the following medications:  Requested Prescriptions     Pending Prescriptions Disp Refills    cromolyn (OPTICROM) 4 % ophthalmic solution [Pharmacy Med Name: CROMOLYN 4% EYE DROPS] 10 mL 5     Sig: instill 1 drop into both eyes four times a day       Date of last visit: 5/8/2024  Date of next visit (if applicable):Visit date not found  Date of last refill: 08/07/23  Pharmacy Name: [facility],  Jaky Bobby LPN

## 2024-07-25 DIAGNOSIS — I10 PRIMARY HYPERTENSION: ICD-10-CM

## 2024-07-25 DIAGNOSIS — I25.10 CORONARY ARTERY DISEASE INVOLVING NATIVE CORONARY ARTERY OF NATIVE HEART WITHOUT ANGINA PECTORIS: ICD-10-CM

## 2024-08-03 DIAGNOSIS — E78.5 HYPERLIPIDEMIA LDL GOAL <70: ICD-10-CM

## 2024-08-05 RX ORDER — GEMFIBROZIL 600 MG/1
600 TABLET, FILM COATED ORAL 2 TIMES DAILY
Qty: 180 TABLET | Refills: 0 | Status: SHIPPED | OUTPATIENT
Start: 2024-08-05

## 2024-08-05 NOTE — TELEPHONE ENCOUNTER
Patient said that the pharmacy needs this sent to [facility] before Wednesday 08/07 because they will be closing.

## 2024-08-08 ENCOUNTER — OFFICE VISIT (OUTPATIENT)
Dept: UROLOGY | Age: 64
End: 2024-08-08
Payer: MEDICAID

## 2024-08-08 ENCOUNTER — TELEPHONE (OUTPATIENT)
Dept: FAMILY MEDICINE CLINIC | Age: 64
End: 2024-08-08

## 2024-08-08 VITALS — HEIGHT: 69 IN | RESPIRATION RATE: 22 BRPM | WEIGHT: 278 LBS | BODY MASS INDEX: 41.18 KG/M2

## 2024-08-08 DIAGNOSIS — N40.1 BENIGN PROSTATIC HYPERPLASIA WITH URINARY RETENTION: Primary | ICD-10-CM

## 2024-08-08 DIAGNOSIS — R33.8 BENIGN PROSTATIC HYPERPLASIA WITH URINARY RETENTION: Primary | ICD-10-CM

## 2024-08-08 DIAGNOSIS — J30.1 SEASONAL ALLERGIC RHINITIS DUE TO POLLEN: ICD-10-CM

## 2024-08-08 LAB — POST VOID RESIDUAL (PVR): 118 ML

## 2024-08-08 PROCEDURE — G8427 DOCREV CUR MEDS BY ELIG CLIN: HCPCS

## 2024-08-08 PROCEDURE — 1036F TOBACCO NON-USER: CPT

## 2024-08-08 PROCEDURE — G8417 CALC BMI ABV UP PARAM F/U: HCPCS

## 2024-08-08 PROCEDURE — 51798 US URINE CAPACITY MEASURE: CPT

## 2024-08-08 PROCEDURE — 3017F COLORECTAL CA SCREEN DOC REV: CPT

## 2024-08-08 PROCEDURE — 99214 OFFICE O/P EST MOD 30 MIN: CPT

## 2024-08-08 RX ORDER — AMPICILLIN 500 MG/1
500 CAPSULE ORAL 4 TIMES DAILY
Qty: 40 CAPSULE | Refills: 0 | Status: SHIPPED | OUTPATIENT
Start: 2024-08-08 | End: 2024-08-18

## 2024-08-08 RX ORDER — CROMOLYN SODIUM 40 MG/ML
SOLUTION/ DROPS OPHTHALMIC
Qty: 10 ML | Refills: 5 | Status: SHIPPED | OUTPATIENT
Start: 2024-08-08

## 2024-08-08 NOTE — TELEPHONE ENCOUNTER
Pool Rivera called requesting a refill on the following medications:  Requested Prescriptions     Pending Prescriptions Disp Refills    cromolyn (OPTICROM) 4 % ophthalmic solution 10 mL 5     Sig: instill 1 drop into both eyes four times a day       Date of last visit: 5/8/2024  Date of next visit (if applicable):8/14/2024  Date of last refill: 7/16/24  Pharmacy Name: [address]      Thanks,  Maddie Cabral LPN

## 2024-08-08 NOTE — TELEPHONE ENCOUNTER
[facility] told patient to have a new script for the Cromolyn 4% solution, sent to them.  Told him he did not have any refills.  [facility] on [address].   PES

## 2024-08-08 NOTE — PROGRESS NOTES
[facility] [place] [facility]  [facility] UROLOGY  [address] [facility] [address]  Dept: [phone]  Loc: [phone]    Visit Date: 8/8/2024        HPI:     HPI  Mr. Rivera is a 64-year-old male that presents in follow-up.     Hx of prostate cancer, grade group 5, for which he underwent EBRT in 3/2021. Mr. Rivera then completed Lupron every 6 months for 2-3 years. Last lupron injection was in 1/2024. His PSA has otherwise remained undetectable.      Also with BPH and lower urinary tract symptoms for which he underwent a greenlight PVP with Dr. Fuad Doyle in 10/2023. Previously taking Flomax (discontinued post-operatively). However, he continued to complain of irritative symptomatology. Cystoscopy in 3/2024 noted shedding of the prostate tissue, a widely patent prostate, and moderate trabeculations of the urinary bladder. The patient took ditropan with little to no improvement. He was therefore started on vesicare. This did help, but he was not at goal, prompting initiation of Myrbetriq. He was, however, too nervous to take Myrbetriq.     Urologic hx also remarkable for a R renal cyst and kidney stones.     Current Outpatient Medications   Medication Sig Dispense Refill    gemfibrozil (LOPID) 600 MG tablet take 1 tablet by mouth twice a day 180 tablet 0    metoprolol tartrate (LOPRESSOR) 25 MG tablet take 1 tablet by mouth twice a day 90 tablet 1    cromolyn (OPTICROM) 4 % ophthalmic solution instill 1 drop into both eyes four times a day 10 mL 5    mirabegron (MYRBETRIQ) 50 MG TB24 Take 50 mg by mouth daily 30 tablet 1    solifenacin (VESICARE) 10 MG tablet Take 1 tablet by mouth daily 90 tablet 3    fluticasone (FLONASE) 50 MCG/ACT nasal spray instill 1 spray into each nostril at bedtime 16 g 3    FEROSUL 325 (65 Fe) MG tablet take 1 tablet by mouth every morning and at bedtime 180 tablet 3    ALPRAZolam (XANAX) 1 MG tablet Take 1 tablet by mouth 2 times daily for 90 days. 180

## 2024-08-08 NOTE — PATIENT INSTRUCTIONS
Get PSA  Follow-up in 3 months   Continue Vesicare and start Myrbetriq   Start Ampicillin for treatment of a urinary tract infection. Call if symptoms do not improve  Call with questions, comments, or concerns. I recommend going to the ED for further evaluation if you develop fever, chills, nausea, vomiting, chest pain, SOB, or calf pain.

## 2024-08-14 ENCOUNTER — OFFICE VISIT (OUTPATIENT)
Dept: FAMILY MEDICINE CLINIC | Age: 64
End: 2024-08-14
Payer: MEDICAID

## 2024-08-14 VITALS
BODY MASS INDEX: 40.88 KG/M2 | RESPIRATION RATE: 20 BRPM | OXYGEN SATURATION: 96 % | DIASTOLIC BLOOD PRESSURE: 72 MMHG | WEIGHT: 276 LBS | HEIGHT: 69 IN | HEART RATE: 79 BPM | SYSTOLIC BLOOD PRESSURE: 128 MMHG

## 2024-08-14 DIAGNOSIS — F41.9 ANXIETY: ICD-10-CM

## 2024-08-14 DIAGNOSIS — Z79.4 TYPE 2 DIABETES MELLITUS WITH HYPERGLYCEMIA, WITH LONG-TERM CURRENT USE OF INSULIN (HCC): Primary | ICD-10-CM

## 2024-08-14 DIAGNOSIS — E11.65 TYPE 2 DIABETES MELLITUS WITH HYPERGLYCEMIA, WITH LONG-TERM CURRENT USE OF INSULIN (HCC): Primary | ICD-10-CM

## 2024-08-14 DIAGNOSIS — J98.4 MIXED RESTRICTIVE AND OBSTRUCTIVE LUNG DISEASE (HCC): ICD-10-CM

## 2024-08-14 DIAGNOSIS — J43.9 MIXED RESTRICTIVE AND OBSTRUCTIVE LUNG DISEASE (HCC): ICD-10-CM

## 2024-08-14 PROBLEM — J12.82 PNEUMONIA DUE TO COVID-19 VIRUS: Status: RESOLVED | Noted: 2023-01-21 | Resolved: 2024-08-14

## 2024-08-14 PROBLEM — U07.1 PNEUMONIA DUE TO COVID-19 VIRUS: Status: RESOLVED | Noted: 2023-01-21 | Resolved: 2024-08-14

## 2024-08-14 LAB — HBA1C MFR BLD: 7 %

## 2024-08-14 PROCEDURE — 3051F HG A1C>EQUAL 7.0%<8.0%: CPT | Performed by: FAMILY MEDICINE

## 2024-08-14 PROCEDURE — 3078F DIAST BP <80 MM HG: CPT | Performed by: FAMILY MEDICINE

## 2024-08-14 PROCEDURE — 1036F TOBACCO NON-USER: CPT | Performed by: FAMILY MEDICINE

## 2024-08-14 PROCEDURE — G8427 DOCREV CUR MEDS BY ELIG CLIN: HCPCS | Performed by: FAMILY MEDICINE

## 2024-08-14 PROCEDURE — G8417 CALC BMI ABV UP PARAM F/U: HCPCS | Performed by: FAMILY MEDICINE

## 2024-08-14 PROCEDURE — 3074F SYST BP LT 130 MM HG: CPT | Performed by: FAMILY MEDICINE

## 2024-08-14 PROCEDURE — 3023F SPIROM DOC REV: CPT | Performed by: FAMILY MEDICINE

## 2024-08-14 PROCEDURE — 2022F DILAT RTA XM EVC RTNOPTHY: CPT | Performed by: FAMILY MEDICINE

## 2024-08-14 PROCEDURE — 99214 OFFICE O/P EST MOD 30 MIN: CPT | Performed by: FAMILY MEDICINE

## 2024-08-14 PROCEDURE — 83036 HEMOGLOBIN GLYCOSYLATED A1C: CPT | Performed by: FAMILY MEDICINE

## 2024-08-14 PROCEDURE — 3017F COLORECTAL CA SCREEN DOC REV: CPT | Performed by: FAMILY MEDICINE

## 2024-08-14 RX ORDER — DULAGLUTIDE 1.5 MG/.5ML
1.5 INJECTION, SOLUTION SUBCUTANEOUS WEEKLY
Qty: 2 ML | Refills: 3 | Status: SHIPPED | OUTPATIENT
Start: 2024-08-14

## 2024-08-14 RX ORDER — ALBUTEROL SULFATE 90 UG/1
2 AEROSOL, METERED RESPIRATORY (INHALATION) EVERY 6 HOURS PRN
Qty: 8.5 G | Refills: 3 | Status: SHIPPED | OUTPATIENT
Start: 2024-08-14

## 2024-08-14 NOTE — PROGRESS NOTES
tablet 3    clopidogrel (PLAVIX) 75 MG tablet Take 1 tablet by mouth daily take 1 tablet by mouth once daily 90 tablet 4    pravastatin (PRAVACHOL) 80 MG tablet Take 1 tablet by mouth nightly 90 tablet 4    DROPLET PEN NEEDLES 31G X 8 MM MISC use as directed twice a day      ascorbic acid (RA VITAMIN C/ERNA HIPS) 500 MG tablet TAKE 1 TABLET BY MOUTH DAILY (Patient taking differently: Take 1 tablet by mouth daily TAKE 1 TABLET BY MOUTH DAILY) 30 tablet 3    Insulin Syringes, Disposable, U-100 1 ML MISC 1 each by Does not apply route 3 times daily 31 gauge x 8 mm  ultrafine 2 100 each 5    Cyanocobalamin ER (RA VITAMIN B-12 TR) 1000 MCG TBCR take 1 tablet by mouth once daily (Patient taking differently: Take 1 tablet by mouth daily take 1 tablet by mouth once daily) 30 tablet 2    Vitamin D, Cholecalciferol, 25 MCG (1000 UT) TABS Take 1,000 Units by mouth daily      aspirin 325 MG tablet Take 1 tablet by mouth daily       No current facility-administered medications for this visit.     Allergies   Allergen Reactions    Ace Inhibitors      When asked Oct 2020, patient was unsure of allergy/reaction. Patient takes/tolerates enalapril. Gets wheeze per pt    Baclofen Other (See Comments)     Lightheadedness, dizziness bad headaches      Darvocet [Propoxyphene N-Acetaminophen] Nausea Only     Felt sickly    Darvon [Propoxyphene Hcl] Nausea And Vomiting     Felt sickly    Flexeril [Cyclobenzaprine] Other (See Comments)     Headache    Morphine Nausea Only     Pt reported feeling sickly. Severe nausea     Motrin [Ibuprofen Micronized] Nausea Only     Pt reported feeling very sick    Tylenol [Acetaminophen] Nausea Only    Ultram [Tramadol] Itching     Health Maintenance   Topic Date Due    HIV screen  Never done    Hepatitis C screen  Never done    DTaP/Tdap/Td vaccine (1 - Tdap) Never done    Shingles vaccine (1 of 2) Never done    Pneumococcal 0-64 years Vaccine (2 of 2 - PCV) 01/11/2019    Diabetic retinal exam  07/31/2019

## 2024-08-16 DIAGNOSIS — J30.1 SEASONAL ALLERGIC RHINITIS DUE TO POLLEN: ICD-10-CM

## 2024-08-16 RX ORDER — CROMOLYN SODIUM 40 MG/ML
SOLUTION/ DROPS OPHTHALMIC
Qty: 10 ML | Refills: 5 | Status: SHIPPED | OUTPATIENT
Start: 2024-08-16

## 2024-08-16 NOTE — TELEPHONE ENCOUNTER
Pool Rivera called requesting a refill on the following medications:  Requested Prescriptions     Pending Prescriptions Disp Refills    cromolyn (OPTICROM) 4 % ophthalmic solution 10 mL 5     Sig: instill 1 drop into both eyes four times a day   Needs sent to new pharmacy    Date of last visit: 8/14/2024  Date of next visit (if applicable):11/18/2024  Date of last refill: 08/08/24  Pharmacy Name: [facility],  Jaky Bobby LPN

## 2024-08-27 DIAGNOSIS — E11.9 WELL CONTROLLED TYPE 2 DIABETES MELLITUS (HCC): Chronic | ICD-10-CM

## 2024-08-27 NOTE — TELEPHONE ENCOUNTER
Pool Rivera called requesting a refill on the following medications:  Requested Prescriptions     Pending Prescriptions Disp Refills    Insulin Syringes, Disposable, U-100 1 ML MISC 100 each 5     Si each by Does not apply route 3 times daily 31 gauge x 8 mm  ultrafine 2       Date of last visit: 2024  Date of next visit (if applicable):2024  Date of last refill: 10/10/22  Pharmacy Name: [facility]      Thanks,  Maddie Cabral LPN

## 2024-09-04 ENCOUNTER — TELEPHONE (OUTPATIENT)
Dept: PULMONOLOGY | Age: 64
End: 2024-09-04

## 2024-09-04 NOTE — TELEPHONE ENCOUNTER
Patient called in stating that he received a letter from medical service company stating that his oxygen is going to be taken away. He states that the letter says to contact his doctor's office about oxygen testing that needs to be completed. I told patient that I would call [facility] to find out what needs to be done for patient to keep his oxygen. Per patient he only uses oxygen bled into his PAP machine at nighttime.   -  I called [facility] @ [phone]. The representative I spoke with states that she will look into this and get back with me as this is not her department. I called patient back to update him and see if there was a phone number on the letter he received. Patient gave me the following phone number: [phone].  -  I called that number twice and had to leave a voicemail. I called the patient back and notified him. I advised patient to also try and get a hold of [facility] on the phone. I told patient that If I do not hear back from [facility] today I will try again tomorrow.

## 2024-09-05 NOTE — TELEPHONE ENCOUNTER
Lilliam called me back stating that they would need a 6MWT. I explained that patient is only using oxygen bled into hi PAP. She said that she will contact the patient and let him know that his oxygen is not being taken away. She also states that she will look into this and see if there is anything needed from us. I provided her with our fax number to send us information if needed so that we have a paper trail. I sent her the LOV note from 04/2024.

## 2024-09-05 NOTE — TELEPHONE ENCOUNTER
[place]am [place] [facility] [place] and [employer] picked up the call and write her # down for me. I called the 2 #s provided and had to LVM on each one.  - [phone] EXT: [phone]

## 2024-09-24 DIAGNOSIS — F41.9 ANXIETY: ICD-10-CM

## 2024-09-24 RX ORDER — ALPRAZOLAM 1 MG
1 TABLET ORAL 2 TIMES DAILY
Qty: 180 TABLET | Refills: 0 | Status: SHIPPED | OUTPATIENT
Start: 2024-09-24 | End: 2024-12-23

## 2024-09-24 RX ORDER — LANCETS 30 GAUGE
1 EACH MISCELLANEOUS 3 TIMES DAILY
Qty: 600 EACH | Refills: 1 | Status: SHIPPED | OUTPATIENT
Start: 2024-09-24

## 2024-09-25 DIAGNOSIS — R39.15 URINARY URGENCY: ICD-10-CM

## 2024-09-25 RX ORDER — SOLIFENACIN SUCCINATE 10 MG/1
10 TABLET, FILM COATED ORAL DAILY
Qty: 90 TABLET | Refills: 3 | Status: SHIPPED | OUTPATIENT
Start: 2024-09-25

## 2024-09-25 RX ORDER — MIRABEGRON 50 MG/1
50 TABLET, EXTENDED RELEASE ORAL DAILY
Qty: 30 TABLET | Refills: 1 | Status: SHIPPED | OUTPATIENT
Start: 2024-09-25 | End: 2024-11-24

## 2024-10-08 ENCOUNTER — HOSPITAL ENCOUNTER (OUTPATIENT)
Age: 64
Discharge: HOME | End: 2024-10-08
Payer: MEDICAID

## 2024-10-08 DIAGNOSIS — I10 PRIMARY HYPERTENSION: ICD-10-CM

## 2024-10-08 DIAGNOSIS — I25.10 CORONARY ARTERY DISEASE INVOLVING NATIVE CORONARY ARTERY OF NATIVE HEART WITHOUT ANGINA PECTORIS: ICD-10-CM

## 2024-10-08 DIAGNOSIS — C61 PROSTATE CANCER (HCC): ICD-10-CM

## 2024-10-08 DIAGNOSIS — E78.5 HYPERLIPIDEMIA LDL GOAL <70: ICD-10-CM

## 2024-10-08 LAB
ALBUMIN SERPL BCG-MCNC: 4.2 G/DL (ref 3.5–5.1)
ALP SERPL-CCNC: 153 U/L (ref 38–126)
ALT SERPL W/O P-5'-P-CCNC: 14 U/L (ref 11–66)
ANION GAP SERPL CALC-SCNC: 10 MEQ/L (ref 8–16)
AST SERPL-CCNC: 10 U/L (ref 5–40)
BILIRUB CONJ SERPL-MCNC: < 0.1 MG/DL (ref 0.1–13.8)
BILIRUB SERPL-MCNC: 0.2 MG/DL (ref 0.3–1.2)
BUN SERPL-MCNC: 18 MG/DL (ref 7–22)
CALCIUM SERPL-MCNC: 9.6 MG/DL (ref 8.5–10.5)
CHLORIDE SERPL-SCNC: 99 MEQ/L (ref 98–111)
CHOLEST SERPL-MCNC: 163 MG/DL (ref 100–199)
CO2 SERPL-SCNC: 28 MEQ/L (ref 23–33)
CREAT SERPL-MCNC: 1.1 MG/DL (ref 0.4–1.2)
DEPRECATED RDW RBC AUTO: 46.1 FL (ref 35–45)
ERYTHROCYTE [DISTWIDTH] IN BLOOD BY AUTOMATED COUNT: 14.1 % (ref 11.5–14.5)
GFR SERPL CREATININE-BSD FRML MDRD: 75 ML/MIN/1.73M2
GLUCOSE SERPL-MCNC: 234 MG/DL (ref 70–108)
HCT VFR BLD AUTO: 38.7 % (ref 42–52)
HDLC SERPL-MCNC: 33 MG/DL
HGB BLD-MCNC: 11.9 GM/DL (ref 14–18)
LDLC SERPL CALC-MCNC: 90 MG/DL
MCH RBC QN AUTO: 27.5 PG (ref 26–33)
MCHC RBC AUTO-ENTMCNC: 30.7 GM/DL (ref 32.2–35.5)
MCV RBC AUTO: 89.6 FL (ref 80–94)
PLATELET # BLD AUTO: 288 THOU/MM3 (ref 130–400)
PMV BLD AUTO: 9.9 FL (ref 9.4–12.4)
POTASSIUM SERPL-SCNC: 5.2 MEQ/L (ref 3.5–5.2)
PROT SERPL-MCNC: 7 G/DL (ref 6.1–8)
PSA SERPL-MCNC: < 0.02 NG/ML (ref 0–1)
RBC # BLD AUTO: 4.32 MILL/MM3 (ref 4.7–6.1)
SODIUM SERPL-SCNC: 137 MEQ/L (ref 135–145)
TRIGL SERPL-MCNC: 199 MG/DL (ref 0–199)
WBC # BLD AUTO: 7.8 THOU/MM3 (ref 4.8–10.8)

## 2024-10-08 PROCEDURE — 84153 ASSAY OF PSA TOTAL: CPT

## 2024-10-08 PROCEDURE — 80053 COMPREHEN METABOLIC PANEL: CPT

## 2024-10-08 PROCEDURE — 82248 BILIRUBIN DIRECT: CPT

## 2024-10-08 PROCEDURE — 80061 LIPID PANEL: CPT

## 2024-10-08 PROCEDURE — 85027 COMPLETE CBC AUTOMATED: CPT

## 2024-10-08 PROCEDURE — 36415 COLL VENOUS BLD VENIPUNCTURE: CPT

## 2024-10-18 ENCOUNTER — OFFICE VISIT (OUTPATIENT)
Dept: CARDIOLOGY CLINIC | Age: 64
End: 2024-10-18
Payer: MEDICAID

## 2024-10-18 VITALS
DIASTOLIC BLOOD PRESSURE: 40 MMHG | HEART RATE: 93 BPM | BODY MASS INDEX: 41.79 KG/M2 | SYSTOLIC BLOOD PRESSURE: 149 MMHG | WEIGHT: 283 LBS

## 2024-10-18 DIAGNOSIS — R94.39 ABNORMAL NUCLEAR STRESS TEST: Primary | ICD-10-CM

## 2024-10-18 DIAGNOSIS — I10 PRIMARY HYPERTENSION: ICD-10-CM

## 2024-10-18 DIAGNOSIS — E78.5 HYPERLIPIDEMIA LDL GOAL <70: ICD-10-CM

## 2024-10-18 DIAGNOSIS — I25.10 CORONARY ARTERY DISEASE INVOLVING NATIVE CORONARY ARTERY OF NATIVE HEART WITHOUT ANGINA PECTORIS: ICD-10-CM

## 2024-10-18 PROCEDURE — 99214 OFFICE O/P EST MOD 30 MIN: CPT | Performed by: INTERNAL MEDICINE

## 2024-10-18 PROCEDURE — G8417 CALC BMI ABV UP PARAM F/U: HCPCS | Performed by: INTERNAL MEDICINE

## 2024-10-18 PROCEDURE — 3017F COLORECTAL CA SCREEN DOC REV: CPT | Performed by: INTERNAL MEDICINE

## 2024-10-18 PROCEDURE — G8427 DOCREV CUR MEDS BY ELIG CLIN: HCPCS | Performed by: INTERNAL MEDICINE

## 2024-10-18 PROCEDURE — 3078F DIAST BP <80 MM HG: CPT | Performed by: INTERNAL MEDICINE

## 2024-10-18 PROCEDURE — 3077F SYST BP >= 140 MM HG: CPT | Performed by: INTERNAL MEDICINE

## 2024-10-18 PROCEDURE — G8484 FLU IMMUNIZE NO ADMIN: HCPCS | Performed by: INTERNAL MEDICINE

## 2024-10-18 PROCEDURE — 93000 ELECTROCARDIOGRAM COMPLETE: CPT | Performed by: INTERNAL MEDICINE

## 2024-10-18 PROCEDURE — 1036F TOBACCO NON-USER: CPT | Performed by: INTERNAL MEDICINE

## 2024-10-18 RX ORDER — AMLODIPINE BESYLATE 10 MG/1
10 TABLET ORAL DAILY
Qty: 90 TABLET | Refills: 3 | Status: SHIPPED | OUTPATIENT
Start: 2024-10-18

## 2024-10-18 RX ORDER — PRAVASTATIN SODIUM 80 MG/1
80 TABLET ORAL NIGHTLY
Qty: 90 TABLET | Refills: 3 | Status: SHIPPED | OUTPATIENT
Start: 2024-10-18

## 2024-10-18 RX ORDER — GEMFIBROZIL 600 MG/1
600 TABLET, FILM COATED ORAL 2 TIMES DAILY
Qty: 180 TABLET | Refills: 3 | Status: SHIPPED | OUTPATIENT
Start: 2024-10-18

## 2024-10-18 RX ORDER — CLOPIDOGREL BISULFATE 75 MG/1
75 TABLET ORAL DAILY
Qty: 90 TABLET | Refills: 3 | Status: SHIPPED | OUTPATIENT
Start: 2024-10-18

## 2024-10-18 RX ORDER — ASPIRIN 81 MG/1
81 TABLET ORAL DAILY
Qty: 90 TABLET | Refills: 3 | Status: SHIPPED | OUTPATIENT
Start: 2024-10-18

## 2024-10-18 RX ORDER — ENALAPRIL MALEATE 10 MG/1
10 TABLET ORAL DAILY
Qty: 90 TABLET | Refills: 3 | Status: SHIPPED | OUTPATIENT
Start: 2024-10-18

## 2024-10-18 RX ORDER — NITROGLYCERIN 0.4 MG/1
0.4 TABLET SUBLINGUAL EVERY 5 MIN PRN
Qty: 25 TABLET | Refills: 3 | Status: SHIPPED | OUTPATIENT
Start: 2024-10-18

## 2024-10-18 RX ORDER — METOPROLOL TARTRATE 25 MG/1
25 TABLET, FILM COATED ORAL 2 TIMES DAILY
Qty: 90 TABLET | Refills: 3 | Status: SHIPPED | OUTPATIENT
Start: 2024-10-18

## 2024-10-18 NOTE — PROGRESS NOTES
[facility] [place] [facility]  [facility] CARDIOLOGY  [address]  [address]  [facility] [address]  Dept: [phone]  Dept Fax: [phone]  Loc: [phone]    Visit Date: 10/18/2024    Mr. Rivera is a 64 y.o. male  who presented for:  Chief Complaint   Patient presents with    1 Year Follow Up    Coronary Artery Disease    Hypertension       HPI:   63 yo M with hx of BMI 42, CAD s/p PCI, HTN, HLD, ROBSON on CPAP is here to establish care.     Denies any chest pain, sob, palpitations, lightheadedness, dizziness, orthopnea, PND or pedal edema. EKG is SR, RBBB, inferior infarct. EF 50%          Current Outpatient Medications:     amLODIPine (NORVASC) 10 MG tablet, Take 1 tablet by mouth daily, Disp: 90 tablet, Rfl: 3    clopidogrel (PLAVIX) 75 MG tablet, Take 1 tablet by mouth daily take 1 tablet by mouth once daily, Disp: 90 tablet, Rfl: 3    enalapril (VASOTEC) 10 MG tablet, Take 1 tablet by mouth daily, Disp: 90 tablet, Rfl: 3    gemfibrozil (LOPID) 600 MG tablet, Take 1 tablet by mouth 2 times daily, Disp: 180 tablet, Rfl: 3    metoprolol tartrate (LOPRESSOR) 25 MG tablet, Take 1 tablet by mouth 2 times daily, Disp: 90 tablet, Rfl: 3    pravastatin (PRAVACHOL) 80 MG tablet, Take 1 tablet by mouth nightly, Disp: 90 tablet, Rfl: 3    nitroGLYCERIN (NITROSTAT) 0.4 MG SL tablet, Place 1 tablet under the tongue every 5 minutes as needed for Chest pain, Disp: 25 tablet, Rfl: 3    aspirin 81 MG EC tablet, Take 1 tablet by mouth daily, Disp: 90 tablet, Rfl: 3    mirabegron (MYRBETRIQ) 50 MG TB24, Take 50 mg by mouth daily, Disp: 30 tablet, Rfl: 1    solifenacin (VESICARE) 10 MG tablet, Take 1 tablet by mouth daily, Disp: 90 tablet, Rfl: 3    Lancets MISC, 1 each by Does not apply route 3 times daily, Disp: 600 each, Rfl: 1    ALPRAZolam (XANAX) 1 MG tablet, Take 1 tablet by mouth 2 times daily for 90 days., Disp: 180 tablet, Rfl: 0    Insulin Syringes, Disposable, U-100 1 ML MISC, 1 each by Does

## 2024-10-18 NOTE — PROGRESS NOTES
1 year follow up, prior Dr Fischer patient    EKG obtained today in office.     Denies any cardiac concerns or symptoms today.     Med list up to date and pharmacy verified. Scripts pended.

## 2024-10-21 DIAGNOSIS — J30.1 CHRONIC SEASONAL ALLERGIC RHINITIS DUE TO POLLEN: ICD-10-CM

## 2024-10-21 RX ORDER — FLUTICASONE PROPIONATE 50 MCG
SPRAY, SUSPENSION (ML) NASAL
Qty: 16 G | Refills: 1 | Status: SHIPPED | OUTPATIENT
Start: 2024-10-21

## 2024-10-21 NOTE — TELEPHONE ENCOUNTER
Pool Rivera called requesting a refill on the following medications:  Requested Prescriptions     Pending Prescriptions Disp Refills    fluticasone (FLONASE) 50 MCG/ACT nasal spray [Pharmacy Med Name: FLUTICASONE PROPIONATE 50MCG SUSPENSION] 16 g 1     Sig: INSTILL ONE (1) SPRAY IN EACH NOSTRIL AT BEDTIME       Date of last visit: 8/14/2024  Date of next visit (if applicable):Visit date not found  Date of last refill: 7/9/2024 [facility]    Pharmacy Name: Sandra Brenner MA

## 2024-10-28 ENCOUNTER — HOSPITAL ENCOUNTER (OUTPATIENT)
Age: 64
Discharge: HOME | End: 2024-10-30
Attending: INTERNAL MEDICINE
Payer: MEDICAID

## 2024-10-28 DIAGNOSIS — R94.39 ABNORMAL NUCLEAR STRESS TEST: ICD-10-CM

## 2024-10-28 DIAGNOSIS — I10 PRIMARY HYPERTENSION: ICD-10-CM

## 2024-10-28 DIAGNOSIS — E78.5 HYPERLIPIDEMIA LDL GOAL <70: ICD-10-CM

## 2024-10-28 DIAGNOSIS — I25.10 CORONARY ARTERY DISEASE INVOLVING NATIVE CORONARY ARTERY OF NATIVE HEART WITHOUT ANGINA PECTORIS: ICD-10-CM

## 2024-10-28 LAB
ECHO AO ASC DIAM: 2.8 CM
ECHO AO SINUS VALSALVA DIAM: 2.9 CM
ECHO AO ST JNCT DIAM: 2.2 CM
ECHO AV CUSP MM: 1.7 CM
ECHO AV MEAN GRADIENT: 5 MMHG
ECHO AV MEAN VELOCITY: 1.1 M/S
ECHO AV PEAK GRADIENT: 7 MMHG
ECHO AV PEAK VELOCITY: 1.3 M/S
ECHO AV VELOCITY RATIO: 0.69
ECHO AV VTI: 28.3 CM
ECHO EST RA PRESSURE: 3 MMHG
ECHO LA AREA 4C: 15.9 CM2
ECHO LA DIAMETER: 3.6 CM
ECHO LA MAJOR AXIS: 5.6 CM
ECHO LA VOL MOD A4C: 35 ML (ref 18–58)
ECHO LV E' LATERAL VELOCITY: 5 CM/S
ECHO LV E' SEPTAL VELOCITY: 5.5 CM/S
ECHO LV EJECTION FRACTION BIPLANE: 60 % (ref 55–100)
ECHO LV FRACTIONAL SHORTENING: 33 % (ref 28–44)
ECHO LV INTERNAL DIMENSION DIASTOLIC: 4 CM (ref 4.2–5.9)
ECHO LV INTERNAL DIMENSION SYSTOLIC: 2.7 CM
ECHO LV ISOVOLUMETRIC RELAXATION TIME (IVRT): 71 MS
ECHO LV IVSD: 1.1 CM (ref 0.6–1)
ECHO LV MASS 2D: 145.6 G (ref 88–224)
ECHO LV POSTERIOR WALL DIASTOLIC: 1.1 CM (ref 0.6–1)
ECHO LV RELATIVE WALL THICKNESS RATIO: 0.55
ECHO LVOT AV VTI INDEX: 0.57
ECHO LVOT MEAN GRADIENT: 2 MMHG
ECHO LVOT PEAK GRADIENT: 3 MMHG
ECHO LVOT PEAK VELOCITY: 0.9 M/S
ECHO LVOT VTI: 16 CM
ECHO MV A VELOCITY: 0.82 M/S
ECHO MV E DECELERATION TIME (DT): 181 MS
ECHO MV E VELOCITY: 0.72 M/S
ECHO MV E/A RATIO: 0.88
ECHO MV E/E' LATERAL: 14.4
ECHO MV E/E' RATIO (AVERAGED): 13.75
ECHO MV E/E' SEPTAL: 13.09
ECHO PV MAX VELOCITY: 0.8 M/S
ECHO PV PEAK GRADIENT: 3 MMHG
ECHO RV FREE WALL PEAK S': 14.3 CM/S
ECHO RV INTERNAL DIMENSION: 3 CM
ECHO RV TAPSE: 2 CM (ref 1.7–?)
ECHO TV E WAVE: 0.5 M/S

## 2024-10-28 PROCEDURE — 93306 TTE W/DOPPLER COMPLETE: CPT

## 2024-10-29 ENCOUNTER — TELEPHONE (OUTPATIENT)
Dept: PULMONOLOGY | Age: 64
End: 2024-10-29

## 2024-10-29 DIAGNOSIS — E66.2 OBESITY HYPOVENTILATION SYNDROME: Primary | ICD-10-CM

## 2024-10-29 NOTE — TELEPHONE ENCOUNTER
[employer] called and is requesting an order for an overnight pulse ox for this patient. They said that they need the testing to show that he needs his O2 bled into his CPAP, in order to keep supplying him with his O2. Please advise, thank you.

## 2024-11-13 NOTE — PROGRESS NOTES
apply route Duration:  10 years 1 each 0    insulin lispro (HUMALOG) 100 UNIT/ML SOLN injection vial INJECT 10 UNITS SUBCUTANEOUSLY TWICE A DAY AS NEEDED FOR HIGH GLUCOSE GREATER THAN 155. 10 mL 5    Insulin Syringe-Needle U-100 (BD INSULIN SYRINGE U/F) 31G X 5/16\" 1 ML MISC 1 each by Other route 3 times daily 300 each 3    DROPLET PEN NEEDLES 31G X 8 MM MISC use as directed twice a day      ascorbic acid (RA VITAMIN C/ERNA HIPS) 500 MG tablet TAKE 1 TABLET BY MOUTH DAILY (Patient taking differently: Take 1 tablet by mouth daily TAKE 1 TABLET BY MOUTH DAILY) 30 tablet 3    Cyanocobalamin ER (RA VITAMIN B-12 TR) 1000 MCG TBCR take 1 tablet by mouth once daily (Patient taking differently: Take 1 tablet by mouth daily take 1 tablet by mouth once daily) 30 tablet 2    Vitamin D, Cholecalciferol, 25 MCG (1000 UT) TABS Take 1 tablet by mouth daily       No current facility-administered medications for this visit.       Past Medical History  Pool  has a past medical history of Abnormal stress test, Benign prostatic hyperplasia with urinary retention, CAD (coronary artery disease), Cancer (HCC), Chronic low back pain, Diabetes mellitus (HCC), Diabetes mellitus (HCC), Hyperlipidemia, Hypertension, Hypertriglyceridemia, MI (myocardial infarction) (HCC), Obesity, Prostate cancer (HCC), Sleep apnea, Urinary urgency, and Viral pneumonia.    Past Surgical History  The patient  has a past surgical history that includes hernia repair (1991); back surgery (11/1997); Colonoscopy (10/2010); Cardiac surgery (10/22/2004); Cardiac catheterization (10/2019); Coronary angioplasty with stent (10/17/2019); Ultrasound Prostate/Transrectal (N/A, 12/07/2020); TURP (N/A, 10/16/2023); and Cystoscopy (03/20/2024).    Family History  This patient's family history includes Arthritis in his father; Diabetes in his brother, father, mother, and sister; Heart Disease in his brother, father, and mother; High Blood Pressure in his father and

## 2024-11-14 ENCOUNTER — OFFICE VISIT (OUTPATIENT)
Dept: UROLOGY | Age: 64
End: 2024-11-14
Payer: MEDICAID

## 2024-11-14 VITALS — BODY MASS INDEX: 42.21 KG/M2 | HEIGHT: 69 IN | RESPIRATION RATE: 22 BRPM | WEIGHT: 285 LBS

## 2024-11-14 DIAGNOSIS — R33.8 BENIGN PROSTATIC HYPERPLASIA WITH URINARY RETENTION: Primary | ICD-10-CM

## 2024-11-14 DIAGNOSIS — N21.0 BLADDER STONE: ICD-10-CM

## 2024-11-14 DIAGNOSIS — N40.1 BENIGN PROSTATIC HYPERPLASIA WITH URINARY RETENTION: Primary | ICD-10-CM

## 2024-11-14 DIAGNOSIS — R39.15 URINARY URGENCY: ICD-10-CM

## 2024-11-14 DIAGNOSIS — N20.0 KIDNEY STONE: ICD-10-CM

## 2024-11-14 DIAGNOSIS — C61 PROSTATE CANCER (HCC): ICD-10-CM

## 2024-11-14 PROCEDURE — 3017F COLORECTAL CA SCREEN DOC REV: CPT | Performed by: NURSE PRACTITIONER

## 2024-11-14 PROCEDURE — 99214 OFFICE O/P EST MOD 30 MIN: CPT | Performed by: NURSE PRACTITIONER

## 2024-11-14 PROCEDURE — G8417 CALC BMI ABV UP PARAM F/U: HCPCS | Performed by: NURSE PRACTITIONER

## 2024-11-14 PROCEDURE — G8482 FLU IMMUNIZE ORDER/ADMIN: HCPCS | Performed by: NURSE PRACTITIONER

## 2024-11-14 PROCEDURE — 1036F TOBACCO NON-USER: CPT | Performed by: NURSE PRACTITIONER

## 2024-11-14 PROCEDURE — G8428 CUR MEDS NOT DOCUMENT: HCPCS | Performed by: NURSE PRACTITIONER

## 2024-11-14 RX ORDER — MIRABEGRON 50 MG/1
50 TABLET, FILM COATED, EXTENDED RELEASE ORAL DAILY
Qty: 90 TABLET | Refills: 2 | Status: SHIPPED | OUTPATIENT
Start: 2024-11-14 | End: 2025-02-12

## 2024-11-18 ENCOUNTER — OFFICE VISIT (OUTPATIENT)
Dept: FAMILY MEDICINE CLINIC | Age: 64
End: 2024-11-18
Payer: MEDICAID

## 2024-11-18 VITALS
RESPIRATION RATE: 20 BRPM | WEIGHT: 281.2 LBS | SYSTOLIC BLOOD PRESSURE: 132 MMHG | OXYGEN SATURATION: 98 % | BODY MASS INDEX: 41.53 KG/M2 | DIASTOLIC BLOOD PRESSURE: 64 MMHG | HEART RATE: 96 BPM

## 2024-11-18 DIAGNOSIS — Z79.4 TYPE 2 DIABETES MELLITUS WITH HYPERGLYCEMIA, WITH LONG-TERM CURRENT USE OF INSULIN (HCC): ICD-10-CM

## 2024-11-18 DIAGNOSIS — E66.01 CLASS 3 SEVERE OBESITY DUE TO EXCESS CALORIES WITH SERIOUS COMORBIDITY AND BODY MASS INDEX (BMI) OF 40.0 TO 44.9 IN ADULT: Primary | ICD-10-CM

## 2024-11-18 DIAGNOSIS — E66.813 CLASS 3 SEVERE OBESITY DUE TO EXCESS CALORIES WITH SERIOUS COMORBIDITY AND BODY MASS INDEX (BMI) OF 40.0 TO 44.9 IN ADULT: Primary | ICD-10-CM

## 2024-11-18 DIAGNOSIS — E11.65 TYPE 2 DIABETES MELLITUS WITH HYPERGLYCEMIA, WITH LONG-TERM CURRENT USE OF INSULIN (HCC): ICD-10-CM

## 2024-11-18 LAB — HBA1C MFR BLD: 7.6 %

## 2024-11-18 PROCEDURE — 83036 HEMOGLOBIN GLYCOSYLATED A1C: CPT | Performed by: FAMILY MEDICINE

## 2024-11-18 PROCEDURE — 3078F DIAST BP <80 MM HG: CPT | Performed by: FAMILY MEDICINE

## 2024-11-18 PROCEDURE — 1036F TOBACCO NON-USER: CPT | Performed by: FAMILY MEDICINE

## 2024-11-18 PROCEDURE — G8427 DOCREV CUR MEDS BY ELIG CLIN: HCPCS | Performed by: FAMILY MEDICINE

## 2024-11-18 PROCEDURE — 3017F COLORECTAL CA SCREEN DOC REV: CPT | Performed by: FAMILY MEDICINE

## 2024-11-18 PROCEDURE — 3075F SYST BP GE 130 - 139MM HG: CPT | Performed by: FAMILY MEDICINE

## 2024-11-18 PROCEDURE — G8482 FLU IMMUNIZE ORDER/ADMIN: HCPCS | Performed by: FAMILY MEDICINE

## 2024-11-18 PROCEDURE — 3051F HG A1C>EQUAL 7.0%<8.0%: CPT | Performed by: FAMILY MEDICINE

## 2024-11-18 PROCEDURE — G8417 CALC BMI ABV UP PARAM F/U: HCPCS | Performed by: FAMILY MEDICINE

## 2024-11-18 PROCEDURE — 99214 OFFICE O/P EST MOD 30 MIN: CPT | Performed by: FAMILY MEDICINE

## 2024-11-18 PROCEDURE — 2022F DILAT RTA XM EVC RTNOPTHY: CPT | Performed by: FAMILY MEDICINE

## 2024-11-18 RX ORDER — INSULIN LISPRO 100 [IU]/ML
INJECTION, SOLUTION INTRAVENOUS; SUBCUTANEOUS
Qty: 10 ML | Refills: 5 | Status: SHIPPED | OUTPATIENT
Start: 2024-11-18

## 2024-11-18 RX ORDER — INSULIN GLARGINE 100 [IU]/ML
42 INJECTION, SOLUTION SUBCUTANEOUS 2 TIMES DAILY
Qty: 26 ADJUSTABLE DOSE PRE-FILLED PEN SYRINGE | Refills: 3 | Status: SHIPPED | OUTPATIENT
Start: 2024-11-18

## 2024-11-18 ASSESSMENT — ENCOUNTER SYMPTOMS
CONSTIPATION: 0
VOMITING: 0
DIARRHEA: 0

## 2024-11-18 NOTE — PROGRESS NOTES
[facility] PROFESSION[facility]  [address]  [facility] [address]  Dept: [phone]  Dept Fax: [phone]  Loc: [phone]  PROGRESS NOTE      Visit Date: 11/18/2024    Pool Rivera is a 64 y.o. male who presents today for:  Chief Complaint   Patient presents with    Diabetes       Chief complaint:  DM    Subjective:  Diabetes      3 month f/u  DM type 2. On lantus 40 units twice daily, humalog 10 units 3x per day for glucose > 155, trulicity 1.5 mg, and metformin. Glucose under 200.  A1c 7.0% in aug.   Has elevated glucose in mornings.  No hypoglycemic episodes. Exercise is walking.  Wt is up a few pounds.  Gets some mild burning of left abd on days he takes trulicity.    Uses cpap    Review of Systems   Gastrointestinal:  Negative for constipation, diarrhea and vomiting.     Patient Active Problem List   Diagnosis    Hypertension    Hyperlipidemia    CAD (coronary artery disease)    Chest pain, atypical    Chronic low back pain    Hypertriglyceridemia    Morbidly obese (HCC)    Abnormal stress test    Os trigonum    Elevated PSA    Lumbar spinal stenosis    Well controlled type 2 diabetes mellitus (HCC)    ROBSON (obstructive sleep apnea)    Back pain at L4-L5 level    Anxiety    Microalbuminuria    H/O heart artery stent    Urinary retention    Malignant neoplasm of prostate (HCC)    COPD, severe (HCC)    Paget disease of bone    BPH with obstruction/lower urinary tract symptoms     Past Medical History:   Diagnosis Date    Abnormal stress test 09/2012    Lateral Wall Ischemia- done at [facility]    Benign prostatic hyperplasia with urinary retention     CAD (coronary artery disease)     Cancer (HCC)     Prostate    Chronic low back pain     Diabetes mellitus (HCC)     Diabetes mellitus (HCC)     Hyperlipidemia     Hypertension     Hypertriglyceridemia     MI (myocardial infarction) (HCC) 2004    Obesity     Prostate cancer (HCC)     Sleep apnea     has CPAP

## 2024-11-21 NOTE — TELEPHONE ENCOUNTER
Patient called for more test strips. Patient gets supplies through [employer]. Advised patient to call [employer] and have them send over a new form.

## 2024-12-06 DIAGNOSIS — R39.15 URINARY URGENCY: ICD-10-CM

## 2024-12-06 RX ORDER — MIRABEGRON 50 MG/1
50 TABLET, FILM COATED, EXTENDED RELEASE ORAL DAILY
Qty: 90 TABLET | Refills: 2 | OUTPATIENT
Start: 2024-12-06 | End: 2025-03-06

## 2024-12-06 NOTE — TELEPHONE ENCOUNTER
Pool Rivera called requesting a refill on the following medications:  Requested Prescriptions     Pending Prescriptions Disp Refills    mirabegron (MYRBETRIQ) 50 MG TB24 90 tablet 2     Sig: Take 50 mg by mouth daily     Pharmacy verified:  [email or web address]      Date of last visit: 11/14/2024  Date of next visit (if applicable): 05/08/2024

## 2024-12-09 DIAGNOSIS — J43.9 MIXED RESTRICTIVE AND OBSTRUCTIVE LUNG DISEASE (HCC): ICD-10-CM

## 2024-12-09 DIAGNOSIS — J98.4 MIXED RESTRICTIVE AND OBSTRUCTIVE LUNG DISEASE (HCC): ICD-10-CM

## 2024-12-09 RX ORDER — ALBUTEROL SULFATE 90 UG/1
INHALANT RESPIRATORY (INHALATION)
Qty: 8.5 EACH | Refills: 3 | Status: SHIPPED | OUTPATIENT
Start: 2024-12-09

## 2024-12-09 NOTE — TELEPHONE ENCOUNTER
Pool Rivera called requesting a refill on the following medications:  Requested Prescriptions     Pending Prescriptions Disp Refills    albuterol sulfate HFA (PROVENTIL;VENTOLIN;PROAIR) 108 (90 Base) MCG/ACT inhaler [Pharmacy Med Name: ALBUTEROL SULFATE HFA HFA AEROSOL SOLN] 8.5 each 3     Sig: INHALE TWO (2) PUFFS INTO THE LUNGS EVERY SIX (6) HOURS AS NEEDED FOR WHEEZING       Date of last visit: 11/18/2024  Date of next visit (if applicable):2/18/2025  Date of last refill: 8/14/2024  Pharmacy Name: Sandra Brenner MA

## 2024-12-17 ENCOUNTER — OFFICE VISIT (OUTPATIENT)
Dept: FAMILY MEDICINE CLINIC | Age: 64
End: 2024-12-17
Payer: MEDICAID

## 2024-12-17 VITALS
WEIGHT: 283 LBS | HEART RATE: 99 BPM | RESPIRATION RATE: 18 BRPM | TEMPERATURE: 98.4 F | DIASTOLIC BLOOD PRESSURE: 86 MMHG | SYSTOLIC BLOOD PRESSURE: 138 MMHG | OXYGEN SATURATION: 95 % | BODY MASS INDEX: 41.79 KG/M2

## 2024-12-17 DIAGNOSIS — R09.81 CONGESTION OF NASAL SINUS: ICD-10-CM

## 2024-12-17 DIAGNOSIS — U07.1 COVID-19 VIRUS INFECTION: Primary | ICD-10-CM

## 2024-12-17 DIAGNOSIS — J44.1 COPD EXACERBATION (HCC): ICD-10-CM

## 2024-12-17 LAB
Lab: ABNORMAL
QC PASS/FAIL: ABNORMAL
SARS-COV-2 RDRP RESP QL NAA+PROBE: POSITIVE

## 2024-12-17 PROCEDURE — G8427 DOCREV CUR MEDS BY ELIG CLIN: HCPCS | Performed by: FAMILY MEDICINE

## 2024-12-17 PROCEDURE — G8482 FLU IMMUNIZE ORDER/ADMIN: HCPCS | Performed by: FAMILY MEDICINE

## 2024-12-17 PROCEDURE — 1036F TOBACCO NON-USER: CPT | Performed by: FAMILY MEDICINE

## 2024-12-17 PROCEDURE — 99214 OFFICE O/P EST MOD 30 MIN: CPT | Performed by: FAMILY MEDICINE

## 2024-12-17 PROCEDURE — 3023F SPIROM DOC REV: CPT | Performed by: FAMILY MEDICINE

## 2024-12-17 PROCEDURE — G8417 CALC BMI ABV UP PARAM F/U: HCPCS | Performed by: FAMILY MEDICINE

## 2024-12-17 PROCEDURE — 87635 SARS-COV-2 COVID-19 AMP PRB: CPT | Performed by: FAMILY MEDICINE

## 2024-12-17 PROCEDURE — 3079F DIAST BP 80-89 MM HG: CPT | Performed by: FAMILY MEDICINE

## 2024-12-17 PROCEDURE — 3075F SYST BP GE 130 - 139MM HG: CPT | Performed by: FAMILY MEDICINE

## 2024-12-17 PROCEDURE — 3017F COLORECTAL CA SCREEN DOC REV: CPT | Performed by: FAMILY MEDICINE

## 2024-12-17 RX ORDER — PREDNISONE 20 MG/1
20 TABLET ORAL 2 TIMES DAILY
Qty: 10 TABLET | Refills: 0 | Status: SHIPPED | OUTPATIENT
Start: 2024-12-17 | End: 2024-12-22

## 2024-12-17 ASSESSMENT — ENCOUNTER SYMPTOMS
SINUS PAIN: 1
COUGH: 1
WHEEZING: 1
SHORTNESS OF BREATH: 1

## 2024-12-17 NOTE — PROGRESS NOTES
[facility] PROFESSIONAL [facility] FAMILY MEDICINE  [address]  [facility] [address]  Dept: [phone]  Dept Fax: [phone]  Loc: [phone]  PROGRESS NOTE      Visit Date: 12/17/2024    Pool Rivera is a 64 y.o. male who presents today for:  Chief Complaint   Patient presents with    Congestion     Started  yesterday, no fever, gets some chills,     Cough       Chief complaint:  cough    Subjective:  Cough  Associated symptoms include chills, headaches, shortness of breath and wheezing. Pertinent negatives include no chest pain or fever.     Cough.  Congestion.  Started yesterday.  Using albuterol every 4 hours now. Drinking water.  Eating less.    Has COPD.  Albuterol and anoro ellipta    Glucose 148 this morning.    Has had multiple covid vaccines    Review of Systems   Constitutional:  Positive for chills. Negative for fever.   HENT:  Positive for congestion and sinus pain.    Respiratory:  Positive for cough, shortness of breath and wheezing.    Cardiovascular:  Negative for chest pain.   Neurological:  Positive for headaches.     Patient Active Problem List   Diagnosis    Hypertension    Hyperlipidemia    CAD (coronary artery disease)    Chest pain, atypical    Chronic low back pain    Hypertriglyceridemia    Morbidly obese (HCC)    Abnormal stress test    Os trigonum    Elevated PSA    Lumbar spinal stenosis    Well controlled type 2 diabetes mellitus (HCC)    ROBSON (obstructive sleep apnea)    Back pain at L4-L5 level    Anxiety    Microalbuminuria    H/O heart artery stent    Urinary retention    Malignant neoplasm of prostate (HCC)    COPD, severe (HCC)    Paget disease of bone    BPH with obstruction/lower urinary tract symptoms     Past Medical History:   Diagnosis Date    Abnormal stress test 09/2012    Lateral Wall Ischemia- done at [facility]    Benign prostatic hyperplasia with urinary retention     CAD (coronary artery disease)     Cancer (HCC)     Prostate

## 2024-12-20 DIAGNOSIS — F41.9 ANXIETY: ICD-10-CM

## 2024-12-20 DIAGNOSIS — J30.1 CHRONIC SEASONAL ALLERGIC RHINITIS DUE TO POLLEN: ICD-10-CM

## 2024-12-20 RX ORDER — FLUTICASONE PROPIONATE 50 MCG
2 SPRAY, SUSPENSION (ML) NASAL DAILY
Qty: 16 G | Refills: 1 | Status: SHIPPED | OUTPATIENT
Start: 2024-12-20

## 2024-12-20 RX ORDER — ALPRAZOLAM 1 MG/1
1 TABLET ORAL 2 TIMES DAILY
Qty: 180 TABLET | Refills: 0 | Status: SHIPPED | OUTPATIENT
Start: 2024-12-20 | End: 2025-03-20

## 2024-12-20 NOTE — TELEPHONE ENCOUNTER
Pool Rivera called requesting a refill on the following medications:  Requested Prescriptions     Pending Prescriptions Disp Refills    ALPRAZolam (XANAX) 1 MG tablet 180 tablet 0     Sig: Take 1 tablet by mouth 2 times daily for 90 days.    fluticasone (FLONASE) 50 MCG/ACT nasal spray 16 g 1     Si sprays by Nasal route daily       Date of last visit: 2024  Date of next visit (if applicable):2025  Date of last refill: 2024  [facility]       Thanks,  Sandra Salazar MA

## 2024-12-27 RX ORDER — DULAGLUTIDE 1.5 MG/.5ML
1.5 INJECTION, SOLUTION SUBCUTANEOUS WEEKLY
Qty: 2 ML | Refills: 3 | Status: SHIPPED | OUTPATIENT
Start: 2024-12-27

## 2024-12-27 NOTE — TELEPHONE ENCOUNTER
Pool Rivera called requesting a refill on the following medications:  Requested Prescriptions     Pending Prescriptions Disp Refills    dulaglutide (TRULICITY) 1.5 MG/0.5ML SC injection 2 mL 3     Sig: Inject 0.5 mLs into the skin once a week       Date of last visit: 12/17/2024  Date of next visit (if applicable):2/18/2025  Date of last refill: 8/14/24  Pharmacy Name: Dary Brenner MA

## 2025-01-13 ENCOUNTER — HOSPITAL ENCOUNTER (OUTPATIENT)
Dept: RESPIRATORY THERAPY | Age: 65
Discharge: HOME | End: 2025-01-13

## 2025-01-13 DIAGNOSIS — E66.2 OBESITY HYPOVENTILATION SYNDROME: ICD-10-CM

## 2025-01-13 NOTE — PROGRESS NOTES
Instructions were given for an overnight nocturnal pulse oximetry study. The assigned GBA number of the pulse oximetry was [id number].  A log sheet was completed. Patient was instructed on documenting any events that occurred throughout the night on the log sheet. The procedure was explained to the learner(s). Patient understanding of the procedure was good.     Patient does have a mean of transportation to bring back the study the next day. A patient task was placed in the patient’s chart for the  to download the nocturnal study and fax the results to the ordering provider for interpretation. The pulse oximetry’s memory was cleared. Patient had no questions and was sent home with the pulse oximeter.

## 2025-01-20 ENCOUNTER — TELEPHONE (OUTPATIENT)
Dept: PULMONOLOGY | Age: 65
End: 2025-01-20

## 2025-01-31 ENCOUNTER — OFFICE VISIT (OUTPATIENT)
Dept: FAMILY MEDICINE CLINIC | Age: 65
End: 2025-01-31

## 2025-01-31 VITALS
TEMPERATURE: 97.4 F | SYSTOLIC BLOOD PRESSURE: 138 MMHG | HEIGHT: 69 IN | DIASTOLIC BLOOD PRESSURE: 68 MMHG | WEIGHT: 278 LBS | OXYGEN SATURATION: 96 % | BODY MASS INDEX: 41.18 KG/M2 | HEART RATE: 99 BPM | RESPIRATION RATE: 20 BRPM

## 2025-01-31 DIAGNOSIS — J44.1 COPD EXACERBATION (HCC): Primary | ICD-10-CM

## 2025-01-31 DIAGNOSIS — J44.9 COPD, MODERATE (HCC): ICD-10-CM

## 2025-01-31 DIAGNOSIS — B34.9 VIRAL ILLNESS: ICD-10-CM

## 2025-01-31 LAB
INFLUENZA VIRUS A RNA: NEGATIVE
INFLUENZA VIRUS B RNA: NEGATIVE
Lab: NORMAL
QC PASS/FAIL: NORMAL
SARS-COV-2 RDRP RESP QL NAA+PROBE: NEGATIVE

## 2025-01-31 RX ORDER — PREDNISONE 20 MG/1
20 TABLET ORAL 2 TIMES DAILY
Qty: 10 TABLET | Refills: 0 | Status: SHIPPED | OUTPATIENT
Start: 2025-01-31 | End: 2025-02-05

## 2025-01-31 RX ORDER — DOXYCYCLINE HYCLATE 100 MG
100 TABLET ORAL 2 TIMES DAILY
Qty: 20 TABLET | Refills: 0 | Status: SHIPPED | OUTPATIENT
Start: 2025-01-31 | End: 2025-02-10

## 2025-01-31 RX ORDER — ALBUTEROL SULFATE 0.83 MG/ML
2.5 SOLUTION RESPIRATORY (INHALATION) EVERY 6 HOURS PRN
Qty: 150 ML | Refills: 3 | Status: SHIPPED | OUTPATIENT
Start: 2025-01-31

## 2025-01-31 SDOH — ECONOMIC STABILITY: FOOD INSECURITY: WITHIN THE PAST 12 MONTHS, THE FOOD YOU BOUGHT JUST DIDN'T LAST AND YOU DIDN'T HAVE MONEY TO GET MORE.: NEVER TRUE

## 2025-01-31 SDOH — ECONOMIC STABILITY: FOOD INSECURITY: WITHIN THE PAST 12 MONTHS, YOU WORRIED THAT YOUR FOOD WOULD RUN OUT BEFORE YOU GOT MONEY TO BUY MORE.: NEVER TRUE

## 2025-01-31 ASSESSMENT — ENCOUNTER SYMPTOMS
SORE THROAT: 1
WHEEZING: 1
ABDOMINAL PAIN: 0
COUGH: 1
DIARRHEA: 0
SHORTNESS OF BREATH: 0

## 2025-01-31 ASSESSMENT — PATIENT HEALTH QUESTIONNAIRE - PHQ9
SUM OF ALL RESPONSES TO PHQ QUESTIONS 1-9: 0
SUM OF ALL RESPONSES TO PHQ QUESTIONS 1-9: 0
2. FEELING DOWN, DEPRESSED OR HOPELESS: NOT AT ALL
1. LITTLE INTEREST OR PLEASURE IN DOING THINGS: NOT AT ALL
SUM OF ALL RESPONSES TO PHQ9 QUESTIONS 1 & 2: 0
SUM OF ALL RESPONSES TO PHQ QUESTIONS 1-9: 0
SUM OF ALL RESPONSES TO PHQ QUESTIONS 1-9: 0

## 2025-01-31 NOTE — PROGRESS NOTES
[facility] PROFESSIONAL [facility]  [address]  [facility] [address]  Dept: [phone]  Dept Fax: [phone]  Loc: [phone]  PROGRESS NOTE      Visit Date: 1/31/2025    Pool Rivera is a 64 y.o. male who presents today for:  Chief Complaint   Patient presents with    Congestion     Cough, congestion, headache for a few days        Chief complaint:  congestion    Subjective:  HPI    Congestion.  2 days.  Body aches.  No tiredness.  Coughing more. Using albuterol every 4 hours.  Eating and drinking well.     Review of Systems   Constitutional:  Negative for chills, fatigue and fever.   HENT:  Positive for congestion and sore throat.    Respiratory:  Positive for cough and wheezing. Negative for shortness of breath.    Gastrointestinal:  Negative for abdominal pain and diarrhea.     Patient Active Problem List   Diagnosis    Hypertension    Hyperlipidemia    CAD (coronary artery disease)    Chest pain, atypical    Chronic low back pain    Hypertriglyceridemia    Morbidly obese (HCC)    Abnormal stress test    Os trigonum    Elevated PSA    Lumbar spinal stenosis    Well controlled type 2 diabetes mellitus (HCC)    ROBSON (obstructive sleep apnea)    Back pain at L4-L5 level    Anxiety    Microalbuminuria    H/O heart artery stent    Urinary retention    Malignant neoplasm of prostate (HCC)    COPD, severe (HCC)    Paget disease of bone    BPH with obstruction/lower urinary tract symptoms     Past Medical History:   Diagnosis Date    Abnormal stress test 09/2012    Lateral Wall Ischemia- done at [facility]    Benign prostatic hyperplasia with urinary retention     CAD (coronary artery disease)     Cancer (HCC)     Prostate    Chronic low back pain     Diabetes mellitus (HCC)     Diabetes mellitus (HCC)     Hyperlipidemia     Hypertension     Hypertriglyceridemia     MI (myocardial infarction) (HCC) 2004    Obesity     Prostate cancer (HCC)     Sleep apnea     has CPAP

## 2025-02-18 ENCOUNTER — OFFICE VISIT (OUTPATIENT)
Dept: FAMILY MEDICINE CLINIC | Age: 65
End: 2025-02-18
Payer: MEDICAID

## 2025-02-18 VITALS
OXYGEN SATURATION: 96 % | BODY MASS INDEX: 43.57 KG/M2 | TEMPERATURE: 97.7 F | HEART RATE: 97 BPM | DIASTOLIC BLOOD PRESSURE: 68 MMHG | WEIGHT: 277.6 LBS | SYSTOLIC BLOOD PRESSURE: 134 MMHG | RESPIRATION RATE: 18 BRPM | HEIGHT: 67 IN

## 2025-02-18 DIAGNOSIS — E66.01 CLASS 3 SEVERE OBESITY DUE TO EXCESS CALORIES WITH SERIOUS COMORBIDITY AND BODY MASS INDEX (BMI) OF 40.0 TO 44.9 IN ADULT: ICD-10-CM

## 2025-02-18 DIAGNOSIS — K21.9 GASTROESOPHAGEAL REFLUX DISEASE WITHOUT ESOPHAGITIS: ICD-10-CM

## 2025-02-18 DIAGNOSIS — F41.9 ANXIETY: ICD-10-CM

## 2025-02-18 DIAGNOSIS — E11.65 TYPE 2 DIABETES MELLITUS WITH HYPERGLYCEMIA, WITH LONG-TERM CURRENT USE OF INSULIN (HCC): Primary | ICD-10-CM

## 2025-02-18 DIAGNOSIS — E66.813 CLASS 3 SEVERE OBESITY DUE TO EXCESS CALORIES WITH SERIOUS COMORBIDITY AND BODY MASS INDEX (BMI) OF 40.0 TO 44.9 IN ADULT: ICD-10-CM

## 2025-02-18 DIAGNOSIS — Z79.4 TYPE 2 DIABETES MELLITUS WITH HYPERGLYCEMIA, WITH LONG-TERM CURRENT USE OF INSULIN (HCC): Primary | ICD-10-CM

## 2025-02-18 LAB — HBA1C MFR BLD: 7.9 %

## 2025-02-18 PROCEDURE — 1036F TOBACCO NON-USER: CPT | Performed by: FAMILY MEDICINE

## 2025-02-18 PROCEDURE — 3075F SYST BP GE 130 - 139MM HG: CPT | Performed by: FAMILY MEDICINE

## 2025-02-18 PROCEDURE — 83036 HEMOGLOBIN GLYCOSYLATED A1C: CPT | Performed by: FAMILY MEDICINE

## 2025-02-18 PROCEDURE — G8427 DOCREV CUR MEDS BY ELIG CLIN: HCPCS | Performed by: FAMILY MEDICINE

## 2025-02-18 PROCEDURE — 99214 OFFICE O/P EST MOD 30 MIN: CPT | Performed by: FAMILY MEDICINE

## 2025-02-18 PROCEDURE — 3017F COLORECTAL CA SCREEN DOC REV: CPT | Performed by: FAMILY MEDICINE

## 2025-02-18 PROCEDURE — G8417 CALC BMI ABV UP PARAM F/U: HCPCS | Performed by: FAMILY MEDICINE

## 2025-02-18 PROCEDURE — 3051F HG A1C>EQUAL 7.0%<8.0%: CPT | Performed by: FAMILY MEDICINE

## 2025-02-18 PROCEDURE — 3078F DIAST BP <80 MM HG: CPT | Performed by: FAMILY MEDICINE

## 2025-02-18 PROCEDURE — 2022F DILAT RTA XM EVC RTNOPTHY: CPT | Performed by: FAMILY MEDICINE

## 2025-02-18 RX ORDER — PANTOPRAZOLE SODIUM 40 MG/1
TABLET, DELAYED RELEASE ORAL
Qty: 90 TABLET | Refills: 3 | Status: SHIPPED | OUTPATIENT
Start: 2025-02-18

## 2025-02-18 ASSESSMENT — ENCOUNTER SYMPTOMS
COUGH: 1
SHORTNESS OF BREATH: 1
WHEEZING: 1

## 2025-02-18 NOTE — PROGRESS NOTES
reported feeling sickly. Severe nausea     Motrin [Ibuprofen Micronized] Nausea Only     Pt reported feeling very sick    Tylenol [Acetaminophen] Nausea Only    Ultram [Tramadol] Itching     Health Maintenance   Topic Date Due    HIV screen  Never done    Hepatitis C screen  Never done    DTaP/Tdap/Td vaccine (1 - Tdap) Never done    Shingles vaccine (1 of 2) Never done    Pneumococcal 50+ years Vaccine (2 of 2 - PCV) 01/11/2019    Diabetic retinal exam  07/31/2019    Diabetic Alb to Cr ratio (uACR) test  02/09/2025    Colorectal Cancer Screen  05/04/2025    Diabetic foot exam  05/08/2025    Lipids  10/08/2025    GFR test (Diabetes, CKD 3-4, OR last GFR 15-59)  10/08/2025    Prostate Specific Antigen (PSA) Screening or Monitoring  10/08/2025    A1C test (Diabetic or Prediabetic)  11/18/2025    Depression Screen  01/31/2026    Flu vaccine  Completed    COVID-19 Vaccine  Completed    Respiratory Syncytial Virus (RSV) Pregnant or age 60 yrs+  Completed    Hepatitis A vaccine  Aged Out    Hepatitis B vaccine  Aged Out    Hib vaccine  Aged Out    Polio vaccine  Aged Out    Meningococcal (ACWY) vaccine  Aged Out    Pneumococcal 0-49 years Vaccine  Discontinued       Objective:  /68 (Site: Left Upper Arm, Position: Sitting, Cuff Size: Large Adult)   Pulse 97   Temp 97.7 °F (36.5 °C)   Resp 18   Ht 1.702 m (5' 7\")   Wt 125.9 kg (277 lb 9.6 oz)   SpO2 96%   BMI 43.48 kg/m²   Physical Exam  Vitals reviewed.   Constitutional:       General: He is not in acute distress.     Appearance: He is well-developed. He is obese. He is not ill-appearing.      Interventions: Nasal cannula in place.   Cardiovascular:      Rate and Rhythm: Normal rate and regular rhythm.      Heart sounds: No murmur heard.  Pulmonary:      Effort: Pulmonary effort is normal. No respiratory distress.      Breath sounds: Normal breath sounds. No wheezing or rhonchi.   Neurological:      Mental Status: He is alert. Mental status is at baseline.

## 2025-03-18 DIAGNOSIS — J30.1 CHRONIC SEASONAL ALLERGIC RHINITIS DUE TO POLLEN: ICD-10-CM

## 2025-03-18 DIAGNOSIS — F41.9 ANXIETY: ICD-10-CM

## 2025-03-18 RX ORDER — FLUTICASONE PROPIONATE 50 MCG
2 SPRAY, SUSPENSION (ML) NASAL DAILY
Qty: 16 G | Refills: 1 | Status: SHIPPED | OUTPATIENT
Start: 2025-03-18

## 2025-03-18 RX ORDER — ALPRAZOLAM 1 MG/1
1 TABLET ORAL 2 TIMES DAILY
Qty: 180 TABLET | Refills: 0 | Status: SHIPPED | OUTPATIENT
Start: 2025-03-18 | End: 2025-06-16

## 2025-03-18 NOTE — TELEPHONE ENCOUNTER
Pool Rivera called requesting a refill on the following medications:  Requested Prescriptions     Pending Prescriptions Disp Refills    ALPRAZolam (XANAX) 1 MG tablet 180 tablet 0     Sig: Take 1 tablet by mouth 2 times daily for 90 days.    fluticasone (FLONASE) 50 MCG/ACT nasal spray 16 g 1     Si sprays by Nasal route daily       Date of last visit: 2025  Date of next visit (if applicable):2025  Date of last refill: 24  Pharmacy Name: Maddie Brenner LPN

## 2025-03-20 DIAGNOSIS — J98.4 MIXED RESTRICTIVE AND OBSTRUCTIVE LUNG DISEASE: ICD-10-CM

## 2025-03-20 DIAGNOSIS — J43.9 MIXED RESTRICTIVE AND OBSTRUCTIVE LUNG DISEASE: ICD-10-CM

## 2025-03-20 RX ORDER — ALBUTEROL SULFATE 90 UG/1
2 INHALANT RESPIRATORY (INHALATION) EVERY 4 HOURS PRN
Qty: 18 G | Refills: 6 | Status: SHIPPED | OUTPATIENT
Start: 2025-03-20

## 2025-03-20 NOTE — TELEPHONE ENCOUNTER
Pool Rivera called requesting a refill on the following medications:  Requested Prescriptions     Pending Prescriptions Disp Refills    albuterol sulfate HFA (PROVENTIL;VENTOLIN;PROAIR) 108 (90 Base) MCG/ACT inhaler 18 g 6     Sig: Inhale 2 puffs into the lungs every 4 hours as needed for Wheezing or Shortness of Breath       Date of last visit: 2/18/2025  Date of next visit (if applicable):5/20/2025  Date of last refill: 12/9/2024  Pharmacy Name: Sandra Brenner MA

## 2025-04-01 ENCOUNTER — TELEPHONE (OUTPATIENT)
Dept: PULMONOLOGY | Age: 65
End: 2025-04-01

## 2025-04-01 DIAGNOSIS — J44.9 MIXED RESTRICTIVE AND OBSTRUCTIVE LUNG DISEASE: ICD-10-CM

## 2025-04-01 DIAGNOSIS — J98.4 MIXED RESTRICTIVE AND OBSTRUCTIVE LUNG DISEASE: ICD-10-CM

## 2025-04-01 RX ORDER — UMECLIDINIUM BROMIDE AND VILANTEROL TRIFENATATE 62.5; 25 UG/1; UG/1
1 POWDER RESPIRATORY (INHALATION) DAILY
Qty: 1 EACH | Refills: 11 | Status: SHIPPED | OUTPATIENT
Start: 2025-04-01

## 2025-04-01 NOTE — TELEPHONE ENCOUNTER
Received refill request for Anoro . Medication was last ordered by Julien . Medication was last ordered on 2/28/2024 with 11 refills.   Patient was last seen in the office 4/23/2024. Patient has a scheduled follow up 4/23/2025.   Medication needs to be sent to [facility].

## 2025-04-12 ENCOUNTER — HOSPITAL ENCOUNTER (OUTPATIENT)
Dept: GENERAL RADIOLOGY | Age: 65
Discharge: HOME | End: 2025-04-12
Payer: MEDICAID

## 2025-04-12 DIAGNOSIS — C61 PROSTATE CANCER (HCC): ICD-10-CM

## 2025-04-12 LAB — PSA SERPL-MCNC: < 0.02 NG/ML (ref 0–1)

## 2025-04-12 PROCEDURE — 84153 ASSAY OF PSA TOTAL: CPT

## 2025-04-12 PROCEDURE — 36415 COLL VENOUS BLD VENIPUNCTURE: CPT

## 2025-04-16 RX ORDER — DULAGLUTIDE 1.5 MG/.5ML
1.5 INJECTION, SOLUTION SUBCUTANEOUS WEEKLY
Qty: 12 ADJUSTABLE DOSE PRE-FILLED PEN SYRINGE | Refills: 3 | Status: SHIPPED | OUTPATIENT
Start: 2025-04-16

## 2025-04-16 NOTE — TELEPHONE ENCOUNTER
Pool Rivera called requesting a refill on the following medications:  Requested Prescriptions     Pending Prescriptions Disp Refills    TRULICITY 1.5 MG/0.5ML SC injection [Pharmacy Med Name: TRULICITY 1.5/0.5 SOLN AUTO-INJ]  3     Sig: INJECT 0.5 MLS INTO THE SKIN ONCE A WEEK       Date of last visit: 2/18/2025  Date of next visit (if applicable):5/20/2025  Date of last refill: 12/27/24  Pharmacy Name: [facility] Luna Arriola MA

## 2025-04-21 NOTE — PROGRESS NOTES
[facility]    Patient: LOUISE RODRIGUEZ, 64 y.o.   : 1960  2025      Patient of Dr. Whitten     Assessment/Plan      Diagnosis Orders   1. Mixed restrictive and obstructive lung disease (HCC)  DME Order for CPAP as OP      2. COPD, moderate (HCC)  DME Order for CPAP as OP      3. Obesity hypoventilation syndrome (HCC)  DME Order for CPAP as OP      4. Morbidly obese (HCC)  DME Order for CPAP as OP      5. Personal history of tobacco use           -Continue current regimen with Anoro, albuterol via nebulizer and inhaler as needed for shortness of breath, wheezing  -Lengthy discussion had regarding correct use of albuterol.  Patient instructed to use inhaler, nebulizer no less than 4 hours apart.  He had previously been using nebulizer 3-4 times daily and inhaler 3-4 times daily.  He was under the assumption that he was supposed to be using this routinely.  -Continues on BiPAP at current pressures.  -Supply refill sent to DME  -Reviewed preventative vaccinations    Advised patient to call office with any changes, questions, or concerns regarding respiratory status or issues with prescribed medications    Return in about 1 year (around 2026) for COPD, OHS, with bipap .     Subjective     Chief Complaint   Patient presents with    Follow-up     COPD 1 year follow up no testing        HPI  Louise is here for follow up for mixed obstructive and restrictive ventilatory defects, obesity.  Wears BiPAP for obesity hypoventilation syndrome.   Overnight pulse oximetry completed on BiPAP without bleed in O2.  Showed no need for additional oxygen through BiPAP.    Overall patient reports respiratory symptoms have been stable since last appointment. Patient reports good compliance with inhaled medications (Anoro). Patient using albuterol 3 times per day on average. He uses this out of routine when he wakes up. Patient reports mild physical limitation due to respiratory symptoms.

## 2025-04-24 ENCOUNTER — OFFICE VISIT (OUTPATIENT)
Dept: PULMONOLOGY | Age: 65
End: 2025-04-24
Payer: MEDICAID

## 2025-04-24 VITALS
OXYGEN SATURATION: 96 % | WEIGHT: 278.8 LBS | HEIGHT: 69 IN | DIASTOLIC BLOOD PRESSURE: 68 MMHG | HEART RATE: 77 BPM | BODY MASS INDEX: 41.29 KG/M2 | TEMPERATURE: 97.7 F | SYSTOLIC BLOOD PRESSURE: 132 MMHG

## 2025-04-24 DIAGNOSIS — J98.4 MIXED RESTRICTIVE AND OBSTRUCTIVE LUNG DISEASE (HCC): Primary | ICD-10-CM

## 2025-04-24 DIAGNOSIS — E66.01 MORBIDLY OBESE (HCC): ICD-10-CM

## 2025-04-24 DIAGNOSIS — Z87.891 PERSONAL HISTORY OF TOBACCO USE: ICD-10-CM

## 2025-04-24 DIAGNOSIS — J44.9 MIXED RESTRICTIVE AND OBSTRUCTIVE LUNG DISEASE (HCC): Primary | ICD-10-CM

## 2025-04-24 DIAGNOSIS — E66.2 OBESITY HYPOVENTILATION SYNDROME (HCC): ICD-10-CM

## 2025-04-24 DIAGNOSIS — J44.9 COPD, MODERATE (HCC): ICD-10-CM

## 2025-04-24 PROCEDURE — 3023F SPIROM DOC REV: CPT

## 2025-04-24 PROCEDURE — 3078F DIAST BP <80 MM HG: CPT

## 2025-04-24 PROCEDURE — 3017F COLORECTAL CA SCREEN DOC REV: CPT

## 2025-04-24 PROCEDURE — 3075F SYST BP GE 130 - 139MM HG: CPT

## 2025-04-24 PROCEDURE — G8417 CALC BMI ABV UP PARAM F/U: HCPCS

## 2025-04-24 PROCEDURE — 99214 OFFICE O/P EST MOD 30 MIN: CPT

## 2025-04-24 PROCEDURE — G8427 DOCREV CUR MEDS BY ELIG CLIN: HCPCS

## 2025-04-24 PROCEDURE — 1036F TOBACCO NON-USER: CPT

## 2025-04-24 ASSESSMENT — ENCOUNTER SYMPTOMS
WHEEZING: 0
CHEST TIGHTNESS: 0
SINUS PAIN: 0
COUGH: 1
SHORTNESS OF BREATH: 0
RHINORRHEA: 0
SINUS PRESSURE: 0

## 2025-05-05 DIAGNOSIS — I25.10 CORONARY ARTERY DISEASE INVOLVING NATIVE CORONARY ARTERY OF NATIVE HEART WITHOUT ANGINA PECTORIS: ICD-10-CM

## 2025-05-05 DIAGNOSIS — I10 PRIMARY HYPERTENSION: ICD-10-CM

## 2025-05-05 RX ORDER — LANCING DEVICE
EACH MISCELLANEOUS
Qty: 200 EACH | Refills: 2 | Status: SHIPPED | OUTPATIENT
Start: 2025-05-05

## 2025-05-05 RX ORDER — METOPROLOL TARTRATE 25 MG/1
TABLET, FILM COATED ORAL
Qty: 90 TABLET | Refills: 1 | Status: SHIPPED | OUTPATIENT
Start: 2025-05-05

## 2025-05-05 NOTE — TELEPHONE ENCOUNTER
This medication refill is regarding a electronic request. Refill requested by  [facility] .    Requested Prescriptions     Pending Prescriptions Disp Refills    COMFORT EZ PEN NEEDLES 31G X 8 MM MISC [Pharmacy Med Name: CLEVER CHOICE COMFORT EZ INSULIN PEN NEEDLES [id number] MISC] 200 each 2     Sig: USE AS DIRECTED TWO TIMES A DAY       Date of last visit: 2/18/2025   Date of next visit: 5/20/2025  Date of last refill: 8/19/2023      Last Lipid Panel:    Lab Results   Component Value Date/Time    CHOL 163 10/08/2024 08:45 AM    TRIG 199 10/08/2024 08:45 AM    HDL 33 10/08/2024 08:45 AM     Last CMP:   Lab Results   Component Value Date     10/08/2024    K 5.2 10/08/2024    CL 99 10/08/2024    CO2 28 10/08/2024    BUN 18 10/08/2024    CREATININE 1.1 10/08/2024    GLUCOSE 234 (H) 10/08/2024    CALCIUM 9.6 10/08/2024    BILITOT 0.2 (L) 10/08/2024    ALKPHOS 153 (H) 10/08/2024    AST 10 10/08/2024    ALT 14 10/08/2024    LABGLOM 75 10/08/2024       Last Thyroid:    Lab Results   Component Value Date    TSH 0.412 01/21/2023     Last Hemoglobin A1C:    Lab Results   Component Value Date/Time    LABA1C 7.9 02/18/2025 09:39 AM       Rx verified, ordered and set to EP.

## 2025-05-07 NOTE — PROGRESS NOTES
[facility] PHYSICIANS [place] [facility]  [facility] UROLOGY  [address] [facility] [address]  Dept: [phone]  Loc: [phone]    Visit Date: 5/8/2025        HPI:     Pool Rivera is a 64 y.o. male who presents today for:  Chief Complaint   Patient presents with    Prostate Cancer    Nephrolithiasis    Benign Prostatic Hypertrophy       HPI  Patient presents to urology clinic for follow-up.    Pool is doing well. Denies flank pain, suprapubic pressure, gross hematuria, dysuria, fever or chills.     Hx of prostate cancer, grade group 5, for which he underwent EBRT in 3/2021. Mr. Rivera then completed Lupron every 6 months for 2-3 years. Last lupron injection was in 1/2024. His PSA has otherwise remained undetectable.      Also with BPH and lower urinary tract symptoms for which he underwent a greenlight PVP with Dr. Fuad Doyle in 10/2023. Previously taking Flomax (discontinued post-operatively). However, he continued to complain of irritative symptomatology. Cystoscopy in 3/2024 noted shedding of the prostate tissue, a widely patent prostate, and moderate trabeculations of the urinary bladder. The patient took ditropan with little to no improvement. He was therefore started on vesicare. This did help, but he was not at goal, prompting initiation of Myrbetriq. He reports symptoms well controlled on combination therapy.      Urologic hx also remarkable for a R renal cyst and kidney stones.     Current Outpatient Medications   Medication Sig Dispense Refill    COMFORT EZ PEN NEEDLES 31G X 8 MM MISC USE AS DIRECTED TWO TIMES A  each 2    metoprolol tartrate (LOPRESSOR) 25 MG tablet TAKE ONE (1) TABLET BY MOUTH TWO (2) TIMES DAILY 90 tablet 1    dulaglutide (TRULICITY) 1.5 MG/0.5ML SC injection INJECT 0.5 MLS INTO THE SKIN ONCE A WEEK 12 Adjustable Dose Pre-filled Pen Syringe 3    umeclidinium-vilanterol (ANORO ELLIPTA) 62.5-25 MCG/ACT inhaler Inhale 1 puff into the lungs daily 1 each 11

## 2025-05-08 ENCOUNTER — OFFICE VISIT (OUTPATIENT)
Dept: UROLOGY | Age: 65
End: 2025-05-08
Payer: MEDICAID

## 2025-05-08 VITALS — BODY MASS INDEX: 41.18 KG/M2 | HEIGHT: 69 IN | RESPIRATION RATE: 12 BRPM | WEIGHT: 278 LBS

## 2025-05-08 DIAGNOSIS — R33.8 BENIGN PROSTATIC HYPERPLASIA WITH URINARY RETENTION: Primary | ICD-10-CM

## 2025-05-08 DIAGNOSIS — N20.0 KIDNEY STONE: ICD-10-CM

## 2025-05-08 DIAGNOSIS — N40.1 BENIGN PROSTATIC HYPERPLASIA WITH URINARY RETENTION: Primary | ICD-10-CM

## 2025-05-08 DIAGNOSIS — R39.15 URINARY URGENCY: ICD-10-CM

## 2025-05-08 DIAGNOSIS — C61 PROSTATE CANCER (HCC): ICD-10-CM

## 2025-05-08 PROCEDURE — 99214 OFFICE O/P EST MOD 30 MIN: CPT | Performed by: NURSE PRACTITIONER

## 2025-05-08 PROCEDURE — 3017F COLORECTAL CA SCREEN DOC REV: CPT | Performed by: NURSE PRACTITIONER

## 2025-05-08 PROCEDURE — G8417 CALC BMI ABV UP PARAM F/U: HCPCS | Performed by: NURSE PRACTITIONER

## 2025-05-08 PROCEDURE — G8427 DOCREV CUR MEDS BY ELIG CLIN: HCPCS | Performed by: NURSE PRACTITIONER

## 2025-05-08 PROCEDURE — 1036F TOBACCO NON-USER: CPT | Performed by: NURSE PRACTITIONER

## 2025-05-20 ENCOUNTER — OFFICE VISIT (OUTPATIENT)
Dept: FAMILY MEDICINE CLINIC | Age: 65
End: 2025-05-20
Payer: MEDICAID

## 2025-05-20 VITALS
SYSTOLIC BLOOD PRESSURE: 132 MMHG | HEART RATE: 83 BPM | BODY MASS INDEX: 41.47 KG/M2 | OXYGEN SATURATION: 96 % | RESPIRATION RATE: 18 BRPM | DIASTOLIC BLOOD PRESSURE: 70 MMHG | HEIGHT: 69 IN | WEIGHT: 280 LBS | TEMPERATURE: 98.5 F

## 2025-05-20 DIAGNOSIS — F41.9 ANXIETY: ICD-10-CM

## 2025-05-20 DIAGNOSIS — D50.9 IRON DEFICIENCY ANEMIA, UNSPECIFIED IRON DEFICIENCY ANEMIA TYPE: ICD-10-CM

## 2025-05-20 DIAGNOSIS — J30.1 CHRONIC SEASONAL ALLERGIC RHINITIS DUE TO POLLEN: ICD-10-CM

## 2025-05-20 DIAGNOSIS — E66.813 CLASS 3 SEVERE OBESITY DUE TO EXCESS CALORIES WITH SERIOUS COMORBIDITY AND BODY MASS INDEX (BMI) OF 40.0 TO 44.9 IN ADULT (HCC): ICD-10-CM

## 2025-05-20 DIAGNOSIS — E11.65 TYPE 2 DIABETES MELLITUS WITH HYPERGLYCEMIA, WITH LONG-TERM CURRENT USE OF INSULIN (HCC): Primary | ICD-10-CM

## 2025-05-20 DIAGNOSIS — Z79.4 TYPE 2 DIABETES MELLITUS WITH HYPERGLYCEMIA, WITH LONG-TERM CURRENT USE OF INSULIN (HCC): Primary | ICD-10-CM

## 2025-05-20 LAB — HBA1C MFR BLD: 7.2 %

## 2025-05-20 PROCEDURE — 1036F TOBACCO NON-USER: CPT | Performed by: FAMILY MEDICINE

## 2025-05-20 PROCEDURE — 3078F DIAST BP <80 MM HG: CPT | Performed by: FAMILY MEDICINE

## 2025-05-20 PROCEDURE — 3051F HG A1C>EQUAL 7.0%<8.0%: CPT | Performed by: FAMILY MEDICINE

## 2025-05-20 PROCEDURE — 3017F COLORECTAL CA SCREEN DOC REV: CPT | Performed by: FAMILY MEDICINE

## 2025-05-20 PROCEDURE — 83036 HEMOGLOBIN GLYCOSYLATED A1C: CPT | Performed by: FAMILY MEDICINE

## 2025-05-20 PROCEDURE — 2022F DILAT RTA XM EVC RTNOPTHY: CPT | Performed by: FAMILY MEDICINE

## 2025-05-20 PROCEDURE — G8417 CALC BMI ABV UP PARAM F/U: HCPCS | Performed by: FAMILY MEDICINE

## 2025-05-20 PROCEDURE — G8427 DOCREV CUR MEDS BY ELIG CLIN: HCPCS | Performed by: FAMILY MEDICINE

## 2025-05-20 PROCEDURE — 99214 OFFICE O/P EST MOD 30 MIN: CPT | Performed by: FAMILY MEDICINE

## 2025-05-20 PROCEDURE — 3075F SYST BP GE 130 - 139MM HG: CPT | Performed by: FAMILY MEDICINE

## 2025-05-20 RX ORDER — PEN NEEDLE, DIABETIC 32GX 5/32"
NEEDLE, DISPOSABLE MISCELLANEOUS
Qty: 200 EACH | Refills: 3 | Status: SHIPPED | OUTPATIENT
Start: 2025-05-20

## 2025-05-20 RX ORDER — BLOOD-GLUCOSE METER
1 KIT MISCELLANEOUS DAILY
Qty: 1 KIT | Refills: 0 | Status: SHIPPED | OUTPATIENT
Start: 2025-05-20

## 2025-05-20 RX ORDER — FLUTICASONE PROPIONATE 50 MCG
2 SPRAY, SUSPENSION (ML) NASAL DAILY
Qty: 16 G | Refills: 5 | Status: SHIPPED | OUTPATIENT
Start: 2025-05-20

## 2025-05-20 NOTE — PROGRESS NOTES
Screen  01/31/2026    Prostate Specific Antigen (PSA) Screening or Monitoring  04/12/2026    Diabetic foot exam  05/20/2026    A1C test (Diabetic or Prediabetic)  05/20/2026    Flu vaccine  Completed    Pneumococcal 50+ years Vaccine  Completed    COVID-19 Vaccine  Completed    Respiratory Syncytial Virus (RSV) Pregnant or age 60 yrs+  Completed    Hepatitis A vaccine  Aged Out    Hepatitis B vaccine  Aged Out    Hib vaccine  Aged Out    Polio vaccine  Aged Out    Meningococcal (ACWY) vaccine  Aged Out    Meningococcal B vaccine  Aged Out    Pneumococcal 0-49 years Vaccine  Discontinued       Objective:      /70 (BP Site: Left Upper Arm, Patient Position: Sitting, BP Cuff Size: Large Adult)   Pulse 83   Temp 98.5 °F (36.9 °C) (Temporal)   Resp 18   Ht 1.753 m (5' 9.02\")   Wt 127 kg (280 lb)   SpO2 96%   BMI 41.32 kg/m²     Physical Exam  Vitals reviewed.   Constitutional:       General: He is not in acute distress.     Appearance: He is well-developed. He is obese. He is not ill-appearing.   Cardiovascular:      Rate and Rhythm: Normal rate and regular rhythm.      Heart sounds: No murmur heard.  Pulmonary:      Effort: Pulmonary effort is normal. No respiratory distress.      Breath sounds: Normal breath sounds. No wheezing or rhonchi.   Neurological:      Mental Status: He is alert. Mental status is at baseline.   Psychiatric:         Mood and Affect: Mood normal.         Behavior: Behavior normal.         Visual inspection:  Deformity/amputation: absent  Skin lesions/pre-ulcerative calluses: absent  Edema: right- negative, left- negative    Sensory exam:  Monofilament sensation: abnormal -decreased in both feet  (minimum of 5 random plantar locations tested, avoiding callused areas - > 1 area with absence of sensation is + for neuropathy)    Plus at least one of the following:  Pulses: normal,     Assessment/Plan      Assessment & Plan  1. Type 2 diabetes mellitus with hyperglycemia.  - Chronic,

## 2025-05-22 ENCOUNTER — RESULTS FOLLOW-UP (OUTPATIENT)
Dept: FAMILY MEDICINE CLINIC | Age: 65
End: 2025-05-22

## 2025-05-22 ENCOUNTER — HOSPITAL ENCOUNTER (OUTPATIENT)
Age: 65
Discharge: HOME | End: 2025-05-22
Payer: MEDICAID

## 2025-05-22 DIAGNOSIS — Z79.4 TYPE 2 DIABETES MELLITUS WITH HYPERGLYCEMIA, WITH LONG-TERM CURRENT USE OF INSULIN (HCC): ICD-10-CM

## 2025-05-22 DIAGNOSIS — E11.65 TYPE 2 DIABETES MELLITUS WITH HYPERGLYCEMIA, WITH LONG-TERM CURRENT USE OF INSULIN (HCC): ICD-10-CM

## 2025-05-22 DIAGNOSIS — D50.9 IRON DEFICIENCY ANEMIA, UNSPECIFIED IRON DEFICIENCY ANEMIA TYPE: ICD-10-CM

## 2025-05-22 LAB
ALBUMIN SERPL BCG-MCNC: 4.2 G/DL (ref 3.4–4.9)
ALP SERPL-CCNC: 144 U/L (ref 40–129)
ALT SERPL W/O P-5'-P-CCNC: 14 U/L (ref 10–50)
ANION GAP SERPL CALC-SCNC: 13 MEQ/L (ref 8–16)
AST SERPL-CCNC: 14 U/L (ref 10–50)
BASOPHILS ABSOLUTE: 0.1 THOU/MM3 (ref 0–0.1)
BASOPHILS NFR BLD AUTO: 0.8 %
BILIRUB SERPL-MCNC: 0.2 MG/DL (ref 0.3–1.2)
BUN SERPL-MCNC: 17 MG/DL (ref 8–23)
CALCIUM SERPL-MCNC: 10.1 MG/DL (ref 8.8–10.2)
CHLORIDE SERPL-SCNC: 104 MEQ/L (ref 98–111)
CHOLEST SERPL-MCNC: 151 MG/DL (ref 100–199)
CO2 SERPL-SCNC: 23 MEQ/L (ref 22–29)
CREAT SERPL-MCNC: 1.1 MG/DL (ref 0.7–1.2)
CREAT UR-MCNC: 113 MG/DL
DEPRECATED RDW RBC AUTO: 45.7 FL (ref 35–45)
EOSINOPHIL NFR BLD AUTO: 3.8 %
EOSINOPHILS ABSOLUTE: 0.3 THOU/MM3 (ref 0–0.4)
ERYTHROCYTE [DISTWIDTH] IN BLOOD BY AUTOMATED COUNT: 13.8 % (ref 11.5–14.5)
FERRITIN SERPL IA-MCNC: 143 NG/ML (ref 30–400)
GFR SERPL CREATININE-BSD FRML MDRD: 75 ML/MIN/1.73M2
GLUCOSE SERPL-MCNC: 164 MG/DL (ref 74–109)
HCT VFR BLD AUTO: 39.5 % (ref 42–52)
HDLC SERPL-MCNC: 29 MG/DL
HGB BLD-MCNC: 12.4 GM/DL (ref 14–18)
IMM GRANULOCYTES # BLD AUTO: 0.04 THOU/MM3 (ref 0–0.07)
IMM GRANULOCYTES NFR BLD AUTO: 0.5 %
LDLC SERPL CALC-MCNC: 71 MG/DL
LYMPHOCYTES ABSOLUTE: 1.4 THOU/MM3 (ref 1–4.8)
LYMPHOCYTES NFR BLD AUTO: 16.2 %
MCH RBC QN AUTO: 28.2 PG (ref 26–33)
MCHC RBC AUTO-ENTMCNC: 31.4 GM/DL (ref 32.2–35.5)
MCV RBC AUTO: 90 FL (ref 80–94)
MICROALBUMIN UR-MCNC: 3.99 MG/DL
MICROALBUMIN/CREAT RATIO PNL UR: 35 MG/G (ref 0–30)
MONOCYTES ABSOLUTE: 0.7 THOU/MM3 (ref 0.4–1.3)
MONOCYTES NFR BLD AUTO: 7.7 %
NEUTROPHILS ABSOLUTE: 6.2 THOU/MM3 (ref 1.8–7.7)
NEUTROPHILS NFR BLD AUTO: 71 %
NRBC BLD AUTO-RTO: 0 /100 WBC
PLATELET # BLD AUTO: 288 THOU/MM3 (ref 130–400)
PMV BLD AUTO: 10 FL (ref 9.4–12.4)
POTASSIUM SERPL-SCNC: 4.7 MEQ/L (ref 3.5–5.2)
PROT SERPL-MCNC: 7 G/DL (ref 6.4–8.3)
RBC # BLD AUTO: 4.39 MILL/MM3 (ref 4.7–6.1)
SODIUM SERPL-SCNC: 140 MEQ/L (ref 135–145)
TRIGL SERPL-MCNC: 254 MG/DL (ref 0–199)
WBC # BLD AUTO: 8.8 THOU/MM3 (ref 4.8–10.8)

## 2025-05-22 PROCEDURE — 80053 COMPREHEN METABOLIC PANEL: CPT

## 2025-05-22 PROCEDURE — 85025 COMPLETE CBC W/AUTO DIFF WBC: CPT

## 2025-05-22 PROCEDURE — 80061 LIPID PANEL: CPT

## 2025-05-22 PROCEDURE — 82043 UR ALBUMIN QUANTITATIVE: CPT

## 2025-05-22 PROCEDURE — 82728 ASSAY OF FERRITIN: CPT

## 2025-05-22 PROCEDURE — 36415 COLL VENOUS BLD VENIPUNCTURE: CPT

## 2025-05-24 ENCOUNTER — APPOINTMENT (OUTPATIENT)
Dept: GENERAL RADIOLOGY | Age: 65
End: 2025-05-24
Payer: MEDICAID

## 2025-05-24 ENCOUNTER — HOSPITAL ENCOUNTER (EMERGENCY)
Age: 65
Discharge: HOME | End: 2025-05-24
Payer: MEDICAID

## 2025-05-24 VITALS
DIASTOLIC BLOOD PRESSURE: 65 MMHG | WEIGHT: 280 LBS | HEIGHT: 69 IN | BODY MASS INDEX: 41.47 KG/M2 | SYSTOLIC BLOOD PRESSURE: 151 MMHG | OXYGEN SATURATION: 96 % | TEMPERATURE: 97.6 F | RESPIRATION RATE: 20 BRPM | HEART RATE: 88 BPM

## 2025-05-24 DIAGNOSIS — S70.02XA CONTUSION OF LEFT HIP, INITIAL ENCOUNTER: Primary | ICD-10-CM

## 2025-05-24 PROCEDURE — 99213 OFFICE O/P EST LOW 20 MIN: CPT | Performed by: NURSE PRACTITIONER

## 2025-05-24 PROCEDURE — 99213 OFFICE O/P EST LOW 20 MIN: CPT

## 2025-05-24 PROCEDURE — 73502 X-RAY EXAM HIP UNI 2-3 VIEWS: CPT

## 2025-05-24 RX ORDER — TIZANIDINE 2 MG/1
2 TABLET ORAL 3 TIMES DAILY PRN
Qty: 15 TABLET | Refills: 0 | Status: SHIPPED | OUTPATIENT
Start: 2025-05-24 | End: 2025-05-29

## 2025-05-24 ASSESSMENT — ENCOUNTER SYMPTOMS
SHORTNESS OF BREATH: 0
NAUSEA: 0
CHEST TIGHTNESS: 0
DIARRHEA: 0
SORE THROAT: 0
RHINORRHEA: 0
VOMITING: 0
COUGH: 0

## 2025-05-24 ASSESSMENT — PAIN - FUNCTIONAL ASSESSMENT
PAIN_FUNCTIONAL_ASSESSMENT: 0-10
PAIN_FUNCTIONAL_ASSESSMENT: PREVENTS OR INTERFERES WITH MANY ACTIVE NOT PASSIVE ACTIVITIES

## 2025-05-24 ASSESSMENT — PAIN DESCRIPTION - PAIN TYPE: TYPE: ACUTE PAIN

## 2025-05-24 ASSESSMENT — PAIN DESCRIPTION - ONSET: ONSET: SUDDEN

## 2025-05-24 ASSESSMENT — PAIN DESCRIPTION - ORIENTATION: ORIENTATION: LEFT

## 2025-05-24 ASSESSMENT — PAIN SCALES - GENERAL: PAINLEVEL_OUTOF10: 10

## 2025-05-24 ASSESSMENT — PAIN DESCRIPTION - FREQUENCY: FREQUENCY: CONTINUOUS

## 2025-05-24 ASSESSMENT — PAIN DESCRIPTION - DESCRIPTORS: DESCRIPTORS: ACHING

## 2025-05-24 ASSESSMENT — PAIN DESCRIPTION - LOCATION: LOCATION: HIP

## 2025-05-24 NOTE — ED NOTES
PT GIVEN DISCHARGE INSTRUCTIONS, VERBALIZES UNDERSTANDING.  PT ASSESSMENT UNCHANGED, DISCHARGED IN STABLE CONDITION.        Charles Armenta, RN  05/24/25 0913

## 2025-05-28 DIAGNOSIS — E11.65 TYPE 2 DIABETES MELLITUS WITH HYPERGLYCEMIA, WITH LONG-TERM CURRENT USE OF INSULIN (HCC): ICD-10-CM

## 2025-05-28 DIAGNOSIS — Z79.4 TYPE 2 DIABETES MELLITUS WITH HYPERGLYCEMIA, WITH LONG-TERM CURRENT USE OF INSULIN (HCC): ICD-10-CM

## 2025-05-28 RX ORDER — INSULIN GLARGINE 100 [IU]/ML
INJECTION, SOLUTION SUBCUTANEOUS
Qty: 26 ML | Refills: 3 | Status: SHIPPED | OUTPATIENT
Start: 2025-05-28

## 2025-05-28 NOTE — TELEPHONE ENCOUNTER
Pool Rivera called requesting a refill on the following medications:  Requested Prescriptions     Pending Prescriptions Disp Refills    LANTUS SOLOSTAR 100 UNIT/ML injection pen [Pharmacy Med Name: LANTUS SOLOSTAR 100/ML SOLN PEN-INJ] 26 mL 3     Sig: INJECT 42 UNITS INTO THE SKIN TWO (2) TIMES DAILY       Date of last visit: 5/20/2025  Date of next visit (if applicable):8/21/2025  Date of last refill: 11/18/2024  Pharmacy Name: [place] [place].      Thanks,  RADHA VERNON LPN

## 2025-05-29 ENCOUNTER — OFFICE VISIT (OUTPATIENT)
Dept: FAMILY MEDICINE CLINIC | Age: 65
End: 2025-05-29
Payer: MEDICAID

## 2025-05-29 VITALS
DIASTOLIC BLOOD PRESSURE: 82 MMHG | BODY MASS INDEX: 38.69 KG/M2 | RESPIRATION RATE: 24 BRPM | WEIGHT: 262 LBS | HEART RATE: 64 BPM | SYSTOLIC BLOOD PRESSURE: 152 MMHG

## 2025-05-29 DIAGNOSIS — Z09 HOSPITAL DISCHARGE FOLLOW-UP: Primary | ICD-10-CM

## 2025-05-29 DIAGNOSIS — M25.552 LEFT HIP PAIN: ICD-10-CM

## 2025-05-29 PROCEDURE — G8417 CALC BMI ABV UP PARAM F/U: HCPCS | Performed by: NURSE PRACTITIONER

## 2025-05-29 PROCEDURE — 1036F TOBACCO NON-USER: CPT | Performed by: NURSE PRACTITIONER

## 2025-05-29 PROCEDURE — 3077F SYST BP >= 140 MM HG: CPT | Performed by: NURSE PRACTITIONER

## 2025-05-29 PROCEDURE — 99214 OFFICE O/P EST MOD 30 MIN: CPT | Performed by: NURSE PRACTITIONER

## 2025-05-29 PROCEDURE — G8427 DOCREV CUR MEDS BY ELIG CLIN: HCPCS | Performed by: NURSE PRACTITIONER

## 2025-05-29 PROCEDURE — 3079F DIAST BP 80-89 MM HG: CPT | Performed by: NURSE PRACTITIONER

## 2025-05-29 PROCEDURE — 1111F DSCHRG MED/CURRENT MED MERGE: CPT | Performed by: NURSE PRACTITIONER

## 2025-05-29 PROCEDURE — 3017F COLORECTAL CA SCREEN DOC REV: CPT | Performed by: NURSE PRACTITIONER

## 2025-05-29 RX ORDER — PREDNISONE 20 MG/1
TABLET ORAL
Qty: 15 TABLET | Refills: 0 | Status: SHIPPED | OUTPATIENT
Start: 2025-05-29 | End: 2025-06-08

## 2025-06-10 ENCOUNTER — HOSPITAL ENCOUNTER (OUTPATIENT)
Dept: PHYSICAL THERAPY | Age: 65
Setting detail: THERAPIES SERIES
Discharge: HOME | End: 2025-06-10
Payer: MEDICAID

## 2025-06-10 PROCEDURE — 97162 PT EVAL MOD COMPLEX 30 MIN: CPT

## 2025-06-10 NOTE — THERAPY EVALUATION
pain to  medications.   Patient will be independent and compliant with HEP to achieve above goals.         Patient Education:   [x]  HEP/Education Completed: Plan of Care, Goals, lumbar nerve vs hip issue  Medbridge Access Code:  []  No new Education completed  []  Reviewed Prior HEP      [x]  Patient verbalized and/or demonstrated understanding of education provided.  []  Patient unable to verbalize and/or demonstrate understanding of education provided.  Will continue education.  []  Barriers to learning:     PLAN:  Treatment Recommendations: Strengthening, Range of Motion, Functional Mobility Training, Gait Training, Stair Training, Neuromuscular Re-education, Manual Therapy - Soft Tissue Mobilization, Pain Management, Home Exercise Program, Patient Education, Integrative Dry Needling, Aquatics, and Modalities    [x]  Plan of care initiated.  Plan to see patient 2 times per week for 8 weeks to address the treatment planned outlined above.  []  Continue with current plan of care  []  Modify plan of care as follows:    []  Hold pending physician visit  []  Discharge    Time In 1347   Time Out 1427   Timed Code Minutes: 0 min   Total Treatment Time: 40 min       Electronically Signed by: Rebecca Lynne PT

## 2025-06-19 ENCOUNTER — HOSPITAL ENCOUNTER (OUTPATIENT)
Dept: PHYSICAL THERAPY | Age: 65
Setting detail: THERAPIES SERIES
Discharge: HOME | End: 2025-06-19
Payer: MEDICAID

## 2025-06-19 PROCEDURE — 97110 THERAPEUTIC EXERCISES: CPT

## 2025-06-19 PROCEDURE — 97035 APP MDLTY 1+ULTRASOUND EA 15: CPT

## 2025-06-19 NOTE — PROGRESS NOTES
[facility]  PHYSICAL THERAPY  [] EVALUATION  [x] DAILY NOTE (LAND) [] DAILY NOTE (AQUATIC ) [] PROGRESS NOTE [] DISCHARGE NOTE    [] [facility] [place]   [x] [place] [facility]    [] [facility]   [] [facility]    Date: 2025  Patient Name:  Pool Rivera  : 1960  MRN: [id number]  [facility]: [id number]    Referring Practitioner Sandip Garcia, PIEDAD - CNP [phone]      Diagnosis  Diagnoses       M25.552 (ICD-10-CM) - Left hip pain           Treatment Diagnosis M54.32  Sciatica, Left Side  R26.2 Difficulty in walking  R53.1 Weakness   Date of Evaluation 6/10/25   Additional Pertinent History Pool Rivera has a past medical history of Abnormal stress test, Benign prostatic hyperplasia with urinary retention, CAD (coronary artery disease), Cancer (HCC), Chronic low back pain, Diabetes mellitus (HCC), Diabetes mellitus (HCC), Hyperlipidemia, Hypertension, Hypertriglyceridemia, MI (myocardial infarction) (HCC), Obesity, Prostate cancer (HCC), Sleep apnea, Urinary urgency, and Viral pneumonia.  he has a past surgical history that includes hernia repair (); back surgery (1997); Colonoscopy (10/2010); Cardiac surgery (10/22/2004); Cardiac catheterization (10/2019); Coronary angioplasty with stent (10/17/2019); Ultrasound Prostate/Transrectal (N/A, 2020); TURP (N/A, 10/16/2023); and Cystoscopy (2024).     Allergies Allergies   Allergen Reactions    Ace Inhibitors      When asked Oct 2020, patient was unsure of allergy/reaction. Patient takes/tolerates enalapril. Gets wheeze per pt    Baclofen Other (See Comments)     Lightheadedness, dizziness bad headaches      Darvocet [Propoxyphene N-Acetaminophen] Nausea Only     Felt sickly    Darvon [Propoxyphene Hcl] Nausea And Vomiting     Felt sickly    Flexeril [Cyclobenzaprine] Other (See Comments)     Headache    Morphine Nausea Only     Pt reported feeling sickly. Severe nausea

## 2025-06-20 DIAGNOSIS — F41.9 ANXIETY: ICD-10-CM

## 2025-06-20 RX ORDER — ALPRAZOLAM 1 MG/1
1 TABLET ORAL 2 TIMES DAILY
Qty: 180 TABLET | Refills: 0 | Status: SHIPPED | OUTPATIENT
Start: 2025-06-20 | End: 2025-09-18

## 2025-06-20 NOTE — TELEPHONE ENCOUNTER
Pool Rivera called requesting a refill on the following medications:  Requested Prescriptions     Pending Prescriptions Disp Refills    ALPRAZolam (XANAX) 1 MG tablet 180 tablet 0     Sig: Take 1 tablet by mouth 2 times daily for 90 days.       Date of last visit: 5/29/2025  Date of next visit (if applicable):8/21/2025  Date of last refill: 3/18/25  Pharmacy Name: Maddie Brenner LPN

## 2025-06-23 ENCOUNTER — HOSPITAL ENCOUNTER (OUTPATIENT)
Dept: PHYSICAL THERAPY | Age: 65
Setting detail: THERAPIES SERIES
Discharge: HOME | End: 2025-06-23
Payer: MEDICAID

## 2025-06-23 PROCEDURE — 97035 APP MDLTY 1+ULTRASOUND EA 15: CPT

## 2025-06-23 PROCEDURE — 97110 THERAPEUTIC EXERCISES: CPT

## 2025-06-23 NOTE — PROGRESS NOTES
[facility]  PHYSICAL THERAPY  [] EVALUATION  [x] DAILY NOTE (LAND) [] DAILY NOTE (AQUATIC ) [] PROGRESS NOTE [] DISCHARGE NOTE    [] [facility] [place]   [x] [place] [facility]    [] [facility]   [] [facility]    Date: 2025  Patient Name:  Pool Rivera  : 1960  MRN: [id number]  CSN: [id number]    Referring Practitioner Sandip Gacria, PIEDAD - CNP [phone]      Diagnosis  Diagnoses       M25.552 (ICD-10-CM) - Left hip pain           Treatment Diagnosis M54.32  Sciatica, Left Side  R26.2 Difficulty in walking  R53.1 Weakness   Date of Evaluation 6/10/25   Additional Pertinent History Pool Rivera has a past medical history of Abnormal stress test, Benign prostatic hyperplasia with urinary retention, CAD (coronary artery disease), Cancer (HCC), Chronic low back pain, Diabetes mellitus (HCC), Diabetes mellitus (HCC), Hyperlipidemia, Hypertension, Hypertriglyceridemia, MI (myocardial infarction) (HCC), Obesity, Prostate cancer (HCC), Sleep apnea, Urinary urgency, and Viral pneumonia.  he has a past surgical history that includes hernia repair (); back surgery (1997); Colonoscopy (10/2010); Cardiac surgery (10/22/2004); Cardiac catheterization (10/2019); Coronary angioplasty with stent (10/17/2019); Ultrasound Prostate/Transrectal (N/A, 2020); TURP (N/A, 10/16/2023); and Cystoscopy (2024).     Allergies Allergies   Allergen Reactions    Ace Inhibitors      When asked Oct 2020, patient was unsure of allergy/reaction. Patient takes/tolerates enalapril. Gets wheeze per pt    Baclofen Other (See Comments)     Lightheadedness, dizziness bad headaches      Darvocet [Propoxyphene N-Acetaminophen] Nausea Only     Felt sickly    Darvon [Propoxyphene Hcl] Nausea And Vomiting     Felt sickly    Flexeril [Cyclobenzaprine] Other (See Comments)     Headache    Morphine Nausea Only     Pt reported feeling sickly. Severe nausea

## 2025-06-25 ENCOUNTER — HOSPITAL ENCOUNTER (OUTPATIENT)
Dept: PHYSICAL THERAPY | Age: 65
Setting detail: THERAPIES SERIES
Discharge: HOME | End: 2025-06-25
Payer: MEDICAID

## 2025-06-25 PROCEDURE — 97035 APP MDLTY 1+ULTRASOUND EA 15: CPT

## 2025-06-25 PROCEDURE — 97110 THERAPEUTIC EXERCISES: CPT

## 2025-06-25 NOTE — PROGRESS NOTES
VERIO) strip, Use to test blood sugar 3 x daily and as needed, Disp: 400 each, Rfl: 3    insulin lispro (HUMALOG,ADMELOG) 100 UNIT/ML SOLN injection vial, INJECT 10 UNITS SUBCUTANEOUSLY Three times A DAY AS NEEDED FOR HIGH GLUCOSE GREATER THAN 155. Max of 30 units per day, Disp: 10 mL, Rfl: 5    amLODIPine (NORVASC) 10 MG tablet, Take 1 tablet by mouth daily, Disp: 90 tablet, Rfl: 3    clopidogrel (PLAVIX) 75 MG tablet, Take 1 tablet by mouth daily take 1 tablet by mouth once daily, Disp: 90 tablet, Rfl: 3    enalapril (VASOTEC) 10 MG tablet, Take 1 tablet by mouth daily, Disp: 90 tablet, Rfl: 3    gemfibrozil (LOPID) 600 MG tablet, Take 1 tablet by mouth 2 times daily, Disp: 180 tablet, Rfl: 3    pravastatin (PRAVACHOL) 80 MG tablet, Take 1 tablet by mouth nightly, Disp: 90 tablet, Rfl: 3    nitroGLYCERIN (NITROSTAT) 0.4 MG SL tablet, Place 1 tablet under the tongue every 5 minutes as needed for Chest pain, Disp: 25 tablet, Rfl: 3    aspirin 81 MG EC tablet, Take 1 tablet by mouth daily, Disp: 90 tablet, Rfl: 3    solifenacin (VESICARE) 10 MG tablet, Take 1 tablet by mouth daily, Disp: 90 tablet, Rfl: 3    Lancets MISC, 1 each by Does not apply route 3 times daily, Disp: 600 each, Rfl: 1    Insulin Syringes, Disposable, U-100 1 ML MISC, 1 each by Does not apply route 3 times daily 31 gauge x 8 mm  ultrafine 2, Disp: 100 each, Rfl: 5    FEROSUL 325 (65 Fe) MG tablet, take 1 tablet by mouth every morning and at bedtime, Disp: 180 tablet, Rfl: 3    Handicap Placard MISC, by Does not apply route Duration:  10 years, Disp: 1 each, Rfl: 0    Insulin Syringe-Needle U-100 (BD INSULIN SYRINGE U/F) 31G X 5/16\" 1 ML MISC, 1 each by Other route 3 times daily, Disp: 300 each, Rfl: 3    DROPLET PEN NEEDLES 31G X 8 MM MISC, use as directed twice a day, Disp: , Rfl:     ascorbic acid (RA VITAMIN C/ERNA HIPS) 500 MG tablet, TAKE 1 TABLET BY MOUTH DAILY, Disp: 30 tablet, Rfl: 3    Cyanocobalamin ER (RA VITAMIN B-12 TR) 1000 MCG

## 2025-06-30 ENCOUNTER — HOSPITAL ENCOUNTER (OUTPATIENT)
Dept: PHYSICAL THERAPY | Age: 65
Setting detail: THERAPIES SERIES
Discharge: HOME | End: 2025-06-30
Payer: MEDICAID

## 2025-06-30 PROCEDURE — 97035 APP MDLTY 1+ULTRASOUND EA 15: CPT

## 2025-06-30 PROCEDURE — 97110 THERAPEUTIC EXERCISES: CPT

## 2025-06-30 NOTE — PROGRESS NOTES
[facility]  PHYSICAL THERAPY  [] EVALUATION  [x] DAILY NOTE (LAND) [] DAILY NOTE (AQUATIC ) [] PROGRESS NOTE [] DISCHARGE NOTE    [] [facility] [place]   [x] [place] [facility]    [] [facility]   [] [facility]    Date: 2025  Patient Name:  Pool Rivera  : 1960  MRN: [id number]  [facility]: [id number]    Referring Practitioner Sandip Garcia, PIEDAD - CNP [phone]      Diagnosis  Diagnoses       M25.552 (ICD-10-CM) - Left hip pain           Treatment Diagnosis M54.32  Sciatica, Left Side  R26.2 Difficulty in walking  R53.1 Weakness   Date of Evaluation 6/10/25   Additional Pertinent History Pool Rivera has a past medical history of Abnormal stress test, Benign prostatic hyperplasia with urinary retention, CAD (coronary artery disease), Cancer (HCC), Chronic low back pain, Diabetes mellitus (HCC), Diabetes mellitus (HCC), Hyperlipidemia, Hypertension, Hypertriglyceridemia, MI (myocardial infarction) (HCC), Obesity, Prostate cancer (HCC), Sleep apnea, Urinary urgency, and Viral pneumonia.  he has a past surgical history that includes hernia repair (); back surgery (1997); Colonoscopy (10/2010); Cardiac surgery (10/22/2004); Cardiac catheterization (10/2019); Coronary angioplasty with stent (10/17/2019); Ultrasound Prostate/Transrectal (N/A, 2020); TURP (N/A, 10/16/2023); and Cystoscopy (2024).     Allergies Allergies   Allergen Reactions    Ace Inhibitors      When asked Oct 2020, patient was unsure of allergy/reaction. Patient takes/tolerates enalapril. Gets wheeze per pt    Baclofen Other (See Comments)     Lightheadedness, dizziness bad headaches      Darvocet [Propoxyphene N-Acetaminophen] Nausea Only     Felt sickly    Darvon [Propoxyphene Hcl] Nausea And Vomiting     Felt sickly    Flexeril [Cyclobenzaprine] Other (See Comments)     Headache    Morphine Nausea Only     Pt reported feeling sickly. Severe nausea

## 2025-07-02 ENCOUNTER — HOSPITAL ENCOUNTER (OUTPATIENT)
Dept: PHYSICAL THERAPY | Age: 65
Setting detail: THERAPIES SERIES
Discharge: HOME | End: 2025-07-02
Payer: MEDICAID

## 2025-07-02 PROCEDURE — 97110 THERAPEUTIC EXERCISES: CPT

## 2025-07-02 PROCEDURE — 97035 APP MDLTY 1+ULTRASOUND EA 15: CPT

## 2025-07-02 NOTE — PROGRESS NOTES
[facility]  PHYSICAL THERAPY  [] EVALUATION  [x] DAILY NOTE (LAND) [] DAILY NOTE (AQUATIC ) [] PROGRESS NOTE [] DISCHARGE NOTE    [] [facility] [place]   [x] [place] [facility]    [] [facility][1]   [] [facility]    Date: 2025  Patient Name:  Pool Rivera  : 1960  MRN: [id number]  CSN: [id number]    Referring Practitioner Sandip Garcia, PIEDAD - CNP [id number]      Diagnosis  Diagnoses       M25.552 (ICD-10-CM) - Left hip pain           Treatment Diagnosis M54.32  Sciatica, Left Side  R26.2 Difficulty in walking  R53.1 Weakness   Date of Evaluation 6/10/25   Additional Pertinent History Pool Rivera has a past medical history of Abnormal stress test, Benign prostatic hyperplasia with urinary retention, CAD (coronary artery disease), Cancer (HCC), Chronic low back pain, Diabetes mellitus (HCC), Diabetes mellitus (HCC), Hyperlipidemia, Hypertension, Hypertriglyceridemia, MI (myocardial infarction) (HCC), Obesity, Prostate cancer (HCC), Sleep apnea, Urinary urgency, and Viral pneumonia.  he has a past surgical history that includes hernia repair (); back surgery (1997); Colonoscopy (10/2010); Cardiac surgery (10/22/2004); Cardiac catheterization (10/2019); Coronary angioplasty with stent (10/17/2019); Ultrasound Prostate/Transrectal (N/A, 2020); TURP (N/A, 10/16/2023); and Cystoscopy (2024).     Allergies Allergies   Allergen Reactions    Ace Inhibitors      When asked Oct 2020, patient was unsure of allergy/reaction. Patient takes/tolerates enalapril. Gets wheeze per pt    Baclofen Other (See Comments)     Lightheadedness, dizziness bad headaches      Darvocet [Propoxyphene N-Acetaminophen] Nausea Only     Felt sickly    Darvon [Propoxyphene Hcl] Nausea And Vomiting     Felt sickly    Flexeril [Cyclobenzaprine] Other (See Comments)     Headache    Morphine Nausea Only     Pt reported feeling sickly. Severe nausea

## 2025-07-07 ENCOUNTER — HOSPITAL ENCOUNTER (OUTPATIENT)
Dept: PHYSICAL THERAPY | Age: 65
Setting detail: THERAPIES SERIES
Discharge: HOME | End: 2025-07-07
Payer: MEDICAID

## 2025-07-07 PROCEDURE — 97110 THERAPEUTIC EXERCISES: CPT

## 2025-07-07 PROCEDURE — 97035 APP MDLTY 1+ULTRASOUND EA 15: CPT

## 2025-07-09 ENCOUNTER — HOSPITAL ENCOUNTER (OUTPATIENT)
Dept: PHYSICAL THERAPY | Age: 65
Setting detail: THERAPIES SERIES
Discharge: HOME | End: 2025-07-09
Payer: MEDICAID

## 2025-07-09 PROCEDURE — 97110 THERAPEUTIC EXERCISES: CPT

## 2025-07-09 PROCEDURE — 97035 APP MDLTY 1+ULTRASOUND EA 15: CPT

## 2025-07-09 NOTE — PROGRESS NOTES
[facility]  PHYSICAL THERAPY  [] EVALUATION  [x] DAILY NOTE (LAND) [] DAILY NOTE (AQUATIC ) [] PROGRESS NOTE [] DISCHARGE NOTE    [] [facility] [place]   [x] [place] [facility]    [] [facility]   [] [facility]    Date: 2025  Patient Name:  Pool Rivera  : 1960  MRN: [id number]  CSN: [id number]    Referring Practitioner Sandip Garcia, PIEDAD - CNP [id number]      Diagnosis  Diagnoses       M25.552 (ICD-10-CM) - Left hip pain           Treatment Diagnosis M54.32  Sciatica, Left Side  R26.2 Difficulty in walking  R53.1 Weakness   Date of Evaluation 6/10/25   Additional Pertinent History Pool Rivera has a past medical history of Abnormal stress test, Benign prostatic hyperplasia with urinary retention, CAD (coronary artery disease), Cancer (HCC), Chronic low back pain, Diabetes mellitus (HCC), Diabetes mellitus (HCC), Hyperlipidemia, Hypertension, Hypertriglyceridemia, MI (myocardial infarction) (HCC), Obesity, Prostate cancer (HCC), Sleep apnea, Urinary urgency, and Viral pneumonia.  he has a past surgical history that includes hernia repair (); back surgery (1997); Colonoscopy (10/2010); Cardiac surgery (10/22/2004); Cardiac catheterization (10/2019); Coronary angioplasty with stent (10/17/2019); Ultrasound Prostate/Transrectal (N/A, 2020); TURP (N/A, 10/16/2023); and Cystoscopy (2024).     Allergies Allergies   Allergen Reactions    Ace Inhibitors      When asked Oct 2020, patient was unsure of allergy/reaction. Patient takes/tolerates enalapril. Gets wheeze per pt    Baclofen Other (See Comments)     Lightheadedness, dizziness bad headaches      Darvocet [Propoxyphene N-Acetaminophen] Nausea Only     Felt sickly    Darvon [Propoxyphene Hcl] Nausea And Vomiting     Felt sickly    Flexeril [Cyclobenzaprine] Other (See Comments)     Headache    Morphine Nausea Only     Pt reported feeling sickly. Severe nausea

## 2025-07-14 ENCOUNTER — HOSPITAL ENCOUNTER (OUTPATIENT)
Dept: PHYSICAL THERAPY | Age: 65
Setting detail: THERAPIES SERIES
Discharge: HOME | End: 2025-07-14
Payer: MEDICAID

## 2025-07-14 PROCEDURE — 97110 THERAPEUTIC EXERCISES: CPT

## 2025-07-14 PROCEDURE — 97035 APP MDLTY 1+ULTRASOUND EA 15: CPT

## 2025-07-14 NOTE — PROGRESS NOTES
[facility]  PHYSICAL THERAPY  [] EVALUATION  [x] DAILY NOTE (LAND) [] DAILY NOTE (AQUATIC ) [] PROGRESS NOTE [] DISCHARGE NOTE    [] [facility] [place]   [x] [place] [facility]    [] [facility]   [] [facility]    Date: 2025  Patient Name:  Pool Rivera  : 1960  MRN: [id number]  [facility]: [id number]    Referring Practitioner Sandip Garcia, PIEDAD - CNP [phone]      Diagnosis  Diagnoses       M25.552 (ICD-10-CM) - Left hip pain           Treatment Diagnosis M54.32  Sciatica, Left Side  R26.2 Difficulty in walking  R53.1 Weakness   Date of Evaluation 6/10/25   Additional Pertinent History Pool Rivera has a past medical history of Abnormal stress test, Benign prostatic hyperplasia with urinary retention, CAD (coronary artery disease), Cancer (HCC), Chronic low back pain, Diabetes mellitus (HCC), Diabetes mellitus (HCC), Hyperlipidemia, Hypertension, Hypertriglyceridemia, MI (myocardial infarction) (HCC), Obesity, Prostate cancer (HCC), Sleep apnea, Urinary urgency, and Viral pneumonia.  he has a past surgical history that includes hernia repair (); back surgery (1997); Colonoscopy (10/2010); Cardiac surgery (10/22/2004); Cardiac catheterization (10/2019); Coronary angioplasty with stent (10/17/2019); Ultrasound Prostate/Transrectal (N/A, 2020); TURP (N/A, 10/16/2023); and Cystoscopy (2024).     Allergies Allergies   Allergen Reactions    Ace Inhibitors      When asked Oct 2020, patient was unsure of allergy/reaction. Patient takes/tolerates enalapril. Gets wheeze per pt    Baclofen Other (See Comments)     Lightheadedness, dizziness bad headaches      Darvocet [Propoxyphene N-Acetaminophen] Nausea Only     Felt sickly    Darvon [Propoxyphene Hcl] Nausea And Vomiting     Felt sickly    Flexeril [Cyclobenzaprine] Other (See Comments)     Headache    Morphine Nausea Only     Pt reported feeling sickly. Severe nausea

## 2025-07-16 ENCOUNTER — HOSPITAL ENCOUNTER (OUTPATIENT)
Dept: PHYSICAL THERAPY | Age: 65
Setting detail: THERAPIES SERIES
Discharge: HOME | End: 2025-07-16
Payer: MEDICAID

## 2025-07-16 PROCEDURE — 97035 APP MDLTY 1+ULTRASOUND EA 15: CPT

## 2025-07-16 PROCEDURE — 97110 THERAPEUTIC EXERCISES: CPT

## 2025-07-16 NOTE — THERAPY DISCHARGE
[facility]  PHYSICAL THERAPY  [] EVALUATION  [] DAILY NOTE (LAND) [] DAILY NOTE (AQUATIC ) [] PROGRESS NOTE [x] DISCHARGE NOTE    [] [facility] [place]   [x] [place] [facility]    [] [facility]   [] [facility]    Date: 2025  Patient Name:  Pool Rivera  : 1960  MRN: [id number]  [facility]: [id number]    Referring Practitioner Sandip Garcia, PIEDAD - CNP [id number]      Diagnosis  Diagnoses       M25.552 (ICD-10-CM) - Left hip pain           Treatment Diagnosis M54.32  Sciatica, Left Side  R26.2 Difficulty in walking  R53.1 Weakness   Date of Evaluation 6/10/25   Additional Pertinent History Pool Rivera has a past medical history of Abnormal stress test, Benign prostatic hyperplasia with urinary retention, CAD (coronary artery disease), Cancer (HCC), Chronic low back pain, Diabetes mellitus (HCC), Diabetes mellitus (HCC), Hyperlipidemia, Hypertension, Hypertriglyceridemia, MI (myocardial infarction) (HCC), Obesity, Prostate cancer (HCC), Sleep apnea, Urinary urgency, and Viral pneumonia.  he has a past surgical history that includes hernia repair (); back surgery (1997); Colonoscopy (10/2010); Cardiac surgery (10/22/2004); Cardiac catheterization (10/2019); Coronary angioplasty with stent (10/17/2019); Ultrasound Prostate/Transrectal (N/A, 2020); TURP (N/A, 10/16/2023); and Cystoscopy (2024).     Allergies Allergies   Allergen Reactions    Ace Inhibitors      When asked Oct 2020, patient was unsure of allergy/reaction. Patient takes/tolerates enalapril. Gets wheeze per pt    Baclofen Other (See Comments)     Lightheadedness, dizziness bad headaches      Darvocet [Propoxyphene N-Acetaminophen] Nausea Only     Felt sickly    Darvon [Propoxyphene Hcl] Nausea And Vomiting     Felt sickly    Flexeril [Cyclobenzaprine] Other (See Comments)     Headache    Morphine Nausea Only     Pt reported feeling sickly. Severe nausea

## 2025-08-04 ENCOUNTER — HOSPITAL ENCOUNTER (OUTPATIENT)
Dept: GENERAL RADIOLOGY | Age: 65
Discharge: HOME | End: 2025-08-04
Payer: MEDICAID

## 2025-08-04 ENCOUNTER — OFFICE VISIT (OUTPATIENT)
Dept: FAMILY MEDICINE CLINIC | Age: 65
End: 2025-08-04
Payer: MEDICAID

## 2025-08-04 ENCOUNTER — RESULTS FOLLOW-UP (OUTPATIENT)
Dept: FAMILY MEDICINE CLINIC | Age: 65
End: 2025-08-04

## 2025-08-04 ENCOUNTER — HOSPITAL ENCOUNTER (OUTPATIENT)
Age: 65
Discharge: HOME | End: 2025-08-04
Payer: MEDICAID

## 2025-08-04 VITALS
BODY MASS INDEX: 39.78 KG/M2 | OXYGEN SATURATION: 96 % | TEMPERATURE: 97.8 F | WEIGHT: 268.6 LBS | RESPIRATION RATE: 17 BRPM | HEART RATE: 87 BPM | HEIGHT: 69 IN | DIASTOLIC BLOOD PRESSURE: 66 MMHG | SYSTOLIC BLOOD PRESSURE: 132 MMHG

## 2025-08-04 DIAGNOSIS — E11.65 TYPE 2 DIABETES MELLITUS WITH HYPERGLYCEMIA, WITH LONG-TERM CURRENT USE OF INSULIN (HCC): ICD-10-CM

## 2025-08-04 DIAGNOSIS — S83.412A SPRAIN OF MEDIAL COLLATERAL LIGAMENT OF LEFT KNEE, INITIAL ENCOUNTER: Primary | ICD-10-CM

## 2025-08-04 DIAGNOSIS — Z79.4 TYPE 2 DIABETES MELLITUS WITH HYPERGLYCEMIA, WITH LONG-TERM CURRENT USE OF INSULIN (HCC): ICD-10-CM

## 2025-08-04 DIAGNOSIS — S83.412A SPRAIN OF MEDIAL COLLATERAL LIGAMENT OF LEFT KNEE, INITIAL ENCOUNTER: ICD-10-CM

## 2025-08-04 PROCEDURE — 2022F DILAT RTA XM EVC RTNOPTHY: CPT | Performed by: NURSE PRACTITIONER

## 2025-08-04 PROCEDURE — G8417 CALC BMI ABV UP PARAM F/U: HCPCS | Performed by: NURSE PRACTITIONER

## 2025-08-04 PROCEDURE — 3078F DIAST BP <80 MM HG: CPT | Performed by: NURSE PRACTITIONER

## 2025-08-04 PROCEDURE — 3075F SYST BP GE 130 - 139MM HG: CPT | Performed by: NURSE PRACTITIONER

## 2025-08-04 PROCEDURE — G8427 DOCREV CUR MEDS BY ELIG CLIN: HCPCS | Performed by: NURSE PRACTITIONER

## 2025-08-04 PROCEDURE — 3017F COLORECTAL CA SCREEN DOC REV: CPT | Performed by: NURSE PRACTITIONER

## 2025-08-04 PROCEDURE — 73564 X-RAY EXAM KNEE 4 OR MORE: CPT

## 2025-08-04 PROCEDURE — 1036F TOBACCO NON-USER: CPT | Performed by: NURSE PRACTITIONER

## 2025-08-04 PROCEDURE — 99213 OFFICE O/P EST LOW 20 MIN: CPT | Performed by: NURSE PRACTITIONER

## 2025-08-04 PROCEDURE — 3051F HG A1C>EQUAL 7.0%<8.0%: CPT | Performed by: NURSE PRACTITIONER

## 2025-08-04 RX ORDER — CROMOLYN SODIUM 40 MG/ML
SOLUTION/ DROPS OPHTHALMIC
COMMUNITY
Start: 2025-07-18

## 2025-08-04 RX ORDER — INSULIN LISPRO 100 [IU]/ML
INJECTION, SOLUTION INTRAVENOUS; SUBCUTANEOUS
Qty: 10 ML | Refills: 5 | Status: SHIPPED | OUTPATIENT
Start: 2025-08-04

## 2025-08-05 ENCOUNTER — TELEPHONE (OUTPATIENT)
Dept: FAMILY MEDICINE CLINIC | Age: 65
End: 2025-08-05

## 2025-08-05 DIAGNOSIS — S83.412A SPRAIN OF MEDIAL COLLATERAL LIGAMENT OF LEFT KNEE, INITIAL ENCOUNTER: ICD-10-CM

## 2025-08-07 ENCOUNTER — TELEPHONE (OUTPATIENT)
Dept: FAMILY MEDICINE CLINIC | Age: 65
End: 2025-08-07

## 2025-08-07 DIAGNOSIS — I25.10 CORONARY ARTERY DISEASE INVOLVING NATIVE CORONARY ARTERY OF NATIVE HEART WITHOUT ANGINA PECTORIS: ICD-10-CM

## 2025-08-07 DIAGNOSIS — I10 PRIMARY HYPERTENSION: ICD-10-CM

## 2025-08-07 RX ORDER — METOPROLOL TARTRATE 25 MG/1
TABLET, FILM COATED ORAL
Qty: 180 TABLET | Refills: 0 | Status: SHIPPED | OUTPATIENT
Start: 2025-08-07

## 2025-08-21 ENCOUNTER — OFFICE VISIT (OUTPATIENT)
Dept: FAMILY MEDICINE CLINIC | Age: 65
End: 2025-08-21
Payer: MEDICAID

## 2025-08-21 ENCOUNTER — HOSPITAL ENCOUNTER (OUTPATIENT)
Dept: GENERAL RADIOLOGY | Age: 65
Discharge: HOME | End: 2025-08-21
Payer: MEDICAID

## 2025-08-21 ENCOUNTER — RESULTS FOLLOW-UP (OUTPATIENT)
Dept: FAMILY MEDICINE CLINIC | Age: 65
End: 2025-08-21

## 2025-08-21 VITALS
OXYGEN SATURATION: 96 % | DIASTOLIC BLOOD PRESSURE: 64 MMHG | TEMPERATURE: 97.8 F | WEIGHT: 263.6 LBS | SYSTOLIC BLOOD PRESSURE: 142 MMHG | HEIGHT: 69 IN | BODY MASS INDEX: 39.04 KG/M2 | HEART RATE: 105 BPM | RESPIRATION RATE: 18 BRPM

## 2025-08-21 DIAGNOSIS — M25.562 ACUTE PAIN OF LEFT KNEE: ICD-10-CM

## 2025-08-21 DIAGNOSIS — D50.9 IRON DEFICIENCY ANEMIA, UNSPECIFIED IRON DEFICIENCY ANEMIA TYPE: ICD-10-CM

## 2025-08-21 DIAGNOSIS — J30.1 CHRONIC SEASONAL ALLERGIC RHINITIS DUE TO POLLEN: ICD-10-CM

## 2025-08-21 DIAGNOSIS — I10 PRIMARY HYPERTENSION: ICD-10-CM

## 2025-08-21 DIAGNOSIS — E78.5 HYPERLIPIDEMIA LDL GOAL <70: ICD-10-CM

## 2025-08-21 DIAGNOSIS — Z79.4 TYPE 2 DIABETES MELLITUS WITH HYPERGLYCEMIA, WITH LONG-TERM CURRENT USE OF INSULIN (HCC): Primary | ICD-10-CM

## 2025-08-21 DIAGNOSIS — F41.9 ANXIETY: ICD-10-CM

## 2025-08-21 DIAGNOSIS — E11.65 TYPE 2 DIABETES MELLITUS WITH HYPERGLYCEMIA, WITH LONG-TERM CURRENT USE OF INSULIN (HCC): Primary | ICD-10-CM

## 2025-08-21 DIAGNOSIS — I25.10 CORONARY ARTERY DISEASE INVOLVING NATIVE CORONARY ARTERY OF NATIVE HEART WITHOUT ANGINA PECTORIS: ICD-10-CM

## 2025-08-21 LAB
FERRITIN SERPL IA-MCNC: 222 NG/ML (ref 30–400)
HBA1C MFR BLD: 6.5 %
HCT VFR BLD AUTO: 36.5 % (ref 42–52)
HGB BLD-MCNC: 11.7 GM/DL (ref 14–18)

## 2025-08-21 PROCEDURE — 82728 ASSAY OF FERRITIN: CPT

## 2025-08-21 PROCEDURE — 85014 HEMATOCRIT: CPT

## 2025-08-21 PROCEDURE — 3017F COLORECTAL CA SCREEN DOC REV: CPT | Performed by: FAMILY MEDICINE

## 2025-08-21 PROCEDURE — 83036 HEMOGLOBIN GLYCOSYLATED A1C: CPT | Performed by: FAMILY MEDICINE

## 2025-08-21 PROCEDURE — 1036F TOBACCO NON-USER: CPT | Performed by: FAMILY MEDICINE

## 2025-08-21 PROCEDURE — G8427 DOCREV CUR MEDS BY ELIG CLIN: HCPCS | Performed by: FAMILY MEDICINE

## 2025-08-21 PROCEDURE — 3044F HG A1C LEVEL LT 7.0%: CPT | Performed by: FAMILY MEDICINE

## 2025-08-21 PROCEDURE — 36415 COLL VENOUS BLD VENIPUNCTURE: CPT

## 2025-08-21 PROCEDURE — 99214 OFFICE O/P EST MOD 30 MIN: CPT | Performed by: FAMILY MEDICINE

## 2025-08-21 PROCEDURE — 3077F SYST BP >= 140 MM HG: CPT | Performed by: FAMILY MEDICINE

## 2025-08-21 PROCEDURE — 3078F DIAST BP <80 MM HG: CPT | Performed by: FAMILY MEDICINE

## 2025-08-21 PROCEDURE — 2022F DILAT RTA XM EVC RTNOPTHY: CPT | Performed by: FAMILY MEDICINE

## 2025-08-21 PROCEDURE — 85018 HEMOGLOBIN: CPT

## 2025-08-21 PROCEDURE — G8417 CALC BMI ABV UP PARAM F/U: HCPCS | Performed by: FAMILY MEDICINE

## 2025-08-21 RX ORDER — AMLODIPINE BESYLATE 10 MG/1
10 TABLET ORAL DAILY
Qty: 90 TABLET | Refills: 3 | Status: SHIPPED | OUTPATIENT
Start: 2025-08-21

## 2025-08-21 RX ORDER — METOPROLOL TARTRATE 25 MG/1
25 TABLET, FILM COATED ORAL 2 TIMES DAILY
Qty: 180 TABLET | Refills: 3 | Status: SHIPPED | OUTPATIENT
Start: 2025-08-21

## 2025-08-21 RX ORDER — CLOPIDOGREL BISULFATE 75 MG/1
75 TABLET ORAL DAILY
Qty: 90 TABLET | Refills: 3 | Status: SHIPPED | OUTPATIENT
Start: 2025-08-21

## 2025-08-21 RX ORDER — GEMFIBROZIL 600 MG/1
600 TABLET, FILM COATED ORAL 2 TIMES DAILY
Qty: 180 TABLET | Refills: 3 | Status: SHIPPED | OUTPATIENT
Start: 2025-08-21

## 2025-08-21 RX ORDER — FERROUS SULFATE 325(65) MG
325 TABLET ORAL 2 TIMES DAILY
Qty: 180 TABLET | Refills: 3 | Status: SHIPPED | OUTPATIENT
Start: 2025-08-21

## 2025-08-21 RX ORDER — ASPIRIN 81 MG/1
81 TABLET ORAL DAILY
Qty: 90 TABLET | Refills: 3 | Status: SHIPPED | OUTPATIENT
Start: 2025-08-21

## 2025-08-21 RX ORDER — PRAVASTATIN SODIUM 80 MG/1
80 TABLET ORAL NIGHTLY
Qty: 90 TABLET | Refills: 3 | Status: SHIPPED | OUTPATIENT
Start: 2025-08-21

## 2025-08-21 RX ORDER — ALPRAZOLAM 1 MG/1
1 TABLET ORAL 2 TIMES DAILY
Qty: 180 TABLET | Refills: 0 | Status: SHIPPED | OUTPATIENT
Start: 2025-09-11 | End: 2025-12-10

## 2025-08-21 RX ORDER — CROMOLYN SODIUM 40 MG/ML
1 SOLUTION/ DROPS OPHTHALMIC 3 TIMES DAILY
Qty: 3 EACH | Refills: 3 | Status: SHIPPED | OUTPATIENT
Start: 2025-08-21

## 2025-08-21 RX ORDER — ENALAPRIL MALEATE 10 MG/1
10 TABLET ORAL DAILY
Qty: 90 TABLET | Refills: 3 | Status: SHIPPED | OUTPATIENT
Start: 2025-08-21

## 2025-08-26 ENCOUNTER — HOSPITAL ENCOUNTER (OUTPATIENT)
Dept: PHYSICAL THERAPY | Age: 65
Setting detail: THERAPIES SERIES
Discharge: HOME | End: 2025-08-26
Payer: MEDICAID

## 2025-08-26 PROCEDURE — 97162 PT EVAL MOD COMPLEX 30 MIN: CPT

## 2025-08-29 ENCOUNTER — HOSPITAL ENCOUNTER (OUTPATIENT)
Dept: PHYSICAL THERAPY | Age: 65
Setting detail: THERAPIES SERIES
Discharge: HOME | End: 2025-08-29
Payer: MEDICAID

## 2025-08-29 PROCEDURE — 97035 APP MDLTY 1+ULTRASOUND EA 15: CPT

## 2025-08-29 PROCEDURE — 97110 THERAPEUTIC EXERCISES: CPT

## 2025-09-02 ENCOUNTER — TELEPHONE (OUTPATIENT)
Dept: ADMINISTRATIVE | Age: 65
End: 2025-09-02

## 2025-09-02 RX ORDER — MIRABEGRON 50 MG/1
50 TABLET, FILM COATED, EXTENDED RELEASE ORAL DAILY
COMMUNITY
End: 2025-09-02 | Stop reason: SDUPTHER

## 2025-09-02 RX ORDER — MIRABEGRON 50 MG/1
50 TABLET, FILM COATED, EXTENDED RELEASE ORAL DAILY
Qty: 90 TABLET | Refills: 1 | Status: SHIPPED | OUTPATIENT
Start: 2025-09-02 | End: 2025-12-01

## 2025-09-03 ENCOUNTER — HOSPITAL ENCOUNTER (OUTPATIENT)
Dept: PHYSICAL THERAPY | Age: 65
Setting detail: THERAPIES SERIES
Discharge: HOME | End: 2025-09-03
Payer: MEDICAID

## 2025-09-03 PROCEDURE — 97110 THERAPEUTIC EXERCISES: CPT

## 2025-09-03 PROCEDURE — 97035 APP MDLTY 1+ULTRASOUND EA 15: CPT

## 2025-09-04 ENCOUNTER — HOSPITAL ENCOUNTER (OUTPATIENT)
Dept: PHYSICAL THERAPY | Age: 65
Setting detail: THERAPIES SERIES
Discharge: HOME | End: 2025-09-04
Payer: MEDICAID

## 2025-09-04 PROCEDURE — 97035 APP MDLTY 1+ULTRASOUND EA 15: CPT

## 2025-09-04 PROCEDURE — 97110 THERAPEUTIC EXERCISES: CPT

## 2025-09-05 ENCOUNTER — HOSPITAL ENCOUNTER (OUTPATIENT)
Dept: PHYSICAL THERAPY | Age: 65
Setting detail: THERAPIES SERIES
Discharge: HOME | End: 2025-09-05
Payer: MEDICAID

## 2025-09-05 PROCEDURE — 97110 THERAPEUTIC EXERCISES: CPT

## 2025-09-05 PROCEDURE — 97035 APP MDLTY 1+ULTRASOUND EA 15: CPT

## (undated) DEVICE — CYSTO: Brand: MEDLINE INDUSTRIES, INC.

## (undated) DEVICE — DRAINBAG,ANTI-REFLUX TOWER,L/F,2000ML,LL: Brand: MEDLINE

## (undated) DEVICE — CATHETER URETH 22FR 30CC BLLN F 3 W SPEC M RND TIP TWO

## (undated) DEVICE — LASER SURG FIBER 1000 MH RND TIP HOLM SMARTSCOPE DISP

## (undated) DEVICE — CATHETER,FOLEY,SILI-ELAST,LTX,16FR,10ML: Brand: MEDLINE

## (undated) DEVICE — BAG,LEG,COMFORT-STRAPS,LARGE,32OZ: Brand: MEDLINE

## (undated) DEVICE — SOLUTION IRRIG 1000ML STRL H2O USP PLAS POUR BTL

## (undated) DEVICE — SOLUTION IRRIG 3000ML 0.9% SOD CHL USP UROMATIC PLAS CONT

## (undated) DEVICE — LASER SURG W22XH58IN D36IN 475LB SLD STATE FREQ DOUBLED

## (undated) DEVICE — GLOVE ORANGE PI 7 1/2   MSG9075

## (undated) DEVICE — MAX-CORE® DISPOSABLE CORE BIOPSY INSTRUMENT, 18G X 25CM: Brand: MAX-CORE

## (undated) DEVICE — SOLUTION IRRIG 2000ML STRL H2O UROMATIC PLAS CONT USP

## (undated) DEVICE — CONTAINER,SPECIMEN,PNEU TUBE,4OZ,OR STRL: Brand: MEDLINE